# Patient Record
Sex: MALE | Race: WHITE | NOT HISPANIC OR LATINO | Employment: OTHER | URBAN - METROPOLITAN AREA
[De-identification: names, ages, dates, MRNs, and addresses within clinical notes are randomized per-mention and may not be internally consistent; named-entity substitution may affect disease eponyms.]

---

## 2018-08-21 DIAGNOSIS — M25.561 RIGHT KNEE PAIN, UNSPECIFIED CHRONICITY: Primary | ICD-10-CM

## 2019-11-22 ENCOUNTER — HOSPITAL ENCOUNTER (EMERGENCY)
Facility: HOSPITAL | Age: 64
Discharge: HOME | End: 2019-11-22
Attending: EMERGENCY MEDICINE
Payer: MEDICAID

## 2019-11-22 VITALS
RESPIRATION RATE: 18 BRPM | DIASTOLIC BLOOD PRESSURE: 94 MMHG | BODY MASS INDEX: 21.96 KG/M2 | SYSTOLIC BLOOD PRESSURE: 139 MMHG | TEMPERATURE: 98 F | HEIGHT: 77 IN | OXYGEN SATURATION: 95 % | HEART RATE: 88 BPM | WEIGHT: 186 LBS
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
## 2019-11-22 DIAGNOSIS — S50.01XA CONTUSION OF RIGHT ELBOW, INITIAL ENCOUNTER: ICD-10-CM

## 2019-11-22 DIAGNOSIS — S63.501A SPRAIN OF RIGHT WRIST, INITIAL ENCOUNTER: Primary | ICD-10-CM

## 2019-11-22 PROCEDURE — 73080 XR ELBOW COMPLETE 3 VIEW RIGHT: ICD-10-PCS | Mod: 26,RT,, | Performed by: RADIOLOGY

## 2019-11-22 PROCEDURE — 73080 X-RAY EXAM OF ELBOW: CPT | Mod: TC,FY,RT

## 2019-11-22 PROCEDURE — 73080 X-RAY EXAM OF ELBOW: CPT | Mod: 26,RT,, | Performed by: RADIOLOGY

## 2019-11-22 PROCEDURE — 73110 X-RAY EXAM OF WRIST: CPT | Mod: 26,RT,, | Performed by: RADIOLOGY

## 2019-11-22 PROCEDURE — 73110 X-RAY EXAM OF WRIST: CPT | Mod: TC,FY,RT

## 2019-11-22 PROCEDURE — 99284 EMERGENCY DEPT VISIT MOD MDM: CPT | Mod: 25

## 2019-11-22 PROCEDURE — 29125 APPL SHORT ARM SPLINT STATIC: CPT

## 2019-11-22 PROCEDURE — 25000003 PHARM REV CODE 250: Performed by: EMERGENCY MEDICINE

## 2019-11-22 PROCEDURE — 73110 XR WRIST COMPLETE 3 VIEWS RIGHT: ICD-10-PCS | Mod: 26,RT,, | Performed by: RADIOLOGY

## 2019-11-22 RX ORDER — KETOROLAC TROMETHAMINE 10 MG/1
10 TABLET, FILM COATED ORAL
Status: COMPLETED | OUTPATIENT
Start: 2019-11-22 | End: 2019-11-22

## 2019-11-22 RX ORDER — KETOROLAC TROMETHAMINE 10 MG/1
10 TABLET, FILM COATED ORAL EVERY 6 HOURS
Qty: 12 TABLET | Refills: 0 | Status: SHIPPED | OUTPATIENT
Start: 2019-11-22 | End: 2019-11-25

## 2019-11-22 RX ADMIN — KETOROLAC TROMETHAMINE 10 MG: 10 TABLET, FILM COATED ORAL at 09:11

## 2019-11-22 NOTE — ED TRIAGE NOTES
"Pt reports drinking "heavily" after fall in order to stop pain and not have to come to hospital.  "

## 2019-11-22 NOTE — ED PROVIDER NOTES
"Encounter Date: 11/22/2019       History     Chief Complaint   Patient presents with    Fall     4-5 hours pta    Wrist Injury     right wrist     63yo male with pmh anxiety/depression, COPD, HLD, HTN, and UC presents to ED for evaluation of right wrist/elbow pain after hitting his elbow on the shower earlier this morning, approximately 4-5 hours prior to arrival. Pain is constant, aching, and radiates from right wrist to right elbow. States he was showering, "has bad knees," slipped and caught himself on the handicap railing but struck his right elbow on the fiberglass shower. Denies head or neck trauma/pain, back pain, LOC. Denies any other injury.     Admits to drinking "two shots of moonshine" prior to arrival to treat his pain because he did not want to "come in." GCS 15, alert, and appropriate.          Review of patient's allergies indicates:  No Known Allergies  Past Medical History:   Diagnosis Date    Anxiety     COPD (chronic obstructive pulmonary disease)     Depression     High cholesterol     HTN (hypertension)     Ulcerative colitis      Past Surgical History:   Procedure Laterality Date    NECK SURGERY      right knee       Family History   Problem Relation Age of Onset    Diabetes Mother     Diabetes Sister      Social History     Tobacco Use    Smoking status: Current Every Day Smoker     Packs/day: 1.50     Years: 40.00     Pack years: 60.00     Types: Cigarettes    Smokeless tobacco: Never Used   Substance Use Topics    Alcohol use: Yes    Drug use: No     Review of Systems   Constitutional: Negative for appetite change, chills, diaphoresis, fatigue and fever.   HENT: Negative for congestion, ear pain, rhinorrhea, sinus pressure, sinus pain, sore throat and tinnitus.    Eyes: Negative for photophobia and visual disturbance.   Respiratory: Negative for cough, chest tightness, shortness of breath and wheezing.    Cardiovascular: Negative for chest pain, palpitations and leg " swelling.   Gastrointestinal: Negative for abdominal pain, constipation, diarrhea, nausea and vomiting.   Endocrine: Negative for cold intolerance, heat intolerance, polydipsia, polyphagia and polyuria.   Genitourinary: Negative for decreased urine volume, difficulty urinating, dysuria, flank pain, frequency, hematuria and urgency.   Musculoskeletal: Positive for arthralgias and joint swelling. Negative for back pain, gait problem, myalgias, neck pain and neck stiffness.   Skin: Negative for color change, pallor, rash and wound.   Allergic/Immunologic: Negative for immunocompromised state.   Neurological: Negative for dizziness, syncope, weakness, light-headedness, numbness and headaches.   Hematological: Negative for adenopathy. Does not bruise/bleed easily.   Psychiatric/Behavioral: Negative for decreased concentration, dysphoric mood and sleep disturbance. The patient is not nervous/anxious.    All other systems reviewed and are negative.      Physical Exam     Initial Vitals [11/22/19 0801]   BP Pulse Resp Temp SpO2   (!) 139/94 88 18 98 °F (36.7 °C) 95 %      MAP       --         Physical Exam    Nursing note and vitals reviewed.  Constitutional: He appears well-developed and well-nourished. He is not diaphoretic. No distress.   HENT:   Head: Normocephalic and atraumatic.   Right Ear: External ear normal.   Left Ear: External ear normal.   Nose: Nose normal.   Mouth/Throat: Oropharynx is clear and moist.   Eyes: Conjunctivae are normal. Pupils are equal, round, and reactive to light. No scleral icterus.   Neck: Normal range of motion and full passive range of motion without pain. Neck supple. No spinous process tenderness and no muscular tenderness present. Normal range of motion present. No JVD present.   Cardiovascular: Normal rate, regular rhythm, normal heart sounds and intact distal pulses.   Pulmonary/Chest: Breath sounds normal. No respiratory distress. He has no wheezes. He has no rhonchi. He has no  rales. He exhibits no tenderness.   Abdominal: Soft. Bowel sounds are normal. He exhibits no distension. There is no tenderness.   Musculoskeletal: He exhibits edema and tenderness.        Right elbow: He exhibits decreased range of motion and swelling. He exhibits no deformity. Tenderness found.        Right wrist: He exhibits decreased range of motion, tenderness, bony tenderness and swelling. He exhibits no deformity.   Lymphadenopathy:     He has no cervical adenopathy.   Neurological: He is alert and oriented to person, place, and time. GCS score is 15. GCS eye subscore is 4. GCS verbal subscore is 5. GCS motor subscore is 6.   Skin: Skin is warm and dry. Capillary refill takes less than 2 seconds. No rash and no abscess noted. No erythema. No pallor.   Psychiatric: He has a normal mood and affect. His behavior is normal. Judgment and thought content normal.         ED Course   Procedures  Labs Reviewed - No data to display       Imaging Results          X-Ray Elbow Complete Right (Final result)  Result time 11/22/19 09:14:08    Final result by Dmitry Finnegan MD (11/22/19 09:14:08)                 Impression:      1. No acute fracture dislocation.  2. Mild degenerative osteoarthrosis.      Electronically signed by: Dmitry Finnegan  Date:    11/22/2019  Time:    09:14             Narrative:    EXAMINATION:  XR ELBOW COMPLETE 3 VIEW RIGHT    CLINICAL HISTORY:  . Unspecified fall, initial encounter    TECHNIQUE:  AP, lateral, and oblique views of the right elbow were performed.    COMPARISON:  None    FINDINGS:  No acute fracture or dislocation.  No significant soft tissue swelling.    The joint spaces are preserved.  Mild degenerative osteoarthrosis with small osteophyte formation.  Small osteophyte formation of the medial and lateral epicondyle.    No significant joint effusion.                               X-Ray Wrist Complete Right (Final result)  Result time 11/22/19 08:38:49    Final result by Dmitry AQUINO  MD Kain (11/22/19 08:38:49)                 Impression:      1. Mild degenerative osteoarthrosis.  2. Mild bone demineralization.      Electronically signed by: Dmitry Finnegan  Date:    11/22/2019  Time:    08:38             Narrative:    EXAMINATION:  XR WRIST COMPLETE 3 VIEWS RIGHT    CLINICAL HISTORY:  Unspecified fall, initial encounter    TECHNIQUE:  PA, lateral, and oblique views of the right wrist were performed.    COMPARISON:  None.    FINDINGS:  There is no acute fracture or dislocation.  No significant soft tissue swelling.    The carpal bones are intact with mild changes of degenerative osteoarthrosis.  The radiocarpal articulation is intact with mild degenerative osteoarthrosis at the radioscaphoid joint.  The ulnar styloid is intact.    Mild bone demineralization.                                 Medical Decision Making:   Differential Diagnosis:   Sprain, contusion, fracture  ED Management:  Imaging without any acute findings. Discussed all results with the patient, who will follow up with pcp as instructed.   MS  without any prescribed controlled substances within the last year.   Splinted for comfort and provided prescription for Toradol.                                  Clinical Impression:       ICD-10-CM ICD-9-CM   1. Sprain of right wrist, initial encounter S63.501A 842.00   2. Fall W19.XXXA E888.9   3. Contusion of right elbow, initial encounter S50.01XA 923.11         Disposition:   Disposition: Discharged  Condition: Stable                     Caty Calderon MD  11/22/19 6621

## 2020-01-28 ENCOUNTER — HOSPITAL ENCOUNTER (OUTPATIENT)
Dept: RADIOLOGY | Facility: HOSPITAL | Age: 65
Discharge: HOME | End: 2020-01-28
Attending: NURSE PRACTITIONER
Payer: MEDICAID

## 2020-01-28 DIAGNOSIS — Z72.0 TOBACCO ABUSE: ICD-10-CM

## 2020-01-28 DIAGNOSIS — J44.9 CHRONIC OBSTRUCTIVE PULMONARY DISEASE, UNSPECIFIED COPD TYPE: ICD-10-CM

## 2020-01-28 DIAGNOSIS — Z12.9 SCREENING FOR CANCER: ICD-10-CM

## 2020-01-28 PROCEDURE — 71250 CT THORAX DX C-: CPT | Mod: TC

## 2020-01-28 PROCEDURE — 71250 CT THORAX DX C-: CPT | Mod: 26,,, | Performed by: RADIOLOGY

## 2020-01-28 PROCEDURE — 71250 CT CHEST WITHOUT CONTRAST: ICD-10-PCS | Mod: 26,,, | Performed by: RADIOLOGY

## 2020-01-30 ENCOUNTER — TELEPHONE (OUTPATIENT)
Dept: UROLOGY | Facility: CLINIC | Age: 65
End: 2020-01-30

## 2020-01-30 NOTE — TELEPHONE ENCOUNTER
Spoke with pt. Scheduled consult with MD in 02/2020. Verbalized an understanding.     ----- Message from Pricila Glover sent at 1/30/2020  2:38 PM CST -----  Contact: Pt  Type: Needs Medical Advice    Who Called:  Pt  Best Call Back Number: [phone]  Additional Information: Requesting a call back regarding if pt appointment will be approved by his insurance   Please Advise ---Thank you

## 2020-02-12 ENCOUNTER — HOSPITAL ENCOUNTER (EMERGENCY)
Facility: HOSPITAL | Age: 65
Discharge: HOME | End: 2020-02-12
Attending: FAMILY MEDICINE
Payer: MEDICAID

## 2020-02-12 VITALS
HEART RATE: 83 BPM | DIASTOLIC BLOOD PRESSURE: 100 MMHG | WEIGHT: 185 LBS | HEIGHT: 77 IN | RESPIRATION RATE: 16 BRPM | TEMPERATURE: 98 F | SYSTOLIC BLOOD PRESSURE: 140 MMHG | BODY MASS INDEX: 21.84 KG/M2 | OXYGEN SATURATION: 96 %

## 2020-02-12 DIAGNOSIS — R05.9 COUGH: ICD-10-CM

## 2020-02-12 DIAGNOSIS — J44.1 COPD WITH ACUTE EXACERBATION: Primary | ICD-10-CM

## 2020-02-12 DIAGNOSIS — R06.02 SOB (SHORTNESS OF BREATH): ICD-10-CM

## 2020-02-12 LAB
ALBUMIN SERPL BCP-MCNC: 4 G/DL (ref 3.5–5.2)
ALP SERPL-CCNC: 93 U/L (ref 55–135)
ALT SERPL W/O P-5'-P-CCNC: 26 U/L (ref 10–44)
ANION GAP SERPL CALC-SCNC: 10 MMOL/L (ref 8–16)
AST SERPL-CCNC: 23 U/L (ref 10–40)
BASOPHILS # BLD AUTO: 0.05 K/UL (ref 0–0.2)
BASOPHILS NFR BLD: 0.6 % (ref 0–1.9)
BILIRUB SERPL-MCNC: 0.5 MG/DL (ref 0.1–1)
BUN SERPL-MCNC: 10 MG/DL (ref 8–23)
CALCIUM SERPL-MCNC: 9.4 MG/DL (ref 8.7–10.5)
CHLORIDE SERPL-SCNC: 105 MMOL/L (ref 95–110)
CO2 SERPL-SCNC: 25 MMOL/L (ref 23–29)
CREAT SERPL-MCNC: 0.7 MG/DL (ref 0.5–1.4)
DEPRECATED S PYO AG THROAT QL EIA: NEGATIVE
DIFFERENTIAL METHOD: ABNORMAL
EOSINOPHIL # BLD AUTO: 0.2 K/UL (ref 0–0.5)
EOSINOPHIL NFR BLD: 2.5 % (ref 0–8)
ERYTHROCYTE [DISTWIDTH] IN BLOOD BY AUTOMATED COUNT: 13.2 % (ref 11.5–14.5)
EST. GFR  (AFRICAN AMERICAN): >60 ML/MIN/1.73 M^2
EST. GFR  (NON AFRICAN AMERICAN): >60 ML/MIN/1.73 M^2
GLUCOSE SERPL-MCNC: 112 MG/DL (ref 70–110)
HCT VFR BLD AUTO: 44.2 % (ref 40–54)
HGB BLD-MCNC: 14.7 G/DL (ref 14–18)
IMM GRANULOCYTES # BLD AUTO: 0.02 K/UL (ref 0–0.04)
IMM GRANULOCYTES NFR BLD AUTO: 0.3 % (ref 0–0.5)
INFLUENZA A, MOLECULAR: NEGATIVE
INFLUENZA B, MOLECULAR: NEGATIVE
LYMPHOCYTES # BLD AUTO: 1.3 K/UL (ref 1–4.8)
LYMPHOCYTES NFR BLD: 16.9 % (ref 18–48)
MCH RBC QN AUTO: 30.7 PG (ref 27–31)
MCHC RBC AUTO-ENTMCNC: 33.3 G/DL (ref 32–36)
MCV RBC AUTO: 92 FL (ref 82–98)
MONOCYTES # BLD AUTO: 0.6 K/UL (ref 0.3–1)
MONOCYTES NFR BLD: 7.3 % (ref 4–15)
NEUTROPHILS # BLD AUTO: 5.7 K/UL (ref 1.8–7.7)
NEUTROPHILS NFR BLD: 72.4 % (ref 38–73)
NRBC BLD-RTO: 0 /100 WBC
PLATELET # BLD AUTO: 278 K/UL (ref 150–350)
PMV BLD AUTO: 10.5 FL (ref 9.2–12.9)
POTASSIUM SERPL-SCNC: 4.1 MMOL/L (ref 3.5–5.1)
PROT SERPL-MCNC: 7.2 G/DL (ref 6–8.4)
RBC # BLD AUTO: 4.79 M/UL (ref 4.6–6.2)
SODIUM SERPL-SCNC: 140 MMOL/L (ref 136–145)
SPECIMEN SOURCE: NORMAL
WBC # BLD AUTO: 7.91 K/UL (ref 3.9–12.7)

## 2020-02-12 PROCEDURE — 99284 EMERGENCY DEPT VISIT MOD MDM: CPT | Mod: 25

## 2020-02-12 PROCEDURE — 96375 TX/PRO/DX INJ NEW DRUG ADDON: CPT

## 2020-02-12 PROCEDURE — 94640 AIRWAY INHALATION TREATMENT: CPT

## 2020-02-12 PROCEDURE — 94761 N-INVAS EAR/PLS OXIMETRY MLT: CPT

## 2020-02-12 PROCEDURE — 96365 THER/PROPH/DIAG IV INF INIT: CPT

## 2020-02-12 PROCEDURE — 71046 XR CHEST PA AND LATERAL: ICD-10-PCS | Mod: 26,,, | Performed by: RADIOLOGY

## 2020-02-12 PROCEDURE — 25000242 PHARM REV CODE 250 ALT 637 W/ HCPCS: Performed by: FAMILY MEDICINE

## 2020-02-12 PROCEDURE — 85025 COMPLETE CBC W/AUTO DIFF WBC: CPT

## 2020-02-12 PROCEDURE — 87081 CULTURE SCREEN ONLY: CPT

## 2020-02-12 PROCEDURE — 80053 COMPREHEN METABOLIC PANEL: CPT

## 2020-02-12 PROCEDURE — 87880 STREP A ASSAY W/OPTIC: CPT

## 2020-02-12 PROCEDURE — 87502 INFLUENZA DNA AMP PROBE: CPT

## 2020-02-12 PROCEDURE — 63600175 PHARM REV CODE 636 W HCPCS: Performed by: FAMILY MEDICINE

## 2020-02-12 PROCEDURE — 71046 X-RAY EXAM CHEST 2 VIEWS: CPT | Mod: TC,FY

## 2020-02-12 PROCEDURE — 71046 X-RAY EXAM CHEST 2 VIEWS: CPT | Mod: 26,,, | Performed by: RADIOLOGY

## 2020-02-12 RX ORDER — LEVOFLOXACIN 5 MG/ML
750 INJECTION, SOLUTION INTRAVENOUS
Status: COMPLETED | OUTPATIENT
Start: 2020-02-12 | End: 2020-02-12

## 2020-02-12 RX ORDER — IPRATROPIUM BROMIDE AND ALBUTEROL SULFATE 2.5; .5 MG/3ML; MG/3ML
6 SOLUTION RESPIRATORY (INHALATION)
Status: COMPLETED | OUTPATIENT
Start: 2020-02-12 | End: 2020-02-12

## 2020-02-12 RX ORDER — AMOXICILLIN AND CLAVULANATE POTASSIUM 875; 125 MG/1; MG/1
1 TABLET, FILM COATED ORAL 2 TIMES DAILY
Qty: 20 TABLET | Refills: 0 | Status: SHIPPED | OUTPATIENT
Start: 2020-02-12 | End: 2020-02-12 | Stop reason: SDUPTHER

## 2020-02-12 RX ORDER — ALBUTEROL SULFATE 90 UG/1
1-2 AEROSOL, METERED RESPIRATORY (INHALATION) EVERY 4 HOURS PRN
Qty: 8 G | Refills: 1 | Status: SHIPPED | OUTPATIENT
Start: 2020-02-12 | End: 2020-02-12 | Stop reason: SDUPTHER

## 2020-02-12 RX ORDER — PREDNISONE 20 MG/1
60 TABLET ORAL DAILY
Qty: 15 TABLET | Refills: 0 | Status: SHIPPED | OUTPATIENT
Start: 2020-02-12 | End: 2020-02-12 | Stop reason: SDUPTHER

## 2020-02-12 RX ORDER — PREDNISONE 20 MG/1
60 TABLET ORAL DAILY
Qty: 15 TABLET | Refills: 0 | Status: SHIPPED | OUTPATIENT
Start: 2020-02-12 | End: 2020-02-17

## 2020-02-12 RX ORDER — ALBUTEROL SULFATE 90 UG/1
1-2 AEROSOL, METERED RESPIRATORY (INHALATION) EVERY 4 HOURS PRN
Qty: 8 G | Refills: 1 | Status: SHIPPED | OUTPATIENT
Start: 2020-02-12 | End: 2020-05-29 | Stop reason: SDUPTHER

## 2020-02-12 RX ORDER — METHYLPREDNISOLONE SOD SUCC 125 MG
125 VIAL (EA) INJECTION
Status: COMPLETED | OUTPATIENT
Start: 2020-02-12 | End: 2020-02-12

## 2020-02-12 RX ORDER — AMOXICILLIN AND CLAVULANATE POTASSIUM 875; 125 MG/1; MG/1
1 TABLET, FILM COATED ORAL 2 TIMES DAILY
Qty: 20 TABLET | Refills: 0 | Status: SHIPPED | OUTPATIENT
Start: 2020-02-12 | End: 2020-03-06

## 2020-02-12 RX ADMIN — METHYLPREDNISOLONE SODIUM SUCCINATE 125 MG: 125 INJECTION, POWDER, FOR SOLUTION INTRAMUSCULAR; INTRAVENOUS at 04:02

## 2020-02-12 RX ADMIN — LEVOFLOXACIN 750 MG: 750 INJECTION, SOLUTION INTRAVENOUS at 04:02

## 2020-02-12 RX ADMIN — IPRATROPIUM BROMIDE AND ALBUTEROL SULFATE 6 ML: .5; 3 SOLUTION RESPIRATORY (INHALATION) at 04:02

## 2020-02-12 NOTE — ED PROVIDER NOTES
Encounter Date: 2/12/2020       History     Chief Complaint   Patient presents with    Cough    suspected pneumonia     Patient comes is complaining of shortness of breath that has worsened over the last 1 week.  He has a productive cough with yellow sputum production.  He has a chronic cough from smoking.  He continues to smoke, but claims to have cut down.  Patient does have a history of emphysema.  He has worsening of his wheezing.  He does not use any nebulizer or MDI therapy.  He denies any sick contacts.  He does not use home oxygen.  He states that his last COPD flare-up was about 2 years prior.        Review of patient's allergies indicates:  No Known Allergies  Past Medical History:   Diagnosis Date    Anxiety     COPD (chronic obstructive pulmonary disease)     Depression     High cholesterol     HTN (hypertension)     Ulcerative colitis      Past Surgical History:   Procedure Laterality Date    NECK SURGERY      right knee       Family History   Problem Relation Age of Onset    Diabetes Mother     Diabetes Sister      Social History     Tobacco Use    Smoking status: Current Every Day Smoker     Packs/day: 1.50     Years: 40.00     Pack years: 60.00     Types: Cigarettes    Smokeless tobacco: Never Used   Substance Use Topics    Alcohol use: Yes    Drug use: No     Review of Systems   Constitutional: Negative for chills, diaphoresis, fatigue and fever.   HENT: Negative for sinus pressure, sinus pain, sneezing and sore throat.    Eyes: Negative for visual disturbance.   Respiratory: Positive for cough, shortness of breath and wheezing. Negative for choking, chest tightness and stridor.    Cardiovascular: Negative for chest pain, palpitations and leg swelling.   Gastrointestinal: Negative for abdominal pain, constipation, diarrhea, nausea and vomiting.   Genitourinary: Negative for dysuria, flank pain and frequency.   Musculoskeletal: Negative for arthralgias and myalgias.   Skin: Negative  for color change, pallor, rash and wound.   Neurological: Negative for dizziness, weakness, light-headedness and headaches.   Hematological: Negative for adenopathy. Does not bruise/bleed easily.   Psychiatric/Behavioral: Negative for agitation, behavioral problems and confusion.       Physical Exam     Initial Vitals [02/12/20 1543]   BP Pulse Resp Temp SpO2   (!) 140/100 80 16 98 °F (36.7 °C) 96 %      MAP       --         Physical Exam    Nursing note and vitals reviewed.  Constitutional: He appears well-developed and well-nourished. He is not diaphoretic. No distress.   HENT:   Head: Normocephalic and atraumatic.   Nose: Nose normal.   Mouth/Throat: Oropharynx is clear and moist. No oropharyngeal exudate.   Eyes: Conjunctivae and EOM are normal.   Cardiovascular: Normal rate, regular rhythm, normal heart sounds and intact distal pulses.   No murmur heard.  Pulmonary/Chest: He has no wheezes. He has no rhonchi. He exhibits no tenderness.   diminished breath sounds   Abdominal: Soft. Bowel sounds are normal. He exhibits no distension. There is no tenderness. There is no rebound and no guarding.   Musculoskeletal: Normal range of motion. He exhibits no edema or tenderness.   Lymphadenopathy:     He has no cervical adenopathy.   Neurological: He is alert and oriented to person, place, and time. He has normal strength. No cranial nerve deficit or sensory deficit. GCS score is 15. GCS eye subscore is 4. GCS verbal subscore is 5. GCS motor subscore is 6.   Skin: Skin is warm and dry. Capillary refill takes less than 2 seconds. No rash and no abscess noted. No erythema. No pallor.   Psychiatric: He has a normal mood and affect. His behavior is normal. Judgment normal.         ED Course   Procedures  Labs Reviewed - No data to display       Imaging Results    None       X-Rays:   Independently Interpreted Readings:   Other Readings:  Chronic left pleural effusion unchanged from previous exams.  Lower lobe atelectasis on  left side noted. No infiltrates noted.                                    Clinical Impression:       ICD-10-CM ICD-9-CM   1. COPD with acute exacerbation J44.1 491.21   2. Cough R05 786.2   3. SOB (shortness of breath) R06.02 786.05         Disposition:   Disposition: Discharged  Condition: Stable                     Kanu Vargas MD  02/12/20 1860

## 2020-02-12 NOTE — ED TRIAGE NOTES
Pt sent from Dr. Farrell's office by NP for suspected pneumonia.  Cough congestion for 1 wk.  Hx of COPD

## 2020-02-15 LAB — BACTERIA THROAT CULT: NORMAL

## 2020-03-06 ENCOUNTER — OFFICE VISIT (OUTPATIENT)
Dept: UROLOGY | Facility: CLINIC | Age: 65
End: 2020-03-06
Payer: MEDICAID

## 2020-03-06 VITALS
HEART RATE: 64 BPM | BODY MASS INDEX: 21.73 KG/M2 | RESPIRATION RATE: 16 BRPM | SYSTOLIC BLOOD PRESSURE: 137 MMHG | TEMPERATURE: 98 F | WEIGHT: 184 LBS | HEIGHT: 77 IN | DIASTOLIC BLOOD PRESSURE: 87 MMHG

## 2020-03-06 DIAGNOSIS — R97.20 ELEVATED PSA: ICD-10-CM

## 2020-03-06 PROCEDURE — 99999 PR PBB SHADOW E&M-EST. PATIENT-LVL IV: ICD-10-PCS | Mod: PBBFAC,,, | Performed by: UROLOGY

## 2020-03-06 PROCEDURE — 99204 OFFICE O/P NEW MOD 45 MIN: CPT | Mod: S$PBB,,, | Performed by: UROLOGY

## 2020-03-06 PROCEDURE — 99214 OFFICE O/P EST MOD 30 MIN: CPT | Mod: PBBFAC | Performed by: UROLOGY

## 2020-03-06 PROCEDURE — 99999 PR PBB SHADOW E&M-EST. PATIENT-LVL IV: CPT | Mod: PBBFAC,,, | Performed by: UROLOGY

## 2020-03-06 PROCEDURE — 99204 PR OFFICE/OUTPT VISIT, NEW, LEVL IV, 45-59 MIN: ICD-10-PCS | Mod: S$PBB,,, | Performed by: UROLOGY

## 2020-03-06 NOTE — PROGRESS NOTES
Subjective:       Patient ID: Jama James is a 64 y.o. male.    Chief Complaint:   Elevated PSA.  This is a consultation with Dr. Marisel Farrell please send a copy of the report to him.     HPI   Mr. James is a 64-year-old male who in a recent routine evaluation was found to have a significant elevation of the PSA level.  On February 12, 2020 his PSA was 23.0.  The patient has been referred to our office for further evaluation and possible treatments.  The patient referred that have significant nocturia times 8-10 times.  Denies dysuria or hematuria.  The flow is a slow but feels to be adequate.  He feels that he is not able to empty the bladder satisfactory.  The patient denies any significant bone pain.  In the last few years have lost significant weight he refers that 5 years ago was 250 now is 184.    The past genitourinary history is completely negative no history of urinary tract infections or stones.  The family history is negative for prostate cancer.    The past medical and surgical history is significant this patient suffer of severe COPD and also asbestosis.  Is a tobacco abuser.    The postvoid residual urine was measured and found to be at 7 cc.      Review of Systems   Constitutional: Negative for activity change and appetite change.   HENT: Negative.    Eyes: Negative for discharge.   Respiratory: Positive for cough and shortness of breath.    Cardiovascular: Negative for chest pain and palpitations.   Gastrointestinal: Negative for abdominal distention, abdominal pain, constipation and vomiting.   Genitourinary: Negative for discharge, dysuria, flank pain, frequency, hematuria, testicular pain and urgency.   Musculoskeletal: Negative for arthralgias.   Skin: Negative for rash.   Neurological: Negative for dizziness.   Psychiatric/Behavioral: The patient is not nervous/anxious.          Objective:      Physical Exam   Constitutional: He appears well-developed and well-nourished.   HENT:   Head:  Normocephalic.   Eyes: Pupils are equal, round, and reactive to light.   Neck: Normal range of motion.   Cardiovascular: Normal rate.    Pulmonary/Chest: Effort normal.   Abdominal: Soft. He exhibits no distension and no mass. There is no tenderness.   Genitourinary: Rectum normal and penis normal. Rectal exam shows no external hemorrhoid, no mass and no tenderness. Prostate is not enlarged and not tender. Right testis shows no mass and no tenderness. Left testis shows no mass and no tenderness. No discharge found.       Musculoskeletal: Normal range of motion.   Neurological: He is alert.   Skin: Skin is warm.     Psychiatric: He has a normal mood and affect.       Assessment:       1. Elevated PSA        Plan:       Elevated PSA  -     Case Request Operating Room: BIOPSY, PROSTATE, RECTAL APPROACH, WITH US GUIDANCE  -     US Transrectal; Future; Expected date: 03/18/2020     The patient was informed that we need to proceed with a prostate biopsy in order to determine if he has are not prostatic neoplasm.  The rationale risks and complication were fully explained to him and his family member.  He agreed to proceed with it and has been is scheduled to undergo this procedure on 03/18/2020 under general mac sedation.  I thank you for letting me participate in the care of your patient will be following the patient with you.

## 2020-03-06 NOTE — LETTER
March 6, 2020      Lisa Y. Cooksey, MILAGROS  [address]  [place]  [place] [address]           [facility]  [address] [place] [address]  Phone: [phone]  Fax: [phone]          Patient: Jama James   MR Number: [id number]   YOB: 1955   Date of Visit: 3/6/2020       Dear Lisa Y. Cooksey:    Thank you for referring Jama James to me for evaluation. Attached you will find relevant portions of my assessment and plan of care.    If you have questions, please do not hesitate to call me. I look forward to following Jama James along with you.    Sincerely,    Bill Jeong MD    Enclosure  CC:  No Recipients    If you would like to receive this communication electronically, please contact [email or web address] or [phone] to request more information on [employer] Link access.    For providers and/or their staff who would like to refer a patient to [facility], please contact us through our one-stop-shop provider referral line, [facility], at [phone].    If you feel you have received this communication in error or would no longer like to receive these types of communications, please e-mail [email or web address]

## 2020-03-16 ENCOUNTER — HOSPITAL ENCOUNTER (OUTPATIENT)
Dept: PREADMISSION TESTING | Facility: HOSPITAL | Age: 65
Discharge: HOME | End: 2020-03-16
Attending: UROLOGY
Payer: MEDICAID

## 2020-03-18 ENCOUNTER — HOSPITAL ENCOUNTER (OUTPATIENT)
Dept: RADIOLOGY | Facility: HOSPITAL | Age: 65
Discharge: HOME | End: 2020-03-18
Attending: UROLOGY | Admitting: UROLOGY
Payer: MEDICAID

## 2020-03-18 ENCOUNTER — ANESTHESIA EVENT (OUTPATIENT)
Dept: SURGERY | Facility: HOSPITAL | Age: 65
End: 2020-03-18
Payer: MEDICAID

## 2020-03-18 ENCOUNTER — ANESTHESIA (OUTPATIENT)
Dept: SURGERY | Facility: HOSPITAL | Age: 65
End: 2020-03-18
Payer: MEDICAID

## 2020-03-18 ENCOUNTER — HOSPITAL ENCOUNTER (OUTPATIENT)
Facility: HOSPITAL | Age: 65
Discharge: HOME | End: 2020-03-18
Attending: UROLOGY | Admitting: UROLOGY
Payer: MEDICAID

## 2020-03-18 VITALS
RESPIRATION RATE: 16 BRPM | BODY MASS INDEX: 21.84 KG/M2 | OXYGEN SATURATION: 96 % | HEIGHT: 77 IN | DIASTOLIC BLOOD PRESSURE: 83 MMHG | SYSTOLIC BLOOD PRESSURE: 149 MMHG | TEMPERATURE: 98 F | HEART RATE: 70 BPM | WEIGHT: 185 LBS

## 2020-03-18 DIAGNOSIS — R97.20 ELEVATED PSA: ICD-10-CM

## 2020-03-18 PROCEDURE — 25000242 PHARM REV CODE 250 ALT 637 W/ HCPCS

## 2020-03-18 PROCEDURE — 88305 TISSUE EXAM BY PATHOLOGIST: CPT | Mod: 26,,, | Performed by: PATHOLOGY

## 2020-03-18 PROCEDURE — 76872 US TRANSRECTAL: CPT | Mod: 26,,, | Performed by: UROLOGY

## 2020-03-18 PROCEDURE — 00910 ANES TRANSURETHRAL PX NOS: CPT | Performed by: UROLOGY

## 2020-03-18 PROCEDURE — 37000009 HC ANESTHESIA EA ADD 15 MINS: Performed by: UROLOGY

## 2020-03-18 PROCEDURE — 63600175 PHARM REV CODE 636 W HCPCS: Performed by: UROLOGY

## 2020-03-18 PROCEDURE — D9220A PRA ANESTHESIA: ICD-10-PCS | Mod: ,,, | Performed by: ANESTHESIOLOGY

## 2020-03-18 PROCEDURE — 76998 US INTRAOPERATIVE: ICD-10-PCS | Mod: 26,,, | Performed by: RADIOLOGY

## 2020-03-18 PROCEDURE — 63600175 PHARM REV CODE 636 W HCPCS: Performed by: NURSE ANESTHETIST, CERTIFIED REGISTERED

## 2020-03-18 PROCEDURE — D9220A PRA ANESTHESIA: Mod: ,,, | Performed by: ANESTHESIOLOGY

## 2020-03-18 PROCEDURE — 25000003 PHARM REV CODE 250: Performed by: UROLOGY

## 2020-03-18 PROCEDURE — 55700 PR BIOPSY OF PROSTATE,NEEDLE/PUNCH: ICD-10-PCS | Mod: ,,, | Performed by: UROLOGY

## 2020-03-18 PROCEDURE — 25000003 PHARM REV CODE 250: Performed by: NURSE ANESTHETIST, CERTIFIED REGISTERED

## 2020-03-18 PROCEDURE — 71000015 HC POSTOP RECOV 1ST HR: Performed by: UROLOGY

## 2020-03-18 PROCEDURE — 37000008 HC ANESTHESIA 1ST 15 MINUTES: Performed by: UROLOGY

## 2020-03-18 PROCEDURE — 25000003 PHARM REV CODE 250: Performed by: ANESTHESIOLOGY

## 2020-03-18 PROCEDURE — 36000705 HC OR TIME LEV I EA ADD 15 MIN: Performed by: UROLOGY

## 2020-03-18 PROCEDURE — 76998 US GUIDE INTRAOP: CPT | Mod: TC

## 2020-03-18 PROCEDURE — 76872 PR US TRANSRECTAL: ICD-10-PCS | Mod: 26,,, | Performed by: UROLOGY

## 2020-03-18 PROCEDURE — 63600175 PHARM REV CODE 636 W HCPCS: Performed by: ANESTHESIOLOGY

## 2020-03-18 PROCEDURE — 76998 US GUIDE INTRAOP: CPT | Mod: 26,,, | Performed by: RADIOLOGY

## 2020-03-18 PROCEDURE — 88305 TISSUE EXAM BY PATHOLOGIST: ICD-10-PCS | Mod: 26,,, | Performed by: PATHOLOGY

## 2020-03-18 PROCEDURE — 55700 PR BIOPSY OF PROSTATE,NEEDLE/PUNCH: CPT | Mod: ,,, | Performed by: UROLOGY

## 2020-03-18 PROCEDURE — S0028 INJECTION, FAMOTIDINE, 20 MG: HCPCS | Performed by: ANESTHESIOLOGY

## 2020-03-18 PROCEDURE — 76942 PR U/S GUIDANCE FOR NEEDLE GUIDANCE: ICD-10-PCS | Mod: 26,59,, | Performed by: UROLOGY

## 2020-03-18 PROCEDURE — 27201423 OPTIME MED/SURG SUP & DEVICES STERILE SUPPLY: Performed by: UROLOGY

## 2020-03-18 PROCEDURE — 76942 ECHO GUIDE FOR BIOPSY: CPT | Mod: 26,59,, | Performed by: UROLOGY

## 2020-03-18 PROCEDURE — 36000704 HC OR TIME LEV I 1ST 15 MIN: Performed by: UROLOGY

## 2020-03-18 PROCEDURE — 71000033 HC RECOVERY, INTIAL HOUR: Performed by: UROLOGY

## 2020-03-18 PROCEDURE — 88305 TISSUE EXAM BY PATHOLOGIST: CPT | Mod: 59 | Performed by: PATHOLOGY

## 2020-03-18 PROCEDURE — 94640 AIRWAY INHALATION TREATMENT: CPT | Mod: 59

## 2020-03-18 PROCEDURE — 52000 CYSTOURETHROSCOPY: CPT | Mod: 59,,, | Performed by: UROLOGY

## 2020-03-18 PROCEDURE — 94761 N-INVAS EAR/PLS OXIMETRY MLT: CPT | Mod: 59

## 2020-03-18 PROCEDURE — 52000 PR CYSTOURETHROSCOPY: ICD-10-PCS | Mod: 59,,, | Performed by: UROLOGY

## 2020-03-18 PROCEDURE — 94760 N-INVAS EAR/PLS OXIMETRY 1: CPT

## 2020-03-18 RX ORDER — DIPHENHYDRAMINE HYDROCHLORIDE 50 MG/ML
12.5 INJECTION INTRAMUSCULAR; INTRAVENOUS
Status: DISCONTINUED | OUTPATIENT
Start: 2020-03-18 | End: 2020-03-18 | Stop reason: HOSPADM

## 2020-03-18 RX ORDER — ONDANSETRON 2 MG/ML
4 INJECTION INTRAMUSCULAR; INTRAVENOUS DAILY PRN
Status: DISCONTINUED | OUTPATIENT
Start: 2020-03-18 | End: 2020-03-18 | Stop reason: HOSPADM

## 2020-03-18 RX ORDER — SUCCINYLCHOLINE CHLORIDE 20 MG/ML
INJECTION INTRAMUSCULAR; INTRAVENOUS
Status: DISCONTINUED | OUTPATIENT
Start: 2020-03-18 | End: 2020-03-18

## 2020-03-18 RX ORDER — FAMOTIDINE 10 MG/ML
INJECTION INTRAVENOUS
Status: DISCONTINUED
Start: 2020-03-18 | End: 2020-03-18 | Stop reason: HOSPADM

## 2020-03-18 RX ORDER — IPRATROPIUM BROMIDE AND ALBUTEROL SULFATE 2.5; .5 MG/3ML; MG/3ML
SOLUTION RESPIRATORY (INHALATION)
Status: COMPLETED
Start: 2020-03-18 | End: 2020-03-18

## 2020-03-18 RX ORDER — MEPERIDINE HYDROCHLORIDE 50 MG/ML
INJECTION INTRAMUSCULAR; INTRAVENOUS; SUBCUTANEOUS
Status: DISCONTINUED | OUTPATIENT
Start: 2020-03-18 | End: 2020-03-18

## 2020-03-18 RX ORDER — LIDOCAINE HYDROCHLORIDE 10 MG/ML
1 INJECTION, SOLUTION EPIDURAL; INFILTRATION; INTRACAUDAL; PERINEURAL ONCE
Status: DISCONTINUED | OUTPATIENT
Start: 2020-03-18 | End: 2020-03-18 | Stop reason: HOSPADM

## 2020-03-18 RX ORDER — SODIUM CHLORIDE, SODIUM LACTATE, POTASSIUM CHLORIDE, CALCIUM CHLORIDE 600; 310; 30; 20 MG/100ML; MG/100ML; MG/100ML; MG/100ML
INJECTION, SOLUTION INTRAVENOUS CONTINUOUS
Status: DISCONTINUED | OUTPATIENT
Start: 2020-03-18 | End: 2020-03-18 | Stop reason: HOSPADM

## 2020-03-18 RX ORDER — IPRATROPIUM BROMIDE AND ALBUTEROL SULFATE 2.5; .5 MG/3ML; MG/3ML
3 SOLUTION RESPIRATORY (INHALATION) ONCE
Status: DISCONTINUED | OUTPATIENT
Start: 2020-03-18 | End: 2020-03-18 | Stop reason: HOSPADM

## 2020-03-18 RX ORDER — CEPHALEXIN 500 MG/1
500 CAPSULE ORAL EVERY 12 HOURS
Qty: 10 CAPSULE | Refills: 0 | Status: SHIPPED | OUTPATIENT
Start: 2020-03-18 | End: 2020-03-23

## 2020-03-18 RX ORDER — PROPOFOL 10 MG/ML
VIAL (ML) INTRAVENOUS
Status: DISCONTINUED | OUTPATIENT
Start: 2020-03-18 | End: 2020-03-18

## 2020-03-18 RX ORDER — CEFAZOLIN SODIUM 2 G/50ML
2 SOLUTION INTRAVENOUS
Status: COMPLETED | OUTPATIENT
Start: 2020-03-18 | End: 2020-03-18

## 2020-03-18 RX ORDER — CEFAZOLIN SODIUM 2 G/50ML
SOLUTION INTRAVENOUS
Status: COMPLETED
Start: 2020-03-18 | End: 2020-03-18

## 2020-03-18 RX ORDER — FAMOTIDINE 10 MG/ML
20 INJECTION INTRAVENOUS 2 TIMES DAILY
Status: DISCONTINUED | OUTPATIENT
Start: 2020-03-18 | End: 2020-03-18 | Stop reason: HOSPADM

## 2020-03-18 RX ORDER — LIDOCAINE HYDROCHLORIDE 20 MG/ML
JELLY TOPICAL
Status: DISCONTINUED | OUTPATIENT
Start: 2020-03-18 | End: 2020-03-18 | Stop reason: HOSPADM

## 2020-03-18 RX ORDER — GLYCOPYRROLATE 0.2 MG/ML
INJECTION INTRAMUSCULAR; INTRAVENOUS
Status: DISCONTINUED | OUTPATIENT
Start: 2020-03-18 | End: 2020-03-18

## 2020-03-18 RX ORDER — TRAMADOL HYDROCHLORIDE AND ACETAMINOPHEN 37.5; 325 MG/1; MG/1
1 TABLET, FILM COATED ORAL EVERY 6 HOURS PRN
Qty: 15 TABLET | Refills: 0 | Status: SHIPPED | OUTPATIENT
Start: 2020-03-18 | End: 2022-09-14

## 2020-03-18 RX ORDER — SODIUM CHLORIDE, SODIUM LACTATE, POTASSIUM CHLORIDE, CALCIUM CHLORIDE 600; 310; 30; 20 MG/100ML; MG/100ML; MG/100ML; MG/100ML
125 INJECTION, SOLUTION INTRAVENOUS CONTINUOUS
Status: DISCONTINUED | OUTPATIENT
Start: 2020-03-18 | End: 2020-03-18 | Stop reason: HOSPADM

## 2020-03-18 RX ADMIN — IPRATROPIUM BROMIDE AND ALBUTEROL SULFATE: 2.5; .5 SOLUTION RESPIRATORY (INHALATION) at 09:03

## 2020-03-18 RX ADMIN — MEPERIDINE HYDROCHLORIDE 25 MG: 50 INJECTION INTRAMUSCULAR; INTRAVENOUS; SUBCUTANEOUS at 10:03

## 2020-03-18 RX ADMIN — SUCCINYLCHOLINE CHLORIDE 120 MG: 20 INJECTION, SOLUTION INTRAMUSCULAR; INTRAVENOUS at 10:03

## 2020-03-18 RX ADMIN — FAMOTIDINE 20 MG: 10 INJECTION INTRAVENOUS at 09:03

## 2020-03-18 RX ADMIN — CEFAZOLIN SODIUM 2 G: 2 SOLUTION INTRAVENOUS at 10:03

## 2020-03-18 RX ADMIN — SODIUM CHLORIDE, SODIUM LACTATE, POTASSIUM CHLORIDE, AND CALCIUM CHLORIDE: .6; .31; .03; .02 INJECTION, SOLUTION INTRAVENOUS at 09:03

## 2020-03-18 RX ADMIN — PROPOFOL 200 MG: 10 INJECTION, EMULSION INTRAVENOUS at 10:03

## 2020-03-18 RX ADMIN — GLYCOPYRROLATE 0.4 MG: 0.2 INJECTION INTRAMUSCULAR; INTRAVENOUS at 11:03

## 2020-03-18 NOTE — TRANSFER OF CARE
"Anesthesia Transfer of Care Note    Patient: Jama James    Procedure(s) Performed: Procedure(s) (LRB):  BIOPSY, PROSTATE, RECTAL APPROACH, WITH US GUIDANCE (Bilateral)    Patient location: PACU    Anesthesia Type: general    Transport from OR: Transported from OR on room air with adequate spontaneous ventilation    Post pain: adequate analgesia    Post assessment: no apparent anesthetic complications and tolerated procedure well    Post vital signs: stable    Level of consciousness: awake, alert and oriented    Nausea/Vomiting: no nausea/vomiting    Complications: none    Transfer of care protocol was followed      Last vitals:   Visit Vitals  /71   Pulse 78   Temp 36.7 °C (98.1 °F) (Oral)   Resp 16   Ht 6' 5" (1.956 m)   Wt 83.9 kg (185 lb)   SpO2 98%   BMI 21.94 kg/m²     "

## 2020-03-18 NOTE — BRIEF OP NOTE
Brief Operative Note     Surgery Date: 3/18/2020    Surgeon:   Bill Jeong MD     Pre-op Diagnosis:  Elevated PSA [R97.20]  Elevated PSA [R97.20]    Post-op Diagnosis:  Same    Procedure:  Procedure(s) (LRB):  BIOPSY, PROSTATE, RECTAL APPROACH, WITH US GUIDANCE (Bilateral)  Cystoscopy  Anesthesia: General    Findings/Key Components:  46 cc prostate    Estimated Blood Loss: Minimal                                                                                                                           Discharge Summary    Admit Date: 3/18/2020     Attending Physician: Bill Jeong MD     Discharge Physician: Bill Jeong MD      Discharge Date: 3/18/2020    Treatments/Procedures: Procedure(s) (LRB):  BIOPSY, PROSTATE, RECTAL APPROACH, WITH US GUIDANCE (Bilateral) Cystoscopy  Final Diagnosis:Elevated PSA  Hospital Course: TRUS+BX+Cystoscopy done as planned.  F.U. Dr. Jeong 2 weeks    Disposition: Home or Self Care     Patient Instructions:     Current Discharge Medication List:         Jama James   Home Medication Instructions PARK:[id number]    Printed on:03/18/20 2504   Medication Information                      albuterol (PROVENTIL/VENTOLIN HFA) 90 mcg/actuation inhaler  Inhale 1-2 puffs into the lungs every 4 (four) hours as needed for Wheezing. Rescue             amlodipine-benazepril 5-20 mg (LOTREL) 5-20 mg per capsule  Take 1 capsule by mouth once daily.             cephALEXin (KEFLEX) 500 MG capsule  Take 1 capsule (500 mg total) by mouth every 12 (twelve) hours. for 10 doses             fluticasone-umeclidin-vilanter (TRELEGY ELLIPTA) 100-62.5-25 mcg DsDv  Inhale 1 puff into the lungs once daily.             ipratropium-albuterol (COMBIVENT RESPIMAT)  mcg/actuation inhaler  Inhale 1 puff into the lungs every 4 (four) hours as needed for Wheezing or Shortness of Breath.             naproxen (NAPROSYN) 500 MG tablet  Take one tablet by mouth once twice daily as needed for  pain. With food and water             omeprazole (PRILOSEC) 20 MG capsule  TAKE 1 CAPSULE EVERY MORNING (30 MINUTES BEFORE BREAKFAST) FOR STOMACH             paroxetine (PAXIL) 40 MG tablet  Take one tablet by mouth once daily             simvastatin (ZOCOR) 40 MG tablet  Take 1 tablet (40 mg total) by mouth nightly.             spironolactone (ALDACTONE) 50 MG tablet  Take 50 mg by mouth once daily.             tramadol-acetaminophen 37.5-325 mg (ULTRACET) 37.5-325 mg Tab  Take 1 tablet by mouth every 6 (six) hours as needed.                     Current Discharge Medication List      START taking these medications    Details   cephALEXin (KEFLEX) 500 MG capsule Take 1 capsule (500 mg total) by mouth every 12 (twelve) hours. for 10 doses  Qty: 10 capsule, Refills: 0      tramadol-acetaminophen 37.5-325 mg (ULTRACET) 37.5-325 mg Tab Take 1 tablet by mouth every 6 (six) hours as needed.  Qty: 15 tablet, Refills: 0         CONTINUE these medications which have NOT CHANGED    Details   albuterol (PROVENTIL/VENTOLIN HFA) 90 mcg/actuation inhaler Inhale 1-2 puffs into the lungs every 4 (four) hours as needed for Wheezing. Rescue  Qty: 8 g, Refills: 1    Associated Diagnoses: COPD with acute exacerbation      amlodipine-benazepril 5-20 mg (LOTREL) 5-20 mg per capsule Take 1 capsule by mouth once daily.  Qty: 90 capsule, Refills: 1    Comments: To replace Lotrel 5/10  Associated Diagnoses: Essential hypertension      fluticasone-umeclidin-vilanter (TRELEGY ELLIPTA) 100-62.5-25 mcg DsDv Inhale 1 puff into the lungs once daily.  Qty: 60 each, Refills: 5    Associated Diagnoses: Tobacco abuse; Chronic obstructive pulmonary disease, unspecified COPD type      ipratropium-albuterol (COMBIVENT RESPIMAT)  mcg/actuation inhaler Inhale 1 puff into the lungs every 4 (four) hours as needed for Wheezing or Shortness of Breath.  Qty: 12 g, Refills: 1    Associated Diagnoses: Chronic obstructive pulmonary disease, unspecified COPD  type      naproxen (NAPROSYN) 500 MG tablet Take one tablet by mouth once twice daily as needed for pain. With food and water  Qty: 60 tablet, Refills: 5    Associated Diagnoses: Polyarthralgia      omeprazole (PRILOSEC) 20 MG capsule TAKE 1 CAPSULE EVERY MORNING (30 MINUTES BEFORE BREAKFAST) FOR STOMACH  Qty: 90 capsule, Refills: 1    Associated Diagnoses: Gastroesophageal reflux disease, esophagitis presence not specified      paroxetine (PAXIL) 40 MG tablet Take one tablet by mouth once daily  Qty: 90 tablet, Refills: 1    Associated Diagnoses: Labile mood      simvastatin (ZOCOR) 40 MG tablet Take 1 tablet (40 mg total) by mouth nightly.  Qty: 90 tablet, Refills: 1    Associated Diagnoses: High cholesterol      spironolactone (ALDACTONE) 50 MG tablet Take 50 mg by mouth once daily.             Discharge Procedure Orders:    Discharge Procedure Orders   Diet Adult Regular     Activity as tolerated

## 2020-03-18 NOTE — PLAN OF CARE
Pt arrives to PACU via stretcher awake and able to follow simple commands, monitor connected, PIV intact and  infusing LR'S to gravity. VSS, assist with Urinal, No complaints will continue  monitor

## 2020-03-18 NOTE — OP NOTE
TRANSRECTAL PROSTATIC ULTRASOUND, TRANSRECTAL PROSTATE BIOPSY  REPORT    DATE:  3/18/2020  NAME:Jama James  : 1955  Diagnosis: Elevated PSA,PROSTATE NODULE  Current PSA: 23.    EBL: Minimal                          TRANSRECTAL ULTRASOUND  Measurements:       Volume: 46 cm3    Seminal vesicles/Ejaculatory Ducts: Normal  Outline/Symmetry of Prostate: WNL  Central Gland/Transition Zone: Well demarcated  Peripheral Zone: Hypoechoic  Bladder: Normal  MedianLobe: Normal    Documentation images were taken.  It was confirmed that the patient took the antibiotic this morning. A total of 12 biopsies were taken with ultrasound guidance, using a spring loaded needle device. The biopsies were at the base, mid-portion, apex and lateral areas of each lobe. Minimal rectal bleeding was observed. The patient tolerated the procedure well. He is to keep taking the prescribed antibiotics until finished. Post biopsy instructions were given and he is to follow up in one week to discuss the pathologist report.      CYSTOSCOPY REPORT    Surgery Date:  3/18/2020  Surgeon(s) and Role:     * Bill Jeong MD - Primary      Jama James  : 1955    Pre Procedure Diagnosis:Elevated PSA    OPERATION: CYSTOSCOPY     ANESTHESIA: 10 cc 2% lidocaine jelly applied per urethra.General      PROCEDURE:  The patient was taken to the cystoscopy suite and placed in supine position.  The genitalia was prepped and draped  in the usual sterile fashion.  Two percent lidocaine jelly was inserted in the urethra .  After sufficent time had passed to allow good local anesthesia, the cystoscope was inserted in the urethra and passed into the bladder visualizing the urethra along its entire course.  The dome, anterior, posterior and lateral walls were examined systematically.  The ureteral orifices were in their usual position and configuration. The cystoscope was then brought to the level of the bladder neck, the water was turned on and the  prostate was visualized.  The cystoscope was removed .  SPECIMEN:none    FINDINGS:  URETHRA: open with no strictures  PROSTATE: 4.5 cm with mild partial FERRIS   TRABEC: grade 2  BLADDER: no lesions or stones seen  URETERAL ORIFICES : NSSP clear efflux   OTHER FINDINGS: none    Procedure medication: Lidocaine gel in the urethra  Post Procedure Diagnosis: Elevated PSA, suspected Prostate CA.     ASSESSMENT/PLAN:  Status post  cystoscopy.  1. Push fluids for 24 hours.  2. May see blood in the urine, this should gradually improve over the next 2-3 days.  3. The patient was instructed to return to the office or go to the emergency should fever, chills, cloudy urine, or inability to urinate develop.  4.  PLAN: Await Biopsy results.  5. Follow up in 2 weeks

## 2020-03-18 NOTE — ANESTHESIA PREPROCEDURE EVALUATION
03/18/2020  Jama James is a 64 y.o., male.    Anesthesia Evaluation    I have reviewed the Patient Summary Reports.    I have reviewed the Nursing Notes.   I have reviewed the Medications.     Review of Systems  Social:  Smoker    Cardiovascular:   Hypertension SUBRAMANIAN    Pulmonary:   COPD    Neurological:  Neurology Normal    Endocrine:  Endocrine Normal    Psych:   Psychiatric History          Physical Exam  General:  Well nourished    Airway/Jaw/Neck:  Airway Findings: (Beard Positive 4mm lesion on tongue.  Hard, elevated.  Patient instructedn to see an ENT doctor) Mouth Opening: Normal  Tongue: Normal  General Airway Assessment: Adult  Mallampati: III  TM Distance: Normal, at least 6 cm       Chest/Lungs:  Chest/Lungs Findings: Clear to auscultation     Heart/Vascular:  Heart Findings: Rate: Normal  Rhythm: Regular Rhythm        Mental Status:  Mental Status Findings:  Cooperative, Alert and Oriented         Anesthesia Plan  Type of Anesthesia, risks & benefits discussed:  Anesthesia Type:  general  Patient's Preference:   Intra-op Monitoring Plan: standard ASA monitors  Intra-op Monitoring Plan Comments:   Post Op Pain Control Plan: IV/PO Opioids PRN  Post Op Pain Control Plan Comments:   Induction:   IV  Beta Blocker:  Patient is not currently on a Beta-Blocker (No further documentation required).       Informed Consent: Patient understands risks and agrees with Anesthesia plan.  Questions answered. Anesthesia consent signed with patient.  ASA Score: 3     Day of Surgery Review of History & Physical: I have interviewed and examined the patient. I have reviewed the patient's H&P dated:            Ready For Surgery From Anesthesia Perspective.

## 2020-03-18 NOTE — ANESTHESIA POSTPROCEDURE EVALUATION
Anesthesia Post Evaluation    Patient: Jama James    Procedure(s) Performed: Procedure(s) (LRB):  BIOPSY, PROSTATE, RECTAL APPROACH, WITH US GUIDANCE (Bilateral)    Final Anesthesia Type: general    Patient location during evaluation: PACU  Patient participation: Yes- Able to Participate  Level of consciousness: awake and alert  Post-procedure vital signs: reviewed and stable  Pain management: adequate  Airway patency: patent    PONV status at discharge: No PONV  Anesthetic complications: no      Cardiovascular status: blood pressure returned to baseline  Respiratory status: unassisted  Hydration status: euvolemic  Follow-up not needed.          Vitals Value Taken Time   /83 3/18/2020 12:42 PM   Temp 36.7 °C (98.1 °F) 3/18/2020  9:04 AM   Pulse 71 3/18/2020 12:42 PM   Resp 17 3/18/2020 12:35 PM   SpO2 96 % 3/18/2020 12:42 PM   Vitals shown include unvalidated device data.      No case tracking events are documented in the log.      Pain/Jhon Score: Jhon Score: 10 (3/18/2020 12:35 PM)  Modified Jhon Score: 20 (3/18/2020 12:35 PM)

## 2020-03-18 NOTE — H&P
Chief Complaint:   Elevated PSA.  This is a consultation with Dr. Marisel Farrell please send a copy of the report to him.      HPI   Mr. James is a 64-year-old male who in a recent routine evaluation was found to have a significant elevation of the PSA level.  On February 12, 2020 his PSA was 23.0.  The patient has been referred to our office for further evaluation and possible treatments.  The patient referred that have significant nocturia times 8-10 times.  Denies dysuria or hematuria.  The flow is a slow but feels to be adequate.  He feels that he is not able to empty the bladder satisfactory.  The patient denies any significant bone pain.  In the last few years have lost significant weight he refers that 5 years ago was 250 now is 184.    The past genitourinary history is completely negative no history of urinary tract infections or stones.  The family history is negative for prostate cancer.     The past medical and surgical history is significant this patient suffer of severe COPD and also asbestosis.  Is a tobacco abuser.     The postvoid residual urine was measured and found to be at 7 cc.        Review of Systems   Constitutional: Negative for activity change and appetite change.   HENT: Negative.    Eyes: Negative for discharge.   Respiratory: Positive for cough and shortness of breath.    Cardiovascular: Negative for chest pain and palpitations.   Gastrointestinal: Negative for abdominal distention, abdominal pain, constipation and vomiting.   Genitourinary: Negative for discharge, dysuria, flank pain, frequency, hematuria, testicular pain and urgency.   Musculoskeletal: Negative for arthralgias.   Skin: Negative for rash.   Neurological: Negative for dizziness.   Psychiatric/Behavioral: The patient is not nervous/anxious.           Objective:   Physical Exam   Constitutional: He appears well-developed and well-nourished.   HENT:   Head: Normocephalic.   Eyes: Pupils are equal, round, and reactive to  light.   Neck: Normal range of motion.   Cardiovascular: Normal rate.    Pulmonary/Chest: Effort normal.   Abdominal: Soft. He exhibits no distension and no mass. There is no tenderness.   Genitourinary: Rectum normal and penis normal. Rectal exam shows no external hemorrhoid, no mass and no tenderness. Prostate is not enlarged and not tender. Right testis shows no mass and no tenderness. Left testis shows no mass and no tenderness. No discharge found.       Musculoskeletal: Normal range of motion.   Neurological: He is alert.   Skin: Skin is warm.     Psychiatric: He has a normal mood and affect.       Assessment:       1. Elevated PSA        Plan:       TRUS+BX+Cystoscopy

## 2020-03-25 LAB
FINAL PATHOLOGIC DIAGNOSIS: NORMAL
GROSS: NORMAL

## 2020-03-26 ENCOUNTER — TELEPHONE (OUTPATIENT)
Dept: UROLOGY | Facility: CLINIC | Age: 65
End: 2020-03-26

## 2020-03-26 NOTE — TELEPHONE ENCOUNTER
Attempted to call pt to inform him of recent lab results and set him up for a telemedicine visit  was unable to JESSEM. Called pt contact Troy CHADWICK.

## 2020-04-23 ENCOUNTER — TELEPHONE (OUTPATIENT)
Dept: UROLOGY | Facility: CLINIC | Age: 65
End: 2020-04-23

## 2020-04-23 NOTE — TELEPHONE ENCOUNTER
Tried calling pt back, no answer and did not have voicemail. Pt needs an audio visit with anne marie

## 2020-04-23 NOTE — TELEPHONE ENCOUNTER
----- Message from Dayana Mcdonough sent at 4/23/2020  3:01 PM CDT -----  Contact: patient  Type: Needs Medical Advice  Who Called:  Patient  Best Call Back Number: [phone] (home) .  Additional Information: the patient said he got a letter stating he needs to come in to discuss the biopsy results please call him to advise

## 2020-04-24 ENCOUNTER — TELEPHONE (OUTPATIENT)
Dept: UROLOGY | Facility: CLINIC | Age: 65
End: 2020-04-24

## 2020-04-24 NOTE — TELEPHONE ENCOUNTER
Pt was set up for a future appt to discuss results per MD request. Pt confirmed susie date and time.       ----- Message from Stephanie Victor MA sent at 4/23/2020  3:09 PM CDT -----  Contact: patient  I called pt but he did not answer, I will not be here tomorrow so I am forwarding this to you.    ----- Message -----  From: Dayana Mcdonough  Sent: 4/23/2020   3:01 PM CDT  To: Jean-Paul LÓ[facility] Staff    Type: Needs Medical Advice  Who Called:  Patient  Best Call Back Number: [phone] (home) .  Additional Information: the patient said he got a letter stating he needs to come in to discuss the biopsy results please call him to advise

## 2020-05-27 ENCOUNTER — OFFICE VISIT (OUTPATIENT)
Dept: UROLOGY | Facility: CLINIC | Age: 65
End: 2020-05-27
Payer: MEDICAID

## 2020-05-27 VITALS
TEMPERATURE: 97 F | RESPIRATION RATE: 16 BRPM | BODY MASS INDEX: 21.37 KG/M2 | WEIGHT: 181 LBS | DIASTOLIC BLOOD PRESSURE: 89 MMHG | HEART RATE: 101 BPM | HEIGHT: 77 IN | SYSTOLIC BLOOD PRESSURE: 169 MMHG | OXYGEN SATURATION: 98 %

## 2020-05-27 DIAGNOSIS — D49.59 NEOPLASM OF PROSTATE: ICD-10-CM

## 2020-05-27 DIAGNOSIS — C61 PROSTATE CANCER: ICD-10-CM

## 2020-05-27 LAB
BILIRUB SERPL-MCNC: ABNORMAL MG/DL
BLOOD URINE, POC: ABNORMAL
COLOR, POC UA: YELLOW
GLUCOSE UR QL STRIP: 500
KETONES UR QL STRIP: ABNORMAL
LEUKOCYTE ESTERASE URINE, POC: ABNORMAL
NITRITE, POC UA: ABNORMAL
PH, POC UA: 5
PROTEIN, POC: 100
SPECIFIC GRAVITY, POC UA: 1.03
UROBILINOGEN, POC UA: 0.2

## 2020-05-27 PROCEDURE — 81002 URINALYSIS NONAUTO W/O SCOPE: CPT | Mod: PBBFAC | Performed by: UROLOGY

## 2020-05-27 PROCEDURE — 99214 OFFICE O/P EST MOD 30 MIN: CPT | Mod: S$PBB,,, | Performed by: UROLOGY

## 2020-05-27 PROCEDURE — 99214 OFFICE O/P EST MOD 30 MIN: CPT | Mod: PBBFAC | Performed by: UROLOGY

## 2020-05-27 PROCEDURE — 99999 PR PBB SHADOW E&M-EST. PATIENT-LVL IV: ICD-10-PCS | Mod: PBBFAC,,, | Performed by: UROLOGY

## 2020-05-27 PROCEDURE — 99999 PR PBB SHADOW E&M-EST. PATIENT-LVL IV: CPT | Mod: PBBFAC,,, | Performed by: UROLOGY

## 2020-05-27 PROCEDURE — 99214 PR OFFICE/OUTPT VISIT, EST, LEVL IV, 30-39 MIN: ICD-10-PCS | Mod: S$PBB,,, | Performed by: UROLOGY

## 2020-05-27 NOTE — PROGRESS NOTES
Mr. James is a 64-year-old male who was found to have a PSA of 23.0 on January 2020 he underwent a transrectal prostate sound on biopsies on March 2020 the biopsies have shown prostate adenocarcinoma Igo score 7 on 7 scores out of 12 performed.  Today I have a lengthy discussion with Mr. Ponce regarding his condition and the need to perform a metastatic workup consisting bone scan and CT scan of the abdomen and pelvis without contrast.  I explained to the patient the concept of grading and staging of cancer of the prostate.  All the questions were answered at the patient's satisfaction he agreed to proceed with metastatic workup and to have a final discussion on possible treatments in the next appointment.  I spent approximately 30 min with Mr. Ponce all the time was spent on counseling

## 2020-06-03 DIAGNOSIS — D49.59 NEOPLASM OF PROSTATE: ICD-10-CM

## 2020-06-04 ENCOUNTER — HOSPITAL ENCOUNTER (OUTPATIENT)
Dept: RADIOLOGY | Facility: HOSPITAL | Age: 65
Discharge: HOME | End: 2020-06-04
Attending: UROLOGY
Payer: MEDICAID

## 2020-06-04 DIAGNOSIS — D49.59 NEOPLASM OF PROSTATE: ICD-10-CM

## 2020-06-04 PROCEDURE — 25500020 PHARM REV CODE 255: Performed by: UROLOGY

## 2020-06-04 PROCEDURE — 78306 BONE IMAGING WHOLE BODY: CPT | Mod: 26,,, | Performed by: RADIOLOGY

## 2020-06-04 PROCEDURE — 74177 CT ABD & PELVIS W/CONTRAST: CPT | Mod: TC

## 2020-06-04 PROCEDURE — A9503 TC99M MEDRONATE: HCPCS

## 2020-06-04 PROCEDURE — 74177 CT ABD & PELVIS W/CONTRAST: CPT | Mod: 26,,, | Performed by: RADIOLOGY

## 2020-06-04 PROCEDURE — A9698 NON-RAD CONTRAST MATERIALNOC: HCPCS | Performed by: UROLOGY

## 2020-06-04 PROCEDURE — 78306 NM BONE SCAN WHOLE BODY: ICD-10-PCS | Mod: 26,,, | Performed by: RADIOLOGY

## 2020-06-04 PROCEDURE — 74177 CT ABDOMEN PELVIS WITH CONTRAST: ICD-10-PCS | Mod: 26,,, | Performed by: RADIOLOGY

## 2020-06-04 RX ADMIN — IOHEXOL 75 ML: 350 INJECTION, SOLUTION INTRAVENOUS at 11:06

## 2020-06-04 RX ADMIN — IOHEXOL 500 ML: 9 SOLUTION ORAL at 10:06

## 2020-06-12 ENCOUNTER — TELEPHONE (OUTPATIENT)
Dept: UROLOGY | Facility: CLINIC | Age: 65
End: 2020-06-12

## 2020-06-12 NOTE — TELEPHONE ENCOUNTER
Attempted to call pt. LVM regarding test results       ----- Message from Odessa Vazquez LPN sent at 6/11/2020  4:33 PM CDT -----  Contact: Pt      ----- Message -----  From: Marleny Zamarripa  Sent: 6/11/2020   2:32 PM CDT  To: Anne Marie LÓPEZ Staff    Type:  Test Results    Who Called:   Pt  Name of Test (Lab/Mammo/Etc):  Lab & ultrasound  Date of Test:   6/4/20  Ordering Provider:   anne marie  Where the test was performed:   [facility]  Best Call Back Number:  [phone]  Additional Information: Pt requesting a call back to get test results

## 2020-06-19 ENCOUNTER — TELEPHONE (OUTPATIENT)
Dept: UROLOGY | Facility: CLINIC | Age: 65
End: 2020-06-19

## 2020-06-19 NOTE — TELEPHONE ENCOUNTER
Spoke to pt and pt sister on the phone, was calling about CT results informed them once the MD views and a result note is made will notify the pt, pt was set up for a f/u appt regarding results per pt request.

## 2020-07-01 ENCOUNTER — OFFICE VISIT (OUTPATIENT)
Dept: UROLOGY | Facility: CLINIC | Age: 65
End: 2020-07-01
Payer: MEDICAID

## 2020-07-01 VITALS
RESPIRATION RATE: 15 BRPM | HEART RATE: 105 BPM | HEIGHT: 77 IN | DIASTOLIC BLOOD PRESSURE: 91 MMHG | SYSTOLIC BLOOD PRESSURE: 157 MMHG | TEMPERATURE: 98 F | WEIGHT: 181 LBS | OXYGEN SATURATION: 98 % | BODY MASS INDEX: 21.37 KG/M2

## 2020-07-01 DIAGNOSIS — D49.59 NEOPLASM OF PROSTATE: Primary | ICD-10-CM

## 2020-07-01 PROCEDURE — 99215 OFFICE O/P EST HI 40 MIN: CPT | Mod: PBBFAC | Performed by: UROLOGY

## 2020-07-01 PROCEDURE — 99214 PR OFFICE/OUTPT VISIT, EST, LEVL IV, 30-39 MIN: ICD-10-PCS | Mod: S$PBB,,, | Performed by: UROLOGY

## 2020-07-01 PROCEDURE — 99999 PR PBB SHADOW E&M-EST. PATIENT-LVL V: ICD-10-PCS | Mod: PBBFAC,,, | Performed by: UROLOGY

## 2020-07-01 PROCEDURE — 99999 PR PBB SHADOW E&M-EST. PATIENT-LVL V: CPT | Mod: PBBFAC,,, | Performed by: UROLOGY

## 2020-07-01 PROCEDURE — 99214 OFFICE O/P EST MOD 30 MIN: CPT | Mod: S$PBB,,, | Performed by: UROLOGY

## 2020-07-01 NOTE — LETTER
July 1, 2020      Marisel Farrell III, MD  [address]  [facility] [place] [address]           [facility]  [address] [place] [address]  Phone: [phone]  Fax: [phone]          Patient: Jama James   MR Number: [id number]   YOB: 1955   Date of Visit: 7/1/2020       Dear Dr. Marisel Farrell III:    Thank you for referring Jama James to me for evaluation. Attached you will find relevant portions of my assessment and plan of care.    If you have questions, please do not hesitate to call me. I look forward to following Jama James along with you.    Sincerely,    Bill Jeong MD    Enclosure  CC:  No Recipients    If you would like to receive this communication electronically, please contact [email or web address] or [phone] to request more information on [facility] access.    For providers and/or their staff who would like to refer a patient to [facility], please contact us through our one-stop-shop provider referral line, [facility], at [phone].    If you feel you have received this communication in error or would no longer like to receive these types of communications, please e-mail [email or web address]

## 2020-07-01 NOTE — PROGRESS NOTES
Mr. James is a 64-year-old male who was diagnosed of prostate cancer Inge score 7.  He underwent a complete metastatic workup to have failed to show evidence of any metastatic disease.  His PSA is 20.  Today we have a lengthy discussion regarding different options for treatment including surgery radiation and or hormone therapy.  The patient is not a good risk for surgery because his CV year a comorbidities including COPD.    Abby suggested that we start with and LHRH agonist injection and we arrange for external beam radiation that can be given at [facility].  All this will be arranged by our office and I will plan to see him in 6 months.    All the questions were answered at his satisfaction and he left the office in satisfactory condition.  At approximately 25 min with him explaining the details of different therapies for prostate cancer with the complications and adverse side effects.

## 2020-07-10 ENCOUNTER — INFUSION (OUTPATIENT)
Dept: INFUSION THERAPY | Facility: HOSPITAL | Age: 65
End: 2020-07-10
Attending: UROLOGY
Payer: MEDICAID

## 2020-07-10 VITALS
TEMPERATURE: 99 F | OXYGEN SATURATION: 95 % | DIASTOLIC BLOOD PRESSURE: 82 MMHG | SYSTOLIC BLOOD PRESSURE: 146 MMHG | RESPIRATION RATE: 21 BRPM | HEART RATE: 99 BPM

## 2020-07-10 DIAGNOSIS — C61 PROSTATE CANCER: Primary | ICD-10-CM

## 2020-07-10 PROCEDURE — 96372 THER/PROPH/DIAG INJ SC/IM: CPT

## 2020-07-10 PROCEDURE — 63600175 PHARM REV CODE 636 W HCPCS: Mod: JG | Performed by: UROLOGY

## 2020-07-10 RX ADMIN — TRIPTORELIN PAMOATE 22.5 MG: KIT at 01:07

## 2020-07-10 NOTE — PLAN OF CARE
Problem: Adult Inpatient Plan of Care  Goal: Plan of Care Review  Outcome: Ongoing, Progressing  Flowsheets (Taken 7/10/2020 1329)  Plan of Care Reviewed With: patient    BP (!) 146/82   Pulse 99   Temp 98.9 °F (37.2 °C)   Resp (!) 21   SpO2 95%   Pt here today for trelstar injection. VSS. Injection given right glute. Secured with bandaid. No reaction indicated. Next appt 1/6/20. Ambulates per self. AVS printed for review.

## 2020-08-17 ENCOUNTER — TELEPHONE (OUTPATIENT)
Dept: UROLOGY | Facility: CLINIC | Age: 65
End: 2020-08-17

## 2020-08-17 NOTE — TELEPHONE ENCOUNTER
Referral has been faxed to [phone] [facility] informing pt to please call me next week if an appt has not been made or he has not been contacted by [facility] urology [facility].       MD states pt will need to have procedure done at [facility], spoke to pt and informed him referral will be sent to [facility] urology and someone will give him a call to set up appt with the pt, verbalized an understanding.         Spoke to pt and his sister they informed me pt would like to have surgery instead of beam radiation at [facility], informed pt I will notify and discuss this with the MD and then give him a call back, verbalized an understanding.       ----- Message from Abdulaziz Siegel sent at 8/17/2020  3:49 PM CDT -----  Type: Needs Medical Advice  Who Called: Sister/Renu Moura    Best Call Back Number: [phone]  Additional Information: Caller states that she would like a callback regarding scheduling the patient's cancer surgery.

## 2020-08-19 ENCOUNTER — TELEPHONE (OUTPATIENT)
Dept: UROLOGY | Facility: CLINIC | Age: 65
End: 2020-08-19

## 2020-09-03 ENCOUNTER — TELEPHONE (OUTPATIENT)
Dept: UROLOGY | Facility: CLINIC | Age: 65
End: 2020-09-03

## 2020-09-03 NOTE — TELEPHONE ENCOUNTER
Referral has been faxed to [phone] [facility] oncology dept  Pt contacted and informed insurance is out of network for [facility], pt verbalized an understanding.     Spoke to Svetlana she stated pt insurance is out of network with [facility] Oncology clinic he has [place] [employer]. She also stated pt could be referred to [facility].         Attempted to call number listed it is directed to the pharmacy, spoke to someone at [phone], [facility] Oncology clinic she states she will send a message to someone in regards to the referral and have them call with clarification of the message.       ----- Message from Micaela Spencer sent at 9/3/2020 10:53 AM CDT -----  Contact: [facility]  Type: Needs Medical Advice    Best Call Back Salome [phone]  Additional Information: received referral  [facility] is not contracted with Sherin Herron requesting  to a counteracted provider

## 2020-09-04 ENCOUNTER — TELEPHONE (OUTPATIENT)
Dept: UROLOGY | Facility: CLINIC | Age: 65
End: 2020-09-04

## 2020-09-04 NOTE — TELEPHONE ENCOUNTER
Pathology report, CT, bone scan, PSA report faxed to Dr. Yates at [facility] fax number [phone]

## 2020-09-24 ENCOUNTER — TELEPHONE (OUTPATIENT)
Dept: UROLOGY | Facility: CLINIC | Age: 65
End: 2020-09-24

## 2020-09-24 NOTE — TELEPHONE ENCOUNTER
Pt states he was seen by [facility] and was told he would need to go for treatment 5 days a week for 2 months, pt is not able to drive back and forth. Pt states he would like a referral sent to [facility] Urology Dr. Macias, to have surgery radiation performed. Information faxed to [phone]      ----- Message from Anastasia Cartagena sent at 9/24/2020  2:41 PM CDT -----  Contact: patient  Type: Needs Medical Advice  Who Called:  Margaret-spouse    Best Call Back Number: [phone] (home)     Additional Information: Please contact patient regarding a referral to surgeon, Dr. Nikki Helms  [phone]

## 2020-10-22 DIAGNOSIS — R94.8 ABNORMAL RADIONUCLIDE BONE SCAN: ICD-10-CM

## 2020-10-22 DIAGNOSIS — C61 PROSTATE CANCER: Primary | ICD-10-CM

## 2020-10-23 ENCOUNTER — HOSPITAL ENCOUNTER (OUTPATIENT)
Dept: RADIOLOGY | Facility: HOSPITAL | Age: 65
Discharge: HOME | End: 2020-10-23
Attending: SPECIALIST
Payer: MEDICAID

## 2020-10-23 DIAGNOSIS — R94.8 ABNORMAL RADIONUCLIDE BONE SCAN: ICD-10-CM

## 2020-10-23 DIAGNOSIS — C61 PROSTATE CANCER: ICD-10-CM

## 2020-10-23 PROCEDURE — 73562 X-RAY EXAM OF KNEE 3: CPT | Mod: TC,FY,LT

## 2020-10-23 PROCEDURE — 73562 X-RAY EXAM OF KNEE 3: CPT | Mod: 26,LT,, | Performed by: RADIOLOGY

## 2020-10-23 PROCEDURE — 73562 XR KNEE 3 VIEW LEFT: ICD-10-PCS | Mod: 26,LT,, | Performed by: RADIOLOGY

## 2020-12-01 ENCOUNTER — TELEPHONE (OUTPATIENT)
Dept: UROLOGY | Facility: CLINIC | Age: 65
End: 2020-12-01

## 2020-12-01 NOTE — TELEPHONE ENCOUNTER
Received a request of hormone therapy given and when from [facility], information has been faxed to [phone]

## 2020-12-29 ENCOUNTER — HOSPITAL ENCOUNTER (EMERGENCY)
Facility: HOSPITAL | Age: 65
Discharge: HOME | End: 2020-12-29
Attending: EMERGENCY MEDICINE
Payer: MEDICAID

## 2020-12-29 VITALS
HEIGHT: 77 IN | RESPIRATION RATE: 17 BRPM | DIASTOLIC BLOOD PRESSURE: 85 MMHG | HEART RATE: 92 BPM | TEMPERATURE: 98 F | BODY MASS INDEX: 21.02 KG/M2 | WEIGHT: 178 LBS | SYSTOLIC BLOOD PRESSURE: 141 MMHG | OXYGEN SATURATION: 94 %

## 2020-12-29 DIAGNOSIS — Z20.822 COVID-19 VIRUS RNA NOT DETECTED: ICD-10-CM

## 2020-12-29 DIAGNOSIS — J44.1 COPD EXACERBATION: Primary | ICD-10-CM

## 2020-12-29 DIAGNOSIS — R05.9 COUGH: ICD-10-CM

## 2020-12-29 LAB — SARS-COV-2 RDRP RESP QL NAA+PROBE: NEGATIVE

## 2020-12-29 PROCEDURE — 96372 THER/PROPH/DIAG INJ SC/IM: CPT

## 2020-12-29 PROCEDURE — 99284 EMERGENCY DEPT VISIT MOD MDM: CPT | Mod: 25

## 2020-12-29 PROCEDURE — U0002 COVID-19 LAB TEST NON-CDC: HCPCS

## 2020-12-29 PROCEDURE — 25000242 PHARM REV CODE 250 ALT 637 W/ HCPCS: Performed by: NURSE PRACTITIONER

## 2020-12-29 PROCEDURE — 63600175 PHARM REV CODE 636 W HCPCS: Performed by: NURSE PRACTITIONER

## 2020-12-29 PROCEDURE — 94640 AIRWAY INHALATION TREATMENT: CPT

## 2020-12-29 PROCEDURE — 71045 X-RAY EXAM CHEST 1 VIEW: CPT | Mod: 26,,, | Performed by: RADIOLOGY

## 2020-12-29 PROCEDURE — 71045 X-RAY EXAM CHEST 1 VIEW: CPT | Mod: TC,FY

## 2020-12-29 PROCEDURE — 94761 N-INVAS EAR/PLS OXIMETRY MLT: CPT

## 2020-12-29 PROCEDURE — 71045 XR CHEST AP PORTABLE: ICD-10-PCS | Mod: 26,,, | Performed by: RADIOLOGY

## 2020-12-29 RX ORDER — AZITHROMYCIN 500 MG/1
500 TABLET, FILM COATED ORAL DAILY
Qty: 5 TABLET | Refills: 0 | Status: SHIPPED | OUTPATIENT
Start: 2020-12-29 | End: 2021-01-03

## 2020-12-29 RX ORDER — IPRATROPIUM BROMIDE AND ALBUTEROL SULFATE 2.5; .5 MG/3ML; MG/3ML
3 SOLUTION RESPIRATORY (INHALATION)
Status: COMPLETED | OUTPATIENT
Start: 2020-12-29 | End: 2020-12-29

## 2020-12-29 RX ORDER — METHYLPREDNISOLONE 4 MG/1
TABLET ORAL
Qty: 1 PACKAGE | Refills: 0 | Status: SHIPPED | OUTPATIENT
Start: 2020-12-29 | End: 2021-01-19

## 2020-12-29 RX ORDER — CEFTRIAXONE 1 G/1
1 INJECTION, POWDER, FOR SOLUTION INTRAMUSCULAR; INTRAVENOUS
Status: COMPLETED | OUTPATIENT
Start: 2020-12-29 | End: 2020-12-29

## 2020-12-29 RX ADMIN — CEFTRIAXONE SODIUM 1 G: 1 INJECTION, POWDER, FOR SOLUTION INTRAMUSCULAR; INTRAVENOUS at 06:12

## 2020-12-29 RX ADMIN — IPRATROPIUM BROMIDE AND ALBUTEROL SULFATE 3 ML: .5; 3 SOLUTION RESPIRATORY (INHALATION) at 06:12

## 2020-12-29 RX ADMIN — METHYLPREDNISOLONE SODIUM SUCCINATE 80 MG: 40 INJECTION, POWDER, FOR SOLUTION INTRAMUSCULAR; INTRAVENOUS at 05:12

## 2020-12-31 ENCOUNTER — DOCUMENTATION ONLY (OUTPATIENT)
Dept: INFUSION THERAPY | Facility: HOSPITAL | Age: 65
End: 2020-12-31

## 2020-12-31 NOTE — PROGRESS NOTES
Liliana Haas, DAHIANA LÓPEZ Staff             Good morning!     Patient is scheduled for Trelstar injection on 1/6/21. Noticed yesterday that patient's appointment with Urology was cancelled. Is patient to f/u with Dr Goodwin now? Do I need to call patient and cancel appointment? Please advise.     Thanks,   Liliana

## 2021-01-06 ENCOUNTER — TELEPHONE (OUTPATIENT)
Dept: INFUSION THERAPY | Facility: HOSPITAL | Age: 66
End: 2021-01-06

## 2021-07-15 ENCOUNTER — HOSPITAL ENCOUNTER (EMERGENCY)
Facility: HOSPITAL | Age: 66
Discharge: HOME | End: 2021-07-15
Attending: EMERGENCY MEDICINE
Payer: MEDICAID

## 2021-07-15 VITALS
OXYGEN SATURATION: 97 % | HEART RATE: 87 BPM | SYSTOLIC BLOOD PRESSURE: 129 MMHG | DIASTOLIC BLOOD PRESSURE: 78 MMHG | WEIGHT: 190 LBS | HEIGHT: 77 IN | TEMPERATURE: 99 F | RESPIRATION RATE: 18 BRPM | BODY MASS INDEX: 22.43 KG/M2

## 2021-07-15 DIAGNOSIS — R05.9 COUGH: ICD-10-CM

## 2021-07-15 DIAGNOSIS — J40 BRONCHITIS: Primary | ICD-10-CM

## 2021-07-15 DIAGNOSIS — R07.9 CHEST PAIN: ICD-10-CM

## 2021-07-15 DIAGNOSIS — J44.9 CHRONIC OBSTRUCTIVE PULMONARY DISEASE, UNSPECIFIED COPD TYPE: ICD-10-CM

## 2021-07-15 LAB
ALBUMIN SERPL BCP-MCNC: 3.6 G/DL (ref 3.5–5.2)
ALP SERPL-CCNC: 160 U/L (ref 55–135)
ALT SERPL W/O P-5'-P-CCNC: 14 U/L (ref 10–44)
ANION GAP SERPL CALC-SCNC: 14 MMOL/L (ref 8–16)
AST SERPL-CCNC: 17 U/L (ref 10–40)
BASOPHILS # BLD AUTO: 0.05 K/UL (ref 0–0.2)
BASOPHILS NFR BLD: 0.6 % (ref 0–1.9)
BILIRUB SERPL-MCNC: 0.6 MG/DL (ref 0.1–1)
BUN SERPL-MCNC: 11 MG/DL (ref 8–23)
CALCIUM SERPL-MCNC: 10.6 MG/DL (ref 8.7–10.5)
CHLORIDE SERPL-SCNC: 104 MMOL/L (ref 95–110)
CK MB SERPL-MCNC: 1.4 NG/ML (ref 0.1–6.5)
CK MB SERPL-RTO: 3.4 % (ref 0–5)
CK SERPL-CCNC: 41 U/L (ref 20–200)
CO2 SERPL-SCNC: 21 MMOL/L (ref 23–29)
CREAT SERPL-MCNC: 0.7 MG/DL (ref 0.5–1.4)
DIFFERENTIAL METHOD: NORMAL
EOSINOPHIL # BLD AUTO: 0.2 K/UL (ref 0–0.5)
EOSINOPHIL NFR BLD: 2.2 % (ref 0–8)
ERYTHROCYTE [DISTWIDTH] IN BLOOD BY AUTOMATED COUNT: 12.5 % (ref 11.5–14.5)
EST. GFR  (AFRICAN AMERICAN): >60 ML/MIN/1.73 M^2
EST. GFR  (NON AFRICAN AMERICAN): >60 ML/MIN/1.73 M^2
GLUCOSE SERPL-MCNC: 131 MG/DL (ref 70–110)
HCT VFR BLD AUTO: 44.6 % (ref 40–54)
HGB BLD-MCNC: 15.1 G/DL (ref 14–18)
IMM GRANULOCYTES # BLD AUTO: 0.04 K/UL (ref 0–0.04)
IMM GRANULOCYTES NFR BLD AUTO: 0.4 % (ref 0–0.5)
LACTATE SERPL-SCNC: 0.8 MMOL/L (ref 0.5–2.2)
LYMPHOCYTES # BLD AUTO: 1.8 K/UL (ref 1–4.8)
LYMPHOCYTES NFR BLD: 19.5 % (ref 18–48)
MAGNESIUM SERPL-MCNC: 1.8 MG/DL (ref 1.6–2.6)
MCH RBC QN AUTO: 30.8 PG (ref 27–31)
MCHC RBC AUTO-ENTMCNC: 33.9 G/DL (ref 32–36)
MCV RBC AUTO: 91 FL (ref 82–98)
MONOCYTES # BLD AUTO: 1 K/UL (ref 0.3–1)
MONOCYTES NFR BLD: 10.5 % (ref 4–15)
NEUTROPHILS # BLD AUTO: 6 K/UL (ref 1.8–7.7)
NEUTROPHILS NFR BLD: 66.8 % (ref 38–73)
NRBC BLD-RTO: 0 /100 WBC
PLATELET # BLD AUTO: 426 K/UL (ref 150–450)
PMV BLD AUTO: 9.8 FL (ref 9.2–12.9)
POTASSIUM SERPL-SCNC: 4 MMOL/L (ref 3.5–5.1)
PROT SERPL-MCNC: 8 G/DL (ref 6–8.4)
RBC # BLD AUTO: 4.91 M/UL (ref 4.6–6.2)
SARS-COV-2 RDRP RESP QL NAA+PROBE: NEGATIVE
SODIUM SERPL-SCNC: 139 MMOL/L (ref 136–145)
TROPONIN I SERPL DL<=0.01 NG/ML-MCNC: <0.006 NG/ML (ref 0–0.03)
WBC # BLD AUTO: 9.01 K/UL (ref 3.9–12.7)

## 2021-07-15 PROCEDURE — 94761 N-INVAS EAR/PLS OXIMETRY MLT: CPT

## 2021-07-15 PROCEDURE — 87040 BLOOD CULTURE FOR BACTERIA: CPT | Performed by: EMERGENCY MEDICINE

## 2021-07-15 PROCEDURE — 93005 ELECTROCARDIOGRAM TRACING: CPT

## 2021-07-15 PROCEDURE — 25000003 PHARM REV CODE 250: Performed by: EMERGENCY MEDICINE

## 2021-07-15 PROCEDURE — 25000242 PHARM REV CODE 250 ALT 637 W/ HCPCS: Performed by: EMERGENCY MEDICINE

## 2021-07-15 PROCEDURE — 71045 X-RAY EXAM CHEST 1 VIEW: CPT | Mod: TC,FY

## 2021-07-15 PROCEDURE — U0002 COVID-19 LAB TEST NON-CDC: HCPCS | Performed by: EMERGENCY MEDICINE

## 2021-07-15 PROCEDURE — 36415 COLL VENOUS BLD VENIPUNCTURE: CPT | Performed by: EMERGENCY MEDICINE

## 2021-07-15 PROCEDURE — 96360 HYDRATION IV INFUSION INIT: CPT

## 2021-07-15 PROCEDURE — 94640 AIRWAY INHALATION TREATMENT: CPT

## 2021-07-15 PROCEDURE — 83735 ASSAY OF MAGNESIUM: CPT | Performed by: EMERGENCY MEDICINE

## 2021-07-15 PROCEDURE — 83605 ASSAY OF LACTIC ACID: CPT | Performed by: EMERGENCY MEDICINE

## 2021-07-15 PROCEDURE — 85025 COMPLETE CBC W/AUTO DIFF WBC: CPT | Performed by: EMERGENCY MEDICINE

## 2021-07-15 PROCEDURE — 82550 ASSAY OF CK (CPK): CPT | Performed by: EMERGENCY MEDICINE

## 2021-07-15 PROCEDURE — 71045 XR CHEST 1 VIEW: ICD-10-PCS | Mod: 26,,, | Performed by: RADIOLOGY

## 2021-07-15 PROCEDURE — 99285 EMERGENCY DEPT VISIT HI MDM: CPT | Mod: 25

## 2021-07-15 PROCEDURE — 84484 ASSAY OF TROPONIN QUANT: CPT | Performed by: EMERGENCY MEDICINE

## 2021-07-15 PROCEDURE — 71045 X-RAY EXAM CHEST 1 VIEW: CPT | Mod: 26,,, | Performed by: RADIOLOGY

## 2021-07-15 PROCEDURE — 80053 COMPREHEN METABOLIC PANEL: CPT | Performed by: EMERGENCY MEDICINE

## 2021-07-15 RX ORDER — PREDNISONE 10 MG/1
10 TABLET ORAL DAILY
Qty: 21 TABLET | Refills: 0 | Status: SHIPPED | OUTPATIENT
Start: 2021-07-15 | End: 2021-07-25

## 2021-07-15 RX ORDER — DOXYCYCLINE 100 MG/1
100 CAPSULE ORAL 2 TIMES DAILY
Qty: 20 CAPSULE | Refills: 0 | Status: SHIPPED | OUTPATIENT
Start: 2021-07-15 | End: 2021-07-25

## 2021-07-15 RX ORDER — ALBUTEROL SULFATE 90 UG/1
1-2 AEROSOL, METERED RESPIRATORY (INHALATION) EVERY 6 HOURS PRN
Qty: 1 G | Refills: 0 | Status: SHIPPED | OUTPATIENT
Start: 2021-07-15 | End: 2022-04-12 | Stop reason: SDUPTHER

## 2021-07-15 RX ORDER — BENZONATATE 100 MG/1
100 CAPSULE ORAL 3 TIMES DAILY PRN
Qty: 20 CAPSULE | Refills: 0 | Status: SHIPPED | OUTPATIENT
Start: 2021-07-15 | End: 2021-07-25

## 2021-07-15 RX ORDER — IPRATROPIUM BROMIDE AND ALBUTEROL SULFATE 2.5; .5 MG/3ML; MG/3ML
3 SOLUTION RESPIRATORY (INHALATION) ONCE
Status: COMPLETED | OUTPATIENT
Start: 2021-07-15 | End: 2021-07-15

## 2021-07-15 RX ORDER — IPRATROPIUM BROMIDE AND ALBUTEROL SULFATE 2.5; .5 MG/3ML; MG/3ML
3 SOLUTION RESPIRATORY (INHALATION) EVERY 6 HOURS PRN
Qty: 1 BOX | Refills: 0 | Status: SHIPPED | OUTPATIENT
Start: 2021-07-15 | End: 2022-07-15

## 2021-07-15 RX ADMIN — IPRATROPIUM BROMIDE AND ALBUTEROL SULFATE 3 ML: .5; 3 SOLUTION RESPIRATORY (INHALATION) at 11:07

## 2021-07-15 RX ADMIN — SODIUM CHLORIDE 1000 ML: 0.9 INJECTION, SOLUTION INTRAVENOUS at 10:07

## 2021-07-20 LAB
BACTERIA BLD CULT: NORMAL
BACTERIA BLD CULT: NORMAL

## 2022-05-07 ENCOUNTER — HOSPITAL ENCOUNTER (EMERGENCY)
Facility: HOSPITAL | Age: 67
Discharge: HOME | End: 2022-05-07
Attending: INTERNAL MEDICINE
Payer: MEDICAID

## 2022-05-07 VITALS
BODY MASS INDEX: 22.43 KG/M2 | OXYGEN SATURATION: 94 % | HEART RATE: 96 BPM | SYSTOLIC BLOOD PRESSURE: 140 MMHG | RESPIRATION RATE: 20 BRPM | WEIGHT: 190 LBS | DIASTOLIC BLOOD PRESSURE: 74 MMHG | HEIGHT: 77 IN | TEMPERATURE: 98 F

## 2022-05-07 DIAGNOSIS — J44.1 COPD WITH ACUTE EXACERBATION: Primary | ICD-10-CM

## 2022-05-07 DIAGNOSIS — R06.02 SHORTNESS OF BREATH: ICD-10-CM

## 2022-05-07 DIAGNOSIS — Z72.0 TOBACCO ABUSE: ICD-10-CM

## 2022-05-07 DIAGNOSIS — R05.9 COUGH: ICD-10-CM

## 2022-05-07 LAB
ALBUMIN SERPL BCP-MCNC: 3.6 G/DL (ref 3.5–5.2)
ALLENS TEST: ABNORMAL
ALP SERPL-CCNC: 120 U/L (ref 55–135)
ALT SERPL W/O P-5'-P-CCNC: 13 U/L (ref 10–44)
ANION GAP SERPL CALC-SCNC: 13 MMOL/L (ref 8–16)
AST SERPL-CCNC: 12 U/L (ref 10–40)
BASOPHILS # BLD AUTO: 0.06 K/UL (ref 0–0.2)
BASOPHILS NFR BLD: 0.6 % (ref 0–1.9)
BILIRUB SERPL-MCNC: 0.4 MG/DL (ref 0.1–1)
BNP SERPL-MCNC: 34 PG/ML (ref 0–99)
BUN SERPL-MCNC: 14 MG/DL (ref 8–23)
CALCIUM SERPL-MCNC: 10.1 MG/DL (ref 8.7–10.5)
CHLORIDE SERPL-SCNC: 102 MMOL/L (ref 95–110)
CO2 SERPL-SCNC: 25 MMOL/L (ref 23–29)
CREAT SERPL-MCNC: 0.8 MG/DL (ref 0.5–1.4)
D DIMER PPP IA.FEU-MCNC: 0.37 MG/L FEU
DELSYS: ABNORMAL
DIFFERENTIAL METHOD: ABNORMAL
EOSINOPHIL # BLD AUTO: 0.1 K/UL (ref 0–0.5)
EOSINOPHIL NFR BLD: 1.4 % (ref 0–8)
ERYTHROCYTE [DISTWIDTH] IN BLOOD BY AUTOMATED COUNT: 14 % (ref 11.5–14.5)
EST. GFR  (AFRICAN AMERICAN): >60 ML/MIN/1.73 M^2
EST. GFR  (NON AFRICAN AMERICAN): >60 ML/MIN/1.73 M^2
FIO2: 21
GLUCOSE SERPL-MCNC: 130 MG/DL (ref 70–110)
HCO3 UR-SCNC: 24.4 MMOL/L (ref 24–28)
HCT VFR BLD AUTO: 41.8 % (ref 40–54)
HGB BLD-MCNC: 14 G/DL (ref 14–18)
IMM GRANULOCYTES # BLD AUTO: 0.02 K/UL (ref 0–0.04)
IMM GRANULOCYTES NFR BLD AUTO: 0.2 % (ref 0–0.5)
LYMPHOCYTES # BLD AUTO: 1.1 K/UL (ref 1–4.8)
LYMPHOCYTES NFR BLD: 11.4 % (ref 18–48)
MCH RBC QN AUTO: 30.8 PG (ref 27–31)
MCHC RBC AUTO-ENTMCNC: 33.5 G/DL (ref 32–36)
MCV RBC AUTO: 92 FL (ref 82–98)
MODE: ABNORMAL
MONOCYTES # BLD AUTO: 0.8 K/UL (ref 0.3–1)
MONOCYTES NFR BLD: 8.5 % (ref 4–15)
NEUTROPHILS # BLD AUTO: 7.7 K/UL (ref 1.8–7.7)
NEUTROPHILS NFR BLD: 77.9 % (ref 38–73)
NRBC BLD-RTO: 0 /100 WBC
PCO2 BLDA: 36.5 MMHG (ref 35–45)
PH SMN: 7.43 [PH] (ref 7.35–7.45)
PLATELET # BLD AUTO: 400 K/UL (ref 150–450)
PMV BLD AUTO: 10.2 FL (ref 9.2–12.9)
PO2 BLDA: 56 MMHG (ref 80–100)
POC BE: 0 MMOL/L
POC SATURATED O2: 90 % (ref 95–100)
POC TCO2: 25 MMOL/L (ref 23–27)
POTASSIUM SERPL-SCNC: 4.1 MMOL/L (ref 3.5–5.1)
PROT SERPL-MCNC: 7.9 G/DL (ref 6–8.4)
RBC # BLD AUTO: 4.55 M/UL (ref 4.6–6.2)
SAMPLE: ABNORMAL
SARS-COV-2 RDRP RESP QL NAA+PROBE: NEGATIVE
SITE: ABNORMAL
SODIUM SERPL-SCNC: 140 MMOL/L (ref 136–145)
SP02: 93
TROPONIN I SERPL DL<=0.01 NG/ML-MCNC: <0.006 NG/ML (ref 0–0.03)
WBC # BLD AUTO: 9.92 K/UL (ref 3.9–12.7)

## 2022-05-07 PROCEDURE — 93010 ELECTROCARDIOGRAM REPORT: CPT | Mod: ,,, | Performed by: INTERNAL MEDICINE

## 2022-05-07 PROCEDURE — 63600175 PHARM REV CODE 636 W HCPCS: Performed by: INTERNAL MEDICINE

## 2022-05-07 PROCEDURE — 71045 XR CHEST 1 VIEW: ICD-10-PCS | Mod: 26,,, | Performed by: RADIOLOGY

## 2022-05-07 PROCEDURE — 93005 ELECTROCARDIOGRAM TRACING: CPT

## 2022-05-07 PROCEDURE — 83880 ASSAY OF NATRIURETIC PEPTIDE: CPT | Performed by: INTERNAL MEDICINE

## 2022-05-07 PROCEDURE — 85379 FIBRIN DEGRADATION QUANT: CPT | Performed by: INTERNAL MEDICINE

## 2022-05-07 PROCEDURE — U0002 COVID-19 LAB TEST NON-CDC: HCPCS | Performed by: INTERNAL MEDICINE

## 2022-05-07 PROCEDURE — 36600 WITHDRAWAL OF ARTERIAL BLOOD: CPT

## 2022-05-07 PROCEDURE — 93010 EKG 12-LEAD: ICD-10-PCS | Mod: ,,, | Performed by: INTERNAL MEDICINE

## 2022-05-07 PROCEDURE — 85025 COMPLETE CBC W/AUTO DIFF WBC: CPT | Performed by: INTERNAL MEDICINE

## 2022-05-07 PROCEDURE — 71045 X-RAY EXAM CHEST 1 VIEW: CPT | Mod: 26,,, | Performed by: RADIOLOGY

## 2022-05-07 PROCEDURE — 71045 X-RAY EXAM CHEST 1 VIEW: CPT | Mod: TC,FY

## 2022-05-07 PROCEDURE — 82803 BLOOD GASES ANY COMBINATION: CPT

## 2022-05-07 PROCEDURE — 99900035 HC TECH TIME PER 15 MIN (STAT)

## 2022-05-07 PROCEDURE — 25000242 PHARM REV CODE 250 ALT 637 W/ HCPCS: Performed by: INTERNAL MEDICINE

## 2022-05-07 PROCEDURE — 84484 ASSAY OF TROPONIN QUANT: CPT | Performed by: INTERNAL MEDICINE

## 2022-05-07 PROCEDURE — 27000221 HC OXYGEN, UP TO 24 HOURS

## 2022-05-07 PROCEDURE — 80053 COMPREHEN METABOLIC PANEL: CPT | Performed by: INTERNAL MEDICINE

## 2022-05-07 PROCEDURE — 96374 THER/PROPH/DIAG INJ IV PUSH: CPT

## 2022-05-07 PROCEDURE — 99285 EMERGENCY DEPT VISIT HI MDM: CPT | Mod: 25

## 2022-05-07 PROCEDURE — 94640 AIRWAY INHALATION TREATMENT: CPT

## 2022-05-07 PROCEDURE — 87040 BLOOD CULTURE FOR BACTERIA: CPT | Performed by: INTERNAL MEDICINE

## 2022-05-07 RX ORDER — AZITHROMYCIN 250 MG/1
250 TABLET, FILM COATED ORAL DAILY
Qty: 6 TABLET | Refills: 0 | Status: SHIPPED | OUTPATIENT
Start: 2022-05-07 | End: 2022-05-07 | Stop reason: SDUPTHER

## 2022-05-07 RX ORDER — METHYLPREDNISOLONE SOD SUCC 125 MG
125 VIAL (EA) INJECTION
Status: COMPLETED | OUTPATIENT
Start: 2022-05-07 | End: 2022-05-07

## 2022-05-07 RX ORDER — AZITHROMYCIN 250 MG/1
250 TABLET, FILM COATED ORAL DAILY
Qty: 6 TABLET | Refills: 0 | Status: SHIPPED | OUTPATIENT
Start: 2022-05-07 | End: 2022-09-14

## 2022-05-07 RX ORDER — PREDNISONE 10 MG/1
10 TABLET ORAL DAILY
Qty: 21 TABLET | Refills: 0 | Status: SHIPPED | OUTPATIENT
Start: 2022-05-07 | End: 2022-09-14

## 2022-05-07 RX ORDER — IPRATROPIUM BROMIDE AND ALBUTEROL SULFATE 2.5; .5 MG/3ML; MG/3ML
3 SOLUTION RESPIRATORY (INHALATION)
Status: COMPLETED | OUTPATIENT
Start: 2022-05-07 | End: 2022-05-07

## 2022-05-07 RX ADMIN — IPRATROPIUM BROMIDE AND ALBUTEROL SULFATE 3 ML: 2.5; .5 SOLUTION RESPIRATORY (INHALATION) at 04:05

## 2022-05-07 RX ADMIN — METHYLPREDNISOLONE SODIUM SUCCINATE 125 MG: 125 INJECTION, POWDER, FOR SOLUTION INTRAMUSCULAR; INTRAVENOUS at 03:05

## 2022-05-07 NOTE — ED NOTES
Pt states that his dizziness gets worse when he gets up, but that he feels like he is always dizzy. Pt states that he has fallen several times due to this dizziness over the last three weeks.

## 2022-05-07 NOTE — PLAN OF CARE
ABG done. Results reported to Dr. Rich. Patient placed on 2 lpm. 1602, aerosol treatment given. Some shortness of breath and wheezing noted. Will continue to monitor.

## 2022-05-07 NOTE — ED NOTES
"Pt states that for the last three weeks he has been coughing up "green stuff". Pt states that he has also been having dizziness and SOB for the past three weeks pt states that his wife and sister talked him into coming to figure out what was going on. Pt states that he is having pain in his chest area from always coughing. Pt states that the pain is over his entire chest area, that is a pressure pain. Pt states that the pain gets worse when he coughs or takes a deep breath pt states that he has been having this pain for 3 weeks also.   "

## 2022-05-07 NOTE — ED PROVIDER NOTES
Encounter Date: 5/7/2022       History     Chief Complaint   Patient presents with    Cough     Cough and sob coughing up green     PATIENT COMES IN COMPLAINING OF COUGH AND COLD CONGESTION FOR LAST 2-3 DAYS.  Patient has history of COPD and emphysema and still smokes, started coughing up green type sputum but no fever chills.  States he has been off his medicines up until several days ago when he got refills.  Patient labs show a lung doctor several years ago.        Review of patient's allergies indicates:  No Known Allergies  Past Medical History:   Diagnosis Date    Anxiety     COPD (chronic obstructive pulmonary disease)     Depression     High cholesterol     HTN (hypertension)     Ulcerative colitis      Past Surgical History:   Procedure Laterality Date    NECK SURGERY      right knee      TRANSRECTAL BIOPSY OF PROSTATE WITH ULTRASOUND GUIDANCE Bilateral 3/18/2020    Procedure: BIOPSY, PROSTATE, RECTAL APPROACH, WITH US GUIDANCE;  Surgeon: Bill Jeong MD;  Location: [facility];  Service: Urology;  Laterality: Bilateral;     Family History   Problem Relation Age of Onset    Diabetes Mother     Diabetes Sister      Social History     Tobacco Use    Smoking status: Current Every Day Smoker     Packs/day: 1.50     Years: 40.00     Pack years: 60.00     Types: Cigarettes    Smokeless tobacco: Never Used   Substance Use Topics    Alcohol use: Yes     Alcohol/week: 6.0 standard drinks     Types: 6 Cans of beer per week    Drug use: No     Review of Systems   Constitutional: Negative for fever.   HENT: Negative for sore throat.    Respiratory: Negative for shortness of breath.    Cardiovascular: Negative for chest pain.   Gastrointestinal: Negative for nausea.   Genitourinary: Negative for dysuria.   Musculoskeletal: Negative for back pain.   Skin: Negative for rash.   Neurological: Negative for weakness.   Hematological: Does not bruise/bleed easily.       Physical Exam     Initial Vitals   BP  Pulse Resp Temp SpO2   05/07/22 1450 05/07/22 1437 05/07/22 1437 05/07/22 1437 05/07/22 1437   125/89 100 (!) 22 98 °F (36.7 °C) (!) 94 %      MAP       --                Physical Exam    Nursing note and vitals reviewed.  Constitutional: He appears well-developed.   HENT:   Head: Normocephalic.   Eyes: Pupils are equal, round, and reactive to light.   Neck:   Normal range of motion.  Cardiovascular: Normal rate, regular rhythm, normal heart sounds and normal pulses.   Pulmonary/Chest: Accessory muscle usage present. No respiratory distress. He has wheezes. He has rhonchi.   Abdominal: Abdomen is soft.   Musculoskeletal:         General: Normal range of motion.      Cervical back: Normal range of motion.     Neurological: He is alert. He has normal strength and normal reflexes. No cranial nerve deficit. GCS eye subscore is 4. GCS verbal subscore is 5. GCS motor subscore is 6.   Skin: Skin is warm.         ED Course   Procedures  Labs Reviewed   CBC W/ AUTO DIFFERENTIAL - Abnormal; Notable for the following components:       Result Value    RBC 4.55 (*)     Gran % 77.9 (*)     Lymph % 11.4 (*)     All other components within normal limits   COMPREHENSIVE METABOLIC PANEL - Abnormal; Notable for the following components:    Glucose 130 (*)     All other components within normal limits   ISTAT PROCEDURE - Abnormal; Notable for the following components:    POC PO2 56 (*)     POC SATURATED O2 90 (*)     All other components within normal limits   CULTURE, BLOOD   CULTURE, BLOOD   SARS-COV-2 RNA AMPLIFICATION, QUAL    Narrative:     Is the patient symptomatic?->No   TROPONIN I   B-TYPE NATRIURETIC PEPTIDE   D DIMER, QUANTITATIVE        ECG Results          EKG 12-lead (Preliminary result)  Result time 05/07/22 14:50:04    ED Interpretation by Hever Rich MD (05/07/22 14:50:04, [facility], Emergency Medicine)    Normal sinus rhythm with heart rate of 96 and no acute ischemic changes                             Imaging Results          X-Ray Chest 1 View (In process)  Result time 05/07/22 16:22:27    ED Interpretation by Hever Rich MD (05/07/22 15:30:20, [facility] Dept, Emergency Medicine)    No significant change from previous chest x-ray                               Medications   methylPREDNISolone sodium succinate injection 125 mg (125 mg Intravenous Given 5/7/22 1515)   albuterol-ipratropium 2.5 mg-0.5 mg/3 mL nebulizer solution 3 mL (3 mLs Nebulization Given 5/7/22 1602)     Medical Decision Making:   ED Management:  The patient feels better after breathing treatment.  Chest x-ray shows no new findings similar to 2021. Patient still smokes advised him he stopped smoking.  Will do course of steroids and Zithromax and referred to pulmonology.               ED Course as of 05/07/22 1622   Sat May 07, 2022   1450 EKG 12-lead [PW]   1503 ABG shows a pH of 7 point 4 3, pCO2 of 36, PO2 56, O2 sat of 90% on room air [PW]   1529 CBC Auto Differential(!) [PW]   1529 ISTAT PROCEDURE(!!) [PW]   1530 X-Ray Chest 1 View [PW]   1551 SARS-CoV-2 RNA, Amplification, Qual: Negative [PW]   1603 Brain Natriuretic Peptide [PW]   1603 Comprehensive Metabolic Panel(!) [PW]   1603 D-Dimer: 0.37 [PW]   1603 D-Dimer, Quantitative [PW]   1612 Troponin I [PW]      ED Course User Index  [PW] Hever Rich MD             Clinical Impression:   Final diagnoses:  [R06.02] Shortness of breath  [J44.1] COPD with acute exacerbation (Primary)  [R05.9] Cough  [Z72.0] Tobacco abuse          ED Disposition Condition    Discharge Stable        ED Prescriptions     Medication Sig Dispense Start Date End Date Auth. Provider    predniSONE (DELTASONE) 10 MG tablet Take 1 tablet (10 mg total) by mouth once daily. Take 4 tabs x 3 days, then  Take 2 tabs x 3 days, then   Take 1 tab x 3 days. 21 tablet 5/7/2022  Hever Rcih MD    azithromycin (Z-MURTAZA) 250 MG tablet  (Status: Discontinued) Take 1 tablet (250 mg total) by mouth once daily. Take  first 2 tablets together, then 1 every day until finished. 6 tablet 5/7/2022 5/7/2022 Hever Rich MD    azithromycin (Z-MURTAZA) 250 MG tablet Take 1 tablet (250 mg total) by mouth once daily. Take first 2 tablets together, then 1 every day until finished. 6 tablet 5/7/2022  Hever Rich MD        Follow-up Information     Follow up With Specialties Details Why Contact Info    Marisel Farrell III, MD Internal Medicine, Cardiology In 3 days  [address] [place] [address] [phone]             Hever Rich MD  05/07/22 1618       Hever Rich MD  05/07/22 1623       Hever Rich MD  05/07/22 1629

## 2022-05-10 LAB
BACTERIA BLD CULT: ABNORMAL

## 2022-05-12 LAB — BACTERIA BLD CULT: NORMAL

## 2022-07-28 ENCOUNTER — HOSPITAL ENCOUNTER (EMERGENCY)
Facility: HOSPITAL | Age: 67
Discharge: HOME | End: 2022-07-28
Attending: FAMILY MEDICINE
Payer: MEDICARE

## 2022-07-28 VITALS
HEIGHT: 77 IN | HEART RATE: 90 BPM | BODY MASS INDEX: 22.43 KG/M2 | SYSTOLIC BLOOD PRESSURE: 99 MMHG | DIASTOLIC BLOOD PRESSURE: 74 MMHG | RESPIRATION RATE: 18 BRPM | WEIGHT: 190 LBS | OXYGEN SATURATION: 97 % | TEMPERATURE: 98 F

## 2022-07-28 DIAGNOSIS — R06.02 SHORTNESS OF BREATH: ICD-10-CM

## 2022-07-28 DIAGNOSIS — J44.1 COPD WITH ACUTE EXACERBATION: ICD-10-CM

## 2022-07-28 DIAGNOSIS — U07.1 COVID-19 VIRUS DETECTED: ICD-10-CM

## 2022-07-28 DIAGNOSIS — J44.9 CHRONIC OBSTRUCTIVE PULMONARY DISEASE, UNSPECIFIED COPD TYPE: ICD-10-CM

## 2022-07-28 DIAGNOSIS — U07.1 COVID-19: Primary | ICD-10-CM

## 2022-07-28 DIAGNOSIS — Z72.0 TOBACCO ABUSE: ICD-10-CM

## 2022-07-28 LAB
ALBUMIN SERPL BCP-MCNC: 3.3 G/DL (ref 3.5–5.2)
ALP SERPL-CCNC: 104 U/L (ref 55–135)
ALT SERPL W/O P-5'-P-CCNC: 22 U/L (ref 10–44)
ANION GAP SERPL CALC-SCNC: 17 MMOL/L (ref 8–16)
AST SERPL-CCNC: 36 U/L (ref 10–40)
BASOPHILS # BLD AUTO: 0.03 K/UL (ref 0–0.2)
BASOPHILS NFR BLD: 0.5 % (ref 0–1.9)
BILIRUB SERPL-MCNC: 0.4 MG/DL (ref 0.1–1)
BUN SERPL-MCNC: 20 MG/DL (ref 8–23)
CALCIUM SERPL-MCNC: 9.6 MG/DL (ref 8.7–10.5)
CHLORIDE SERPL-SCNC: 103 MMOL/L (ref 95–110)
CO2 SERPL-SCNC: 22 MMOL/L (ref 23–29)
CREAT SERPL-MCNC: 0.9 MG/DL (ref 0.5–1.4)
DIFFERENTIAL METHOD: NORMAL
EOSINOPHIL # BLD AUTO: 0 K/UL (ref 0–0.5)
EOSINOPHIL NFR BLD: 0.2 % (ref 0–8)
ERYTHROCYTE [DISTWIDTH] IN BLOOD BY AUTOMATED COUNT: 13.7 % (ref 11.5–14.5)
EST. GFR  (AFRICAN AMERICAN): >60 ML/MIN/1.73 M^2
EST. GFR  (NON AFRICAN AMERICAN): >60 ML/MIN/1.73 M^2
GLUCOSE SERPL-MCNC: 83 MG/DL (ref 70–110)
HCT VFR BLD AUTO: 48.4 % (ref 40–54)
HGB BLD-MCNC: 16.3 G/DL (ref 14–18)
IMM GRANULOCYTES # BLD AUTO: 0.01 K/UL (ref 0–0.04)
IMM GRANULOCYTES NFR BLD AUTO: 0.2 % (ref 0–0.5)
LYMPHOCYTES # BLD AUTO: 1.2 K/UL (ref 1–4.8)
LYMPHOCYTES NFR BLD: 21.1 % (ref 18–48)
MCH RBC QN AUTO: 29.9 PG (ref 27–31)
MCHC RBC AUTO-ENTMCNC: 33.7 G/DL (ref 32–36)
MCV RBC AUTO: 89 FL (ref 82–98)
MONOCYTES # BLD AUTO: 0.5 K/UL (ref 0.3–1)
MONOCYTES NFR BLD: 9.3 % (ref 4–15)
NEUTROPHILS # BLD AUTO: 3.8 K/UL (ref 1.8–7.7)
NEUTROPHILS NFR BLD: 68.7 % (ref 38–73)
NRBC BLD-RTO: 0 /100 WBC
PLATELET # BLD AUTO: 188 K/UL (ref 150–450)
PMV BLD AUTO: 10.8 FL (ref 9.2–12.9)
POTASSIUM SERPL-SCNC: 4.1 MMOL/L (ref 3.5–5.1)
PROT SERPL-MCNC: 8.1 G/DL (ref 6–8.4)
RBC # BLD AUTO: 5.45 M/UL (ref 4.6–6.2)
SARS-COV-2 RDRP RESP QL NAA+PROBE: POSITIVE
SODIUM SERPL-SCNC: 142 MMOL/L (ref 136–145)
WBC # BLD AUTO: 5.49 K/UL (ref 3.9–12.7)

## 2022-07-28 PROCEDURE — 25000242 PHARM REV CODE 250 ALT 637 W/ HCPCS: Performed by: FAMILY MEDICINE

## 2022-07-28 PROCEDURE — 80053 COMPREHEN METABOLIC PANEL: CPT | Performed by: FAMILY MEDICINE

## 2022-07-28 PROCEDURE — 93005 ELECTROCARDIOGRAM TRACING: CPT

## 2022-07-28 PROCEDURE — 93010 EKG 12-LEAD: ICD-10-PCS | Mod: ,,, | Performed by: INTERNAL MEDICINE

## 2022-07-28 PROCEDURE — 63600175 PHARM REV CODE 636 W HCPCS: Performed by: FAMILY MEDICINE

## 2022-07-28 PROCEDURE — 99284 EMERGENCY DEPT VISIT MOD MDM: CPT | Mod: 25

## 2022-07-28 PROCEDURE — 27000221 HC OXYGEN, UP TO 24 HOURS

## 2022-07-28 PROCEDURE — 94761 N-INVAS EAR/PLS OXIMETRY MLT: CPT

## 2022-07-28 PROCEDURE — U0002 COVID-19 LAB TEST NON-CDC: HCPCS | Performed by: FAMILY MEDICINE

## 2022-07-28 PROCEDURE — 93010 ELECTROCARDIOGRAM REPORT: CPT | Mod: ,,, | Performed by: INTERNAL MEDICINE

## 2022-07-28 PROCEDURE — 94640 AIRWAY INHALATION TREATMENT: CPT

## 2022-07-28 PROCEDURE — 85025 COMPLETE CBC W/AUTO DIFF WBC: CPT | Performed by: FAMILY MEDICINE

## 2022-07-28 PROCEDURE — 96374 THER/PROPH/DIAG INJ IV PUSH: CPT

## 2022-07-28 RX ORDER — ALBUTEROL SULFATE 90 UG/1
AEROSOL, METERED RESPIRATORY (INHALATION)
Qty: 18 G | Refills: 11 | Status: SHIPPED | OUTPATIENT
Start: 2022-07-28 | End: 2022-09-14 | Stop reason: SDUPTHER

## 2022-07-28 RX ORDER — IPRATROPIUM BROMIDE AND ALBUTEROL SULFATE 2.5; .5 MG/3ML; MG/3ML
3 SOLUTION RESPIRATORY (INHALATION)
Status: COMPLETED | OUTPATIENT
Start: 2022-07-28 | End: 2022-07-28

## 2022-07-28 RX ORDER — METHYLPREDNISOLONE SOD SUCC 125 MG
125 VIAL (EA) INJECTION
Status: COMPLETED | OUTPATIENT
Start: 2022-07-28 | End: 2022-07-28

## 2022-07-28 RX ORDER — IPRATROPIUM BROMIDE AND ALBUTEROL 20; 100 UG/1; UG/1
1 SPRAY, METERED RESPIRATORY (INHALATION) EVERY 4 HOURS PRN
Qty: 12 G | Refills: 2 | Status: SHIPPED | OUTPATIENT
Start: 2022-07-28 | End: 2022-09-14 | Stop reason: SDUPTHER

## 2022-07-28 RX ADMIN — IPRATROPIUM BROMIDE AND ALBUTEROL SULFATE 3 ML: 2.5; .5 SOLUTION RESPIRATORY (INHALATION) at 06:07

## 2022-07-28 RX ADMIN — IPRATROPIUM BROMIDE AND ALBUTEROL SULFATE 3 ML: .5; 2.5 SOLUTION RESPIRATORY (INHALATION) at 04:07

## 2022-07-28 RX ADMIN — METHYLPREDNISOLONE SODIUM SUCCINATE 125 MG: 125 INJECTION, POWDER, FOR SOLUTION INTRAMUSCULAR; INTRAVENOUS at 04:07

## 2022-07-28 NOTE — ED PROVIDER NOTES
Encounter Date: 7/28/2022       History     Chief Complaint   Patient presents with    Shortness of Breath     Pt. C/o SOB and dizziness     67-year-old male presents complaining shortness of breath dizziness fatigue he has a history of COPD depression hypertension prostate cancer there are no family members with similar symptoms in the has no known exposure        Review of patient's allergies indicates:  No Known Allergies  Past Medical History:   Diagnosis Date    Anxiety     COPD (chronic obstructive pulmonary disease)     Depression     High cholesterol     HTN (hypertension)     Malignant neoplasm of prostate     Ulcerative colitis      Past Surgical History:   Procedure Laterality Date    NECK SURGERY      right knee      TRANSRECTAL BIOPSY OF PROSTATE WITH ULTRASOUND GUIDANCE Bilateral 3/18/2020    Procedure: BIOPSY, PROSTATE, RECTAL APPROACH, WITH US GUIDANCE;  Surgeon: Bill Jeong MD;  Location: [facility] OR;  Service: Urology;  Laterality: Bilateral;     Family History   Problem Relation Age of Onset    Diabetes Mother     Diabetes Sister      Social History     Tobacco Use    Smoking status: Current Every Day Smoker     Packs/day: 1.50     Years: 40.00     Pack years: 60.00     Types: Cigarettes    Smokeless tobacco: Never Used   Substance Use Topics    Alcohol use: Yes     Alcohol/week: 6.0 standard drinks     Types: 6 Cans of beer per week    Drug use: No     Review of Systems   Constitutional: Negative for fever.   HENT: Negative for sore throat.    Respiratory: Negative for shortness of breath.    Cardiovascular: Negative for chest pain.   Gastrointestinal: Negative for nausea.   Genitourinary: Negative for dysuria.   Musculoskeletal: Negative for back pain.   Skin: Negative for rash.   Neurological: Negative for dizziness and weakness.   Hematological: Does not bruise/bleed easily.       Physical Exam     Initial Vitals [07/28/22 1635]   BP Pulse Resp Temp SpO2   124/81 93 (!) 24  98 °F (36.7 °C) (!) 91 %      MAP       --         Physical Exam    Nursing note and vitals reviewed.  Constitutional: He appears well-developed and well-nourished. He is not diaphoretic. No distress.   HENT:   Head: Normocephalic and atraumatic.   Nose: Nose normal.   Mouth/Throat: Oropharynx is clear and moist. No oropharyngeal exudate.   Eyes: EOM are normal.   Neck: Neck supple. No tracheal deviation present.   Normal range of motion.  Cardiovascular: Normal rate and regular rhythm.   No murmur heard.  Pulmonary/Chest: Breath sounds normal. No stridor. No respiratory distress. He has no rales.   Abdominal: Abdomen is soft. He exhibits no distension and no mass. There is no abdominal tenderness. There is no rebound.   Musculoskeletal:         General: No edema. Normal range of motion.      Cervical back: Normal range of motion and neck supple.     Lymphadenopathy:     He has no cervical adenopathy.   Neurological: He is alert and oriented to person, place, and time. He has normal strength.   Skin: Skin is warm and dry. Capillary refill takes less than 2 seconds. No pallor.   Psychiatric: He has a normal mood and affect.         ED Course   Procedures  Labs Reviewed   SARS-COV-2 RNA AMPLIFICATION, QUAL - Abnormal; Notable for the following components:       Result Value    SARS-CoV-2 RNA, Amplification, Qual Positive (*)     All other components within normal limits    Narrative:     Is the patient symptomatic?->Yes   CBC W/ AUTO DIFFERENTIAL   COMPREHENSIVE METABOLIC PANEL     EKG Readings: (Independently Interpreted)   Initial Reading: No STEMI. Rhythm: Normal Sinus Rhythm. Heart Rate: 96. Ectopy: No Ectopy. Conduction: Normal. ST Segments: Non-Specific ST Segment Depression. T Waves: Normal.       Imaging Results    None          Medications   albuterol-ipratropium 2.5 mg-0.5 mg/3 mL nebulizer solution 3 mL (3 mLs Nebulization Given 7/28/22 0685)   methylPREDNISolone sodium succinate injection 125 mg (125 mg  Intravenous Given 7/28/22 1657)   albuterol-ipratropium 2.5 mg-0.5 mg/3 mL nebulizer solution 3 mL (3 mLs Nebulization Given 7/28/22 1809)                          Clinical Impression:   Final diagnoses:  [R06.02] Shortness of breath  [U07.1] COVID-19 (Primary)          ED Disposition Condition    Discharge Stable        ED Prescriptions     Medication Sig Dispense Start Date End Date Auth. Provider    fluticasone-umeclidin-vilanter (TRELEGY ELLIPTA) 100-62.5-25 mcg DsDv Inhale 1 puff into the lungs once daily. 60 each 7/28/2022  Grabiel Armstrong MD    albuterol (PROVENTIL/VENTOLIN HFA) 90 mcg/actuation inhaler INHALE ONE (1) OR TWO (2) SPRAYS BY MOUTH EVERY FOUR (4) HOURS AS NEEDED FOR WHEEZING 18 g 7/28/2022  Grabiel Armstrong MD    nirmatrelvir-ritonavir 300 mg (150 mg x 2)-100 mg copackaged tablets (EUA) Take 3 tablets by mouth 2 (two) times daily for 5 days. Each dose contains 2 nirmatrelvir (pink tablets) and 1 ritonavir (white tablet). Take all 3 tablets together 30 tablet 7/28/2022 8/2/2022 Grabiel Armstrong MD    ipratropium-albuteroL (COMBIVENT RESPIMAT)  mcg/actuation inhaler Inhale 1 puff into the lungs every 4 (four) hours as needed for Wheezing or Shortness of Breath. 12 g 7/28/2022  Grabiel Armstrong MD        Follow-up Information    None          Grabiel Armstrong MD  07/31/22 5955

## 2022-07-28 NOTE — PLAN OF CARE
Patient in no apparent distress. Sat's 93  % on 2 lpm. Wheezing and shortess of breath present. Duoneb 3 ml treatment given . Will continue to monitor.

## 2022-10-11 ENCOUNTER — HOSPITAL ENCOUNTER (OUTPATIENT)
Dept: RADIOLOGY | Facility: HOSPITAL | Age: 67
Discharge: HOME | End: 2022-10-11
Attending: NURSE PRACTITIONER
Payer: MEDICARE

## 2022-10-11 DIAGNOSIS — Z87.891 PERSONAL HISTORY OF NICOTINE DEPENDENCE: ICD-10-CM

## 2022-10-11 DIAGNOSIS — Z72.0 TOBACCO ABUSE: ICD-10-CM

## 2022-10-11 DIAGNOSIS — I73.9 CLAUDICATION OF BOTH LOWER EXTREMITIES: ICD-10-CM

## 2022-10-11 DIAGNOSIS — Z12.2 SCREENING FOR LUNG CANCER: ICD-10-CM

## 2022-10-11 PROCEDURE — 93922 US ARTERIAL LOWER EXTREMITY BILAT WITH ABI (XPD): ICD-10-PCS | Mod: 26,,, | Performed by: RADIOLOGY

## 2022-10-11 PROCEDURE — 71271 CT CHEST LUNG SCREENING LOW DOSE: ICD-10-PCS | Mod: 26,,, | Performed by: RADIOLOGY

## 2022-10-11 PROCEDURE — 71271 CT THORAX LUNG CANCER SCR C-: CPT | Mod: TC

## 2022-10-11 PROCEDURE — 93925 US ARTERIAL LOWER EXTREMITY BILAT WITH ABI (XPD): ICD-10-PCS | Mod: 26,,, | Performed by: RADIOLOGY

## 2022-10-11 PROCEDURE — 71271 CT THORAX LUNG CANCER SCR C-: CPT | Mod: 26,,, | Performed by: RADIOLOGY

## 2022-10-11 PROCEDURE — 93925 LOWER EXTREMITY STUDY: CPT | Mod: 26,,, | Performed by: RADIOLOGY

## 2022-10-11 PROCEDURE — 93925 LOWER EXTREMITY STUDY: CPT | Mod: TC

## 2022-10-11 PROCEDURE — 93922 UPR/L XTREMITY ART 2 LEVELS: CPT | Mod: 26,,, | Performed by: RADIOLOGY

## 2022-10-27 ENCOUNTER — HOSPITAL ENCOUNTER (OUTPATIENT)
Dept: RADIOLOGY | Facility: HOSPITAL | Age: 67
Discharge: HOME | End: 2022-10-27
Attending: NURSE PRACTITIONER
Payer: MEDICARE

## 2022-10-27 DIAGNOSIS — Z87.891 PERSONAL HISTORY OF NICOTINE DEPENDENCE: ICD-10-CM

## 2022-10-27 DIAGNOSIS — R42 DIZZINESS AND GIDDINESS: ICD-10-CM

## 2022-10-27 DIAGNOSIS — R55 SYNCOPE AND COLLAPSE: ICD-10-CM

## 2022-10-27 DIAGNOSIS — R91.8 OTHER NONSPECIFIC ABNORMAL FINDING OF LUNG FIELD: ICD-10-CM

## 2022-10-27 DIAGNOSIS — R91.8 MULTIPLE PULMONARY NODULES: ICD-10-CM

## 2022-10-27 PROCEDURE — 70551 MRI BRAIN WITHOUT CONTRAST: ICD-10-PCS | Mod: 26,,, | Performed by: RADIOLOGY

## 2022-10-27 PROCEDURE — 71271 CT THORAX LUNG CANCER SCR C-: CPT | Mod: TC

## 2022-10-27 PROCEDURE — 71271 CT THORAX LUNG CANCER SCR C-: CPT | Mod: 26,,, | Performed by: RADIOLOGY

## 2022-10-27 PROCEDURE — 70551 MRI BRAIN STEM W/O DYE: CPT | Mod: 26,,, | Performed by: RADIOLOGY

## 2022-10-27 PROCEDURE — 70551 MRI BRAIN STEM W/O DYE: CPT | Mod: TC

## 2022-10-27 PROCEDURE — 71271 CT CHEST LUNG SCREENING LOW DOSE: ICD-10-PCS | Mod: 26,,, | Performed by: RADIOLOGY

## 2022-11-08 ENCOUNTER — HOSPITAL ENCOUNTER (OUTPATIENT)
Dept: RADIOLOGY | Facility: HOSPITAL | Age: 67
Discharge: HOME | End: 2022-11-08
Attending: NURSE PRACTITIONER
Payer: MEDICARE

## 2022-11-08 DIAGNOSIS — R55 SYNCOPE AND COLLAPSE: ICD-10-CM

## 2022-11-08 DIAGNOSIS — R42 DIZZINESS: ICD-10-CM

## 2022-11-08 PROCEDURE — 93880 US CAROTID BILATERAL: ICD-10-PCS | Mod: 26,,, | Performed by: RADIOLOGY

## 2022-11-08 PROCEDURE — 93880 EXTRACRANIAL BILAT STUDY: CPT | Mod: TC

## 2022-11-08 PROCEDURE — 93880 EXTRACRANIAL BILAT STUDY: CPT | Mod: 26,,, | Performed by: RADIOLOGY

## 2022-11-17 ENCOUNTER — HOSPITAL ENCOUNTER (OUTPATIENT)
Dept: CARDIOLOGY | Facility: HOSPITAL | Age: 67
Discharge: HOME | End: 2022-11-17
Attending: NURSE PRACTITIONER
Payer: MEDICARE

## 2022-11-17 DIAGNOSIS — R42 DIZZINESS: ICD-10-CM

## 2022-11-17 DIAGNOSIS — R55 SYNCOPE AND COLLAPSE: ICD-10-CM

## 2022-11-17 PROCEDURE — 93225 XTRNL ECG REC<48 HRS REC: CPT

## 2023-01-10 LAB
OHS CV EVENT MONITOR DAY: 0
OHS CV HOLTER LENGTH DECIMAL HOURS: 47.98
OHS CV HOLTER LENGTH HOURS: 47
OHS CV HOLTER LENGTH MINUTES: 59
OHS CV HOLTER SINUS AVERAGE HR: 87
OHS CV HOLTER SINUS MAX HR: 140
OHS CV HOLTER SINUS MIN HR: 67

## 2023-02-13 DIAGNOSIS — C61 PROSTATE CANCER: Primary | ICD-10-CM

## 2023-02-16 ENCOUNTER — TELEPHONE (OUTPATIENT)
Dept: UROLOGY | Facility: CLINIC | Age: 68
End: 2023-02-16
Payer: MEDICARE

## 2023-02-16 NOTE — TELEPHONE ENCOUNTER
Contacted patient regarding referral received. Updated patient on progress of obtaining records. Will schedule [facility] visits once records are received and reviewed. Patient has received multiple opinions on treatment plan. States he is currently on Trelstar, radiation was recommended but patient could not complete regimen due to transportation. Patient verbalized understanding of process and is aware nurse navigator will contact him once we are able to schedule.

## 2023-03-10 ENCOUNTER — TELEPHONE (OUTPATIENT)
Dept: UROLOGY | Facility: CLINIC | Age: 68
End: 2023-03-10
Payer: MEDICARE

## 2023-03-10 NOTE — TELEPHONE ENCOUNTER
Oncology nurse navigator contacted patient for scheduling urology oncology surgery appointment, self referral. Date, time, and location discussed with patient. All questions/concerns addressed. Patient verbalized understanding. Contact information reviewed for further assistance.      All records uploaded into media.

## 2023-03-21 ENCOUNTER — HOSPITAL ENCOUNTER (INPATIENT)
Facility: HOSPITAL | Age: 68
LOS: 3 days | Discharge: HOME | DRG: 190 | End: 2023-03-27
Attending: EMERGENCY MEDICINE | Admitting: STUDENT IN AN ORGANIZED HEALTH CARE EDUCATION/TRAINING PROGRAM
Payer: MEDICARE

## 2023-03-21 DIAGNOSIS — J44.1 CHRONIC OBSTRUCTIVE PULMONARY DISEASE WITH ACUTE EXACERBATION: ICD-10-CM

## 2023-03-21 DIAGNOSIS — Z03.89 RULED OUT FOR MYOCARDIAL INFARCTION: ICD-10-CM

## 2023-03-21 DIAGNOSIS — J44.1 COPD WITH ACUTE EXACERBATION: Primary | ICD-10-CM

## 2023-03-21 DIAGNOSIS — R09.02 HYPOXIA: ICD-10-CM

## 2023-03-21 DIAGNOSIS — R06.02 SOB (SHORTNESS OF BREATH): ICD-10-CM

## 2023-03-21 LAB
ALBUMIN SERPL BCP-MCNC: 3.4 G/DL (ref 3.5–5.2)
ALP SERPL-CCNC: 102 U/L (ref 55–135)
ALT SERPL W/O P-5'-P-CCNC: 14 U/L (ref 10–44)
ANION GAP SERPL CALC-SCNC: 11 MMOL/L (ref 8–16)
AST SERPL-CCNC: 20 U/L (ref 10–40)
BASOPHILS # BLD AUTO: 0.05 K/UL (ref 0–0.2)
BASOPHILS NFR BLD: 0.8 % (ref 0–1.9)
BILIRUB SERPL-MCNC: 0.8 MG/DL (ref 0.1–1)
BNP SERPL-MCNC: 170 PG/ML (ref 0–99)
BUN SERPL-MCNC: 7 MG/DL (ref 8–23)
CALCIUM SERPL-MCNC: 9.3 MG/DL (ref 8.7–10.5)
CHLORIDE SERPL-SCNC: 110 MMOL/L (ref 95–110)
CO2 SERPL-SCNC: 24 MMOL/L (ref 23–29)
CREAT SERPL-MCNC: 0.7 MG/DL (ref 0.5–1.4)
DIFFERENTIAL METHOD: ABNORMAL
EOSINOPHIL # BLD AUTO: 0.2 K/UL (ref 0–0.5)
EOSINOPHIL NFR BLD: 2.4 % (ref 0–8)
ERYTHROCYTE [DISTWIDTH] IN BLOOD BY AUTOMATED COUNT: 12.8 % (ref 11.5–14.5)
EST. GFR  (NO RACE VARIABLE): >60 ML/MIN/1.73 M^2
GLUCOSE SERPL-MCNC: 101 MG/DL (ref 70–110)
HCT VFR BLD AUTO: 38.3 % (ref 40–54)
HGB BLD-MCNC: 12.7 G/DL (ref 14–18)
IMM GRANULOCYTES # BLD AUTO: 0.02 K/UL (ref 0–0.04)
IMM GRANULOCYTES NFR BLD AUTO: 0.3 % (ref 0–0.5)
INR PPP: 1 (ref 0.8–1.2)
LYMPHOCYTES # BLD AUTO: 0.8 K/UL (ref 1–4.8)
LYMPHOCYTES NFR BLD: 11.6 % (ref 18–48)
MCH RBC QN AUTO: 31.4 PG (ref 27–31)
MCHC RBC AUTO-ENTMCNC: 33.2 G/DL (ref 32–36)
MCV RBC AUTO: 95 FL (ref 82–98)
MONOCYTES # BLD AUTO: 0.5 K/UL (ref 0.3–1)
MONOCYTES NFR BLD: 7.3 % (ref 4–15)
NEUTROPHILS # BLD AUTO: 5.1 K/UL (ref 1.8–7.7)
NEUTROPHILS NFR BLD: 77.6 % (ref 38–73)
NRBC BLD-RTO: 0 /100 WBC
PLATELET # BLD AUTO: 245 K/UL (ref 150–450)
PMV BLD AUTO: 10.2 FL (ref 9.2–12.9)
POTASSIUM SERPL-SCNC: 4.2 MMOL/L (ref 3.5–5.1)
PROT SERPL-MCNC: 6.6 G/DL (ref 6–8.4)
PROTHROMBIN TIME: 10.3 SEC (ref 9–12.5)
RBC # BLD AUTO: 4.05 M/UL (ref 4.6–6.2)
SARS-COV-2 RDRP RESP QL NAA+PROBE: NEGATIVE
SODIUM SERPL-SCNC: 145 MMOL/L (ref 136–145)
TROPONIN I SERPL DL<=0.01 NG/ML-MCNC: 0.01 NG/ML (ref 0–0.03)
WBC # BLD AUTO: 6.55 K/UL (ref 3.9–12.7)

## 2023-03-21 PROCEDURE — 25000003 PHARM REV CODE 250: Performed by: STUDENT IN AN ORGANIZED HEALTH CARE EDUCATION/TRAINING PROGRAM

## 2023-03-21 PROCEDURE — 25000242 PHARM REV CODE 250 ALT 637 W/ HCPCS: Performed by: STUDENT IN AN ORGANIZED HEALTH CARE EDUCATION/TRAINING PROGRAM

## 2023-03-21 PROCEDURE — 71275 CTA CHEST NON CORONARY (PE STUDIES): ICD-10-PCS | Mod: 26,,, | Performed by: RADIOLOGY

## 2023-03-21 PROCEDURE — 93010 EKG 12-LEAD: ICD-10-PCS | Mod: ,,, | Performed by: INTERNAL MEDICINE

## 2023-03-21 PROCEDURE — 71045 X-RAY EXAM CHEST 1 VIEW: CPT | Mod: TC

## 2023-03-21 PROCEDURE — G0378 HOSPITAL OBSERVATION PER HR: HCPCS

## 2023-03-21 PROCEDURE — 85025 COMPLETE CBC W/AUTO DIFF WBC: CPT | Performed by: EMERGENCY MEDICINE

## 2023-03-21 PROCEDURE — 25500020 PHARM REV CODE 255: Performed by: EMERGENCY MEDICINE

## 2023-03-21 PROCEDURE — 25000242 PHARM REV CODE 250 ALT 637 W/ HCPCS: Performed by: PHYSICIAN ASSISTANT

## 2023-03-21 PROCEDURE — U0002 COVID-19 LAB TEST NON-CDC: HCPCS | Performed by: PHYSICIAN ASSISTANT

## 2023-03-21 PROCEDURE — 99285 EMERGENCY DEPT VISIT HI MDM: CPT | Mod: 25

## 2023-03-21 PROCEDURE — 71045 XR CHEST AP PORTABLE: ICD-10-PCS | Mod: 26,,, | Performed by: RADIOLOGY

## 2023-03-21 PROCEDURE — 94640 AIRWAY INHALATION TREATMENT: CPT

## 2023-03-21 PROCEDURE — 94640 AIRWAY INHALATION TREATMENT: CPT | Mod: XB

## 2023-03-21 PROCEDURE — 94761 N-INVAS EAR/PLS OXIMETRY MLT: CPT

## 2023-03-21 PROCEDURE — 84484 ASSAY OF TROPONIN QUANT: CPT | Performed by: EMERGENCY MEDICINE

## 2023-03-21 PROCEDURE — 85610 PROTHROMBIN TIME: CPT | Performed by: EMERGENCY MEDICINE

## 2023-03-21 PROCEDURE — 83880 ASSAY OF NATRIURETIC PEPTIDE: CPT | Performed by: EMERGENCY MEDICINE

## 2023-03-21 PROCEDURE — 63600175 PHARM REV CODE 636 W HCPCS: Performed by: STUDENT IN AN ORGANIZED HEALTH CARE EDUCATION/TRAINING PROGRAM

## 2023-03-21 PROCEDURE — 93010 ELECTROCARDIOGRAM REPORT: CPT | Mod: ,,, | Performed by: INTERNAL MEDICINE

## 2023-03-21 PROCEDURE — 99223 1ST HOSP IP/OBS HIGH 75: CPT | Mod: AI,,, | Performed by: STUDENT IN AN ORGANIZED HEALTH CARE EDUCATION/TRAINING PROGRAM

## 2023-03-21 PROCEDURE — 27000221 HC OXYGEN, UP TO 24 HOURS

## 2023-03-21 PROCEDURE — 71045 X-RAY EXAM CHEST 1 VIEW: CPT | Mod: 26,,, | Performed by: RADIOLOGY

## 2023-03-21 PROCEDURE — 93005 ELECTROCARDIOGRAM TRACING: CPT

## 2023-03-21 PROCEDURE — 71275 CT ANGIOGRAPHY CHEST: CPT | Mod: TC

## 2023-03-21 PROCEDURE — 99900031 HC PATIENT EDUCATION (STAT)

## 2023-03-21 PROCEDURE — 71275 CT ANGIOGRAPHY CHEST: CPT | Mod: 26,,, | Performed by: RADIOLOGY

## 2023-03-21 PROCEDURE — 63700000 PHARM REV CODE 250 ALT 637 W/O HCPCS: Performed by: STUDENT IN AN ORGANIZED HEALTH CARE EDUCATION/TRAINING PROGRAM

## 2023-03-21 PROCEDURE — 96375 TX/PRO/DX INJ NEW DRUG ADDON: CPT

## 2023-03-21 PROCEDURE — 99223 PR INITIAL HOSPITAL CARE,LEVL III: ICD-10-PCS | Mod: AI,,, | Performed by: STUDENT IN AN ORGANIZED HEALTH CARE EDUCATION/TRAINING PROGRAM

## 2023-03-21 PROCEDURE — 80053 COMPREHEN METABOLIC PANEL: CPT | Performed by: EMERGENCY MEDICINE

## 2023-03-21 RX ORDER — ATORVASTATIN CALCIUM 10 MG/1
10 TABLET, FILM COATED ORAL NIGHTLY
Status: DISCONTINUED | OUTPATIENT
Start: 2023-03-21 | End: 2023-03-27 | Stop reason: HOSPADM

## 2023-03-21 RX ORDER — IPRATROPIUM BROMIDE 0.5 MG/2.5ML
0.5 SOLUTION RESPIRATORY (INHALATION) EVERY 6 HOURS
Status: DISCONTINUED | OUTPATIENT
Start: 2023-03-21 | End: 2023-03-25

## 2023-03-21 RX ORDER — FLUTICASONE FUROATE AND VILANTEROL 100; 25 UG/1; UG/1
1 POWDER RESPIRATORY (INHALATION) DAILY
Status: DISCONTINUED | OUTPATIENT
Start: 2023-03-22 | End: 2023-03-27 | Stop reason: HOSPADM

## 2023-03-21 RX ORDER — IPRATROPIUM BROMIDE 0.5 MG/2.5ML
0.5 SOLUTION RESPIRATORY (INHALATION) ONCE
Status: COMPLETED | OUTPATIENT
Start: 2023-03-21 | End: 2023-03-21

## 2023-03-21 RX ORDER — ASPIRIN 81 MG/1
81 TABLET ORAL DAILY
Status: DISCONTINUED | OUTPATIENT
Start: 2023-03-22 | End: 2023-03-27 | Stop reason: HOSPADM

## 2023-03-21 RX ORDER — TRAZODONE HYDROCHLORIDE 50 MG/1
100 TABLET ORAL NIGHTLY
Status: DISCONTINUED | OUTPATIENT
Start: 2023-03-21 | End: 2023-03-27 | Stop reason: HOSPADM

## 2023-03-21 RX ORDER — ALBUTEROL SULFATE 0.83 MG/ML
SOLUTION RESPIRATORY (INHALATION)
Status: DISPENSED
Start: 2023-03-21 | End: 2023-03-22

## 2023-03-21 RX ORDER — FINASTERIDE 5 MG/1
5 TABLET, FILM COATED ORAL DAILY
Status: DISCONTINUED | OUTPATIENT
Start: 2023-03-22 | End: 2023-03-27 | Stop reason: HOSPADM

## 2023-03-21 RX ORDER — PAROXETINE 10 MG/1
40 TABLET, FILM COATED ORAL DAILY
Status: DISCONTINUED | OUTPATIENT
Start: 2023-03-22 | End: 2023-03-27 | Stop reason: HOSPADM

## 2023-03-21 RX ORDER — LOSARTAN POTASSIUM 25 MG/1
25 TABLET ORAL 2 TIMES DAILY
Status: DISCONTINUED | OUTPATIENT
Start: 2023-03-22 | End: 2023-03-24

## 2023-03-21 RX ORDER — IPRATROPIUM BROMIDE 0.5 MG/2.5ML
SOLUTION RESPIRATORY (INHALATION)
Status: DISPENSED
Start: 2023-03-21 | End: 2023-03-22

## 2023-03-21 RX ORDER — ALBUTEROL SULFATE 0.83 MG/ML
2.5 SOLUTION RESPIRATORY (INHALATION)
Status: DISPENSED | OUTPATIENT
Start: 2023-03-21 | End: 2023-03-22

## 2023-03-21 RX ORDER — ENOXAPARIN SODIUM 100 MG/ML
40 INJECTION SUBCUTANEOUS EVERY 24 HOURS
Status: DISCONTINUED | OUTPATIENT
Start: 2023-03-22 | End: 2023-03-25

## 2023-03-21 RX ORDER — ALBUTEROL SULFATE 0.83 MG/ML
2.5 SOLUTION RESPIRATORY (INHALATION) EVERY 6 HOURS
Status: DISCONTINUED | OUTPATIENT
Start: 2023-03-22 | End: 2023-03-25

## 2023-03-21 RX ORDER — SODIUM CHLORIDE 0.9 % (FLUSH) 0.9 %
10 SYRINGE (ML) INJECTION
Status: DISCONTINUED | OUTPATIENT
Start: 2023-03-21 | End: 2023-03-27 | Stop reason: HOSPADM

## 2023-03-21 RX ORDER — IPRATROPIUM BROMIDE 0.5 MG/2.5ML
0.5 SOLUTION RESPIRATORY (INHALATION) ONCE
Status: DISCONTINUED | OUTPATIENT
Start: 2023-03-21 | End: 2023-03-23

## 2023-03-21 RX ORDER — PANTOPRAZOLE SODIUM 40 MG/1
40 TABLET, DELAYED RELEASE ORAL DAILY
Status: DISCONTINUED | OUTPATIENT
Start: 2023-03-22 | End: 2023-03-27 | Stop reason: HOSPADM

## 2023-03-21 RX ORDER — IPRATROPIUM BROMIDE 0.5 MG/2.5ML
0.5 SOLUTION RESPIRATORY (INHALATION) ONCE
Status: DISCONTINUED | OUTPATIENT
Start: 2023-03-21 | End: 2023-03-21

## 2023-03-21 RX ORDER — IPRATROPIUM BROMIDE AND ALBUTEROL SULFATE 2.5; .5 MG/3ML; MG/3ML
3 SOLUTION RESPIRATORY (INHALATION)
Status: DISCONTINUED | OUTPATIENT
Start: 2023-03-21 | End: 2023-03-21

## 2023-03-21 RX ORDER — ALBUTEROL SULFATE 0.83 MG/ML
2.5 SOLUTION RESPIRATORY (INHALATION) ONCE
Status: COMPLETED | OUTPATIENT
Start: 2023-03-21 | End: 2023-03-21

## 2023-03-21 RX ORDER — TAMSULOSIN HYDROCHLORIDE 0.4 MG/1
0.4 CAPSULE ORAL DAILY
Status: DISCONTINUED | OUTPATIENT
Start: 2023-03-22 | End: 2023-03-27 | Stop reason: HOSPADM

## 2023-03-21 RX ORDER — PREDNISONE 10 MG/1
40 TABLET ORAL DAILY
Status: DISCONTINUED | OUTPATIENT
Start: 2023-03-21 | End: 2023-03-27 | Stop reason: HOSPADM

## 2023-03-21 RX ORDER — AZITHROMYCIN 250 MG/1
500 TABLET, FILM COATED ORAL DAILY
Status: DISCONTINUED | OUTPATIENT
Start: 2023-03-21 | End: 2023-03-24

## 2023-03-21 RX ORDER — FUROSEMIDE 10 MG/ML
20 INJECTION INTRAMUSCULAR; INTRAVENOUS ONCE
Status: COMPLETED | OUTPATIENT
Start: 2023-03-21 | End: 2023-03-21

## 2023-03-21 RX ORDER — ALBUTEROL SULFATE 0.83 MG/ML
2.5 SOLUTION RESPIRATORY (INHALATION) EVERY 4 HOURS PRN
Status: DISCONTINUED | OUTPATIENT
Start: 2023-03-21 | End: 2023-03-21

## 2023-03-21 RX ADMIN — FUROSEMIDE 20 MG: 10 INJECTION, SOLUTION INTRAMUSCULAR; INTRAVENOUS at 05:03

## 2023-03-21 RX ADMIN — TRAZODONE HYDROCHLORIDE 100 MG: 50 TABLET ORAL at 09:03

## 2023-03-21 RX ADMIN — IPRATROPIUM BROMIDE 0.5 MG: 0.5 SOLUTION RESPIRATORY (INHALATION) at 02:03

## 2023-03-21 RX ADMIN — ATORVASTATIN CALCIUM 10 MG: 10 TABLET, FILM COATED ORAL at 09:03

## 2023-03-21 RX ADMIN — ALBUTEROL SULFATE 2.5 MG: 2.5 SOLUTION RESPIRATORY (INHALATION) at 05:03

## 2023-03-21 RX ADMIN — IOHEXOL 75 ML: 350 INJECTION, SOLUTION INTRAVENOUS at 04:03

## 2023-03-21 RX ADMIN — PREDNISONE 40 MG: 10 TABLET ORAL at 05:03

## 2023-03-21 RX ADMIN — CEFTRIAXONE SODIUM 1 G: 1 INJECTION, POWDER, FOR SOLUTION INTRAMUSCULAR; INTRAVENOUS at 06:03

## 2023-03-21 RX ADMIN — AZITHROMYCIN MONOHYDRATE 500 MG: 250 TABLET ORAL at 05:03

## 2023-03-21 RX ADMIN — ALBUTEROL SULFATE 2.5 MG: 2.5 SOLUTION RESPIRATORY (INHALATION) at 02:03

## 2023-03-21 RX ADMIN — IPRATROPIUM BROMIDE 0.5 MG: 0.5 SOLUTION RESPIRATORY (INHALATION) at 05:03

## 2023-03-21 NOTE — HPI
67 w/ Chronic Hypoxic Respiratory Failure on 1L LFNC 2/2 COPD, Prostate cancer, HTN, HLD presenting w/ 1 week of worsening SOB. Patient endorses subjective fever and chills. No change in sputum production but has new cough. He has been having to use his home O2 more often than usual and using his home inhalers more often. Denies NVD. Also endorses chronic LE swelling that has been worse lately. He does endorse sleeping on a couch primarily sleeping straight up. He sits in a recliner all day with his legs hanging. Patient endorses inspirational chest pain. Does not radiate. Comes on at rest and with exertion. Associated with cough. Painful to palpation per patient. Denies any hx of HF. Patient actively undergoing workup for treatment of prostate cancer.

## 2023-03-21 NOTE — SUBJECTIVE & OBJECTIVE
Past Medical History:   Diagnosis Date    Anxiety     COPD (chronic obstructive pulmonary disease)     Depression     High cholesterol     HTN (hypertension)     Malignant neoplasm of prostate     Ulcerative colitis        Past Surgical History:   Procedure Laterality Date    NECK SURGERY      right knee      TRANSRECTAL BIOPSY OF PROSTATE WITH ULTRASOUND GUIDANCE Bilateral 3/18/2020    Procedure: BIOPSY, PROSTATE, RECTAL APPROACH, WITH US GUIDANCE;  Surgeon: Bill Jeong MD;  Location: [facility] OR;  Service: Urology;  Laterality: Bilateral;       Review of patient's allergies indicates:  No Known Allergies    No current facility-administered medications on file prior to encounter.     Current Outpatient Medications on File Prior to Encounter   Medication Sig    aspirin (ECOTRIN) 81 MG EC tablet Take 1 tablet (81 mg total) by mouth once daily.    albuterol (PROVENTIL/VENTOLIN HFA) 90 mcg/actuation inhaler INHALE ONE (1) OR TWO (2) SPRAYS BY MOUTH EVERY SIX (6) HOURS AS NEEDED FOR WHEEZING    atorvastatin (LIPITOR) 10 MG tablet Take 1 tablet (10 mg total) by mouth every evening.    finasteride (PROSCAR) 5 mg tablet TAKE 1 TABLET EVERY MORNING FOR PROSTATE    fluticasone-umeclidin-vilanter (TRELEGY ELLIPTA) 200-62.5-25 mcg inhaler Inhale 1 puff into the lungs once daily.    losartan (COZAAR) 25 MG tablet Take 25 mg by mouth 2 (two) times a day.    pantoprazole (PROTONIX) 40 MG tablet TAKE 1 TABLET EVERY MORNING (30 MINUTES BEFORE BREAKFAST) FOR STOMACH    paroxetine (PAXIL) 40 MG tablet TAKE ONE (1) TABLET EVERY MORNING FOR MOOD AND NERVE PAIN    tamsulosin (FLOMAX) 0.4 mg Cap Take 1 capsule (0.4 mg total) by mouth once daily.    traZODone (DESYREL) 100 MG tablet Take 1 tablet (100 mg total) by mouth every evening.     Family History       Problem Relation (Age of Onset)    Diabetes Mother, Sister          Tobacco Use    Smoking status: Every Day     Packs/day: 1.50     Years: 40.00     Pack years: 60.00     Types:  Cigarettes    Smokeless tobacco: Never   Substance and Sexual Activity    Alcohol use: Yes     Alcohol/week: 6.0 standard drinks     Types: 6 Cans of beer per week    Drug use: No    Sexual activity: Yes     Review of Systems   All other systems reviewed and are negative.  Objective:     Vital Signs (Most Recent):  Temp: 98.4 °F (36.9 °C) (03/21/23 1721)  Pulse: 82 (03/21/23 1721)  Resp: 18 (03/21/23 1721)  BP: (!) 166/91 (03/21/23 1721)  SpO2: 98 % (03/21/23 1721)   Vital Signs (24h Range):  Temp:  [98 °F (36.7 °C)-98.4 °F (36.9 °C)] 98.4 °F (36.9 °C)  Pulse:  [76-94] 82  Resp:  [18-22] 18  SpO2:  [94 %-98 %] 98 %  BP: (138-166)/(85-91) 166/91     Weight: 86.2 kg (190 lb)  Body mass index is 22.53 kg/m².    Physical Exam  Vitals reviewed.   Constitutional:       General: He is not in acute distress.     Appearance: Normal appearance. He is ill-appearing.   HENT:      Head: Normocephalic and atraumatic.   Eyes:      Extraocular Movements: Extraocular movements intact.      Pupils: Pupils are equal, round, and reactive to light.   Cardiovascular:      Rate and Rhythm: Normal rate and regular rhythm.   Pulmonary:      Effort: Pulmonary effort is normal. No respiratory distress.      Breath sounds: No wheezing or rales.   Abdominal:      Palpations: Abdomen is soft.      Tenderness: There is no abdominal tenderness.   Musculoskeletal:         General: Tenderness (tenderness to palpation left chest wall) present.      Right lower leg: Edema present.      Left lower leg: Edema present.   Skin:     General: Skin is warm and dry.      Findings: Erythema (to bilateral anterior shins w/ some stasis dermatitis) present.   Neurological:      General: No focal deficit present.      Mental Status: He is alert and oriented to person, place, and time.   Psychiatric:         Mood and Affect: Mood normal.         Behavior: Behavior normal.       CRANIAL NERVES     CN III, IV, VI   Pupils are equal, round, and reactive to light.      Significant Labs: All pertinent labs within the past 24 hours have been reviewed.    Significant Imaging: I have reviewed all pertinent imaging results/findings within the past 24 hours.

## 2023-03-21 NOTE — ASSESSMENT & PLAN NOTE
Acute on Chronic Hypoxic Respiratory failure 2/2 COPD exacerbation caused by likely acute viral illness.   CT w/ no evidence of pneumonia or PE  Did show 6mm spiculated nodule left anterior lung that needs to be investigated further in outpatient setting. Patient did have recent PET scan but I can not see report to see if this is new or old.  Will give 1 dose IV lasix for elevated BNP and mild swelling in lower extremities although this is likely venous insufficiency from sitting most of the day  TTE tomorrow  Given 1 dose Rocephin/Azithro. Will continue azithromycin for anti-inflammatory effect  Continue prednisone daily  Breathing treatments scheduled  Continuous pulse ox  Currently requiring 2L LFNC which is increase from baseline. Wean as tolerated

## 2023-03-21 NOTE — ED PROVIDER NOTES
Encounter Date: 3/21/2023       History     Chief Complaint   Patient presents with    Shortness of Breath     X 2 days. Pt has hx of copd on home o2. Pt reports being unable to walk to the restroom due to the sob which isn't his normal.      Pt presents to the ER with complaint of chest pain and shortness of breath pt stated this started yesterday and continues today. Pt stated he has a history of COPD and continues to smoke 1 pack a day. Pt stated he is not normally on oxygen though the last week he has used home oxygen. The patient stated he has no history of CHF stated his cardiologist is Dr. Earl. Pt denied any n/v/d/fever or other associated symptoms.    The history is provided by the patient.   Review of patient's allergies indicates:  No Known Allergies  Past Medical History:   Diagnosis Date    Anxiety     COPD (chronic obstructive pulmonary disease)     Depression     High cholesterol     HTN (hypertension)     Malignant neoplasm of prostate     Ulcerative colitis      Past Surgical History:   Procedure Laterality Date    NECK SURGERY      right knee      TRANSRECTAL BIOPSY OF PROSTATE WITH ULTRASOUND GUIDANCE Bilateral 3/18/2020    Procedure: BIOPSY, PROSTATE, RECTAL APPROACH, WITH US GUIDANCE;  Surgeon: Bill Jeong MD;  Location: [facility] OR;  Service: Urology;  Laterality: Bilateral;     Family History   Problem Relation Age of Onset    Diabetes Mother     Diabetes Sister      Social History     Tobacco Use    Smoking status: Every Day     Packs/day: 1.50     Years: 40.00     Pack years: 60.00     Types: Cigarettes    Smokeless tobacco: Never   Substance Use Topics    Alcohol use: Yes     Alcohol/week: 6.0 standard drinks     Types: 6 Cans of beer per week    Drug use: No     Review of Systems   Constitutional:  Negative for fever.   HENT:  Negative for congestion, rhinorrhea and sore throat.    Respiratory:  Positive for shortness of breath. Negative for cough.    Cardiovascular:   Positive for chest pain.   Gastrointestinal:  Negative for abdominal pain, constipation, diarrhea, nausea and vomiting.   Genitourinary:  Negative for difficulty urinating.   Musculoskeletal:  Negative for back pain.   Skin:  Negative for rash.   Neurological:  Negative for headaches.   All other systems reviewed and are negative.    Physical Exam     Initial Vitals [03/21/23 1342]   BP Pulse Resp Temp SpO2   138/88 94 (!) 22 98.2 °F (36.8 °C) (!) 94 %      MAP       --         Physical Exam    Nursing note and vitals reviewed.  Constitutional: He appears well-developed and well-nourished. He is not diaphoretic. No distress.   HENT:   Head: Atraumatic.   Mouth/Throat: Oropharynx is clear and moist.   Eyes: Right eye exhibits no discharge. Left eye exhibits no discharge.   Neck: Neck supple.   Normal range of motion.  Cardiovascular:  Normal rate, regular rhythm, normal heart sounds and intact distal pulses.     Exam reveals no gallop and no friction rub.       No murmur heard.  Pulmonary/Chest: No respiratory distress. He has no wheezes. He has no rhonchi. He has rales (Bilarteral bases). He exhibits no tenderness.   Abdominal: Abdomen is soft. He exhibits no distension. There is no abdominal tenderness.   Musculoskeletal:         General: Edema (1+ bilaterally) present. Normal range of motion.      Cervical back: Normal range of motion and neck supple.     Neurological: He is alert and oriented to person, place, and time. GCS score is 15. GCS eye subscore is 4. GCS verbal subscore is 5. GCS motor subscore is 6.   Skin: Skin is warm and dry. Capillary refill takes less than 2 seconds.   Psychiatric: He has a normal mood and affect. Thought content normal.       ED Course   Critical Care    Date/Time: 3/21/2023 1:42 PM  Performed by: LOPEZ Ribeiro  Authorized by: Alfredito Rogel MD   Total critical care time (exclusive of procedural time) : 35 minutes  Critical care time was exclusive of separately  billable procedures and treating other patients and teaching time.  Critical care was necessary to treat or prevent imminent or life-threatening deterioration of the following conditions: respiratory failure.  Critical care was time spent personally by me on the following activities: development of treatment plan with patient or surrogate, discussions with consultants, evaluation of patient's response to treatment, examination of patient, obtaining history from patient or surrogate, ordering and performing treatments and interventions, ordering and review of laboratory studies, ordering and review of radiographic studies, pulse oximetry, re-evaluation of patient's condition and review of old charts.      Labs Reviewed   CBC W/ AUTO DIFFERENTIAL - Abnormal; Notable for the following components:       Result Value    RBC 4.05 (*)     Hemoglobin 12.7 (*)     Hematocrit 38.3 (*)     MCH 31.4 (*)     Lymph # 0.8 (*)     Gran % 77.6 (*)     Lymph % 11.6 (*)     All other components within normal limits   COMPREHENSIVE METABOLIC PANEL - Abnormal; Notable for the following components:    BUN 7 (*)     Albumin 3.4 (*)     All other components within normal limits   B-TYPE NATRIURETIC PEPTIDE - Abnormal; Notable for the following components:     (*)     All other components within normal limits   TROPONIN I   PROTIME-INR   SARS-COV-2 RNA AMPLIFICATION, QUAL    Narrative:     Is the patient symptomatic?->Yes     EKG Readings: (Independently Interpreted)   Initial Reading: No STEMI.   Normal sinus rhythm at 94 bpm no ST elevation normal axis narrow QRS      Imaging Results               CTA Chest Non-Coronary (PE Studies) (Final result)  Result time 03/21/23 16:42:30      Final result by Dmitry Finnegan MD (03/21/23 16:42:30)                   Impression:      1. No CT evidence to suggest an acute pulmonary embolus.  2. Multiple small soft tissue pulmonary nodules with interval development of a small spiculated soft  tissue pulmonary nodule of the left upper lobe.  Follow-up per Fleischner guidelines.  For multiple solid nodules with any 6 mm or greater, Fleischner Society guidelines recommend follow up with non-contrast chest CT at 3-6 months and 18-24 months after discovery.  3. Diffuse centrilobular emphysematous change with linear discoid atelectasis at the lung bases.  4. Chronic small loculated left pleural effusion with suspected chronic round atelectasis at the left lung base.  5. Chronic bilateral adrenal adenomas.  This report was flagged in Epic as abnormal.      Electronically signed by: Dmitry Finnegan  Date:    03/21/2023  Time:    16:42               Narrative:    EXAMINATION:  CTA CHEST NON CORONARY (PE STUDIES)    CLINICAL HISTORY:  Pulmonary embolism (PE) suspected, high prob;    TECHNIQUE:  Low dose axial images, sagittal and coronal reformations were obtained from the thoracic inlet to the lung bases following the IV administration of 75 mL of Omnipaque 350.  Contrast timing was optimized to evaluate the pulmonary arteries.  MIP images were performed.    COMPARISON:  Chest x-ray same day.  CT 10/27/2022.    FINDINGS:  The pulmonary trunk, main right and left pulmonary arteries extending into the segmental and subsegmental pulmonary arteries opacify normally without CT evidence to suggest an acute pulmonary embolus.    There is a small calcified granuloma the right lower lobe.  There are small bilateral soft tissue pulmonary nodules measuring 3-5 mm in diameter.  There is been interval development of a small spiculated 6 mm soft tissue pulmonary nodule of the anterior left upper lobe.  Extensive dependent discoid atelectasis is present at the lung bases.  Chronic focus of round atelectasis is present at the left lung base.  There is diffuse centrilobular emphysematous change most predominant within the bilateral upper lobes.    There is a chronic loculated left pleural effusion measuring to a depth of 2.9 cm  with chronic peripheral calcification.  No significant right pleural effusion.  Mild pleural thickening at the right lung base.  No significant pericardial effusion.  No suspicious endobronchial lesion.  There is dependent mucous within the right mainstem bronchus.  No significant axillary or intrathoracic lymphadenopathy.    Limited evaluation of the upper abdomen demonstrates chronic prominent bilateral adrenal adenomas.                                        X-Ray Chest AP Portable (Final result)  Result time 03/21/23 14:11:08      Final result by Dmitry Finnegan MD (03/21/23 14:11:08)                   Impression:      1. Findings consistent with congestive heart failure small bilateral pleural effusions and bibasilar dependent atelectasis.  2. Retrocardiac density consistent with left lower lobe atelectasis versus infiltrate.  Correlate clinically with possible fever and/or elevated white count.  3. Calcified pleural plaque consistent with prior occupational exposure.  This report was flagged in Epic as abnormal.      Electronically signed by: Dmitry Finnegan  Date:    03/21/2023  Time:    14:11               Narrative:    EXAMINATION:  XR CHEST AP PORTABLE    CLINICAL HISTORY:  sob;    TECHNIQUE:  Portable view of the chest was performed.    COMPARISON:  05/07/2022.    FINDINGS:  Mild diffuse prominence of the pulmonary interstitium suspicious for interstitial edema.  There are small bilateral pleural effusions with bibasilar dependent atelectasis.  There is a retrocardiac density consistent with left lower lobe atelectasis versus infiltrate.  Calcified pleural plaque at the left hemidiaphragm.    Heart size is mildly enlarged.  Calcified aortic plaque.  Trachea midline.    Bony thorax intact.  Prior ACDF.                                       Medications   albuterol (PROVENTIL) 2.5 mg /3 mL (0.083 %) nebulizer solution (  Canceled Entry 3/21/23 1415)   ipratropium (ATROVENT) 0.02 % nebulizer solution (   Canceled Entry 3/21/23 1415)   albuterol nebulizer solution 2.5 mg ( Nebulization Canceled Entry 3/21/23 1700)   aspirin EC tablet 81 mg (has no administration in time range)   atorvastatin tablet 10 mg (has no administration in time range)   finasteride tablet 5 mg (has no administration in time range)   predniSONE tablet 40 mg (40 mg Oral Given 3/21/23 1755)   fluticasone furoate-vilanteroL 100-25 mcg/dose diskus inhaler 1 puff (has no administration in time range)   losartan tablet 25 mg (has no administration in time range)   pantoprazole EC tablet 40 mg (has no administration in time range)   tamsulosin 24 hr capsule 0.4 mg (has no administration in time range)   paroxetine tablet 40 mg (has no administration in time range)   trazodone split tablet 100 mg (has no administration in time range)   ipratropium 0.02 % nebulizer solution 0.5 mg (0.5 mg Nebulization Given 3/21/23 1717)   cefTRIAXone (ROCEPHIN) 1 g in dextrose 5 % in water (D5W) 5 % 50 mL IVPB (MB+) (0 g Intravenous Stopped 3/21/23 1802)   azithromycin tablet 500 mg (500 mg Oral Given 3/21/23 1754)   sodium chloride 0.9% flush 10 mL (has no administration in time range)   enoxaparin injection 40 mg (has no administration in time range)   ipratropium 0.02 % nebulizer solution 0.5 mg (has no administration in time range)   albuterol nebulizer solution 2.5 mg (has no administration in time range)   albuterol nebulizer solution 2.5 mg (2.5 mg Nebulization Given 3/21/23 1416)   ipratropium 0.02 % nebulizer solution 0.5 mg (0.5 mg Nebulization Given 3/21/23 1416)   iohexoL (OMNIPAQUE 350) injection 75 mL (75 mLs Intravenous Given 3/21/23 1618)   furosemide injection 20 mg (20 mg Intravenous Given 3/21/23 1756)     Medical Decision Making:   Differential Diagnosis:   COPD, MI, Pneumonia, PE  Clinical Tests:   Lab Tests: Ordered and Reviewed  Radiological Study: Ordered and Reviewed  Medical Tests: Ordered and Reviewed  ED Management:  Discussed with the  patient exam findings plan of care will await labs and imaging and reevaluate.    On walk test the patients oxygen saturation dropped to 84% on room air    Discussed the patient lab results imaging results discussed plan of care we will admit to hospital medicine for further evaluation and treatment of COPD exacerbation with hypoxia patient verbalizes understanding and accepts plan of care for admission.  At time of admission patient is stable on nasal cannula maintaining 98% oxygen saturation.    Discussed case with hospitalist Hernandezotal accepts patient for admission to the hospital.                          Clinical Impression:   Final diagnoses:  [R06.02] SOB (shortness of breath)  [J44.1] COPD with acute exacerbation (Primary)  [R09.02] Hypoxia        ED Disposition Condition    Observation Stable                LOPEZ Ribeiro  03/21/23 1178       LOPEZ Ribeiro  04/10/23 1526

## 2023-03-22 LAB
ALBUMIN SERPL BCP-MCNC: 2.9 G/DL (ref 3.5–5.2)
ALP SERPL-CCNC: 107 U/L (ref 55–135)
ALT SERPL W/O P-5'-P-CCNC: 12 U/L (ref 10–44)
ANION GAP SERPL CALC-SCNC: 10 MMOL/L (ref 8–16)
AORTIC ROOT ANNULUS: 2.57 CM
AORTIC VALVE CUSP SEPERATION: 1.64 CM
ASCENDING AORTA: 2.31 CM
AST SERPL-CCNC: 12 U/L (ref 10–40)
AV INDEX (PROSTH): 0.91
AV MEAN GRADIENT: 3 MMHG
AV PEAK GRADIENT: 5 MMHG
AV VALVE AREA: 2.51 CM2
AV VELOCITY RATIO: 0.91
BASOPHILS # BLD AUTO: 0.02 K/UL (ref 0–0.2)
BASOPHILS NFR BLD: 0.5 % (ref 0–1.9)
BILIRUB SERPL-MCNC: 0.6 MG/DL (ref 0.1–1)
BSA FOR ECHO PROCEDURE: 2.16 M2
BUN SERPL-MCNC: 11 MG/DL (ref 8–23)
CALCIUM SERPL-MCNC: 8.9 MG/DL (ref 8.7–10.5)
CHLORIDE SERPL-SCNC: 107 MMOL/L (ref 95–110)
CO2 SERPL-SCNC: 26 MMOL/L (ref 23–29)
CREAT SERPL-MCNC: 0.7 MG/DL (ref 0.5–1.4)
CV ECHO LV RWT: 0.46 CM
DIFFERENTIAL METHOD: ABNORMAL
DOP CALC AO PEAK VEL: 1.14 M/S
DOP CALC AO VTI: 26.6 CM
DOP CALC LVOT AREA: 2.8 CM2
DOP CALC LVOT DIAMETER: 1.88 CM
DOP CALC LVOT PEAK VEL: 1.04 M/S
DOP CALC LVOT STROKE VOLUME: 66.87 CM3
DOP CALC MV VTI: 23.3 CM
DOP CALCLVOT PEAK VEL VTI: 24.1 CM
E WAVE DECELERATION TIME: 169.94 MSEC
E/A RATIO: 1.18
E/E' RATIO: 10.6 M/S
ECHO LV POSTERIOR WALL: 1.16 CM (ref 0.6–1.1)
EJECTION FRACTION: 69 %
EOSINOPHIL # BLD AUTO: 0 K/UL (ref 0–0.5)
EOSINOPHIL NFR BLD: 0 % (ref 0–8)
ERYTHROCYTE [DISTWIDTH] IN BLOOD BY AUTOMATED COUNT: 12.8 % (ref 11.5–14.5)
EST. GFR  (NO RACE VARIABLE): >60 ML/MIN/1.73 M^2
FRACTIONAL SHORTENING: 39 % (ref 28–44)
GLUCOSE SERPL-MCNC: 252 MG/DL (ref 70–110)
HCT VFR BLD AUTO: 35 % (ref 40–54)
HGB BLD-MCNC: 11.6 G/DL (ref 14–18)
IMM GRANULOCYTES # BLD AUTO: 0.01 K/UL (ref 0–0.04)
IMM GRANULOCYTES NFR BLD AUTO: 0.2 % (ref 0–0.5)
INFLUENZA A, MOLECULAR: NEGATIVE
INFLUENZA B, MOLECULAR: NEGATIVE
INTERVENTRICULAR SEPTUM: 1.22 CM (ref 0.6–1.1)
IVC DIAMETER: 1.86 CM
IVRT: 83.73 MSEC
LA MAJOR: 3.84 CM
LA MINOR: 3.38 CM
LEFT ATRIUM SIZE: 3.87 CM
LEFT ATRIUM VOLUME INDEX MOD: 15.5 ML/M2
LEFT ATRIUM VOLUME MOD: 33.87 CM3
LEFT INTERNAL DIMENSION IN SYSTOLE: 3.06 CM (ref 2.1–4)
LEFT VENTRICLE DIASTOLIC VOLUME INDEX: 53.74 ML/M2
LEFT VENTRICLE DIASTOLIC VOLUME: 117.7 ML
LEFT VENTRICLE MASS INDEX: 105 G/M2
LEFT VENTRICLE SYSTOLIC VOLUME INDEX: 16.8 ML/M2
LEFT VENTRICLE SYSTOLIC VOLUME: 36.83 ML
LEFT VENTRICULAR INTERNAL DIMENSION IN DIASTOLE: 4.99 CM (ref 3.5–6)
LEFT VENTRICULAR MASS: 230.29 G
LV LATERAL E/E' RATIO: 8.83 M/S
LV SEPTAL E/E' RATIO: 13.25 M/S
LVOT MG: 2.25 MMHG
LVOT MV: 0.7 CM/S
LYMPHOCYTES # BLD AUTO: 0.3 K/UL (ref 1–4.8)
LYMPHOCYTES NFR BLD: 7.1 % (ref 18–48)
MAGNESIUM SERPL-MCNC: 1.9 MG/DL (ref 1.6–2.6)
MCH RBC QN AUTO: 31.7 PG (ref 27–31)
MCHC RBC AUTO-ENTMCNC: 33.1 G/DL (ref 32–36)
MCV RBC AUTO: 96 FL (ref 82–98)
MONOCYTES # BLD AUTO: 0.2 K/UL (ref 0.3–1)
MONOCYTES NFR BLD: 4.2 % (ref 4–15)
MV MEAN GRADIENT: 2 MMHG
MV PEAK A VEL: 0.9 M/S
MV PEAK E VEL: 1.06 M/S
MV PEAK GRADIENT: 4 MMHG
MV STENOSIS PRESSURE HALF TIME: 54.65 MS
MV VALVE AREA BY CONTINUITY EQUATION: 2.87 CM2
MV VALVE AREA P 1/2 METHOD: 4.03 CM2
NEUTROPHILS # BLD AUTO: 3.7 K/UL (ref 1.8–7.7)
NEUTROPHILS NFR BLD: 88 % (ref 38–73)
NRBC BLD-RTO: 0 /100 WBC
PISA MRMAX VEL: 4.37 M/S
PISA TR MAX VEL: 2 M/S
PLATELET # BLD AUTO: 233 K/UL (ref 150–450)
PMV BLD AUTO: 10.8 FL (ref 9.2–12.9)
POTASSIUM SERPL-SCNC: 3.7 MMOL/L (ref 3.5–5.1)
PROT SERPL-MCNC: 6 G/DL (ref 6–8.4)
PV MEAN GRADIENT: 2 MMHG
PV MV: 0.74 M/S
PV PEAK VELOCITY: 0.96 CM/S
RA MAJOR: 4.18 CM
RA PRESSURE: 8 MMHG
RA WIDTH: 3.19 CM
RBC # BLD AUTO: 3.66 M/UL (ref 4.6–6.2)
RIGHT VENTRICULAR END-DIASTOLIC DIMENSION: 3.21 CM
RIGHT VENTRICULAR LENGTH IN DIASTOLE (APICAL 4-CHAMBER VIEW): 62.3 CM
RV MID DIAMA: 15.8 CM
SODIUM SERPL-SCNC: 143 MMOL/L (ref 136–145)
SPECIMEN SOURCE: NORMAL
STJ: 2.5 CM
TDI LATERAL: 0.12 M/S
TDI SEPTAL: 0.08 M/S
TDI: 0.1 M/S
TR MAX PG: 16 MMHG
TRICUSPID VALVE PEAK A WAVE VELOCITY: 0.5 M/S
TV PEAK E VEL: 0.6 M/S
TV REST PULMONARY ARTERY PRESSURE: 24 MMHG
WBC # BLD AUTO: 4.25 K/UL (ref 3.9–12.7)

## 2023-03-22 PROCEDURE — 25000003 PHARM REV CODE 250: Performed by: STUDENT IN AN ORGANIZED HEALTH CARE EDUCATION/TRAINING PROGRAM

## 2023-03-22 PROCEDURE — 85025 COMPLETE CBC W/AUTO DIFF WBC: CPT | Performed by: STUDENT IN AN ORGANIZED HEALTH CARE EDUCATION/TRAINING PROGRAM

## 2023-03-22 PROCEDURE — 99233 SBSQ HOSP IP/OBS HIGH 50: CPT | Mod: ,,, | Performed by: STUDENT IN AN ORGANIZED HEALTH CARE EDUCATION/TRAINING PROGRAM

## 2023-03-22 PROCEDURE — 94761 N-INVAS EAR/PLS OXIMETRY MLT: CPT

## 2023-03-22 PROCEDURE — 96372 THER/PROPH/DIAG INJ SC/IM: CPT | Performed by: STUDENT IN AN ORGANIZED HEALTH CARE EDUCATION/TRAINING PROGRAM

## 2023-03-22 PROCEDURE — 25000242 PHARM REV CODE 250 ALT 637 W/ HCPCS: Performed by: STUDENT IN AN ORGANIZED HEALTH CARE EDUCATION/TRAINING PROGRAM

## 2023-03-22 PROCEDURE — 63700000 PHARM REV CODE 250 ALT 637 W/O HCPCS: Performed by: STUDENT IN AN ORGANIZED HEALTH CARE EDUCATION/TRAINING PROGRAM

## 2023-03-22 PROCEDURE — 94640 AIRWAY INHALATION TREATMENT: CPT | Mod: XB

## 2023-03-22 PROCEDURE — 27000221 HC OXYGEN, UP TO 24 HOURS

## 2023-03-22 PROCEDURE — 87502 INFLUENZA DNA AMP PROBE: CPT | Performed by: STUDENT IN AN ORGANIZED HEALTH CARE EDUCATION/TRAINING PROGRAM

## 2023-03-22 PROCEDURE — 99233 PR SUBSEQUENT HOSPITAL CARE,LEVL III: ICD-10-PCS | Mod: ,,, | Performed by: STUDENT IN AN ORGANIZED HEALTH CARE EDUCATION/TRAINING PROGRAM

## 2023-03-22 PROCEDURE — 96365 THER/PROPH/DIAG IV INF INIT: CPT

## 2023-03-22 PROCEDURE — 80053 COMPREHEN METABOLIC PANEL: CPT | Performed by: STUDENT IN AN ORGANIZED HEALTH CARE EDUCATION/TRAINING PROGRAM

## 2023-03-22 PROCEDURE — G0378 HOSPITAL OBSERVATION PER HR: HCPCS

## 2023-03-22 PROCEDURE — 63600175 PHARM REV CODE 636 W HCPCS: Performed by: STUDENT IN AN ORGANIZED HEALTH CARE EDUCATION/TRAINING PROGRAM

## 2023-03-22 PROCEDURE — 83735 ASSAY OF MAGNESIUM: CPT | Performed by: STUDENT IN AN ORGANIZED HEALTH CARE EDUCATION/TRAINING PROGRAM

## 2023-03-22 RX ORDER — MAGNESIUM SULFATE 1 G/100ML
1 INJECTION INTRAVENOUS ONCE
Status: COMPLETED | OUTPATIENT
Start: 2023-03-22 | End: 2023-03-22

## 2023-03-22 RX ORDER — POTASSIUM CHLORIDE 20 MEQ/1
40 TABLET, EXTENDED RELEASE ORAL ONCE
Status: COMPLETED | OUTPATIENT
Start: 2023-03-22 | End: 2023-03-22

## 2023-03-22 RX ADMIN — FLUTICASONE FUROATE AND VILANTEROL TRIFENATATE 1 PUFF: 100; 25 POWDER RESPIRATORY (INHALATION) at 08:03

## 2023-03-22 RX ADMIN — IPRATROPIUM BROMIDE 0.5 MG: 0.5 SOLUTION RESPIRATORY (INHALATION) at 01:03

## 2023-03-22 RX ADMIN — ALBUTEROL SULFATE 2.5 MG: 2.5 SOLUTION RESPIRATORY (INHALATION) at 12:03

## 2023-03-22 RX ADMIN — LOSARTAN POTASSIUM 25 MG: 25 TABLET, FILM COATED ORAL at 09:03

## 2023-03-22 RX ADMIN — FINASTERIDE 5 MG: 5 TABLET, FILM COATED ORAL at 09:03

## 2023-03-22 RX ADMIN — PANTOPRAZOLE SODIUM 40 MG: 40 TABLET, DELAYED RELEASE ORAL at 09:03

## 2023-03-22 RX ADMIN — MAGNESIUM SULFATE IN DEXTROSE 1 G: 10 INJECTION, SOLUTION INTRAVENOUS at 09:03

## 2023-03-22 RX ADMIN — PAROXETINE HYDROCHLORIDE 40 MG: 10 TABLET, FILM COATED ORAL at 09:03

## 2023-03-22 RX ADMIN — POTASSIUM CHLORIDE 40 MEQ: 1500 TABLET, EXTENDED RELEASE ORAL at 09:03

## 2023-03-22 RX ADMIN — IPRATROPIUM BROMIDE 0.5 MG: 0.5 SOLUTION RESPIRATORY (INHALATION) at 06:03

## 2023-03-22 RX ADMIN — ALBUTEROL SULFATE 2.5 MG: 2.5 SOLUTION RESPIRATORY (INHALATION) at 07:03

## 2023-03-22 RX ADMIN — ALBUTEROL SULFATE 2.5 MG: 2.5 SOLUTION RESPIRATORY (INHALATION) at 06:03

## 2023-03-22 RX ADMIN — TAMSULOSIN HYDROCHLORIDE 0.4 MG: 0.4 CAPSULE ORAL at 09:03

## 2023-03-22 RX ADMIN — TRAZODONE HYDROCHLORIDE 100 MG: 50 TABLET ORAL at 09:03

## 2023-03-22 RX ADMIN — ATORVASTATIN CALCIUM 10 MG: 10 TABLET, FILM COATED ORAL at 09:03

## 2023-03-22 RX ADMIN — ALBUTEROL SULFATE 2.5 MG: 2.5 SOLUTION RESPIRATORY (INHALATION) at 01:03

## 2023-03-22 RX ADMIN — AZITHROMYCIN MONOHYDRATE 500 MG: 250 TABLET ORAL at 09:03

## 2023-03-22 RX ADMIN — PREDNISONE 40 MG: 10 TABLET ORAL at 09:03

## 2023-03-22 RX ADMIN — ENOXAPARIN SODIUM 40 MG: 40 INJECTION SUBCUTANEOUS at 04:03

## 2023-03-22 RX ADMIN — ASPIRIN 81 MG: 81 TABLET, COATED ORAL at 09:03

## 2023-03-22 RX ADMIN — IPRATROPIUM BROMIDE 0.5 MG: 0.5 SOLUTION RESPIRATORY (INHALATION) at 08:03

## 2023-03-22 NOTE — ASSESSMENT & PLAN NOTE
Acute on Chronic Hypoxic Respiratory failure 2/2 COPD exacerbation caused by likely acute viral illness.   CT w/ no evidence of pneumonia or PE  Did show 6mm spiculated nodule left anterior lung that needs to be investigated further in outpatient setting. Patient did have recent PET scan but I can not see report to see if this is new or old.  TTE pending  Given 1 dose Rocephin/Azithro. Will continue azithromycin for anti-inflammatory effect  Continue prednisone daily  Breathing treatments scheduled  Continuous pulse ox  Currently requiring 3 L LFNC which is increase from baseline. Wean as tolerated

## 2023-03-22 NOTE — PLAN OF CARE
03/22/23 1027   Discharge Assessment   Assessment Type Discharge Planning Assessment   Confirmed/corrected address, phone number and insurance Yes   Confirmed Demographics Correct on Facesheet   Source of Information patient   When was your last doctors appointment?   (about 2 months ago)   Does patient/caregiver understand observation status Yes   Communicated CLEMENTINA with patient/caregiver Date not available/Unable to determine   Reason For Admission Shortness of breath   People in Home spouse  (Margaret James [phone])   Do you expect to return to your current living situation? Yes   Do you have help at home or someone to help you manage your care at home? Yes   Who are your caregiver(s) and their phone number(s)? sisterBrennon [phone]   Prior to hospitilization cognitive status: Unable to Assess   Current cognitive status: Alert/Oriented   Walking or Climbing Stairs ambulation difficulty, requires equipment   Mobility Management rollator and striaght cane   Dressing/Bathing bathing difficulty, requires equipment   Dressing/Bathing Management shower chair   Home Accessibility wheelchair accessible   Home Layout Able to live on 1st floor   Equipment Currently Used at Home oxygen;cane, straight;rollator;shower chair;nebulizer  (nebulizer machine is broken)   Readmission within 30 days? No   Patient currently being followed by outpatient case management? No   Do you currently have service(s) that help you manage your care at home? No   Do you take prescription medications? Yes   Do you have prescription coverage? Yes   Coverage [employer]   Do you have any problems affording any of your prescribed medications? No   Is the patient taking medications as prescribed? yes   Who is going to help you get home at discharge? brother in law/sister   How do you get to doctors appointments? family or friend will provide;car, drives self   Are you on dialysis? No   Do you take coumadin? No   Discharge Plan A Home with  family   Discharge Plan B Home Health   DME Needed Upon Discharge  none   Discharge Plan discussed with: Patient   Discharge Barriers Identified None   Physical Activity   On average, how many days per week do you engage in moderate to strenuous exercise (like a brisk walk)? 1 day   On average, how many minutes do you engage in exercise at this level? 30 min   Financial Resource Strain   How hard is it for you to pay for the very basics like food, housing, medical care, and heating? Not very   Housing Stability   In the last 12 months, was there a time when you were not able to pay the mortgage or rent on time? N   In the last 12 months, was there a time when you did not have a steady place to sleep or slept in a shelter (including now)? N   Transportation Needs   In the past 12 months, has lack of transportation kept you from medical appointments or from getting medications? no   In the past 12 months, has lack of transportation kept you from meetings, work, or from getting things needed for daily living? No   Food Insecurity   Within the past 12 months, you worried that your food would run out before you got the money to buy more. Never true   Within the past 12 months, the food you bought just didn't last and you didn't have money to get more. Never true   Stress   Do you feel stress - tense, restless, nervous, or anxious, or unable to sleep at night because your mind is troubled all the time - these days? Rather much   Social Connections   In a typical week, how many times do you talk on the phone with family, friends, or neighbors? More than 3   How often do you get together with friends or relatives? Once   How often do you attend Restoration or Yazidi services? Never   Do you belong to any clubs or organizations such as Restoration groups, unions, fraternal or athletic groups, or school groups? Yes   How often do you attend meetings of the clubs or organizations you belong to? Never   Are you , ,  , , never , or living with a partner?    Alcohol Use   Q1: How often do you have a drink containing alcohol? 4 or more ti   Q2: How many drinks containing alcohol do you have on a typical day when you are drinking? 5 or 6   Q3: How often do you have six or more drinks on one occasion? Daily   OTHER   Name(s) of People in Home Spouse, Margaret James [phone]      completed assessment with patient who was alert and oriented. He lives at home with his spouse, Margaret James [phone]. Patient's PCP is Marisel calabrese MD. He has [employer] Managed Medicare and [place] medicaid insurance and his pharmacy of choice is [facility]. Patient is interested in having home health upon discharge. He uses a rollator, straight cane, shower chair and oxygen at home. He stated that he has a nebulizer at home however it is broken. He has family support, his sister, brother in law and aunts are available to assist when needed. Patient denies any additional needs or barriers at this time. Case Management will continue to follow.

## 2023-03-22 NOTE — PLAN OF CARE
03/22/23 1132   Discharge Reassessment   Assessment Type Discharge Planning Reassessment   Did the patient's condition or plan change since previous assessment? No   Discharge Plan discussed with: Patient   Communicated CLEMENTINA with patient/caregiver Yes   Discharge Plan A Home with family   Discharge Plan B Home Health   DME Needed Upon Discharge  nebulizer   Discharge Barriers Identified None   Why the patient remains in the hospital Requires continued medical care   Post-Acute Status   Hospital Resources/Appts/Education Provided Appointments scheduled and added to AVS   Discharge Delays None known at this time     In to discuss discharge plans with patient. States his wife has home health & wanted to use them. Spoke to all the home health co & none have his wife as a patient. Called his wife who states she has [employer] NP that sees her yearly but doesn't have home health. Patient notified. States he's not sure if he needs or wants home health. He can decide once he's ready for discharge. His nebulizer is broken. Will see if he can get another one. Plan for the day is to try to wean his oxygen down to his normal 1-2l at home; replace eletrolytes & have echo done. Possibly discharge tomorrow. No other needs at this time. Will continue to follow.

## 2023-03-22 NOTE — PROGRESS NOTES
[facility] Medicine  Progress Note    Patient Name: Jama James  MRN: [id number]  Patient Class: OP- Observation   Admission Date: 3/21/2023  Length of Stay: 0 days  Attending Physician: Edu Meier MD  Primary Care Provider: Marisel Farrell III, MD        Subjective:     Principal Problem:COPD exacerbation        HPI:  67 w/ Chronic Hypoxic Respiratory Failure on 1L LFNC 2/2 COPD, Prostate cancer, HTN, HLD presenting w/ 1 week of worsening SOB. Patient endorses subjective fever and chills. No change in sputum production but has new cough. He has been having to use his home O2 more often than usual and using his home inhalers more often. Denies NVD. Also endorses chronic LE swelling that has been worse lately. He does endorse sleeping on a couch primarily sleeping straight up. He sits in a recliner all day with his legs hanging. Patient endorses inspirational chest pain. Does not radiate. Comes on at rest and with exertion. Associated with cough. Painful to palpation per patient. Denies any hx of HF. Patient actively undergoing workup for treatment of prostate cancer.      Overview/Hospital Course:  No notes on file    Interval History: Patient continues to have significant shortness of breath with ambulation with associated wheezing. Had good UOP with IV lasix but did not improve patient's breathing. On 3L LFNC this morning. TTE pending. Continuing current level of care for today.    Review of Systems   All other systems reviewed and are negative.  Objective:     Vital Signs (Most Recent):  Temp: 97 °F (36.1 °C) (03/22/23 0725)  Pulse: 73 (03/22/23 0833)  Resp: 16 (03/22/23 0833)  BP: (!) 165/72 (03/22/23 0725)  SpO2: 98 % (03/22/23 0833)   Vital Signs (24h Range):  Temp:  [96.9 °F (36.1 °C)-98.4 °F (36.9 °C)] 97 °F (36.1 °C)  Pulse:  [] 73  Resp:  [14-22] 16  SpO2:  [94 %-99 %] 98 %  BP: (136-169)/(72-91) 165/72     Weight: 86.2 kg (190 lb)  Body mass index is 22.53  kg/m².    Intake/Output Summary (Last 24 hours) at 3/22/2023 1029  Last data filed at 3/22/2023 0808  Gross per 24 hour   Intake 1050 ml   Output 750 ml   Net 300 ml      Physical Exam  Vitals reviewed  General: NAD, Well developed, Well Nourished  Head: NC/AT  Eyes: EOMI, MELISSA  Cardiovascular: Pulses intact distally, Regular Rate and rhythm  Pulmonary: Normal Respiratory Rate, No respiratory distress, no audible wheezing  Gi: Soft, Non-tender  Extremities: Warm, No edema present, chronic skin changes bilateral lower extremity. Edema resolved  Skin: Warm, dry  Neuro: Alert, Oriented x3, No focal Deficit  Psych: Appropriate mood and affect      Significant Labs: All pertinent labs within the past 24 hours have been reviewed.    Significant Imaging: I have reviewed all pertinent imaging results/findings within the past 24 hours.      Assessment/Plan:      * COPD exacerbation  Acute on Chronic Hypoxic Respiratory failure 2/2 COPD exacerbation caused by likely acute viral illness.   CT w/ no evidence of pneumonia or PE  Did show 6mm spiculated nodule left anterior lung that needs to be investigated further in outpatient setting. Patient did have recent PET scan but I can not see report to see if this is new or old.  TTE pending  Given 1 dose Rocephin/Azithro. Will continue azithromycin for anti-inflammatory effect  Continue prednisone daily  Breathing treatments scheduled  Continuous pulse ox  Currently requiring 3 L LFNC which is increase from baseline. Wean as tolerated      Prostate cancer  Needs f/u w/ heme onc at discharge  Continue flomax and finasteride      Tobacco abuse  Counseled on cessation  Nicotine patch if needed    Depression  Continue home paroxetine        High cholesterol  Continue ASA/Statin      HTN (hypertension)  Continue home medications. Adjust and titrate as needed        VTE Risk Mitigation (From admission, onward)         Ordered     enoxaparin injection 40 mg  Daily         03/21/23 9053      IP VTE HIGH RISK PATIENT  Once         03/21/23 1701     Place sequential compression device  Until discontinued         03/21/23 1701                Discharge Planning   CLEMENTINA:      Code Status: Full Code   Is the patient medically ready for discharge?:     Reason for patient still in hospital (select all that apply): Treatment                     Edu Meier MD  Department of [facility] Medicine   [facility] Intensive [facility]

## 2023-03-22 NOTE — SUBJECTIVE & OBJECTIVE
Interval History: Patient continues to have significant shortness of breath with ambulation with associated wheezing. Had good UOP with IV lasix but did not improve patient's breathing. On 3L LFNC this morning. TTE pending. Continuing current level of care for today.    Review of Systems   All other systems reviewed and are negative.  Objective:     Vital Signs (Most Recent):  Temp: 97 °F (36.1 °C) (03/22/23 0725)  Pulse: 73 (03/22/23 0833)  Resp: 16 (03/22/23 0833)  BP: (!) 165/72 (03/22/23 0725)  SpO2: 98 % (03/22/23 0833)   Vital Signs (24h Range):  Temp:  [96.9 °F (36.1 °C)-98.4 °F (36.9 °C)] 97 °F (36.1 °C)  Pulse:  [] 73  Resp:  [14-22] 16  SpO2:  [94 %-99 %] 98 %  BP: (136-169)/(72-91) 165/72     Weight: 86.2 kg (190 lb)  Body mass index is 22.53 kg/m².    Intake/Output Summary (Last 24 hours) at 3/22/2023 1029  Last data filed at 3/22/2023 0808  Gross per 24 hour   Intake 1050 ml   Output 750 ml   Net 300 ml      Physical Exam  Vitals reviewed  General: NAD, Well developed, Well Nourished  Head: NC/AT  Eyes: EOMI, MELISSA  Cardiovascular: Pulses intact distally, Regular Rate and rhythm  Pulmonary: Normal Respiratory Rate, No respiratory distress, no audible wheezing  Gi: Soft, Non-tender  Extremities: Warm, No edema present, chronic skin changes bilateral lower extremity. Edema resolved  Skin: Warm, dry  Neuro: Alert, Oriented x3, No focal Deficit  Psych: Appropriate mood and affect      Significant Labs: All pertinent labs within the past 24 hours have been reviewed.    Significant Imaging: I have reviewed all pertinent imaging results/findings within the past 24 hours.

## 2023-03-22 NOTE — PLAN OF CARE
03/22/23 1039   GAMBLE Message   Medicare Outpatient and Observation Notification regarding financial responsibility Given to patient/caregiver;Explained to patient/caregiver;Signed/date by patient/caregiver   Date GAMBLE was signed 03/22/23   Time GAMBLE was signed 103        Dupixent Amount: 300 mg

## 2023-03-22 NOTE — H&P
[facility] Medicine  History & Physical    Patient Name: Jama James  MRN: [id number]  Patient Class: OP- Observation  Admission Date: 3/21/2023  Attending Physician: Edu Meier MD   Primary Care Provider: Marisel Farrell III, MD         Patient information was obtained from patient, past medical records and ER records.     Subjective:     Principal Problem:COPD exacerbation    Chief Complaint:   Chief Complaint   Patient presents with    Shortness of Breath     X 2 days. Pt has hx of copd on home o2. Pt reports being unable to walk to the restroom due to the sob which isn't his normal.         HPI: 67 w/ Chronic Hypoxic Respiratory Failure on 1L LFNC 2/2 COPD, Prostate cancer, HTN, HLD presenting w/ 1 week of worsening SOB. Patient endorses subjective fever and chills. No change in sputum production but has new cough. He has been having to use his home O2 more often than usual and using his home inhalers more often. Denies NVD. Also endorses chronic LE swelling that has been worse lately. He does endorse sleeping on a couch primarily sleeping straight up. He sits in a recliner all day with his legs hanging. Patient endorses inspirational chest pain. Does not radiate. Comes on at rest and with exertion. Associated with cough. Painful to palpation per patient. Denies any hx of HF. Patient actively undergoing workup for treatment of prostate cancer.      Past Medical History:   Diagnosis Date    Anxiety     COPD (chronic obstructive pulmonary disease)     Depression     High cholesterol     HTN (hypertension)     Malignant neoplasm of prostate     Ulcerative colitis        Past Surgical History:   Procedure Laterality Date    NECK SURGERY      right knee      TRANSRECTAL BIOPSY OF PROSTATE WITH ULTRASOUND GUIDANCE Bilateral 3/18/2020    Procedure: BIOPSY, PROSTATE, RECTAL APPROACH, WITH US GUIDANCE;  Surgeon: Bill Jeong MD;  Location: [facility] OR;  Service: Urology;   Laterality: Bilateral;       Review of patient's allergies indicates:  No Known Allergies    No current facility-administered medications on file prior to encounter.     Current Outpatient Medications on File Prior to Encounter   Medication Sig    aspirin (ECOTRIN) 81 MG EC tablet Take 1 tablet (81 mg total) by mouth once daily.    albuterol (PROVENTIL/VENTOLIN HFA) 90 mcg/actuation inhaler INHALE ONE (1) OR TWO (2) SPRAYS BY MOUTH EVERY SIX (6) HOURS AS NEEDED FOR WHEEZING    atorvastatin (LIPITOR) 10 MG tablet Take 1 tablet (10 mg total) by mouth every evening.    finasteride (PROSCAR) 5 mg tablet TAKE 1 TABLET EVERY MORNING FOR PROSTATE    fluticasone-umeclidin-vilanter (TRELEGY ELLIPTA) 200-62.5-25 mcg inhaler Inhale 1 puff into the lungs once daily.    losartan (COZAAR) 25 MG tablet Take 25 mg by mouth 2 (two) times a day.    pantoprazole (PROTONIX) 40 MG tablet TAKE 1 TABLET EVERY MORNING (30 MINUTES BEFORE BREAKFAST) FOR STOMACH    paroxetine (PAXIL) 40 MG tablet TAKE ONE (1) TABLET EVERY MORNING FOR MOOD AND NERVE PAIN    tamsulosin (FLOMAX) 0.4 mg Cap Take 1 capsule (0.4 mg total) by mouth once daily.    traZODone (DESYREL) 100 MG tablet Take 1 tablet (100 mg total) by mouth every evening.     Family History       Problem Relation (Age of Onset)    Diabetes Mother, Sister          Tobacco Use    Smoking status: Every Day     Packs/day: 1.50     Years: 40.00     Pack years: 60.00     Types: Cigarettes    Smokeless tobacco: Never   Substance and Sexual Activity    Alcohol use: Yes     Alcohol/week: 6.0 standard drinks     Types: 6 Cans of beer per week    Drug use: No    Sexual activity: Yes     Review of Systems   All other systems reviewed and are negative.  Objective:     Vital Signs (Most Recent):  Temp: 98.4 °F (36.9 °C) (03/21/23 1721)  Pulse: 82 (03/21/23 1721)  Resp: 18 (03/21/23 1721)  BP: (!) 166/91 (03/21/23 1721)  SpO2: 98 % (03/21/23 1721)   Vital Signs (24h Range):  Temp:  [98 °F  (36.7 °C)-98.4 °F (36.9 °C)] 98.4 °F (36.9 °C)  Pulse:  [76-94] 82  Resp:  [18-22] 18  SpO2:  [94 %-98 %] 98 %  BP: (138-166)/(85-91) 166/91     Weight: 86.2 kg (190 lb)  Body mass index is 22.53 kg/m².    Physical Exam  Vitals reviewed.   Constitutional:       General: He is not in acute distress.     Appearance: Normal appearance. He is ill-appearing.   HENT:      Head: Normocephalic and atraumatic.   Eyes:      Extraocular Movements: Extraocular movements intact.      Pupils: Pupils are equal, round, and reactive to light.   Cardiovascular:      Rate and Rhythm: Normal rate and regular rhythm.   Pulmonary:      Effort: Pulmonary effort is normal. No respiratory distress.      Breath sounds: No wheezing or rales.   Abdominal:      Palpations: Abdomen is soft.      Tenderness: There is no abdominal tenderness.   Musculoskeletal:         General: Tenderness (tenderness to palpation left chest wall) present.      Right lower leg: Edema present.      Left lower leg: Edema present.   Skin:     General: Skin is warm and dry.      Findings: Erythema (to bilateral anterior shins w/ some stasis dermatitis) present.   Neurological:      General: No focal deficit present.      Mental Status: He is alert and oriented to person, place, and time.   Psychiatric:         Mood and Affect: Mood normal.         Behavior: Behavior normal.       CRANIAL NERVES     CN III, IV, VI   Pupils are equal, round, and reactive to light.     Significant Labs: All pertinent labs within the past 24 hours have been reviewed.    Significant Imaging: I have reviewed all pertinent imaging results/findings within the past 24 hours.    Assessment/Plan:     * COPD exacerbation  Acute on Chronic Hypoxic Respiratory failure 2/2 COPD exacerbation caused by likely acute viral illness.   CT w/ no evidence of pneumonia or PE  Did show 6mm spiculated nodule left anterior lung that needs to be investigated further in outpatient setting. Patient did have recent  PET scan but I can not see report to see if this is new or old.  Will give 1 dose IV lasix for elevated BNP and mild swelling in lower extremities although this is likely venous insufficiency from sitting most of the day  TTE tomorrow  Given 1 dose Rocephin/Azithro. Will continue azithromycin for anti-inflammatory effect  Continue prednisone daily  Breathing treatments scheduled  Continuous pulse ox  Currently requiring 2L LFNC which is increase from baseline. Wean as tolerated      Prostate cancer  Needs f/u w/ heme onc at discharge  Continue flomax and finasteride      Tobacco abuse  Counseled on cessation  Nicotine patch if needed    Depression  Continue home paroxetine        High cholesterol  Continue ASA/Statin      HTN (hypertension)  Continue home medications. Adjust and titrate as needed        VTE Risk Mitigation (From admission, onward)         Ordered     enoxaparin injection 40 mg  Daily         03/21/23 1701     IP VTE HIGH RISK PATIENT  Once         03/21/23 1701     Place sequential compression device  Until discontinued         03/21/23 1701                   On 03/21/2023, patient should be placed in hospital observation services under my care.        Edu Meier MD  Department of Hospital Medicine  [place] - Emergency Dept

## 2023-03-22 NOTE — PLAN OF CARE
Free from falls, skin breakdown, or injury. NADN. Respirations even and unlabored on 3l N/C. A&O x 4. Tolerating diet well as ordered. Denies pain. Bed in lowest, locked position, side rails elevated x2.  Call bell in reach. Purposeful rounding done every hour.

## 2023-03-23 LAB
ALBUMIN SERPL BCP-MCNC: 3 G/DL (ref 3.5–5.2)
ALP SERPL-CCNC: 101 U/L (ref 55–135)
ALT SERPL W/O P-5'-P-CCNC: 12 U/L (ref 10–44)
ANION GAP SERPL CALC-SCNC: 8 MMOL/L (ref 8–16)
AST SERPL-CCNC: 16 U/L (ref 10–40)
BASOPHILS # BLD AUTO: 0.04 K/UL (ref 0–0.2)
BASOPHILS NFR BLD: 0.6 % (ref 0–1.9)
BILIRUB SERPL-MCNC: 0.2 MG/DL (ref 0.1–1)
BUN SERPL-MCNC: 9 MG/DL (ref 8–23)
CALCIUM SERPL-MCNC: 9.6 MG/DL (ref 8.7–10.5)
CHLORIDE SERPL-SCNC: 108 MMOL/L (ref 95–110)
CO2 SERPL-SCNC: 26 MMOL/L (ref 23–29)
CREAT SERPL-MCNC: 0.7 MG/DL (ref 0.5–1.4)
DIFFERENTIAL METHOD: ABNORMAL
EOSINOPHIL # BLD AUTO: 0 K/UL (ref 0–0.5)
EOSINOPHIL NFR BLD: 0.6 % (ref 0–8)
ERYTHROCYTE [DISTWIDTH] IN BLOOD BY AUTOMATED COUNT: 12.7 % (ref 11.5–14.5)
EST. GFR  (NO RACE VARIABLE): >60 ML/MIN/1.73 M^2
GLUCOSE SERPL-MCNC: 122 MG/DL (ref 70–110)
HCT VFR BLD AUTO: 36.7 % (ref 40–54)
HGB BLD-MCNC: 11.7 G/DL (ref 14–18)
IMM GRANULOCYTES # BLD AUTO: 0.03 K/UL (ref 0–0.04)
IMM GRANULOCYTES NFR BLD AUTO: 0.4 % (ref 0–0.5)
LYMPHOCYTES # BLD AUTO: 1.1 K/UL (ref 1–4.8)
LYMPHOCYTES NFR BLD: 16.5 % (ref 18–48)
MCH RBC QN AUTO: 30.7 PG (ref 27–31)
MCHC RBC AUTO-ENTMCNC: 31.9 G/DL (ref 32–36)
MCV RBC AUTO: 96 FL (ref 82–98)
MONOCYTES # BLD AUTO: 0.6 K/UL (ref 0.3–1)
MONOCYTES NFR BLD: 9.3 % (ref 4–15)
NEUTROPHILS # BLD AUTO: 4.9 K/UL (ref 1.8–7.7)
NEUTROPHILS NFR BLD: 72.6 % (ref 38–73)
NRBC BLD-RTO: 0 /100 WBC
PLATELET # BLD AUTO: 234 K/UL (ref 150–450)
PMV BLD AUTO: 11.5 FL (ref 9.2–12.9)
POTASSIUM SERPL-SCNC: 4 MMOL/L (ref 3.5–5.1)
PROT SERPL-MCNC: 6 G/DL (ref 6–8.4)
RBC # BLD AUTO: 3.81 M/UL (ref 4.6–6.2)
SODIUM SERPL-SCNC: 142 MMOL/L (ref 136–145)
WBC # BLD AUTO: 6.8 K/UL (ref 3.9–12.7)

## 2023-03-23 PROCEDURE — G0378 HOSPITAL OBSERVATION PER HR: HCPCS

## 2023-03-23 PROCEDURE — 94761 N-INVAS EAR/PLS OXIMETRY MLT: CPT

## 2023-03-23 PROCEDURE — 25000003 PHARM REV CODE 250: Performed by: STUDENT IN AN ORGANIZED HEALTH CARE EDUCATION/TRAINING PROGRAM

## 2023-03-23 PROCEDURE — 27000221 HC OXYGEN, UP TO 24 HOURS

## 2023-03-23 PROCEDURE — 99233 PR SUBSEQUENT HOSPITAL CARE,LEVL III: ICD-10-PCS | Mod: ,,, | Performed by: STUDENT IN AN ORGANIZED HEALTH CARE EDUCATION/TRAINING PROGRAM

## 2023-03-23 PROCEDURE — 94640 AIRWAY INHALATION TREATMENT: CPT | Mod: XB

## 2023-03-23 PROCEDURE — 63700000 PHARM REV CODE 250 ALT 637 W/O HCPCS: Performed by: STUDENT IN AN ORGANIZED HEALTH CARE EDUCATION/TRAINING PROGRAM

## 2023-03-23 PROCEDURE — 99233 SBSQ HOSP IP/OBS HIGH 50: CPT | Mod: ,,, | Performed by: STUDENT IN AN ORGANIZED HEALTH CARE EDUCATION/TRAINING PROGRAM

## 2023-03-23 PROCEDURE — 25000242 PHARM REV CODE 250 ALT 637 W/ HCPCS: Performed by: STUDENT IN AN ORGANIZED HEALTH CARE EDUCATION/TRAINING PROGRAM

## 2023-03-23 PROCEDURE — 96372 THER/PROPH/DIAG INJ SC/IM: CPT | Performed by: STUDENT IN AN ORGANIZED HEALTH CARE EDUCATION/TRAINING PROGRAM

## 2023-03-23 PROCEDURE — 85025 COMPLETE CBC W/AUTO DIFF WBC: CPT | Performed by: STUDENT IN AN ORGANIZED HEALTH CARE EDUCATION/TRAINING PROGRAM

## 2023-03-23 PROCEDURE — 80053 COMPREHEN METABOLIC PANEL: CPT | Performed by: STUDENT IN AN ORGANIZED HEALTH CARE EDUCATION/TRAINING PROGRAM

## 2023-03-23 PROCEDURE — 63600175 PHARM REV CODE 636 W HCPCS: Performed by: STUDENT IN AN ORGANIZED HEALTH CARE EDUCATION/TRAINING PROGRAM

## 2023-03-23 RX ADMIN — ALBUTEROL SULFATE 2.5 MG: 2.5 SOLUTION RESPIRATORY (INHALATION) at 12:03

## 2023-03-23 RX ADMIN — IPRATROPIUM BROMIDE 0.5 MG: 0.5 SOLUTION RESPIRATORY (INHALATION) at 07:03

## 2023-03-23 RX ADMIN — ALBUTEROL SULFATE 2.5 MG: 2.5 SOLUTION RESPIRATORY (INHALATION) at 07:03

## 2023-03-23 RX ADMIN — ENOXAPARIN SODIUM 40 MG: 40 INJECTION SUBCUTANEOUS at 05:03

## 2023-03-23 RX ADMIN — AZITHROMYCIN MONOHYDRATE 500 MG: 250 TABLET ORAL at 09:03

## 2023-03-23 RX ADMIN — LOSARTAN POTASSIUM 25 MG: 25 TABLET, FILM COATED ORAL at 09:03

## 2023-03-23 RX ADMIN — ATORVASTATIN CALCIUM 10 MG: 10 TABLET, FILM COATED ORAL at 09:03

## 2023-03-23 RX ADMIN — PREDNISONE 40 MG: 10 TABLET ORAL at 09:03

## 2023-03-23 RX ADMIN — IPRATROPIUM BROMIDE 0.5 MG: 0.5 SOLUTION RESPIRATORY (INHALATION) at 06:03

## 2023-03-23 RX ADMIN — FLUTICASONE FUROATE AND VILANTEROL TRIFENATATE 1 PUFF: 100; 25 POWDER RESPIRATORY (INHALATION) at 07:03

## 2023-03-23 RX ADMIN — PANTOPRAZOLE SODIUM 40 MG: 40 TABLET, DELAYED RELEASE ORAL at 09:03

## 2023-03-23 RX ADMIN — IPRATROPIUM BROMIDE 0.5 MG: 0.5 SOLUTION RESPIRATORY (INHALATION) at 12:03

## 2023-03-23 RX ADMIN — FINASTERIDE 5 MG: 5 TABLET, FILM COATED ORAL at 09:03

## 2023-03-23 RX ADMIN — TAMSULOSIN HYDROCHLORIDE 0.4 MG: 0.4 CAPSULE ORAL at 09:03

## 2023-03-23 RX ADMIN — ALBUTEROL SULFATE 2.5 MG: 2.5 SOLUTION RESPIRATORY (INHALATION) at 06:03

## 2023-03-23 RX ADMIN — TRAZODONE HYDROCHLORIDE 100 MG: 50 TABLET ORAL at 09:03

## 2023-03-23 RX ADMIN — PAROXETINE HYDROCHLORIDE 40 MG: 10 TABLET, FILM COATED ORAL at 09:03

## 2023-03-23 RX ADMIN — ASPIRIN 81 MG: 81 TABLET, COATED ORAL at 09:03

## 2023-03-23 NOTE — PLAN OF CARE
Patient recently back in bed after going to bathroom & still remains short of breath with little exertion. Plan for today will to continue twith breathing treatments, steroids & oxygen.

## 2023-03-23 NOTE — PLAN OF CARE
Problem: Gas Exchange Impaired  Goal: Optimal Gas Exchange  Outcome: Ongoing, Progressing     Problem: COPD (Chronic Obstructive Pulmonary Disease) Comorbidity  Goal: Maintenance of COPD Symptom Control  Outcome: Ongoing, Progressing     Problem: Hypertension Comorbidity  Goal: Blood Pressure in Desired Range  Outcome: Ongoing, Progressing     Problem: Activity Intolerance  Goal: Enhanced Capacity and Energy  Outcome: Ongoing, Progressing

## 2023-03-23 NOTE — ASSESSMENT & PLAN NOTE
Acute on Chronic Hypoxic Respiratory failure 2/2 COPD exacerbation caused by likely acute viral illness.   CT w/ no evidence of pneumonia or PE  Did show 6mm spiculated nodule left anterior lung that needs to be investigated further in outpatient setting. Patient did have recent PET scan but I can not see report to see if this is new or old.  TTE completed  Given 1 dose Rocephin/Azithro. Will continue azithromycin for anti-inflammatory effect  Continue prednisone daily  Breathing treatments scheduled  Continuous pulse ox  Currently requiring 3 L LFNC which is increase from baseline. Wean as tolerated

## 2023-03-23 NOTE — PROGRESS NOTES
[facility] Medicine  Progress Note    Patient Name: Jama James  MRN: [id number]  Patient Class: OP- Observation   Admission Date: 3/21/2023  Length of Stay: 0 days  Attending Physician: Edu Meier MD  Primary Care Provider: Marisel Farrell III, MD        Subjective:     Principal Problem:COPD exacerbation        HPI:  67 w/ Chronic Hypoxic Respiratory Failure on 1L LFNC 2/2 COPD, Prostate cancer, HTN, HLD presenting w/ 1 week of worsening SOB. Patient endorses subjective fever and chills. No change in sputum production but has new cough. He has been having to use his home O2 more often than usual and using his home inhalers more often. Denies NVD. Also endorses chronic LE swelling that has been worse lately. He does endorse sleeping on a couch primarily sleeping straight up. He sits in a recliner all day with his legs hanging. Patient endorses inspirational chest pain. Does not radiate. Comes on at rest and with exertion. Associated with cough. Painful to palpation per patient. Denies any hx of HF. Patient actively undergoing workup for treatment of prostate cancer.      Overview/Hospital Course:  No notes on file    Interval History: Patient continues to require O2 and is very symptomatically short of breath with ambulation. He is not improved from yesterday. Continuing current level of care for today.    Review of Systems   All other systems reviewed and are negative.  Objective:     Vital Signs (Most Recent):  Temp: 96.5 °F (35.8 °C) (03/23/23 0800)  Pulse: 70 (03/23/23 0800)  Resp: 20 (03/23/23 0800)  BP: (!) 180/83 (03/23/23 0800)  SpO2: 96 % (03/23/23 0800)   Vital Signs (24h Range):  Temp:  [96.5 °F (35.8 °C)-98 °F (36.7 °C)] 96.5 °F (35.8 °C)  Pulse:  [66-89] 70  Resp:  [14-22] 20  SpO2:  [96 %-99 %] 96 %  BP: (148-180)/(68-94) 180/83     Weight: 86.2 kg (190 lb)  Body mass index is 22.53 kg/m².    Intake/Output Summary (Last 24 hours) at 3/23/2023 1113  Last data filed at  3/23/2023 0745  Gross per 24 hour   Intake --   Output 1280 ml   Net -1280 ml      Physical Exam  Vitals reviewed  General: Mild respiratory distress, Well developed, Well Nourished  Head: NC/AT  Eyes: EOMI, MELISSA  Cardiovascular: Pulses intact distally, Regular Rate and rhythm  Pulmonary: Increased RR, Audibly Wheezing on exam  Gi: Soft, Non-tender  Extremities: Warm, No edema present  Skin: Warm, dry  Neuro: Alert, Oriented x3, No focal Deficit  Psych: Appropriate mood and affect      Significant Labs: All pertinent labs within the past 24 hours have been reviewed.    Significant Imaging: I have reviewed all pertinent imaging results/findings within the past 24 hours.      Assessment/Plan:      * COPD exacerbation  Acute on Chronic Hypoxic Respiratory failure 2/2 COPD exacerbation caused by likely acute viral illness.   CT w/ no evidence of pneumonia or PE  Did show 6mm spiculated nodule left anterior lung that needs to be investigated further in outpatient setting. Patient did have recent PET scan but I can not see report to see if this is new or old.  TTE completed  Given 1 dose Rocephin/Azithro. Will continue azithromycin for anti-inflammatory effect  Continue prednisone daily  Breathing treatments scheduled  Continuous pulse ox  Currently requiring 3 L LFNC which is increase from baseline. Wean as tolerated      Prostate cancer  Needs f/u w/ heme onc at discharge  Continue flomax and finasteride      Tobacco abuse  Counseled on cessation  Nicotine patch if needed    Depression  Continue home paroxetine        High cholesterol  Continue ASA/Statin      HTN (hypertension)  Continue home medications. Adjust and titrate as needed      VTE Risk Mitigation (From admission, onward)         Ordered     enoxaparin injection 40 mg  Daily         03/21/23 1701     IP VTE HIGH RISK PATIENT  Once         03/21/23 1701     Place sequential compression device  Until discontinued         03/21/23 1701                Discharge  Planning   CLEMENTINA:      Code Status: Full Code   Is the patient medically ready for discharge?:     Reason for patient still in hospital (select all that apply): Treatment  Discharge Plan A: Home with family   Discharge Delays: None known at this time              Edu Meier MD  Department of [facility] Medicine   [facility] Intensive [facility]

## 2023-03-23 NOTE — SUBJECTIVE & OBJECTIVE
Interval History: Patient continues to require O2 and is very symptomatically short of breath with ambulation. He is not improved from yesterday. Continuing current level of care for today.    Review of Systems   All other systems reviewed and are negative.  Objective:     Vital Signs (Most Recent):  Temp: 96.5 °F (35.8 °C) (03/23/23 0800)  Pulse: 70 (03/23/23 0800)  Resp: 20 (03/23/23 0800)  BP: (!) 180/83 (03/23/23 0800)  SpO2: 96 % (03/23/23 0800)   Vital Signs (24h Range):  Temp:  [96.5 °F (35.8 °C)-98 °F (36.7 °C)] 96.5 °F (35.8 °C)  Pulse:  [66-89] 70  Resp:  [14-22] 20  SpO2:  [96 %-99 %] 96 %  BP: (148-180)/(68-94) 180/83     Weight: 86.2 kg (190 lb)  Body mass index is 22.53 kg/m².    Intake/Output Summary (Last 24 hours) at 3/23/2023 1113  Last data filed at 3/23/2023 0745  Gross per 24 hour   Intake --   Output 1280 ml   Net -1280 ml      Physical Exam  Vitals reviewed  General: Mild respiratory distress, Well developed, Well Nourished  Head: NC/AT  Eyes: EOMI, MELISSA  Cardiovascular: Pulses intact distally, Regular Rate and rhythm  Pulmonary: Increased RR, Audibly Wheezing on exam  Gi: Soft, Non-tender  Extremities: Warm, No edema present  Skin: Warm, dry  Neuro: Alert, Oriented x3, No focal Deficit  Psych: Appropriate mood and affect      Significant Labs: All pertinent labs within the past 24 hours have been reviewed.    Significant Imaging: I have reviewed all pertinent imaging results/findings within the past 24 hours.

## 2023-03-24 LAB
ALBUMIN SERPL BCP-MCNC: 2.8 G/DL (ref 3.5–5.2)
ALP SERPL-CCNC: 98 U/L (ref 55–135)
ALT SERPL W/O P-5'-P-CCNC: 14 U/L (ref 10–44)
ANION GAP SERPL CALC-SCNC: 10 MMOL/L (ref 8–16)
AST SERPL-CCNC: 12 U/L (ref 10–40)
BASOPHILS # BLD AUTO: 0.04 K/UL (ref 0–0.2)
BASOPHILS NFR BLD: 0.6 % (ref 0–1.9)
BILIRUB SERPL-MCNC: 0.2 MG/DL (ref 0.1–1)
BUN SERPL-MCNC: 14 MG/DL (ref 8–23)
CALCIUM SERPL-MCNC: 8.8 MG/DL (ref 8.7–10.5)
CHLORIDE SERPL-SCNC: 108 MMOL/L (ref 95–110)
CO2 SERPL-SCNC: 26 MMOL/L (ref 23–29)
CREAT SERPL-MCNC: 0.8 MG/DL (ref 0.5–1.4)
DIFFERENTIAL METHOD: ABNORMAL
EOSINOPHIL # BLD AUTO: 0 K/UL (ref 0–0.5)
EOSINOPHIL NFR BLD: 0.6 % (ref 0–8)
ERYTHROCYTE [DISTWIDTH] IN BLOOD BY AUTOMATED COUNT: 12.6 % (ref 11.5–14.5)
EST. GFR  (NO RACE VARIABLE): >60 ML/MIN/1.73 M^2
GLUCOSE SERPL-MCNC: 142 MG/DL (ref 70–110)
HCT VFR BLD AUTO: 35.1 % (ref 40–54)
HGB BLD-MCNC: 11.4 G/DL (ref 14–18)
IMM GRANULOCYTES # BLD AUTO: 0.03 K/UL (ref 0–0.04)
IMM GRANULOCYTES NFR BLD AUTO: 0.4 % (ref 0–0.5)
LYMPHOCYTES # BLD AUTO: 1.2 K/UL (ref 1–4.8)
LYMPHOCYTES NFR BLD: 17 % (ref 18–48)
MCH RBC QN AUTO: 31.2 PG (ref 27–31)
MCHC RBC AUTO-ENTMCNC: 32.5 G/DL (ref 32–36)
MCV RBC AUTO: 96 FL (ref 82–98)
MONOCYTES # BLD AUTO: 0.6 K/UL (ref 0.3–1)
MONOCYTES NFR BLD: 8.9 % (ref 4–15)
NEUTROPHILS # BLD AUTO: 5.1 K/UL (ref 1.8–7.7)
NEUTROPHILS NFR BLD: 72.5 % (ref 38–73)
NRBC BLD-RTO: 0 /100 WBC
PLATELET # BLD AUTO: 259 K/UL (ref 150–450)
PMV BLD AUTO: 10.7 FL (ref 9.2–12.9)
POTASSIUM SERPL-SCNC: 3.7 MMOL/L (ref 3.5–5.1)
PROT SERPL-MCNC: 5.8 G/DL (ref 6–8.4)
RBC # BLD AUTO: 3.65 M/UL (ref 4.6–6.2)
SODIUM SERPL-SCNC: 144 MMOL/L (ref 136–145)
WBC # BLD AUTO: 7 K/UL (ref 3.9–12.7)

## 2023-03-24 PROCEDURE — 94640 AIRWAY INHALATION TREATMENT: CPT | Mod: XB

## 2023-03-24 PROCEDURE — 99233 PR SUBSEQUENT HOSPITAL CARE,LEVL III: ICD-10-PCS | Mod: ,,, | Performed by: STUDENT IN AN ORGANIZED HEALTH CARE EDUCATION/TRAINING PROGRAM

## 2023-03-24 PROCEDURE — 27000221 HC OXYGEN, UP TO 24 HOURS

## 2023-03-24 PROCEDURE — 85025 COMPLETE CBC W/AUTO DIFF WBC: CPT | Performed by: STUDENT IN AN ORGANIZED HEALTH CARE EDUCATION/TRAINING PROGRAM

## 2023-03-24 PROCEDURE — 99233 SBSQ HOSP IP/OBS HIGH 50: CPT | Mod: ,,, | Performed by: STUDENT IN AN ORGANIZED HEALTH CARE EDUCATION/TRAINING PROGRAM

## 2023-03-24 PROCEDURE — 63700000 PHARM REV CODE 250 ALT 637 W/O HCPCS: Performed by: STUDENT IN AN ORGANIZED HEALTH CARE EDUCATION/TRAINING PROGRAM

## 2023-03-24 PROCEDURE — 94640 AIRWAY INHALATION TREATMENT: CPT

## 2023-03-24 PROCEDURE — 94761 N-INVAS EAR/PLS OXIMETRY MLT: CPT

## 2023-03-24 PROCEDURE — 25000003 PHARM REV CODE 250: Performed by: STUDENT IN AN ORGANIZED HEALTH CARE EDUCATION/TRAINING PROGRAM

## 2023-03-24 PROCEDURE — 80053 COMPREHEN METABOLIC PANEL: CPT | Performed by: STUDENT IN AN ORGANIZED HEALTH CARE EDUCATION/TRAINING PROGRAM

## 2023-03-24 PROCEDURE — 97530 THERAPEUTIC ACTIVITIES: CPT

## 2023-03-24 PROCEDURE — 63600175 PHARM REV CODE 636 W HCPCS: Performed by: STUDENT IN AN ORGANIZED HEALTH CARE EDUCATION/TRAINING PROGRAM

## 2023-03-24 PROCEDURE — 97166 OT EVAL MOD COMPLEX 45 MIN: CPT

## 2023-03-24 PROCEDURE — 21400001 HC TELEMETRY ROOM

## 2023-03-24 PROCEDURE — 97116 GAIT TRAINING THERAPY: CPT

## 2023-03-24 PROCEDURE — 25000242 PHARM REV CODE 250 ALT 637 W/ HCPCS: Performed by: STUDENT IN AN ORGANIZED HEALTH CARE EDUCATION/TRAINING PROGRAM

## 2023-03-24 PROCEDURE — 97162 PT EVAL MOD COMPLEX 30 MIN: CPT

## 2023-03-24 RX ORDER — MUPIROCIN 20 MG/G
OINTMENT TOPICAL 2 TIMES DAILY
Status: DISCONTINUED | OUTPATIENT
Start: 2023-03-24 | End: 2023-03-27 | Stop reason: HOSPADM

## 2023-03-24 RX ORDER — LOSARTAN POTASSIUM 25 MG/1
50 TABLET ORAL 2 TIMES DAILY
Status: DISCONTINUED | OUTPATIENT
Start: 2023-03-24 | End: 2023-03-27 | Stop reason: HOSPADM

## 2023-03-24 RX ORDER — IPRATROPIUM BROMIDE 0.5 MG/2.5ML
500 SOLUTION RESPIRATORY (INHALATION) EVERY 6 HOURS PRN
Qty: 75 ML | Refills: 0 | Status: SHIPPED | OUTPATIENT
Start: 2023-03-24 | End: 2023-10-18

## 2023-03-24 RX ORDER — ALBUTEROL SULFATE 0.83 MG/ML
2.5 SOLUTION RESPIRATORY (INHALATION) EVERY 6 HOURS PRN
Qty: 180 ML | Refills: 2 | Status: SHIPPED | OUTPATIENT
Start: 2023-03-24 | End: 2023-04-23

## 2023-03-24 RX ORDER — LOSARTAN POTASSIUM 25 MG/1
25 TABLET ORAL ONCE
Status: COMPLETED | OUTPATIENT
Start: 2023-03-24 | End: 2023-03-24

## 2023-03-24 RX ADMIN — PANTOPRAZOLE SODIUM 40 MG: 40 TABLET, DELAYED RELEASE ORAL at 08:03

## 2023-03-24 RX ADMIN — LOSARTAN POTASSIUM 25 MG: 25 TABLET, FILM COATED ORAL at 11:03

## 2023-03-24 RX ADMIN — AZITHROMYCIN MONOHYDRATE 500 MG: 250 TABLET ORAL at 08:03

## 2023-03-24 RX ADMIN — IPRATROPIUM BROMIDE 0.5 MG: 0.5 SOLUTION RESPIRATORY (INHALATION) at 07:03

## 2023-03-24 RX ADMIN — PAROXETINE HYDROCHLORIDE 40 MG: 10 TABLET, FILM COATED ORAL at 08:03

## 2023-03-24 RX ADMIN — FLUTICASONE FUROATE AND VILANTEROL TRIFENATATE 1 PUFF: 100; 25 POWDER RESPIRATORY (INHALATION) at 07:03

## 2023-03-24 RX ADMIN — ALBUTEROL SULFATE 2.5 MG: 2.5 SOLUTION RESPIRATORY (INHALATION) at 07:03

## 2023-03-24 RX ADMIN — LOSARTAN POTASSIUM 50 MG: 25 TABLET, FILM COATED ORAL at 08:03

## 2023-03-24 RX ADMIN — LOSARTAN POTASSIUM 25 MG: 25 TABLET, FILM COATED ORAL at 08:03

## 2023-03-24 RX ADMIN — ENOXAPARIN SODIUM 40 MG: 40 INJECTION SUBCUTANEOUS at 04:03

## 2023-03-24 RX ADMIN — ALBUTEROL SULFATE 2.5 MG: 2.5 SOLUTION RESPIRATORY (INHALATION) at 06:03

## 2023-03-24 RX ADMIN — ASPIRIN 81 MG: 81 TABLET, COATED ORAL at 08:03

## 2023-03-24 RX ADMIN — IPRATROPIUM BROMIDE 0.5 MG: 0.5 SOLUTION RESPIRATORY (INHALATION) at 06:03

## 2023-03-24 RX ADMIN — ALBUTEROL SULFATE 2.5 MG: 2.5 SOLUTION RESPIRATORY (INHALATION) at 12:03

## 2023-03-24 RX ADMIN — ATORVASTATIN CALCIUM 10 MG: 10 TABLET, FILM COATED ORAL at 08:03

## 2023-03-24 RX ADMIN — TAMSULOSIN HYDROCHLORIDE 0.4 MG: 0.4 CAPSULE ORAL at 08:03

## 2023-03-24 RX ADMIN — FINASTERIDE 5 MG: 5 TABLET, FILM COATED ORAL at 08:03

## 2023-03-24 RX ADMIN — PREDNISONE 40 MG: 10 TABLET ORAL at 08:03

## 2023-03-24 RX ADMIN — IPRATROPIUM BROMIDE 0.5 MG: 0.5 SOLUTION RESPIRATORY (INHALATION) at 12:03

## 2023-03-24 RX ADMIN — TRAZODONE HYDROCHLORIDE 100 MG: 50 TABLET ORAL at 10:03

## 2023-03-24 RX ADMIN — MUPIROCIN: 20 OINTMENT TOPICAL at 11:03

## 2023-03-24 NOTE — PLAN OF CARE
Patient in no apparent distress. Patient received on 2 lpm, decreased to 1 lpm. Patient wears 1 lpm at home at night and PRN Day. Sat's  96 % on 1 lpm. Aerosol treatments given Q 6. Breo daily . Will continue to monitor.

## 2023-03-24 NOTE — PT/OT/SLP EVAL
Occupational Therapy   Evaluation    Name: Jama James  MRN: [id number]  Admitting Diagnosis: COPD exacerbation  Recent Surgery: * No surgery found *      Recommendations:     Discharge Recommendations:  Home with home health  Discharge Equipment Recommendations:   Noine  Barriers to discharge:   None    Assessment:   67 w/ Chronic Hypoxic Respiratory Failure on 1L LFNC 2/2 COPD, Prostate cancer, HTN, HLD presenting w/ 1 week of worsening SOB. Patient endorses subjective fever and chills. No change in sputum production but has new cough. He has been having to use his home O2 more often than usual and using his home inhalers more often. Denies NVD. Also endorses chronic LE swelling that has been worse lately. He does endorse sleeping on a couch primarily sleeping straight up. He sits in a recliner all day with his legs hanging. Patient endorses inspirational chest pain. Does not radiate. Comes on at rest and with exertion. Associated with cough. Painful to palpation per patient. Denies any hx of HF. Patient actively undergoing workup for treatment of prostate cancer.       Rehab Prognosis: Good    Plan:     Patient to be seen   to address the above listed problems via    Plan of Care Expires: When patient is discharged.  Patient will be treated 3-5x per week.  Plan of Care Reviewed with:  Patient     Subjective       Occupational Profile:  Living Environment: Patient lives in an apartment on first floor.  Previous level of function: Indepedent  Equipment Used at Home:  None  Assistance upon Discharge: wife    Pain/Comfort:   No pain        Objective:     Communicated with: OT spoke with patient's nurse prior to entering room.    General Precautions: Standard,    Orthopedic Precautions:    Braces:    Respiratory Status: 2L O2    Occupational Performance:    Bed Mobility:    Patient requires CGA with bed mobility.    Functional Mobility/Transfers:  Patient requires Gareth with functional transfers.    Activities of Daily  Living:  Patient requires Gareth with ADLs.    Cognitive/Visual Perceptual:  Patient is alert and oriented x4.    Physical Exam:  No deficits with BUE.    Treatment & Education:  Patient tolerated OT evaluation well. Patient requires CGA with bed mobility.  Patient requires Gareth with functional transfers and ADLs.      GOALS:   Patient will require SBA with UE dressing.  Patient will require CGA with LE dressing.  Patient will be independent with grooming.  History:     Past Medical History:   Diagnosis Date    Anxiety     COPD (chronic obstructive pulmonary disease)     Depression     High cholesterol     HTN (hypertension)     Malignant neoplasm of prostate     Ulcerative colitis          Past Surgical History:   Procedure Laterality Date    NECK SURGERY      right knee      TRANSRECTAL BIOPSY OF PROSTATE WITH ULTRASOUND GUIDANCE Bilateral 3/18/2020    Procedure: BIOPSY, PROSTATE, RECTAL APPROACH, WITH US GUIDANCE;  Surgeon: Bill Jeong MD;  Location: [facility];  Service: Urology;  Laterality: Bilateral;       Time Tracking:     OT Date of Treatment:  3/24/2023  OT Start Time:  9:40  OT Stop Time: 9:55  OT Total Time (min):  15 minutes    Santino Jane OTR/L  3/24/2023

## 2023-03-24 NOTE — PLAN OF CARE
Problem: Adult Inpatient Plan of Care  Goal: Plan of Care Review  Outcome: Ongoing, Progressing  Goal: Patient-Specific Goal (Individualized)  Outcome: Ongoing, Progressing  Goal: Absence of Hospital-Acquired Illness or Injury  Outcome: Ongoing, Progressing  Goal: Optimal Comfort and Wellbeing  Outcome: Ongoing, Progressing  Goal: Readiness for Transition of Care  Outcome: Ongoing, Progressing     Problem: Skin Injury Risk Increased  Goal: Skin Health and Integrity  Outcome: Ongoing, Progressing     Problem: Fall Injury Risk  Goal: Absence of Fall and Fall-Related Injury  Outcome: Ongoing, Progressing     Problem: Gas Exchange Impaired  Goal: Optimal Gas Exchange  Outcome: Ongoing, Progressing     Problem: Asthma Comorbidity  Goal: Maintenance of Asthma Control  Outcome: Ongoing, Progressing     Problem: COPD (Chronic Obstructive Pulmonary Disease) Comorbidity  Goal: Maintenance of COPD Symptom Control  Outcome: Ongoing, Progressing     Problem: Hypertension Comorbidity  Goal: Blood Pressure in Desired Range  Outcome: Ongoing, Progressing     Problem: Activity Intolerance  Goal: Enhanced Capacity and Energy  Outcome: Ongoing, Progressing

## 2023-03-24 NOTE — PROGRESS NOTES
[facility] Medicine  Progress Note    Patient Name: Jama James  MRN: [id number]  Patient Class: IP- Inpatient   Admission Date: 3/21/2023  Length of Stay: 0 days  Attending Physician: Edu Meier MD  Primary Care Provider: Marisel Farrell III, MD        Subjective:     Principal Problem:COPD exacerbation        HPI:  67 w/ Chronic Hypoxic Respiratory Failure on 1L LFNC 2/2 COPD, Prostate cancer, HTN, HLD presenting w/ 1 week of worsening SOB. Patient endorses subjective fever and chills. No change in sputum production but has new cough. He has been having to use his home O2 more often than usual and using his home inhalers more often. Denies NVD. Also endorses chronic LE swelling that has been worse lately. He does endorse sleeping on a couch primarily sleeping straight up. He sits in a recliner all day with his legs hanging. Patient endorses inspirational chest pain. Does not radiate. Comes on at rest and with exertion. Associated with cough. Painful to palpation per patient. Denies any hx of HF. Patient actively undergoing workup for treatment of prostate cancer.      Overview/Hospital Course:  Patient admitted for acute on chronic hypoxic respiratory failure 2/2 COPD exacerbation from likely viral infection. CT PE obtained at admit given patients cancer history but this was negative. Patient improving mildly with steroids and breathing treatments. Patient continues to require more o2 than at home. Working w/ PT/OT today. 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 Improving daily. Should be appropriate for discharge 3/25 if he continues to improve.      Interval History: Improved today from yesterday but continues to require more oxygen than at home. Continues to require 2L LFNC. AT home patient only uses oxygen at sleep. Continuing steroids and scheduled treatments today. Working w/ PT/OT today.     Review of Systems   All other systems reviewed and are  negative.  Objective:     Vital Signs (Most Recent):  Temp: 97.8 °F (36.6 °C) (03/24/23 0820)  Pulse: 81 (03/24/23 0821)  Resp: (!) 22 (03/24/23 0820)  BP: (!) 175/84 (03/24/23 0821)  SpO2: 95 % (03/24/23 0820)   Vital Signs (24h Range):  Temp:  [96.9 °F (36.1 °C)-98.1 °F (36.7 °C)] 97.8 °F (36.6 °C)  Pulse:  [68-85] 81  Resp:  [16-22] 22  SpO2:  [94 %-98 %] 95 %  BP: (157-183)/(76-86) 175/84     Weight: 86.7 kg (191 lb 2.2 oz)  Body mass index is 22.67 kg/m².    Intake/Output Summary (Last 24 hours) at 3/24/2023 1032  Last data filed at 3/24/2023 0353  Gross per 24 hour   Intake --   Output 1675 ml   Net -1675 ml      Physical Exam  Vitals reviewed  General: NAD, Well developed, Well Nourished  Head: NC/AT  Eyes: EOMI, MELISSA  Cardiovascular: Pulses intact distally, Regular Rate and rhythm  Pulmonary: Increased RR, No respiratory distress  Gi: Soft, Non-tender  Extremities: Warm, No edema present  Skin: Warm, dry  Neuro: Alert, Oriented x3, No focal Deficit  Psych: Appropriate mood and affect      Significant Labs: All pertinent labs within the past 24 hours have been reviewed.    Significant Imaging: I have reviewed all pertinent imaging results/findings within the past 24 hours.      Assessment/Plan:      * COPD exacerbation  Acute on Chronic Hypoxic Respiratory failure 2/2 COPD exacerbation caused by likely acute viral illness.   CT w/ no evidence of pneumonia or PE  Did show 6mm spiculated nodule left anterior lung that needs to be investigated further in outpatient setting. Patient did have recent PET scan but I can not see report to see if this is new or old.  TTE completed  Given 1 dose Rocephin/Azithro. Stopping azithro today  Continue prednisone daily  Breathing treatments scheduled  Continuous pulse ox  Currently requiring 2 L LFNC which is increase from baseline. Wean as tolerated      Prostate cancer  Needs f/u w/ heme onc at discharge  Continue flomax and finasteride      Tobacco abuse  Counseled on  cessation  Nicotine patch if needed    Depression  Continue home paroxetine        High cholesterol  Continue ASA/Statin      HTN (hypertension)  Continue home medications. Adjust and titrate as needed  Increased home losartan 3/24.      VTE Risk Mitigation (From admission, onward)         Ordered     enoxaparin injection 40 mg  Daily         03/21/23 1701     IP VTE HIGH RISK PATIENT  Once         03/21/23 1701     Place sequential compression device  Until discontinued         03/21/23 1701                Discharge Planning   CLEMENTINA:      Code Status: Full Code   Is the patient medically ready for discharge?:     Reason for patient still in hospital (select all that apply): Treatment  Discharge Plan A: Home with family   Discharge Delays: None known at this time              Edu Meier MD  Department of Hospital Medicine   [facility]

## 2023-03-24 NOTE — ASSESSMENT & PLAN NOTE
Acute on Chronic Hypoxic Respiratory failure 2/2 COPD exacerbation caused by likely acute viral illness.   CT w/ no evidence of pneumonia or PE  Did show 6mm spiculated nodule left anterior lung that needs to be investigated further in outpatient setting. Patient did have recent PET scan but I can not see report to see if this is new or old.  TTE completed  Given 1 dose Rocephin/Azithro. Stopping azithro today  Continue prednisone daily  Breathing treatments scheduled  Continuous pulse ox  Currently requiring 2 L LFNC which is increase from baseline. Wean as tolerated

## 2023-03-24 NOTE — PLAN OF CARE
03/24/23 1552   Discharge Reassessment   Assessment Type Discharge Planning Reassessment   Did the patient's condition or plan change since previous assessment? No   Discharge Plan discussed with: Patient   Communicated CLEMENTINA with patient/caregiver No   Discharge Plan A Home Health   DME Needed Upon Discharge  nebulizer   Discharge Barriers Identified None   Why the patient remains in the hospital Requires continued medical care   Post-Acute Status   Post-Acute Authorization Home Health;HME   HME Status Set-up Complete/Auth obtained   Home Health Status Referrals Sent   Discharge Delays None known at this time     Verbal & written follow up appointments with Tiffany Smith NP for primary care & urology follow up appointment provided to patient. Demonstrated understanding by verbal feedback. Preference form for home health with [facility] signed. Referral sent to them. Will need appointment with his pulmonologist Dr Mitchell Pagan. Provided him with nebulizer from [employer]. No other needs at this time.

## 2023-03-24 NOTE — PLAN OF CARE
03/24/23 1318   Medicare Message   Important Message from Medicare regarding Discharge Appeal Rights Given to patient/caregiver;Explained to patient/caregiver;Signed/date by patient/caregiver   Date IMM was signed 03/24/23   Time IMM was signed [id number]

## 2023-03-24 NOTE — PLAN OF CARE
Problem: Gas Exchange Impaired  Goal: Optimal Gas Exchange  Outcome: Ongoing, Progressing  Intervention: Optimize Oxygenation and Ventilation  Flowsheets (Taken 3/24/2023 1618)  Airway/Ventilation Management: airway patency maintained  Head of Bed (HOB) Positioning: HOB elevated     Problem: Asthma Comorbidity  Goal: Maintenance of Asthma Control  Outcome: Ongoing, Progressing  Intervention: Maintain Asthma Symptom Control  Flowsheets (Taken 3/24/2023 1618)  Medication Review/Management: medications reviewed     Problem: COPD (Chronic Obstructive Pulmonary Disease) Comorbidity  Goal: Maintenance of COPD Symptom Control  Outcome: Ongoing, Progressing  Intervention: Maintain COPD-Symptom Control  Flowsheets (Taken 3/24/2023 1618)  Supportive Measures:   goal-setting facilitated   self-care encouraged  Medication Review/Management: medications reviewed     Problem: Activity Intolerance  Goal: Enhanced Capacity and Energy  Outcome: Ongoing, Progressing  Intervention: Optimize Activity Tolerance  Flowsheets (Taken 3/24/2023 1618)  Self-Care Promotion: independence encouraged  Activity Management: Ambulated in sánchez -      Plan of care reviewed with pt. AAOX4 throughout shift. Cardiac diet. Continue to Monitor Labs. VSS.  Decreased O2 to 0.5L nasal cannula with humidification. Patient ambulated in the hallway with PT today and has been ambulating to the restroom without assistance.  Safety maintained. No complaints at this time. Patient anticipating discharge tomorrow.

## 2023-03-24 NOTE — HOSPITAL COURSE
Patient admitted for acute on chronic hypoxic respiratory failure 2/2 COPD exacerbation from likely viral infection. CT PE obtained at admit given patients cancer history but this was negative. Patient improved with steroids and breathing treatments. Patient continues to require more o2 than at home previously. At home patient is primary care giver for wife. Patient continued to improve throughout hospital stay. Patient went into new onset afib during this admission but converted back to sinus with amio. Cardiology was consulted. Patient started on diltiazem and amiodarone. Initially on lovenox weight based but transitioned to eliquis at discharge. Patient was counseled on tobacco cessation. He was prescribed nicotine patches. Given prescription for nebulizer and breathing treatments. He will be discharged with home health services. Patient was provided discharge instructions and return precautions.

## 2023-03-24 NOTE — PT/OT/SLP EVAL
Physical Therapy Evaluation    Patient Name:  Jama James   MRN:  [id number]    Recommendations:     Discharge Recommendations: home with home health   Discharge Equipment Recommendations: walker, rolling   Barriers to discharge: None    Assessment:   HPI:  67 w/ Chronic Hypoxic Respiratory Failure on 1L LFNC 2/2 COPD, Prostate cancer, HTN, HLD presenting w/ 1 week of worsening SOB. Patient endorses subjective fever and chills. No change in sputum production but has new cough. He has been having to use his home O2 more often than usual and using his home inhalers more often. Denies NVD. Also endorses chronic LE swelling that has been worse lately. He does endorse sleeping on a couch primarily sleeping straight up. He sits in a recliner all day with his legs hanging. Patient endorses inspirational chest pain. Does not radiate. Comes on at rest and with exertion. Associated with cough. Painful to palpation per patient. Denies any hx of HF. Patient actively undergoing workup for treatment of prostate cancer.    Jama James is a 67 y.o. male admitted with a medical diagnosis of COPD exacerbation.  He presents with the following impairments/functional limitations: weakness, impaired endurance, impaired functional mobility, impaired balance, impaired cardiopulmonary response to activity.    Rehab Prognosis: Good; patient would benefit from acute skilled PT services to address these deficits and reach maximum level of function.    Recent Surgery: * No surgery found *      Plan:     During this hospitalization, patient to be seen  (3-5 x/wk) to address the identified rehab impairments via gait training, therapeutic activities, therapeutic exercises and progress toward the following goals:    Plan of Care Expires:   (upon discharge)    Subjective     Chief Complaint: COPD exacerbation  Patient/Family Comments/goals: discharge home with spouse  Pain/Comfort:  Pain Rating 1: 0/10    Patients cultural, spiritual, Spiritism  conflicts given the current situation: no    Living Environment:  Patient lives with his wife in a SS home with no steps to enter.  Prior to admission, patients level of function was mod I to I with ADL's, ambulatory limited distances using AD (rollator or SPC).  Equipment used at home: nebulizer, cane, straight, oxygen, shower chair, rollator.  DME owned (not currently used): none.  Upon discharge, patient will have assistance from family.    Objective:     Communicated with RN and MD prior to session.  Patient found HOB elevated with oxygen, peripheral IV, pulse ox (continuous), telemetry  upon PT entry to room.    General Precautions: Standard,    Orthopedic Precautions:N/A   Braces: N/A  Respiratory Status: Nasal cannula, flow 2 L/min    Exams:  Cognitive Exam:  Patient is oriented to Person, Place, Time, and Situation  RLE ROM: WFL  RLE Strength: 3+/5 to 4-/5  LLE ROM: WFL  LLE Strength: 3+/5 to 4-/5    Functional Mobility:  Bed Mobility:  Rolling Left:  modified independence  Rolling Right: modified independence  Scooting: independence  Bridging: independence  Supine to Sit: modified independence  Sit to Supine: modified independence  Transfers:  Sit to Stand:  stand by assistance with rolling walker  Gait: patient ambulated 150' with SBA using RW and requiring several standing rest breaks secondary to SOB      AM-PAC 6 CLICK MOBILITY  Total Score:        Treatment & Education:  Patient performs bed mobility with mod I to I, transfers with SBA using RW. Patient ambulated 150' with SBA using RW and requiring several standing rest breaks secondary to SOB. Patient also with noted LE weakness requiring use of AD for safety and decrease fall risk. Patient educated in pursed lip breathing techniques, call light use, importance of OOB activity and functional mobility to negate the negative effects of prolonged bed rest during this hospitalization, safe transfers/ambulation and discharge planning  recommendations/options.     Patient left HOB elevated with all lines intact, call button in reach, and RN notified.    GOALS:   Independent with all aspects of bed mobility  Independent transfers with RW using safe/correct technique  3.   Ambulate 200' with supervision using RW and requiring decreased standing rest breaks    History:     Past Medical History:   Diagnosis Date    Anxiety     COPD (chronic obstructive pulmonary disease)     Depression     High cholesterol     HTN (hypertension)     Malignant neoplasm of prostate     Ulcerative colitis        Past Surgical History:   Procedure Laterality Date    NECK SURGERY      right knee      TRANSRECTAL BIOPSY OF PROSTATE WITH ULTRASOUND GUIDANCE Bilateral 3/18/2020    Procedure: BIOPSY, PROSTATE, RECTAL APPROACH, WITH US GUIDANCE;  Surgeon: Bill Jeong MD;  Location: [facility] OR;  Service: Urology;  Laterality: Bilateral;       Time Tracking:     PT Received On: 03/24/23  PT Start Time: 0915     PT Stop Time: 0939  PT Total Time (min): 24 min     Billable Minutes: Evaluation 8 min and Gait Training 16 min      03/24/2023

## 2023-03-24 NOTE — SUBJECTIVE & OBJECTIVE
Interval History: Improved today from yesterday but continues to require more oxygen than at home. Continues to require 2L LFNC. AT home patient only uses oxygen at sleep. Continuing steroids and scheduled treatments today. Working w/ PT/OT today.     Review of Systems   All other systems reviewed and are negative.  Objective:     Vital Signs (Most Recent):  Temp: 97.8 °F (36.6 °C) (03/24/23 0820)  Pulse: 81 (03/24/23 0821)  Resp: (!) 22 (03/24/23 0820)  BP: (!) 175/84 (03/24/23 0821)  SpO2: 95 % (03/24/23 0820)   Vital Signs (24h Range):  Temp:  [96.9 °F (36.1 °C)-98.1 °F (36.7 °C)] 97.8 °F (36.6 °C)  Pulse:  [68-85] 81  Resp:  [16-22] 22  SpO2:  [94 %-98 %] 95 %  BP: (157-183)/(76-86) 175/84     Weight: 86.7 kg (191 lb 2.2 oz)  Body mass index is 22.67 kg/m².    Intake/Output Summary (Last 24 hours) at 3/24/2023 1032  Last data filed at 3/24/2023 0353  Gross per 24 hour   Intake --   Output 1675 ml   Net -1675 ml      Physical Exam  Vitals reviewed  General: NAD, Well developed, Well Nourished  Head: NC/AT  Eyes: EOMI, MELISSA  Cardiovascular: Pulses intact distally, Regular Rate and rhythm  Pulmonary: Increased RR, No respiratory distress  Gi: Soft, Non-tender  Extremities: Warm, No edema present  Skin: Warm, dry  Neuro: Alert, Oriented x3, No focal Deficit  Psych: Appropriate mood and affect      Significant Labs: All pertinent labs within the past 24 hours have been reviewed.    Significant Imaging: I have reviewed all pertinent imaging results/findings within the past 24 hours.

## 2023-03-25 LAB
ALBUMIN SERPL BCP-MCNC: 3.2 G/DL (ref 3.5–5.2)
ALP SERPL-CCNC: 98 U/L (ref 55–135)
ALT SERPL W/O P-5'-P-CCNC: 17 U/L (ref 10–44)
ANION GAP SERPL CALC-SCNC: 10 MMOL/L (ref 8–16)
AST SERPL-CCNC: 17 U/L (ref 10–40)
BASOPHILS # BLD AUTO: 0.04 K/UL (ref 0–0.2)
BASOPHILS NFR BLD: 0.5 % (ref 0–1.9)
BILIRUB SERPL-MCNC: 0.3 MG/DL (ref 0.1–1)
BUN SERPL-MCNC: 13 MG/DL (ref 8–23)
CALCIUM SERPL-MCNC: 9.4 MG/DL (ref 8.7–10.5)
CHLORIDE SERPL-SCNC: 104 MMOL/L (ref 95–110)
CK MB SERPL-MCNC: 1.6 NG/ML (ref 0.1–6.5)
CK MB SERPL-RTO: 8.4 % (ref 0–5)
CK SERPL-CCNC: 19 U/L (ref 20–200)
CO2 SERPL-SCNC: 27 MMOL/L (ref 23–29)
CREAT SERPL-MCNC: 0.7 MG/DL (ref 0.5–1.4)
DIFFERENTIAL METHOD: ABNORMAL
EOSINOPHIL # BLD AUTO: 0.1 K/UL (ref 0–0.5)
EOSINOPHIL NFR BLD: 1.5 % (ref 0–8)
ERYTHROCYTE [DISTWIDTH] IN BLOOD BY AUTOMATED COUNT: 12.7 % (ref 11.5–14.5)
EST. GFR  (NO RACE VARIABLE): >60 ML/MIN/1.73 M^2
GLUCOSE SERPL-MCNC: 114 MG/DL (ref 70–110)
HCT VFR BLD AUTO: 39.5 % (ref 40–54)
HGB BLD-MCNC: 13.1 G/DL (ref 14–18)
IMM GRANULOCYTES # BLD AUTO: 0.02 K/UL (ref 0–0.04)
IMM GRANULOCYTES NFR BLD AUTO: 0.2 % (ref 0–0.5)
LYMPHOCYTES # BLD AUTO: 1.1 K/UL (ref 1–4.8)
LYMPHOCYTES NFR BLD: 12 % (ref 18–48)
MCH RBC QN AUTO: 31.6 PG (ref 27–31)
MCHC RBC AUTO-ENTMCNC: 33.2 G/DL (ref 32–36)
MCV RBC AUTO: 95 FL (ref 82–98)
MONOCYTES # BLD AUTO: 0.7 K/UL (ref 0.3–1)
MONOCYTES NFR BLD: 8.3 % (ref 4–15)
NEUTROPHILS # BLD AUTO: 6.8 K/UL (ref 1.8–7.7)
NEUTROPHILS NFR BLD: 77.5 % (ref 38–73)
NRBC BLD-RTO: 0 /100 WBC
PLATELET # BLD AUTO: 289 K/UL (ref 150–450)
PMV BLD AUTO: 9.9 FL (ref 9.2–12.9)
POTASSIUM SERPL-SCNC: 3.7 MMOL/L (ref 3.5–5.1)
PROT SERPL-MCNC: 6.4 G/DL (ref 6–8.4)
RBC # BLD AUTO: 4.15 M/UL (ref 4.6–6.2)
SODIUM SERPL-SCNC: 141 MMOL/L (ref 136–145)
TROPONIN I SERPL DL<=0.01 NG/ML-MCNC: 0.01 NG/ML (ref 0–0.03)
TSH SERPL DL<=0.005 MIU/L-ACNC: 1.76 UIU/ML (ref 0.4–4)
WBC # BLD AUTO: 8.72 K/UL (ref 3.9–12.7)

## 2023-03-25 PROCEDURE — 99900035 HC TECH TIME PER 15 MIN (STAT)

## 2023-03-25 PROCEDURE — 84484 ASSAY OF TROPONIN QUANT: CPT | Performed by: INTERNAL MEDICINE

## 2023-03-25 PROCEDURE — 21400001 HC TELEMETRY ROOM

## 2023-03-25 PROCEDURE — 93010 EKG 12-LEAD: ICD-10-PCS | Mod: ,,, | Performed by: INTERNAL MEDICINE

## 2023-03-25 PROCEDURE — 82553 CREATINE MB FRACTION: CPT | Performed by: INTERNAL MEDICINE

## 2023-03-25 PROCEDURE — 25000003 PHARM REV CODE 250: Performed by: STUDENT IN AN ORGANIZED HEALTH CARE EDUCATION/TRAINING PROGRAM

## 2023-03-25 PROCEDURE — 85025 COMPLETE CBC W/AUTO DIFF WBC: CPT | Performed by: INTERNAL MEDICINE

## 2023-03-25 PROCEDURE — 84443 ASSAY THYROID STIM HORMONE: CPT | Performed by: INTERNAL MEDICINE

## 2023-03-25 PROCEDURE — 80053 COMPREHEN METABOLIC PANEL: CPT | Performed by: INTERNAL MEDICINE

## 2023-03-25 PROCEDURE — 93010 ELECTROCARDIOGRAM REPORT: CPT | Mod: ,,, | Performed by: INTERNAL MEDICINE

## 2023-03-25 PROCEDURE — 25000242 PHARM REV CODE 250 ALT 637 W/ HCPCS: Performed by: STUDENT IN AN ORGANIZED HEALTH CARE EDUCATION/TRAINING PROGRAM

## 2023-03-25 PROCEDURE — 99233 PR SUBSEQUENT HOSPITAL CARE,LEVL III: ICD-10-PCS | Mod: 25,,, | Performed by: INTERNAL MEDICINE

## 2023-03-25 PROCEDURE — 63600175 PHARM REV CODE 636 W HCPCS: Performed by: STUDENT IN AN ORGANIZED HEALTH CARE EDUCATION/TRAINING PROGRAM

## 2023-03-25 PROCEDURE — 99233 SBSQ HOSP IP/OBS HIGH 50: CPT | Mod: 25,,, | Performed by: INTERNAL MEDICINE

## 2023-03-25 PROCEDURE — 63600175 PHARM REV CODE 636 W HCPCS: Performed by: INTERNAL MEDICINE

## 2023-03-25 PROCEDURE — 36415 COLL VENOUS BLD VENIPUNCTURE: CPT | Performed by: INTERNAL MEDICINE

## 2023-03-25 PROCEDURE — 94640 AIRWAY INHALATION TREATMENT: CPT

## 2023-03-25 PROCEDURE — 99407 PR TOBACCO USE CESSATION INTENSIVE >10 MINUTES: ICD-10-PCS | Mod: ,,, | Performed by: INTERNAL MEDICINE

## 2023-03-25 PROCEDURE — 99407 BEHAV CHNG SMOKING > 10 MIN: CPT | Mod: ,,, | Performed by: INTERNAL MEDICINE

## 2023-03-25 PROCEDURE — 94761 N-INVAS EAR/PLS OXIMETRY MLT: CPT

## 2023-03-25 PROCEDURE — 25000003 PHARM REV CODE 250: Performed by: INTERNAL MEDICINE

## 2023-03-25 PROCEDURE — S4991 NICOTINE PATCH NONLEGEND: HCPCS | Performed by: INTERNAL MEDICINE

## 2023-03-25 PROCEDURE — 27000221 HC OXYGEN, UP TO 24 HOURS

## 2023-03-25 PROCEDURE — 93005 ELECTROCARDIOGRAM TRACING: CPT

## 2023-03-25 RX ORDER — ENOXAPARIN SODIUM 100 MG/ML
1 INJECTION SUBCUTANEOUS
Status: DISCONTINUED | OUTPATIENT
Start: 2023-03-25 | End: 2023-03-27

## 2023-03-25 RX ORDER — ACETAMINOPHEN 325 MG/1
650 TABLET ORAL EVERY 6 HOURS PRN
Status: DISCONTINUED | OUTPATIENT
Start: 2023-03-25 | End: 2023-03-27 | Stop reason: HOSPADM

## 2023-03-25 RX ORDER — IPRATROPIUM BROMIDE 0.5 MG/2.5ML
0.5 SOLUTION RESPIRATORY (INHALATION) EVERY 6 HOURS PRN
Status: DISCONTINUED | OUTPATIENT
Start: 2023-03-25 | End: 2023-03-27 | Stop reason: HOSPADM

## 2023-03-25 RX ORDER — DILTIAZEM HYDROCHLORIDE 30 MG/1
30 TABLET, FILM COATED ORAL EVERY 8 HOURS
Status: DISCONTINUED | OUTPATIENT
Start: 2023-03-25 | End: 2023-03-27

## 2023-03-25 RX ORDER — LEVALBUTEROL 1.25 MG/.5ML
0.63 SOLUTION, CONCENTRATE RESPIRATORY (INHALATION) EVERY 6 HOURS PRN
Status: DISCONTINUED | OUTPATIENT
Start: 2023-03-25 | End: 2023-03-25

## 2023-03-25 RX ORDER — DILTIAZEM HYDROCHLORIDE 5 MG/ML
10 INJECTION INTRAVENOUS ONCE
Status: COMPLETED | OUTPATIENT
Start: 2023-03-25 | End: 2023-03-25

## 2023-03-25 RX ORDER — LEVALBUTEROL 1.25 MG/.5ML
1.25 SOLUTION, CONCENTRATE RESPIRATORY (INHALATION) EVERY 6 HOURS PRN
Status: DISCONTINUED | OUTPATIENT
Start: 2023-03-25 | End: 2023-03-27 | Stop reason: HOSPADM

## 2023-03-25 RX ORDER — IBUPROFEN 200 MG
1 TABLET ORAL DAILY
Status: DISCONTINUED | OUTPATIENT
Start: 2023-03-25 | End: 2023-03-27 | Stop reason: HOSPADM

## 2023-03-25 RX ADMIN — ASPIRIN 81 MG: 81 TABLET, COATED ORAL at 08:03

## 2023-03-25 RX ADMIN — ATORVASTATIN CALCIUM 10 MG: 10 TABLET, FILM COATED ORAL at 09:03

## 2023-03-25 RX ADMIN — LOSARTAN POTASSIUM 50 MG: 25 TABLET, FILM COATED ORAL at 08:03

## 2023-03-25 RX ADMIN — FINASTERIDE 5 MG: 5 TABLET, FILM COATED ORAL at 08:03

## 2023-03-25 RX ADMIN — PAROXETINE HYDROCHLORIDE 40 MG: 10 TABLET, FILM COATED ORAL at 08:03

## 2023-03-25 RX ADMIN — ALBUTEROL SULFATE 2.5 MG: 2.5 SOLUTION RESPIRATORY (INHALATION) at 01:03

## 2023-03-25 RX ADMIN — IPRATROPIUM BROMIDE 0.5 MG: 0.5 SOLUTION RESPIRATORY (INHALATION) at 01:03

## 2023-03-25 RX ADMIN — DILTIAZEM HYDROCHLORIDE 10 MG: 5 INJECTION, SOLUTION INTRAVENOUS at 12:03

## 2023-03-25 RX ADMIN — NICOTINE 1 PATCH: 14 PATCH, EXTENDED RELEASE TRANSDERMAL at 10:03

## 2023-03-25 RX ADMIN — DILTIAZEM HYDROCHLORIDE 30 MG: 30 TABLET, FILM COATED ORAL at 10:03

## 2023-03-25 RX ADMIN — PANTOPRAZOLE SODIUM 40 MG: 40 TABLET, DELAYED RELEASE ORAL at 08:03

## 2023-03-25 RX ADMIN — LOSARTAN POTASSIUM 50 MG: 25 TABLET, FILM COATED ORAL at 09:03

## 2023-03-25 RX ADMIN — MUPIROCIN: 20 OINTMENT TOPICAL at 08:03

## 2023-03-25 RX ADMIN — ENOXAPARIN SODIUM 90 MG: 100 INJECTION SUBCUTANEOUS at 10:03

## 2023-03-25 RX ADMIN — ALBUTEROL SULFATE 2.5 MG: 2.5 SOLUTION RESPIRATORY (INHALATION) at 06:03

## 2023-03-25 RX ADMIN — DILTIAZEM HYDROCHLORIDE 30 MG: 30 TABLET, FILM COATED ORAL at 09:03

## 2023-03-25 RX ADMIN — IPRATROPIUM BROMIDE 0.5 MG: 0.5 SOLUTION RESPIRATORY (INHALATION) at 06:03

## 2023-03-25 RX ADMIN — TAMSULOSIN HYDROCHLORIDE 0.4 MG: 0.4 CAPSULE ORAL at 08:03

## 2023-03-25 RX ADMIN — TRAZODONE HYDROCHLORIDE 100 MG: 50 TABLET ORAL at 09:03

## 2023-03-25 RX ADMIN — PREDNISONE 40 MG: 10 TABLET ORAL at 08:03

## 2023-03-25 RX ADMIN — FLUTICASONE FUROATE AND VILANTEROL TRIFENATATE 1 PUFF: 100; 25 POWDER RESPIRATORY (INHALATION) at 07:03

## 2023-03-25 NOTE — PLAN OF CARE
Problem: Adult Inpatient Plan of Care  Goal: Plan of Care Review  Outcome: Ongoing, Progressing     Problem: Adult Inpatient Plan of Care  Goal: Patient-Specific Goal (Individualized)  Outcome: Ongoing, Progressing     Problem: Skin Injury Risk Increased  Goal: Skin Health and Integrity  Outcome: Ongoing, Progressing     Problem: Fall Injury Risk  Goal: Absence of Fall and Fall-Related Injury  Outcome: Ongoing, Progressing     Problem: Gas Exchange Impaired  Goal: Optimal Gas Exchange  Outcome: Ongoing, Progressing     Problem: Asthma Comorbidity  Goal: Maintenance of Asthma Control  Outcome: Ongoing, Progressing     Problem: COPD (Chronic Obstructive Pulmonary Disease) Comorbidity  Goal: Maintenance of COPD Symptom Control  Outcome: Ongoing, Progressing     Problem: Hypertension Comorbidity  Goal: Blood Pressure in Desired Range  Outcome: Ongoing, Progressing     Problem: Activity Intolerance  Goal: Enhanced Capacity and Energy  Outcome: Ongoing, Progressing

## 2023-03-25 NOTE — PROGRESS NOTES
[facility]  Progress Note    Patient Name: Jama James  MRN: [id number]  Patient Class: IP- Inpatient   Admission Date: 3/21/2023  Length of Stay: 1 days  Attending Physician: Edu Meier MD  Primary Care Provider: Marisel Farrell III, MD        Subjective:     Principal Problem:COPD exacerbation        HPI:  67 w/ Chronic Hypoxic Respiratory Failure on 1L LFNC 2/2 COPD, Prostate cancer, HTN, HLD presenting w/ 1 week of worsening SOB. Patient endorses subjective fever and chills. No change in sputum production but has new cough. He has been having to use his home O2 more often than usual and using his home inhalers more often. Denies NVD. Also endorses chronic LE swelling that has been worse lately. He does endorse sleeping on a couch primarily sleeping straight up. He sits in a recliner all day with his legs hanging. Patient endorses inspirational chest pain. Does not radiate. Comes on at rest and with exertion. Associated with cough. Painful to palpation per patient. Denies any hx of HF. Patient actively undergoing workup for treatment of prostate cancer.      Overview/Hospital Course:  Patient admitted for acute on chronic hypoxic respiratory failure 2/2 COPD exacerbation from likely viral infection. CT PE obtained at admit given patients cancer history but this was negative. Patient improving mildly with steroids and breathing treatments. Patient continues to require more o2 than at home. Working w/ PT/OT today. 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 Improving daily. Should be appropriate for discharge 3/25 if he continues to improve.      Interval History:  Patient continues to be short of breath and noted to be having significant sinus tachycardia upon ambulation.  Patient admits significant use of alcohol consumption and nicotine.  Currently on 2 liter/minute supplemental oxygen via nasal cannula.    Review of Systems   All other systems reviewed and are  negative.  Objective:     Vital Signs (Most Recent):  Temp: 96.5 °F (35.8 °C) (03/25/23 0742)  Pulse: 92 (03/25/23 0742)  Resp: 20 (03/25/23 0742)  BP: (!) 158/68 (03/25/23 0742)  SpO2: 96 % (03/25/23 0742)   Vital Signs (24h Range):  Temp:  [96.5 °F (35.8 °C)-98.4 °F (36.9 °C)] 96.5 °F (35.8 °C)  Pulse:  [66-92] 92  Resp:  [12-22] 20  SpO2:  [91 %-97 %] 96 %  BP: (151-182)/(68-90) 158/68     Weight: 86.7 kg (191 lb 2.2 oz)  Body mass index is 22.67 kg/m².    Intake/Output Summary (Last 24 hours) at 3/25/2023 0755  Last data filed at 3/25/2023 0048  Gross per 24 hour   Intake 698.86 ml   Output 2850 ml   Net -2151.14 ml        Physical Exam  Vitals reviewed  General: NAD, Well developed, Well Nourished  Head: NC/AT  Eyes: EOMI, MELISSA  Cardiovascular: Pulses intact distally, Regular Rate and rhythm  Pulmonary: Increased RR, No respiratory distress  Gi: Soft, Non-tender  Extremities: Warm, No edema present  Skin: Warm, dry  Neuro: Alert, Oriented x3, No focal Deficit  Psych: Appropriate mood and affect      Significant Labs:   Lab Results   Component Value Date    WBC 7.00 03/24/2023    HGB 11.4 (L) 03/24/2023    HCT 35.1 (L) 03/24/2023    MCV 96 03/24/2023     03/24/2023       CMP  Sodium   Date Value Ref Range Status   03/24/2023 144 136 - 145 mmol/L Final     Potassium   Date Value Ref Range Status   03/24/2023 3.7 3.5 - 5.1 mmol/L Final     Chloride   Date Value Ref Range Status   03/24/2023 108 95 - 110 mmol/L Final     CO2   Date Value Ref Range Status   03/24/2023 26 23 - 29 mmol/L Final     Glucose   Date Value Ref Range Status   03/24/2023 142 (H) 70 - 110 mg/dL Final     BUN   Date Value Ref Range Status   03/24/2023 14 8 - 23 mg/dL Final     Creatinine   Date Value Ref Range Status   03/24/2023 0.8 0.5 - 1.4 mg/dL Final     Calcium   Date Value Ref Range Status   03/24/2023 8.8 8.7 - 10.5 mg/dL Final     Total Protein   Date Value Ref Range Status   03/24/2023 5.8 (L) 6.0 - 8.4 g/dL Final     Albumin    Date Value Ref Range Status   03/24/2023 2.8 (L) 3.5 - 5.2 g/dL Final     Total Bilirubin   Date Value Ref Range Status   03/24/2023 0.2 0.1 - 1.0 mg/dL Final     Comment:     For infants and newborns, interpretation of results should be based  on gestational age, weight and in agreement with clinical  observations.    Premature Infant recommended reference ranges:  Up to 24 hours.............<8.0 mg/dL  Up to 48 hours............<12.0 mg/dL  3-5 days..................<15.0 mg/dL  6-29 days.................<15.0 mg/dL       Alkaline Phosphatase   Date Value Ref Range Status   03/24/2023 98 55 - 135 U/L Final     AST   Date Value Ref Range Status   03/24/2023 12 10 - 40 U/L Final     ALT   Date Value Ref Range Status   03/24/2023 14 10 - 44 U/L Final     Anion Gap   Date Value Ref Range Status   03/24/2023 10 8 - 16 mmol/L Final     eGFR   Date Value Ref Range Status   03/24/2023 >60.0 >60 mL/min/1.73 m^2 Final   01/12/2023 97 > OR = 60 mL/min/1.73m2 Final     Comment:     The eGFR is based on the CKD-EPI 2021 equation. To calculate   the new eGFR from a previous Creatinine or Cystatin C  result, go to https://www.kidney.org/professionals/  kdoqi/gfr%5Fcalculator       Microbiology Results (last 7 days)       Procedure Component Value Units Date/Time    Influenza A & B by Molecular [[id number]] Collected: 03/22/23 0810    Order Status: Completed Specimen: Nasopharyngeal Swab Updated: 03/22/23 0912     Influenza A, Molecular Negative     Influenza B, Molecular Negative     Flu A & B Source Nasal Swab          Significant Imaging:   ECHO:  The left ventricle is normal in size with concentric remodeling and normal systolic function.  The estimated ejection fraction is 69%.  Normal left ventricular diastolic function.  Normal right ventricular size with normal right ventricular systolic function.  Mild right atrial enlargement.  Intermediate central venous pressure (8 mmHg).  The estimated PA systolic pressure is 24  mmHg.  The left ventricular global longitudinal strain is -12%. Reduced    CXR:  1. Findings consistent with congestive heart failure small bilateral pleural effusions and bibasilar dependent atelectasis.  2. Retrocardiac density consistent with left lower lobe atelectasis versus infiltrate.  Correlate clinically with possible fever and/or elevated white count.  3. Calcified pleural plaque consistent with prior occupational exposure.    CTA Chest:  1. No CT evidence to suggest an acute pulmonary embolus.  2. Multiple small soft tissue pulmonary nodules with interval development of a small spiculated soft tissue pulmonary nodule of the left upper lobe.  Follow-up per Fleischner guidelines.  For multiple solid nodules with any 6 mm or greater, Fleischner Society guidelines recommend follow up with non-contrast chest CT at 3-6 months and 18-24 months after discovery.  3. Diffuse centrilobular emphysematous change with linear discoid atelectasis at the lung bases.  4. Chronic small loculated left pleural effusion with suspected chronic round atelectasis at the left lung base.  5. Chronic bilateral adrenal adenomas.      Assessment/Plan:      * COPD exacerbation  Acute on Chronic Hypoxic Respiratory failure 2/2 COPD exacerbation caused by likely acute viral illness.   CT w/ no evidence of pneumonia or PE  Did show 6mm spiculated nodule left anterior lung that needs to be investigated further in outpatient setting. Patient did have recent PET scan but I can not see report to see if this is new or old.  TTE completed  Completed antibiotic therapy.  Continue prednisone daily  Breathing treatments scheduled  Continuous pulse ox  Currently requiring 2 L LFNC which is increase from baseline. Wean as tolerated      Prostate cancer  Needs f/u w/ heme onc at discharge  Continue flomax and finasteride      Tobacco abuse  Smoking cessation counseling performed. Dangers of cigarette smoking were reviewed with patient in detail and  patient was encouraged to quit. Nicotine replacement options were discussed for > 10 minutes.  Nicoderm ordered.      Depression  Continue home paroxetine        High cholesterol  Continue ASA/Statin      HTN (hypertension)  Continue home medications. Adjust and titrate as needed  Increased home losartan 3/24.    Encouraged patient to quit tobacco cigarette smoking and abstain from drinking alcohol.  Encourage patient to follow-up with pulmonologist for evaluation of pulmonary nodule and there surveillance.  VTE Risk Mitigation (From admission, onward)           Ordered     enoxaparin injection 40 mg  Daily         03/21/23 1701     IP VTE HIGH RISK PATIENT  Once         03/21/23 1701     Place sequential compression device  Until discontinued         03/21/23 1701                    Discharge Planning   CLE[place]:  3/26/23    Code Status: Full Code   Is the patient medically ready for discharge?:     Reason for patient still in hospital (select all that apply): Patient trending condition  Discharge Plan A: Home Health   Discharge Delays: None known at this time              Mayra Ritter MD  Department of Hospital Medicine   [facility] - Int[facility]    Addendum:  Patient has developed new onset AFib with RVR.  EKG reviewed.  Patient follows with Dr. Annabella sanderson from Cardiology.  2D echocardiogram reviewed.  Will increase Lovenox 1 milligram/kilogram subQ Q 12 hours.  Start patient on Cardizem 30 mg t.i.d. obtain TSH and serial cardiac enzymes.

## 2023-03-25 NOTE — NURSING
Patient rhythm on telemetry monitor became irregular.  EKG performed and confirmed a-fib with RVR. Provider made aware.  Dr. Combs consulted and will be in to see patient today. Dr. Combs agrees to plan of Diltiazem 30mg three times daily and Lovenox 90mg twice daily. Stated he will be in to see the patient today.

## 2023-03-25 NOTE — PLAN OF CARE
Problem: Gas Exchange Impaired  Goal: Optimal Gas Exchange  Outcome: Ongoing, Progressing     Problem: Asthma Comorbidity  Goal: Maintenance of Asthma Control  Outcome: Ongoing, Progressing     Problem: COPD (Chronic Obstructive Pulmonary Disease) Comorbidity  Goal: Maintenance of COPD Symptom Control  Outcome: Ongoing, Progressing   Patient received on nasal cannula at 1 lpm. Aerosol treatments given Q 6. Breo daily. Home O2 walk done to determine if continuous O2 needed as patient only wears at night and prn day. Patient desaturated during walk to 87%. Placed on 2 lpm. At 5 min into walk HR increased to 153. HR did decreased to 120's post walk but later rhythm noted to be irregular, EKG done. Per Dr. Ritter, medications changed to Xopenex PRN and Ipatropium PRN. Will monitor and given as needed.

## 2023-03-25 NOTE — PLAN OF CARE
Problem: Gas Exchange Impaired  Goal: Optimal Gas Exchange  Outcome: Ongoing, Progressing  Intervention: Optimize Oxygenation and Ventilation  Flowsheets (Taken 3/25/2023 1712)  Airway/Ventilation Management: airway patency maintained  Head of Bed (HOB) Positioning: HOB elevated     Problem: Dysrhythmia  Goal: Normalized Cardiac Rhythm  Outcome: Ongoing, Progressing  Intervention: Monitor and Manage Cardiac Rhythm Effect  Flowsheets (Taken 3/25/2023 1712)  Dysrhythmia Management: (Medication given) forceful cough encouraged  VTE Prevention/Management:   ambulation promoted   bleeding risk assessed   dorsiflexion/plantar flexion performed   fluids promoted     Plan of care reviewed with pt. AAOX4 throughout shift. Cardiac diet. Patient experienced new onset Afib with RVR this morning. Dr. Combs called for consult and will see patient tomorrow morning. Medication given and patient converted to NSR around 3pm.  VSS.  Safety maintained. Will continue to monitor. No complaints at this time.

## 2023-03-25 NOTE — SUBJECTIVE & OBJECTIVE
Interval History:  Patient continues to be short of breath and noted to be having significant sinus tachycardia upon ambulation.  Patient admits significant use of alcohol consumption and nicotine.  Currently on 2 liter/minute supplemental oxygen via nasal cannula.    Review of Systems   All other systems reviewed and are negative.  Objective:     Vital Signs (Most Recent):  Temp: 96.5 °F (35.8 °C) (03/25/23 0742)  Pulse: 92 (03/25/23 0742)  Resp: 20 (03/25/23 0742)  BP: (!) 158/68 (03/25/23 0742)  SpO2: 96 % (03/25/23 0742)   Vital Signs (24h Range):  Temp:  [96.5 °F (35.8 °C)-98.4 °F (36.9 °C)] 96.5 °F (35.8 °C)  Pulse:  [66-92] 92  Resp:  [12-22] 20  SpO2:  [91 %-97 %] 96 %  BP: (151-182)/(68-90) 158/68     Weight: 86.7 kg (191 lb 2.2 oz)  Body mass index is 22.67 kg/m².    Intake/Output Summary (Last 24 hours) at 3/25/2023 0755  Last data filed at 3/25/2023 0048  Gross per 24 hour   Intake 698.86 ml   Output 2850 ml   Net -2151.14 ml        Physical Exam  Vitals reviewed  General: NAD, Well developed, Well Nourished  Head: NC/AT  Eyes: EOMI, MELISSA  Cardiovascular: Pulses intact distally, Regular Rate and rhythm  Pulmonary: Increased RR, No respiratory distress  Gi: Soft, Non-tender  Extremities: Warm, No edema present  Skin: Warm, dry  Neuro: Alert, Oriented x3, No focal Deficit  Psych: Appropriate mood and affect      Significant Labs:   Lab Results   Component Value Date    WBC 7.00 03/24/2023    HGB 11.4 (L) 03/24/2023    HCT 35.1 (L) 03/24/2023    MCV 96 03/24/2023     03/24/2023       CMP  Sodium   Date Value Ref Range Status   03/24/2023 144 136 - 145 mmol/L Final     Potassium   Date Value Ref Range Status   03/24/2023 3.7 3.5 - 5.1 mmol/L Final     Chloride   Date Value Ref Range Status   03/24/2023 108 95 - 110 mmol/L Final     CO2   Date Value Ref Range Status   03/24/2023 26 23 - 29 mmol/L Final     Glucose   Date Value Ref Range Status   03/24/2023 142 (H) 70 - 110 mg/dL Final     BUN   Date  Value Ref Range Status   03/24/2023 14 8 - 23 mg/dL Final     Creatinine   Date Value Ref Range Status   03/24/2023 0.8 0.5 - 1.4 mg/dL Final     Calcium   Date Value Ref Range Status   03/24/2023 8.8 8.7 - 10.5 mg/dL Final     Total Protein   Date Value Ref Range Status   03/24/2023 5.8 (L) 6.0 - 8.4 g/dL Final     Albumin   Date Value Ref Range Status   03/24/2023 2.8 (L) 3.5 - 5.2 g/dL Final     Total Bilirubin   Date Value Ref Range Status   03/24/2023 0.2 0.1 - 1.0 mg/dL Final     Comment:     For infants and newborns, interpretation of results should be based  on gestational age, weight and in agreement with clinical  observations.    Premature Infant recommended reference ranges:  Up to 24 hours.............<8.0 mg/dL  Up to 48 hours............<12.0 mg/dL  3-5 days..................<15.0 mg/dL  6-29 days.................<15.0 mg/dL       Alkaline Phosphatase   Date Value Ref Range Status   03/24/2023 98 55 - 135 U/L Final     AST   Date Value Ref Range Status   03/24/2023 12 10 - 40 U/L Final     ALT   Date Value Ref Range Status   03/24/2023 14 10 - 44 U/L Final     Anion Gap   Date Value Ref Range Status   03/24/2023 10 8 - 16 mmol/L Final     eGFR   Date Value Ref Range Status   03/24/2023 >60.0 >60 mL/min/1.73 m^2 Final   01/12/2023 97 > OR = 60 mL/min/1.73m2 Final     Comment:     The eGFR is based on the CKD-EPI 2021 equation. To calculate   the new eGFR from a previous Creatinine or Cystatin C  result, go to https://www.kidney.org/professionals/  kdoqi/gfr%5Fcalculator       Microbiology Results (last 7 days)       Procedure Component Value Units Date/Time    Influenza A & B by Molecular [[id number]] Collected: 03/22/23 0810    Order Status: Completed Specimen: Nasopharyngeal Swab Updated: 03/22/23 0912     Influenza A, Molecular Negative     Influenza B, Molecular Negative     Flu A & B Source Nasal Swab          Significant Imaging:   ECHO:  The left ventricle is normal in size with concentric  remodeling and normal systolic function.  The estimated ejection fraction is 69%.  Normal left ventricular diastolic function.  Normal right ventricular size with normal right ventricular systolic function.  Mild right atrial enlargement.  Intermediate central venous pressure (8 mmHg).  The estimated PA systolic pressure is 24 mmHg.  The left ventricular global longitudinal strain is -12%. Reduced    CXR:  1. Findings consistent with congestive heart failure small bilateral pleural effusions and bibasilar dependent atelectasis.  2. Retrocardiac density consistent with left lower lobe atelectasis versus infiltrate.  Correlate clinically with possible fever and/or elevated white count.  3. Calcified pleural plaque consistent with prior occupational exposure.    CTA Chest:  1. No CT evidence to suggest an acute pulmonary embolus.  2. Multiple small soft tissue pulmonary nodules with interval development of a small spiculated soft tissue pulmonary nodule of the left upper lobe.  Follow-up per Fleischner guidelines.  For multiple solid nodules with any 6 mm or greater, Fleischner Society guidelines recommend follow up with non-contrast chest CT at 3-6 months and 18-24 months after discovery.  3. Diffuse centrilobular emphysematous change with linear discoid atelectasis at the lung bases.  4. Chronic small loculated left pleural effusion with suspected chronic round atelectasis at the left lung base.  5. Chronic bilateral adrenal adenomas.

## 2023-03-25 NOTE — ASSESSMENT & PLAN NOTE
Acute on Chronic Hypoxic Respiratory failure 2/2 COPD exacerbation caused by likely acute viral illness.   CT w/ no evidence of pneumonia or PE  Did show 6mm spiculated nodule left anterior lung that needs to be investigated further in outpatient setting. Patient did have recent PET scan but I can not see report to see if this is new or old.  TTE completed  Completed antibiotic therapy.  Continue prednisone daily  Breathing treatments scheduled  Continuous pulse ox  Currently requiring 2 L LFNC which is increase from baseline. Wean as tolerated

## 2023-03-25 NOTE — CARE UPDATE
03/25/23 0832   PRE-TX-O2   Device (Oxygen Therapy) room air   SpO2 (!) 87 %  (during 6 min walk)   Home Oxygen Qualification   $ Home O2 Qualification Tech time 15 minutes   Room Air SpO2 At Rest 93 %   Room Air SpO2 During Ambulation (!) 87 %   SpO2 During Ambulation on O2 93 %   Heart Rate on O2 153 bpm   Ambulation O2 LPM 2 LPM   SpO2 Post Ambulation 96 %   Post Ambulation Heart Rate 128 bpm   Post Ambulation O2 LPM 2 LPM   Home O2 Eval Comments   (Walked with walker, complaionts of knee pain. Shortness of breath with walking.)     Hr increased at 5 min ariana of walk to 153. Dr. Ritter notified. HR back down to 120's after walk.

## 2023-03-25 NOTE — ASSESSMENT & PLAN NOTE
Smoking cessation counseling performed. Dangers of cigarette smoking were reviewed with patient in detail and patient was encouraged to quit. Nicotine replacement options were discussed for > 10 minutes.  Nicoderm ordered.

## 2023-03-26 PROBLEM — I48.0 PAROXYSMAL ATRIAL FIBRILLATION WITH RVR: Status: ACTIVE | Noted: 2023-03-26

## 2023-03-26 LAB
ALBUMIN SERPL BCP-MCNC: 3 G/DL (ref 3.5–5.2)
ALP SERPL-CCNC: 89 U/L (ref 55–135)
ALT SERPL W/O P-5'-P-CCNC: 22 U/L (ref 10–44)
ANION GAP SERPL CALC-SCNC: 7 MMOL/L (ref 8–16)
AST SERPL-CCNC: 19 U/L (ref 10–40)
BASOPHILS # BLD AUTO: 0.03 K/UL (ref 0–0.2)
BASOPHILS NFR BLD: 0.4 % (ref 0–1.9)
BILIRUB SERPL-MCNC: 0.3 MG/DL (ref 0.1–1)
BUN SERPL-MCNC: 12 MG/DL (ref 8–23)
CALCIUM SERPL-MCNC: 9.1 MG/DL (ref 8.7–10.5)
CHLORIDE SERPL-SCNC: 107 MMOL/L (ref 95–110)
CO2 SERPL-SCNC: 28 MMOL/L (ref 23–29)
CREAT SERPL-MCNC: 0.6 MG/DL (ref 0.5–1.4)
DIFFERENTIAL METHOD: ABNORMAL
EOSINOPHIL # BLD AUTO: 0.1 K/UL (ref 0–0.5)
EOSINOPHIL NFR BLD: 1.3 % (ref 0–8)
ERYTHROCYTE [DISTWIDTH] IN BLOOD BY AUTOMATED COUNT: 12.4 % (ref 11.5–14.5)
EST. GFR  (NO RACE VARIABLE): >60 ML/MIN/1.73 M^2
GLUCOSE SERPL-MCNC: 105 MG/DL (ref 70–110)
HCT VFR BLD AUTO: 38.9 % (ref 40–54)
HGB BLD-MCNC: 12.8 G/DL (ref 14–18)
IMM GRANULOCYTES # BLD AUTO: 0.02 K/UL (ref 0–0.04)
IMM GRANULOCYTES NFR BLD AUTO: 0.3 % (ref 0–0.5)
LYMPHOCYTES # BLD AUTO: 1.4 K/UL (ref 1–4.8)
LYMPHOCYTES NFR BLD: 20.3 % (ref 18–48)
MAGNESIUM SERPL-MCNC: 1.9 MG/DL (ref 1.6–2.6)
MCH RBC QN AUTO: 31.4 PG (ref 27–31)
MCHC RBC AUTO-ENTMCNC: 32.9 G/DL (ref 32–36)
MCV RBC AUTO: 95 FL (ref 82–98)
MONOCYTES # BLD AUTO: 0.7 K/UL (ref 0.3–1)
MONOCYTES NFR BLD: 9.9 % (ref 4–15)
NEUTROPHILS # BLD AUTO: 4.6 K/UL (ref 1.8–7.7)
NEUTROPHILS NFR BLD: 67.8 % (ref 38–73)
NRBC BLD-RTO: 0 /100 WBC
PLATELET # BLD AUTO: 294 K/UL (ref 150–450)
PMV BLD AUTO: 10.2 FL (ref 9.2–12.9)
POTASSIUM SERPL-SCNC: 3.5 MMOL/L (ref 3.5–5.1)
PROT SERPL-MCNC: 6 G/DL (ref 6–8.4)
RBC # BLD AUTO: 4.08 M/UL (ref 4.6–6.2)
SODIUM SERPL-SCNC: 142 MMOL/L (ref 136–145)
WBC # BLD AUTO: 6.76 K/UL (ref 3.9–12.7)

## 2023-03-26 PROCEDURE — 27000221 HC OXYGEN, UP TO 24 HOURS

## 2023-03-26 PROCEDURE — 83735 ASSAY OF MAGNESIUM: CPT | Performed by: INTERNAL MEDICINE

## 2023-03-26 PROCEDURE — 25000003 PHARM REV CODE 250: Performed by: STUDENT IN AN ORGANIZED HEALTH CARE EDUCATION/TRAINING PROGRAM

## 2023-03-26 PROCEDURE — 21400001 HC TELEMETRY ROOM

## 2023-03-26 PROCEDURE — 80053 COMPREHEN METABOLIC PANEL: CPT | Performed by: INTERNAL MEDICINE

## 2023-03-26 PROCEDURE — 63600175 PHARM REV CODE 636 W HCPCS: Performed by: STUDENT IN AN ORGANIZED HEALTH CARE EDUCATION/TRAINING PROGRAM

## 2023-03-26 PROCEDURE — 85025 COMPLETE CBC W/AUTO DIFF WBC: CPT | Performed by: INTERNAL MEDICINE

## 2023-03-26 PROCEDURE — 99900035 HC TECH TIME PER 15 MIN (STAT)

## 2023-03-26 PROCEDURE — S4991 NICOTINE PATCH NONLEGEND: HCPCS | Performed by: INTERNAL MEDICINE

## 2023-03-26 PROCEDURE — 25000003 PHARM REV CODE 250: Performed by: INTERNAL MEDICINE

## 2023-03-26 PROCEDURE — 99233 PR SUBSEQUENT HOSPITAL CARE,LEVL III: ICD-10-PCS | Mod: ,,, | Performed by: INTERNAL MEDICINE

## 2023-03-26 PROCEDURE — 63600175 PHARM REV CODE 636 W HCPCS: Performed by: INTERNAL MEDICINE

## 2023-03-26 PROCEDURE — 99233 SBSQ HOSP IP/OBS HIGH 50: CPT | Mod: ,,, | Performed by: INTERNAL MEDICINE

## 2023-03-26 PROCEDURE — 94640 AIRWAY INHALATION TREATMENT: CPT

## 2023-03-26 RX ORDER — POTASSIUM CHLORIDE 750 MG/1
10 TABLET, EXTENDED RELEASE ORAL ONCE
Status: COMPLETED | OUTPATIENT
Start: 2023-03-26 | End: 2023-03-26

## 2023-03-26 RX ORDER — FUROSEMIDE 20 MG/1
20 TABLET ORAL ONCE
Status: COMPLETED | OUTPATIENT
Start: 2023-03-26 | End: 2023-03-26

## 2023-03-26 RX ADMIN — ENOXAPARIN SODIUM 90 MG: 100 INJECTION SUBCUTANEOUS at 11:03

## 2023-03-26 RX ADMIN — PAROXETINE HYDROCHLORIDE 40 MG: 10 TABLET, FILM COATED ORAL at 09:03

## 2023-03-26 RX ADMIN — POTASSIUM CHLORIDE 10 MEQ: 750 TABLET, FILM COATED, EXTENDED RELEASE ORAL at 01:03

## 2023-03-26 RX ADMIN — FLUTICASONE FUROATE AND VILANTEROL TRIFENATATE 1 PUFF: 100; 25 POWDER RESPIRATORY (INHALATION) at 07:03

## 2023-03-26 RX ADMIN — DILTIAZEM HYDROCHLORIDE 30 MG: 30 TABLET, FILM COATED ORAL at 05:03

## 2023-03-26 RX ADMIN — FINASTERIDE 5 MG: 5 TABLET, FILM COATED ORAL at 09:03

## 2023-03-26 RX ADMIN — ENOXAPARIN SODIUM 90 MG: 100 INJECTION SUBCUTANEOUS at 12:03

## 2023-03-26 RX ADMIN — MUPIROCIN: 20 OINTMENT TOPICAL at 08:03

## 2023-03-26 RX ADMIN — LOSARTAN POTASSIUM 50 MG: 25 TABLET, FILM COATED ORAL at 08:03

## 2023-03-26 RX ADMIN — NICOTINE 1 PATCH: 14 PATCH, EXTENDED RELEASE TRANSDERMAL at 09:03

## 2023-03-26 RX ADMIN — ASPIRIN 81 MG: 81 TABLET, COATED ORAL at 09:03

## 2023-03-26 RX ADMIN — DILTIAZEM HYDROCHLORIDE 30 MG: 30 TABLET, FILM COATED ORAL at 10:03

## 2023-03-26 RX ADMIN — DILTIAZEM HYDROCHLORIDE 30 MG: 30 TABLET, FILM COATED ORAL at 01:03

## 2023-03-26 RX ADMIN — MUPIROCIN: 20 OINTMENT TOPICAL at 09:03

## 2023-03-26 RX ADMIN — TRAZODONE HYDROCHLORIDE 100 MG: 50 TABLET ORAL at 08:03

## 2023-03-26 RX ADMIN — FUROSEMIDE 20 MG: 20 TABLET ORAL at 01:03

## 2023-03-26 RX ADMIN — PANTOPRAZOLE SODIUM 40 MG: 40 TABLET, DELAYED RELEASE ORAL at 09:03

## 2023-03-26 RX ADMIN — TAMSULOSIN HYDROCHLORIDE 0.4 MG: 0.4 CAPSULE ORAL at 08:03

## 2023-03-26 RX ADMIN — ATORVASTATIN CALCIUM 10 MG: 10 TABLET, FILM COATED ORAL at 08:03

## 2023-03-26 RX ADMIN — PREDNISONE 40 MG: 10 TABLET ORAL at 08:03

## 2023-03-26 NOTE — CONSULTS
CARDIOLOGY CONSULTATION    Patient Name:  Jama James    :  1955    Medical Record:  [id number]    DATE OF SERVICE: 3/26/2023    ATTENDING PHYSICIAN: Edu Meier MD    REASON FOR CONSULT:   Atrial fibrillation  HISTORY OF PRESENT ILLNESS  The patient is a 67 y.o. y/o male who is hospitalized for COPD exacerbation.  Increasing shortness of breath COPD exacerbation CT no pulmonary embolism but multiple small nodules seen.  Patient had developed atrial fibrillation rapid ventricular response possibly 10:30 a.m. on 2023 patient describes his palpitations given amiodarone 150 mg bolus and converted to sinus rhythm 3:30 p.m..  Patient had telemetry monitor and the  no atrial fibrillation seen at that time.  Echo EF 60%  Lexiscan mild inferior fixed defect .  Troponin negative x2.  Cardiology consult for atrial fibrillation.      PAST MEDICAL HISTORY  Past Medical History:   Diagnosis Date    Anxiety     COPD (chronic obstructive pulmonary disease)     Depression     High cholesterol     HTN (hypertension)     Malignant neoplasm of prostate     Ulcerative colitis        PAST SURGICAL HISTORY  Past Surgical History:   Procedure Laterality Date    NECK SURGERY      right knee      TRANSRECTAL BIOPSY OF PROSTATE WITH ULTRASOUND GUIDANCE Bilateral 3/18/2020    Procedure: BIOPSY, PROSTATE, RECTAL APPROACH, WITH US GUIDANCE;  Surgeon: Bill Jeong MD;  Location: [facility] OR;  Service: Urology;  Laterality: Bilateral;       SOCIAL HISTORY   reports that he has been smoking cigarettes. He has a 60.00 pack-year smoking history. He has never used smokeless tobacco. He reports current alcohol use of about 6.0 standard drinks per week. He reports that he does not use drugs.      FAMILY HISTORY  Family History   Problem Relation Age of Onset    Diabetes Mother     Diabetes Sister          ALLERGIES   Review of patient's allergies indicates:  No Known Allergies    HOME  MEDICATIONS  Prior to Admission  medications    Medication Sig Start Date End Date Taking? Authorizing Provider   aspirin (ECOTRIN) 81 MG EC tablet Take 1 tablet (81 mg total) by mouth once daily. 11/14/22 11/14/23 Yes Tiffany Smith NP   albuterol (PROVENTIL) 2.5 mg /3 mL (0.083 %) nebulizer solution Take 3 mLs (2.5 mg total) by nebulization every 6 (six) hours as needed for Wheezing or Shortness of Breath. Rescue 3/24/23 4/23/23  Edu Meier MD   albuterol (PROVENTIL/VENTOLIN HFA) 90 mcg/actuation inhaler INHALE ONE (1) OR TWO (2) SPRAYS BY MOUTH EVERY SIX (6) HOURS AS NEEDED FOR WHEEZING 1/12/23   Tiffany Smith NP   atorvastatin (LIPITOR) 10 MG tablet Take 1 tablet (10 mg total) by mouth every evening. 10/13/22 10/13/23  Tiffany Smith NP   finasteride (PROSCAR) 5 mg tablet TAKE 1 TABLET EVERY MORNING FOR PROSTATE 3/15/23   Tiffany Smith NP   fluticasone-umeclidin-vilanter (TRELEGY ELLIPTA) 200-62.5-25 mcg inhaler Inhale 1 puff into the lungs once daily. 1/12/23   Tiffany Smith NP   ipratropium (ATROVENT) 0.02 % nebulizer solution Take 2.5 mLs (500 mcg total) by nebulization every 6 (six) hours as needed for Wheezing. Rescue 3/24/23 4/23/23  Edu Meier MD   losartan (COZAAR) 25 MG tablet Take 25 mg by mouth 2 (two) times a day.    Historical Provider   pantoprazole (PROTONIX) 40 MG tablet TAKE 1 TABLET EVERY MORNING (30 MINUTES BEFORE BREAKFAST) FOR STOMACH 11/28/22   Tiffany Smith NP   paroxetine (PAXIL) 40 MG tablet TAKE ONE (1) TABLET EVERY MORNING FOR MOOD AND NERVE PAIN 11/28/22   Tiffany Smith NP   tamsulosin (FLOMAX) 0.4 mg Cap Take 1 capsule (0.4 mg total) by mouth once daily. 12/6/22   Tiffany Smith NP   traZODone (DESYREL) 100 MG tablet Take 1 tablet (100 mg total) by mouth every evening. 1/26/23 1/26/24  Tiffany Smith NP     CURRENT MEDICATIONS   aspirin  81 mg Oral Daily    atorvastatin  10 mg Oral QHS    diltiaZEM  30 mg Oral Q8H    enoxaparin  1 mg/kg Subcutaneous Q12H    finasteride  5 mg Oral Daily    fluticasone  furoate-vilanteroL  1 puff Inhalation Daily    losartan  50 mg Oral BID    mupirocin   Nasal BID    nicotine  1 patch Transdermal Daily    pantoprazole  40 mg Oral Daily    paroxetine  40 mg Oral Daily    predniSONE  40 mg Oral Daily    tamsulosin  0.4 mg Oral Daily    traZODone  100 mg Oral QHS     acetaminophen, ipratropium, levalbuterol, sodium chloride 0.9%      REVIEW OF SYSTEMS  General: no weight loss, no fever, no chills, no anorexia  Skin: no rash  Eyes: no blurry vision  Ears/Nose/Throat: no nasal congestion, no sore throat  Respiratory: +cough, +dyspnea, no wheezing  Cardiovascular: no chest pain, no ankle swelling  Gastrointestinal: no nausea, no vomiting, no diarrhea, no constipation, no abdominal pain. No melena, no bright red blood per rectum  Genitourinary: no dysuria, no hematuria  Musculoskeletal: no joint pain, no myalgia, no muscle weakness  Neurologic: no seizures, no tremors, no fainting  Hematologic/Lymphatic: no abnormal bleeding, no abnormal bruising  Endocrine: no heat or cold intolerance, no polydipsia, no polyuria  Psychiatric: no anxiety, no depression  Allergy/Immunology: no seasonal allergies, no joint stiffness in morning      PHYSICAL EXAM  Vital Signs:  Temp:  [97.2 °F (36.2 °C)-98.6 °F (37 °C)] 97.2 °F (36.2 °C)  Pulse:  [] 79  Resp:  [16-24] 16  SpO2:  [92 %-98 %] 96 %  BP: (137-172)/(65-91) 140/65  Constitutional:  Healthy, no distress  HENT:  Normocephalic, Atraumatic, Bilateral external ears normal, Oropharynx moist, No oral exudates, No tenderness, neck supple.  Eyes:  PERRL, EOMI, Conjunctiva normal, No discharge.   Lymphatic:  No cervical or supraclavicular lymphadenopathy noted.   Cardiovascular:  Normal heart rate, Normal rhythm, 1 to 2/6 systolic ejection murmur left sternal border, No rubs, No gallops.   Respiratory:  Normal breath sounds, No respiratory distress, No wheezing, No rhonchi, No rales, No chest tenderness.   GI:  Bowel sounds normal, Soft, No  tenderness, No rebound or guarding, No masses.   Integument:  Warm, Dry, No erythema, No rash.   Musculoskeletal/Extremities:  Intact distal pulses, No edema, No tenderness, No cyanosis, No clubbing. Good range of motion in all major joints.   Neurologic:  Alert & oriented x 3, cranial nerves grossly intact, No focal deficits.    LABS    CBC  Recent Labs   Lab 03/24/23  0331 03/25/23  1109 03/26/23  0427   WBC 7.00 8.72 6.76   RBC 3.65* 4.15* 4.08*   HGB 11.4* 13.1* 12.8*   HCT 35.1* 39.5* 38.9*   MCV 96 95 95   MCH 31.2* 31.6* 31.4*   MCHC 32.5 33.2 32.9   RDW 12.6 12.7 12.4   MPV 10.7 9.9 10.2    289 294       Chemistry  Recent Labs   Lab 03/24/23  0331 03/25/23  1109 03/26/23  0427    141 142   K 3.7 3.7 3.5    104 107   CO2 26 27 28   BUN 14 13 12   CREATININE 0.8 0.7 0.6   * 114* 105   PROT 5.8* 6.4 6.0   CALCIUM 8.8 9.4 9.1   BILITOT 0.2 0.3 0.3   AST 12 17 19   ALT 14 17 22       ABG  No results for input(s): PHART, EMJ4MVW, PO2ART, EAD6ORA, G7AQQUQI, BEART, D6UGUFUW in the last 168 hours.    IMAGING STUDIES & OTHER STUDIES  Reviewed          ASSESSMENT  Active Hospital Problems    Diagnosis  POA    *COPD exacerbation [J44.1]  Yes    Paroxysmal atrial fibrillation with RVR [I48.0]  No    Prostate cancer [C61]  Yes    Tobacco abuse [Z72.0]  Yes    HTN (hypertension) [I10]  Yes    Depression [F32.A]  Yes    High cholesterol [E78.00]  Yes      Resolved Hospital Problems   No resolved problems to display.       PLAN    Persistent atrial fibrillation  Atrial fibrillation rate 130  Convert with IV amiodarone 150 x 1  Cardizem 30 mg t.i.d.  Lexiscan mild inferior fixed defect 2/23  Echo EF 60% 2023  Enzyme negative  Lovenox 1 milligram/kilogram subQ b.i.d.    Acute HFpEF   3/23    Hypokalemia  K 3.5    Hypertension  140/65 today  On losartan 50 mg b.i.d.  Monitor blood pressure    Lung nodule  CT chest 6 mm left upper lobe nodule    COPD exacerbation  Treated medicine    History of  prostate cancer    Plan  Decrease Cardizem 30 mg b.i.d. recent syncope  KCL 40 mg x 1  Additional 20 mEq at 2:00 p.m. today  Lasix 20 mg p.o. x1 today  CBC BMP Mag in a.m.  Amiodarone 200 mg daily  Discussed with Dr. Ritter    The plan was discussed with the patient and/or family.    Thank you for the Consult.    Electronically signed by: Vince Combs, 3/26/2023 9:15 AM     Time spent on counseling/coordination of care:   Total time spent with patient:

## 2023-03-26 NOTE — PLAN OF CARE
Problem: Adult Inpatient Plan of Care  Goal: Plan of Care Review  Outcome: Ongoing, Not Progressing  Goal: Patient-Specific Goal (Individualized)  Outcome: Ongoing, Not Progressing  Goal: Absence of Hospital-Acquired Illness or Injury  Outcome: Ongoing, Not Progressing  Goal: Optimal Comfort and Wellbeing  Outcome: Ongoing, Not Progressing  Goal: Readiness for Transition of Care  Outcome: Ongoing, Not Progressing     Problem: Skin Injury Risk Increased  Goal: Skin Health and Integrity  Outcome: Ongoing, Not Progressing     Problem: Fall Injury Risk  Goal: Absence of Fall and Fall-Related Injury  Outcome: Ongoing, Not Progressing     Problem: Gas Exchange Impaired  Goal: Optimal Gas Exchange  Outcome: Ongoing, Not Progressing     Problem: Asthma Comorbidity  Goal: Maintenance of Asthma Control  Outcome: Ongoing, Not Progressing     Problem: COPD (Chronic Obstructive Pulmonary Disease) Comorbidity  Goal: Maintenance of COPD Symptom Control  Outcome: Ongoing, Not Progressing     Problem: Hypertension Comorbidity  Goal: Blood Pressure in Desired Range  Outcome: Ongoing, Not Progressing     Problem: Activity Intolerance  Goal: Enhanced Capacity and Energy  Outcome: Ongoing, Not Progressing     Problem: Dysrhythmia  Goal: Normalized Cardiac Rhythm  Outcome: Ongoing, Not Progressing

## 2023-03-26 NOTE — ASSESSMENT & PLAN NOTE
Patient with Paroxysmal (<7 days) atrial fibrillation which is controlled currently with Calcium Channel Blocker. Patient is currently in sinus rhythm.[id number] Score: 1. . Anticoagulation with Lovenox 1 mg/kg sq q day. Longterm anticoagulation decision deferred to cardiologist recommendations.  Discussed with Dr. Combs who is reducing Cardizem 30 mg twice daily and adding amiodarone 200 mg daily.  Patient is well known to him.

## 2023-03-26 NOTE — PLAN OF CARE
Problem: Adult Inpatient Plan of Care  Goal: Plan of Care Review  Outcome: Ongoing, Progressing     Problem: Adult Inpatient Plan of Care  Goal: Patient-Specific Goal (Individualized)  Outcome: Ongoing, Progressing     Problem: Adult Inpatient Plan of Care  Goal: Absence of Hospital-Acquired Illness or Injury  Outcome: Ongoing, Progressing     Problem: Adult Inpatient Plan of Care  Goal: Optimal Comfort and Wellbeing  Outcome: Ongoing, Progressing     Problem: Adult Inpatient Plan of Care  Goal: Readiness for Transition of Care  Outcome: Ongoing, Progressing     Problem: Skin Injury Risk Increased  Goal: Skin Health and Integrity  Outcome: Ongoing, Progressing     Problem: Fall Injury Risk  Goal: Absence of Fall and Fall-Related Injury  Outcome: Ongoing, Progressing

## 2023-03-26 NOTE — PLAN OF CARE
Patient in no apparent distress. Sat's 92  % on 2 lpm. PRN treatments not needed. Breo daily. Shortness of breath present with exertion . Will continue to monitor.

## 2023-03-26 NOTE — PROGRESS NOTES
[facility]  Progress Note    Patient Name: Jama James  MRN: [id number]  Patient Class: IP- Inpatient   Admission Date: 3/21/2023  Length of Stay: 2 days  Attending Physician: Edu Meier MD  Primary Care Provider: Marisel Farrell III, MD        Subjective:     Principal Problem:COPD exacerbation        HPI:  67 w/ Chronic Hypoxic Respiratory Failure on 1L LFNC 2/2 COPD, Prostate cancer, HTN, HLD presenting w/ 1 week of worsening SOB. Patient endorses subjective fever and chills. No change in sputum production but has new cough. He has been having to use his home O2 more often than usual and using his home inhalers more often. Denies NVD. Also endorses chronic LE swelling that has been worse lately. He does endorse sleeping on a couch primarily sleeping straight up. He sits in a recliner all day with his legs hanging. Patient endorses inspirational chest pain. Does not radiate. Comes on at rest and with exertion. Associated with cough. Painful to palpation per patient. Denies any hx of HF. Patient actively undergoing workup for treatment of prostate cancer.      Overview/Hospital Course:  Patient admitted for acute on chronic hypoxic respiratory failure 2/2 COPD exacerbation from likely viral infection. CT PE obtained at admit given patients cancer history but this was negative. Patient improving mildly with steroids and breathing treatments. Patient continues to require more o2 than at home. Working w/ PT/OT today. 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 Improving daily. Should be appropriate for discharge 3/25 if he continues to improve.      Interval History:  Overnight patient atrial fibrillation converted spontaneously to normal sinus rhythm, now rate controlled.  Still with significant shortness of breath especially on exertion.  Patient admits significant use of alcohol consumption and nicotine.  Currently on 2 liter/minute supplemental oxygen via nasal  cannula.    Review of Systems   All other systems reviewed and are negative.  Objective:     Vital Signs (Most Recent):  Temp: 97.7 °F (36.5 °C) (03/26/23 0045)  Pulse: 69 (03/26/23 0045)  Resp: 18 (03/26/23 0045)  BP: (!) 152/70 (03/26/23 0430)  SpO2: 97 % (03/26/23 0045)   Vital Signs (24h Range):  Temp:  [96.5 °F (35.8 °C)-98.6 °F (37 °C)] 97.7 °F (36.5 °C)  Pulse:  [] 69  Resp:  [18-25] 18  SpO2:  [87 %-98 %] 97 %  BP: (137-172)/(67-91) 152/70     Weight: 86.7 kg (191 lb 2.2 oz)  Body mass index is 22.67 kg/m².    Intake/Output Summary (Last 24 hours) at 3/26/2023 0740  Last data filed at 3/26/2023 0020  Gross per 24 hour   Intake 1660 ml   Output --   Net 1660 ml        Physical Exam  Vitals reviewed  General: NAD, Well developed, Well Nourished  Head: NC/AT  Eyes: EOMI, MELISSA  Cardiovascular: Pulses intact distally, Regular Rate and rhythm  Pulmonary: Increased RR, No respiratory distress  Gi: Soft, Non-tender  Extremities: Warm, No edema present  Skin: Warm, dry  Neuro: Alert, Oriented x3, No focal Deficit  Psych: Appropriate mood and affect      Significant Labs:   Lab Results   Component Value Date    WBC 6.76 03/26/2023    HGB 12.8 (L) 03/26/2023    HCT 38.9 (L) 03/26/2023    MCV 95 03/26/2023     03/26/2023       CMP  Sodium   Date Value Ref Range Status   03/26/2023 142 136 - 145 mmol/L Final     Potassium   Date Value Ref Range Status   03/26/2023 3.5 3.5 - 5.1 mmol/L Final     Chloride   Date Value Ref Range Status   03/26/2023 107 95 - 110 mmol/L Final     CO2   Date Value Ref Range Status   03/26/2023 28 23 - 29 mmol/L Final     Glucose   Date Value Ref Range Status   03/26/2023 105 70 - 110 mg/dL Final     BUN   Date Value Ref Range Status   03/26/2023 12 8 - 23 mg/dL Final     Creatinine   Date Value Ref Range Status   03/26/2023 0.6 0.5 - 1.4 mg/dL Final     Calcium   Date Value Ref Range Status   03/26/2023 9.1 8.7 - 10.5 mg/dL Final     Total Protein   Date Value Ref Range Status    03/26/2023 6.0 6.0 - 8.4 g/dL Final     Albumin   Date Value Ref Range Status   03/26/2023 3.0 (L) 3.5 - 5.2 g/dL Final     Total Bilirubin   Date Value Ref Range Status   03/26/2023 0.3 0.1 - 1.0 mg/dL Final     Comment:     For infants and newborns, interpretation of results should be based  on gestational age, weight and in agreement with clinical  observations.    Premature Infant recommended reference ranges:  Up to 24 hours.............<8.0 mg/dL  Up to 48 hours............<12.0 mg/dL  3-5 days..................<15.0 mg/dL  6-29 days.................<15.0 mg/dL       Alkaline Phosphatase   Date Value Ref Range Status   03/26/2023 89 55 - 135 U/L Final     AST   Date Value Ref Range Status   03/26/2023 19 10 - 40 U/L Final     ALT   Date Value Ref Range Status   03/26/2023 22 10 - 44 U/L Final     Anion Gap   Date Value Ref Range Status   03/26/2023 7 (L) 8 - 16 mmol/L Final     eGFR   Date Value Ref Range Status   03/26/2023 >60.0 >60 mL/min/1.73 m^2 Final   01/12/2023 97 > OR = 60 mL/min/1.73m2 Final     Comment:     The eGFR is based on the CKD-EPI 2021 equation. To calculate   the new eGFR from a previous Creatinine or Cystatin C  result, go to https://www.kidney.org/professionals/  kdoqi/gfr%5Fcalculator       Microbiology Results (last 7 days)       Procedure Component Value Units Date/Time    Influenza A & B by Molecular [[id number]] Collected: 03/22/23 0810    Order Status: Completed Specimen: Nasopharyngeal Swab Updated: 03/22/23 0912     Influenza A, Molecular Negative     Influenza B, Molecular Negative     Flu A & B Source Nasal Swab          Significant Imaging:   ECHO:   The left ventricle is normal in size with concentric remodeling and normal systolic function.   The estimated ejection fraction is 69%.   Normal left ventricular diastolic function.   Normal right ventricular size with normal right ventricular systolic function.   Mild right atrial enlargement.   Intermediate central venous  pressure (8 mmHg).   The estimated PA systolic pressure is 24 mmHg.   The left ventricular global longitudinal strain is -12%. Reduced    CXR:  1. Findings consistent with congestive heart failure small bilateral pleural effusions and bibasilar dependent atelectasis.  2. Retrocardiac density consistent with left lower lobe atelectasis versus infiltrate.  Correlate clinically with possible fever and/or elevated white count.  3. Calcified pleural plaque consistent with prior occupational exposure.    CTA Chest:  1. No CT evidence to suggest an acute pulmonary embolus.  2. Multiple small soft tissue pulmonary nodules with interval development of a small spiculated soft tissue pulmonary nodule of the left upper lobe.  Follow-up per Fleischner guidelines.  For multiple solid nodules with any 6 mm or greater, Fleischner Society guidelines recommend follow up with non-contrast chest CT at 3-6 months and 18-24 months after discovery.  3. Diffuse centrilobular emphysematous change with linear discoid atelectasis at the lung bases.  4. Chronic small loculated left pleural effusion with suspected chronic round atelectasis at the left lung base.  5. Chronic bilateral adrenal adenomas.      Assessment/Plan:      * COPD exacerbation  Acute on Chronic Hypoxic Respiratory failure 2/2 COPD exacerbation caused by likely acute viral illness.   CT w/ no evidence of pneumonia or PE  Did show 6mm spiculated nodule left anterior lung that needs to be investigated further in outpatient setting. Patient did have recent PET scan but I can not see report to see if this is new or old.  TTE completed  Completed antibiotic therapy.  Continue prednisone daily  Breathing treatments scheduled  Continuous pulse ox  Currently requiring 2 L LFNC which is increase from baseline. Wean as tolerated      Paroxysmal atrial fibrillation with RVR  Patient with Paroxysmal (<7 days) atrial fibrillation which is controlled currently with Calcium Channel  Blocker. Patient is currently in sinus rhythm.[id number] Score: 1. . Anticoagulation with Lovenox 1 mg/kg sq q day. Longterm anticoagulation decision deferred to cardiologist recommendations.  Discussed with Dr. Combs who is reducing Cardizem 30 mg twice daily and adding amiodarone 200 mg daily.  Patient is well known to him.        Prostate cancer  Needs f/u w/ heme onc at discharge  Continue flomax and finasteride      Tobacco abuse  Smoking cessation counseling performed. Dangers of cigarette smoking were reviewed with patient in detail and patient was encouraged to quit. Nicotine replacement options were discussed for > 10 minutes.  Nicoderm ordered.      Depression  Continue home paroxetine        High cholesterol  Continue ASA/Statin      HTN (hypertension)  Continue home medications. Adjust and titrate as needed  Increased home losartan 3/24.      VTE Risk Mitigation (From admission, onward)         Ordered     enoxaparin injection 90 mg  Every 12 hours (non-standard times)         03/25/23 1036     IP VTE HIGH RISK PATIENT  Once         03/21/23 1701     Place sequential compression device  Until discontinued         03/21/23 1701                Discharge Planning   CLEMENTINA: 3/27/23     Code Status: Full Code   Is the patient medically ready for discharge?:     Reason for patient still in hospital (select all that apply): Patient trending condition and Consult recommendations  Discharge Plan A: Home Health   Discharge Delays: None known at this time              Mayra Ritter MD  Department of Hospital Medicine   [facility]

## 2023-03-27 VITALS
TEMPERATURE: 98 F | RESPIRATION RATE: 17 BRPM | BODY MASS INDEX: 22.57 KG/M2 | OXYGEN SATURATION: 98 % | DIASTOLIC BLOOD PRESSURE: 77 MMHG | HEART RATE: 68 BPM | WEIGHT: 191.13 LBS | SYSTOLIC BLOOD PRESSURE: 164 MMHG | HEIGHT: 77 IN

## 2023-03-27 LAB
ANION GAP SERPL CALC-SCNC: 6 MMOL/L (ref 8–16)
BASOPHILS # BLD AUTO: 0.04 K/UL (ref 0–0.2)
BASOPHILS NFR BLD: 0.6 % (ref 0–1.9)
BNP SERPL-MCNC: 62 PG/ML (ref 0–99)
BUN SERPL-MCNC: 14 MG/DL (ref 8–23)
CALCIUM SERPL-MCNC: 9.2 MG/DL (ref 8.7–10.5)
CHLORIDE SERPL-SCNC: 107 MMOL/L (ref 95–110)
CO2 SERPL-SCNC: 29 MMOL/L (ref 23–29)
CREAT SERPL-MCNC: 0.6 MG/DL (ref 0.5–1.4)
DIFFERENTIAL METHOD: ABNORMAL
EOSINOPHIL # BLD AUTO: 0.1 K/UL (ref 0–0.5)
EOSINOPHIL NFR BLD: 1.9 % (ref 0–8)
ERYTHROCYTE [DISTWIDTH] IN BLOOD BY AUTOMATED COUNT: 12.3 % (ref 11.5–14.5)
EST. GFR  (NO RACE VARIABLE): >60 ML/MIN/1.73 M^2
GLUCOSE SERPL-MCNC: 102 MG/DL (ref 70–110)
HCT VFR BLD AUTO: 37.1 % (ref 40–54)
HGB BLD-MCNC: 12.2 G/DL (ref 14–18)
IMM GRANULOCYTES # BLD AUTO: 0.02 K/UL (ref 0–0.04)
IMM GRANULOCYTES NFR BLD AUTO: 0.3 % (ref 0–0.5)
LYMPHOCYTES # BLD AUTO: 1.5 K/UL (ref 1–4.8)
LYMPHOCYTES NFR BLD: 22.1 % (ref 18–48)
MAGNESIUM SERPL-MCNC: 2 MG/DL (ref 1.6–2.6)
MCH RBC QN AUTO: 31.2 PG (ref 27–31)
MCHC RBC AUTO-ENTMCNC: 32.9 G/DL (ref 32–36)
MCV RBC AUTO: 95 FL (ref 82–98)
MONOCYTES # BLD AUTO: 0.6 K/UL (ref 0.3–1)
MONOCYTES NFR BLD: 9.6 % (ref 4–15)
NEUTROPHILS # BLD AUTO: 4.4 K/UL (ref 1.8–7.7)
NEUTROPHILS NFR BLD: 65.5 % (ref 38–73)
NRBC BLD-RTO: 0 /100 WBC
PLATELET # BLD AUTO: 294 K/UL (ref 150–450)
PMV BLD AUTO: 10 FL (ref 9.2–12.9)
POTASSIUM SERPL-SCNC: 3.4 MMOL/L (ref 3.5–5.1)
RBC # BLD AUTO: 3.91 M/UL (ref 4.6–6.2)
SODIUM SERPL-SCNC: 142 MMOL/L (ref 136–145)
WBC # BLD AUTO: 6.7 K/UL (ref 3.9–12.7)

## 2023-03-27 PROCEDURE — 83880 ASSAY OF NATRIURETIC PEPTIDE: CPT | Performed by: INTERNAL MEDICINE

## 2023-03-27 PROCEDURE — S4991 NICOTINE PATCH NONLEGEND: HCPCS | Performed by: INTERNAL MEDICINE

## 2023-03-27 PROCEDURE — 94761 N-INVAS EAR/PLS OXIMETRY MLT: CPT

## 2023-03-27 PROCEDURE — 25000003 PHARM REV CODE 250: Performed by: INTERNAL MEDICINE

## 2023-03-27 PROCEDURE — 85025 COMPLETE CBC W/AUTO DIFF WBC: CPT | Performed by: INTERNAL MEDICINE

## 2023-03-27 PROCEDURE — 83735 ASSAY OF MAGNESIUM: CPT | Performed by: INTERNAL MEDICINE

## 2023-03-27 PROCEDURE — 63600175 PHARM REV CODE 636 W HCPCS: Performed by: STUDENT IN AN ORGANIZED HEALTH CARE EDUCATION/TRAINING PROGRAM

## 2023-03-27 PROCEDURE — 80048 BASIC METABOLIC PNL TOTAL CA: CPT | Performed by: INTERNAL MEDICINE

## 2023-03-27 PROCEDURE — 99239 PR HOSPITAL DISCHARGE DAY,>30 MIN: ICD-10-PCS | Mod: ,,, | Performed by: STUDENT IN AN ORGANIZED HEALTH CARE EDUCATION/TRAINING PROGRAM

## 2023-03-27 PROCEDURE — 25000003 PHARM REV CODE 250: Performed by: STUDENT IN AN ORGANIZED HEALTH CARE EDUCATION/TRAINING PROGRAM

## 2023-03-27 PROCEDURE — 27000221 HC OXYGEN, UP TO 24 HOURS

## 2023-03-27 PROCEDURE — 94640 AIRWAY INHALATION TREATMENT: CPT

## 2023-03-27 PROCEDURE — 99239 HOSP IP/OBS DSCHRG MGMT >30: CPT | Mod: ,,, | Performed by: STUDENT IN AN ORGANIZED HEALTH CARE EDUCATION/TRAINING PROGRAM

## 2023-03-27 RX ORDER — AMIODARONE HYDROCHLORIDE 200 MG/1
200 TABLET ORAL DAILY
Qty: 30 TABLET | Refills: 3 | Status: SHIPPED | OUTPATIENT
Start: 2023-03-27 | End: 2023-07-14

## 2023-03-27 RX ORDER — DILTIAZEM HYDROCHLORIDE 30 MG/1
30 TABLET, FILM COATED ORAL EVERY 12 HOURS
Qty: 60 TABLET | Refills: 11 | Status: SHIPPED | OUTPATIENT
Start: 2023-03-27 | End: 2024-03-26

## 2023-03-27 RX ORDER — POTASSIUM CHLORIDE 20 MEQ/1
40 TABLET, EXTENDED RELEASE ORAL EVERY 4 HOURS
Status: COMPLETED | OUTPATIENT
Start: 2023-03-27 | End: 2023-03-27

## 2023-03-27 RX ORDER — IBUPROFEN 200 MG
1 TABLET ORAL DAILY
Qty: 30 PATCH | Refills: 1 | Status: SHIPPED | OUTPATIENT
Start: 2023-03-27 | End: 2023-04-26

## 2023-03-27 RX ORDER — DILTIAZEM HYDROCHLORIDE 30 MG/1
30 TABLET, FILM COATED ORAL EVERY 12 HOURS
Status: DISCONTINUED | OUTPATIENT
Start: 2023-03-27 | End: 2023-03-27 | Stop reason: HOSPADM

## 2023-03-27 RX ORDER — LOSARTAN POTASSIUM 50 MG/1
50 TABLET ORAL 2 TIMES DAILY
Qty: 180 TABLET | Refills: 3 | Status: ON HOLD | OUTPATIENT
Start: 2023-03-27 | End: 2023-11-30 | Stop reason: HOSPADM

## 2023-03-27 RX ORDER — AMIODARONE HYDROCHLORIDE 100 MG/1
200 TABLET ORAL DAILY
Status: DISCONTINUED | OUTPATIENT
Start: 2023-03-27 | End: 2023-03-27 | Stop reason: HOSPADM

## 2023-03-27 RX ADMIN — LOSARTAN POTASSIUM 50 MG: 25 TABLET, FILM COATED ORAL at 08:03

## 2023-03-27 RX ADMIN — PAROXETINE HYDROCHLORIDE 40 MG: 10 TABLET, FILM COATED ORAL at 08:03

## 2023-03-27 RX ADMIN — POTASSIUM CHLORIDE 40 MEQ: 1500 TABLET, EXTENDED RELEASE ORAL at 08:03

## 2023-03-27 RX ADMIN — APIXABAN 5 MG: 2.5 TABLET, FILM COATED ORAL at 11:03

## 2023-03-27 RX ADMIN — FINASTERIDE 5 MG: 5 TABLET, FILM COATED ORAL at 08:03

## 2023-03-27 RX ADMIN — TAMSULOSIN HYDROCHLORIDE 0.4 MG: 0.4 CAPSULE ORAL at 08:03

## 2023-03-27 RX ADMIN — FLUTICASONE FUROATE AND VILANTEROL TRIFENATATE 1 PUFF: 100; 25 POWDER RESPIRATORY (INHALATION) at 09:03

## 2023-03-27 RX ADMIN — ASPIRIN 81 MG: 81 TABLET, COATED ORAL at 08:03

## 2023-03-27 RX ADMIN — NICOTINE 1 PATCH: 14 PATCH, EXTENDED RELEASE TRANSDERMAL at 08:03

## 2023-03-27 RX ADMIN — POTASSIUM CHLORIDE 40 MEQ: 1500 TABLET, EXTENDED RELEASE ORAL at 11:03

## 2023-03-27 RX ADMIN — PANTOPRAZOLE SODIUM 40 MG: 40 TABLET, DELAYED RELEASE ORAL at 08:03

## 2023-03-27 RX ADMIN — AMIODARONE HYDROCHLORIDE 200 MG: 100 TABLET ORAL at 11:03

## 2023-03-27 RX ADMIN — PREDNISONE 40 MG: 10 TABLET ORAL at 08:03

## 2023-03-27 RX ADMIN — DILTIAZEM HYDROCHLORIDE 30 MG: 30 TABLET, FILM COATED ORAL at 05:03

## 2023-03-27 NOTE — PROGRESS NOTES
"CARDIOLOGY PROGRESS NOTE    Patient Name:  Jama James    :  1955    Medical Record:  [id number]    DATE OF SERVICE  3/27/2023        SUBJECTIVE  The patient is being seen for follow-up recent COPD exacerbation or shortness of breath chest pains atrial fibrillation rapid conversion to sinus rhythm hypokalemia K 3.4 today      REVIEW OF SYSTEMS  General: No chills, No fever, No fatigue  Eyes: No vision changes, No drainage from eyes  ENT: No nasal congestion, no sore throat  Respiratory: No shortness of breath, No cough  Cardiac: No chest pain, palpitations, dyspnea, dizziness, claudication, or syncopal episodes  GI: No abdominal pain, no nausea, no vomiting  : No dysuria  Musculoskeletal: No joint pain  Neuro: No weakness, dizziness, vision changes       OBJECTIVE          PHYSICAL EXAM  Vital Signs:  BP (!) 164/77   Pulse 68   Temp 98.2 °F (36.8 °C)   Resp 17   Ht 6' 5" (1.956 m)   Wt 86.7 kg (191 lb 2.2 oz)   SpO2 98%   BMI 22.67 kg/m²   Temp:  [98.2 °F (36.8 °C)-98.6 °F (37 °C)] 98.2 °F (36.8 °C)  Pulse:  [66-80] 68  Resp:  [16-17] 17  SpO2:  [95 %-98 %] 98 %  BP: (134-164)/(71-77) 164/77      General:   Eyes: Anicteric sclera. Moist conjunctiva. Vision grossly intact.  HENMT: Normocephalic.  Moist mucous membranes. No obvious ulcerations.   Neck: Trachea midline.   Cardiovascular: Heart regular rate and rhythm. S1, S2, 1 to 2/6 systolic ejection murmur left sternal border Peripheral pulses palpable. No peripheral edema.   Respiratory: Lungs clear to auscultation bilaterally. No increased work of breathing.  Gastrointestinal: Bowel sounds active in all 4 quadrants. Soft, nontender, nondistended.   Genitourinary: Urine clear yellow  Musculoskeletal: Moves all extremities with equal strength.   Skin: Color normal for ethnicity. Skin warm and dry with good turgor. Capillary refill < 3 seconds.   Neurologic: Oriented x 3.  Speech fluent, follows commands.  Psychiatric:  Awake and alert, normal affect " mood good.      LABS    CBC  Recent Labs   Lab 03/25/23  1109 03/26/23  0427 03/27/23  0500   WBC 8.72 6.76 6.70   RBC 4.15* 4.08* 3.91*   HGB 13.1* 12.8* 12.2*   HCT 39.5* 38.9* 37.1*   MCV 95 95 95   MCH 31.6* 31.4* 31.2*   MCHC 33.2 32.9 32.9   RDW 12.7 12.4 12.3   MPV 9.9 10.2 10.0    294 294       Chemistry  Recent Labs   Lab 03/24/23  0331 03/25/23  1109 03/26/23  0427 03/27/23  0500    141 142 142   K 3.7 3.7 3.5 3.4*    104 107 107   CO2 26 27 28 29   BUN 14 13 12 14   CREATININE 0.8 0.7 0.6 0.6   * 114* 105 102   PROT 5.8* 6.4 6.0  --    CALCIUM 8.8 9.4 9.1 9.2   BILITOT 0.2 0.3 0.3  --    AST 12 17 19  --    ALT 14 17 22  --        ABG  No results for input(s): PHART, VVL8UUC, PO2ART, CVB8TBP, C1JTIAPE, BEART, U7TCQVVH in the last 168 hours.      MICROBIOLOGY  Reviewed    IMAGING & OTHER STUDIES  Reviewed      Intake/Output last 3 shifts:  I/O last 3 completed shifts:  In: 1502 [P.O.:1502]  Out: -     Scheduled meds:   amiodarone  200 mg Oral Daily    apixaban  5 mg Oral BID    aspirin  81 mg Oral Daily    atorvastatin  10 mg Oral QHS    diltiaZEM  30 mg Oral Q12H    finasteride  5 mg Oral Daily    fluticasone furoate-vilanteroL  1 puff Inhalation Daily    losartan  50 mg Oral BID    mupirocin   Nasal BID    nicotine  1 patch Transdermal Daily    pantoprazole  40 mg Oral Daily    paroxetine  40 mg Oral Daily    predniSONE  40 mg Oral Daily    tamsulosin  0.4 mg Oral Daily    traZODone  100 mg Oral QHS       PRN Meds:  acetaminophen, ipratropium, levalbuterol, sodium chloride 0.9%    Continuous Infusions:      Dietary Orders:  [unfilled]     Admit weight: Weight: 86.2 kg (190 lb)   Today weight: Weight: 86.7 kg (191 lb 2.2 oz)      Length of stay in days: 3      ASSESSMENT    Active Hospital Problems    Diagnosis  POA    *COPD exacerbation [J44.1]  Yes    Paroxysmal atrial fibrillation with RVR [I48.0]  No    Prostate cancer [C61]  Yes    Tobacco abuse [Z72.0]  Yes    HTN  (hypertension) [I10]  Yes    Depression [F32.A]  Yes    High cholesterol [E78.00]  Yes      Resolved Hospital Problems   No resolved problems to display.          PLAN    Persistent atrial fibrillation  Atrial fibrillation rate 130  Convert with IV amiodarone 150 x 1  Cardizem 30 mg t.i.d.  Lexiscan mild inferior fixed defect 2/23  Echo EF 60% 2023  Enzyme negative  Lovenox 1 milligram/kilogram subQ b.i.d.     Acute HFpEF   3/23     Hypokalemia  K 3.4 3/27     Hypertension  140/65 today  On losartan 50 mg b.i.d.  Monitor blood pressure     Lung nodule  CT chest 6 mm left upper lobe nodule     COPD exacerbation  Treated medicine     History of prostate cancer     Plan    Discussed with Dr. Renea MARES to home today  Replete potassium  Follow-up 1-2 weeks          Electronically signed by: Vince Combs, 3/27/2023 1:20 PM

## 2023-03-27 NOTE — PLAN OF CARE
03/27/23 1634   Final Note   Assessment Type Final Discharge Note   Anticipated Discharge Disposition Home-Health   What phone number can be called within the next 1-3 days to see how you are doing after discharge? [phone]   Hospital Resources/Appts/Education Provided Appointments scheduled and added to AVS   Post-Acute Status   Post-Acute Authorization Home Health   Home Health Status Referrals Sent   Discharge Delays None known at this time     Follow up appointment were given to patient via written and verbal form. Home health referrals and orders were sent to [facility]. Patient demonstrated understanding by verbal feedback. Patient was also given a discount card for Eliquis as well as a nebulizer from [facility]. There are no additional needs at this time.

## 2023-03-27 NOTE — PLAN OF CARE
03/27/23 1042   Medicare Message   Important Message from Medicare regarding Discharge Appeal Rights Given to patient/caregiver;Explained to patient/caregiver;Signed/date by patient/caregiver   Date IMM was signed 03/27/23   Time IMM was signed 1042

## 2023-03-27 NOTE — DISCHARGE SUMMARY
[facility]  Discharge Summary      Patient Name: Jama James  MRN: [id number]  PARK: [id number]  Patient Class: IP- Inpatient  Admission Date: 3/21/2023  Hospital Length of Stay: 3 days  Discharge Date and Time:  03/27/2023 1:38 PM  Attending Physician: Edu Meier MD   Discharging Provider: Edu Meier MD  Primary Care Provider: Marisel Farrell III, MD    Primary Care Team: Networked reference to record PCT     HPI:   67 w/ Chronic Hypoxic Respiratory Failure on 1L LFNC 2/2 COPD, Prostate cancer, HTN, HLD presenting w/ 1 week of worsening SOB. Patient endorses subjective fever and chills. No change in sputum production but has new cough. He has been having to use his home O2 more often than usual and using his home inhalers more often. Denies NVD. Also endorses chronic LE swelling that has been worse lately. He does endorse sleeping on a couch primarily sleeping straight up. He sits in a recliner all day with his legs hanging. Patient endorses inspirational chest pain. Does not radiate. Comes on at rest and with exertion. Associated with cough. Painful to palpation per patient. Denies any hx of HF. Patient actively undergoing workup for treatment of prostate cancer.      * No surgery found *      Hospital Course:   Patient admitted for acute on chronic hypoxic respiratory failure 2/2 COPD exacerbation from likely viral infection. CT PE obtained at admit given patients cancer history but this was negative. Patient improved with steroids and breathing treatments. Patient continues to require more o2 than at home previously. At home patient is primary care giver for wife. Patient continued to improve throughout hospital stay. Patient went into new onset afib during this admission but converted back to sinus with amio. Cardiology was consulted. Patient started on diltiazem and amiodarone. Initially on lovenox weight based but transitioned to eliquis at discharge. Patient was  counseled on tobacco cessation. He was prescribed nicotine patches. Given prescription for nebulizer and breathing treatments. He will be discharged with home health services. Patient was provided discharge instructions and return precautions.       Goals of Care Treatment Preferences:  Code Status: Full Code    Living Will: Yes              Consults:   Consults (From admission, onward)        Status Ordering Provider     Inpatient consult to Cardiology  Once        Provider:  Vince Combs MD    Completed , JS     IP consult to case management  Once        Provider:  (Not yet assigned)    Acknowledged TAMEKA SALTER     Inpatient consult to Respiratory Care  Once        Provider:  (Not yet assigned)    Acknowledged TAMEKA SALTER          No new Assessment & Plan notes have been filed under this hospital service since the last note was generated.  Service: Hospital Medicine    Final Active Diagnoses:    Diagnosis Date Noted POA    PRINCIPAL PROBLEM:  COPD exacerbation [J44.1] 03/21/2023 Yes    Paroxysmal atrial fibrillation with RVR [I48.0] 03/26/2023 No    Prostate cancer [C61] 05/27/2020 Yes    Tobacco abuse [Z72.0] 06/11/2014 Yes    HTN (hypertension) [I10]  Yes    Depression [F32.A]  Yes    High cholesterol [E78.00]  Yes      Problems Resolved During this Admission:       Discharged Condition: good    Disposition: Home or Self Care    Follow Up:   Follow-up Information     Antonio Boykin MD. Go on 4/4/2023.    Specialty: Urology  Why: appointment Tuesday April 4th at 1:45pm for urology follow up regarding prostate cancer options  Contact information:  [address]  [address] [facility] [address]  [phone]             Tiffany Luis, NP. Go on 3/30/2023.    Specialty: Family Medicine  Why: appointment Thursday March 30th at 11:00am for hospital follow up  Contact information:  [address] [place] [address]  [phone]             Mitchell BOLDEN  "MD Latasha Follow up on 4/3/2023.    Specialty: Pulmonary Disease  Why: Appointment for Monday, 4/3 @ 9:30am--address is [id number]  Contact information:  [address]  MO[address]  [address]  [place] [address]  [phone]             Vince Combs MD Follow up in 2 week(s).    Specialties: Cardiology, Cardiovascular Disease  Why: Office will call patient with foll-up appointment  Contact information:  [address] [facility]  [facility] [place] [address]  [phone]                       Patient Instructions:      NEBULIZER FOR HOME USE     Order Specific Question Answer Comments   Height: 6' 5" (1.956 m)    Weight: 86.7 kg (191 lb 2.2 oz)    Does patient have medical equipment at home? nebulizer    Does patient have medical equipment at home? cane, straight    Does patient have medical equipment at home? oxygen    Does patient have medical equipment at home? shower chair    Does patient have medical equipment at home? rollator    Length of need (1-99 months): 99      Ambulatory referral/consult to Smoking Cessation Program   Standing Status: Future   Referral Priority: Routine Referral Type: Consultation   Referral Reason: Specialty Services Required   Requested Specialty: CTTS   Number of Visits Requested: 1     Ambulatory referral/consult to Home Health   Standing Status: Future   Referral Priority: Routine Referral Type: Home Health   Referral Reason: Specialty Services Required   Requested Specialty: Home Health Services   Number of Visits Requested: 1     Ambulatory referral/consult to ENT   Standing Status: Future   Referral Priority: Routine Referral Type: Consultation   Referral Reason: Specialty Services Required   Requested Specialty: Otolaryngology   Number of Visits Requested: 1     Diet Cardiac     Activity as tolerated       Significant Diagnostic Studies:   Recent Results (from the past 168 hour(s))   CBC auto differential    Collection Time: 03/21/23  2:07 PM   Result Value Ref Range    " WBC 6.55 3.90 - 12.70 K/uL    RBC 4.05 (L) 4.60 - 6.20 M/uL    Hemoglobin 12.7 (L) 14.0 - 18.0 g/dL    Hematocrit 38.3 (L) 40.0 - 54.0 %    MCV 95 82 - 98 fL    MCH 31.4 (H) 27.0 - 31.0 pg    MCHC 33.2 32.0 - 36.0 g/dL    RDW 12.8 11.5 - 14.5 %    Platelets 245 150 - 450 K/uL    MPV 10.2 9.2 - 12.9 fL    Immature Granulocytes 0.3 0.0 - 0.5 %    Gran # (ANC) 5.1 1.8 - 7.7 K/uL    Immature Grans (Abs) 0.02 0.00 - 0.04 K/uL    Lymph # 0.8 (L) 1.0 - 4.8 K/uL    Mono # 0.5 0.3 - 1.0 K/uL    Eos # 0.2 0.0 - 0.5 K/uL    Baso # 0.05 0.00 - 0.20 K/uL    nRBC 0 0 /100 WBC    Gran % 77.6 (H) 38.0 - 73.0 %    Lymph % 11.6 (L) 18.0 - 48.0 %    Mono % 7.3 4.0 - 15.0 %    Eosinophil % 2.4 0.0 - 8.0 %    Basophil % 0.8 0.0 - 1.9 %    Differential Method Automated    Comprehensive metabolic panel    Collection Time: 03/21/23  2:07 PM   Result Value Ref Range    Sodium 145 136 - 145 mmol/L    Potassium 4.2 3.5 - 5.1 mmol/L    Chloride 110 95 - 110 mmol/L    CO2 24 23 - 29 mmol/L    Glucose 101 70 - 110 mg/dL    BUN 7 (L) 8 - 23 mg/dL    Creatinine 0.7 0.5 - 1.4 mg/dL    Calcium 9.3 8.7 - 10.5 mg/dL    Total Protein 6.6 6.0 - 8.4 g/dL    Albumin 3.4 (L) 3.5 - 5.2 g/dL    Total Bilirubin 0.8 0.1 - 1.0 mg/dL    Alkaline Phosphatase 102 55 - 135 U/L    AST 20 10 - 40 U/L    ALT 14 10 - 44 U/L    Anion Gap 11 8 - 16 mmol/L    eGFR >60.0 >60 mL/min/1.73 m^2   Troponin I    Collection Time: 03/21/23  2:07 PM   Result Value Ref Range    Troponin I 0.010 0.000 - 0.026 ng/mL   Brain natriuretic peptide    Collection Time: 03/21/23  2:07 PM   Result Value Ref Range     (H) 0 - 99 pg/mL   Protime-INR    Collection Time: 03/21/23  2:07 PM   Result Value Ref Range    Prothrombin Time 10.3 9.0 - 12.5 sec    INR 1.0 0.8 - 1.2   COVID-19 Rapid Screening    Collection Time: 03/21/23  2:44 PM   Result Value Ref Range    SARS-CoV-2 RNA, Amplification, Qual Negative Negative   CBC Auto Differential    Collection Time: 03/22/23  3:55 AM   Result  Value Ref Range    WBC 4.25 3.90 - 12.70 K/uL    RBC 3.66 (L) 4.60 - 6.20 M/uL    Hemoglobin 11.6 (L) 14.0 - 18.0 g/dL    Hematocrit 35.0 (L) 40.0 - 54.0 %    MCV 96 82 - 98 fL    MCH 31.7 (H) 27.0 - 31.0 pg    MCHC 33.1 32.0 - 36.0 g/dL    RDW 12.8 11.5 - 14.5 %    Platelets 233 150 - 450 K/uL    MPV 10.8 9.2 - 12.9 fL    Immature Granulocytes 0.2 0.0 - 0.5 %    Gran # (ANC) 3.7 1.8 - 7.7 K/uL    Immature Grans (Abs) 0.01 0.00 - 0.04 K/uL    Lymph # 0.3 (L) 1.0 - 4.8 K/uL    Mono # 0.2 (L) 0.3 - 1.0 K/uL    Eos # 0.0 0.0 - 0.5 K/uL    Baso # 0.02 0.00 - 0.20 K/uL    nRBC 0 0 /100 WBC    Gran % 88.0 (H) 38.0 - 73.0 %    Lymph % 7.1 (L) 18.0 - 48.0 %    Mono % 4.2 4.0 - 15.0 %    Eosinophil % 0.0 0.0 - 8.0 %    Basophil % 0.5 0.0 - 1.9 %    Differential Method Automated    Comprehensive Metabolic Panel    Collection Time: 03/22/23  3:55 AM   Result Value Ref Range    Sodium 143 136 - 145 mmol/L    Potassium 3.7 3.5 - 5.1 mmol/L    Chloride 107 95 - 110 mmol/L    CO2 26 23 - 29 mmol/L    Glucose 252 (H) 70 - 110 mg/dL    BUN 11 8 - 23 mg/dL    Creatinine 0.7 0.5 - 1.4 mg/dL    Calcium 8.9 8.7 - 10.5 mg/dL    Total Protein 6.0 6.0 - 8.4 g/dL    Albumin 2.9 (L) 3.5 - 5.2 g/dL    Total Bilirubin 0.6 0.1 - 1.0 mg/dL    Alkaline Phosphatase 107 55 - 135 U/L    AST 12 10 - 40 U/L    ALT 12 10 - 44 U/L    Anion Gap 10 8 - 16 mmol/L    eGFR >60.0 >60 mL/min/1.73 m^2   Magnesium    Collection Time: 03/22/23  3:55 AM   Result Value Ref Range    Magnesium 1.9 1.6 - 2.6 mg/dL   Influenza A & B by Molecular    Collection Time: 03/22/23  8:10 AM    Specimen: Nasopharyngeal Swab   Result Value Ref Range    Influenza A, Molecular Negative Negative    Influenza B, Molecular Negative Negative    Flu A & B Source Nasal Swab    Echo    Collection Time: 03/22/23 12:22 PM   Result Value Ref Range    BSA 2.16 m2    TDI SEPTAL 0.08 m/s    LV LATERAL E/E' RATIO 8.83 m/s    LV SEPTAL E/E' RATIO 13.25 m/s    Right Atrial Pressure (from IVC) 8  mmHg    IVC diameter 1.86 cm    RV mid diameter 15.80 cm    EF 69 %    Left Ventricular Outflow Tract Mean Velocity 0.70 cm/s    Left Ventricular Outflow Tract Mean Gradient 2.25 mmHg    Pulmonary Valve Mean Velocity 0.74 m/s    AORTIC VALVE CUSP SEPERATION 1.64 cm    TDI LATERAL 0.12 m/s    PV PEAK VELOCITY 0.96 cm/s    LVIDd 4.99 3.5 - 6.0 cm    IVS 1.22 (A) 0.6 - 1.1 cm    Posterior Wall 1.16 (A) 0.6 - 1.1 cm    Ao root annulus 2.57 cm    LVIDs 3.06 2.1 - 4.0 cm    FS 39 28 - 44 %    STJ 2.50 cm    Ascending aorta 2.31 cm    LV mass 230.29 g    LA size 3.87 cm    RVDD 3.21 cm    Right ventricular length in diastole (apical 4-chamber view) 62.3 cm    Tricuspid valve peak A wave velocity [id number] m/s    TV peak E emmett 0.6 m/s    Left Ventricle Relative Wall Thickness 0.46 cm    AV mean gradient 3 mmHg    AV valve area 2.51 cm2    AV Velocity Ratio 0.91     AV index (prosthetic) 0.91     MV mean gradient 2 mmHg    MV valve area p 1/2 method 4.03 cm2    MV valve area by continuity eq 2.87 cm2    E/A ratio 1.18     Mean e' 0.10 m/s    E wave deceleration time 169.94 msec    IVRT 83.73 msec    LVOT diameter 1.88 cm    LVOT area 2.8 cm2    LVOT peak emmett 1.04 m/s    LVOT peak VTI 24.10 cm    Ao peak emmett 1.14 m/s    Ao VTI 26.6 cm    Mr max emmett 4.37 m/s    LVOT stroke volume 66.87 cm3    AV peak gradient 5 mmHg    MV peak gradient 4 mmHg    TV rest pulmonary artery pressure 24 mmHg    PV mean gradient 2.00 mmHg    E/E' ratio 10.60 m/s    MV Peak E Emmett 1.06 m/s    TR Max Emmett 2.00 m/s    MV VTI 23.3 cm    MV stenosis pressure 1/2 time 54.65 ms    MV Peak A Emmett 0.90 m/s    LV Systolic Volume 36.83 mL    LV Systolic Volume Index 16.8 mL/m2    LV Diastolic Volume 117.70 mL    LV Diastolic Volume Index 53.74 mL/m2    LV Mass Index 105 g/m2    RA Major Axis 4.18 cm    Left Atrium Minor Axis 3.38 cm    Left Atrium Major Axis 3.84 cm    Triscuspid Valve Regurgitation Peak Gradient 16 mmHg    RA Width 3.19 cm    LA Volume  Index (Mod) 15.5 mL/m2    LA volume (mod) 33.87 cm3   CBC Auto Differential    Collection Time: 03/23/23  3:51 AM   Result Value Ref Range    WBC 6.80 3.90 - 12.70 K/uL    RBC 3.81 (L) 4.60 - 6.20 M/uL    Hemoglobin 11.7 (L) 14.0 - 18.0 g/dL    Hematocrit 36.7 (L) 40.0 - 54.0 %    MCV 96 82 - 98 fL    MCH 30.7 27.0 - 31.0 pg    MCHC 31.9 (L) 32.0 - 36.0 g/dL    RDW 12.7 11.5 - 14.5 %    Platelets 234 150 - 450 K/uL    MPV 11.5 9.2 - 12.9 fL    Immature Granulocytes 0.4 0.0 - 0.5 %    Gran # (ANC) 4.9 1.8 - 7.7 K/uL    Immature Grans (Abs) 0.03 0.00 - 0.04 K/uL    Lymph # 1.1 1.0 - 4.8 K/uL    Mono # 0.6 0.3 - 1.0 K/uL    Eos # 0.0 0.0 - 0.5 K/uL    Baso # 0.04 0.00 - 0.20 K/uL    nRBC 0 0 /100 WBC    Gran % 72.6 38.0 - 73.0 %    Lymph % 16.5 (L) 18.0 - 48.0 %    Mono % 9.3 4.0 - 15.0 %    Eosinophil % 0.6 0.0 - 8.0 %    Basophil % 0.6 0.0 - 1.9 %    Differential Method Automated    Comprehensive Metabolic Panel    Collection Time: 03/23/23  3:51 AM   Result Value Ref Range    Sodium 142 136 - 145 mmol/L    Potassium 4.0 3.5 - 5.1 mmol/L    Chloride 108 95 - 110 mmol/L    CO2 26 23 - 29 mmol/L    Glucose 122 (H) 70 - 110 mg/dL    BUN 9 8 - 23 mg/dL    Creatinine 0.7 0.5 - 1.4 mg/dL    Calcium 9.6 8.7 - 10.5 mg/dL    Total Protein 6.0 6.0 - 8.4 g/dL    Albumin 3.0 (L) 3.5 - 5.2 g/dL    Total Bilirubin 0.2 0.1 - 1.0 mg/dL    Alkaline Phosphatase 101 55 - 135 U/L    AST 16 10 - 40 U/L    ALT 12 10 - 44 U/L    Anion Gap 8 8 - 16 mmol/L    eGFR >60.0 >60 mL/min/1.73 m^2   CBC Auto Differential    Collection Time: 03/24/23  3:31 AM   Result Value Ref Range    WBC 7.00 3.90 - 12.70 K/uL    RBC 3.65 (L) 4.60 - 6.20 M/uL    Hemoglobin 11.4 (L) 14.0 - 18.0 g/dL    Hematocrit 35.1 (L) 40.0 - 54.0 %    MCV 96 82 - 98 fL    MCH 31.2 (H) 27.0 - 31.0 pg    MCHC 32.5 32.0 - 36.0 g/dL    RDW 12.6 11.5 - 14.5 %    Platelets 259 150 - 450 K/uL    MPV 10.7 9.2 - 12.9 fL    Immature Granulocytes 0.4 0.0 - 0.5 %    Gran # (ANC) 5.1 1.8 -  7.7 K/uL    Immature Grans (Abs) 0.03 0.00 - 0.04 K/uL    Lymph # 1.2 1.0 - 4.8 K/uL    Mono # 0.6 0.3 - 1.0 K/uL    Eos # 0.0 0.0 - 0.5 K/uL    Baso # 0.04 0.00 - 0.20 K/uL    nRBC 0 0 /100 WBC    Gran % 72.5 38.0 - 73.0 %    Lymph % 17.0 (L) 18.0 - 48.0 %    Mono % 8.9 4.0 - 15.0 %    Eosinophil % 0.6 0.0 - 8.0 %    Basophil % 0.6 0.0 - 1.9 %    Differential Method Automated    Comprehensive Metabolic Panel    Collection Time: 03/24/23  3:31 AM   Result Value Ref Range    Sodium 144 136 - 145 mmol/L    Potassium 3.7 3.5 - 5.1 mmol/L    Chloride 108 95 - 110 mmol/L    CO2 26 23 - 29 mmol/L    Glucose 142 (H) 70 - 110 mg/dL    BUN 14 8 - 23 mg/dL    Creatinine 0.8 0.5 - 1.4 mg/dL    Calcium 8.8 8.7 - 10.5 mg/dL    Total Protein 5.8 (L) 6.0 - 8.4 g/dL    Albumin 2.8 (L) 3.5 - 5.2 g/dL    Total Bilirubin 0.2 0.1 - 1.0 mg/dL    Alkaline Phosphatase 98 55 - 135 U/L    AST 12 10 - 40 U/L    ALT 14 10 - 44 U/L    Anion Gap 10 8 - 16 mmol/L    eGFR >60.0 >60 mL/min/1.73 m^2   CBC auto differential    Collection Time: 03/25/23 11:09 AM   Result Value Ref Range    WBC 8.72 3.90 - 12.70 K/uL    RBC 4.15 (L) 4.60 - 6.20 M/uL    Hemoglobin 13.1 (L) 14.0 - 18.0 g/dL    Hematocrit 39.5 (L) 40.0 - 54.0 %    MCV 95 82 - 98 fL    MCH 31.6 (H) 27.0 - 31.0 pg    MCHC 33.2 32.0 - 36.0 g/dL    RDW 12.7 11.5 - 14.5 %    Platelets 289 150 - 450 K/uL    MPV 9.9 9.2 - 12.9 fL    Immature Granulocytes 0.2 0.0 - 0.5 %    Gran # (ANC) 6.8 1.8 - 7.7 K/uL    Immature Grans (Abs) 0.02 0.00 - 0.04 K/uL    Lymph # 1.1 1.0 - 4.8 K/uL    Mono # 0.7 0.3 - 1.0 K/uL    Eos # 0.1 0.0 - 0.5 K/uL    Baso # 0.04 0.00 - 0.20 K/uL    nRBC 0 0 /100 WBC    Gran % 77.5 (H) 38.0 - 73.0 %    Lymph % 12.0 (L) 18.0 - 48.0 %    Mono % 8.3 4.0 - 15.0 %    Eosinophil % 1.5 0.0 - 8.0 %    Basophil % 0.5 0.0 - 1.9 %    Differential Method Automated    Comprehensive metabolic panel    Collection Time: 03/25/23 11:09 AM   Result Value Ref Range    Sodium 141 136 - 145  mmol/L    Potassium 3.7 3.5 - 5.1 mmol/L    Chloride 104 95 - 110 mmol/L    CO2 27 23 - 29 mmol/L    Glucose 114 (H) 70 - 110 mg/dL    BUN 13 8 - 23 mg/dL    Creatinine 0.7 0.5 - 1.4 mg/dL    Calcium 9.4 8.7 - 10.5 mg/dL    Total Protein 6.4 6.0 - 8.4 g/dL    Albumin 3.2 (L) 3.5 - 5.2 g/dL    Total Bilirubin 0.3 0.1 - 1.0 mg/dL    Alkaline Phosphatase 98 55 - 135 U/L    AST 17 10 - 40 U/L    ALT 17 10 - 44 U/L    Anion Gap 10 8 - 16 mmol/L    eGFR >60.0 >60 mL/min/1.73 m^2   CK-MB    Collection Time: 03/25/23 11:09 AM   Result Value Ref Range    CPK 19 (L) 20 - 200 U/L    CPK MB 1.6 0.1 - 6.5 ng/mL    MB % 8.4 (H) 0.0 - 5.0 %   Troponin I    Collection Time: 03/25/23 11:09 AM   Result Value Ref Range    Troponin I 0.006 0.000 - 0.026 ng/mL   TSH    Collection Time: 03/25/23 11:15 AM   Result Value Ref Range    TSH 1.756 0.400 - 4.000 uIU/mL   CBC Auto Differential    Collection Time: 03/26/23  4:27 AM   Result Value Ref Range    WBC 6.76 3.90 - 12.70 K/uL    RBC 4.08 (L) 4.60 - 6.20 M/uL    Hemoglobin 12.8 (L) 14.0 - 18.0 g/dL    Hematocrit 38.9 (L) 40.0 - 54.0 %    MCV 95 82 - 98 fL    MCH 31.4 (H) 27.0 - 31.0 pg    MCHC 32.9 32.0 - 36.0 g/dL    RDW 12.4 11.5 - 14.5 %    Platelets 294 150 - 450 K/uL    MPV 10.2 9.2 - 12.9 fL    Immature Granulocytes 0.3 0.0 - 0.5 %    Gran # (ANC) 4.6 1.8 - 7.7 K/uL    Immature Grans (Abs) 0.02 0.00 - 0.04 K/uL    Lymph # 1.4 1.0 - 4.8 K/uL    Mono # 0.7 0.3 - 1.0 K/uL    Eos # 0.1 0.0 - 0.5 K/uL    Baso # 0.03 0.00 - 0.20 K/uL    nRBC 0 0 /100 WBC    Gran % 67.8 38.0 - 73.0 %    Lymph % 20.3 18.0 - 48.0 %    Mono % 9.9 4.0 - 15.0 %    Eosinophil % 1.3 0.0 - 8.0 %    Basophil % 0.4 0.0 - 1.9 %    Differential Method Automated    Comprehensive Metabolic Panel    Collection Time: 03/26/23  4:27 AM   Result Value Ref Range    Sodium 142 136 - 145 mmol/L    Potassium 3.5 3.5 - 5.1 mmol/L    Chloride 107 95 - 110 mmol/L    CO2 28 23 - 29 mmol/L    Glucose 105 70 - 110 mg/dL    BUN 12 8  - 23 mg/dL    Creatinine 0.6 0.5 - 1.4 mg/dL    Calcium 9.1 8.7 - 10.5 mg/dL    Total Protein 6.0 6.0 - 8.4 g/dL    Albumin 3.0 (L) 3.5 - 5.2 g/dL    Total Bilirubin 0.3 0.1 - 1.0 mg/dL    Alkaline Phosphatase 89 55 - 135 U/L    AST 19 10 - 40 U/L    ALT 22 10 - 44 U/L    Anion Gap 7 (L) 8 - 16 mmol/L    eGFR >60.0 >60 mL/min/1.73 m^2   Magnesium    Collection Time: 03/26/23  4:27 AM   Result Value Ref Range    Magnesium 1.9 1.6 - 2.6 mg/dL   CBC Auto Differential    Collection Time: 03/27/23  5:00 AM   Result Value Ref Range    WBC 6.70 3.90 - 12.70 K/uL    RBC 3.91 (L) 4.60 - 6.20 M/uL    Hemoglobin 12.2 (L) 14.0 - 18.0 g/dL    Hematocrit 37.1 (L) 40.0 - 54.0 %    MCV 95 82 - 98 fL    MCH 31.2 (H) 27.0 - 31.0 pg    MCHC 32.9 32.0 - 36.0 g/dL    RDW 12.3 11.5 - 14.5 %    Platelets 294 150 - 450 K/uL    MPV 10.0 9.2 - 12.9 fL    Immature Granulocytes 0.3 0.0 - 0.5 %    Gran # (ANC) 4.4 1.8 - 7.7 K/uL    Immature Grans (Abs) 0.02 0.00 - 0.04 K/uL    Lymph # 1.5 1.0 - 4.8 K/uL    Mono # 0.6 0.3 - 1.0 K/uL    Eos # 0.1 0.0 - 0.5 K/uL    Baso # 0.04 0.00 - 0.20 K/uL    nRBC 0 0 /100 WBC    Gran % 65.5 38.0 - 73.0 %    Lymph % 22.1 18.0 - 48.0 %    Mono % 9.6 4.0 - 15.0 %    Eosinophil % 1.9 0.0 - 8.0 %    Basophil % 0.6 0.0 - 1.9 %    Differential Method Automated    Basic Metabolic Panel    Collection Time: 03/27/23  5:00 AM   Result Value Ref Range    Sodium 142 136 - 145 mmol/L    Potassium 3.4 (L) 3.5 - 5.1 mmol/L    Chloride 107 95 - 110 mmol/L    CO2 29 23 - 29 mmol/L    Glucose 102 70 - 110 mg/dL    BUN 14 8 - 23 mg/dL    Creatinine 0.6 0.5 - 1.4 mg/dL    Calcium 9.2 8.7 - 10.5 mg/dL    Anion Gap 6 (L) 8 - 16 mmol/L    eGFR >60.0 >60 mL/min/1.73 m^2   BNP    Collection Time: 03/27/23  5:00 AM   Result Value Ref Range    BNP 62 0 - 99 pg/mL   Magnesium    Collection Time: 03/27/23  5:00 AM   Result Value Ref Range    Magnesium 2.0 1.6 - 2.6 mg/dL         Pending Diagnostic Studies:     None          Medications:  Reconciled Home Medications:      Medication List      START taking these medications    amiodarone 200 MG Tab  Commonly known as: PACERONE  Take 1 tablet (200 mg total) by mouth once daily.     apixaban 5 mg Tab  Commonly known as: ELIQUIS  Take 1 tablet (5 mg total) by mouth 2 (two) times daily.     diltiaZEM 30 MG tablet  Commonly known as: CARDIZEM  Take 1 tablet (30 mg total) by mouth every 12 (twelve) hours.     ipratropium 0.02 % nebulizer solution  Commonly known as: ATROVENT  Take 2.5 mLs (500 mcg total) by nebulization every 6 (six) hours as needed for Wheezing. Rescue     nicotine 21 mg/24 hr  Commonly known as: NICODERM CQ  Place 1 patch onto the skin once daily.        CHANGE how you take these medications    * albuterol 90 mcg/actuation inhaler  Commonly known as: PROVENTIL/VENTOLIN HFA  INHALE ONE (1) OR TWO (2) SPRAYS BY MOUTH EVERY SIX (6) HOURS AS NEEDED FOR WHEEZING  What changed: Another medication with the same name was added. Make sure you understand how and when to take each.     * albuterol 2.5 mg /3 mL (0.083 %) nebulizer solution  Commonly known as: PROVENTIL  Take 3 mLs (2.5 mg total) by nebulization every 6 (six) hours as needed for Wheezing or Shortness of Breath. Rescue  What changed: You were already taking a medication with the same name, and this prescription was added. Make sure you understand how and when to take each.     * losartan 50 MG tablet  Commonly known as: COZAAR  Take 1 tablet (50 mg total) by mouth 2 (two) times a day.  What changed: You were already taking a medication with the same name, and this prescription was added. Make sure you understand how and when to take each.     * losartan 25 MG tablet  Commonly known as: COZAAR  Take 25 mg by mouth 2 (two) times a day.  What changed: Another medication with the same name was added. Make sure you understand how and when to take each.         * This list has 4 medication(s) that are the same as other  medications prescribed for you. Read the directions carefully, and ask your doctor or other care provider to review them with you.            CONTINUE taking these medications    aspirin 81 MG EC tablet  Commonly known as: ECOTRIN  Take 1 tablet (81 mg total) by mouth once daily.     atorvastatin 10 MG tablet  Commonly known as: LIPITOR  Take 1 tablet (10 mg total) by mouth every evening.     finasteride 5 mg tablet  Commonly known as: PROSCAR  TAKE 1 TABLET EVERY MORNING FOR PROSTATE     fluticasone-umeclidin-vilanter 200-62.5-25 mcg inhaler  Commonly known as: TRELEGY ELLIPTA  Inhale 1 puff into the lungs once daily.     pantoprazole 40 MG tablet  Commonly known as: PROTONIX  TAKE 1 TABLET EVERY MORNING (30 MINUTES BEFORE BREAKFAST) FOR STOMACH     paroxetine 40 MG tablet  Commonly known as: PAXIL  TAKE ONE (1) TABLET EVERY MORNING FOR MOOD AND NERVE PAIN     tamsulosin 0.4 mg Cap  Commonly known as: FLOMAX  Take 1 capsule (0.4 mg total) by mouth once daily.     traZODone 100 MG tablet  Commonly known as: DESYREL  Take 1 tablet (100 mg total) by mouth every evening.            Indwelling Lines/Drains at time of discharge:   Lines/Drains/Airways     None                 Time spent on the discharge of patient: 40 minutes         Edu Meier MD  Department of Hospital Medicine  [facility]

## 2023-03-27 NOTE — NURSING
D/C instructions provided and explained to pt, printed prescriptions provided, understanding of D/C instructions verbalized. IV removed, catheter intact. Tolerated well. Telemetry monitor removed and returned to monitor room. VSS. Pt denies complaints. Transported off unit via wheelchair with all belongings in hand.

## 2023-03-30 PROBLEM — R91.1 NODULE OF UPPER LOBE OF LEFT LUNG: Status: ACTIVE | Noted: 2023-03-30

## 2023-04-20 ENCOUNTER — TELEPHONE (OUTPATIENT)
Dept: SMOKING CESSATION | Facility: CLINIC | Age: 68
End: 2023-04-20
Payer: MEDICARE

## 2023-04-20 NOTE — TELEPHONE ENCOUNTER
Called patient with offer to reschedule his no show intake appointment with smoking cessation. I was able to leave a detailed message with my contact information. I requested a return call.

## 2023-05-16 RX ORDER — IPRATROPIUM BROMIDE AND ALBUTEROL SULFATE 2.5; .5 MG/3ML; MG/3ML
SOLUTION RESPIRATORY (INHALATION)
Qty: 180 ML | Refills: 2 | OUTPATIENT
Start: 2023-05-16

## 2023-05-17 PROBLEM — J44.9 COPD (CHRONIC OBSTRUCTIVE PULMONARY DISEASE): Status: ACTIVE | Noted: 2023-03-21

## 2023-05-22 ENCOUNTER — LAB VISIT (OUTPATIENT)
Dept: LAB | Facility: HOSPITAL | Age: 68
End: 2023-05-22
Attending: UROLOGY
Payer: MEDICARE

## 2023-05-22 ENCOUNTER — OFFICE VISIT (OUTPATIENT)
Dept: UROLOGY | Facility: CLINIC | Age: 68
End: 2023-05-22
Payer: MEDICARE

## 2023-05-22 VITALS — WEIGHT: 203 LBS | HEIGHT: 77 IN | BODY MASS INDEX: 23.97 KG/M2

## 2023-05-22 DIAGNOSIS — C61 PROSTATE CANCER: Primary | ICD-10-CM

## 2023-05-22 DIAGNOSIS — C61 PROSTATE CANCER: ICD-10-CM

## 2023-05-22 LAB — COMPLEXED PSA SERPL-MCNC: 43.8 NG/ML (ref 0–4)

## 2023-05-22 PROCEDURE — 1101F PR PT FALLS ASSESS DOC 0-1 FALLS W/OUT INJ PAST YR: ICD-10-PCS | Mod: CPTII,S$GLB,, | Performed by: UROLOGY

## 2023-05-22 PROCEDURE — 36415 COLL VENOUS BLD VENIPUNCTURE: CPT | Performed by: UROLOGY

## 2023-05-22 PROCEDURE — 3008F PR BODY MASS INDEX (BMI) DOCUMENTED: ICD-10-PCS | Mod: CPTII,S$GLB,, | Performed by: UROLOGY

## 2023-05-22 PROCEDURE — 3044F PR MOST RECENT HEMOGLOBIN A1C LEVEL <7.0%: ICD-10-PCS | Mod: CPTII,S$GLB,, | Performed by: UROLOGY

## 2023-05-22 PROCEDURE — 99215 PR OFFICE/OUTPT VISIT, EST, LEVL V, 40-54 MIN: ICD-10-PCS | Mod: S$GLB,,, | Performed by: UROLOGY

## 2023-05-22 PROCEDURE — 1157F ADVNC CARE PLAN IN RCRD: CPT | Mod: CPTII,S$GLB,, | Performed by: UROLOGY

## 2023-05-22 PROCEDURE — 99999 PR PBB SHADOW E&M-EST. PATIENT-LVL IV: ICD-10-PCS | Mod: PBBFAC,,, | Performed by: UROLOGY

## 2023-05-22 PROCEDURE — 1159F MED LIST DOCD IN RCRD: CPT | Mod: CPTII,S$GLB,, | Performed by: UROLOGY

## 2023-05-22 PROCEDURE — 3288F PR FALLS RISK ASSESSMENT DOCUMENTED: ICD-10-PCS | Mod: CPTII,S$GLB,, | Performed by: UROLOGY

## 2023-05-22 PROCEDURE — 1157F PR ADVANCE CARE PLAN OR EQUIV PRESENT IN MEDICAL RECORD: ICD-10-PCS | Mod: CPTII,S$GLB,, | Performed by: UROLOGY

## 2023-05-22 PROCEDURE — 4010F PR ACE/ARB THEARPY RXD/TAKEN: ICD-10-PCS | Mod: CPTII,S$GLB,, | Performed by: UROLOGY

## 2023-05-22 PROCEDURE — 99999 PR PBB SHADOW E&M-EST. PATIENT-LVL IV: CPT | Mod: PBBFAC,,, | Performed by: UROLOGY

## 2023-05-22 PROCEDURE — 3008F BODY MASS INDEX DOCD: CPT | Mod: CPTII,S$GLB,, | Performed by: UROLOGY

## 2023-05-22 PROCEDURE — 1159F PR MEDICATION LIST DOCUMENTED IN MEDICAL RECORD: ICD-10-PCS | Mod: CPTII,S$GLB,, | Performed by: UROLOGY

## 2023-05-22 PROCEDURE — 3044F HG A1C LEVEL LT 7.0%: CPT | Mod: CPTII,S$GLB,, | Performed by: UROLOGY

## 2023-05-22 PROCEDURE — 1101F PT FALLS ASSESS-DOCD LE1/YR: CPT | Mod: CPTII,S$GLB,, | Performed by: UROLOGY

## 2023-05-22 PROCEDURE — 1126F AMNT PAIN NOTED NONE PRSNT: CPT | Mod: CPTII,S$GLB,, | Performed by: UROLOGY

## 2023-05-22 PROCEDURE — 4010F ACE/ARB THERAPY RXD/TAKEN: CPT | Mod: CPTII,S$GLB,, | Performed by: UROLOGY

## 2023-05-22 PROCEDURE — 99215 OFFICE O/P EST HI 40 MIN: CPT | Mod: S$GLB,,, | Performed by: UROLOGY

## 2023-05-22 PROCEDURE — 99214 OFFICE O/P EST MOD 30 MIN: CPT | Performed by: UROLOGY

## 2023-05-22 PROCEDURE — 3288F FALL RISK ASSESSMENT DOCD: CPT | Mod: CPTII,S$GLB,, | Performed by: UROLOGY

## 2023-05-22 PROCEDURE — 1126F PR PAIN SEVERITY QUANTIFIED, NO PAIN PRESENT: ICD-10-PCS | Mod: CPTII,S$GLB,, | Performed by: UROLOGY

## 2023-05-22 PROCEDURE — 84153 ASSAY OF PSA TOTAL: CPT | Performed by: UROLOGY

## 2023-05-22 RX ORDER — AMLODIPINE AND BENAZEPRIL HYDROCHLORIDE 5; 20 MG/1; MG/1
CAPSULE ORAL
COMMUNITY
Start: 2023-05-15 | End: 2023-05-24

## 2023-05-22 RX ORDER — IPRATROPIUM BROMIDE AND ALBUTEROL SULFATE 2.5; .5 MG/3ML; MG/3ML
SOLUTION RESPIRATORY (INHALATION)
COMMUNITY
Start: 2023-05-15 | End: 2023-06-13

## 2023-05-22 NOTE — PROGRESS NOTES
[facility] New Patient/H&P:    Jama James is a 67 y.o. male who presents for high-risk prostate cancer.    Patient with a history of severe COPD, ulcerative colitis and HTN who was referred for a 4th opinion on management of his prostate cancer.     On review of records, he underwent prostate biopsy on 3/18/20 with Dr. Jeong. Pathology with [place] 4+3=7 and 3+4=7 prostate cancer. He was referred to radiation oncology in 9/2020 in [place] as he was not a good surgical candidate. He followed up and the plan was for ADT with XRT, but patient states he had no transportation.     Here today stating that he now has someone who can bring him for radiation therapy and he also has Medicare coverage. Wishes to proceed with XRT.     He has last been managed by Dr. Church at [facility]. On review of sparse records, he underwent PET CT in 1/2023 with no evidence of any metastasis present. CTA chest on 3/21/23 with multiple small soft tissue pulmonary nodules with interval development of a soft tissue pulmonary nodule. Diffuse emphysematous change and chronic bilateral adrenal adenomas.     His sister is present and states he did receive a dose of Lupron in the past. Unclear when he last received a dose.     He is on Flomax and Finasteride for lower urinary tract symptoms. No erectile function. Of note, he reports undergoing negative hematuria work up recently for gross hematuria.     Denies any fever, chills, gross hematuria, flank pain, bone pain, unintentional weight loss,or  trauma.         PSA  4.9  9/14/22  23  1/20/20      Past Medical History:   Diagnosis Date    Anxiety     COPD (chronic obstructive pulmonary disease)     Depression     High cholesterol     HTN (hypertension)     Malignant neoplasm of prostate     Ulcerative colitis        Past Surgical History:   Procedure Laterality Date    NECK SURGERY      right knee      TRANSRECTAL BIOPSY OF PROSTATE WITH ULTRASOUND GUIDANCE  "Bilateral 3/18/2020    Procedure: BIOPSY, PROSTATE, RECTAL APPROACH, WITH US GUIDANCE;  Surgeon: Bill Jeong MD;  Location: [facility] OR;  Service: Urology;  Laterality: Bilateral;       Family History   Problem Relation Age of Onset    Diabetes Mother     Diabetes Sister        Review of patient's allergies indicates:  No Known Allergies    Medications Reviewed: see MAR      FOCUSED PHYSICAL EXAM:    There were no vitals filed for this visit.  Body mass index is 24.07 kg/m². Weight: 92.1 kg (203 lb) Height: 6' 5" (195.6 cm)       General: Alert, cooperative, no distress, appears stated age  Abdomen: Soft, non-tender, no CVA tenderness, non-distended      LABS:    No results found for this or any previous visit (from the past 336 hour(s)).      Assessment/Diagnosis:    1. Prostate cancer  MRI Prostate W W/O Contrast    Creatinine, Serum    Prostate Specific Antigen, Diagnostic          Plans:    - I spent 40 minutes of the day of this encounter preparing for, treating and managing this patient. Today's visit was spent almost entirely on counseling. We reviewed his diagnosis, stage, grade, risk group, and prognosis. We discussed the concept of low risk, moderate risk, and high risk disease. We discussed the different treatment options including active surveillance (as well as the surveillance protocol), prostate brachytherapy, IMRT, IGRT, cryotherapy, and both open and robotic prostatectomy.We also discussed the advantages, disadvantages, risks and benefits of the different options. Regarding radiation therapy we discussed treatment planning, the different techniques, short and long term complications. These included radiation cystitis, radiation proctitis, and impotence. We discussed success, failure, and salvage therapeutic options. We discussed surgical therapy in depth including preoperative preparation, surgical technique(including bladder neck and nerve-sparing techniques), postoperative recuperation and " recovery, and short and long term complications including UTI, bleeding, blood clots,catheter dislodgement, etc. We discussed the risks of reoperation, incontinence, impotence, and recurrence. We discussed preop and postop Kegels, post op penile rehab, and treatment options for incontinence and impotence. We discussed rates of cancer free survival and recurrence, as well as salvage therapeutic options. We discussed the possible  indications for adjuvant radiation therapy. I answered questions and addressed concerns.   - PSA today.   - MRI prostate.   - Referral to radiation oncology in [place] to discuss XRT.

## 2023-05-24 PROBLEM — Z99.81 ON HOME OXYGEN THERAPY: Status: ACTIVE | Noted: 2023-05-24

## 2023-05-26 PROBLEM — R91.8 MULTIPLE LUNG NODULES: Status: ACTIVE | Noted: 2023-03-30

## 2023-05-31 ENCOUNTER — HOSPITAL ENCOUNTER (OUTPATIENT)
Dept: RADIOLOGY | Facility: HOSPITAL | Age: 68
Discharge: HOME | End: 2023-05-31
Attending: UROLOGY
Payer: MEDICARE

## 2023-05-31 DIAGNOSIS — C61 PROSTATE CANCER: ICD-10-CM

## 2023-05-31 PROCEDURE — 72197 MRI PELVIS W/O & W/DYE: CPT | Mod: 26,,, | Performed by: RADIOLOGY

## 2023-05-31 PROCEDURE — A9585 GADOBUTROL INJECTION: HCPCS | Performed by: UROLOGY

## 2023-05-31 PROCEDURE — 72197 MRI PROSTATE W W/O CONTRAST: ICD-10-PCS | Mod: 26,,, | Performed by: RADIOLOGY

## 2023-05-31 PROCEDURE — 25500020 PHARM REV CODE 255: Performed by: UROLOGY

## 2023-05-31 PROCEDURE — 72197 MRI PELVIS W/O & W/DYE: CPT | Mod: TC

## 2023-05-31 RX ORDER — GADOBUTROL 604.72 MG/ML
9 INJECTION INTRAVENOUS
Status: COMPLETED | OUTPATIENT
Start: 2023-05-31 | End: 2023-05-31

## 2023-05-31 RX ADMIN — GADOBUTROL 9 ML: 604.72 INJECTION INTRAVENOUS at 04:05

## 2023-06-01 ENCOUNTER — TELEPHONE (OUTPATIENT)
Dept: UROLOGY | Facility: CLINIC | Age: 68
End: 2023-06-01
Payer: MEDICARE

## 2023-06-01 DIAGNOSIS — C61 PROSTATE CANCER: Primary | ICD-10-CM

## 2023-06-01 NOTE — TELEPHONE ENCOUNTER
----- Message from Magdi Ramirez sent at 6/1/2023  3:08 PM CDT -----  Contact: self  Type:  Patient Returning Call    Who Called:  Renu (sister)  Who Left Message for Patient:  n/a  Does the patient know what this is regarding?:  n/a  Best Call Back Number:  [phone]  Additional Information:  thank you

## 2023-06-08 ENCOUNTER — TELEPHONE (OUTPATIENT)
Dept: HEMATOLOGY/ONCOLOGY | Facility: CLINIC | Age: 68
End: 2023-06-08

## 2023-06-08 NOTE — PROGRESS NOTES
Patient's sister, Beti, called regarding transportation assistance.  She was given the number to [facility] [phone]; she was instructed that she must call three business days prior to appointment.

## 2023-06-09 ENCOUNTER — HOSPITAL ENCOUNTER (OUTPATIENT)
Dept: RADIOLOGY | Facility: HOSPITAL | Age: 68
Discharge: HOME | End: 2023-06-09
Attending: UROLOGY
Payer: MEDICARE

## 2023-06-09 DIAGNOSIS — C61 PROSTATE CANCER: ICD-10-CM

## 2023-06-09 PROCEDURE — 78815 NM PET CT F 18 PYL PSMA, MIDTHIGH TO VERTEX: ICD-10-PCS | Mod: 26,PI,, | Performed by: RADIOLOGY

## 2023-06-09 PROCEDURE — 78815 PET IMAGE W/CT SKULL-THIGH: CPT | Mod: TC,PN

## 2023-06-09 PROCEDURE — 78815 PET IMAGE W/CT SKULL-THIGH: CPT | Mod: 26,PI,, | Performed by: RADIOLOGY

## 2023-06-09 NOTE — PROGRESS NOTES
PET Imaging Questionnaire    Are you a Diabetic? Recent Blood Sugar level? No    Are you anemic? Bone Marrow Stimulation Meds? No    Have you had a CT Scan, if so when & where was your last one? Yes -     Have you had a PET Scan, if so when & where was your last one? Yes -     Chemotherapy or currently on Chemotherapy? Yes    Radiation therapy? No    Surgical History:   Past Surgical History:   Procedure Laterality Date    NECK SURGERY      right knee      TRANSRECTAL BIOPSY OF PROSTATE WITH ULTRASOUND GUIDANCE Bilateral 3/18/2020    Procedure: BIOPSY, PROSTATE, RECTAL APPROACH, WITH US GUIDANCE;  Surgeon: Bill Jeong MD;  Location: [facility];  Service: Urology;  Laterality: Bilateral;        Have you been fasting for at least 6 hours? Yes    Is there any chance you may be pregnant or breastfeeding? No    Assay: 10.93 MCi@:11:46   Injection Site:RT AC    Residual: 1.241 mCi@: 11:48   Technologist: Jagdish English Injected:9.69 mCi

## 2023-06-14 RX ORDER — AMIODARONE HYDROCHLORIDE 200 MG/1
TABLET ORAL
Qty: 30 TABLET | Refills: 3 | OUTPATIENT
Start: 2023-06-14

## 2023-06-15 NOTE — PROGRESS NOTES
Jama James  [id number]  1955  6/15/2023  Ann Goodwin Jr., Md  [address] [place] [address] Dr  [address]  [place] [address]    Dx: Metastatic castrate-sensitive prostate adenoCA    HISTORY OF PRESENT ILLNESS:   Mr. James is a 67M who was dx'd with high risk PCa in 2020 w/ Grp3 on bx and PSA: 23, at that time.  Reportedly, he was also deemed medically inoperable and thus sent for definitive RT, however he and his sister state that transportation and insurance issues preventing execution of such.    He does report getting several Lupron injection circa 3251-9816, but is unaware when that last was.  States that is was at least over 1 year ago.  PSA in 2022 was down 4.9, but now is up to 43.8.    The patient was seen by Dr. Goodwin last month and sent for PSMA, as well as to [facility] here for eval.    Unfortunately, his PSMA on 6/9/23 showed PA/mediastinal/SCV LAD as well as numerous bony DM's.    The patient presents today in stable condition and denies any bony pain, however does report severe LUTS w/o noted retention, abd bloating, or hematuria, but with burning and frequent, intermittent weak stream.  He also reports chronic moderate left testicle soreness w/o swelling/erythema/TTP.    IPSS:  34/35   Severe dysuria  QoL:   6/6 Terrible  Sage:  1/25 Severe ED      PSMA PET/CT (6/9/23):  - Prostate carcinoma with metastatic disease to cervical, thoracic,   abdominal and pelvic lymph nodes as well as metastatic disease to   bone.  Interval progression compared to previous PET-CT.        REVIEW OF SYSTEMS:  A complete ROS was performed and the patient denies any acute changes/concerns other than per HPI.    Past Medical History:   Diagnosis Date    Anxiety     Asbestosis 08/04/2015    Atrial fibrillation     Bilateral adrenal adenomas     COPD (chronic obstructive pulmonary disease)     COVID-19 virus infection 07/28/2022    Depression     High cholesterol     HTN (hypertension)     Malignant neoplasm of prostate      Multiple lung nodules     Nodule of upper lobe of left lung 03/30/2023    6 mm spiculated on cta chest 3/21/23    On home oxygen therapy 05/24/2023    Pneumonia     Prostate cancer 5/27/2020    Ulcerative colitis      Past Surgical History:   Procedure Laterality Date    NECK SURGERY      right knee      TRANSRECTAL BIOPSY OF PROSTATE WITH ULTRASOUND GUIDANCE Bilateral 3/18/2020    Procedure: BIOPSY, PROSTATE, RECTAL APPROACH, WITH US GUIDANCE;  Surgeon: Bill Jeong MD;  Location: [facility];  Service: Urology;  Laterality: Bilateral;     Social History     Socioeconomic History    Marital status:    Tobacco Use    Smoking status: Every Day     Packs/day: 0.50     Years: 50.00     Pack years: 25.00     Types: Cigarettes     Passive exposure: Past    Smokeless tobacco: Never   Substance and Sexual Activity    Alcohol use: Yes     Comment: 8 16oz beers per day    Drug use: No     Social Determinants of Health     Financial Resource Strain: Low Risk     Difficulty of Paying Living Expenses: Not very hard   Food Insecurity: No Food Insecurity    Worried About Running Out of Food in the Last Year: Never true    Ran Out of Food in the Last Year: Never true   Transportation Needs: No Transportation Needs    Lack of Transportation (Medical): No    Lack of Transportation (Non-Medical): No   Physical Activity: Insufficiently Active    Days of Exercise per Week: 1 day    Minutes of Exercise per Session: 30 min   Stress: Stress Concern Present    Feeling of Stress : Rather much   Social Connections: Moderately Integrated    Frequency of Communication with Friends and Family: More than three times a week    Frequency of Social Gatherings with Friends and Family: Once a week    Attends Baptism Services: Never    Active Member of Clubs or Organizations: Yes    Attends Club or Organization Meetings: Never    Marital Status:    Housing Stability: Unknown    Unable to Pay for Housing in the Last Year: No     Unstable Housing in the Last Year: No     Family History   Problem Relation Age of Onset    Cancer Mother         Uterine and Ovarian cancer    Diabetes Mother     Diabetes Sister        PRIOR HISTORY OF CHEMOTHERAPY OR RADIOTHERAPY: Please see HPI for patients prior oncologic history.    Medication List with Changes/Refills   Current Medications    ALBUTEROL (PROVENTIL/VENTOLIN HFA) 90 MCG/ACTUATION INHALER    INHALE ONE (1) OR TWO (2) SPRAYS BY MOUTH EVERY SIX (6) HOURS AS NEEDED FOR WHEEZING    ALBUTEROL-IPRATROPIUM (DUO-NEB) 2.5 MG-0.5 MG/3 ML NEBULIZER SOLUTION    PLACE ONE (1) VIAL IN NEBULIZER AND INHALE EVERY 6 HOURS AS DIRECTED AS NEEDED FOR WHEEZING    AMIODARONE (PACERONE) 200 MG TAB    Take 1 tablet (200 mg total) by mouth once daily.    ASPIRIN (ECOTRIN) 81 MG EC TABLET    Take 1 tablet (81 mg total) by mouth once daily.    ATORVASTATIN (LIPITOR) 10 MG TABLET    TAKE 1 TABLET AT BEDTIME FOR CHOLESTEROL (TAKE WITH CO-ENZYME Q-10)    DILTIAZEM (CARDIZEM) 30 MG TABLET    Take 1 tablet (30 mg total) by mouth every 12 (twelve) hours.    FERROUS SULFATE (FEOSOL) 325 MG (65 MG IRON) TAB TABLET    Take 1 tablet (325 mg total) by mouth every Mon, Wed, Fri.    FINASTERIDE (PROSCAR) 5 MG TABLET    TAKE 1 TABLET EVERY MORNING FOR PROSTATE    FLUTICASONE-UMECLIDIN-VILANTER (TRELEGY ELLIPTA) 200-62.5-25 MCG INHALER    Inhale 1 puff into the lungs once daily.    IPRATROPIUM (ATROVENT) 0.02 % NEBULIZER SOLUTION    Take 2.5 mLs (500 mcg total) by nebulization every 6 (six) hours as needed for Wheezing. Rescue    LOSARTAN (COZAAR) 25 MG TABLET    Take 25 mg by mouth 2 (two) times a day.    LOSARTAN (COZAAR) 50 MG TABLET    Take 1 tablet (50 mg total) by mouth 2 (two) times a day.    OXYGEN-AIR DELIVERY SYSTEMS MISC    by Misc.(Non-Drug; Combo Route) route.    PANTOPRAZOLE (PROTONIX) 40 MG TABLET    TAKE 1 TABLET EVERY MORNING (30 MINUTES BEFORE BREAKFAST) FOR STOMACH    PAROXETINE (PAXIL) 40 MG TABLET    TAKE ONE (1)  TABLET EVERY MORNING FOR MOOD AND NERVE PAIN    TAMSULOSIN (FLOMAX) 0.4 MG CAP    TAKE 1 CAPSULE AT BEDTIME FOR PROSTATE    TRAZODONE (DESYREL) 100 MG TABLET    Take 1 tablet (100 mg total) by mouth every evening.    TRIAMCINOLONE ACETONIDE 0.1% (KENALOG) 0.1 % CREAM    Apply topically 2 (two) times daily.     Review of patient's allergies indicates:  No Known Allergies    QUALITY OF LIFE: 80%- Normal Activity with Effort: Some Symptoms of Disease    There were no vitals filed for this visit.  There is no height or weight on file to calculate BMI.    PHYSICAL EXAM:  GENERAL: alert; in no apparent distress.   HEAD: normocephalic, atraumatic.  EYES: pupils are equal, round, reactive to light and accommodation. Sclera anicteric. Conjunctiva not injected.   NOSE/THROAT: no nasal erythema or rhinorrhea. Oropharynx pink, without erythema, ulcerations or thrush.   NECK: no cervical motion rigidity; supple with no masses.  CHEST: clear to auscultation bilaterally; no wheezes, crackles or rubs. Patient is speaking comfortably on room air with normal work of breathing without using accessory muscles of respiration.  CARDIOVASCULAR: regular rate and rhythm; no murmurs, rubs or gallops.  ABDOMEN: soft, nontender, nondistended. Bowel sounds present.   MUSCULOSKELETAL: no tenderness to palpation along the spine or scapulae. Normal range of motion.  NEUROLOGIC: cranial nerves II-XII intact bilaterally. Strength 5/5 in bilateral upper and lower extremities. No sensory deficits appreciated. Reflexes globally intact. No cerebellar signs. Normal gait.  LYMPHATIC: no cervical, supraclavicular or axillary adenopathy appreciated bilaterally.   EXTREMITIES: no clubbing, cyanosis, edema.  SKIN: no erythema, rashes, ulcerations noted.     REVIEW OF IMAGING/PATHOLOGY/LABS: Please see HPI. All images reviewed personally by dictating physician.       ASSESSMENT: Jama James is a 67 y.o. male with metastatic castrate-sensitive prostate  adenoCA    PLAN:  After complete review of the patient's chart/hx and eval/discussion w/ the patient today, I explained that his metastatic disease burden is to great to benefit from STAMPEDE prostate gland RT or directed bony met SBRT, and I would be concerned radiating his prostate with such severe LUTS.    I did, however, recommend that he restart ADT, probably best via 2wks casodex followed by Lupron due to known new bony lesions.  And I will also refer to [facility] for eval regarding addition of 2nd gen ADT agent(s) and/or docetaxel.    I described the potential future indications for palliative RT that he may face, and he verbalized understanding of this and of all above.    Contact info was exchanged, and the patient was encouraged to contact our clinic with any questions, needs, or concerns.    DISPOSITION: RTC PRN    I have personally seen and evaluated this patient. Greater than 50% of this time was spent discussing coordination of care and/or counseling.    PHYSICIAN: Myles Hutchison III, MD    Thank you for the opportunity to meet and consult with Jama James.   Please feel free to contact me to discuss the above recommendation further.

## 2023-06-16 ENCOUNTER — OFFICE VISIT (OUTPATIENT)
Dept: RADIATION ONCOLOGY | Facility: CLINIC | Age: 68
End: 2023-06-16
Payer: MEDICARE

## 2023-06-16 ENCOUNTER — DOCUMENTATION ONLY (OUTPATIENT)
Dept: HEMATOLOGY/ONCOLOGY | Facility: CLINIC | Age: 68
End: 2023-06-16

## 2023-06-16 VITALS
TEMPERATURE: 98 F | RESPIRATION RATE: 18 BRPM | DIASTOLIC BLOOD PRESSURE: 73 MMHG | OXYGEN SATURATION: 93 % | SYSTOLIC BLOOD PRESSURE: 162 MMHG | BODY MASS INDEX: 22.79 KG/M2 | HEIGHT: 77 IN | HEART RATE: 78 BPM | WEIGHT: 193 LBS

## 2023-06-16 DIAGNOSIS — C61 PROSTATE CANCER: ICD-10-CM

## 2023-06-16 DIAGNOSIS — C61 PROSTATE CANCER: Primary | ICD-10-CM

## 2023-06-16 PROCEDURE — 3077F SYST BP >= 140 MM HG: CPT | Mod: CPTII,S$GLB,, | Performed by: RADIOLOGY

## 2023-06-16 PROCEDURE — 3008F BODY MASS INDEX DOCD: CPT | Mod: CPTII,S$GLB,, | Performed by: RADIOLOGY

## 2023-06-16 PROCEDURE — 3044F PR MOST RECENT HEMOGLOBIN A1C LEVEL <7.0%: ICD-10-PCS | Mod: CPTII,S$GLB,, | Performed by: RADIOLOGY

## 2023-06-16 PROCEDURE — 99205 OFFICE O/P NEW HI 60 MIN: CPT | Mod: S$GLB,,, | Performed by: RADIOLOGY

## 2023-06-16 PROCEDURE — 4010F ACE/ARB THERAPY RXD/TAKEN: CPT | Mod: CPTII,S$GLB,, | Performed by: RADIOLOGY

## 2023-06-16 PROCEDURE — 1159F MED LIST DOCD IN RCRD: CPT | Mod: CPTII,S$GLB,, | Performed by: RADIOLOGY

## 2023-06-16 PROCEDURE — 4010F PR ACE/ARB THEARPY RXD/TAKEN: ICD-10-PCS | Mod: CPTII,S$GLB,, | Performed by: RADIOLOGY

## 2023-06-16 PROCEDURE — 1101F PR PT FALLS ASSESS DOC 0-1 FALLS W/OUT INJ PAST YR: ICD-10-PCS | Mod: CPTII,S$GLB,, | Performed by: RADIOLOGY

## 2023-06-16 PROCEDURE — 1125F PR PAIN SEVERITY QUANTIFIED, PAIN PRESENT: ICD-10-PCS | Mod: CPTII,S$GLB,, | Performed by: RADIOLOGY

## 2023-06-16 PROCEDURE — 1157F PR ADVANCE CARE PLAN OR EQUIV PRESENT IN MEDICAL RECORD: ICD-10-PCS | Mod: CPTII,S$GLB,, | Performed by: RADIOLOGY

## 2023-06-16 PROCEDURE — 3078F PR MOST RECENT DIASTOLIC BLOOD PRESSURE < 80 MM HG: ICD-10-PCS | Mod: CPTII,S$GLB,, | Performed by: RADIOLOGY

## 2023-06-16 PROCEDURE — 1157F ADVNC CARE PLAN IN RCRD: CPT | Mod: CPTII,S$GLB,, | Performed by: RADIOLOGY

## 2023-06-16 PROCEDURE — 3288F FALL RISK ASSESSMENT DOCD: CPT | Mod: CPTII,S$GLB,, | Performed by: RADIOLOGY

## 2023-06-16 PROCEDURE — 3008F PR BODY MASS INDEX (BMI) DOCUMENTED: ICD-10-PCS | Mod: CPTII,S$GLB,, | Performed by: RADIOLOGY

## 2023-06-16 PROCEDURE — 3078F DIAST BP <80 MM HG: CPT | Mod: CPTII,S$GLB,, | Performed by: RADIOLOGY

## 2023-06-16 PROCEDURE — 1125F AMNT PAIN NOTED PAIN PRSNT: CPT | Mod: CPTII,S$GLB,, | Performed by: RADIOLOGY

## 2023-06-16 PROCEDURE — 3044F HG A1C LEVEL LT 7.0%: CPT | Mod: CPTII,S$GLB,, | Performed by: RADIOLOGY

## 2023-06-16 PROCEDURE — 1101F PT FALLS ASSESS-DOCD LE1/YR: CPT | Mod: CPTII,S$GLB,, | Performed by: RADIOLOGY

## 2023-06-16 PROCEDURE — 1159F PR MEDICATION LIST DOCUMENTED IN MEDICAL RECORD: ICD-10-PCS | Mod: CPTII,S$GLB,, | Performed by: RADIOLOGY

## 2023-06-16 PROCEDURE — 3288F PR FALLS RISK ASSESSMENT DOCUMENTED: ICD-10-PCS | Mod: CPTII,S$GLB,, | Performed by: RADIOLOGY

## 2023-06-16 PROCEDURE — 99205 PR OFFICE/OUTPT VISIT, NEW, LEVL V, 60-74 MIN: ICD-10-PCS | Mod: S$GLB,,, | Performed by: RADIOLOGY

## 2023-06-16 PROCEDURE — 3077F PR MOST RECENT SYSTOLIC BLOOD PRESSURE >= 140 MM HG: ICD-10-PCS | Mod: CPTII,S$GLB,, | Performed by: RADIOLOGY

## 2023-06-19 DIAGNOSIS — C61 PROSTATE CANCER: Primary | ICD-10-CM

## 2023-06-19 RX ORDER — BICALUTAMIDE 50 MG/1
50 TABLET, FILM COATED ORAL DAILY
Qty: 14 TABLET | Refills: 0 | Status: SHIPPED | OUTPATIENT
Start: 2023-06-19 | End: 2023-10-18

## 2023-06-20 ENCOUNTER — TELEPHONE (OUTPATIENT)
Dept: UROLOGY | Facility: CLINIC | Age: 68
End: 2023-06-20
Payer: MEDICARE

## 2023-06-20 DIAGNOSIS — C61 PROSTATE CANCER: Primary | ICD-10-CM

## 2023-06-20 NOTE — TELEPHONE ENCOUNTER
----- Message from Dillon Willis MA sent at 6/19/2023  8:18 AM CDT -----    ----- Message -----  From: Ann Goodwin Jr., MD  Sent: 6/19/2023   7:04 AM CDT  To: Buddy FAUSTIN Staff    Please inform patient that Casodex was sent to his pharmacy to start ASAP. He will need an appointment in the next 2 weeks for Lupron injection in [facility]. Thanks.   ----- Message -----  From: Myles Hutchison III, MD  Sent: 6/16/2023  12:25 PM CDT  To: Maurilio Lawson MD, Marisel Farrell III, MD, #

## 2023-06-20 NOTE — TELEPHONE ENCOUNTER
Called and spoke to patient sister. Informed that patient will need to start the Casodex and come in for a Lupron. Appt scheduled 6/30/2023 at 10:15am. Appt confirmed. Auth ordered and linked to appt.

## 2023-06-22 ENCOUNTER — LAB VISIT (OUTPATIENT)
Dept: LAB | Facility: HOSPITAL | Age: 68
End: 2023-06-22
Attending: UROLOGY
Payer: MEDICARE

## 2023-06-22 ENCOUNTER — TELEPHONE (OUTPATIENT)
Dept: UROLOGY | Facility: CLINIC | Age: 68
End: 2023-06-22
Payer: MEDICARE

## 2023-06-22 DIAGNOSIS — R82.998 CELLS AND CASTS IN URINE: ICD-10-CM

## 2023-06-22 DIAGNOSIS — R82.998 CELLS AND CASTS IN URINE: Primary | ICD-10-CM

## 2023-06-22 PROCEDURE — 87086 URINE CULTURE/COLONY COUNT: CPT | Performed by: UROLOGY

## 2023-06-22 NOTE — TELEPHONE ENCOUNTER
----- Message from Carole Hamm sent at 6/22/2023  1:59 PM CDT -----  Contact: Sister/Beti  Type: Needs Medical Advice  Who Called:  Sister/Beti  Symptoms (please be specific):  testicular pain  Pharmacy name and phone #:    [facility] [place] [facility] [address]  [address]  [place] [address]  Phone: [phone] Fax: [phone]    Best Call Back Number: [phone]    Additional Information: Would like something called in for symptom before 6/30 appt

## 2023-06-22 NOTE — TELEPHONE ENCOUNTER
Called and spoke to patient sister who states that patient is having burning when urinating. States that patient describes it as tobacco coming out of penis. Informed that he would need a urine culture done. Orders placed. Informed after leaving urine specimen can take AZO over the counter until results are received. Patient sister voiced okay.

## 2023-06-24 LAB — BACTERIA UR CULT: NO GROWTH

## 2023-06-30 ENCOUNTER — OFFICE VISIT (OUTPATIENT)
Dept: UROLOGY | Facility: CLINIC | Age: 68
End: 2023-06-30
Payer: MEDICARE

## 2023-06-30 VITALS — HEIGHT: 77 IN | BODY MASS INDEX: 23.97 KG/M2 | WEIGHT: 203 LBS

## 2023-06-30 DIAGNOSIS — C61 PROSTATE CANCER: ICD-10-CM

## 2023-06-30 DIAGNOSIS — C61 PROSTATE CANCER: Primary | ICD-10-CM

## 2023-06-30 DIAGNOSIS — R39.9 LOWER URINARY TRACT SYMPTOMS (LUTS): ICD-10-CM

## 2023-06-30 DIAGNOSIS — C61 PROSTATE CANCER METASTATIC TO BONE: Primary | ICD-10-CM

## 2023-06-30 DIAGNOSIS — C79.51 PROSTATE CANCER METASTATIC TO BONE: Primary | ICD-10-CM

## 2023-06-30 PROCEDURE — 1126F PR PAIN SEVERITY QUANTIFIED, NO PAIN PRESENT: ICD-10-PCS | Mod: CPTII,S$GLB,, | Performed by: UROLOGY

## 2023-06-30 PROCEDURE — 99999 PR PBB SHADOW E&M-EST. PATIENT-LVL III: CPT | Mod: PBBFAC,,, | Performed by: UROLOGY

## 2023-06-30 PROCEDURE — 1159F PR MEDICATION LIST DOCUMENTED IN MEDICAL RECORD: ICD-10-PCS | Mod: CPTII,S$GLB,, | Performed by: UROLOGY

## 2023-06-30 PROCEDURE — 99999 PR PBB SHADOW E&M-EST. PATIENT-LVL III: ICD-10-PCS | Mod: PBBFAC,,, | Performed by: UROLOGY

## 2023-06-30 PROCEDURE — 1126F AMNT PAIN NOTED NONE PRSNT: CPT | Mod: CPTII,S$GLB,, | Performed by: UROLOGY

## 2023-06-30 PROCEDURE — 99214 OFFICE O/P EST MOD 30 MIN: CPT | Mod: 25,S$GLB,, | Performed by: UROLOGY

## 2023-06-30 PROCEDURE — 96402 CHEMO HORMON ANTINEOPL SQ/IM: CPT | Mod: S$GLB,,, | Performed by: UROLOGY

## 2023-06-30 PROCEDURE — 3288F PR FALLS RISK ASSESSMENT DOCUMENTED: ICD-10-PCS | Mod: CPTII,S$GLB,, | Performed by: UROLOGY

## 2023-06-30 PROCEDURE — 1157F PR ADVANCE CARE PLAN OR EQUIV PRESENT IN MEDICAL RECORD: ICD-10-PCS | Mod: CPTII,S$GLB,, | Performed by: UROLOGY

## 2023-06-30 PROCEDURE — 1100F PTFALLS ASSESS-DOCD GE2>/YR: CPT | Mod: CPTII,S$GLB,, | Performed by: UROLOGY

## 2023-06-30 PROCEDURE — 3288F FALL RISK ASSESSMENT DOCD: CPT | Mod: CPTII,S$GLB,, | Performed by: UROLOGY

## 2023-06-30 PROCEDURE — 96402 PR CHEMOTHER HORMON ANTINEOPL SUB-Q/IM: ICD-10-PCS | Mod: S$GLB,,, | Performed by: UROLOGY

## 2023-06-30 PROCEDURE — 1157F ADVNC CARE PLAN IN RCRD: CPT | Mod: CPTII,S$GLB,, | Performed by: UROLOGY

## 2023-06-30 PROCEDURE — 3008F PR BODY MASS INDEX (BMI) DOCUMENTED: ICD-10-PCS | Mod: CPTII,S$GLB,, | Performed by: UROLOGY

## 2023-06-30 PROCEDURE — 99214 PR OFFICE/OUTPT VISIT, EST, LEVL IV, 30-39 MIN: ICD-10-PCS | Mod: 25,S$GLB,, | Performed by: UROLOGY

## 2023-06-30 PROCEDURE — 1160F PR REVIEW ALL MEDS BY PRESCRIBER/CLIN PHARMACIST DOCUMENTED: ICD-10-PCS | Mod: CPTII,S$GLB,, | Performed by: UROLOGY

## 2023-06-30 PROCEDURE — 3044F PR MOST RECENT HEMOGLOBIN A1C LEVEL <7.0%: ICD-10-PCS | Mod: CPTII,S$GLB,, | Performed by: UROLOGY

## 2023-06-30 PROCEDURE — 4010F ACE/ARB THERAPY RXD/TAKEN: CPT | Mod: CPTII,S$GLB,, | Performed by: UROLOGY

## 2023-06-30 PROCEDURE — 3008F BODY MASS INDEX DOCD: CPT | Mod: CPTII,S$GLB,, | Performed by: UROLOGY

## 2023-06-30 PROCEDURE — 4010F PR ACE/ARB THEARPY RXD/TAKEN: ICD-10-PCS | Mod: CPTII,S$GLB,, | Performed by: UROLOGY

## 2023-06-30 PROCEDURE — 3044F HG A1C LEVEL LT 7.0%: CPT | Mod: CPTII,S$GLB,, | Performed by: UROLOGY

## 2023-06-30 PROCEDURE — 1159F MED LIST DOCD IN RCRD: CPT | Mod: CPTII,S$GLB,, | Performed by: UROLOGY

## 2023-06-30 PROCEDURE — 1160F RVW MEDS BY RX/DR IN RCRD: CPT | Mod: CPTII,S$GLB,, | Performed by: UROLOGY

## 2023-06-30 PROCEDURE — 1100F PR PT FALLS ASSESS DOC 2+ FALLS/FALL W/INJURY/YR: ICD-10-PCS | Mod: CPTII,S$GLB,, | Performed by: UROLOGY

## 2023-06-30 RX ORDER — AMLODIPINE AND BENAZEPRIL HYDROCHLORIDE 5; 20 MG/1; MG/1
CAPSULE ORAL
Status: ON HOLD | COMMUNITY
Start: 2023-05-31 | End: 2023-11-30 | Stop reason: HOSPADM

## 2023-06-30 NOTE — PROGRESS NOTES
[facility] Established Patient/H&P:    Jama James is a 67 y.o. male who presents for metastatic castrate sensitive prostate cancer.     Patient with a history of severe COPD, ulcerative colitis and HTN who was referred for a 4th opinion on management of his prostate cancer.      On review of records, he underwent prostate biopsy on 3/18/20 with Dr. Jeong. Pathology with [place] 4+3=7 and 3+4=7 prostate cancer. He was referred to radiation oncology in 9/2020 in [place] as he was not a good surgical candidate. He followed up and the plan was for ADT with XRT, but patient states he had no transportation.      Here today stating that he now has someone who can bring him for radiation therapy and he also has Medicare coverage. Wishes to proceed with XRT.      He has last been managed by Dr. Church at [facility]. On review of sparse records, he underwent PET CT in 1/2023 with no evidence of any metastasis present. CTA chest on 3/21/23 with multiple small soft tissue pulmonary nodules with interval development of a soft tissue pulmonary nodule. Diffuse emphysematous change and chronic bilateral adrenal adenomas.      His sister is present and states he did receive a dose of Lupron in the past. Unclear when he last received a dose.      He is on Flomax and Finasteride for lower urinary tract symptoms. No erectile function. Of note, he reports undergoing negative hematuria work up recently for gross hematuria.       Interval History    6/30/23: Repeat PSA 43.8 on 5/22/23. MRI prostate on 5/31/23 with a PIRADs 5 lesion with broad capsular abutment and suspicious femoral chain lymph nodes. PET CT with metastatic disease to cervical, thoracic, abdominal and pelvic lymph nodes as well as bone on 6/9/23. He is not a candidate for XRT given his metastatic disease. Scheduled to establish care with heme onc. Here for Lupron and to discuss further management. No complaints today.      Denies any  "fever, chills, gross hematuria, flank pain, bone pain, unintentional weight loss,or  trauma.         PSA  43.8  5/22/23  4.9                   9/14/22  23                    1/20/20    Urine culture  No growth 6/22/23    Past Medical History:   Diagnosis Date    Anxiety     Asbestosis 08/04/2015    Atrial fibrillation     Bilateral adrenal adenomas     COPD (chronic obstructive pulmonary disease)     COVID-19 virus infection 07/28/2022    Depression     High cholesterol     HTN (hypertension)     Malignant neoplasm of prostate     Multiple lung nodules     Nodule of upper lobe of left lung 03/30/2023    6 mm spiculated on cta chest 3/21/23    On home oxygen therapy 05/24/2023    Pneumonia     Prostate cancer 5/27/2020    Ulcerative colitis        Past Surgical History:   Procedure Laterality Date    NECK SURGERY      right knee      TRANSRECTAL BIOPSY OF PROSTATE WITH ULTRASOUND GUIDANCE Bilateral 3/18/2020    Procedure: BIOPSY, PROSTATE, RECTAL APPROACH, WITH US GUIDANCE;  Surgeon: Bill Jeong MD;  Location: [facility];  Service: Urology;  Laterality: Bilateral;       Review of patient's allergies indicates:  No Known Allergies    Medications Reviewed: see MAR    FOCUSED PHYSICAL EXAM:    There were no vitals filed for this visit.  Body mass index is 24.07 kg/m². Weight: 92.1 kg (203 lb) Height: 6' 5" (195.6 cm)       General: Alert, cooperative, no distress, appears stated age  Abdomen: Soft, non-tender, no CVA tenderness, non-distended        LABS:    Recent Results (from the past 336 hour(s))   Urine culture    Collection Time: 06/22/23  3:44 PM    Specimen: Urine, Clean Catch   Result Value Ref Range    Urine Culture, Routine No growth            Assessment/Diagnosis:    1. Prostate cancer metastatic to bone        2. Prostate cancer        3. Lower urinary tract symptoms (LUTS)            Plans:    - I spent 30 minutes of the day of this encounter preparing for, treating and managing this patient. " Today's visit was spent almost entirely on counseling. We reviewed his diagnosis, stage, grade, risk group, and prognosis. We discussed the concept of low risk, moderate risk, and high risk disease. We discussed that he has metastatic castrate sensitive prostate cancer.   - Lupron 45 mg administered today.   - Establish care with Dr. Renny OBANDO for abiraterone or enzalutamide.   - Continue Flomax and Finasteride.   - RTC in 6 months with PSA and for Lupron.

## 2023-07-06 PROBLEM — R93.89 ABNORMAL CT SCAN: Status: ACTIVE | Noted: 2023-07-06

## 2023-07-06 PROBLEM — C61 PROSTATE CANCER METASTATIC TO LUNG: Status: ACTIVE | Noted: 2023-07-06

## 2023-07-06 PROBLEM — R94.8 ABNORMAL POSITRON EMISSION TOMOGRAPHY (PET) SCAN: Status: ACTIVE | Noted: 2023-07-06

## 2023-07-06 PROBLEM — C79.51 PROSTATE CANCER METASTATIC TO BONE: Status: ACTIVE | Noted: 2023-07-06

## 2023-07-06 PROBLEM — C61 PROSTATE CANCER METASTATIC TO BONE: Status: ACTIVE | Noted: 2023-07-06

## 2023-07-06 PROBLEM — R97.20 ELEVATED PSA: Status: ACTIVE | Noted: 2023-07-06

## 2023-07-06 PROBLEM — C78.00 PROSTATE CANCER METASTATIC TO LUNG: Status: ACTIVE | Noted: 2023-07-06

## 2023-07-06 NOTE — PROGRESS NOTES
[facility] Hematolgy/Oncology  History & Physical    Subjective:      Patient ID:   NAME: Jama James : 1955     67 y.o. male    Referring Doc: Myles Hutchison III, *  Other Physicians: Ann Goodwin; Tiffany Smith NP(Bud); Vince Combs (Card)        Chief Complaint: prostate cancer      HPI:  67 y.o. male with diagnosis of prostate cancer who has been referred by Myles Hutchison III, * for evaluation by medical hematology/oncology. Patient had a diagnosis of high risk prostate cancer back in  with Group 3 on pathology and a PSA of 23. He was previously followed by Dr Jeong with  and had undergone a TRUBP on 3/18/2020.  At that time, he was deemed to be inoperable and was referred for definitive XRT, but appears to have been noncompliant and was lost to follow-up. He reports that he was seeing Dr Wood with urology in [facility] due to insurance issues and started ADT with Lupron in  or ut had not been on the ADT for over a year.    He is here with his sister. He lives at home with his wife.     His PSA had previously dropped to 4.9 in , but has since risen to 43.8. He had a  PEt PSMA on 2023 which is unfortunately showing wide-spread disease including bony mets.     He was seen by Dr Ann Goodwin with  and Dr JOSEFINA Hutchison with rad/onc. He was restarted on ADT with 2 weeks of casodex up front.    He has been referred to med/onc for evaluation for any second generation ADT therapy  +/- chemotherapy.     Breathing ok currently but is on O2 at home an inhalers. No CP, Ha's or N/V    He is retired ; he is smoker and drinks beer regularly    Discussed covid and he has not been vaccinated but he had covid last year              ROS:   GEN: normal without any fever, night sweats or weight loss  HEENT: normal with no HA's, sore throat, stiff neck, changes in vision  CV: normal with no CP, SOB, PND, SUBRAMANIAN or orthopnea  PULM: normal with no SOB, cough, hemoptysis, sputum or pleuritic  pain  GI: normal with no abdominal pain, nausea, vomiting, constipation, diarrhea, melanotic stools, BRBPR, or hematemesis  : LUTS sx's; adequate flow currently  BREAST: normal with no mass, discharge, pain  SKIN: normal with no rash, erythema, bruising, or swelling       Past Medical/Surgical History:  Past Medical History:   Diagnosis Date    Abnormal CT scan 7/6/2023    Abnormal positron emission tomography (PET) scan 7/6/2023    Anxiety     Asbestosis 08/04/2015    Atrial fibrillation     Bilateral adrenal adenomas     COPD (chronic obstructive pulmonary disease)     COVID-19 virus infection 07/28/2022    Depression     Elevated PSA 7/6/2023    High cholesterol     HTN (hypertension)     Malignant neoplasm of prostate     Multiple lung nodules     Nodule of upper lobe of left lung 03/30/2023    6 mm spiculated on cta chest 3/21/23    On home oxygen therapy 05/24/2023    Pneumonia     Prostate cancer 5/27/2020    Prostate cancer metastatic to bone 7/6/2023    Prostate cancer metastatic to lung 7/6/2023    Ulcerative colitis      Past Surgical History:   Procedure Laterality Date    NECK SURGERY      right knee      TRANSRECTAL BIOPSY OF PROSTATE WITH ULTRASOUND GUIDANCE Bilateral 3/18/2020    Procedure: BIOPSY, PROSTATE, RECTAL APPROACH, WITH US GUIDANCE;  Surgeon: Bill Jeong MD;  Location: [facility] OR;  Service: Urology;  Laterality: Bilateral;         Allergies:  Review of patient's allergies indicates:  No Known Allergies    Social/Family History:  Social History     Socioeconomic History    Marital status:    Tobacco Use    Smoking status: Every Day     Packs/day: 0.50     Years: 50.00     Pack years: 25.00     Types: Cigarettes     Passive exposure: Past    Smokeless tobacco: Never   Substance and Sexual Activity    Alcohol use: Yes     Comment: 8 16oz beers per day    Drug use: No     Social Determinants of Health     Financial Resource Strain: Low Risk     Difficulty of Paying Living Expenses:  Not very hard   Food Insecurity: No Food Insecurity    Worried About Running Out of Food in the Last Year: Never true    Ran Out of Food in the Last Year: Never true   Transportation Needs: No Transportation Needs    Lack of Transportation (Medical): No    Lack of Transportation (Non-Medical): No   Physical Activity: Insufficiently Active    Days of Exercise per Week: 1 day    Minutes of Exercise per Session: 30 min   Stress: Stress Concern Present    Feeling of Stress : Rather much   Social Connections: Moderately Integrated    Frequency of Communication with Friends and Family: More than three times a week    Frequency of Social Gatherings with Friends and Family: Once a week    Attends Denominational Services: Never    Active Member of Clubs or Organizations: Yes    Attends Club or Organization Meetings: Never    Marital Status:    Housing Stability: Unknown    Unable to Pay for Housing in the Last Year: No    Unstable Housing in the Last Year: No     Family History   Problem Relation Age of Onset    Cancer Mother         Uterine and Ovarian cancer    Diabetes Mother     Diabetes Sister          Medications:    Current Outpatient Medications:     albuterol (PROVENTIL/VENTOLIN HFA) 90 mcg/actuation inhaler, INHALE ONE (1) OR TWO (2) SPRAYS BY MOUTH EVERY SIX (6) HOURS AS NEEDED FOR WHEEZING, Disp: 18 g, Rfl: 5    albuterol-ipratropium (DUO-NEB) 2.5 mg-0.5 mg/3 mL nebulizer solution, PLACE ONE (1) VIAL IN NEBULIZER AND INHALE EVERY 6 HOURS AS DIRECTED AS NEEDED FOR WHEEZING, Disp: 180 mL, Rfl: 2    amiodarone (PACERONE) 200 MG Tab, Take 1 tablet (200 mg total) by mouth once daily., Disp: 30 tablet, Rfl: 3    amlodipine-benazepril 5-20 mg (LOTREL) 5-20 mg per capsule,  90 cap, 0 Refill(s), Disp: , Rfl:     apixaban (ELIQUIS) 2.5 mg Tab, Take by mouth 2 (two) times daily., Disp: , Rfl:     apixaban (ELIQUIS) 5 mg Tab,  60 EA, TAKE 1 TABLET BY MOUTH TWICE DAILY, 0 Refill(s), Soft Stop, Disp: , Rfl:     aspirin  (ECOTRIN) 81 MG EC tablet, Take 1 tablet (81 mg total) by mouth once daily., Disp: 90 tablet, Rfl: 3    atorvastatin (LIPITOR) 10 MG tablet, TAKE 1 TABLET AT BEDTIME FOR CHOLESTEROL (TAKE WITH CO-ENZYME Q-10), Disp: 90 tablet, Rfl: 3    diltiaZEM (CARDIZEM) 30 MG tablet, Take 1 tablet (30 mg total) by mouth every 12 (twelve) hours., Disp: 60 tablet, Rfl: 11    ferrous sulfate (FEOSOL) 325 mg (65 mg iron) Tab tablet, Take 1 tablet (325 mg total) by mouth every Mon, Wed, Fri., Disp: 30 tablet, Rfl: 1    finasteride (PROSCAR) 5 mg tablet, TAKE 1 TABLET EVERY MORNING FOR PROSTATE, Disp: 90 tablet, Rfl: 1    fluticasone-umeclidin-vilanter (TRELEGY ELLIPTA) 200-62.5-25 mcg inhaler, Inhale 1 puff into the lungs once daily., Disp: 60 each, Rfl: 11    losartan (COZAAR) 25 MG tablet, Take 25 mg by mouth 2 (two) times a day., Disp: , Rfl:     losartan (COZAAR) 50 MG tablet, Take 1 tablet (50 mg total) by mouth 2 (two) times a day., Disp: 180 tablet, Rfl: 3    OXYGEN-AIR DELIVERY SYSTEMS MISC, by Misc.(Non-Drug; Combo Route) route., Disp: , Rfl:     pantoprazole (PROTONIX) 40 MG tablet, TAKE 1 TABLET EVERY MORNING (30 MINUTES BEFORE BREAKFAST) FOR STOMACH, Disp: 90 tablet, Rfl: 1    paroxetine (PAXIL) 40 MG tablet, TAKE ONE (1) TABLET EVERY MORNING FOR MOOD AND NERVE PAIN, Disp: 90 tablet, Rfl: 1    tamsulosin (FLOMAX) 0.4 mg Cap, TAKE 1 CAPSULE AT BEDTIME FOR PROSTATE, Disp: 30 capsule, Rfl: 3    traZODone (DESYREL) 100 MG tablet, Take 1 tablet (100 mg total) by mouth every evening., Disp: 90 tablet, Rfl: 1    triamcinolone acetonide 0.1% (KENALOG) 0.1 % cream, Apply topically 2 (two) times daily., Disp: 453.6 g, Rfl: 1    bicalutamide (CASODEX) 50 MG Tab, Take 1 tablet (50 mg total) by mouth once daily., Disp: 14 tablet, Rfl: 0    ipratropium (ATROVENT) 0.02 % nebulizer solution, Take 2.5 mLs (500 mcg total) by nebulization every 6 (six) hours as needed for Wheezing. Rescue, Disp: 75 mL, Rfl: 0      Pathology:   Cancer  "Staging   No matching staging information was found for the patient.      Prostate Biopsy 3/18/2020:    Final Pathologic Diagnosis 1.  PROSTATE, RIGHT BASE, BIOPSY:   Benign prostatic tissue.   Negative for malignancy.   2.  PROSTATE, RIGHT MIDDLE, BIOPSY:   Prostatic adenocarcinoma, [place] grade 4 + 3 = 7 (80% pattern 4), involving   70% (8 mm) of 1 core.   3.  PROSTATE, RIGHT APEX, BIOPSY:   Prostatic adenocarcinoma, [place] grade 4 + 3 = 7 (70% pattern 4), involving   95% (15 mm) of 1 core.   4.  PROSTATE, RIGHT LATERAL BASE, BIOPSY:   Prostatic adenocarcinoma, [place] grade 4 + 3 = 7 (60% pattern 4), involving   80% (9 mm) of 1 core.   5. PROSTATE, RIGHT LATERAL MIDDLE, BIOPSY:   Prostatic adenocarcinoma, [place] grade 4 + 3 = 7 (90% pattern 4), involving   90% (11 mm) of 1 core.   6.  PROSTATE, RIGHT LATERAL APEX, BIOPSY:   Prostatic adenocarcinoma, [place] grade 3 + 4 = 7 (30% pattern 4), involving   70% (4 mm) of 1 core.   7.  PROSTATE, LEFT BASE, BIOPSY:   Benign prostatic tissue.   Negative for malignancy.   8.  PROSTATE, LEFT MIDDLE, BIOPSY:   Benign prostatic tissue.   Negative for malignancy.   9.  PROSTATE, LEFT APEX, BIOPSY:   Prostatic adenocarcinoma, [place] grade 4 + 3 = 7 (60% pattern 4), involving   15% (2 mm) of 1 core.   10.  PROSTATE, LEFT LATERAL BASE, BIOPSY:   Benign prostatic tissue.   Negative for malignancy.   11.  PROSTATE, LEFT LATERAL MIDDLE, BIOPSY:   Benign prostatic tissue with atrophy.   Negative for malignancy.   12.  PROSTATE, LEFT LATERAL APEX, BIOPSY:   Prostatic adenocarcinoma, [place] grade 3 + 4 = 7 (10% pattern 4) involving a   40-50% (3-5 mm) of 2/2 cores.   Perineural invasion is identified.         Objective:   Vitals:  Blood pressure 133/69, pulse 91, temperature 98.2 °F (36.8 °C), resp. rate 18, height 6' 5" (1.956 m), weight 86.8 kg (191 lb 4.8 oz), SpO2 (!) 94 %.    Physical Examination:   GEN: no apparent distress, comfortable; AAOx3; looks older than chronological " age  HEAD: atraumatic and normocephalic  EYES: no pallor, no icterus, PERRLA; shiner on left orbit  ENT: OMM, no pharyngeal erythema, external ears WNL; no nasal discharge; no thrush  NECK: no masses, thyroid normal, trachea midline, no LAD/LN's, supple  CV: RRR with no murmur; normal pulse; normal S1 and S2; no pedal edema  CHEST: Normal respiratory effort; CTAB; normal breath sounds; no wheeze or crackles  ABDOM: nontender and nondistended; soft; normal bowel sounds; no rebound/guarding  MUSC/Skeletal: ROM normal; no crepitus; joints normal; no deformities ; some arthropathy  EXTREM: no clubbing, cyanosis, inflammation or swelling  SKIN: no rashes, lesions, ulcers, petechiae or subcutaneous nodules; dry skin on bilateral lower extremities Rght >Left; chronic age related skin changes; tattoos  : no tracy  NEURO: grossly intact; motor/sensory WNL; AAOx3; no tremors  PSYCH: normal mood, affect and behavior  LYMPH: normal cervical, supraclavicular, axillary and groin LN's      Labs:   Lab Results   Component Value Date    WBC 7.0 06/30/2023    HGB 12.5 (L) 06/30/2023    HCT 39.5 06/30/2023    MCV 97.3 06/30/2023     06/30/2023    CMP  Sodium   Date Value Ref Range Status   05/22/2023 141 135 - 146 mmol/L Final     Potassium   Date Value Ref Range Status   05/22/2023 4.3 3.5 - 5.3 mmol/L Final     Chloride   Date Value Ref Range Status   05/22/2023 104 98 - 110 mmol/L Final     CO2   Date Value Ref Range Status   05/22/2023 28 20 - 32 mmol/L Final     Glucose   Date Value Ref Range Status   05/22/2023 103 65 - 139 mg/dL Final     Comment:               Non-fasting reference interval          BUN   Date Value Ref Range Status   05/22/2023 24 7 - 25 mg/dL Final     Creatinine   Date Value Ref Range Status   05/31/2023 0.8 0.5 - 1.4 mg/dL Final     Calcium   Date Value Ref Range Status   05/22/2023 9.9 8.6 - 10.3 mg/dL Final     Total Protein   Date Value Ref Range Status   05/22/2023 6.5 6.1 - 8.1 g/dL Final      Albumin   Date Value Ref Range Status   05/22/2023 4.2 3.6 - 5.1 g/dL Final     Total Bilirubin   Date Value Ref Range Status   05/22/2023 0.4 0.2 - 1.2 mg/dL Final     Alkaline Phosphatase   Date Value Ref Range Status   03/26/2023 89 55 - 135 U/L Final     AST   Date Value Ref Range Status   05/22/2023 16 10 - 35 U/L Final     ALT   Date Value Ref Range Status   05/22/2023 11 9 - 46 U/L Final     Anion Gap   Date Value Ref Range Status   03/27/2023 6 (L) 8 - 16 mmol/L Final     eGFR if    Date Value Ref Range Status   07/28/2022 >60.0 >60 mL/min/1.73 m^2 Final     eGFR if non    Date Value Ref Range Status   07/28/2022 >60.0 >60 mL/min/1.73 m^2 Final     Comment:     Calculation used to obtain the estimated glomerular filtration  rate (eGFR) is the CKD-EPI equation.            Radiology/Diagnostic Studies:      PET PSMA 6/9/2023:    Impression:     Prostate carcinoma with metastatic disease to cervical, thoracic, abdominal and pelvic lymph nodes as well as metastatic disease to bone.  Interval progression compared to previous PET-CT.      MRI prostate 5/31/2023:    Impression:     Mild enlarged prostate gland demonstrating a PI-RADS 5 lesion for which clinically significant prostate cancer is highly likely to be present.  Broad capsular abutment and above with the anterior wall the rectum is present without visible invasion.  Mildly suspicious bilateral common femoral chain lymph nodes.     Overall Assessment:     PIRADS 5 (clinically significant cancer is highly likely to be present)        CTA chest 3/21/2023:    Impression:     1. No CT evidence to suggest an acute pulmonary embolus.  2. Multiple small soft tissue pulmonary nodules with interval development of a small spiculated soft tissue pulmonary nodule of the left upper lobe.  Follow-up per Fleischner guidelines.  For multiple solid nodules with any 6 mm or greater, Fleischner Society guidelines recommend follow up with  non-contrast chest CT at 3-6 months and 18-24 months after discovery.  3. Diffuse centrilobular emphysematous change with linear discoid atelectasis at the lung bases.  4. Chronic small loculated left pleural effusion with suspected chronic round atelectasis at the left lung base.  5. Chronic bilateral adrenal adenomas.  This report was flagged in Epic as abnormal.       CT Chest 10/27/2022:    FINDINGS:  There is no mediastinal nor axillary adenopathy.  The aorta is normal in caliber with extensive calcific plaque.  There are coronary artery calcifications.  Mild gynecomastia is present.     There is a chronic small loculated left pleural effusion with associated calcification of the pleura.  There is a trace right pleural effusion versus posterior pleural thickening, unchanged.  There is unchanged rounded atelectasis in the left lower lobe adjacent to the chronic loculated pleural effusion.     Emphysema is present.  There are calcified granulomata.     Noncalcified lung nodules are unchanged, as follows on series 3:     Right upper lobe 3 mm image 81, unchanged.     Right upper lobe 2 mm image 121 unchanged.     Right upper lobe 4 mm image 189, not significantly changed.     Right middle lobe 6 mm image 227, unchanged.     Intra fissural nodule on the right, scarring in the lateral right middle lobe are unchanged.  Pleural thickening lateral right lower lobe dependent atelectasis are similar to the previous study     2 mm nodule left lower lobe series 3, image 188 unchanged.     3 mm nodule anterior left upper lobe image 102 is unchanged.     There are no new or enlarging nodules compared to the recent prior study.     There is hardware in the cervical spine.  No acute bony abnormality.     Bilateral adrenal adenomas are again noted.         All lab results and imaging results have been reviewed and discussed with the patient    Assessment:   (1) 67 y.o. male diagnosis of prostate cancer who has been referred by  Myles Hutchison III, * for evaluation by medical hematology/oncology. Patient had a diagnosis of high risk prostate cancer back in 2020 with Group 3 on pathology and a PSA of 23. He was previously followed by Dr Jeong with  and had undergone a TRUBP on 3/18/2020.  At that time, he was deemed to be inoperable and was referred for definitive XRT, but appears to have been noncompliant and was lost to follow-up.     He reports that he started ADT with Lupron in 2020 or 2021 but had not continued for over a year and could not recall when he last had an injection. His PSA had previously dropped to 4.9 in 2022, but has since risen to 43.8. He had a  PEt PSMA on 6/9/2023 which is unfortunately showing wide-spread disease including bony mets.     He was seen by Dr Ann Goodwin with  and Dr JOSEFINA Hutchison with rad/onc. He was restarted on ADT with 2 weeks of casodex up front.    He has been referred to med/onc for evaluation for any second generation ADT therapy  +/- chemotherapy.     7/7/2023:  - discussed and reviewed the latest NCCN guidelines (Vers 1.2023)  - would benefit from addition of abiraterone and prednisone to the ADT regimen  - set up chemotx/antiandrogen training with Josephine; provid litertaure on the drugs, go over side-effect profile and obtain consents      (2) HTN and hypercholesterolemia    (3) Atrial fibrillation    (4) COPD, chronic hypoxemia, O2 dependence, multiple pulmonary nodules    (5) Asbestosis and chronic active tobacco use    (6) Ulcerative colitis    (7) Anxiety/Depression    (8) Bilateral adrenal adenomas        VISIT DIAGNOSES:     Prostate cancer  -     Ambulatory referral/consult to Hematology / Oncology    Tobacco abuse    Multiple lung nodules    Prostate cancer metastatic to bone    Prostate cancer metastatic to lung    Elevated PSA    Abnormal positron emission tomography (PET) scan    Abnormal CT scan          Plan:     PLAN:  Set up chemotx/antiandrogen therapy school/education  with Josephine for the abiraterone; provide literature, discuss side-effect profile and obtain consents  Proceed with ADT therapy  F/u with PCP, , Card and rad/onc    RTC in  4 weeks   Fax note to Myles Hutchison III, *, Tiffany Smith (PCP), Garret Isabel      COVID-19 Discussion:    I had long discussion with patient and any applicable family about the COVID-19 coronavirus epidemic and the recommended precautions with regard to cancer and/or hematology patients. I have re-iterated the CDC recommendations for adequate hand washing, use of hand -like products, and coughing into elbow, etc. In addition, especially for our patients who are on chemotherapy and/or our otherwise immunocompromised patients, I have recommended avoidance of crowds, including movie theaters, restaurants, churches, etc. I have recommended avoidance of any sick or symptomatic family members and/or friends. I have also recommended avoidance of any raw and unwashed food products, and general avoidance of food items that have not been prepared by themselves. The patient has been asked to call us immediately with any symptom developments, issues, questions or other general concerns.       Pathology Discussion:    I reviewed and discussed the pathology report(s) and radiograph reports (if available) in as simple to understand and/or laymen's terms to the best of my ability. I had an indepth conversation with the patient and went over the patient's individual diagnosis based on the information that was currently available. I discussed the TNM staging process with regard to the patient's particular cancer type, and the calculated stage based on the currently available TNM data and literature. I discussed the available prognostic data with regard to the current staging information and how it relates to the prognosis of their particular neoplastic process.        NCCN Guidelines:    I discussed the available treatment option(s) in  "accordance with the latest literature from the NCCN Clinical Practice Guidelines for the patient's particular type of cancer disorder. The NCCN Guidelines provide a "document evidence-based (and) consensus-driven management" of the care of oncology patients. The treatment recommendations were made not only in accordance to the NCCN guidelines, but also factored in to account the patient's overall age, condition, performance status and their medical co-morbidities. I went over the risks and benefits of the the treatment options (if any could be made) with regard to their particular cancer type, their cancer stage, their age, and their co-morbidities.     Anti-Androgen Hormone Therapy Discussion:    I discussed the advantages of antihormone therapy with the patient with regard to their particular neoplastic or carcinoma in situ condition.  I discussed the risks for thromboembolic events such as DVT's, pulmonary emboli, CVA's, retinal vascular clots, phlebitis, and TIA's. I discussed the potential risks for development of ocular disturbances, retinopathy, cataracts, corneal changes, flushing, hot flashes, erectile dysfunction, altered breasts (incl. Gynecomastia), fluid retention, weight changes, elevations in LFT's, liver damage, and mood disturbances. I discussed the potential risk of arthropathy and joint pains/aches which could be chronic and debilitating. I discussed potential adverse effects on bone mineralization and the risk of osteopenia and/or osteoporosis which could led to increase risk of fractures.   A consent form was obtained and a copy was provided to the patient.   I have explained and the patient understands all of  the current recommendation(s). I have answered all of their questions to the best of my ability and to their complete satisfaction.           Thank you for allowing me to participate in this patient's care. Please call with any questions or concerns.    Electronically signed Maurilio MCMANUS " MD Renny  Answers submitted by the patient for this visit:  Review of Systems Questionnaire (Submitted on 7/6/2023)  appetite change : No  unexpected weight change: Yes  mouth sores: No  visual disturbance: Yes  cough: Yes  shortness of breath: Yes  chest pain: Yes  abdominal pain: No  diarrhea: Yes  frequency: Yes  back pain: No  rash: No  headaches: Yes  adenopathy: No  nervous/ anxious: No

## 2023-07-07 ENCOUNTER — OFFICE VISIT (OUTPATIENT)
Dept: HEMATOLOGY/ONCOLOGY | Facility: CLINIC | Age: 68
End: 2023-07-07
Payer: MEDICARE

## 2023-07-07 VITALS
HEART RATE: 91 BPM | SYSTOLIC BLOOD PRESSURE: 133 MMHG | OXYGEN SATURATION: 94 % | HEIGHT: 77 IN | BODY MASS INDEX: 22.59 KG/M2 | DIASTOLIC BLOOD PRESSURE: 69 MMHG | RESPIRATION RATE: 18 BRPM | TEMPERATURE: 98 F | WEIGHT: 191.31 LBS

## 2023-07-07 DIAGNOSIS — R91.8 MULTIPLE LUNG NODULES: ICD-10-CM

## 2023-07-07 DIAGNOSIS — R97.20 ELEVATED PSA: ICD-10-CM

## 2023-07-07 DIAGNOSIS — C61 PROSTATE CANCER METASTATIC TO BONE: ICD-10-CM

## 2023-07-07 DIAGNOSIS — C61 PROSTATE CANCER: Primary | ICD-10-CM

## 2023-07-07 DIAGNOSIS — C61 PROSTATE CANCER METASTATIC TO LUNG: ICD-10-CM

## 2023-07-07 DIAGNOSIS — R94.8 ABNORMAL POSITRON EMISSION TOMOGRAPHY (PET) SCAN: ICD-10-CM

## 2023-07-07 DIAGNOSIS — C78.00 PROSTATE CANCER METASTATIC TO LUNG: ICD-10-CM

## 2023-07-07 DIAGNOSIS — C79.51 PROSTATE CANCER METASTATIC TO BONE: ICD-10-CM

## 2023-07-07 DIAGNOSIS — Z72.0 TOBACCO ABUSE: ICD-10-CM

## 2023-07-07 DIAGNOSIS — R93.89 ABNORMAL CT SCAN: ICD-10-CM

## 2023-07-07 PROCEDURE — 3008F PR BODY MASS INDEX (BMI) DOCUMENTED: ICD-10-PCS | Mod: CPTII,S$GLB,, | Performed by: INTERNAL MEDICINE

## 2023-07-07 PROCEDURE — 3008F BODY MASS INDEX DOCD: CPT | Mod: CPTII,S$GLB,, | Performed by: INTERNAL MEDICINE

## 2023-07-07 PROCEDURE — 1100F PTFALLS ASSESS-DOCD GE2>/YR: CPT | Mod: CPTII,S$GLB,, | Performed by: INTERNAL MEDICINE

## 2023-07-07 PROCEDURE — 1125F AMNT PAIN NOTED PAIN PRSNT: CPT | Mod: CPTII,S$GLB,, | Performed by: INTERNAL MEDICINE

## 2023-07-07 PROCEDURE — 1159F MED LIST DOCD IN RCRD: CPT | Mod: CPTII,S$GLB,, | Performed by: INTERNAL MEDICINE

## 2023-07-07 PROCEDURE — 1157F ADVNC CARE PLAN IN RCRD: CPT | Mod: CPTII,S$GLB,, | Performed by: INTERNAL MEDICINE

## 2023-07-07 PROCEDURE — 3288F FALL RISK ASSESSMENT DOCD: CPT | Mod: CPTII,S$GLB,, | Performed by: INTERNAL MEDICINE

## 2023-07-07 PROCEDURE — 1100F PR PT FALLS ASSESS DOC 2+ FALLS/FALL W/INJURY/YR: ICD-10-PCS | Mod: CPTII,S$GLB,, | Performed by: INTERNAL MEDICINE

## 2023-07-07 PROCEDURE — 3044F HG A1C LEVEL LT 7.0%: CPT | Mod: CPTII,S$GLB,, | Performed by: INTERNAL MEDICINE

## 2023-07-07 PROCEDURE — 3075F PR MOST RECENT SYSTOLIC BLOOD PRESS GE 130-139MM HG: ICD-10-PCS | Mod: CPTII,S$GLB,, | Performed by: INTERNAL MEDICINE

## 2023-07-07 PROCEDURE — 99204 OFFICE O/P NEW MOD 45 MIN: CPT | Mod: S$GLB,,, | Performed by: INTERNAL MEDICINE

## 2023-07-07 PROCEDURE — 3078F PR MOST RECENT DIASTOLIC BLOOD PRESSURE < 80 MM HG: ICD-10-PCS | Mod: CPTII,S$GLB,, | Performed by: INTERNAL MEDICINE

## 2023-07-07 PROCEDURE — 4010F PR ACE/ARB THEARPY RXD/TAKEN: ICD-10-PCS | Mod: CPTII,S$GLB,, | Performed by: INTERNAL MEDICINE

## 2023-07-07 PROCEDURE — 1157F PR ADVANCE CARE PLAN OR EQUIV PRESENT IN MEDICAL RECORD: ICD-10-PCS | Mod: CPTII,S$GLB,, | Performed by: INTERNAL MEDICINE

## 2023-07-07 PROCEDURE — 3078F DIAST BP <80 MM HG: CPT | Mod: CPTII,S$GLB,, | Performed by: INTERNAL MEDICINE

## 2023-07-07 PROCEDURE — 3288F PR FALLS RISK ASSESSMENT DOCUMENTED: ICD-10-PCS | Mod: CPTII,S$GLB,, | Performed by: INTERNAL MEDICINE

## 2023-07-07 PROCEDURE — 1125F PR PAIN SEVERITY QUANTIFIED, PAIN PRESENT: ICD-10-PCS | Mod: CPTII,S$GLB,, | Performed by: INTERNAL MEDICINE

## 2023-07-07 PROCEDURE — 99204 PR OFFICE/OUTPT VISIT, NEW, LEVL IV, 45-59 MIN: ICD-10-PCS | Mod: S$GLB,,, | Performed by: INTERNAL MEDICINE

## 2023-07-07 PROCEDURE — 1160F RVW MEDS BY RX/DR IN RCRD: CPT | Mod: CPTII,S$GLB,, | Performed by: INTERNAL MEDICINE

## 2023-07-07 PROCEDURE — 1159F PR MEDICATION LIST DOCUMENTED IN MEDICAL RECORD: ICD-10-PCS | Mod: CPTII,S$GLB,, | Performed by: INTERNAL MEDICINE

## 2023-07-07 PROCEDURE — 3044F PR MOST RECENT HEMOGLOBIN A1C LEVEL <7.0%: ICD-10-PCS | Mod: CPTII,S$GLB,, | Performed by: INTERNAL MEDICINE

## 2023-07-07 PROCEDURE — 4010F ACE/ARB THERAPY RXD/TAKEN: CPT | Mod: CPTII,S$GLB,, | Performed by: INTERNAL MEDICINE

## 2023-07-07 PROCEDURE — 3075F SYST BP GE 130 - 139MM HG: CPT | Mod: CPTII,S$GLB,, | Performed by: INTERNAL MEDICINE

## 2023-07-07 PROCEDURE — 1160F PR REVIEW ALL MEDS BY PRESCRIBER/CLIN PHARMACIST DOCUMENTED: ICD-10-PCS | Mod: CPTII,S$GLB,, | Performed by: INTERNAL MEDICINE

## 2023-07-14 ENCOUNTER — TELEPHONE (OUTPATIENT)
Dept: HEMATOLOGY/ONCOLOGY | Facility: CLINIC | Age: 68
End: 2023-07-14

## 2023-07-14 ENCOUNTER — OFFICE VISIT (OUTPATIENT)
Dept: HEMATOLOGY/ONCOLOGY | Facility: CLINIC | Age: 68
End: 2023-07-14
Payer: MEDICARE

## 2023-07-14 VITALS
SYSTOLIC BLOOD PRESSURE: 148 MMHG | HEART RATE: 77 BPM | HEIGHT: 77 IN | DIASTOLIC BLOOD PRESSURE: 70 MMHG | WEIGHT: 194.63 LBS | TEMPERATURE: 98 F | OXYGEN SATURATION: 95 % | RESPIRATION RATE: 18 BRPM | BODY MASS INDEX: 22.98 KG/M2

## 2023-07-14 DIAGNOSIS — C61 PROSTATE CANCER METASTATIC TO BONE: ICD-10-CM

## 2023-07-14 DIAGNOSIS — C79.51 PROSTATE CANCER METASTATIC TO BONE: ICD-10-CM

## 2023-07-14 DIAGNOSIS — C61 PROSTATE CANCER METASTATIC TO LUNG: ICD-10-CM

## 2023-07-14 DIAGNOSIS — I87.2 STASIS DERMATITIS OF BOTH LEGS: ICD-10-CM

## 2023-07-14 DIAGNOSIS — C78.00 PROSTATE CANCER METASTATIC TO LUNG: ICD-10-CM

## 2023-07-14 PROCEDURE — 1125F AMNT PAIN NOTED PAIN PRSNT: CPT | Mod: CPTII,S$GLB,, | Performed by: NURSE PRACTITIONER

## 2023-07-14 PROCEDURE — 3078F PR MOST RECENT DIASTOLIC BLOOD PRESSURE < 80 MM HG: ICD-10-PCS | Mod: CPTII,S$GLB,, | Performed by: NURSE PRACTITIONER

## 2023-07-14 PROCEDURE — 1159F MED LIST DOCD IN RCRD: CPT | Mod: CPTII,S$GLB,, | Performed by: NURSE PRACTITIONER

## 2023-07-14 PROCEDURE — 3008F BODY MASS INDEX DOCD: CPT | Mod: CPTII,S$GLB,, | Performed by: NURSE PRACTITIONER

## 2023-07-14 PROCEDURE — 3288F PR FALLS RISK ASSESSMENT DOCUMENTED: ICD-10-PCS | Mod: CPTII,S$GLB,, | Performed by: NURSE PRACTITIONER

## 2023-07-14 PROCEDURE — 99215 PR OFFICE/OUTPT VISIT, EST, LEVL V, 40-54 MIN: ICD-10-PCS | Mod: S$GLB,,, | Performed by: NURSE PRACTITIONER

## 2023-07-14 PROCEDURE — 3044F PR MOST RECENT HEMOGLOBIN A1C LEVEL <7.0%: ICD-10-PCS | Mod: CPTII,S$GLB,, | Performed by: NURSE PRACTITIONER

## 2023-07-14 PROCEDURE — 1157F PR ADVANCE CARE PLAN OR EQUIV PRESENT IN MEDICAL RECORD: ICD-10-PCS | Mod: CPTII,S$GLB,, | Performed by: NURSE PRACTITIONER

## 2023-07-14 PROCEDURE — 3288F FALL RISK ASSESSMENT DOCD: CPT | Mod: CPTII,S$GLB,, | Performed by: NURSE PRACTITIONER

## 2023-07-14 PROCEDURE — 1159F PR MEDICATION LIST DOCUMENTED IN MEDICAL RECORD: ICD-10-PCS | Mod: CPTII,S$GLB,, | Performed by: NURSE PRACTITIONER

## 2023-07-14 PROCEDURE — 1100F PR PT FALLS ASSESS DOC 2+ FALLS/FALL W/INJURY/YR: ICD-10-PCS | Mod: CPTII,S$GLB,, | Performed by: NURSE PRACTITIONER

## 2023-07-14 PROCEDURE — 3077F PR MOST RECENT SYSTOLIC BLOOD PRESSURE >= 140 MM HG: ICD-10-PCS | Mod: CPTII,S$GLB,, | Performed by: NURSE PRACTITIONER

## 2023-07-14 PROCEDURE — 3077F SYST BP >= 140 MM HG: CPT | Mod: CPTII,S$GLB,, | Performed by: NURSE PRACTITIONER

## 2023-07-14 PROCEDURE — 1157F ADVNC CARE PLAN IN RCRD: CPT | Mod: CPTII,S$GLB,, | Performed by: NURSE PRACTITIONER

## 2023-07-14 PROCEDURE — 3078F DIAST BP <80 MM HG: CPT | Mod: CPTII,S$GLB,, | Performed by: NURSE PRACTITIONER

## 2023-07-14 PROCEDURE — 1125F PR PAIN SEVERITY QUANTIFIED, PAIN PRESENT: ICD-10-PCS | Mod: CPTII,S$GLB,, | Performed by: NURSE PRACTITIONER

## 2023-07-14 PROCEDURE — 99215 OFFICE O/P EST HI 40 MIN: CPT | Mod: S$GLB,,, | Performed by: NURSE PRACTITIONER

## 2023-07-14 PROCEDURE — 3008F PR BODY MASS INDEX (BMI) DOCUMENTED: ICD-10-PCS | Mod: CPTII,S$GLB,, | Performed by: NURSE PRACTITIONER

## 2023-07-14 PROCEDURE — 1100F PTFALLS ASSESS-DOCD GE2>/YR: CPT | Mod: CPTII,S$GLB,, | Performed by: NURSE PRACTITIONER

## 2023-07-14 PROCEDURE — 4010F ACE/ARB THERAPY RXD/TAKEN: CPT | Mod: CPTII,S$GLB,, | Performed by: NURSE PRACTITIONER

## 2023-07-14 PROCEDURE — 4010F PR ACE/ARB THEARPY RXD/TAKEN: ICD-10-PCS | Mod: CPTII,S$GLB,, | Performed by: NURSE PRACTITIONER

## 2023-07-14 PROCEDURE — 3044F HG A1C LEVEL LT 7.0%: CPT | Mod: CPTII,S$GLB,, | Performed by: NURSE PRACTITIONER

## 2023-07-14 RX ORDER — ABIRATERONE 500 MG/1
1000 TABLET ORAL DAILY
Qty: 120 TABLET | Refills: 11 | Status: SHIPPED | OUTPATIENT
Start: 2023-07-14 | End: 2023-07-17

## 2023-07-14 RX ORDER — TRAMADOL HYDROCHLORIDE 50 MG/1
TABLET ORAL
Qty: 90 EACH | Refills: 0 | Status: SHIPPED | OUTPATIENT
Start: 2023-07-14 | End: 2023-08-16 | Stop reason: SDUPTHER

## 2023-07-14 RX ORDER — ABIRATERONE 500 MG/1
1000 TABLET ORAL DAILY
Qty: 60 TABLET | Refills: 11 | Status: SHIPPED | OUTPATIENT
Start: 2023-07-14 | End: 2023-07-14 | Stop reason: SDUPTHER

## 2023-07-14 RX ORDER — PREDNISONE 5 MG/1
5 TABLET ORAL DAILY
Qty: 90 TABLET | Refills: 3 | Status: ON HOLD | OUTPATIENT
Start: 2023-07-14 | End: 2023-11-30 | Stop reason: HOSPADM

## 2023-07-14 RX ORDER — TRIAMCINOLONE ACETONIDE 1 MG/G
CREAM TOPICAL 2 TIMES DAILY
Qty: 453.6 G | Refills: 1 | Status: ON HOLD | OUTPATIENT
Start: 2023-07-14 | End: 2023-11-30 | Stop reason: HOSPADM

## 2023-07-14 NOTE — TELEPHONE ENCOUNTER
----- Message from Shantal Tian sent at 7/14/2023  2:17 PM CDT -----  Dian with [employer] is calling b/c pts ins will only cover Zytiga 250 mg tablet. They are needing a new rx for 250 mg for 1000 mg day     [phone] ext [phone]

## 2023-07-14 NOTE — PROGRESS NOTES
[facility] HEMATOLGY ONCOLOGY     Subjective:       Patient ID: Jama James is a 67 y.o. male presents today for chemotherapy education.      Chief Complaint: Prostate cancer      HPI  He is receiving the ADT from Dr. Goodwin every 6 months.   He is due to start on Zytiga and prednisone.   He is here with a family friend.     Oncology History   Prostate cancer metastatic to bone   7/6/2023 Initial Diagnosis    Prostate cancer metastatic to bone     7/14/2023 -  Chemotherapy    Treatment Summary   Plan Name: OP ABIRATERONE DAILY PREDNISONE BID  Treatment Goal: Control  Status: Active  Start Date: 7/14/2023 (Planned)  End Date: 7/14/2023 (Planned)  Provider: Maurilio Lawson MD  Chemotherapy: abiraterone (ZYTIGA) 500 mg Tab, 1,000 mg, Oral, Daily, 0 of 1 cycle, Start date: --, End date: --     Prostate cancer metastatic to lung   7/6/2023 Initial Diagnosis    Prostate cancer metastatic to lung     7/14/2023 -  Chemotherapy    Treatment Summary   Plan Name: OP ABIRATERONE DAILY PREDNISONE BID  Treatment Goal: Control  Status: Active  Start Date: 7/14/2023 (Planned)  End Date: 7/14/2023 (Planned)  Provider: Maurilio Lawson MD  Chemotherapy: abiraterone (ZYTIGA) 500 mg Tab, 1,000 mg, Oral, Daily, 0 of 1 cycle, Start date: --, End date: --         Past Medical History:   Diagnosis Date    Abnormal CT scan 7/6/2023    Abnormal positron emission tomography (PET) scan 7/6/2023    Anxiety     Asbestosis 08/04/2015    Atrial fibrillation     Bilateral adrenal adenomas     COPD (chronic obstructive pulmonary disease)     COVID-19 virus infection 07/28/2022    Depression     Elevated PSA 7/6/2023    High cholesterol     HTN (hypertension)     Malignant neoplasm of prostate     Multiple lung nodules     Nodule of upper lobe of left lung 03/30/2023    6 mm spiculated on cta chest 3/21/23    On home oxygen therapy 05/24/2023    Pneumonia     Prostate cancer 5/27/2020    Prostate cancer metastatic to bone 7/6/2023    Prostate  cancer metastatic to lung 7/6/2023    Ulcerative colitis        Past Surgical History:   Procedure Laterality Date    NECK SURGERY      right knee      TRANSRECTAL BIOPSY OF PROSTATE WITH ULTRASOUND GUIDANCE Bilateral 3/18/2020    Procedure: BIOPSY, PROSTATE, RECTAL APPROACH, WITH US GUIDANCE;  Surgeon: Bill Jeong MD;  Location: [facility];  Service: Urology;  Laterality: Bilateral;       Social History     Socioeconomic History    Marital status:    Tobacco Use    Smoking status: Every Day     Packs/day: 0.50     Years: 50.00     Pack years: 25.00     Types: Cigarettes     Passive exposure: Past    Smokeless tobacco: Never   Substance and Sexual Activity    Alcohol use: Yes     Comment: 8 16oz beers per day    Drug use: No     Social Determinants of Health     Financial Resource Strain: Low Risk     Difficulty of Paying Living Expenses: Not very hard   Food Insecurity: No Food Insecurity    Worried About Running Out of Food in the Last Year: Never true    Ran Out of Food in the Last Year: Never true   Transportation Needs: No Transportation Needs    Lack of Transportation (Medical): No    Lack of Transportation (Non-Medical): No   Physical Activity: Insufficiently Active    Days of Exercise per Week: 1 day    Minutes of Exercise per Session: 30 min   Stress: Stress Concern Present    Feeling of Stress : Rather much   Social Connections: Moderately Integrated    Frequency of Communication with Friends and Family: More than three times a week    Frequency of Social Gatherings with Friends and Family: Once a week    Attends Taoist Services: Never    Active Member of Clubs or Organizations: Yes    Attends Club or Organization Meetings: Never    Marital Status:    Housing Stability: Unknown    Unable to Pay for Housing in the Last Year: No    Unstable Housing in the Last Year: No       Family History   Problem Relation Age of Onset    Cancer Mother         Uterine and Ovarian cancer    Diabetes  Mother     Diabetes Sister        Review of patient's allergies indicates:  No Known Allergies      Current Outpatient Medications:     albuterol (PROVENTIL/VENTOLIN HFA) 90 mcg/actuation inhaler, INHALE ONE (1) OR TWO (2) SPRAYS BY MOUTH EVERY SIX (6) HOURS AS NEEDED FOR WHEEZING, Disp: 18 g, Rfl: 5    albuterol-ipratropium (DUO-NEB) 2.5 mg-0.5 mg/3 mL nebulizer solution, PLACE ONE (1) VIAL IN NEBULIZER AND INHALE EVERY 6 HOURS AS DIRECTED AS NEEDED FOR WHEEZING, Disp: 180 mL, Rfl: 2    amiodarone (PACERONE) 200 MG Tab, TAKE ONE (1) TABLET ONCE DAILY FOR HEART RYHTHM, Disp: 30 tablet, Rfl: 3    amlodipine-benazepril 5-20 mg (LOTREL) 5-20 mg per capsule,  90 cap, 0 Refill(s), Disp: , Rfl:     apixaban (ELIQUIS) 5 mg Tab,  60 EA, TAKE 1 TABLET BY MOUTH TWICE DAILY, 0 Refill(s), Soft Stop, Disp: , Rfl:     aspirin (ECOTRIN) 81 MG EC tablet, Take 1 tablet (81 mg total) by mouth once daily., Disp: 90 tablet, Rfl: 3    atorvastatin (LIPITOR) 10 MG tablet, TAKE 1 TABLET AT BEDTIME FOR CHOLESTEROL (TAKE WITH CO-ENZYME Q-10), Disp: 90 tablet, Rfl: 3    diltiaZEM (CARDIZEM) 30 MG tablet, Take 1 tablet (30 mg total) by mouth every 12 (twelve) hours., Disp: 60 tablet, Rfl: 11    ferrous sulfate (FEOSOL) 325 mg (65 mg iron) Tab tablet, Take 1 tablet (325 mg total) by mouth every Mon, Wed, Fri., Disp: 30 tablet, Rfl: 1    finasteride (PROSCAR) 5 mg tablet, TAKE ONE (1) TABLET EVERY MORNING FOR PROSTATE, Disp: 90 tablet, Rfl: 1    fluticasone-umeclidin-vilanter (TRELEGY ELLIPTA) 200-62.5-25 mcg inhaler, Inhale 1 puff into the lungs once daily., Disp: 60 each, Rfl: 11    losartan (COZAAR) 50 MG tablet, Take 1 tablet (50 mg total) by mouth 2 (two) times a day. (Patient taking differently: Take 50 mg by mouth once daily.), Disp: 180 tablet, Rfl: 3    OXYGEN-AIR DELIVERY SYSTEMS MISC, by Misc.(Non-Drug; Combo Route) route., Disp: , Rfl:     pantoprazole (PROTONIX) 40 MG tablet, TAKE 1 TABLET EVERY MORNING (30 MINUTES BEFORE  BREAKFAST) FOR STOMACH, Disp: 90 tablet, Rfl: 1    paroxetine (PAXIL) 40 MG tablet, TAKE ONE (1) TABLET EVERY MORNING FOR MOOD AND NERVE PAIN, Disp: 90 tablet, Rfl: 1    tamsulosin (FLOMAX) 0.4 mg Cap, TAKE 1 CAPSULE AT BEDTIME FOR PROSTATE, Disp: 30 capsule, Rfl: 3    traZODone (DESYREL) 100 MG tablet, TAKE 1 TABLET AT BEDTIME FOR SLEEP AND NERVE PAIN, Disp: 90 tablet, Rfl: 1    triamcinolone acetonide 0.1% (KENALOG) 0.1 % cream, Apply topically 2 (two) times daily., Disp: 453.6 g, Rfl: 1    abiraterone (ZYTIGA) 500 mg Tab, Take 1,000 mg by mouth Daily., Disp: 60 tablet, Rfl: 11    bicalutamide (CASODEX) 50 MG Tab, Take 1 tablet (50 mg total) by mouth once daily., Disp: 14 tablet, Rfl: 0    ipratropium (ATROVENT) 0.02 % nebulizer solution, Take 2.5 mLs (500 mcg total) by nebulization every 6 (six) hours as needed for Wheezing. Rescue, Disp: 75 mL, Rfl: 0    predniSONE (DELTASONE) 5 MG tablet, Take 1 tablet (5 mg total) by mouth once daily., Disp: 90 tablet, Rfl: 3    traMADoL (ULTRAM) 50 mg tablet, Take 1-2 tablets every 8 hours as needed for pain, Disp: 90 each, Rfl: 0    All medications and past history have been reviewed.    Review of Systems   Constitutional:  Positive for unexpected weight change. Negative for appetite change.   HENT:  Negative for mouth sores.    Eyes:  Positive for visual disturbance.   Respiratory:  Positive for shortness of breath. Negative for cough.    Cardiovascular:  Negative for chest pain.   Gastrointestinal:  Positive for diarrhea. Negative for abdominal pain.   Genitourinary:  Positive for frequency.   Musculoskeletal:  Negative for back pain.   Skin:  Negative for rash.   Neurological:  Positive for headaches.   Hematological:  Negative for adenopathy.   Psychiatric/Behavioral:  The patient is not nervous/anxious.      Objective:        Physcial Examination  VITAL SIGNS:    Body surface area is 2.19 meters squared.   Pain Assessment  Vitals:    07/14/23 1312   BP: (!) 148/70  "  Pulse: 77   Resp: 18   Temp: 98.2 °F (36.8 °C)   SpO2: 95%   Weight: 88.3 kg (194 lb 9.6 oz)   Height: 6' 5" (1.956 m)   PainSc:   5   PainLoc: Groin     Quality:throbbing  Onset: gradual  Duration: 3 months  What relieves the pain? pain medication  What cause or increases the pain? Standing and Laying  Plan: Pain medications prescribed.     Wt Readings from Last 5 Encounters:   07/14/23 88.3 kg (194 lb 9.6 oz)   07/13/23 87.5 kg (193 lb)   07/07/23 86.8 kg (191 lb 4.8 oz)   06/30/23 92.1 kg (203 lb)   06/16/23 87.5 kg (193 lb)       Physical Exam  Constitutional:       Appearance: Normal appearance. He is ill-appearing.   HENT:      Head: Normocephalic and atraumatic.      Mouth/Throat:      Mouth: Mucous membranes are moist.      Comments: Missing teeth    Cardiovascular:      Rate and Rhythm: Normal rate and regular rhythm.      Pulses: Normal pulses.      Heart sounds: Normal heart sounds.   Pulmonary:      Effort: Pulmonary effort is normal. No respiratory distress.      Breath sounds: Normal breath sounds. No wheezing.   Abdominal:      General: There is no distension.      Palpations: Abdomen is soft. There is no mass.      Tenderness: There is no abdominal tenderness.   Musculoskeletal:         General: No swelling. Normal range of motion.      Right lower leg: No edema.      Left lower leg: No edema.   Skin:     General: Skin is warm and dry.      Capillary Refill: Capillary refill takes 2 to 3 seconds.      Findings: Rash present. No bruising.   Neurological:      Mental Status: He is alert and oriented to person, place, and time. Mental status is at baseline.      Motor: No weakness.   Psychiatric:         Mood and Affect: Mood normal.         Behavior: Behavior normal.            Laboratory and Radiology   Lab Results   Component Value Date    WBC 7.0 06/30/2023    RBC 4.06 (L) 06/30/2023    HGB 12.5 (L) 06/30/2023    HCT 39.5 06/30/2023    MCV 97.3 06/30/2023    MCH 30.8 06/30/2023    MCHC 31.6 (L) " 06/30/2023    RDW 13.1 06/30/2023     06/30/2023    MPV 10.1 06/30/2023    GRAN 4.4 03/27/2023    GRAN 65.5 03/27/2023    LYMPH 812 (L) 06/30/2023    LYMPH 11.6 06/30/2023    MONO 560 06/30/2023    MONO 8.0 06/30/2023     06/30/2023    BASO 63 06/30/2023    EOSINOPHIL 2.0 06/30/2023    BASOPHIL 0.9 06/30/2023     BMP  Lab Results   Component Value Date     05/22/2023    K 4.3 05/22/2023     05/22/2023    CO2 28 05/22/2023    BUN 24 05/22/2023    CREATININE 0.8 05/31/2023    CALCIUM 9.9 05/22/2023    ANIONGAP 6 (L) 03/27/2023    ESTGFRAFRICA >60.0 07/28/2022    EGFRNONAA >60.0 07/28/2022     Lab Results   Component Value Date    ALT 11 05/22/2023    AST 16 05/22/2023    ALKPHOS 89 03/26/2023    BILITOT 0.4 05/22/2023     No results found for this or any previous visit (from the past 2160 hour(s)).  No results found for this or any previous visit (from the past 2160 hour(s)).  Results for orders placed or performed during the hospital encounter of 05/31/23 (from the past 2160 hour(s))   MRI Prostate W W/O Contrast    Impression    Mild enlarged prostate gland demonstrating a PI-RADS 5 lesion for which clinically significant prostate cancer is highly likely to be present.  Broad capsular abutment and above with the anterior wall the rectum is present without visible invasion.  Mildly suspicious bilateral common femoral chain lymph nodes.    Overall Assessment:    PIRADS 5 (clinically significant cancer is highly likely to be present)    Number of targets created for potential MR/US fusion biopsy    1      Electronically signed by: Dmitry Reno MD  Date:    05/31/2023  Time:    17:09       Pathology  Pathology Results  (Last 10 years)                 03/18/20 1127  Specimen to Pathology, Surgery Urology Final result    Narrative:  Pre-op Diagnosis: Elevated PSA [R97.20]   Procedure(s):   BIOPSY, PROSTATE, RECTAL APPROACH, WITH US GUIDANCE   Number of specimens: 12   Name of specimens: 1.  "Right base 2. Right middle 3. Right   apex 4. Right lateral base 5. Right lateral middle 6. Right   lateral apex 7. Left base 8. Left middle 9. Left apex 10.   Left lateral base 11. Left lateral middle 12. Left lateral   apex   Specimen total (fresh, frozen, permanent):->12             Zytiga®     Generic name: Abiraterone acetate   Zytiga® is the trade name for the generic drug abiraterone acetate. In some cases, health care professionals may use the generic name abiraterone acetate when referring to the trade name Zytiga®.   Drug type:   Zytiga® is a type of hormone therapy. This medication is classified as an adrenal inhibitor". (For more detail, see How Zytiga® Works below)   What Zytiga Is Used For:  Zytiga® is indicated for use in combination with prednisone for the treatment of men with metastatic castration-resistant prostate cancer who have already received prior chemotherapy containing docetaxel.   Note: If a drug has been approved for one use, physicians may elect to use this same drug for other problems if they believe it may be helpful.   How Zytiga Is Given:  Zytiga® is a pill, taken by mouth. It is taken once a day.   It must be taken on an empty stomach (take the pill at least 2 hours after a meal, and do not eat for at least 1 hour after taking the pill)   It should be taken at approximately the same time each day.   Prednisone [10mg daily] should be taken along with Zytiga®.   The amount of Zytiga® you receive depends on many factors. Your doctor will determine your dose and schedule   Side Effects:  Important things to remember about the side effects of Zytiga®:   Most people will not experience all of the Zytiga® side effects listed.   Zytiga® side effects are often predictable in terms of their onset, duration, and severity.   Zytiga® side effects are almost always reversible and will go away after therapy is complete.   Zytiga® side effects may be quite manageable. There are many options to " minimize or prevent the side effects of Zytiga®.   The following side effects are common (occurring in greater than 30%) for patients taking Zytiga®:   Fluid Retention   Increased triglycerides   Increased liver enzymes (AST)   These side effects are less common (occurring in 10-29%) side effects for patients receiving Zytiga®:   Joint swelling / discomfort   Decreased levels of potassium in your blood (hypokalemia)   Peripheral Edema (swelling of the hands/feet)   Muscle aches / discomfort   Decreased levels of phosphorus in your blood (hypophosphatemia)   Hot flashes   Diarrhea   Urinary tract infections   Cough   Increased ALT (liver enzyme)   Zytiga® works by inhibiting specific enzymes in your adrenal glands that are responsible for making androgens. For this reason, there is an increased risk of over production of mineral corticosteroids in your body while on Zytiga®. As a result, Zytiga® may cause hypertension [high blood pressure], hypokalemia [low levels of potassium in the blood], and fluid retention. Because of this risk, Zytiga® should be used cautiously in patients with a strong cardiovascular history, and patients should be monitored closely for these symptoms. Dosing along with prednisone will decrease the risk of mineral corticosteroids excess.   Not all side effects are listed above. Side effects that are very rare -- occurring in less than about 10 percent of patients -- are not listed here. But you should always inform your health care provider if you experience any unusual symptoms.   When to contact your doctor or health care provider:  Contact your health care provider immediately, day or night, if you should experience any of the following symptoms:   Fever of 100.4° F (38° C or higher, chills)   Chest Pain or Irregular heart beats   Difficulty breathing   Inability to urinate for 8 or more hours   Always inform your health care provider if you experience any unusual symptoms.   The  following symptoms require medical attention, but are not an emergency. Contact your health care provider within 24 hours of noticing any of the following:   Dizziness / Light headedness   Significant headache   Diarrhea (4-6 episodes in a 24-hour period)   Unusual bleeding or bruising   Black or tarry stools, or blood in your stools   Extreme fatigue (unable to carry on self-care activities)   Yellow coloring of your skin or eyes   Unusual elevation in blood pressure   Always inform your health care provider if you experience any unusual symptoms.   Precautions:  Before starting Zytiga® treatment, make sure you tell your doctor about any other medications you are taking (including prescription, over-the-counter, vitamins, herbal remedies, etc.). Do not take aspirin, products containing aspirin unless your doctor specifically permits this.   Zytiga® is currently not indicated for use in women. However, if Zytiga® is given to a woman, getting pregnant should be avoided and the woman should not breast feed.   Zytiga® is currently not indicated for use in women. Zytiga® should not be given to women who are pregnant or intend to become pregnant. Pregnancy category X (Zytiga® may cause fetal harm when given to a pregnant woman. If a woman becomes pregnant while taking Zytiga®, the medication must be stopped immediately and the woman given appropriate counseling).   It is not known if Zytiga® is excreted in semen, therefore, men should use a condom and another method of birth control during treatment and for 1 week following the last dose of Zytiga®.   Self-Care Tips:  Zytiga® and prednisone are to be used together and neither medication should be interrupted or stopped unless your healthcare provider tells you to. Take the medication exactly as directed.   Take at the same time each day   Take on an empty stomach   You will need to continue receiving your LHRH/GnRH agonists injections (ie Zoladex®, Lupron®, Trelstar®,  etc) throughout treatment with Zytiga®.   If you are experiencing hot flashes, wear light clothing, stay in a cool environment to try and reduce symptoms. Consult your healthcare provider if these worsen or become intolerable.   Get plenty of rest.   Maintain good nutrition.   If you experience symptoms or side effects, be sure to discuss them with your health care team. They can prescribe medications and/or offer other suggestions that are effective in managing such problems.   Monitoring and Testing:  You will be checked regularly by your doctor while you are taking Zytiga®, to monitor side effects and check your response to therapy. Periodic blood work will be obtained to monitor your complete blood count (CBC) as well as the function of other organs (such as your kidneys and liver), as deemed necessary by your doctor.   How Zytiga Works:  Hormones are chemical substances that are produced by glands in the body, which enter the bloodstream and cause effects in other tissues. The use of hormone therapy to treat cancer is based on the observation that receptors for specific hormones that are needed for cell growth are on the surface of some tumor cells. Hormone therapy can work by stopping the production of a certain hormone, blocking hormone receptors or substituting chemically similar agents for the active hormone, which cannot be used by the tumor cell. Different types of hormone therapies are categorized by their function and/or the type of hormone that is affected.   The growth of prostate cancer is stimulated by male hormones (androgens / testosterone). Decreasing the production of these hormones is critical in helping men fight prostate cancer. Androgens are primarily made by the testicles and adrenal glands. However, in men with advanced prostate cancer, the metastatic tumors themselves have the capability to produce testosterone. Generally, prostate cancer responds to treatment that decreases androgen  levels. Many androgen deprivation therapies, decrease androgen production by the testicles but do not affect androgen production elsewhere in the body, such as the adrenal glands or in the tumor.   Zytiga® works in a different manner than other androgen deprivation therapies. Zytiga® interferes with an enzyme that is expressed in testicular, adrenal, and prostatic tumor tissues and is required as part of the bodys androgen producing process. Because of this interference the amount of androgens produced are decreased. Zytiga®, blocks androgen production at three sources; the testes, the adrenal glands, as well as from the tumor itself.   Note: We strongly encourage you to talk with your health care professional about your specific medical condition and treatments. The information contained in this website is meant to be helpful and educational, but is not a substitute for medical advice.   All lab results and imaging results have been reviewed and discussed with the patient.      PROGRESS NOTES: I met with the patient Mr James  today for chemotherapy education. he will be starting treatment with zytiga . We discussed the mechanism of action, potential side effects of this treatment as well as ways he can manage them at home. Some of these side effects include but or not limited to fever, nausea, vomiting, decreased appetite, fatigue, weakness, cytopenias, myalgia/arthralgia, constipation, diarrhea, bleeding, headache, shortness of breath, nail changes, taste change, hair thinning/loss, mood disturbances, or edema. We also discussed dietary modifications he should make although this will be discussed in more detail with the dietician. he was provided with anti-emetic medication, a copy of all of the information we discussed today as well as our contact information. he will be provided a schedule on his first day of treatment. We will obtain labs on a weekly basis and the patient will follow-up with the physician for  toxicity monitoring throughout treatment. All questions were answered and an informed consent was obtained. he was reminded to certainly contact us sooner if needed.  Attached to the patients folder and discussed with the patient the 24 hour/ 7 days a week after hours telephone number for the physician.  Patient notified to call anytime 24/7 because their is a physician on call for any problems that may arise.  Patient also notified to report to [facility] if they can not get in touch with a physician after hours.  Discussed the five wishes booklet with the patient and their family.           Assessment/Plan:       1. Prostate cancer metastatic to bone    2. Prostate cancer metastatic to lung    3. Stasis dermatitis of both legs      Prostate cancer metastatic to bone  -     Ambulatory referral/consult to [facility]  -     predniSONE (DELTASONE) 5 MG tablet; Take 1 tablet (5 mg total) by mouth once daily.  Dispense: 90 tablet; Refill: 3  -     abiraterone (ZYTIGA) 500 mg Tab; Take 1,000 mg by mouth Daily.  Dispense: 60 tablet; Refill: 11  -     CBC Auto Differential; Standing  -     CMP; Standing  -     Prostate Specific Antigen, Diagnostic; Standing  -     traMADoL (ULTRAM) 50 mg tablet; Take 1-2 tablets every 8 hours as needed for pain  Dispense: 90 each; Refill: 0    Prostate cancer metastatic to lung  -     Ambulatory referral/consult to [facility]    Stasis dermatitis of both legs  -     triamcinolone acetonide 0.1% (KENALOG) 0.1 % cream; Apply topically 2 (two) times daily.  Dispense: 453.6 g; Refill: 1          I have explained and the patient understands all of  the current recommendation(s). I have answered all of their questions to the best of my ability and to their complete satisfaction.   The patient is to continue with the current management plan.             Standing Labs Ordered:  Cbc cmp monthly   PSA every 3 months      Total Face to Face Time: 60 minutes face to face with the patient and  their family discussing the chemotherapy side-effects and when to call our office.   Electronically signed by: Electronically signed by: Josephine Peraza, MSN, APRN, AGNP-C

## 2023-07-17 DIAGNOSIS — C79.51 PROSTATE CANCER METASTATIC TO BONE: ICD-10-CM

## 2023-07-17 DIAGNOSIS — C61 PROSTATE CANCER METASTATIC TO BONE: ICD-10-CM

## 2023-07-17 RX ORDER — ABIRATERONE ACETATE 250 MG/1
1000 TABLET ORAL DAILY
Qty: 120 TABLET | Refills: 11 | Status: SHIPPED | OUTPATIENT
Start: 2023-07-17

## 2023-07-18 ENCOUNTER — TELEPHONE (OUTPATIENT)
Dept: HEMATOLOGY/ONCOLOGY | Facility: CLINIC | Age: 68
End: 2023-07-18

## 2023-07-18 NOTE — TELEPHONE ENCOUNTER
----- Message from Micaela Soares sent at 7/18/2023  8:38 AM CDT -----  Priscila from [employer] called and said they need progress notes faxed to the for the prior auth for his Abiraterone. Fax# [phone]

## 2023-07-24 ENCOUNTER — TELEPHONE (OUTPATIENT)
Dept: HEMATOLOGY/ONCOLOGY | Facility: CLINIC | Age: 68
End: 2023-07-24

## 2023-07-24 NOTE — TELEPHONE ENCOUNTER
I spoke with pt sister and reminded her that pt needs labs done prior to appt here on 8/1/23 she verbalized understanding .

## 2023-07-25 ENCOUNTER — LAB VISIT (OUTPATIENT)
Dept: LAB | Facility: HOSPITAL | Age: 68
End: 2023-07-25
Attending: NURSE PRACTITIONER
Payer: MEDICARE

## 2023-07-25 DIAGNOSIS — C79.51 PROSTATE CANCER METASTATIC TO BONE: ICD-10-CM

## 2023-07-25 DIAGNOSIS — C61 PROSTATE CANCER METASTATIC TO BONE: ICD-10-CM

## 2023-07-25 LAB
ALBUMIN SERPL BCP-MCNC: 3.9 G/DL (ref 3.5–5.2)
ALP SERPL-CCNC: 175 U/L (ref 55–135)
ALT SERPL W/O P-5'-P-CCNC: 17 U/L (ref 10–44)
ANION GAP SERPL CALC-SCNC: 13 MMOL/L (ref 8–16)
AST SERPL-CCNC: 21 U/L (ref 10–40)
BASOPHILS # BLD AUTO: 0.06 K/UL (ref 0–0.2)
BASOPHILS NFR BLD: 0.7 % (ref 0–1.9)
BILIRUB SERPL-MCNC: 0.4 MG/DL (ref 0.1–1)
BUN SERPL-MCNC: 18 MG/DL (ref 8–23)
CALCIUM SERPL-MCNC: 9.8 MG/DL (ref 8.7–10.5)
CHLORIDE SERPL-SCNC: 104 MMOL/L (ref 95–110)
CO2 SERPL-SCNC: 24 MMOL/L (ref 23–29)
COMPLEXED PSA SERPL-MCNC: 65.3 NG/ML (ref 0–4)
CREAT SERPL-MCNC: 0.9 MG/DL (ref 0.5–1.4)
DIFFERENTIAL METHOD: ABNORMAL
EOSINOPHIL # BLD AUTO: 0.2 K/UL (ref 0–0.5)
EOSINOPHIL NFR BLD: 2.6 % (ref 0–8)
ERYTHROCYTE [DISTWIDTH] IN BLOOD BY AUTOMATED COUNT: 14.1 % (ref 11.5–14.5)
EST. GFR  (NO RACE VARIABLE): >60 ML/MIN/1.73 M^2
GLUCOSE SERPL-MCNC: 106 MG/DL (ref 70–110)
HCT VFR BLD AUTO: 43.5 % (ref 40–54)
HGB BLD-MCNC: 14.3 G/DL (ref 14–18)
IMM GRANULOCYTES # BLD AUTO: 0.03 K/UL (ref 0–0.04)
IMM GRANULOCYTES NFR BLD AUTO: 0.4 % (ref 0–0.5)
LYMPHOCYTES # BLD AUTO: 1.7 K/UL (ref 1–4.8)
LYMPHOCYTES NFR BLD: 19.6 % (ref 18–48)
MCH RBC QN AUTO: 31.1 PG (ref 27–31)
MCHC RBC AUTO-ENTMCNC: 32.9 G/DL (ref 32–36)
MCV RBC AUTO: 95 FL (ref 82–98)
MONOCYTES # BLD AUTO: 0.6 K/UL (ref 0.3–1)
MONOCYTES NFR BLD: 7.5 % (ref 4–15)
NEUTROPHILS # BLD AUTO: 5.8 K/UL (ref 1.8–7.7)
NEUTROPHILS NFR BLD: 69.2 % (ref 38–73)
NRBC BLD-RTO: 0 /100 WBC
PLATELET # BLD AUTO: 326 K/UL (ref 150–450)
PMV BLD AUTO: 9.4 FL (ref 9.2–12.9)
POTASSIUM SERPL-SCNC: 4.7 MMOL/L (ref 3.5–5.1)
PROT SERPL-MCNC: 7.7 G/DL (ref 6–8.4)
RBC # BLD AUTO: 4.6 M/UL (ref 4.6–6.2)
SODIUM SERPL-SCNC: 141 MMOL/L (ref 136–145)
WBC # BLD AUTO: 8.4 K/UL (ref 3.9–12.7)

## 2023-07-25 PROCEDURE — 85025 COMPLETE CBC W/AUTO DIFF WBC: CPT | Performed by: NURSE PRACTITIONER

## 2023-07-25 PROCEDURE — 36415 COLL VENOUS BLD VENIPUNCTURE: CPT | Performed by: NURSE PRACTITIONER

## 2023-07-25 PROCEDURE — 84153 ASSAY OF PSA TOTAL: CPT | Performed by: NURSE PRACTITIONER

## 2023-07-25 PROCEDURE — 80053 COMPREHEN METABOLIC PANEL: CPT | Performed by: NURSE PRACTITIONER

## 2023-07-31 NOTE — PROGRESS NOTES
[facility] Hematology/Oncology  PROGRESS NOTE - 2nd Follow-up Visit      Subjective:       Patient ID:   NAME: Jama James : 1955     68 y.o. male    Referring Doc: Myles Hutchison III, *  Other Physicians: Ann Goodwin; Tiffany Smith NP(Bud); Vince Combs (Card)        Chief Complaint: prostate cancer f/u      History of Present Illness:     Patient returns today for a 2nd regularly scheduled follow-up visit.  The patient is here today to go over the results of the recently ordered labs, tests and studies. He is here with his sister.    Breathing ok currently but is on O2 at home an inhalers. No CP, Ha's or N/V    He saw Josephine Hernandez NP; he started abiraterone last week and seems to be tolerating it well so far           ROS:   GEN: normal without any fever, night sweats or weight loss  HEENT: normal with no HA's, sore throat, stiff neck, changes in vision  CV: normal with no CP, SOB, PND, SUBRAMANIAN or orthopnea  PULM: normal with no SOB, cough, hemoptysis, sputum or pleuritic pain  GI: normal with no abdominal pain, nausea, vomiting, constipation, diarrhea, melanotic stools, BRBPR, or hematemesis  : LUTS sx's; adequate flow currently  BREAST: normal with no mass, discharge, pain  SKIN: normal with no rash, erythema, bruising, or swelling    Pain Scale:  0    Allergies:  Review of patient's allergies indicates:  No Known Allergies    Medications:    Current Outpatient Medications:     abiraterone (ZYTIGA) 250 mg Tab, Take 4 tablets (1,000 mg total) by mouth once daily., Disp: 120 tablet, Rfl: 11    albuterol (PROVENTIL/VENTOLIN HFA) 90 mcg/actuation inhaler, INHALE ONE (1) OR TWO (2) SPRAYS BY MOUTH EVERY SIX (6) HOURS AS NEEDED FOR WHEEZING, Disp: 18 g, Rfl: 5    albuterol-ipratropium (DUO-NEB) 2.5 mg-0.5 mg/3 mL nebulizer solution, PLACE ONE (1) VIAL IN NEBULIZER AND INHALE EVERY 6 HOURS AS DIRECTED AS NEEDED FOR WHEEZING, Disp: 180 mL, Rfl: 2    amiodarone (PACERONE) 200 MG Tab, TAKE ONE (1) TABLET ONCE DAILY  FOR HEART RYHTHM, Disp: 30 tablet, Rfl: 3    amlodipine-benazepril 5-20 mg (LOTREL) 5-20 mg per capsule,  90 cap, 0 Refill(s), Disp: , Rfl:     apixaban (ELIQUIS) 5 mg Tab,  60 EA, TAKE 1 TABLET BY MOUTH TWICE DAILY, 0 Refill(s), Soft Stop, Disp: , Rfl:     aspirin (ECOTRIN) 81 MG EC tablet, Take 1 tablet (81 mg total) by mouth once daily., Disp: 90 tablet, Rfl: 3    atorvastatin (LIPITOR) 10 MG tablet, TAKE 1 TABLET AT BEDTIME FOR CHOLESTEROL (TAKE WITH CO-ENZYME Q-10), Disp: 90 tablet, Rfl: 3    diltiaZEM (CARDIZEM) 30 MG tablet, Take 1 tablet (30 mg total) by mouth every 12 (twelve) hours., Disp: 60 tablet, Rfl: 11    ferrous sulfate (FEOSOL) 325 mg (65 mg iron) Tab tablet, Take 1 tablet (325 mg total) by mouth every Mon, Wed, Fri., Disp: 30 tablet, Rfl: 1    finasteride (PROSCAR) 5 mg tablet, TAKE ONE (1) TABLET EVERY MORNING FOR PROSTATE, Disp: 90 tablet, Rfl: 1    fluticasone-umeclidin-vilanter (TRELEGY ELLIPTA) 200-62.5-25 mcg inhaler, Inhale 1 puff into the lungs once daily., Disp: 60 each, Rfl: 11    losartan (COZAAR) 50 MG tablet, Take 1 tablet (50 mg total) by mouth 2 (two) times a day. (Patient taking differently: Take 50 mg by mouth once daily.), Disp: 180 tablet, Rfl: 3    OXYGEN-AIR DELIVERY SYSTEMS MISC, by Misc.(Non-Drug; Combo Route) route., Disp: , Rfl:     pantoprazole (PROTONIX) 40 MG tablet, TAKE 1 TABLET EVERY MORNING (30 MINUTES BEFORE BREAKFAST) FOR STOMACH, Disp: 90 tablet, Rfl: 1    paroxetine (PAXIL) 40 MG tablet, TAKE ONE (1) TABLET EVERY MORNING FOR MOOD AND NERVE PAIN, Disp: 90 tablet, Rfl: 1    predniSONE (DELTASONE) 5 MG tablet, Take 1 tablet (5 mg total) by mouth once daily., Disp: 90 tablet, Rfl: 3    tamsulosin (FLOMAX) 0.4 mg Cap, TAKE 1 CAPSULE AT BEDTIME FOR PROSTATE, Disp: 30 capsule, Rfl: 3    traMADoL (ULTRAM) 50 mg tablet, Take 1-2 tablets every 8 hours as needed for pain, Disp: 90 each, Rfl: 0    traZODone (DESYREL) 100 MG tablet, TAKE 1 TABLET AT BEDTIME FOR SLEEP AND  NERVE PAIN, Disp: 90 tablet, Rfl: 1    triamcinolone acetonide 0.1% (KENALOG) 0.1 % cream, Apply topically 2 (two) times daily., Disp: 453.6 g, Rfl: 1    bicalutamide (CASODEX) 50 MG Tab, Take 1 tablet (50 mg total) by mouth once daily., Disp: 14 tablet, Rfl: 0    ipratropium (ATROVENT) 0.02 % nebulizer solution, Take 2.5 mLs (500 mcg total) by nebulization every 6 (six) hours as needed for Wheezing. Rescue, Disp: 75 mL, Rfl: 0    PMHx/PSHx Updates:  See patient's last visit with me on 7/7/2023.  See H&P on 7/7/2023        Pathology:   Cancer Staging   No matching staging information was found for the patient.    Prostate Biopsy 3/18/2020:     Final Pathologic Diagnosis 1.  PROSTATE, RIGHT BASE, BIOPSY:   Benign prostatic tissue.   Negative for malignancy.   2.  PROSTATE, RIGHT MIDDLE, BIOPSY:   Prostatic adenocarcinoma, [place] grade 4 + 3 = 7 (80% pattern 4), involving   70% (8 mm) of 1 core.   3.  PROSTATE, RIGHT APEX, BIOPSY:   Prostatic adenocarcinoma, [place] grade 4 + 3 = 7 (70% pattern 4), involving   95% (15 mm) of 1 core.   4.  PROSTATE, RIGHT LATERAL BASE, BIOPSY:   Prostatic adenocarcinoma, [place] grade 4 + 3 = 7 (60% pattern 4), involving   80% (9 mm) of 1 core.   5. PROSTATE, RIGHT LATERAL MIDDLE, BIOPSY:   Prostatic adenocarcinoma, [place] grade 4 + 3 = 7 (90% pattern 4), involving   90% (11 mm) of 1 core.   6.  PROSTATE, RIGHT LATERAL APEX, BIOPSY:   Prostatic adenocarcinoma, [place] grade 3 + 4 = 7 (30% pattern 4), involving   70% (4 mm) of 1 core.   7.  PROSTATE, LEFT BASE, BIOPSY:   Benign prostatic tissue.   Negative for malignancy.   8.  PROSTATE, LEFT MIDDLE, BIOPSY:   Benign prostatic tissue.   Negative for malignancy.   9.  PROSTATE, LEFT APEX, BIOPSY:   Prostatic adenocarcinoma, [place] grade 4 + 3 = 7 (60% pattern 4), involving   15% (2 mm) of 1 core.   10.  PROSTATE, LEFT LATERAL BASE, BIOPSY:   Benign prostatic tissue.   Negative for malignancy.   11.  PROSTATE, LEFT LATERAL MIDDLE,  "BIOPSY:   Benign prostatic tissue with atrophy.   Negative for malignancy.   12.  PROSTATE, LEFT LATERAL APEX, BIOPSY:   Prostatic adenocarcinoma, [place] grade 3 + 4 = 7 (10% pattern 4) involving a   40-50% (3-5 mm) of 2/2 cores.   Perineural invasion is identified.               Objective:     Vitals:  Blood pressure (!) 160/86, pulse 86, temperature 97.4 °F (36.3 °C), resp. rate 20, height 6' 5" (1.956 m), weight 87.3 kg (192 lb 6.4 oz).    Physical Examination:   GEN: no apparent distress, comfortable; AAOx3; looks older than chronological age  HEAD: atraumatic and normocephalic  EYES: no pallor, no icterus, PERRLA; shiner on left orbit  ENT: OMM, no pharyngeal erythema, external ears WNL; no nasal discharge; no thrush  NECK: no masses, thyroid normal, trachea midline, no LAD/LN's, supple  CV: RRR with no murmur; normal pulse; normal S1 and S2; no pedal edema  CHEST: Normal respiratory effort; CTAB; normal breath sounds; no wheeze or crackles  ABDOM: nontender and nondistended; soft; normal bowel sounds; no rebound/guarding  MUSC/Skeletal: ROM normal; no crepitus; joints normal; no deformities ; some arthropathy  EXTREM: no clubbing, cyanosis, inflammation or swelling  SKIN: no rashes, lesions, ulcers, petechiae or subcutaneous nodules; dry skin on bilateral lower extremities Rght >Left; chronic age related skin changes; tattoos  : no tracy  NEURO: grossly intact; motor/sensory WNL; AAOx3; no tremors  PSYCH: normal mood, affect and behavior  LYMPH: normal cervical, supraclavicular, axillary and groin LN's             Labs:     Lab Results   Component Value Date    WBC 8.40 07/25/2023    HGB 14.3 07/25/2023    HCT 43.5 07/25/2023    MCV 95 07/25/2023     07/25/2023       CMP  Sodium   Date Value Ref Range Status   07/25/2023 141 136 - 145 mmol/L Final     Potassium   Date Value Ref Range Status   07/25/2023 4.7 3.5 - 5.1 mmol/L Final     Chloride   Date Value Ref Range Status   07/25/2023 104 95 - 110 " mmol/L Final     CO2   Date Value Ref Range Status   07/25/2023 24 23 - 29 mmol/L Final     Glucose   Date Value Ref Range Status   07/25/2023 106 70 - 110 mg/dL Final     BUN   Date Value Ref Range Status   07/25/2023 18 8 - 23 mg/dL Final     Creatinine   Date Value Ref Range Status   07/25/2023 0.9 0.5 - 1.4 mg/dL Final     Calcium   Date Value Ref Range Status   07/25/2023 9.8 8.7 - 10.5 mg/dL Final     Total Protein   Date Value Ref Range Status   07/25/2023 7.7 6.0 - 8.4 g/dL Final     Albumin   Date Value Ref Range Status   07/25/2023 3.9 3.5 - 5.2 g/dL Final     Total Bilirubin   Date Value Ref Range Status   07/25/2023 0.4 0.1 - 1.0 mg/dL Final     Comment:     For infants and newborns, interpretation of results should be based  on gestational age, weight and in agreement with clinical  observations.    Premature Infant recommended reference ranges:  Up to 24 hours.............<8.0 mg/dL  Up to 48 hours............<12.0 mg/dL  3-5 days..................<15.0 mg/dL  6-29 days.................<15.0 mg/dL       Alkaline Phosphatase   Date Value Ref Range Status   07/25/2023 175 (H) 55 - 135 U/L Final     AST   Date Value Ref Range Status   07/25/2023 21 10 - 40 U/L Final     ALT   Date Value Ref Range Status   07/25/2023 17 10 - 44 U/L Final     Anion Gap   Date Value Ref Range Status   07/25/2023 13 8 - 16 mmol/L Final     eGFR   Date Value Ref Range Status   07/25/2023 >60.0 >60 mL/min/1.73 m^2 Final   05/22/2023 95 > OR = 60 mL/min/1.73m2 Final     Comment:     The eGFR is based on the CKD-EPI 2021 equation. To calculate   the new eGFR from a previous Creatinine or Cystatin C  result, go to https://www.kidney.org/professionals/  kdoqi/gfr%5Fcalculator       Total PSA (ng/mL)   Date Value   09/14/2022 4.9 (H)           Radiology/Diagnostic Studies:    PET PSMA 6/9/2023:     Impression:     Prostate carcinoma with metastatic disease to cervical, thoracic, abdominal and pelvic lymph nodes as well as metastatic  disease to bone.  Interval progression compared to previous PET-CT.        MRI prostate 5/31/2023:     Impression:     Mild enlarged prostate gland demonstrating a PI-RADS 5 lesion for which clinically significant prostate cancer is highly likely to be present.  Broad capsular abutment and above with the anterior wall the rectum is present without visible invasion.  Mildly suspicious bilateral common femoral chain lymph nodes.     Overall Assessment:     PIRADS 5 (clinically significant cancer is highly likely to be present)           CTA chest 3/21/2023:     Impression:     1. No CT evidence to suggest an acute pulmonary embolus.  2. Multiple small soft tissue pulmonary nodules with interval development of a small spiculated soft tissue pulmonary nodule of the left upper lobe.  Follow-up per Fleischner guidelines.  For multiple solid nodules with any 6 mm or greater, Fleischner Society guidelines recommend follow up with non-contrast chest CT at 3-6 months and 18-24 months after discovery.  3. Diffuse centrilobular emphysematous change with linear discoid atelectasis at the lung bases.  4. Chronic small loculated left pleural effusion with suspected chronic round atelectasis at the left lung base.  5. Chronic bilateral adrenal adenomas.  This report was flagged in Epic as abnormal.        CT Chest 10/27/2022:     FINDINGS:  There is no mediastinal nor axillary adenopathy.  The aorta is normal in caliber with extensive calcific plaque.  There are coronary artery calcifications.  Mild gynecomastia is present.     There is a chronic small loculated left pleural effusion with associated calcification of the pleura.  There is a trace right pleural effusion versus posterior pleural thickening, unchanged.  There is unchanged rounded atelectasis in the left lower lobe adjacent to the chronic loculated pleural effusion.     Emphysema is present.  There are calcified granulomata.     Noncalcified lung nodules are unchanged,  as follows on series 3:     Right upper lobe 3 mm image 81, unchanged.     Right upper lobe 2 mm image 121 unchanged.     Right upper lobe 4 mm image 189, not significantly changed.     Right middle lobe 6 mm image 227, unchanged.     Intra fissural nodule on the right, scarring in the lateral right middle lobe are unchanged.  Pleural thickening lateral right lower lobe dependent atelectasis are similar to the previous study     2 mm nodule left lower lobe series 3, image 188 unchanged.     3 mm nodule anterior left upper lobe image 102 is unchanged.     There are no new or enlarging nodules compared to the recent prior study.     There is hardware in the cervical spine.  No acute bony abnormality.     Bilateral adrenal adenomas are again noted.          I have reviewed all available lab results and radiology reports.    Assessment/Plan:   (1) 68 y.o. male with diagnosis of prostate cancer who has been referred by Myles Hutchison III, * for evaluation by medical hematology/oncology. Patient had a diagnosis of high risk prostate cancer back in 2020 with Group 3 on pathology and a PSA of 23. He was previously followed by Dr Jeong with  and had undergone a TRUBP on 3/18/2020.  At that time, he was deemed to be inoperable and was referred for definitive XRT, but appears to have been noncompliant and was lost to follow-up.      He reports that he started ADT with Lupron in 2020 or 2021 but had not continued for over a year and could not recall when he last had an injection. His PSA had previously dropped to 4.9 in 2022, but has since risen to 43.8. He had a  PEt PSMA on 6/9/2023 which is unfortunately showing wide-spread disease including bony mets.      He was seen by Dr Ann Goodwin with  and Dr JOSEFINA Hutchison with rad/onc. He was restarted on ADT with 2 weeks of casodex up front.     He has been referred to med/onc for evaluation for any second generation ADT therapy  +/- chemotherapy.      7/7/2023:  - discussed  and reviewed the latest NCCN guidelines (Vers 1.2023)  - would benefit from addition of abiraterone and prednisone to the ADT regimen  - set up chemotx/antiandrogen training with Josephine; provid litertaure on the drugs, go over side-effect profile and obtain consents    8/1/2023:  - s/p chemotx/antiandrogen therapy school with Kay  - started on abiraterone last week and seems to be tolerating it ok so far        (2) HTN and hypercholesterolemia     (3) Atrial fibrillation     (4) COPD, chronic hypoxemia, O2 dependence, multiple pulmonary nodules     (5) Asbestosis and chronic active tobacco use     (6) Ulcerative colitis     (7) Anxiety/Depression     (8) Bilateral adrenal adenomas            VISIT DIAGNOSES:      Prostate cancer metastatic to lung    Elevated PSA    Multiple lung nodules    Prostate cancer metastatic to bone    Prostate cancer    Abnormal CT scan    Abnormal positron emission tomography (PET) scan    Tobacco abuse          PLAN:  Proceeded with abiraterone last week; s/pchemotx/antiandrogen therapy school/education with Josephine for the abiraterone; provide literature, discuss side-effect profile and obtain consents  Proceed with ADT therapy  F/u with PCP, , Card and rad/onc     RTC in  6 weeks with Kay and 12 weeks with myself  Fax note to Myles Hutchison III, *, Tiffany Smith (PCP), Garret Isabel       Discussion:     COVID-19 Discussion:    I had long discussion with patient and any applicable family about the COVID-19 coronavirus epidemic and the recommended precautions with regard to cancer and/or hematology patients. I have re-iterated the CDC recommendations for adequate hand washing, use of hand -like products, and coughing into elbow, etc. In addition, especially for our patients who are on chemotherapy and/or our otherwise immunocompromised patients, I have recommended avoidance of crowds, including movie theaters, restaurants, churches, etc. I have recommended avoidance  "of any sick or symptomatic family members and/or friends. I have also recommended avoidance of any raw and unwashed food products, and general avoidance of food items that have not been prepared by themselves. The patient has been asked to call us immediately with any symptom developments, issues, questions or other general concerns.     Pathology Discussion:    I reviewed and discussed the pathology report(s) and radiograph reports (if available) in as simple to understand and/or laymen's terms to the best of my ability. I had an indepth conversation with the patient and went over the patient's individual diagnosis based on the information that was currently available. I discussed the TNM staging process with regard to the patient's particular cancer type, and the calculated stage based on the currently available TNM data and literature. I discussed the available prognostic data with regard to the current staging information and how it relates to the prognosis of their particular neoplastic process.        NCCN Guidelines:    I discussed the available treatment option(s) in accordance with the latest literature from the NCCN Clinical Practice Guidelines for the patient's particular type of cancer disorder. The NCCN Guidelines provide a "document evidence-based (and) consensus-driven management" of the care of oncology patients. The treatment recommendations were made not only in accordance to the NCCN guidelines, but also factored in to account the patient's overall age, condition, performance status and their medical co-morbidities. I went over the risks and benefits of the the treatment options (if any could be made) with regard to their particular cancer type, their cancer stage, their age, and their co-morbidities.       Anti-Androgen Hormone Therapy Discussion:    I discussed the advantages of anti-androgen hormone therapy with the patient with regard to their particular neoplastic or carcinoma in situ " condition.  I discussed the risks for thromboembolic events such as DVT's, pulmonary emboli, CVA's, retinal vascular clots, phlebitis, and TIA's. I discussed the potential risks for development of ocular disturbances, retinopathy, cataracts, corneal changes, flushing, hot flashes, erectile dysfunction, altered breasts (incl. Gynecomastia), fluid retention, weight changes, elevations in LFT's, liver damage, and mood disturbances. I discussed the potential risk of arthropathy and joint pains/aches which could be chronic and debilitating. I discussed potential adverse effects on bone mineralization and the risk of osteopenia and/or osteoporosis which could led to increase risk of fractures.   A consent form was obtained and a copy was provided to the patient.      I spent over 25 mins of time with the patient. Reviewed results of the recently ordered labs, tests and studies; made directives with regards to the results. Over half of this time was spent couseling and coordinating care.    I have explained all of the above in detail and the patient understands all of the current recommendation(s). I have answered all of their questions to the best of my ability and to their complete satisfaction.   The patient is to continue with the current management plan.            Electronically signed by Maurilio Lawson MD          Answers submitted by the patient for this visit:  Review of Systems Questionnaire (Submitted on 7/29/2023)  appetite change : No  unexpected weight change: Yes  mouth sores: No  visual disturbance: No  cough: No  shortness of breath: No  chest pain: No  diarrhea: No  frequency: No  back pain: No  rash: No  headaches: No  adenopathy: No  nervous/ anxious: No

## 2023-08-01 ENCOUNTER — OFFICE VISIT (OUTPATIENT)
Dept: HEMATOLOGY/ONCOLOGY | Facility: CLINIC | Age: 68
End: 2023-08-01
Payer: MEDICARE

## 2023-08-01 VITALS
RESPIRATION RATE: 20 BRPM | TEMPERATURE: 97 F | HEIGHT: 77 IN | DIASTOLIC BLOOD PRESSURE: 86 MMHG | HEART RATE: 86 BPM | SYSTOLIC BLOOD PRESSURE: 160 MMHG | BODY MASS INDEX: 22.71 KG/M2 | WEIGHT: 192.38 LBS

## 2023-08-01 DIAGNOSIS — C61 PROSTATE CANCER METASTATIC TO BONE: ICD-10-CM

## 2023-08-01 DIAGNOSIS — Z72.0 TOBACCO ABUSE: ICD-10-CM

## 2023-08-01 DIAGNOSIS — R93.89 ABNORMAL CT SCAN: ICD-10-CM

## 2023-08-01 DIAGNOSIS — R94.8 ABNORMAL POSITRON EMISSION TOMOGRAPHY (PET) SCAN: ICD-10-CM

## 2023-08-01 DIAGNOSIS — C78.00 PROSTATE CANCER METASTATIC TO LUNG: Primary | ICD-10-CM

## 2023-08-01 DIAGNOSIS — C61 PROSTATE CANCER METASTATIC TO LUNG: Primary | ICD-10-CM

## 2023-08-01 DIAGNOSIS — C61 PROSTATE CANCER: ICD-10-CM

## 2023-08-01 DIAGNOSIS — R97.20 ELEVATED PSA: ICD-10-CM

## 2023-08-01 DIAGNOSIS — R91.8 MULTIPLE LUNG NODULES: ICD-10-CM

## 2023-08-01 DIAGNOSIS — C79.51 PROSTATE CANCER METASTATIC TO BONE: ICD-10-CM

## 2023-08-01 PROCEDURE — 3044F HG A1C LEVEL LT 7.0%: CPT | Mod: CPTII,S$GLB,, | Performed by: INTERNAL MEDICINE

## 2023-08-01 PROCEDURE — 4010F PR ACE/ARB THEARPY RXD/TAKEN: ICD-10-PCS | Mod: CPTII,S$GLB,, | Performed by: INTERNAL MEDICINE

## 2023-08-01 PROCEDURE — 3288F PR FALLS RISK ASSESSMENT DOCUMENTED: ICD-10-PCS | Mod: CPTII,S$GLB,, | Performed by: INTERNAL MEDICINE

## 2023-08-01 PROCEDURE — 99214 PR OFFICE/OUTPT VISIT, EST, LEVL IV, 30-39 MIN: ICD-10-PCS | Mod: S$GLB,,, | Performed by: INTERNAL MEDICINE

## 2023-08-01 PROCEDURE — 1157F ADVNC CARE PLAN IN RCRD: CPT | Mod: CPTII,S$GLB,, | Performed by: INTERNAL MEDICINE

## 2023-08-01 PROCEDURE — 1100F PTFALLS ASSESS-DOCD GE2>/YR: CPT | Mod: CPTII,S$GLB,, | Performed by: INTERNAL MEDICINE

## 2023-08-01 PROCEDURE — 3079F PR MOST RECENT DIASTOLIC BLOOD PRESSURE 80-89 MM HG: ICD-10-PCS | Mod: CPTII,S$GLB,, | Performed by: INTERNAL MEDICINE

## 2023-08-01 PROCEDURE — 3077F SYST BP >= 140 MM HG: CPT | Mod: CPTII,S$GLB,, | Performed by: INTERNAL MEDICINE

## 2023-08-01 PROCEDURE — 3008F BODY MASS INDEX DOCD: CPT | Mod: CPTII,S$GLB,, | Performed by: INTERNAL MEDICINE

## 2023-08-01 PROCEDURE — 1100F PR PT FALLS ASSESS DOC 2+ FALLS/FALL W/INJURY/YR: ICD-10-PCS | Mod: CPTII,S$GLB,, | Performed by: INTERNAL MEDICINE

## 2023-08-01 PROCEDURE — 3044F PR MOST RECENT HEMOGLOBIN A1C LEVEL <7.0%: ICD-10-PCS | Mod: CPTII,S$GLB,, | Performed by: INTERNAL MEDICINE

## 2023-08-01 PROCEDURE — 1126F AMNT PAIN NOTED NONE PRSNT: CPT | Mod: CPTII,S$GLB,, | Performed by: INTERNAL MEDICINE

## 2023-08-01 PROCEDURE — 1159F PR MEDICATION LIST DOCUMENTED IN MEDICAL RECORD: ICD-10-PCS | Mod: CPTII,S$GLB,, | Performed by: INTERNAL MEDICINE

## 2023-08-01 PROCEDURE — 3288F FALL RISK ASSESSMENT DOCD: CPT | Mod: CPTII,S$GLB,, | Performed by: INTERNAL MEDICINE

## 2023-08-01 PROCEDURE — 1160F RVW MEDS BY RX/DR IN RCRD: CPT | Mod: CPTII,S$GLB,, | Performed by: INTERNAL MEDICINE

## 2023-08-01 PROCEDURE — 1126F PR PAIN SEVERITY QUANTIFIED, NO PAIN PRESENT: ICD-10-PCS | Mod: CPTII,S$GLB,, | Performed by: INTERNAL MEDICINE

## 2023-08-01 PROCEDURE — 3079F DIAST BP 80-89 MM HG: CPT | Mod: CPTII,S$GLB,, | Performed by: INTERNAL MEDICINE

## 2023-08-01 PROCEDURE — 1160F PR REVIEW ALL MEDS BY PRESCRIBER/CLIN PHARMACIST DOCUMENTED: ICD-10-PCS | Mod: CPTII,S$GLB,, | Performed by: INTERNAL MEDICINE

## 2023-08-01 PROCEDURE — 3077F PR MOST RECENT SYSTOLIC BLOOD PRESSURE >= 140 MM HG: ICD-10-PCS | Mod: CPTII,S$GLB,, | Performed by: INTERNAL MEDICINE

## 2023-08-01 PROCEDURE — 99214 OFFICE O/P EST MOD 30 MIN: CPT | Mod: S$GLB,,, | Performed by: INTERNAL MEDICINE

## 2023-08-01 PROCEDURE — 3008F PR BODY MASS INDEX (BMI) DOCUMENTED: ICD-10-PCS | Mod: CPTII,S$GLB,, | Performed by: INTERNAL MEDICINE

## 2023-08-01 PROCEDURE — 1159F MED LIST DOCD IN RCRD: CPT | Mod: CPTII,S$GLB,, | Performed by: INTERNAL MEDICINE

## 2023-08-01 PROCEDURE — 1157F PR ADVANCE CARE PLAN OR EQUIV PRESENT IN MEDICAL RECORD: ICD-10-PCS | Mod: CPTII,S$GLB,, | Performed by: INTERNAL MEDICINE

## 2023-08-01 PROCEDURE — 4010F ACE/ARB THERAPY RXD/TAKEN: CPT | Mod: CPTII,S$GLB,, | Performed by: INTERNAL MEDICINE

## 2023-08-03 ENCOUNTER — HOSPITAL ENCOUNTER (EMERGENCY)
Facility: HOSPITAL | Age: 68
Discharge: HOME | End: 2023-08-03
Attending: EMERGENCY MEDICINE
Payer: MEDICARE

## 2023-08-03 VITALS
TEMPERATURE: 99 F | SYSTOLIC BLOOD PRESSURE: 154 MMHG | BODY MASS INDEX: 23.62 KG/M2 | RESPIRATION RATE: 16 BRPM | DIASTOLIC BLOOD PRESSURE: 92 MMHG | HEART RATE: 60 BPM | OXYGEN SATURATION: 100 % | WEIGHT: 200 LBS | HEIGHT: 77 IN

## 2023-08-03 DIAGNOSIS — R33.9 URINARY RETENTION: Primary | ICD-10-CM

## 2023-08-03 LAB
ANION GAP SERPL CALC-SCNC: 11 MMOL/L (ref 8–16)
BASOPHILS # BLD AUTO: 0.06 K/UL (ref 0–0.2)
BASOPHILS NFR BLD: 0.8 % (ref 0–1.9)
BILIRUB UR QL STRIP: NEGATIVE
BUN SERPL-MCNC: 12 MG/DL (ref 8–23)
CALCIUM SERPL-MCNC: 9.5 MG/DL (ref 8.7–10.5)
CHLORIDE SERPL-SCNC: 106 MMOL/L (ref 95–110)
CLARITY UR: CLEAR
CO2 SERPL-SCNC: 22 MMOL/L (ref 23–29)
COLOR UR: YELLOW
CREAT SERPL-MCNC: 0.8 MG/DL (ref 0.5–1.4)
DIFFERENTIAL METHOD: ABNORMAL
EOSINOPHIL # BLD AUTO: 0.1 K/UL (ref 0–0.5)
EOSINOPHIL NFR BLD: 0.9 % (ref 0–8)
ERYTHROCYTE [DISTWIDTH] IN BLOOD BY AUTOMATED COUNT: 13.7 % (ref 11.5–14.5)
EST. GFR  (NO RACE VARIABLE): >60 ML/MIN/1.73 M^2
GLUCOSE SERPL-MCNC: 97 MG/DL (ref 70–110)
GLUCOSE UR QL STRIP: NEGATIVE
HCT VFR BLD AUTO: 39.7 % (ref 40–54)
HGB BLD-MCNC: 13.3 G/DL (ref 14–18)
HGB UR QL STRIP: ABNORMAL
IMM GRANULOCYTES # BLD AUTO: 0.04 K/UL (ref 0–0.04)
IMM GRANULOCYTES NFR BLD AUTO: 0.5 % (ref 0–0.5)
KETONES UR QL STRIP: NEGATIVE
LEUKOCYTE ESTERASE UR QL STRIP: NEGATIVE
LYMPHOCYTES # BLD AUTO: 1.1 K/UL (ref 1–4.8)
LYMPHOCYTES NFR BLD: 15.3 % (ref 18–48)
MCH RBC QN AUTO: 31.7 PG (ref 27–31)
MCHC RBC AUTO-ENTMCNC: 33.5 G/DL (ref 32–36)
MCV RBC AUTO: 95 FL (ref 82–98)
MONOCYTES # BLD AUTO: 0.6 K/UL (ref 0.3–1)
MONOCYTES NFR BLD: 8.4 % (ref 4–15)
NEUTROPHILS # BLD AUTO: 5.5 K/UL (ref 1.8–7.7)
NEUTROPHILS NFR BLD: 74.1 % (ref 38–73)
NITRITE UR QL STRIP: NEGATIVE
NRBC BLD-RTO: 0 /100 WBC
PH UR STRIP: 6 [PH] (ref 5–8)
PLATELET # BLD AUTO: 263 K/UL (ref 150–450)
PMV BLD AUTO: 9.6 FL (ref 9.2–12.9)
POTASSIUM SERPL-SCNC: 4 MMOL/L (ref 3.5–5.1)
PROT UR QL STRIP: NEGATIVE
RBC # BLD AUTO: 4.2 M/UL (ref 4.6–6.2)
SODIUM SERPL-SCNC: 139 MMOL/L (ref 136–145)
SP GR UR STRIP: <=1.005 (ref 1–1.03)
URN SPEC COLLECT METH UR: ABNORMAL
UROBILINOGEN UR STRIP-ACNC: NEGATIVE EU/DL
WBC # BLD AUTO: 7.37 K/UL (ref 3.9–12.7)

## 2023-08-03 PROCEDURE — 81003 URINALYSIS AUTO W/O SCOPE: CPT | Performed by: EMERGENCY MEDICINE

## 2023-08-03 PROCEDURE — 80048 BASIC METABOLIC PNL TOTAL CA: CPT | Performed by: EMERGENCY MEDICINE

## 2023-08-03 PROCEDURE — 99283 EMERGENCY DEPT VISIT LOW MDM: CPT

## 2023-08-03 PROCEDURE — 85025 COMPLETE CBC W/AUTO DIFF WBC: CPT | Performed by: EMERGENCY MEDICINE

## 2023-08-04 NOTE — ED PROVIDER NOTES
Encounter Date: 8/3/2023       History     Chief Complaint   Patient presents with    Urinary Retention     Stage 4 prostate cancer. Has had rentention x 3 days. Pharmacy has not been able to fill Flomax. Wears 3L O2 at home     68-year-old male here stating that he can not urinate and requesting a Roblero catheter.  Patient has stage IV prostate cancer.    The history is provided by the patient. No  was used.     Review of patient's allergies indicates:  No Known Allergies  Past Medical History:   Diagnosis Date    Abnormal CT scan 7/6/2023    Abnormal positron emission tomography (PET) scan 7/6/2023    Anxiety     Asbestosis 08/04/2015    Atrial fibrillation     Bilateral adrenal adenomas     COPD (chronic obstructive pulmonary disease)     COVID-19 virus infection 07/28/2022    Depression     Elevated PSA 7/6/2023    High cholesterol     HTN (hypertension)     Malignant neoplasm of prostate     Multiple lung nodules     Nodule of upper lobe of left lung 03/30/2023    6 mm spiculated on cta chest 3/21/23    On home oxygen therapy 05/24/2023    Pneumonia     Prostate cancer 5/27/2020    Prostate cancer metastatic to bone 7/6/2023    Prostate cancer metastatic to lung 7/6/2023    Ulcerative colitis      Past Surgical History:   Procedure Laterality Date    NECK SURGERY      right knee      TRANSRECTAL BIOPSY OF PROSTATE WITH ULTRASOUND GUIDANCE Bilateral 3/18/2020    Procedure: BIOPSY, PROSTATE, RECTAL APPROACH, WITH US GUIDANCE;  Surgeon: Bill Jeong MD;  Location: [facility];  Service: Urology;  Laterality: Bilateral;     Family History   Problem Relation Age of Onset    Cancer Mother         Uterine and Ovarian cancer    Diabetes Mother     Diabetes Sister      Social History     Tobacco Use    Smoking status: Every Day     Current packs/day: 0.50     Average packs/day: 0.5 packs/day for 50.0 years (25.0 ttl pk-yrs)     Types: Cigarettes     Passive exposure: Past    Smokeless tobacco: Never    Substance Use Topics    Alcohol use: Yes     Comment: 8 16oz beers per day    Drug use: No     Review of Systems   Constitutional:  Negative for fever.   HENT:  Negative for sore throat.    Respiratory:  Negative for shortness of breath.    Cardiovascular:  Negative for chest pain.   Gastrointestinal:  Positive for abdominal pain. Negative for nausea.   Genitourinary:  Positive for decreased urine volume and urgency. Negative for dysuria.   Musculoskeletal:  Negative for back pain.   Skin:  Negative for rash.   Neurological:  Negative for weakness.   Hematological:  Does not bruise/bleed easily.   All other systems reviewed and are negative.      Physical Exam     Initial Vitals [08/03/23 2005]   BP Pulse Resp Temp SpO2   (!) 163/104 63 20 98.9 °F (37.2 °C) (!) 91 %      MAP       --         Physical Exam    Nursing note and vitals reviewed.  Constitutional: He appears well-developed and well-nourished.   HENT:   Head: Normocephalic and atraumatic.   Eyes: EOM are normal. Pupils are equal, round, and reactive to light.   Neck:   Normal range of motion.  Cardiovascular:  Normal rate and regular rhythm.           Pulmonary/Chest: Breath sounds normal.   Abdominal: Abdomen is soft.   Suprapubic fullness   Musculoskeletal:      Cervical back: Normal range of motion.     Neurological: He is alert and oriented to person, place, and time. He has normal strength.   Skin: Skin is warm and dry. Capillary refill takes less than 2 seconds.   Psychiatric: He has a normal mood and affect. His behavior is normal. Thought content normal.         ED Course   Procedures  Labs Reviewed   BASIC METABOLIC PANEL - Abnormal; Notable for the following components:       Result Value    CO2 22 (*)     All other components within normal limits   CBC W/ AUTO DIFFERENTIAL - Abnormal; Notable for the following components:    RBC 4.20 (*)     Hemoglobin 13.3 (*)     Hematocrit 39.7 (*)     MCH 31.7 (*)     Gran % 74.1 (*)     Lymph % 15.3 (*)      All other components within normal limits   URINALYSIS, REFLEX TO URINE CULTURE - Abnormal; Notable for the following components:    Occult Blood UA Trace (*)     All other components within normal limits    Narrative:     Preferred Collection Type->Urine, Clean Catch  Specimen Source->Urine          Imaging Results    None          Medications - No data to display  Medical Decision Making:   Differential Diagnosis:   Urinary retention, urinary tract infection, hematuria  ED Management:  Initially 700 cc out of urine, total of 2 L out over his time in the emergency department.  Labs show no renal injury secondary to urinary retention.  Patient feels much better and happy and was able to walk out and will follow up with his urologist                          Clinical Impression:   Final diagnoses:  [R33.9] Urinary retention (Primary)        ED Disposition Condition    Discharge Stable          ED Prescriptions    None       Follow-up Information       Follow up With Specialties Details Why Contact Info    Marisel Farrell III, MD Internal Medicine, Cardiology Schedule an appointment as soon as possible for a visit  Please call your urologist as soon as possible and schedule a follow up appointment as soon as possible.  Return to the emergency department with any new or worsening symptoms.  You will need the catheter to remain in place until your seen by the urologist. [address]  [place] [facility] [place] [id number] [phone]               Ingrid Nino MD  08/04/23 0059

## 2023-08-16 DIAGNOSIS — C61 PROSTATE CANCER METASTATIC TO BONE: ICD-10-CM

## 2023-08-16 DIAGNOSIS — C79.51 PROSTATE CANCER METASTATIC TO BONE: ICD-10-CM

## 2023-08-17 RX ORDER — TRAMADOL HYDROCHLORIDE 50 MG/1
TABLET ORAL
Qty: 90 EACH | Refills: 0 | Status: SHIPPED | OUTPATIENT
Start: 2023-08-17 | End: 2023-10-24 | Stop reason: SDUPTHER

## 2023-08-26 ENCOUNTER — HOSPITAL ENCOUNTER (EMERGENCY)
Facility: HOSPITAL | Age: 68
Discharge: HOME | End: 2023-08-26
Attending: EMERGENCY MEDICINE
Payer: MEDICARE

## 2023-08-26 VITALS
BODY MASS INDEX: 23.62 KG/M2 | HEART RATE: 80 BPM | DIASTOLIC BLOOD PRESSURE: 79 MMHG | OXYGEN SATURATION: 97 % | WEIGHT: 200 LBS | SYSTOLIC BLOOD PRESSURE: 139 MMHG | HEIGHT: 77 IN | TEMPERATURE: 99 F | RESPIRATION RATE: 20 BRPM

## 2023-08-26 DIAGNOSIS — R06.02 SOB (SHORTNESS OF BREATH): ICD-10-CM

## 2023-08-26 DIAGNOSIS — J44.1 COPD WITH ACUTE EXACERBATION: ICD-10-CM

## 2023-08-26 DIAGNOSIS — R60.0 BILATERAL LEG EDEMA: Primary | ICD-10-CM

## 2023-08-26 LAB
ALBUMIN SERPL BCP-MCNC: 3.1 G/DL (ref 3.5–5.2)
ALP SERPL-CCNC: 135 U/L (ref 55–135)
ALT SERPL W/O P-5'-P-CCNC: 15 U/L (ref 10–44)
ANION GAP SERPL CALC-SCNC: 13 MMOL/L (ref 8–16)
AST SERPL-CCNC: 16 U/L (ref 10–40)
BASOPHILS # BLD AUTO: 0.05 K/UL (ref 0–0.2)
BASOPHILS NFR BLD: 0.6 % (ref 0–1.9)
BILIRUB SERPL-MCNC: 0.3 MG/DL (ref 0.1–1)
BNP SERPL-MCNC: 34 PG/ML (ref 0–99)
BUN SERPL-MCNC: 9 MG/DL (ref 8–23)
CALCIUM SERPL-MCNC: 9.9 MG/DL (ref 8.7–10.5)
CHLORIDE SERPL-SCNC: 101 MMOL/L (ref 95–110)
CO2 SERPL-SCNC: 30 MMOL/L (ref 23–29)
CREAT SERPL-MCNC: 0.7 MG/DL (ref 0.5–1.4)
D DIMER PPP IA.FEU-MCNC: 0.5 MG/L FEU
DIFFERENTIAL METHOD: ABNORMAL
EOSINOPHIL # BLD AUTO: 0.3 K/UL (ref 0–0.5)
EOSINOPHIL NFR BLD: 3.4 % (ref 0–8)
ERYTHROCYTE [DISTWIDTH] IN BLOOD BY AUTOMATED COUNT: 12.8 % (ref 11.5–14.5)
EST. GFR  (NO RACE VARIABLE): >60 ML/MIN/1.73 M^2
GLUCOSE SERPL-MCNC: 119 MG/DL (ref 70–110)
HCT VFR BLD AUTO: 39.1 % (ref 40–54)
HGB BLD-MCNC: 12.7 G/DL (ref 14–18)
IMM GRANULOCYTES # BLD AUTO: 0.02 K/UL (ref 0–0.04)
IMM GRANULOCYTES NFR BLD AUTO: 0.2 % (ref 0–0.5)
LYMPHOCYTES # BLD AUTO: 1 K/UL (ref 1–4.8)
LYMPHOCYTES NFR BLD: 11.8 % (ref 18–48)
MAGNESIUM SERPL-MCNC: 1.9 MG/DL (ref 1.6–2.6)
MCH RBC QN AUTO: 30.8 PG (ref 27–31)
MCHC RBC AUTO-ENTMCNC: 32.5 G/DL (ref 32–36)
MCV RBC AUTO: 95 FL (ref 82–98)
MONOCYTES # BLD AUTO: 0.6 K/UL (ref 0.3–1)
MONOCYTES NFR BLD: 7.5 % (ref 4–15)
NEUTROPHILS # BLD AUTO: 6.2 K/UL (ref 1.8–7.7)
NEUTROPHILS NFR BLD: 76.5 % (ref 38–73)
NRBC BLD-RTO: 0 /100 WBC
PLATELET # BLD AUTO: 322 K/UL (ref 150–450)
PMV BLD AUTO: 9.6 FL (ref 9.2–12.9)
POTASSIUM SERPL-SCNC: 3.4 MMOL/L (ref 3.5–5.1)
PROT SERPL-MCNC: 6.9 G/DL (ref 6–8.4)
RBC # BLD AUTO: 4.12 M/UL (ref 4.6–6.2)
SODIUM SERPL-SCNC: 144 MMOL/L (ref 136–145)
WBC # BLD AUTO: 8.12 K/UL (ref 3.9–12.7)

## 2023-08-26 PROCEDURE — 93010 EKG 12-LEAD: ICD-10-PCS | Mod: ,,, | Performed by: INTERNAL MEDICINE

## 2023-08-26 PROCEDURE — 85379 FIBRIN DEGRADATION QUANT: CPT | Performed by: EMERGENCY MEDICINE

## 2023-08-26 PROCEDURE — 80053 COMPREHEN METABOLIC PANEL: CPT | Performed by: EMERGENCY MEDICINE

## 2023-08-26 PROCEDURE — 25000242 PHARM REV CODE 250 ALT 637 W/ HCPCS: Performed by: EMERGENCY MEDICINE

## 2023-08-26 PROCEDURE — 83880 ASSAY OF NATRIURETIC PEPTIDE: CPT | Performed by: EMERGENCY MEDICINE

## 2023-08-26 PROCEDURE — 71045 X-RAY EXAM CHEST 1 VIEW: CPT | Mod: TC

## 2023-08-26 PROCEDURE — 85025 COMPLETE CBC W/AUTO DIFF WBC: CPT | Performed by: EMERGENCY MEDICINE

## 2023-08-26 PROCEDURE — 96372 THER/PROPH/DIAG INJ SC/IM: CPT | Performed by: EMERGENCY MEDICINE

## 2023-08-26 PROCEDURE — 93005 ELECTROCARDIOGRAM TRACING: CPT

## 2023-08-26 PROCEDURE — 71045 X-RAY EXAM CHEST 1 VIEW: CPT | Mod: 26,,, | Performed by: RADIOLOGY

## 2023-08-26 PROCEDURE — 27000221 HC OXYGEN, UP TO 24 HOURS

## 2023-08-26 PROCEDURE — 99285 EMERGENCY DEPT VISIT HI MDM: CPT | Mod: 25

## 2023-08-26 PROCEDURE — 83735 ASSAY OF MAGNESIUM: CPT | Performed by: EMERGENCY MEDICINE

## 2023-08-26 PROCEDURE — 96374 THER/PROPH/DIAG INJ IV PUSH: CPT

## 2023-08-26 PROCEDURE — 25000003 PHARM REV CODE 250: Performed by: EMERGENCY MEDICINE

## 2023-08-26 PROCEDURE — 71045 XR CHEST AP PORTABLE: ICD-10-PCS | Mod: 26,,, | Performed by: RADIOLOGY

## 2023-08-26 PROCEDURE — 63600175 PHARM REV CODE 636 W HCPCS: Mod: UD | Performed by: EMERGENCY MEDICINE

## 2023-08-26 PROCEDURE — 94640 AIRWAY INHALATION TREATMENT: CPT

## 2023-08-26 PROCEDURE — 93010 ELECTROCARDIOGRAM REPORT: CPT | Mod: ,,, | Performed by: INTERNAL MEDICINE

## 2023-08-26 RX ORDER — DEXAMETHASONE SODIUM PHOSPHATE 10 MG/ML
8 INJECTION INTRAMUSCULAR; INTRAVENOUS
Status: COMPLETED | OUTPATIENT
Start: 2023-08-26 | End: 2023-08-26

## 2023-08-26 RX ORDER — PREDNISONE 20 MG/1
20 TABLET ORAL DAILY
Qty: 5 TABLET | Refills: 0 | Status: SHIPPED | OUTPATIENT
Start: 2023-08-26 | End: 2023-08-31

## 2023-08-26 RX ORDER — IPRATROPIUM BROMIDE AND ALBUTEROL SULFATE 2.5; .5 MG/3ML; MG/3ML
3 SOLUTION RESPIRATORY (INHALATION)
Status: COMPLETED | OUTPATIENT
Start: 2023-08-26 | End: 2023-08-26

## 2023-08-26 RX ORDER — FUROSEMIDE 10 MG/ML
20 INJECTION INTRAMUSCULAR; INTRAVENOUS
Status: COMPLETED | OUTPATIENT
Start: 2023-08-26 | End: 2023-08-26

## 2023-08-26 RX ORDER — FUROSEMIDE 20 MG/1
20 TABLET ORAL DAILY
Qty: 14 TABLET | Refills: 0 | Status: SHIPPED | OUTPATIENT
Start: 2023-08-26 | End: 2023-10-18

## 2023-08-26 RX ORDER — POTASSIUM CHLORIDE 20 MEQ/1
40 TABLET, EXTENDED RELEASE ORAL
Status: COMPLETED | OUTPATIENT
Start: 2023-08-26 | End: 2023-08-26

## 2023-08-26 RX ADMIN — POTASSIUM CHLORIDE 40 MEQ: 1500 TABLET, EXTENDED RELEASE ORAL at 04:08

## 2023-08-26 RX ADMIN — DEXAMETHASONE SODIUM PHOSPHATE 8 MG: 10 INJECTION INTRAMUSCULAR; INTRAVENOUS at 04:08

## 2023-08-26 RX ADMIN — IPRATROPIUM BROMIDE AND ALBUTEROL SULFATE 3 ML: 2.5; .5 SOLUTION RESPIRATORY (INHALATION) at 03:08

## 2023-08-26 RX ADMIN — FUROSEMIDE 20 MG: 10 INJECTION, SOLUTION INTRAVENOUS at 04:08

## 2023-08-26 NOTE — ED PROVIDER NOTES
Encounter Date: 8/26/2023       History     Chief Complaint   Patient presents with    Leg Swelling     Bilateral x 5 days. Pt also states that he has exertional dyspnea.      Patient is a 68-year-old male with multiple medical problems including COPD requiring home O2 and metastatic prostate cancer (patient currently maintained on oral chemotherapeutic agent) here with complaints of increased bilateral leg edema, cough and shortness of breath for about a week.  No fever or chills.  No chest pain.  Symptoms better with rest, worse with movement.    The history is provided by the patient and medical records.     Review of patient's allergies indicates:  No Known Allergies  Past Medical History:   Diagnosis Date    Abnormal CT scan 7/6/2023    Abnormal positron emission tomography (PET) scan 7/6/2023    Anxiety     Asbestosis 08/04/2015    Atrial fibrillation     Bilateral adrenal adenomas     COPD (chronic obstructive pulmonary disease)     COVID-19 virus infection 07/28/2022    Depression     Elevated PSA 7/6/2023    High cholesterol     HTN (hypertension)     Malignant neoplasm of prostate     Multiple lung nodules     Nodule of upper lobe of left lung 03/30/2023    6 mm spiculated on cta chest 3/21/23    On home oxygen therapy 05/24/2023    Pneumonia     Prostate cancer 5/27/2020    Prostate cancer metastatic to bone 7/6/2023    Prostate cancer metastatic to lung 7/6/2023    Ulcerative colitis      Past Surgical History:   Procedure Laterality Date    NECK SURGERY      right knee      TRANSRECTAL BIOPSY OF PROSTATE WITH ULTRASOUND GUIDANCE Bilateral 3/18/2020    Procedure: BIOPSY, PROSTATE, RECTAL APPROACH, WITH US GUIDANCE;  Surgeon: Bill Jeong MD;  Location: [facility];  Service: Urology;  Laterality: Bilateral;     Family History   Problem Relation Age of Onset    Cancer Mother         Uterine and Ovarian cancer    Diabetes Mother     Diabetes Sister      Social History     Tobacco Use    Smoking status:  Every Day     Current packs/day: 0.50     Average packs/day: 0.5 packs/day for 50.0 years (25.0 ttl pk-yrs)     Types: Cigarettes     Passive exposure: Past    Smokeless tobacco: Never   Substance Use Topics    Alcohol use: Yes     Comment: 8 16oz beers per day    Drug use: No     Review of Systems   Constitutional:  Negative for fever.   HENT:  Negative for sore throat.    Respiratory:  Positive for cough, shortness of breath and wheezing.    Cardiovascular:  Negative for chest pain.   Gastrointestinal:  Negative for nausea.   Genitourinary:  Negative for dysuria.   Musculoskeletal:  Negative for back pain.        Bilateral leg swelling   Skin:  Negative for rash.   Neurological:  Negative for weakness.   Hematological:  Does not bruise/bleed easily.   All other systems reviewed and are negative.      Physical Exam     Initial Vitals [08/26/23 1524]   BP Pulse Resp Temp SpO2   137/73 80 16 98.8 °F (37.1 °C) 96 %      MAP       --         Physical Exam    Nursing note and vitals reviewed.  Constitutional: He appears well-developed.   Thin, chronically ill-appearing elderly male in no obvious distress.   HENT:   Head: Atraumatic.   Eyes: EOM are normal.   Neck: Neck supple.   Cardiovascular:  Normal rate.           Pulmonary/Chest: No respiratory distress. He has wheezes.   Abdominal: Abdomen is soft.   Musculoskeletal:         General: Edema present.      Cervical back: Neck supple.      Comments: 2+ bilateral leg edema     Neurological: He is oriented to person, place, and time.   Skin: Skin is warm and dry.   Psychiatric: He has a normal mood and affect.         ED Course   Procedures  Labs Reviewed   CBC W/ AUTO DIFFERENTIAL - Abnormal; Notable for the following components:       Result Value    RBC 4.12 (*)     Hemoglobin 12.7 (*)     Hematocrit 39.1 (*)     Gran % 76.5 (*)     Lymph % 11.8 (*)     All other components within normal limits   COMPREHENSIVE METABOLIC PANEL - Abnormal; Notable for the following  components:    Potassium 3.4 (*)     CO2 30 (*)     Glucose 119 (*)     Albumin 3.1 (*)     All other components within normal limits   D DIMER, QUANTITATIVE - Abnormal; Notable for the following components:    D-Dimer 0.50 (*)     All other components within normal limits   MAGNESIUM   B-TYPE NATRIURETIC PEPTIDE   URINALYSIS, REFLEX TO URINE CULTURE     EKG Readings: (Independently Interpreted)   15:35 EKG #1 sinus rhythm 81 beats per minute, normal AR, long QT, +arrhythmia, no lexi ST elevation       Imaging Results              X-Ray Chest AP Portable (Final result)  Result time 08/26/23 15:56:23      Final result by Lisa Ortega MD (08/26/23 15:56:23)                   Impression:      There is bibasilar atelectasis.      Electronically signed by: Lisa Ortega MD  Date:    08/26/2023  Time:    15:56               Narrative:    EXAMINATION:  XR CHEST AP PORTABLE    CLINICAL HISTORY:  sob;    TECHNIQUE:  Single frontal view of the chest was performed.    COMPARISON:  03/21/2023    FINDINGS:  There are bibasilar regions of atelectasis appearing similar to prior exam.  There is no pneumothorax or pleural effusion.  There is atherosclerotic disease of the aorta.                                       Medications   albuterol-ipratropium 2.5 mg-0.5 mg/3 mL nebulizer solution 3 mL (3 mLs Nebulization Given 8/26/23 4629)   dexAMETHasone sodium phos (PF) injection 8 mg (8 mg Intramuscular Given 8/26/23 1657)   furosemide injection 20 mg (20 mg Intravenous Given 8/26/23 1657)   potassium chloride SA CR tablet 40 mEq (40 mEq Oral Given 8/26/23 1657)     Medical Decision Making  Differential diagnosis:  Congestive heart failure, DVT, COPD exacerbation, sequelae of prostate cancer.  Patient is a 68-year-old male with shortness of breath and leg edema.  Clinically patient is in congestive heart failure.  We will send labs, image and expand workup as needed.    Amount and/or Complexity of Data Reviewed  Labs:  ordered.  Radiology: ordered.    Risk  Prescription drug management.               ED Course as of 08/26/23 1717   Sat Aug 26, 2023   1713 We will start patient on Lasix, short course of steroids for COPD exacerbation and discharge home. [RJ]      ED Course User Index  [RJ] Dmitry Gracia MD                    Clinical Impression:   Final diagnoses:  [R06.02] SOB (shortness of breath)  [R60.0] Bilateral leg edema (Primary)  [J44.1] COPD with acute exacerbation        ED Disposition Condition    Discharge Stable          ED Prescriptions       Medication Sig Dispense Start Date End Date Auth. Provider    predniSONE (DELTASONE) 20 MG tablet Take 1 tablet (20 mg total) by mouth once daily. for 5 days 5 tablet 8/26/2023 8/31/2023 Dmitry Gracia MD    furosemide (LASIX) 20 MG tablet Take 1 tablet (20 mg total) by mouth once daily. for 14 days 14 tablet 8/26/2023 9/9/2023 Dmitry Gracia MD          Follow-up Information       Follow up With Specialties Details Why Contact Info    [facility] Emergency Dept Emergency Medicine  As needed [address] [facility] [address] [phone]    Marisel Farrell III, MD Internal Medicine, Cardiology Schedule an appointment as soon as possible for a visit   [address]  [facility] [address] [phone]               Dmitry Gracia MD  08/26/23 1717

## 2023-08-26 NOTE — ED NOTES
Pt arrives via POV on home O2 that he wears all the time. He states that for the last 5 days, he has had significant increase in the bilateral distal leg swelling. He states that he also has exertional dyspnea. Pt denies any pain or discomfort. He reports some dizziness/lightheadedness on exertion as well. He states that he has been on a new chemo medication and his physician stated that these would be some of the side effects, but that he just wanted to be sure nothing else was going on.

## 2023-08-26 NOTE — CARE UPDATE
08/26/23 1559   Patient Assessment/Suction   Level of Consciousness (AVPU) alert   Respiratory Effort Short of breath;Unlabored   Expansion/Accessory Muscles/Retractions expansion symmetric;no retractions;no use of accessory muscles   All Lung Fields Breath Sounds wheezes, expiratory;diminished   Rhythm/Pattern, Respiratory shortness of breath;depth regular;pattern regular;unlabored   PRE-TX-O2   Device (Oxygen Therapy) nasal cannula   $ Is the patient on Low Flow Oxygen? Yes   Flow (L/min) 3  (wears 3 lpm at home)   Oxygen Concentration (%) 32   SpO2 96 %   Pulse 71   Resp 20   Aerosol Therapy   $ Aerosol Therapy Charges Aerosol Treatment  (3 ml Duoneb given)   Respiratory Treatment Status (SVN) given   Treatment Route (SVN) mouthpiece;oxygen   Patient Position (SVN) HOB elevated   Post Treatment Assessment (SVN) breath sounds improved;increased aeration   Signs of Intolerance (SVN) none   Breath Sounds Post-Respiratory Treatment   Throughout All Fields Post-Treatment All Fields   Throughout All Fields Post-Treatment aeration increased   Post-treatment Heart Rate (beats/min) 75   Post-treatment Resp Rate (breaths/min) 20   Ready to Wean/Extubation Screen   FIO2<=50 (chart decimal) 0.32

## 2023-08-26 NOTE — ED NOTES
Pt's family member, Mariza, is stepping out for something to eat. She states to call if anything changes. [phone]

## 2023-08-29 ENCOUNTER — LAB VISIT (OUTPATIENT)
Dept: LAB | Facility: HOSPITAL | Age: 68
End: 2023-08-29
Attending: NURSE PRACTITIONER
Payer: MEDICARE

## 2023-08-29 DIAGNOSIS — C79.51 PROSTATE CANCER METASTATIC TO BONE: ICD-10-CM

## 2023-08-29 DIAGNOSIS — C61 PROSTATE CANCER METASTATIC TO BONE: ICD-10-CM

## 2023-08-29 LAB
ALBUMIN SERPL BCP-MCNC: 3 G/DL (ref 3.5–5.2)
ALP SERPL-CCNC: 132 U/L (ref 55–135)
ALT SERPL W/O P-5'-P-CCNC: 14 U/L (ref 10–44)
ANION GAP SERPL CALC-SCNC: 11 MMOL/L (ref 8–16)
AST SERPL-CCNC: 11 U/L (ref 10–40)
BASOPHILS # BLD AUTO: 0.03 K/UL (ref 0–0.2)
BASOPHILS NFR BLD: 0.3 % (ref 0–1.9)
BILIRUB SERPL-MCNC: 0.4 MG/DL (ref 0.1–1)
BUN SERPL-MCNC: 9 MG/DL (ref 8–23)
CALCIUM SERPL-MCNC: 9.7 MG/DL (ref 8.7–10.5)
CHLORIDE SERPL-SCNC: 99 MMOL/L (ref 95–110)
CO2 SERPL-SCNC: 33 MMOL/L (ref 23–29)
COMPLEXED PSA SERPL-MCNC: 53 NG/ML (ref 0–4)
CREAT SERPL-MCNC: 0.8 MG/DL (ref 0.5–1.4)
DIFFERENTIAL METHOD: ABNORMAL
EOSINOPHIL # BLD AUTO: 0.2 K/UL (ref 0–0.5)
EOSINOPHIL NFR BLD: 2.4 % (ref 0–8)
ERYTHROCYTE [DISTWIDTH] IN BLOOD BY AUTOMATED COUNT: 12.7 % (ref 11.5–14.5)
EST. GFR  (NO RACE VARIABLE): >60 ML/MIN/1.73 M^2
GLUCOSE SERPL-MCNC: 143 MG/DL (ref 70–110)
HCT VFR BLD AUTO: 39 % (ref 40–54)
HGB BLD-MCNC: 12.8 G/DL (ref 14–18)
IMM GRANULOCYTES # BLD AUTO: 0.02 K/UL (ref 0–0.04)
IMM GRANULOCYTES NFR BLD AUTO: 0.2 % (ref 0–0.5)
LYMPHOCYTES # BLD AUTO: 0.7 K/UL (ref 1–4.8)
LYMPHOCYTES NFR BLD: 7.6 % (ref 18–48)
MCH RBC QN AUTO: 30.6 PG (ref 27–31)
MCHC RBC AUTO-ENTMCNC: 32.8 G/DL (ref 32–36)
MCV RBC AUTO: 93 FL (ref 82–98)
MONOCYTES # BLD AUTO: 0.7 K/UL (ref 0.3–1)
MONOCYTES NFR BLD: 7.2 % (ref 4–15)
NEUTROPHILS # BLD AUTO: 7.5 K/UL (ref 1.8–7.7)
NEUTROPHILS NFR BLD: 82.3 % (ref 38–73)
NRBC BLD-RTO: 0 /100 WBC
PLATELET # BLD AUTO: 347 K/UL (ref 150–450)
PMV BLD AUTO: 9.4 FL (ref 9.2–12.9)
POTASSIUM SERPL-SCNC: 3.4 MMOL/L (ref 3.5–5.1)
PROT SERPL-MCNC: 6.9 G/DL (ref 6–8.4)
RBC # BLD AUTO: 4.18 M/UL (ref 4.6–6.2)
SODIUM SERPL-SCNC: 143 MMOL/L (ref 136–145)
WBC # BLD AUTO: 9.12 K/UL (ref 3.9–12.7)

## 2023-08-29 PROCEDURE — 36415 COLL VENOUS BLD VENIPUNCTURE: CPT | Performed by: NURSE PRACTITIONER

## 2023-08-29 PROCEDURE — 80053 COMPREHEN METABOLIC PANEL: CPT | Performed by: NURSE PRACTITIONER

## 2023-08-29 PROCEDURE — 84153 ASSAY OF PSA TOTAL: CPT | Performed by: NURSE PRACTITIONER

## 2023-08-29 PROCEDURE — 85025 COMPLETE CBC W/AUTO DIFF WBC: CPT | Performed by: NURSE PRACTITIONER

## 2023-09-01 ENCOUNTER — HOSPITAL ENCOUNTER (OUTPATIENT)
Dept: RADIOLOGY | Facility: HOSPITAL | Age: 68
Discharge: HOME | End: 2023-09-01
Attending: NURSE PRACTITIONER
Payer: MEDICARE

## 2023-09-01 ENCOUNTER — OFFICE VISIT (OUTPATIENT)
Dept: HEMATOLOGY/ONCOLOGY | Facility: CLINIC | Age: 68
End: 2023-09-01
Payer: MEDICARE

## 2023-09-01 VITALS
DIASTOLIC BLOOD PRESSURE: 71 MMHG | RESPIRATION RATE: 22 BRPM | BODY MASS INDEX: 22.18 KG/M2 | OXYGEN SATURATION: 92 % | HEART RATE: 84 BPM | TEMPERATURE: 97 F | SYSTOLIC BLOOD PRESSURE: 123 MMHG | HEIGHT: 77 IN | WEIGHT: 187.88 LBS

## 2023-09-01 DIAGNOSIS — C79.51 PROSTATE CANCER METASTATIC TO BONE: Primary | ICD-10-CM

## 2023-09-01 DIAGNOSIS — R05.9 COUGH, UNSPECIFIED TYPE: ICD-10-CM

## 2023-09-01 DIAGNOSIS — J44.9 CHRONIC OBSTRUCTIVE PULMONARY DISEASE, UNSPECIFIED COPD TYPE: ICD-10-CM

## 2023-09-01 DIAGNOSIS — C61 PROSTATE CANCER METASTATIC TO BONE: Primary | ICD-10-CM

## 2023-09-01 DIAGNOSIS — C78.00 PROSTATE CANCER METASTATIC TO LUNG: ICD-10-CM

## 2023-09-01 DIAGNOSIS — C61 PROSTATE CANCER METASTATIC TO LUNG: ICD-10-CM

## 2023-09-01 PROCEDURE — 3008F BODY MASS INDEX DOCD: CPT | Mod: CPTII,S$GLB,, | Performed by: NURSE PRACTITIONER

## 2023-09-01 PROCEDURE — 3074F SYST BP LT 130 MM HG: CPT | Mod: CPTII,S$GLB,, | Performed by: NURSE PRACTITIONER

## 2023-09-01 PROCEDURE — 3288F PR FALLS RISK ASSESSMENT DOCUMENTED: ICD-10-PCS | Mod: CPTII,S$GLB,, | Performed by: NURSE PRACTITIONER

## 2023-09-01 PROCEDURE — 3288F FALL RISK ASSESSMENT DOCD: CPT | Mod: CPTII,S$GLB,, | Performed by: NURSE PRACTITIONER

## 2023-09-01 PROCEDURE — 1100F PTFALLS ASSESS-DOCD GE2>/YR: CPT | Mod: CPTII,S$GLB,, | Performed by: NURSE PRACTITIONER

## 2023-09-01 PROCEDURE — 99214 OFFICE O/P EST MOD 30 MIN: CPT | Mod: S$GLB,,, | Performed by: NURSE PRACTITIONER

## 2023-09-01 PROCEDURE — 3074F PR MOST RECENT SYSTOLIC BLOOD PRESSURE < 130 MM HG: ICD-10-PCS | Mod: CPTII,S$GLB,, | Performed by: NURSE PRACTITIONER

## 2023-09-01 PROCEDURE — 71046 X-RAY EXAM CHEST 2 VIEWS: CPT | Mod: TC,PO

## 2023-09-01 PROCEDURE — 1125F AMNT PAIN NOTED PAIN PRSNT: CPT | Mod: CPTII,S$GLB,, | Performed by: NURSE PRACTITIONER

## 2023-09-01 PROCEDURE — 3078F DIAST BP <80 MM HG: CPT | Mod: CPTII,S$GLB,, | Performed by: NURSE PRACTITIONER

## 2023-09-01 PROCEDURE — 3044F PR MOST RECENT HEMOGLOBIN A1C LEVEL <7.0%: ICD-10-PCS | Mod: CPTII,S$GLB,, | Performed by: NURSE PRACTITIONER

## 2023-09-01 PROCEDURE — 3008F PR BODY MASS INDEX (BMI) DOCUMENTED: ICD-10-PCS | Mod: CPTII,S$GLB,, | Performed by: NURSE PRACTITIONER

## 2023-09-01 PROCEDURE — 4010F PR ACE/ARB THEARPY RXD/TAKEN: ICD-10-PCS | Mod: CPTII,S$GLB,, | Performed by: NURSE PRACTITIONER

## 2023-09-01 PROCEDURE — 1157F PR ADVANCE CARE PLAN OR EQUIV PRESENT IN MEDICAL RECORD: ICD-10-PCS | Mod: CPTII,S$GLB,, | Performed by: NURSE PRACTITIONER

## 2023-09-01 PROCEDURE — 1159F PR MEDICATION LIST DOCUMENTED IN MEDICAL RECORD: ICD-10-PCS | Mod: CPTII,S$GLB,, | Performed by: NURSE PRACTITIONER

## 2023-09-01 PROCEDURE — 1125F PR PAIN SEVERITY QUANTIFIED, PAIN PRESENT: ICD-10-PCS | Mod: CPTII,S$GLB,, | Performed by: NURSE PRACTITIONER

## 2023-09-01 PROCEDURE — 1157F ADVNC CARE PLAN IN RCRD: CPT | Mod: CPTII,S$GLB,, | Performed by: NURSE PRACTITIONER

## 2023-09-01 PROCEDURE — 4010F ACE/ARB THERAPY RXD/TAKEN: CPT | Mod: CPTII,S$GLB,, | Performed by: NURSE PRACTITIONER

## 2023-09-01 PROCEDURE — 99214 PR OFFICE/OUTPT VISIT, EST, LEVL IV, 30-39 MIN: ICD-10-PCS | Mod: S$GLB,,, | Performed by: NURSE PRACTITIONER

## 2023-09-01 PROCEDURE — 3078F PR MOST RECENT DIASTOLIC BLOOD PRESSURE < 80 MM HG: ICD-10-PCS | Mod: CPTII,S$GLB,, | Performed by: NURSE PRACTITIONER

## 2023-09-01 PROCEDURE — 1159F MED LIST DOCD IN RCRD: CPT | Mod: CPTII,S$GLB,, | Performed by: NURSE PRACTITIONER

## 2023-09-01 PROCEDURE — 3044F HG A1C LEVEL LT 7.0%: CPT | Mod: CPTII,S$GLB,, | Performed by: NURSE PRACTITIONER

## 2023-09-01 PROCEDURE — 1100F PR PT FALLS ASSESS DOC 2+ FALLS/FALL W/INJURY/YR: ICD-10-PCS | Mod: CPTII,S$GLB,, | Performed by: NURSE PRACTITIONER

## 2023-09-01 RX ORDER — LEVOFLOXACIN 750 MG/1
750 TABLET ORAL DAILY
Qty: 7 TABLET | Refills: 0 | Status: ON HOLD | OUTPATIENT
Start: 2023-09-01 | End: 2023-11-30 | Stop reason: HOSPADM

## 2023-09-01 NOTE — PROGRESS NOTES
[facility] Hematology/Oncology  PROGRESS NOTE - 2nd Follow-up Visit      Subjective:       Patient ID:   NAME: Jama James : 1955     68 y.o. male    Referring Doc: Myles Hutchison III, *  Other Physicians: Ann Goodwin; Tiffany Smith NP(Bud); Vince Combs (Card)        Chief Complaint: prostate cancer f/u      History of Present Illness:     Patient returns today for a regularly scheduled follow-up visit.  The patient is here today to go over the results of the recently ordered labs, tests and studies. He is here with his sister.    Breathing ok currently but is on O2 at home an inhalers. No CP, Ha's or N/V    He was started on zytiga and prednisone for prostate cancer.     He was admitted to the hospital twice over the past month due to urinary retention bilateral lower extremity edema.     He rain out of his flomax at home and then he was having the issues due to this reason.     He is now having increased cough and is not on any cough medications.       ROS:   GEN: normal without any fever, night sweats or weight loss  HEENT: normal with no HA's, sore throat, stiff neck, changes in vision  CV: normal with no CP, SOB, PND, SUBRAMANIAN or orthopnea  PULM: normal with no SOB, cough, hemoptysis, sputum or pleuritic pain  GI: normal with no abdominal pain, nausea, vomiting, constipation, diarrhea, melanotic stools, BRBPR, or hematemesis  : LUTS sx's; adequate flow currently  BREAST: normal with no mass, discharge, pain  SKIN: normal with no rash, erythema, bruising, or swelling    Pain Scale:  0    Allergies:  Review of patient's allergies indicates:  No Known Allergies    Medications:    Current Outpatient Medications:     abiraterone (ZYTIGA) 250 mg Tab, Take 4 tablets (1,000 mg total) by mouth once daily., Disp: 120 tablet, Rfl: 11    albuterol (PROVENTIL/VENTOLIN HFA) 90 mcg/actuation inhaler, INHALE ONE (1) OR TWO (2) SPRAYS BY MOUTH EVERY SIX (6) HOURS AS NEEDED FOR WHEEZING, Disp: 18 g, Rfl: 5     albuterol-ipratropium (DUO-NEB) 2.5 mg-0.5 mg/3 mL nebulizer solution, Take 3 mLs by nebulization every 6 (six) hours as needed for Wheezing. Rescue, Disp: 180 mL, Rfl: 2    amiodarone (PACERONE) 200 MG Tab, TAKE ONE (1) TABLET ONCE DAILY FOR HEART RYHTHM, Disp: 30 tablet, Rfl: 3    amlodipine-benazepril 5-20 mg (LOTREL) 5-20 mg per capsule,  90 cap, 0 Refill(s), Disp: , Rfl:     apixaban (ELIQUIS) 5 mg Tab,  60 EA, TAKE 1 TABLET BY MOUTH TWICE DAILY, 0 Refill(s), Soft Stop, Disp: , Rfl:     aspirin (ECOTRIN) 81 MG EC tablet, Take 1 tablet (81 mg total) by mouth once daily., Disp: 90 tablet, Rfl: 3    atorvastatin (LIPITOR) 10 MG tablet, TAKE 1 TABLET AT BEDTIME FOR CHOLESTEROL (TAKE WITH CO-ENZYME Q-10), Disp: 90 tablet, Rfl: 3    diltiaZEM (CARDIZEM) 30 MG tablet, Take 1 tablet (30 mg total) by mouth every 12 (twelve) hours., Disp: 60 tablet, Rfl: 11    ferrous sulfate (FEOSOL) 325 mg (65 mg iron) Tab tablet, Take 1 tablet (325 mg total) by mouth every Mon, Wed, Fri., Disp: 30 tablet, Rfl: 1    finasteride (PROSCAR) 5 mg tablet, TAKE ONE (1) TABLET EVERY MORNING FOR PROSTATE, Disp: 90 tablet, Rfl: 1    fluticasone-umeclidin-vilanter (TRELEGY ELLIPTA) 200-62.5-25 mcg inhaler, Inhale 1 puff into the lungs once daily., Disp: 60 each, Rfl: 11    furosemide (LASIX) 20 MG tablet, Take 1 tablet (20 mg total) by mouth once daily. for 14 days, Disp: 14 tablet, Rfl: 0    losartan (COZAAR) 50 MG tablet, Take 1 tablet (50 mg total) by mouth 2 (two) times a day. (Patient taking differently: Take 50 mg by mouth once daily.), Disp: 180 tablet, Rfl: 3    OXYGEN-AIR DELIVERY SYSTEMS MISC, by Misc.(Non-Drug; Combo Route) route., Disp: , Rfl:     pantoprazole (PROTONIX) 40 MG tablet, TAKE 1 TABLET EVERY MORNING (30 MINUTES BEFORE BREAKFAST) FOR STOMACH, Disp: 90 tablet, Rfl: 1    paroxetine (PAXIL) 40 MG tablet, TAKE ONE (1) TABLET EVERY MORNING FOR MOOD AND NERVE PAIN, Disp: 90 tablet, Rfl: 1    predniSONE (DELTASONE) 5 MG tablet,  Take 1 tablet (5 mg total) by mouth once daily., Disp: 90 tablet, Rfl: 3    tamsulosin (FLOMAX) 0.4 mg Cap, Take 1 capsule (0.4 mg total) by mouth once daily., Disp: 90 capsule, Rfl: 3    traMADoL (ULTRAM) 50 mg tablet, Take 1-2 tablets every 8 hours as needed for pain, Disp: 90 each, Rfl: 0    traZODone (DESYREL) 100 MG tablet, TAKE 1 TABLET AT BEDTIME FOR SLEEP AND NERVE PAIN, Disp: 90 tablet, Rfl: 1    triamcinolone acetonide 0.1% (KENALOG) 0.1 % cream, Apply topically 2 (two) times daily., Disp: 453.6 g, Rfl: 1    bicalutamide (CASODEX) 50 MG Tab, Take 1 tablet (50 mg total) by mouth once daily., Disp: 14 tablet, Rfl: 0    ipratropium (ATROVENT) 0.02 % nebulizer solution, Take 2.5 mLs (500 mcg total) by nebulization every 6 (six) hours as needed for Wheezing. Rescue, Disp: 75 mL, Rfl: 0    levoFLOXacin (LEVAQUIN) 750 MG tablet, Take 1 tablet (750 mg total) by mouth once daily., Disp: 7 tablet, Rfl: 0    PMHx/PSHx Updates:  See patient's last visit with Dr. Lawson 8/1/2023  See H&P on 7/7/2023        Pathology:   Cancer Staging   No matching staging information was found for the patient.    Prostate Biopsy 3/18/2020:     Final Pathologic Diagnosis 1.  PROSTATE, RIGHT BASE, BIOPSY:   Benign prostatic tissue.   Negative for malignancy.   2.  PROSTATE, RIGHT MIDDLE, BIOPSY:   Prostatic adenocarcinoma, Inge grade 4 + 3 = 7 (80% pattern 4), involving   70% (8 mm) of 1 core.   3.  PROSTATE, RIGHT APEX, BIOPSY:   Prostatic adenocarcinoma, Inge grade 4 + 3 = 7 (70% pattern 4), involving   95% (15 mm) of 1 core.   4.  PROSTATE, RIGHT LATERAL BASE, BIOPSY:   Prostatic adenocarcinoma, Inge grade 4 + 3 = 7 (60% pattern 4), involving   80% (9 mm) of 1 core.   5. PROSTATE, RIGHT LATERAL MIDDLE, BIOPSY:   Prostatic adenocarcinoma, Beaumont grade 4 + 3 = 7 (90% pattern 4), involving   90% (11 mm) of 1 core.   6.  PROSTATE, RIGHT LATERAL APEX, BIOPSY:   Prostatic adenocarcinoma, Beaumont grade 3 + 4 = 7 (30% pattern 4),  "involving   70% (4 mm) of 1 core.   7.  PROSTATE, LEFT BASE, BIOPSY:   Benign prostatic tissue.   Negative for malignancy.   8.  PROSTATE, LEFT MIDDLE, BIOPSY:   Benign prostatic tissue.   Negative for malignancy.   9.  PROSTATE, LEFT APEX, BIOPSY:   Prostatic adenocarcinoma, Bath grade 4 + 3 = 7 (60% pattern 4), involving   15% (2 mm) of 1 core.   10.  PROSTATE, LEFT LATERAL BASE, BIOPSY:   Benign prostatic tissue.   Negative for malignancy.   11.  PROSTATE, LEFT LATERAL MIDDLE, BIOPSY:   Benign prostatic tissue with atrophy.   Negative for malignancy.   12.  PROSTATE, LEFT LATERAL APEX, BIOPSY:   Prostatic adenocarcinoma, Bath grade 3 + 4 = 7 (10% pattern 4) involving a   40-50% (3-5 mm) of 2/2 cores.   Perineural invasion is identified.               Objective:     Vitals:  Blood pressure 123/71, pulse 84, temperature 97 °F (36.1 °C), resp. rate (!) 22, height 6' 5" (1.956 m), weight 85.2 kg (187 lb 14.4 oz), SpO2 (!) 82 %.    Physical Examination:   GEN: no apparent distress, comfortable; AAOx3; looks older than chronological age  HEAD: atraumatic and normocephalic  EYES: no pallor, no icterus, PERRLA;  ENT: OMM, no pharyngeal erythema, external ears WNL; no nasal discharge; no thrush  NECK: no masses, thyroid normal, trachea midline, no LAD/LN's, supple  CV: RRR with no murmur; normal pulse; normal S1 and S2; no pedal edema  CHEST: + cough, +wheezing   ABDOM: nontender and nondistended; soft; normal bowel sounds; no rebound/guarding  MUSC/Skeletal: ROM normal; no crepitus; joints normal; no deformities ; some arthropathy  EXTREM: no clubbing, cyanosis, inflammation or swelling  SKIN: no rashes, lesions, ulcers, petechiae or subcutaneous nodules; dry skin on bilateral lower extremities Rght >Left; chronic age related skin changes; tattoos  : no tracy  NEURO: grossly intact; motor/sensory WNL; AAOx3; no tremors  PSYCH: normal mood, affect and behavior  LYMPH: normal cervical, supraclavicular, axillary and " edwige CHEW's             Labs:     Lab Results   Component Value Date    WBC 9.12 08/29/2023    HGB 12.8 (L) 08/29/2023    HCT 39.0 (L) 08/29/2023    MCV 93 08/29/2023     08/29/2023       CMP  Sodium   Date Value Ref Range Status   08/29/2023 143 136 - 145 mmol/L Final     Potassium   Date Value Ref Range Status   08/29/2023 3.4 (L) 3.5 - 5.1 mmol/L Final     Chloride   Date Value Ref Range Status   08/29/2023 99 95 - 110 mmol/L Final     CO2   Date Value Ref Range Status   08/29/2023 33 (H) 23 - 29 mmol/L Final     Glucose   Date Value Ref Range Status   08/29/2023 143 (H) 70 - 110 mg/dL Final     BUN   Date Value Ref Range Status   08/29/2023 9 8 - 23 mg/dL Final     Creatinine   Date Value Ref Range Status   08/29/2023 0.8 0.5 - 1.4 mg/dL Final     Calcium   Date Value Ref Range Status   08/29/2023 9.7 8.7 - 10.5 mg/dL Final     Total Protein   Date Value Ref Range Status   08/29/2023 6.9 6.0 - 8.4 g/dL Final     Albumin   Date Value Ref Range Status   08/29/2023 3.0 (L) 3.5 - 5.2 g/dL Final     Total Bilirubin   Date Value Ref Range Status   08/29/2023 0.4 0.1 - 1.0 mg/dL Final     Comment:     For infants and newborns, interpretation of results should be based  on gestational age, weight and in agreement with clinical  observations.    Premature Infant recommended reference ranges:  Up to 24 hours.............<8.0 mg/dL  Up to 48 hours............<12.0 mg/dL  3-5 days..................<15.0 mg/dL  6-29 days.................<15.0 mg/dL       Alkaline Phosphatase   Date Value Ref Range Status   08/29/2023 132 55 - 135 U/L Final     AST   Date Value Ref Range Status   08/29/2023 11 10 - 40 U/L Final     ALT   Date Value Ref Range Status   08/29/2023 14 10 - 44 U/L Final     Anion Gap   Date Value Ref Range Status   08/29/2023 11 8 - 16 mmol/L Final     eGFR   Date Value Ref Range Status   08/29/2023 >60.0 >60 mL/min/1.73 m^2 Final   05/22/2023 95 > OR = 60 mL/min/1.73m2 Final     Comment:     The eGFR is  based on the CKD-EPI 2021 equation. To calculate   the new eGFR from a previous Creatinine or Cystatin C  result, go to https://www.kidney.org/professionals/  kdoqi/gfr%5Fcalculator       Total PSA (ng/mL)   Date Value   09/14/2022 4.9 (H)           Radiology/Diagnostic Studies:    PET PSMA 6/9/2023:     Impression:     Prostate carcinoma with metastatic disease to cervical, thoracic, abdominal and pelvic lymph nodes as well as metastatic disease to bone.  Interval progression compared to previous PET-CT.        MRI prostate 5/31/2023:     Impression:     Mild enlarged prostate gland demonstrating a PI-RADS 5 lesion for which clinically significant prostate cancer is highly likely to be present.  Broad capsular abutment and above with the anterior wall the rectum is present without visible invasion.  Mildly suspicious bilateral common femoral chain lymph nodes.     Overall Assessment:     PIRADS 5 (clinically significant cancer is highly likely to be present)           CTA chest 3/21/2023:     Impression:     1. No CT evidence to suggest an acute pulmonary embolus.  2. Multiple small soft tissue pulmonary nodules with interval development of a small spiculated soft tissue pulmonary nodule of the left upper lobe.  Follow-up per Fleischner guidelines.  For multiple solid nodules with any 6 mm or greater, Fleischner Society guidelines recommend follow up with non-contrast chest CT at 3-6 months and 18-24 months after discovery.  3. Diffuse centrilobular emphysematous change with linear discoid atelectasis at the lung bases.  4. Chronic small loculated left pleural effusion with suspected chronic round atelectasis at the left lung base.  5. Chronic bilateral adrenal adenomas.  This report was flagged in Epic as abnormal.        CT Chest 10/27/2022:     FINDINGS:  There is no mediastinal nor axillary adenopathy.  The aorta is normal in caliber with extensive calcific plaque.  There are coronary artery calcifications.   Mild gynecomastia is present.     There is a chronic small loculated left pleural effusion with associated calcification of the pleura.  There is a trace right pleural effusion versus posterior pleural thickening, unchanged.  There is unchanged rounded atelectasis in the left lower lobe adjacent to the chronic loculated pleural effusion.     Emphysema is present.  There are calcified granulomata.     Noncalcified lung nodules are unchanged, as follows on series 3:     Right upper lobe 3 mm image 81, unchanged.     Right upper lobe 2 mm image 121 unchanged.     Right upper lobe 4 mm image 189, not significantly changed.     Right middle lobe 6 mm image 227, unchanged.     Intra fissural nodule on the right, scarring in the lateral right middle lobe are unchanged.  Pleural thickening lateral right lower lobe dependent atelectasis are similar to the previous study     2 mm nodule left lower lobe series 3, image 188 unchanged.     3 mm nodule anterior left upper lobe image 102 is unchanged.     There are no new or enlarging nodules compared to the recent prior study.     There is hardware in the cervical spine.  No acute bony abnormality.     Bilateral adrenal adenomas are again noted.          I have reviewed all available lab results and radiology reports.    Assessment/Plan:   (1) 68 y.o. male with diagnosis of prostate cancer who has been referred by Myles Hutchison III, * for evaluation by medical hematology/oncology. Patient had a diagnosis of high risk prostate cancer back in 2020 with Group 3 on pathology and a PSA of 23. He was previously followed by Dr Jeong with  and had undergone a TRUBP on 3/18/2020.  At that time, he was deemed to be inoperable and was referred for definitive XRT, but appears to have been noncompliant and was lost to follow-up.      He reports that he started ADT with Lupron in 2020 or 2021 but had not continued for over a year and could not recall when he last had an injection. His  PSA had previously dropped to 4.9 in 2022, but has since risen to 43.8. He had a  PEt PSMA on 6/9/2023 which is unfortunately showing wide-spread disease including bony mets.      He was seen by Dr Ann Goodwin with  and Dr JOSEFINA Hutchison with rad/onc. He was restarted on ADT with 2 weeks of casodex up front.     He has been referred to med/onc for evaluation for any second generation ADT therapy  +/- chemotherapy.      7/7/2023:  - discussed and reviewed the latest NCCN guidelines (Vers 1.2023)  - would benefit from addition of abiraterone and prednisone to the ADT regimen  - set up chemotx/antiandrogen training with Josephine; provid litertaure on the drugs, go over side-effect profile and obtain consents    8/1/2023:  - s/p chemotx/antiandrogen therapy school with Kay  - started on abiraterone last week and seems to be tolerating it ok so far        (2) HTN and hypercholesterolemia     (3) Atrial fibrillation     (4) COPD, chronic hypoxemia, O2 dependence, multiple pulmonary nodules     (5) Asbestosis and chronic active tobacco use     (6) Ulcerative colitis     (7) Anxiety/Depression     (8) Bilateral adrenal adenomas            VISIT DIAGNOSES:      Prostate cancer metastatic to bone    Prostate cancer metastatic to lung    Cough, unspecified type  -     X-Ray Chest PA And Lateral; Future; Expected date: 09/01/2023  -     levoFLOXacin (LEVAQUIN) 750 MG tablet; Take 1 tablet (750 mg total) by mouth once daily.  Dispense: 7 tablet; Refill: 0    Chronic obstructive pulmonary disease, unspecified COPD type  -     Ambulatory referral/consult to Pulmonology; Future; Expected date: 09/02/2023            PLAN:  Proceeded with abiraterone last week; s/pchemotx/antiandrogen therapy school/education with me for the abiraterone; provide literature, discuss side-effect profile and obtain consents  Proceed with ADT therapy  F/u with PCP, , Card and rad/onc  CXR today   Levaquin sent to [facility] for increasing cough    Referral to pulm to establish care here in [facility] in  6 weeks with Dr. Lawson and labs before   Fax note to Myles Hutchison III, *, Tiffany Smith (PCP), Garret Isabel       Discussion:     COVID-19 Discussion:    I had long discussion with patient and any applicable family about the COVID-19 coronavirus epidemic and the recommended precautions with regard to cancer and/or hematology patients. I have re-iterated the CDC recommendations for adequate hand washing, use of hand -like products, and coughing into elbow, etc. In addition, especially for our patients who are on chemotherapy and/or our otherwise immunocompromised patients, I have recommended avoidance of crowds, including movie theaters, restaurants, churches, etc. I have recommended avoidance of any sick or symptomatic family members and/or friends. I have also recommended avoidance of any raw and unwashed food products, and general avoidance of food items that have not been prepared by themselves. The patient has been asked to call us immediately with any symptom developments, issues, questions or other general concerns.     Pathology Discussion:    I reviewed and discussed the pathology report(s) and radiograph reports (if available) in as simple to understand and/or laymen's terms to the best of my ability. I had an indepth conversation with the patient and went over the patient's individual diagnosis based on the information that was currently available. I discussed the TNM staging process with regard to the patient's particular cancer type, and the calculated stage based on the currently available TNM data and literature. I discussed the available prognostic data with regard to the current staging information and how it relates to the prognosis of their particular neoplastic process.        NCCN Guidelines:    I discussed the available treatment option(s) in accordance with the latest literature from the NCCN Clinical  "Practice Guidelines for the patient's particular type of cancer disorder. The NCCN Guidelines provide a "document evidence-based (and) consensus-driven management" of the care of oncology patients. The treatment recommendations were made not only in accordance to the NCCN guidelines, but also factored in to account the patient's overall age, condition, performance status and their medical co-morbidities. I went over the risks and benefits of the the treatment options (if any could be made) with regard to their particular cancer type, their cancer stage, their age, and their co-morbidities.       Anti-Androgen Hormone Therapy Discussion:    I discussed the advantages of anti-androgen hormone therapy with the patient with regard to their particular neoplastic or carcinoma in situ condition.  I discussed the risks for thromboembolic events such as DVT's, pulmonary emboli, CVA's, retinal vascular clots, phlebitis, and TIA's. I discussed the potential risks for development of ocular disturbances, retinopathy, cataracts, corneal changes, flushing, hot flashes, erectile dysfunction, altered breasts (incl. Gynecomastia), fluid retention, weight changes, elevations in LFT's, liver damage, and mood disturbances. I discussed the potential risk of arthropathy and joint pains/aches which could be chronic and debilitating. I discussed potential adverse effects on bone mineralization and the risk of osteopenia and/or osteoporosis which could led to increase risk of fractures.   A consent form was obtained and a copy was provided to the patient.      I spent over 25 mins of time with the patient. Reviewed results of the recently ordered labs, tests and studies; made directives with regards to the results. Over half of this time was spent couseling and coordinating care.    I have explained all of the above in detail and the patient understands all of the current recommendation(s). I have answered all of their questions to the best " of my ability and to their complete satisfaction.   The patient is to continue with the current management plan.            Electronically signed by Josephine Peraza, MSN,APRN,AGNP-C            Answers submitted by the patient for this visit:  Review of Systems Questionnaire (Submitted on 7/29/2023)  appetite change : No  unexpected weight change: Yes  mouth sores: No  visual disturbance: No  cough: No  shortness of breath: No  chest pain: No  diarrhea: No  frequency: No  back pain: No  rash: No  headaches: No  adenopathy: No  nervous/ anxious: No

## 2023-09-05 ENCOUNTER — TELEPHONE (OUTPATIENT)
Dept: PULMONOLOGY | Facility: CLINIC | Age: 68
End: 2023-09-05

## 2023-09-07 ENCOUNTER — OFFICE VISIT (OUTPATIENT)
Dept: PULMONOLOGY | Facility: CLINIC | Age: 68
End: 2023-09-07
Payer: MEDICARE

## 2023-09-07 VITALS
WEIGHT: 194 LBS | OXYGEN SATURATION: 94 % | HEIGHT: 77 IN | HEART RATE: 87 BPM | DIASTOLIC BLOOD PRESSURE: 70 MMHG | SYSTOLIC BLOOD PRESSURE: 130 MMHG | BODY MASS INDEX: 22.91 KG/M2

## 2023-09-07 DIAGNOSIS — J44.9 CHRONIC OBSTRUCTIVE PULMONARY DISEASE, UNSPECIFIED COPD TYPE: Primary | ICD-10-CM

## 2023-09-07 DIAGNOSIS — C61 PROSTATE CANCER METASTATIC TO MULTIPLE SITES: ICD-10-CM

## 2023-09-07 DIAGNOSIS — J96.11 CHRONIC HYPOXEMIC RESPIRATORY FAILURE: ICD-10-CM

## 2023-09-07 DIAGNOSIS — Z72.0 TOBACCO ABUSE: ICD-10-CM

## 2023-09-07 PROCEDURE — 3008F PR BODY MASS INDEX (BMI) DOCUMENTED: ICD-10-PCS | Mod: CPTII,S$GLB,, | Performed by: NURSE PRACTITIONER

## 2023-09-07 PROCEDURE — 1159F PR MEDICATION LIST DOCUMENTED IN MEDICAL RECORD: ICD-10-PCS | Mod: CPTII,S$GLB,, | Performed by: NURSE PRACTITIONER

## 2023-09-07 PROCEDURE — 3044F HG A1C LEVEL LT 7.0%: CPT | Mod: CPTII,S$GLB,, | Performed by: NURSE PRACTITIONER

## 2023-09-07 PROCEDURE — 99204 OFFICE O/P NEW MOD 45 MIN: CPT | Mod: S$GLB,,, | Performed by: NURSE PRACTITIONER

## 2023-09-07 PROCEDURE — 3078F DIAST BP <80 MM HG: CPT | Mod: CPTII,S$GLB,, | Performed by: NURSE PRACTITIONER

## 2023-09-07 PROCEDURE — 1157F PR ADVANCE CARE PLAN OR EQUIV PRESENT IN MEDICAL RECORD: ICD-10-PCS | Mod: CPTII,S$GLB,, | Performed by: NURSE PRACTITIONER

## 2023-09-07 PROCEDURE — 4010F ACE/ARB THERAPY RXD/TAKEN: CPT | Mod: CPTII,S$GLB,, | Performed by: NURSE PRACTITIONER

## 2023-09-07 PROCEDURE — 3075F PR MOST RECENT SYSTOLIC BLOOD PRESS GE 130-139MM HG: ICD-10-PCS | Mod: CPTII,S$GLB,, | Performed by: NURSE PRACTITIONER

## 2023-09-07 PROCEDURE — 1125F AMNT PAIN NOTED PAIN PRSNT: CPT | Mod: CPTII,S$GLB,, | Performed by: NURSE PRACTITIONER

## 2023-09-07 PROCEDURE — 3078F PR MOST RECENT DIASTOLIC BLOOD PRESSURE < 80 MM HG: ICD-10-PCS | Mod: CPTII,S$GLB,, | Performed by: NURSE PRACTITIONER

## 2023-09-07 PROCEDURE — 99204 PR OFFICE/OUTPT VISIT, NEW, LEVL IV, 45-59 MIN: ICD-10-PCS | Mod: S$GLB,,, | Performed by: NURSE PRACTITIONER

## 2023-09-07 PROCEDURE — 3008F BODY MASS INDEX DOCD: CPT | Mod: CPTII,S$GLB,, | Performed by: NURSE PRACTITIONER

## 2023-09-07 PROCEDURE — 3075F SYST BP GE 130 - 139MM HG: CPT | Mod: CPTII,S$GLB,, | Performed by: NURSE PRACTITIONER

## 2023-09-07 PROCEDURE — 1159F MED LIST DOCD IN RCRD: CPT | Mod: CPTII,S$GLB,, | Performed by: NURSE PRACTITIONER

## 2023-09-07 PROCEDURE — 1157F ADVNC CARE PLAN IN RCRD: CPT | Mod: CPTII,S$GLB,, | Performed by: NURSE PRACTITIONER

## 2023-09-07 PROCEDURE — 3044F PR MOST RECENT HEMOGLOBIN A1C LEVEL <7.0%: ICD-10-PCS | Mod: CPTII,S$GLB,, | Performed by: NURSE PRACTITIONER

## 2023-09-07 PROCEDURE — 4010F PR ACE/ARB THEARPY RXD/TAKEN: ICD-10-PCS | Mod: CPTII,S$GLB,, | Performed by: NURSE PRACTITIONER

## 2023-09-07 PROCEDURE — 1125F PR PAIN SEVERITY QUANTIFIED, PAIN PRESENT: ICD-10-PCS | Mod: CPTII,S$GLB,, | Performed by: NURSE PRACTITIONER

## 2023-09-07 RX ORDER — IPRATROPIUM BROMIDE AND ALBUTEROL 20; 100 UG/1; UG/1
SPRAY, METERED RESPIRATORY (INHALATION)
COMMUNITY
Start: 2023-08-18 | End: 2023-09-07

## 2023-09-07 RX ORDER — BUDESONIDE, GLYCOPYRROLATE, AND FORMOTEROL FUMARATE 160; 9; 4.8 UG/1; UG/1; UG/1
2 AEROSOL, METERED RESPIRATORY (INHALATION) 2 TIMES DAILY
Qty: 10.7 G | Refills: 6 | Status: ON HOLD | OUTPATIENT
Start: 2023-09-07 | End: 2023-12-22 | Stop reason: HOSPADM

## 2023-09-07 NOTE — PATIENT INSTRUCTIONS
PFT  6 minute walk   Stop the Trelegy  Start the Breztri 2 puffs twice a day   Mucinex 1200mg twice a day  YOU NEED TO QUIT SMOKING!    Follow up in about 6 weeks (around 10/19/2023).

## 2023-09-07 NOTE — PROGRESS NOTES
SUBJECTIVE:    Patient ID: Jama James is a 68 y.o. male.    Chief Complaint: Establish Care and COPD    HPI    Patient referred here from oncology for management of COPD. He was seeing a MD in [place] but wanted to have someone closer to [place].  The patient has metastatic prostrate cancer.  He is still actively smoking, states has cut back a lot. He is oxygen dependent, swears he does not smoke around oxygen.  He is using Trelegy daily, and his rescue medicine via nebulizer or rescue inhaler at least 4 times a day.  He was treated with Levaquin for a week, finished today for a URI. His legs are swollen, states he did not take lasix this am because he had appointment.   Past Medical History:   Diagnosis Date    Abnormal CT scan 7/6/2023    Abnormal positron emission tomography (PET) scan 7/6/2023    Anxiety     Asbestosis 08/04/2015    Atrial fibrillation     Bilateral adrenal adenomas     COPD (chronic obstructive pulmonary disease)     COVID-19 virus infection 07/28/2022    Depression     Elevated PSA 7/6/2023    High cholesterol     HTN (hypertension)     Malignant neoplasm of prostate     Multiple lung nodules     Nodule of upper lobe of left lung 03/30/2023    6 mm spiculated on cta chest 3/21/23    On home oxygen therapy 05/24/2023    Pneumonia     Prostate cancer 5/27/2020    Prostate cancer metastatic to bone 7/6/2023    Prostate cancer metastatic to lung 7/6/2023    Ulcerative colitis      Past Surgical History:   Procedure Laterality Date    NECK SURGERY      right knee      TRANSRECTAL BIOPSY OF PROSTATE WITH ULTRASOUND GUIDANCE Bilateral 3/18/2020    Procedure: BIOPSY, PROSTATE, RECTAL APPROACH, WITH US GUIDANCE;  Surgeon: Bill Jeong MD;  Location: [facility];  Service: Urology;  Laterality: Bilateral;     Family History   Problem Relation Age of Onset    Cancer Mother         Uterine and Ovarian cancer    Diabetes Mother     Diabetes Sister         Social History:   Marital Status:    Occupation: Data Unavailable  Alcohol History:  reports current alcohol use.  Tobacco History:  reports that he has been smoking cigarettes. He started smoking about 50 years ago. He has a 25.0 pack-year smoking history. He has been exposed to tobacco smoke. He has never used smokeless tobacco.  Drug History:  reports no history of drug use.    Review of patient's allergies indicates:  No Known Allergies    Current Outpatient Medications   Medication Sig Dispense Refill    abiraterone (ZYTIGA) 250 mg Tab Take 4 tablets (1,000 mg total) by mouth once daily. 120 tablet 11    albuterol (PROVENTIL/VENTOLIN HFA) 90 mcg/actuation inhaler INHALE ONE (1) OR TWO (2) SPRAYS BY MOUTH EVERY SIX (6) HOURS AS NEEDED FOR WHEEZING 18 g 5    albuterol-ipratropium (DUO-NEB) 2.5 mg-0.5 mg/3 mL nebulizer solution Take 3 mLs by nebulization every 6 (six) hours as needed for Wheezing. Rescue 180 mL 2    amiodarone (PACERONE) 200 MG Tab TAKE ONE (1) TABLET ONCE DAILY FOR HEART RYHTHM 30 tablet 3    amlodipine-benazepril 5-20 mg (LOTREL) 5-20 mg per capsule   90 cap, 0 Refill(s)      apixaban (ELIQUIS) 5 mg Tab   60 EA, TAKE 1 TABLET BY MOUTH TWICE DAILY, 0 Refill(s), Soft Stop      aspirin (ECOTRIN) 81 MG EC tablet Take 1 tablet (81 mg total) by mouth once daily. 90 tablet 3    atorvastatin (LIPITOR) 10 MG tablet TAKE 1 TABLET AT BEDTIME FOR CHOLESTEROL (TAKE WITH CO-ENZYME Q-10) 90 tablet 3    diltiaZEM (CARDIZEM) 30 MG tablet Take 1 tablet (30 mg total) by mouth every 12 (twelve) hours. 60 tablet 11    ferrous sulfate (FEOSOL) 325 mg (65 mg iron) Tab tablet Take 1 tablet (325 mg total) by mouth every Mon, Wed, Fri. 30 tablet 1    finasteride (PROSCAR) 5 mg tablet TAKE ONE (1) TABLET EVERY MORNING FOR PROSTATE 90 tablet 1    furosemide (LASIX) 20 MG tablet Take 1 tablet (20 mg total) by mouth once daily. for 14 days 14 tablet 0    levoFLOXacin (LEVAQUIN) 750 MG tablet Take 1 tablet (750 mg total) by mouth once daily. 7 tablet 0     losartan (COZAAR) 50 MG tablet Take 1 tablet (50 mg total) by mouth 2 (two) times a day. (Patient taking differently: Take 50 mg by mouth once daily.) 180 tablet 3    OXYGEN-AIR DELIVERY SYSTEMS M[place] [facility] [place]Non-Drug; Combo Route) route.      pantoprazole (PROTONIX) 40 MG tablet TAKE 1 TABLET EVERY MORNING (30 MINUTES BEFORE BREAKFAST) FOR STOMACH 90 tablet 1    paroxetine (PAXIL) 40 MG tablet TAKE ONE (1) TABLET EVERY MORNING FOR MOOD AND NERVE PAIN 90 tablet 1    predniSONE (DELTASONE) 5 MG tablet Take 1 tablet (5 mg total) by mouth once daily. 90 tablet 3    tamsulosin (FLOMAX) 0.4 mg Cap Take 1 capsule (0.4 mg total) by mouth once daily. 90 capsule 3    traMADoL (ULTRAM) 50 mg tablet Take 1-2 tablets every 8 hours as needed for pain 90 each 0    traZODone (DESYREL) 100 MG tablet TAKE 1 TABLET AT BEDTIME FOR SLEEP AND NERVE PAIN 90 tablet 1    triamcinolone acetonide 0.1% (KENALOG) 0.1 % cream Apply topically 2 (two) times daily. 453.6 g 1    bicalutamide (CASODEX) 50 MG Tab Take 1 tablet (50 mg total) by mouth once daily. 14 tablet 0    budesonide-glycopyr-formoterol (BREZTRI AEROSPHERE) 160-9-4.8 mcg/actuation HFAA Inhale 2 puffs into the lungs 2 (two) times a day. 10.7 g 6    ipratropium (ATROVENT) 0.02 % nebulizer solution Take 2.5 mLs (500 mcg total) by nebulization every 6 (six) hours as needed for Wheezing. Rescue 75 mL 0     No current facility-administered medications for this visit.         Last PET 06/23 Impression:     Prostate carcinoma with metastatic disease to cervical, thoracic, abdominal and pelvic lymph nodes as well as metastatic disease to bone.  Interval progression compared to previous PET-/09/2023    Last CTA:03/2023    Impression:     1. No CT evidence to suggest an acute pulmonary embolus.  2. Multiple small soft tissue pulmonary nodules with interval development of a small spiculated soft tissue pulmonary nodule of the left upper lobe.  Follow-up per Fleischner guidelines.  For  "multiple solid nodules with any 6 mm or greater, Fleischner Society guidelines recommend follow up with non-contrast chest CT at 3-6 months and 18-24 months after discovery.  3. Diffuse centrilobular emphysematous change with linear discoid atelectasis at the lung bases.  4. Chronic small loculated left pleural effusion with suspected chronic round atelectasis at the left lung base.  5. Chronic bilateral adrenal adenomas.      Review of Systems  General: Feeling Well.  Eyes: Vision is good.  ENT:  No sinusitis or pharyngitis.   Heart:: No chest pain or palpitations.  Lungs: dyspnea with exertion, chronic mucous production   GI: No Nausea, vomiting, constipation, diarrhea, or reflux.  : No dysuria, hesitancy, or nocturia.  Musculoskeletal: No joint pain or myalgias.  Skin: No lesions or rashes.  Neuro: No headaches or neuropathy.  Lymph: legs are swollen   Psych: No anxiety or depression.  Endo: No weight change.    OBJECTIVE:      /70 (BP Location: Left arm, Patient Position: Sitting, BP Method: Medium (Manual))   Pulse 87   Ht 6' 5" (1.956 m)   Wt 88 kg (194 lb)   SpO2 (!) 94% Comment: 3LPM  BMI 23.01 kg/m²     Physical Exam  GENERAL: Older patient in no distress. Smells of cigarette smoke   HEENT: Pupils equal and reactive. Extraocular movements intact. Nose intact.      Pharynx moist.  NECK: Supple.   HEART: Regular rate and rhythm. No murmur or gallop auscultated.  LUNGS: dense crackles to right base posteriorly, laterally  ABDOMEN: Bowel sounds present. Non-tender, no masses palpated.  EXTREMITIES: Normal muscle tone and joint movement, clubbed nails, smoke stained, onychomycosis  LYMPHATICS: No adenopathy palpated, 2+ pitting to legs  SKIN: erythematous lower extremities  NEURO: Cranial nerves II-XII intact. Motor strength 5/5 bilaterally, upper and lower extremities.  PSYCH: Appropriate affect.    Assessment:       1. Chronic obstructive pulmonary disease, unspecified COPD type    2. Prostate " cancer metastatic to multiple sites    3. Tobacco abuse    4. Chronic hypoxemic respiratory failure          Plan:       Chronic obstructive pulmonary disease, unspecified COPD type  -     Complete PFT with bronchodilator; Future    Prostate cancer metastatic to multiple sites    Tobacco abuse    Chronic hypoxemic respiratory failure  -     Stress test, pulmonary; Future; Expected date: 09/07/2023    Other orders  -     budesonide-glycopyr-formoterol (BREZTRI AEROSPHERE) 160-9-4.8 mcg/actuation HFAA; Inhale 2 puffs into the lungs 2 (two) times a day.  Dispense: 10.7 g; Refill: 6       PFT  6 minute walk   Stop the Trelegy  Start the Breztri 2 puffs twice a day   Mucinex 1200mg twice a day  YOU NEED TO QUIT SMOKING!    Follow up in about 6 weeks (around 10/19/2023).

## 2023-10-03 ENCOUNTER — TELEPHONE (OUTPATIENT)
Dept: PULMONOLOGY | Facility: CLINIC | Age: 68
End: 2023-10-03

## 2023-10-03 NOTE — TELEPHONE ENCOUNTER
----- Message from Alfredo Mchugh sent at 10/3/2023  9:50 AM CDT -----  Regarding: Pt Sister Roger Called  Pt sister Renu called and would like to get an order. She would for Gali to sent it to [facility] for pt to have a portal able oxygen tank.  She can be reached at [phone]

## 2023-10-06 ENCOUNTER — HOSPITAL ENCOUNTER (OUTPATIENT)
Dept: PULMONOLOGY | Facility: HOSPITAL | Age: 68
Discharge: HOME | End: 2023-10-06
Attending: NURSE PRACTITIONER
Payer: MEDICARE

## 2023-10-06 VITALS — HEIGHT: 77 IN | BODY MASS INDEX: 22.91 KG/M2 | WEIGHT: 194 LBS

## 2023-10-06 DIAGNOSIS — J44.9 CHRONIC OBSTRUCTIVE PULMONARY DISEASE, UNSPECIFIED COPD TYPE: ICD-10-CM

## 2023-10-06 DIAGNOSIS — J96.11 CHRONIC HYPOXEMIC RESPIRATORY FAILURE: ICD-10-CM

## 2023-10-06 PROCEDURE — 94727 GAS DIL/WSHOT DETER LNG VOL: CPT

## 2023-10-06 PROCEDURE — 94618 PULMONARY STRESS TESTING: CPT

## 2023-10-06 PROCEDURE — 94729 DIFFUSING CAPACITY: CPT

## 2023-10-06 PROCEDURE — 94060 EVALUATION OF WHEEZING: CPT

## 2023-10-06 PROCEDURE — 94010 BREATHING CAPACITY TEST: CPT

## 2023-10-06 NOTE — CARE UPDATE
"   10/06/23 1126   6MW Test   Ordering Provider Gali Camacho FNP   Diagnosis Shortness of Breath;Qualify for Oxygen;COPD   Height 6' 5" (1.956 m)   Weight 88 kg (194 lb)   BMI (Calculated) 23   Predicted Distance Meters (Calculated) 685.5 meters   Patient Race    6MWT Status completed with stops   Patient Reported Dyspnea;Dizziness;Other (Comment)  (weakness, pt very unsteady while walking .)   Was O2 used? Yes   Delivery Method Cannula;Pull Tank;Continuous Flow   6MW Distance walked (feet) 500 feet   Distance walked (meters) 152.4 meters   Did patient stop? No   Type of assistive device(s) used? a wheelchair   Is extra documentation required for this patient? Yes   Pre-Exercise   Oxygen Saturation 85 %   Supplemental Oxygen Room Air   Heart Rate 98 bpm   Ravi Dyspnea Rating  very, very heavy   Exercise 1 Minute   Oxygen Saturation 86 %   Supplemental Oxygen 2 L/M   Heart Rate 98 bpm   Exercise 2 Minutes   Oxygen Saturation 89 %   Supplemental Oxygen 3 L/M   Heart Rate 98 bpm   Exercise 3 Minutes   Oxygen Saturation 94 %   Supplemental Oxygen 3 L/M   Heart Rate 96 bpm   Exercise 4 Minutes   Oxygen Saturation 95 %   Supplemental Oxygen 3 L/M   Heart Rate 96 bpm   Exercise 5 Minutes   Oxygen Saturation 94 %   Supplemental Oxygen 3 L/M   Heart Rate 96 bpm   Post Exercise   Oxygen Saturation 95 %   Supplemental Oxygen 3 L/M   Heart Rate 96 bpm   Ravi Dyspnea Rating  very,very heavy   Recovery   Oxygen Saturation 96 %   Supplemental Oxygen 3 L/M   Heart Rate 89 bpm   Ravi Dyspnea Rating  heavy   Interpretation   Is procedure ready for interpretation? Yes   Did the patient stop or pause? No   Total Laps Walked 3   Final Partial Lap Distance (feet) 0 feet   Total Distance Feet (Calculated) 600 feet   Total Distance Meters (Calculated) 182.88 meters   Percentage of Predicted (Calculated) 26.68   Peak VO2 (Calculated) 9.47   Mets 2.71   Comments This is a hypoxemic 6 min. walk.   Patient requires __3___ " liters of oxygen to adequately oxygenate with ambulation.   Oxygen Qualification   Oxygen Qualification? Yes

## 2023-10-08 ENCOUNTER — HOSPITAL ENCOUNTER (EMERGENCY)
Facility: HOSPITAL | Age: 68
Discharge: HOME | End: 2023-10-08
Attending: STUDENT IN AN ORGANIZED HEALTH CARE EDUCATION/TRAINING PROGRAM
Payer: MEDICARE

## 2023-10-08 VITALS
SYSTOLIC BLOOD PRESSURE: 164 MMHG | OXYGEN SATURATION: 99 % | HEART RATE: 82 BPM | TEMPERATURE: 98 F | BODY MASS INDEX: 22.43 KG/M2 | HEIGHT: 77 IN | RESPIRATION RATE: 19 BRPM | DIASTOLIC BLOOD PRESSURE: 87 MMHG | WEIGHT: 190 LBS

## 2023-10-08 DIAGNOSIS — R33.9 URINARY RETENTION: Primary | ICD-10-CM

## 2023-10-08 LAB
ALBUMIN SERPL BCP-MCNC: 3.9 G/DL (ref 3.5–5.2)
ALP SERPL-CCNC: 176 U/L (ref 55–135)
ALT SERPL W/O P-5'-P-CCNC: 14 U/L (ref 10–44)
ANION GAP SERPL CALC-SCNC: 15 MMOL/L (ref 8–16)
AST SERPL-CCNC: 14 U/L (ref 10–40)
BASOPHILS # BLD AUTO: 0.08 K/UL (ref 0–0.2)
BASOPHILS NFR BLD: 1 % (ref 0–1.9)
BILIRUB SERPL-MCNC: 0.7 MG/DL (ref 0.1–1)
BILIRUB UR QL STRIP: NEGATIVE
BUN SERPL-MCNC: 10 MG/DL (ref 8–23)
CALCIUM SERPL-MCNC: 10.1 MG/DL (ref 8.7–10.5)
CHLORIDE SERPL-SCNC: 102 MMOL/L (ref 95–110)
CLARITY UR: CLEAR
CO2 SERPL-SCNC: 30 MMOL/L (ref 23–29)
COLOR UR: YELLOW
CREAT SERPL-MCNC: 1 MG/DL (ref 0.5–1.4)
DIFFERENTIAL METHOD: ABNORMAL
EOSINOPHIL # BLD AUTO: 0.2 K/UL (ref 0–0.5)
EOSINOPHIL NFR BLD: 2.7 % (ref 0–8)
ERYTHROCYTE [DISTWIDTH] IN BLOOD BY AUTOMATED COUNT: 14 % (ref 11.5–14.5)
EST. GFR  (NO RACE VARIABLE): >60 ML/MIN/1.73 M^2
GLUCOSE SERPL-MCNC: 126 MG/DL (ref 70–110)
GLUCOSE UR QL STRIP: NEGATIVE
HCT VFR BLD AUTO: 40.6 % (ref 40–54)
HGB BLD-MCNC: 12.7 G/DL (ref 14–18)
HGB UR QL STRIP: ABNORMAL
IMM GRANULOCYTES # BLD AUTO: 0.02 K/UL (ref 0–0.04)
IMM GRANULOCYTES NFR BLD AUTO: 0.3 % (ref 0–0.5)
KETONES UR QL STRIP: NEGATIVE
LEUKOCYTE ESTERASE UR QL STRIP: NEGATIVE
LYMPHOCYTES # BLD AUTO: 1.2 K/UL (ref 1–4.8)
LYMPHOCYTES NFR BLD: 16.1 % (ref 18–48)
MCH RBC QN AUTO: 29.6 PG (ref 27–31)
MCHC RBC AUTO-ENTMCNC: 31.3 G/DL (ref 32–36)
MCV RBC AUTO: 95 FL (ref 82–98)
MONOCYTES # BLD AUTO: 0.5 K/UL (ref 0.3–1)
MONOCYTES NFR BLD: 6.3 % (ref 4–15)
NEUTROPHILS # BLD AUTO: 5.6 K/UL (ref 1.8–7.7)
NEUTROPHILS NFR BLD: 73.6 % (ref 38–73)
NITRITE UR QL STRIP: NEGATIVE
NRBC BLD-RTO: 0 /100 WBC
PH UR STRIP: 6 [PH] (ref 5–8)
PLATELET # BLD AUTO: 379 K/UL (ref 150–450)
PMV BLD AUTO: 9.6 FL (ref 9.2–12.9)
POTASSIUM SERPL-SCNC: 4.1 MMOL/L (ref 3.5–5.1)
PROT SERPL-MCNC: 7.7 G/DL (ref 6–8.4)
PROT UR QL STRIP: NEGATIVE
RBC # BLD AUTO: 4.29 M/UL (ref 4.6–6.2)
SODIUM SERPL-SCNC: 147 MMOL/L (ref 136–145)
SP GR UR STRIP: 1.01 (ref 1–1.03)
URN SPEC COLLECT METH UR: ABNORMAL
UROBILINOGEN UR STRIP-ACNC: NEGATIVE EU/DL
WBC # BLD AUTO: 7.65 K/UL (ref 3.9–12.7)

## 2023-10-08 PROCEDURE — 96360 HYDRATION IV INFUSION INIT: CPT | Mod: 59

## 2023-10-08 PROCEDURE — 51702 INSERT TEMP BLADDER CATH: CPT

## 2023-10-08 PROCEDURE — 80053 COMPREHEN METABOLIC PANEL: CPT | Performed by: STUDENT IN AN ORGANIZED HEALTH CARE EDUCATION/TRAINING PROGRAM

## 2023-10-08 PROCEDURE — 81003 URINALYSIS AUTO W/O SCOPE: CPT | Performed by: STUDENT IN AN ORGANIZED HEALTH CARE EDUCATION/TRAINING PROGRAM

## 2023-10-08 PROCEDURE — 99284 EMERGENCY DEPT VISIT MOD MDM: CPT | Mod: 25

## 2023-10-08 PROCEDURE — 85025 COMPLETE CBC W/AUTO DIFF WBC: CPT | Performed by: STUDENT IN AN ORGANIZED HEALTH CARE EDUCATION/TRAINING PROGRAM

## 2023-10-08 PROCEDURE — 25000003 PHARM REV CODE 250: Performed by: STUDENT IN AN ORGANIZED HEALTH CARE EDUCATION/TRAINING PROGRAM

## 2023-10-08 RX ORDER — TAMSULOSIN HYDROCHLORIDE 0.4 MG/1
0.4 CAPSULE ORAL
Status: COMPLETED | OUTPATIENT
Start: 2023-10-08 | End: 2023-10-08

## 2023-10-08 RX ORDER — TAMSULOSIN HYDROCHLORIDE 0.4 MG/1
0.4 CAPSULE ORAL DAILY
Qty: 60 CAPSULE | Refills: 0 | Status: SHIPPED | OUTPATIENT
Start: 2023-10-08 | End: 2023-10-18

## 2023-10-08 RX ADMIN — SODIUM CHLORIDE 1000 ML: 9 INJECTION, SOLUTION INTRAVENOUS at 07:10

## 2023-10-08 RX ADMIN — TAMSULOSIN HYDROCHLORIDE 0.4 MG: 0.4 CAPSULE ORAL at 07:10

## 2023-10-08 NOTE — ED PROVIDER NOTES
"Encounter Date: 10/8/2023       History     Chief Complaint   Patient presents with    Urinary Retention     Patient states he is unable to urinate. Last urination time was "some time last night". Patient states hes been out of his flomax for 4 days now     68-year-old male with multiple medical problems including metastatic prostate cancer, patient currently maintained on oral chemotherapeutic agent, frequent urinary retention.  Presents to ED with complaints of urinary retention, last micturition was 9:00 p.m. last night. He reports that he ran out of his Flomax 4 days ago.  He reports associated pelvic pressure.  He denies fever, chills, nausea, vomiting or any other systemic symptoms.    The history is provided by the patient. No  was used.     Review of patient's allergies indicates:  No Known Allergies  Past Medical History:   Diagnosis Date    Abnormal CT scan 7/6/2023    Abnormal positron emission tomography (PET) scan 7/6/2023    Anxiety     Asbestosis 08/04/2015    Atrial fibrillation     Bilateral adrenal adenomas     COPD (chronic obstructive pulmonary disease)     COVID-19 virus infection 07/28/2022    Depression     Elevated PSA 7/6/2023    High cholesterol     HTN (hypertension)     Malignant neoplasm of prostate     Multiple lung nodules     Nodule of upper lobe of left lung 03/30/2023    6 mm spiculated on cta chest 3/21/23    On home oxygen therapy 05/24/2023    Pneumonia     Prostate cancer 5/27/2020    Prostate cancer metastatic to bone 7/6/2023    Prostate cancer metastatic to lung 7/6/2023    Ulcerative colitis      Past Surgical History:   Procedure Laterality Date    NECK SURGERY      right knee      TRANSRECTAL BIOPSY OF PROSTATE WITH ULTRASOUND GUIDANCE Bilateral 3/18/2020    Procedure: BIOPSY, PROSTATE, RECTAL APPROACH, WITH US GUIDANCE;  Surgeon: Bill Jeong MD;  Location: [facility];  Service: Urology;  Laterality: Bilateral;     Family History   Problem Relation " Age of Onset    Cancer Mother         Uterine and Ovarian cancer    Diabetes Mother     Diabetes Sister      Social History     Tobacco Use    Smoking status: Every Day     Current packs/day: 0.50     Average packs/day: 0.5 packs/day for 50.1 years (25.0 ttl pk-yrs)     Types: Cigarettes     Start date: 9/7/1973     Passive exposure: Past    Smokeless tobacco: Never   Substance Use Topics    Alcohol use: Yes     Comment: 8 16oz beers per day    Drug use: No     Review of Systems   Constitutional: Negative.    HENT: Negative.     Eyes: Negative.    Respiratory: Negative.     Cardiovascular: Negative.    Gastrointestinal: Negative.    Endocrine: Negative.    Genitourinary:         Urinary retention   Musculoskeletal: Negative.    Skin: Negative.    Allergic/Immunologic: Negative.    Neurological: Negative.    Hematological: Negative.    Psychiatric/Behavioral: Negative.     All other systems reviewed and are negative.      Physical Exam     Initial Vitals [10/08/23 0722]   BP Pulse Resp Temp SpO2   (!) 207/107 82 19 97.7 °F (36.5 °C) 96 %      MAP       --         Physical Exam    Nursing note and vitals reviewed.  Constitutional: He appears well-developed.   HENT:   Head: Normocephalic.   Eyes: Pupils are equal, round, and reactive to light.   Neck:   Normal range of motion.  Cardiovascular:  Normal rate.           Pulmonary/Chest: Breath sounds normal. No respiratory distress.   Abdominal: Abdomen is soft. Bowel sounds are normal.   Musculoskeletal:         General: Normal range of motion.      Cervical back: Normal range of motion.     Neurological: He is alert and oriented to person, place, and time. GCS score is 15. GCS eye subscore is 4. GCS verbal subscore is 5. GCS motor subscore is 6.   Skin: Skin is warm. Capillary refill takes less than 2 seconds.   Psychiatric: He has a normal mood and affect.         ED Course   Procedures  Labs Reviewed   CBC W/ AUTO DIFFERENTIAL - Abnormal; Notable for the following  components:       Result Value    RBC 4.29 (*)     Hemoglobin 12.7 (*)     MCHC 31.3 (*)     Gran % 73.6 (*)     Lymph % 16.1 (*)     All other components within normal limits   COMPREHENSIVE METABOLIC PANEL - Abnormal; Notable for the following components:    Sodium 147 (*)     CO2 30 (*)     Glucose 126 (*)     Alkaline Phosphatase 176 (*)     All other components within normal limits   URINALYSIS, REFLEX TO URINE CULTURE - Abnormal; Notable for the following components:    Occult Blood UA Trace (*)     All other components within normal limits    Narrative:     Preferred Collection Type->Urine, Clean Catch  Specimen Source->Urine          Imaging Results    None          Medications   sodium chloride 0.9% bolus 1,000 mL 1,000 mL (1,000 mLs Intravenous New Bag 10/8/23 9005)   tamsulosin 24 hr capsule 0.4 mg (0.4 mg Oral Given 10/8/23 1794)     Medical Decision Making  68-year-old male with multiple medical problems including metastatic prostate cancer, patient currently maintained on oral chemotherapeutic agent, frequent urinary retention.  ----------------------------------------------------------  Differentials includes urinary retention, UTI, among others.  CBC, CMP showed no significant gross abnormalities.  UA showed evidence of infection.  Roblero catheter was inserted  and yielded 1.5L of fluid.  Rx Flomax. We will leave Roblero catheter inserted, follow up with urologist  Was given strict return precautions to the ED. The patient voiced understanding and agreed with the plan      Amount and/or Complexity of Data Reviewed  External Data Reviewed: labs.  Labs: ordered.    Risk  Prescription drug management.                               Clinical Impression:   Final diagnoses:  [R33.9] Urinary retention (Primary)        ED Disposition Condition    Discharge Stable          ED Prescriptions       Medication Sig Dispense Start Date End Date Auth. Provider    tamsulosin (FLOMAX) 0.4 mg Cap Take 1 capsule (0.4 mg  total) by mouth once daily. 60 capsule 10/8/2023 12/7/2023 Vin Ferris MD          Follow-up Information       Follow up With Specialties Details Why Contact Info    Marisel Farrell III, MD Internal Medicine, Cardiology   [address] [place] [address] [place] [id number]  [phone]               Vin Ferris MD  10/08/23 0892

## 2023-10-08 NOTE — ED NOTES
Patient discharged. AVS reviewed. Patient discharged with tracy to gravity per MD. Catheter intact, catheter education reviewed. No questions or concerns at this time. Patient assisted to vehicle via WC.

## 2023-10-09 ENCOUNTER — TELEPHONE (OUTPATIENT)
Dept: PULMONOLOGY | Facility: CLINIC | Age: 68
End: 2023-10-09

## 2023-10-09 DIAGNOSIS — J96.11 CHRONIC HYPOXEMIC RESPIRATORY FAILURE: Primary | ICD-10-CM

## 2023-10-19 ENCOUNTER — OFFICE VISIT (OUTPATIENT)
Dept: UROLOGY | Facility: CLINIC | Age: 68
End: 2023-10-19
Payer: MEDICARE

## 2023-10-19 ENCOUNTER — OFFICE VISIT (OUTPATIENT)
Dept: PULMONOLOGY | Facility: CLINIC | Age: 68
End: 2023-10-19
Payer: MEDICARE

## 2023-10-19 VITALS — WEIGHT: 195 LBS | HEIGHT: 77 IN | BODY MASS INDEX: 23.02 KG/M2

## 2023-10-19 VITALS
HEART RATE: 99 BPM | SYSTOLIC BLOOD PRESSURE: 150 MMHG | HEIGHT: 77 IN | WEIGHT: 195 LBS | BODY MASS INDEX: 23.02 KG/M2 | DIASTOLIC BLOOD PRESSURE: 80 MMHG | OXYGEN SATURATION: 92 %

## 2023-10-19 DIAGNOSIS — J44.9 CHRONIC OBSTRUCTIVE PULMONARY DISEASE, UNSPECIFIED COPD TYPE: Primary | ICD-10-CM

## 2023-10-19 DIAGNOSIS — R39.9 LOWER URINARY TRACT SYMPTOMS (LUTS): ICD-10-CM

## 2023-10-19 DIAGNOSIS — C61 PROSTATE CANCER METASTATIC TO MULTIPLE SITES: ICD-10-CM

## 2023-10-19 DIAGNOSIS — R33.9 URINARY RETENTION: ICD-10-CM

## 2023-10-19 DIAGNOSIS — C79.51 PROSTATE CANCER METASTATIC TO BONE: Primary | ICD-10-CM

## 2023-10-19 DIAGNOSIS — C61 PROSTATE CANCER: ICD-10-CM

## 2023-10-19 DIAGNOSIS — J96.11 CHRONIC HYPOXEMIC RESPIRATORY FAILURE: ICD-10-CM

## 2023-10-19 DIAGNOSIS — C61 PROSTATE CANCER METASTATIC TO BONE: Primary | ICD-10-CM

## 2023-10-19 PROCEDURE — 3008F BODY MASS INDEX DOCD: CPT | Mod: CPTII,S$GLB,, | Performed by: NURSE PRACTITIONER

## 2023-10-19 PROCEDURE — 3044F PR MOST RECENT HEMOGLOBIN A1C LEVEL <7.0%: ICD-10-PCS | Mod: CPTII,S$GLB,, | Performed by: UROLOGY

## 2023-10-19 PROCEDURE — 1157F ADVNC CARE PLAN IN RCRD: CPT | Mod: CPTII,S$GLB,, | Performed by: NURSE PRACTITIONER

## 2023-10-19 PROCEDURE — 4010F ACE/ARB THERAPY RXD/TAKEN: CPT | Mod: CPTII,S$GLB,, | Performed by: UROLOGY

## 2023-10-19 PROCEDURE — 1101F PR PT FALLS ASSESS DOC 0-1 FALLS W/OUT INJ PAST YR: ICD-10-PCS | Mod: CPTII,S$GLB,, | Performed by: UROLOGY

## 2023-10-19 PROCEDURE — 3008F BODY MASS INDEX DOCD: CPT | Mod: CPTII,S$GLB,, | Performed by: UROLOGY

## 2023-10-19 PROCEDURE — 99999 PR PBB SHADOW E&M-EST. PATIENT-LVL IV: CPT | Mod: PBBFAC,,, | Performed by: UROLOGY

## 2023-10-19 PROCEDURE — 4010F PR ACE/ARB THEARPY RXD/TAKEN: ICD-10-PCS | Mod: CPTII,S$GLB,, | Performed by: UROLOGY

## 2023-10-19 PROCEDURE — 1126F PR PAIN SEVERITY QUANTIFIED, NO PAIN PRESENT: ICD-10-PCS | Mod: CPTII,S$GLB,, | Performed by: UROLOGY

## 2023-10-19 PROCEDURE — 4010F ACE/ARB THERAPY RXD/TAKEN: CPT | Mod: CPTII,S$GLB,, | Performed by: NURSE PRACTITIONER

## 2023-10-19 PROCEDURE — 1160F RVW MEDS BY RX/DR IN RCRD: CPT | Mod: CPTII,S$GLB,, | Performed by: UROLOGY

## 2023-10-19 PROCEDURE — 3077F SYST BP >= 140 MM HG: CPT | Mod: CPTII,S$GLB,, | Performed by: NURSE PRACTITIONER

## 2023-10-19 PROCEDURE — 1157F PR ADVANCE CARE PLAN OR EQUIV PRESENT IN MEDICAL RECORD: ICD-10-PCS | Mod: CPTII,S$GLB,, | Performed by: NURSE PRACTITIONER

## 2023-10-19 PROCEDURE — 3288F FALL RISK ASSESSMENT DOCD: CPT | Mod: CPTII,S$GLB,, | Performed by: UROLOGY

## 2023-10-19 PROCEDURE — 3008F PR BODY MASS INDEX (BMI) DOCUMENTED: ICD-10-PCS | Mod: CPTII,S$GLB,, | Performed by: NURSE PRACTITIONER

## 2023-10-19 PROCEDURE — 99999 PR PBB SHADOW E&M-EST. PATIENT-LVL IV: ICD-10-PCS | Mod: PBBFAC,,, | Performed by: UROLOGY

## 2023-10-19 PROCEDURE — 1125F PR PAIN SEVERITY QUANTIFIED, PAIN PRESENT: ICD-10-PCS | Mod: CPTII,S$GLB,, | Performed by: NURSE PRACTITIONER

## 2023-10-19 PROCEDURE — 99214 OFFICE O/P EST MOD 30 MIN: CPT | Mod: S$GLB,,, | Performed by: UROLOGY

## 2023-10-19 PROCEDURE — 1101F PT FALLS ASSESS-DOCD LE1/YR: CPT | Mod: CPTII,S$GLB,, | Performed by: UROLOGY

## 2023-10-19 PROCEDURE — 3077F PR MOST RECENT SYSTOLIC BLOOD PRESSURE >= 140 MM HG: ICD-10-PCS | Mod: CPTII,S$GLB,, | Performed by: NURSE PRACTITIONER

## 2023-10-19 PROCEDURE — 3044F PR MOST RECENT HEMOGLOBIN A1C LEVEL <7.0%: ICD-10-PCS | Mod: CPTII,S$GLB,, | Performed by: NURSE PRACTITIONER

## 2023-10-19 PROCEDURE — 3044F HG A1C LEVEL LT 7.0%: CPT | Mod: CPTII,S$GLB,, | Performed by: UROLOGY

## 2023-10-19 PROCEDURE — 1126F AMNT PAIN NOTED NONE PRSNT: CPT | Mod: CPTII,S$GLB,, | Performed by: UROLOGY

## 2023-10-19 PROCEDURE — 1159F MED LIST DOCD IN RCRD: CPT | Mod: CPTII,S$GLB,, | Performed by: UROLOGY

## 2023-10-19 PROCEDURE — 1160F PR REVIEW ALL MEDS BY PRESCRIBER/CLIN PHARMACIST DOCUMENTED: ICD-10-PCS | Mod: CPTII,S$GLB,, | Performed by: UROLOGY

## 2023-10-19 PROCEDURE — 3044F HG A1C LEVEL LT 7.0%: CPT | Mod: CPTII,S$GLB,, | Performed by: NURSE PRACTITIONER

## 2023-10-19 PROCEDURE — 1157F PR ADVANCE CARE PLAN OR EQUIV PRESENT IN MEDICAL RECORD: ICD-10-PCS | Mod: CPTII,S$GLB,, | Performed by: UROLOGY

## 2023-10-19 PROCEDURE — 4010F PR ACE/ARB THEARPY RXD/TAKEN: ICD-10-PCS | Mod: CPTII,S$GLB,, | Performed by: NURSE PRACTITIONER

## 2023-10-19 PROCEDURE — 1125F AMNT PAIN NOTED PAIN PRSNT: CPT | Mod: CPTII,S$GLB,, | Performed by: NURSE PRACTITIONER

## 2023-10-19 PROCEDURE — 99214 PR OFFICE/OUTPT VISIT, EST, LEVL IV, 30-39 MIN: ICD-10-PCS | Mod: S$GLB,,, | Performed by: UROLOGY

## 2023-10-19 PROCEDURE — 99213 PR OFFICE/OUTPT VISIT, EST, LEVL III, 20-29 MIN: ICD-10-PCS | Mod: S$GLB,,, | Performed by: NURSE PRACTITIONER

## 2023-10-19 PROCEDURE — 99213 OFFICE O/P EST LOW 20 MIN: CPT | Mod: S$GLB,,, | Performed by: NURSE PRACTITIONER

## 2023-10-19 PROCEDURE — 3079F PR MOST RECENT DIASTOLIC BLOOD PRESSURE 80-89 MM HG: ICD-10-PCS | Mod: CPTII,S$GLB,, | Performed by: NURSE PRACTITIONER

## 2023-10-19 PROCEDURE — 3008F PR BODY MASS INDEX (BMI) DOCUMENTED: ICD-10-PCS | Mod: CPTII,S$GLB,, | Performed by: UROLOGY

## 2023-10-19 PROCEDURE — 1157F ADVNC CARE PLAN IN RCRD: CPT | Mod: CPTII,S$GLB,, | Performed by: UROLOGY

## 2023-10-19 PROCEDURE — 3288F PR FALLS RISK ASSESSMENT DOCUMENTED: ICD-10-PCS | Mod: CPTII,S$GLB,, | Performed by: UROLOGY

## 2023-10-19 PROCEDURE — 1159F PR MEDICATION LIST DOCUMENTED IN MEDICAL RECORD: ICD-10-PCS | Mod: CPTII,S$GLB,, | Performed by: UROLOGY

## 2023-10-19 PROCEDURE — 3079F DIAST BP 80-89 MM HG: CPT | Mod: CPTII,S$GLB,, | Performed by: NURSE PRACTITIONER

## 2023-10-19 RX ORDER — ALBUTEROL SULFATE 90 UG/1
AEROSOL, METERED RESPIRATORY (INHALATION)
Qty: 18 G | Refills: 5 | Status: ON HOLD | OUTPATIENT
Start: 2023-10-19 | End: 2023-12-22 | Stop reason: HOSPADM

## 2023-10-19 NOTE — PROGRESS NOTES
[facility] Established Patient/H&P:    Jama James is a 68 y.o. male who presents for follow up for metastatic castrate sensitive prostate cancer.      Patient with a history of severe COPD, ulcerative colitis and HTN who was referred for a 4th opinion on management of his prostate cancer.      On review of records, he underwent prostate biopsy on 3/18/20 with Dr. Jeong. Pathology with [place]ora 4+3=7 and 3+4=7 prostate cancer. He was referred to radiation oncology in 9/2020 in [place] as he was not a good surgical candidate. He followed up and the plan was for ADT with XRT, but patient states he had no transportation.      Here today stating that he now has someone who can bring him for radiation therapy and he also has Medicare coverage. Wishes to proceed with XRT.      He has last been managed by Dr. Church at [facility]. On review of sparse records, he underwent PET CT in 1/2023 with no evidence of any metastasis present. CTA chest on 3/21/23 with multiple small soft tissue pulmonary nodules with interval development of a soft tissue pulmonary nodule. Diffuse emphysematous change and chronic bilateral adrenal adenomas.      His sister is present and states he did receive a dose of Lupron in the past. Unclear when he last received a dose.      He is on Flomax and Finasteride for lower urinary tract symptoms. No erectile function. Of note, he reports undergoing negative hematuria work up recently for gross hematuria.         Interval History     6/30/23: Repeat PSA 43.8 on 5/22/23. MRI prostate on 5/31/23 with a PIRADs 5 lesion with broad capsular abutment and suspicious femoral chain lymph nodes. PET CT with metastatic disease to cervical, thoracic, abdominal and pelvic lymph nodes as well as bone on 6/9/23. He is not a candidate for XRT given his metastatic disease. Scheduled to establish care with heme onc. Here for Lupron and to discuss further management. No complaints today.      10/19/23: Here for follow up. He has followed up with heme-onc and is now on Abiraterone with prednisone. He presented to the [place] emergency department on 10/8/23 with urinary retention stating he had been off of his Flomax for 4 days. Roblero placed which he removed at home a few days later with the balloon inflated. Developed gross hematuria for a few days after which has resolved.  mL today.      Denies any fever, chills, gross hematuria, flank pain, bone pain, unintentional weight loss,or  trauma.         PSA  53  8/29/23  65.3  7/25/23  43.8                 5/22/23  4.9                   9/14/22  23                    1/20/20     Urine culture  No growth        6/22/23    ADT History  Lupron 45 mg 6/30/23    PVR  112 mL  10/19/23      Past Medical History:   Diagnosis Date    Abnormal CT scan 7/6/2023    Abnormal positron emission tomography (PET) scan 7/6/2023    Anxiety     Asbestosis 08/04/2015    Atrial fibrillation     Bilateral adrenal adenomas     COPD (chronic obstructive pulmonary disease)     COVID-19 virus infection 07/28/2022    Depression     Elevated PSA 7/6/2023    High cholesterol     HTN (hypertension)     Malignant neoplasm of prostate     Multiple lung nodules     Nodule of upper lobe of left lung 03/30/2023    6 mm spiculated on cta chest 3/21/23    On home oxygen therapy 05/24/2023    Pneumonia     Prostate cancer 5/27/2020    Prostate cancer metastatic to bone 7/6/2023    Prostate cancer metastatic to lung 7/6/2023    Ulcerative colitis        Past Surgical History:   Procedure Laterality Date    NECK SURGERY      right knee      TRANSRECTAL BIOPSY OF PROSTATE WITH ULTRASOUND GUIDANCE Bilateral 3/18/2020    Procedure: BIOPSY, PROSTATE, RECTAL APPROACH, WITH US GUIDANCE;  Surgeon: Bill Jeong MD;  Location: [facility] OR;  Service: Urology;  Laterality: Bilateral;       Review of patient's allergies indicates:  No Known Allergies    Medications Reviewed: see MAR    FOCUSED  "PHYSICAL EXAM:    There were no vitals filed for this visit.  Body mass index is 23.12 kg/m². Weight: 88.5 kg (195 lb) Height: 6' 5" (195.6 cm)       General: Alert, cooperative, no distress, appears stated age  Abdomen: Soft, non-tender, no CVA tenderness, non-distended      LABS:    Recent Results (from the past 336 hour(s))   Urinalysis, Reflex to Urine Culture Urine, Clean Catch    Collection Time: 10/08/23  7:51 AM    Specimen: Urine, Catheterized   Result Value Ref Range    Specimen UA Urine, Unspecified     Color, UA Yellow Yellow, Straw, Briana    Appearance, UA Clear Clear    pH, UA 6.0 5.0 - 8.0    Specific Gravity, UA 1.015 1.005 - 1.030    Protein, UA Negative Negative    Glucose, UA Negative Negative    Ketones, UA Negative Negative    Bilirubin (UA) Negative Negative    Occult Blood UA Trace (A) Negative    Nitrite, UA Negative Negative    Urobilinogen, UA Negative Negative EU/dL    Leukocytes, UA Negative Negative   CBC W/ AUTO DIFFERENTIAL    Collection Time: 10/08/23  7:53 AM   Result Value Ref Range    WBC 7.65 3.90 - 12.70 K/uL    RBC 4.29 (L) 4.60 - 6.20 M/uL    Hemoglobin 12.7 (L) 14.0 - 18.0 g/dL    Hematocrit 40.6 40.0 - 54.0 %    MCV 95 82 - 98 fL    MCH 29.6 27.0 - 31.0 pg    MCHC 31.3 (L) 32.0 - 36.0 g/dL    RDW 14.0 11.5 - 14.5 %    Platelets 379 150 - 450 K/uL    MPV 9.6 9.2 - 12.9 fL    Immature Granulocytes 0.3 0.0 - 0.5 %    Gran # (ANC) 5.6 1.8 - 7.7 K/uL    Immature Grans (Abs) 0.02 0.00 - 0.04 K/uL    Lymph # 1.2 1.0 - 4.8 K/uL    Mono # 0.5 0.3 - 1.0 K/uL    Eos # 0.2 0.0 - 0.5 K/uL    Baso # 0.08 0.00 - 0.20 K/uL    nRBC 0 0 /100 WBC    Gran % 73.6 (H) 38.0 - 73.0 %    Lymph % 16.1 (L) 18.0 - 48.0 %    Mono % 6.3 4.0 - 15.0 %    Eosinophil % 2.7 0.0 - 8.0 %    Basophil % 1.0 0.0 - 1.9 %    Differential Method Automated    Comp. Metabolic Panel    Collection Time: 10/08/23  7:53 AM   Result Value Ref Range    Sodium 147 (H) 136 - 145 mmol/L    Potassium 4.1 3.5 - 5.1 mmol/L    " Chloride 102 95 - 110 mmol/L    CO2 30 (H) 23 - 29 mmol/L    Glucose 126 (H) 70 - 110 mg/dL    BUN 10 8 - 23 mg/dL    Creatinine 1.0 0.5 - 1.4 mg/dL    Calcium 10.1 8.7 - 10.5 mg/dL    Total Protein 7.7 6.0 - 8.4 g/dL    Albumin 3.9 3.5 - 5.2 g/dL    Total Bilirubin 0.7 0.1 - 1.0 mg/dL    Alkaline Phosphatase 176 (H) 55 - 135 U/L    AST 14 10 - 40 U/L    ALT 14 10 - 44 U/L    eGFR >60.0 >60 mL/min/1.73 m^2    Anion Gap 15 8 - 16 mmol/L   CBC Auto Differential    Collection Time: 10/11/23  1:44 PM   Result Value Ref Range    WBC 8.2 3.8 - 10.8 Thousand/uL    RBC 3.99 (L) 4.20 - 5.80 Million/uL    Hemoglobin 11.8 (L) 13.2 - 17.1 g/dL    Hematocrit 36.2 (L) 38.5 - 50.0 %    MCV 90.7 80.0 - 100.0 fL    MCH 29.6 27.0 - 33.0 pg    MCHC 32.6 32.0 - 36.0 g/dL    RDW 13.2 11.0 - 15.0 %    Platelets 348 140 - 400 Thousand/uL    MPV 10.3 7.5 - 12.5 fL    Neutrophils, Abs 6,371 1,500 - 7,800 cells/uL    Lymph # 943 850 - 3,900 cells/uL    Mono # 533 200 - 950 cells/uL    Eos # 279 15 - 500 cells/uL    Baso # 74 0 - 200 cells/uL    Neutrophils Relative 77.7 %    Lymph % 11.5 %    Mono % 6.5 %    Eosinophil % 3.4 %    Basophil % 0.9 %   Comprehensive Metabolic Panel    Collection Time: 10/11/23  1:44 PM   Result Value Ref Range    Glucose 121 65 - 139 mg/dL    BUN 14 7 - 25 mg/dL    Creatinine 0.58 (L) 0.70 - 1.35 mg/dL    eGFR 108 > OR = 60 mL/min/1.73m2    BUN/Creatinine Ratio 24 (H) 6 - 22 (calc)    Sodium 147 (H) 135 - 146 mmol/L    Potassium 3.6 3.5 - 5.3 mmol/L    Chloride 106 98 - 110 mmol/L    CO2 32 20 - 32 mmol/L    Calcium 9.5 8.6 - 10.3 mg/dL    Total Protein 6.7 6.1 - 8.1 g/dL    Albumin 3.9 3.6 - 5.1 g/dL    Globulin, Total 2.8 1.9 - 3.7 g/dL (calc)    Albumin/Globulin Ratio 1.4 1.0 - 2.5 (calc)    Total Bilirubin 0.6 0.2 - 1.2 mg/dL    Alkaline Phosphatase 137 35 - 144 U/L    AST 12 10 - 35 U/L    ALT 7 (L) 9 - 46 U/L   Hgb A1c w/ eAG Estimation    Collection Time: 10/11/23  1:44 PM   Result Value Ref Range     Hemoglobin A1C 4.8 <5.7 % of total Hgb    EAG (MG/DL) 91 mg/dL    EAG (MMOL/L) 5.0 mmol/L   Lipid Panel    Collection Time: 10/11/23  1:44 PM   Result Value Ref Range    Cholesterol 198 <200 mg/dL    HDL 87 > OR = 40 mg/dL    Triglycerides 91 <150 mg/dL    LDL Cholesterol 92 mg/dL (calc)    HDL/Cholesterol Ratio 2.3 <5.0 (calc)    Non HDL Chol. (LDL+VLDL) 111 <130 mg/dL (calc)   Ferritin    Collection Time: 10/11/23  1:44 PM   Result Value Ref Range    Ferritin 71 24 - 380 ng/mL   Folate    Collection Time: 10/11/23  1:44 PM   Result Value Ref Range    Folate 7.2 ng/mL   Iron and TIBC    Collection Time: 10/11/23  1:44 PM   Result Value Ref Range    Iron 39 (L) 50 - 180 mcg/dL    TIBC 320 250 - 425 mcg/dL (calc)    Iron Saturation 12 (L) 20 - 48 % (calc)   TSH    Collection Time: 10/11/23  1:44 PM   Result Value Ref Range    TSH 1.28 0.40 - 4.50 mIU/L   Magnesium    Collection Time: 10/11/23  1:44 PM   Result Value Ref Range    Magnesium 2.0 1.5 - 2.5 mg/dL   Vitamin B12    Collection Time: 10/11/23  1:44 PM   Result Value Ref Range    Vitamin B-12 299 200 - 1,100 pg/mL           Assessment/Diagnosis:    1. Prostate cancer metastatic to bone        2. Prostate cancer        3. Lower urinary tract symptoms (LUTS)        4. Urinary retention            Plans:       - I spent 30 minutes of the day of this encounter preparing for, treating and managing this patient. Today's visit was spent almost entirely on counseling. We reviewed his diagnosis, stage, grade, risk group, and prognosis. We discussed the concept of low risk, moderate risk, and high risk disease. We discussed that he has metastatic castrate sensitive prostate cancer. Continue abiraterone and prednisone per heme onc.   - Follow up in 1/2024 for Lupron injection.   - Continue Flomax 0.4 mg and Finasteride 5 mg PO daily.   - Defer cystoscopy as he developed hematuria only after removing his tracy at home without deflating the balloon.

## 2023-10-19 NOTE — PROGRESS NOTES
SUBJECTIVE:    Patient ID: Jama James is a 68 y.o. male.    Chief Complaint: COPD    HPI    PFT shows severe obstruction with good response to bronchodilator, severe restriction, very severe diffusion defect. DLCO is 15. His 6 minute walk is hypoxemic needs to be on 3 liters of oxygen. He is using Breztri twice a day.  He has been off cigarettes for 4 days.  He would like a portable concentrator.      Past Medical History:   Diagnosis Date    Abnormal CT scan 7/6/2023    Abnormal positron emission tomography (PET) scan 7/6/2023    Anxiety     Asbestosis 08/04/2015    Atrial fibrillation     Bilateral adrenal adenomas     COPD (chronic obstructive pulmonary disease)     COVID-19 virus infection 07/28/2022    Depression     Elevated PSA 7/6/2023    High cholesterol     HTN (hypertension)     Malignant neoplasm of prostate     Multiple lung nodules     Nodule of upper lobe of left lung 03/30/2023    6 mm spiculated on cta chest 3/21/23    On home oxygen therapy 05/24/2023    Pneumonia     Prostate cancer 5/27/2020    Prostate cancer metastatic to bone 7/6/2023    Prostate cancer metastatic to lung 7/6/2023    Ulcerative colitis      Past Surgical History:   Procedure Laterality Date    NECK SURGERY      right knee      TRANSRECTAL BIOPSY OF PROSTATE WITH ULTRASOUND GUIDANCE Bilateral 3/18/2020    Procedure: BIOPSY, PROSTATE, RECTAL APPROACH, WITH US GUIDANCE;  Surgeon: Bill Jeong MD;  Location: [facility] OR;  Service: Urology;  Laterality: Bilateral;     Family History   Problem Relation Age of Onset    Cancer Mother         Uterine and Ovarian cancer    Diabetes Mother     Diabetes Sister         Social History:   Marital Status:   Occupation: Data Unavailable  Alcohol History:  reports current alcohol use.  Tobacco History:  reports that he has been smoking cigarettes. He started smoking about 50 years ago. He has a 25.1 pack-year smoking history. He has been exposed to tobacco smoke. He has never used  smokeless tobacco.  Drug History:  reports no history of drug use.    Review of patient's allergies indicates:  No Known Allergies    Current Outpatient Medications   Medication Sig Dispense Refill    abiraterone (ZYTIGA) 250 mg Tab Take 4 tablets (1,000 mg total) by mouth once daily. 120 tablet 11    albuterol (PROVENTIL/VENTOLIN HFA) 90 mcg/actuation inhaler Rescue 18 g 5    albuterol-ipratropium (DUO-NEB) 2.5 mg-0.5 mg/3 mL nebulizer solution Take 3 mLs by nebulization every 6 (six) hours as needed for Wheezing. Rescue 180 mL 2    amiodarone (PACERONE) 200 MG Tab TAKE ONE (1) TABLET ONCE DAILY FOR HEART RYHTHM 90 tablet 1    amlodipine-benazepril 5-20 mg (LOTREL) 5-20 mg per capsule   90 cap, 0 Refill(s)      apixaban (ELIQUIS) 5 mg Tab Take 1 tablet (5 mg total) by mouth 2 (two) times daily. 180 tablet 1    aspirin (ECOTRIN) 81 MG EC tablet Take 1 tablet (81 mg total) by mouth once daily. 90 tablet 3    atorvastatin (LIPITOR) 10 MG tablet TAKE 1 TABLET AT BEDTIME FOR CHOLESTEROL (TAKE WITH CO-ENZYME Q-10) 90 tablet 3    budesonide-glycopyr-formoterol (BREZTRI AEROSPHERE) 160-9-4.8 mcg/actuation HFAA Inhale 2 puffs into the lungs 2 (two) times a day. 10.7 g 6    diltiaZEM (CARDIZEM) 30 MG tablet Take 1 tablet (30 mg total) by mouth every 12 (twelve) hours. 60 tablet 11    ferrous sulfate (FEOSOL) 325 mg (65 mg iron) Tab tablet Take 1 tablet (325 mg total) by mouth every Mon, Wed, Fri. 30 tablet 1    finasteride (PROSCAR) 5 mg tablet TAKE ONE (1) TABLET EVERY MORNING FOR PROSTATE 90 tablet 1    losartan (COZAAR) 50 MG tablet Take 1 tablet (50 mg total) by mouth 2 (two) times a day. (Patient taking differently: Take 50 mg by mouth once daily.) 180 tablet 3    OXYGEN-AIR DELIVERY SYSTEMS MISC by [facility].(Non-Drug; Combo Route) route.      pantoprazole (PROTONIX) 40 MG tablet TAKE 1 TABLET EVERY MORNING (30 MINUTES BEFORE BREAKFAST) FOR STOMACH 90 tablet 1    paroxetine (PAXIL) 40 MG tablet TAKE ONE (1) TABLET EVERY  MORNING FOR MOOD AND NERVE PAIN 90 tablet 1    predniSONE (DELTASONE) 5 MG tablet Take 1 tablet (5 mg total) by mouth once daily. 90 tablet 3    tamsulosin (FLOMAX) 0.4 mg Cap Take 1 capsule (0.4 mg total) by mouth once daily. 90 capsule 3    traMADoL (ULTRAM) 50 mg tablet Take 1-2 tablets every 8 hours as needed for pain 90 each 0    traZODone (DESYREL) 100 MG tablet TAKE ONE (1) TABLET AT BEDTIME FOR SLEEP AND NERVE PAIN 90 tablet 1    triamcinolone acetonide 0.1% (KENALOG) 0.1 % cream Apply topically 2 (two) times daily. 453.6 g 1    bicalutamide (CASODEX) 50 MG Tab Take 1 tablet (50 mg total) by mouth once daily. 14 tablet 0    levoFLOXacin (LEVAQUIN) 750 MG tablet Take 1 tablet (750 mg total) by mouth once daily. (Patient not taking: Reported on 10/19/2023) 7 tablet 0     No current facility-administered medications for this visit.         Last PET 06/23 Impression:     Prostate carcinoma with metastatic disease to cervical, thoracic, abdominal and pelvic lymph nodes as well as metastatic disease to bone.  Interval progression compared to previous PET-/09/2023    Last CTA:03/2023    Impression:     1. No CT evidence to suggest an acute pulmonary embolus.  2. Multiple small soft tissue pulmonary nodules with interval development of a small spiculated soft tissue pulmonary nodule of the left upper lobe.  Follow-up per Fleischner guidelines.  For multiple solid nodules with any 6 mm or greater, Fleischner Society guidelines recommend follow up with non-contrast chest CT at 3-6 months and 18-24 months after discovery.  3. Diffuse centrilobular emphysematous change with linear discoid atelectasis at the lung bases.  4. Chronic small loculated left pleural effusion with suspected chronic round atelectasis at the left lung base.  5. Chronic bilateral adrenal adenomas.      Review of Systems  General: Feeling Well.  Eyes: Vision is good.  ENT:  No sinusitis or pharyngitis.   Heart:: No chest pain or  "palpitations.  Lungs: dyspnea with exertion, chronic mucous production   GI: No Nausea, vomiting, constipation, diarrhea, or reflux.  : No dysuria, hesitancy, or nocturia.  Musculoskeletal: No joint pain or myalgias.  Skin: No lesions or rashes.  Neuro: No headaches or neuropathy.  Lymph: legs are swollen   Psych: No anxiety or depression.  Endo: No weight change.    OBJECTIVE:      BP (!) 150/80 (BP Location: Left arm, Patient Position: Sitting, BP Method: Medium (Manual))   Pulse 99   Ht 6' 5" (1.956 m)   Wt 88.5 kg (195 lb)   SpO2 (!) 92% Comment: 3LPM  BMI 23.12 kg/m²     Physical Exam  GENERAL: Older patient in no distress. Smells of cigarette smoke   HEENT: Pupils equal and reactive. Extraocular movements intact. Nose intact.      Pharynx moist.  NECK: Supple.   HEART: Regular rate and rhythm. No murmur or gallop auscultated.  LUNGS: rhonchi cleared with cough, decreased throughout  ABDOMEN: Bowel sounds present. Non-tender, no masses palpated.  EXTREMITIES: Normal muscle tone and joint movement, clubbed nails, smoke stained, onychomycosis  LYMPHATICS: No adenopathy palpated, +1 edema to legs   SKIN: erythematous lower extremities  NEURO: Cranial nerves II-XII intact. Motor strength 5/5 bilaterally, upper and lower extremities.  PSYCH: Appropriate affect.    Assessment:       1. Chronic obstructive pulmonary disease, unspecified COPD type    2. Chronic hypoxemic respiratory failure    3. Prostate cancer metastatic to multiple sites            Plan:       Chronic obstructive pulmonary disease, unspecified COPD type  -     albuterol (PROVENTIL/VENTOLIN HFA) 90 mcg/actuation inhaler; Rescue  Dispense: 18 g; Refill: 5    Chronic hypoxemic respiratory failure    Prostate cancer metastatic to multiple sites        Continue the Breztri 2 puffs twice a day   Mucinex 1200mg twice a day  Stay off the cigarettes     Follow up in about 6 months (around 4/19/2024).          "

## 2023-10-23 NOTE — PROGRESS NOTES
[facility] Hematology/Oncology  PROGRESS NOTE -  Follow-up Visit      Subjective:       Patient ID:   NAME: Jama James : 1955     68 y.o. male    Referring Doc: Myles Hutchison III, *  Other Physicians: Ann Goodwin; Tiffany Smith NP(Bud); Vince Combs (Card)        Chief Complaint: prostate cancer f/u      History of Present Illness:     Patient returns today for a regularly scheduled follow-up visit.  The patient is here today to go over the results of the recently ordered labs, tests and studies. He is here with his sister.    Breathing ok currently but is on O2 at home and is on new inhalers. No CP, HA's or N/V    He saw Dr Goodwin on 10/19;2023 and is due for next ADT in 2023; he accidentally pulled out his catheter and had some blood loss which has since resolved    He saw Janice with pulmonary on 10/19/2023    He has been on abiraterone and seems to be tolerating it well so far           ROS:   GEN: normal without any fever, night sweats or weight loss  HEENT: normal with no HA's, sore throat, stiff neck, changes in vision  CV: normal with no CP, SOB, PND, SUBRAMANIAN or orthopnea  PULM: normal with no SOB, cough, hemoptysis, sputum or pleuritic pain  GI: normal with no abdominal pain, nausea, vomiting, constipation, diarrhea, melanotic stools, BRBPR, or hematemesis  : LUTS sx's; adequate flow currently  BREAST: normal with no mass, discharge, pain  SKIN: normal with no rash, erythema, bruising, or swelling    Pain Scale:  0    Allergies:  Review of patient's allergies indicates:  No Known Allergies    Medications:    Current Outpatient Medications:     abiraterone (ZYTIGA) 250 mg Tab, Take 4 tablets (1,000 mg total) by mouth once daily., Disp: 120 tablet, Rfl: 11    albuterol (PROVENTIL/VENTOLIN HFA) 90 mcg/actuation inhaler, Rescue, Disp: 18 g, Rfl: 5    albuterol-ipratropium (DUO-NEB) 2.5 mg-0.5 mg/3 mL nebulizer solution, Take 3 mLs by nebulization every 6 (six) hours as needed for Wheezing. Rescue,  Disp: 180 mL, Rfl: 2    amiodarone (PACERONE) 200 MG Tab, TAKE ONE (1) TABLET ONCE DAILY FOR HEART RYHTHM, Disp: 90 tablet, Rfl: 1    amlodipine-benazepril 5-20 mg (LOTREL) 5-20 mg per capsule,  90 cap, 0 Refill(s), Disp: , Rfl:     apixaban (ELIQUIS) 5 mg Tab, Take 1 tablet (5 mg total) by mouth 2 (two) times daily., Disp: 180 tablet, Rfl: 1    aspirin (ECOTRIN) 81 MG EC tablet, Take 1 tablet (81 mg total) by mouth once daily., Disp: 90 tablet, Rfl: 3    atorvastatin (LIPITOR) 10 MG tablet, TAKE 1 TABLET AT BEDTIME FOR CHOLESTEROL (TAKE WITH CO-ENZYME Q-10), Disp: 90 tablet, Rfl: 3    bicalutamide (CASODEX) 50 MG Tab, Take 1 tablet (50 mg total) by mouth once daily., Disp: 14 tablet, Rfl: 0    budesonide-glycopyr-formoterol (BREZTRI AEROSPHERE) 160-9-4.8 mcg/actuation HFAA, Inhale 2 puffs into the lungs 2 (two) times a day., Disp: 10.7 g, Rfl: 6    diltiaZEM (CARDIZEM) 30 MG tablet, Take 1 tablet (30 mg total) by mouth every 12 (twelve) hours., Disp: 60 tablet, Rfl: 11    ferrous sulfate (FEOSOL) 325 mg (65 mg iron) Tab tablet, Take 1 tablet (325 mg total) by mouth every Mon, Wed, Fri., Disp: 30 tablet, Rfl: 1    finasteride (PROSCAR) 5 mg tablet, TAKE ONE (1) TABLET EVERY MORNING FOR PROSTATE, Disp: 90 tablet, Rfl: 1    levoFLOXacin (LEVAQUIN) 750 MG tablet, Take 1 tablet (750 mg total) by mouth once daily., Disp: 7 tablet, Rfl: 0    losartan (COZAAR) 50 MG tablet, Take 1 tablet (50 mg total) by mouth 2 (two) times a day. (Patient taking differently: Take 50 mg by mouth once daily.), Disp: 180 tablet, Rfl: 3    OXYGEN-AIR DELIVERY SYSTEMS MISC, by Misc.(Non-Drug; Combo Route) route., Disp: , Rfl:     pantoprazole (PROTONIX) 40 MG tablet, TAKE 1 TABLET EVERY MORNING (30 MINUTES BEFORE BREAKFAST) FOR STOMACH, Disp: 90 tablet, Rfl: 1    paroxetine (PAXIL) 40 MG tablet, TAKE ONE (1) TABLET EVERY MORNING FOR MOOD AND NERVE PAIN, Disp: 90 tablet, Rfl: 1    predniSONE (DELTASONE) 5 MG tablet, Take 1 tablet (5 mg total)  by mouth once daily., Disp: 90 tablet, Rfl: 3    tamsulosin (FLOMAX) 0.4 mg Cap, Take 1 capsule (0.4 mg total) by mouth once daily., Disp: 90 capsule, Rfl: 3    traMADoL (ULTRAM) 50 mg tablet, Take 1-2 tablets every 8 hours as needed for pain, Disp: 90 each, Rfl: 0    traZODone (DESYREL) 100 MG tablet, TAKE ONE (1) TABLET AT BEDTIME FOR SLEEP AND NERVE PAIN, Disp: 90 tablet, Rfl: 1    triamcinolone acetonide 0.1% (KENALOG) 0.1 % cream, Apply topically 2 (two) times daily., Disp: 453.6 g, Rfl: 1    PMHx/PSHx Updates:  See patient's last visit with me on 8/1/2023.  See H&P on 7/7/2023        Pathology:   Cancer Staging   No matching staging information was found for the patient.    Prostate Biopsy 3/18/2020:     Final Pathologic Diagnosis 1.  PROSTATE, RIGHT BASE, BIOPSY:   Benign prostatic tissue.   Negative for malignancy.   2.  PROSTATE, RIGHT MIDDLE, BIOPSY:   Prostatic adenocarcinoma, Inge grade 4 + 3 = 7 (80% pattern 4), involving   70% (8 mm) of 1 core.   3.  PROSTATE, RIGHT APEX, BIOPSY:   Prostatic adenocarcinoma, Broadford grade 4 + 3 = 7 (70% pattern 4), involving   95% (15 mm) of 1 core.   4.  PROSTATE, RIGHT LATERAL BASE, BIOPSY:   Prostatic adenocarcinoma, Broadford grade 4 + 3 = 7 (60% pattern 4), involving   80% (9 mm) of 1 core.   5. PROSTATE, RIGHT LATERAL MIDDLE, BIOPSY:   Prostatic adenocarcinoma, Broadford grade 4 + 3 = 7 (90% pattern 4), involving   90% (11 mm) of 1 core.   6.  PROSTATE, RIGHT LATERAL APEX, BIOPSY:   Prostatic adenocarcinoma, Broadford grade 3 + 4 = 7 (30% pattern 4), involving   70% (4 mm) of 1 core.   7.  PROSTATE, LEFT BASE, BIOPSY:   Benign prostatic tissue.   Negative for malignancy.   8.  PROSTATE, LEFT MIDDLE, BIOPSY:   Benign prostatic tissue.   Negative for malignancy.   9.  PROSTATE, LEFT APEX, BIOPSY:   Prostatic adenocarcinoma, Broadford grade 4 + 3 = 7 (60% pattern 4), involving   15% (2 mm) of 1 core.   10.  PROSTATE, LEFT LATERAL BASE, BIOPSY:   Benign prostatic tissue.    Negative for malignancy.   11.  PROSTATE, LEFT LATERAL MIDDLE, BIOPSY:   Benign prostatic tissue with atrophy.   Negative for malignancy.   12.  PROSTATE, LEFT LATERAL APEX, BIOPSY:   Prostatic adenocarcinoma, Inge grade 3 + 4 = 7 (10% pattern 4) involving a   40-50% (3-5 mm) of 2/2 cores.   Perineural invasion is identified.               Objective:     Vitals:  Blood pressure 120/61, pulse 78, temperature 97.6 °F (36.4 °C).    Physical Examination:   GEN: no apparent distress, comfortable; AAOx3; looks older than chronological age  HEAD: atraumatic and normocephalic  EYES: no pallor, no icterus, PERRLA; shiner on left orbit  ENT: OMM, no pharyngeal erythema, external ears WNL; no nasal discharge; no thrush  NECK: no masses, thyroid normal, trachea midline, no LAD/LN's, supple  CV: RRR with no murmur; normal pulse; normal S1 and S2; no pedal edema  CHEST: Normal respiratory effort; CTAB; normal breath sounds; no wheeze or crackles  ABDOM: nontender and nondistended; soft; normal bowel sounds; no rebound/guarding  MUSC/Skeletal: ROM normal; no crepitus; joints normal; no deformities ; some arthropathy  EXTREM: no clubbing, cyanosis, inflammation or swelling  SKIN: no rashes, lesions, ulcers, petechiae or subcutaneous nodules; dry skin on bilateral lower extremities Rght >Left; chronic age related skin changes; tattoos  : no tracy  NEURO: grossly intact; motor/sensory WNL; AAOx3; no tremors  PSYCH: normal mood, affect and behavior  LYMPH: normal cervical, supraclavicular, axillary and groin LN's             Labs:     Lab Results   Component Value Date    WBC 8.2 10/11/2023    HGB 11.8 (L) 10/11/2023    HCT 36.2 (L) 10/11/2023    MCV 90.7 10/11/2023     10/11/2023       CMP  Sodium   Date Value Ref Range Status   10/11/2023 147 (H) 135 - 146 mmol/L Final     Potassium   Date Value Ref Range Status   10/11/2023 3.6 3.5 - 5.3 mmol/L Final     Chloride   Date Value Ref Range Status   10/11/2023 106 98 - 110  mmol/L Final     CO2   Date Value Ref Range Status   10/11/2023 32 20 - 32 mmol/L Final     Glucose   Date Value Ref Range Status   10/11/2023 121 65 - 139 mg/dL Final     Comment:               Non-fasting reference interval          BUN   Date Value Ref Range Status   10/11/2023 14 7 - 25 mg/dL Final     Creatinine   Date Value Ref Range Status   10/11/2023 0.58 (L) 0.70 - 1.35 mg/dL Final     Calcium   Date Value Ref Range Status   10/11/2023 9.5 8.6 - 10.3 mg/dL Final     Total Protein   Date Value Ref Range Status   10/11/2023 6.7 6.1 - 8.1 g/dL Final     Albumin   Date Value Ref Range Status   10/11/2023 3.9 3.6 - 5.1 g/dL Final     Total Bilirubin   Date Value Ref Range Status   10/11/2023 0.6 0.2 - 1.2 mg/dL Final     Alkaline Phosphatase   Date Value Ref Range Status   10/08/2023 176 (H) 55 - 135 U/L Final     AST   Date Value Ref Range Status   10/11/2023 12 10 - 35 U/L Final     ALT   Date Value Ref Range Status   10/11/2023 7 (L) 9 - 46 U/L Final     Anion Gap   Date Value Ref Range Status   10/08/2023 15 8 - 16 mmol/L Final     eGFR   Date Value Ref Range Status   10/11/2023 108 > OR = 60 mL/min/1.73m2 Final      8/29/2023  PSA Diagnostic 0.00 - 4.00 ng/mL 53.0 High        Radiology/Diagnostic Studies:    PET PSMA 6/9/2023:     Impression:     Prostate carcinoma with metastatic disease to cervical, thoracic, abdominal and pelvic lymph nodes as well as metastatic disease to bone.  Interval progression compared to previous PET-CT.        MRI prostate 5/31/2023:     Impression:     Mild enlarged prostate gland demonstrating a PI-RADS 5 lesion for which clinically significant prostate cancer is highly likely to be present.  Broad capsular abutment and above with the anterior wall the rectum is present without visible invasion.  Mildly suspicious bilateral common femoral chain lymph nodes.     Overall Assessment:     PIRADS 5 (clinically significant cancer is highly likely to be present)           CTA chest  3/21/2023:     Impression:     1. No CT evidence to suggest an acute pulmonary embolus.  2. Multiple small soft tissue pulmonary nodules with interval development of a small spiculated soft tissue pulmonary nodule of the left upper lobe.  Follow-up per Fleischner guidelines.  For multiple solid nodules with any 6 mm or greater, Fleischner Society guidelines recommend follow up with non-contrast chest CT at 3-6 months and 18-24 months after discovery.  3. Diffuse centrilobular emphysematous change with linear discoid atelectasis at the lung bases.  4. Chronic small loculated left pleural effusion with suspected chronic round atelectasis at the left lung base.  5. Chronic bilateral adrenal adenomas.  This report was flagged in Epic as abnormal.        CT Chest 10/27/2022:     FINDINGS:  There is no mediastinal nor axillary adenopathy.  The aorta is normal in caliber with extensive calcific plaque.  There are coronary artery calcifications.  Mild gynecomastia is present.     There is a chronic small loculated left pleural effusion with associated calcification of the pleura.  There is a trace right pleural effusion versus posterior pleural thickening, unchanged.  There is unchanged rounded atelectasis in the left lower lobe adjacent to the chronic loculated pleural effusion.     Emphysema is present.  There are calcified granulomata.     Noncalcified lung nodules are unchanged, as follows on series 3:     Right upper lobe 3 mm image 81, unchanged.     Right upper lobe 2 mm image 121 unchanged.     Right upper lobe 4 mm image 189, not significantly changed.     Right middle lobe 6 mm image 227, unchanged.     Intra fissural nodule on the right, scarring in the lateral right middle lobe are unchanged.  Pleural thickening lateral right lower lobe dependent atelectasis are similar to the previous study     2 mm nodule left lower lobe series 3, image 188 unchanged.     3 mm nodule anterior left upper lobe image 102 is  unchanged.     There are no new or enlarging nodules compared to the recent prior study.     There is hardware in the cervical spine.  No acute bony abnormality.     Bilateral adrenal adenomas are again noted.          I have reviewed all available lab results and radiology reports.    Assessment/Plan:   (1) 68 y.o. male with diagnosis of prostate cancer who has been referred by Myles Hutchison III, * for evaluation by medical hematology/oncology. Patient had a diagnosis of high risk prostate cancer back in 2020 with Group 3 on pathology and a PSA of 23. He was previously followed by Dr Jeong with  and had undergone a TRUBP on 3/18/2020.  At that time, he was deemed to be inoperable and was referred for definitive XRT, but appears to have been noncompliant and was lost to follow-up.      He reports that he started ADT with Lupron in 2020 or 2021 but had not continued for over a year and could not recall when he last had an injection. His PSA had previously dropped to 4.9 in 2022, but has since risen to 43.8. He had a  PEt PSMA on 6/9/2023 which is unfortunately showing wide-spread disease including bony mets.      He was seen by Dr Ann Goodwin with  and Dr JOSEFINA Hutchison with rad/onc. He was restarted on ADT with 2 weeks of casodex up front.     He has been referred to med/onc for evaluation for any second generation ADT therapy  +/- chemotherapy.      7/7/2023:  - discussed and reviewed the latest NCCN guidelines (Vers 1.2023)  - would benefit from addition of abiraterone and prednisone to the ADT regimen  - set up chemotx/antiandrogen training with Josephine; provid litertaure on the drugs, go over side-effect profile and obtain consents    8/1/2023:  - s/p chemotx/antiandrogen therapy school with Kay  - started on abiraterone last week and seems to be tolerating it ok so far       10/24/2023:  - he recently accidentally removed his catheter and had some blood loss - he saw Dr Goodwin on 10/19  - he is continued  on abiraterone  - his next ADT in in Jan 2023  - latest PSA from Aug 2023 was 53     (2) HTN and hypercholesterolemia     (3) Atrial fibrillation     (4) COPD, chronic hypoxemia, O2 dependence, multiple pulmonary nodules     (5) Asbestosis and chronic active tobacco use     (6) Ulcerative colitis     (7) Anxiety/Depression     (8) Bilateral adrenal adenomas            VISIT DIAGNOSES:      Prostate cancer metastatic to lung    Prostate cancer metastatic to bone    Elevated PSA    Prostate cancer    Abnormal CT scan    Abnormal positron emission tomography (PET) scan    Tobacco abuse          PLAN:  Continued on abiraterone and ADT (next ADT due in Jan 2024); s/p chemotx/antiandrogen therapy school/education with Josephine for the abiraterone; provide literature, discuss side-effect profile and obtain consents  Continue with ADT therapy (due Jan 2024)  Check PSA monthly for now and Cbc/CMP as well  F/u with PCP, , Card and rad/onc     RTC in  6 weeks   myself  Fax note to Myles Hutchison III, *, Tiffany Smith (PCP), Garret Isabel       Discussion:     COVID-19 Discussion:    I had long discussion with patient and any applicable family about the COVID-19 coronavirus epidemic and the recommended precautions with regard to cancer and/or hematology patients. I have re-iterated the CDC recommendations for adequate hand washing, use of hand -like products, and coughing into elbow, etc. In addition, especially for our patients who are on chemotherapy and/or our otherwise immunocompromised patients, I have recommended avoidance of crowds, including movie theaters, restaurants, churches, etc. I have recommended avoidance of any sick or symptomatic family members and/or friends. I have also recommended avoidance of any raw and unwashed food products, and general avoidance of food items that have not been prepared by themselves. The patient has been asked to call us immediately with any symptom developments,  "issues, questions or other general concerns.     Pathology Discussion:    I reviewed and discussed the pathology report(s) and radiograph reports (if available) in as simple to understand and/or laymen's terms to the best of my ability. I had an indepth conversation with the patient and went over the patient's individual diagnosis based on the information that was currently available. I discussed the TNM staging process with regard to the patient's particular cancer type, and the calculated stage based on the currently available TNM data and literature. I discussed the available prognostic data with regard to the current staging information and how it relates to the prognosis of their particular neoplastic process.        NCCN Guidelines:    I discussed the available treatment option(s) in accordance with the latest literature from the NCCN Clinical Practice Guidelines for the patient's particular type of cancer disorder. The NCCN Guidelines provide a "document evidence-based (and) consensus-driven management" of the care of oncology patients. The treatment recommendations were made not only in accordance to the NCCN guidelines, but also factored in to account the patient's overall age, condition, performance status and their medical co-morbidities. I went over the risks and benefits of the the treatment options (if any could be made) with regard to their particular cancer type, their cancer stage, their age, and their co-morbidities.       Anti-Androgen Hormone Therapy Discussion:    I discussed the advantages of anti-androgen hormone therapy with the patient with regard to their particular neoplastic or carcinoma in situ condition.  I discussed the risks for thromboembolic events such as DVT's, pulmonary emboli, CVA's, retinal vascular clots, phlebitis, and TIA's. I discussed the potential risks for development of ocular disturbances, retinopathy, cataracts, corneal changes, flushing, hot flashes, erectile " dysfunction, altered breasts (incl. Gynecomastia), fluid retention, weight changes, elevations in LFT's, liver damage, and mood disturbances. I discussed the potential risk of arthropathy and joint pains/aches which could be chronic and debilitating. I discussed potential adverse effects on bone mineralization and the risk of osteopenia and/or osteoporosis which could led to increase risk of fractures.   A consent form was obtained and a copy was provided to the patient.      I spent over 25 mins of time with the patient. Reviewed results of the recently ordered labs, tests and studies; made directives with regards to the results. Over half of this time was spent couseling and coordinating care.    I have explained all of the above in detail and the patient understands all of the current recommendation(s). I have answered all of their questions to the best of my ability and to their complete satisfaction.   The patient is to continue with the current management plan.            Electronically signed by Maurilio Lawson MD

## 2023-10-24 ENCOUNTER — OFFICE VISIT (OUTPATIENT)
Dept: HEMATOLOGY/ONCOLOGY | Facility: CLINIC | Age: 68
End: 2023-10-24
Payer: MEDICARE

## 2023-10-24 VITALS — HEART RATE: 78 BPM | SYSTOLIC BLOOD PRESSURE: 120 MMHG | TEMPERATURE: 98 F | DIASTOLIC BLOOD PRESSURE: 61 MMHG

## 2023-10-24 DIAGNOSIS — R94.8 ABNORMAL POSITRON EMISSION TOMOGRAPHY (PET) SCAN: ICD-10-CM

## 2023-10-24 DIAGNOSIS — C61 PROSTATE CANCER METASTATIC TO BONE: ICD-10-CM

## 2023-10-24 DIAGNOSIS — C78.00 PROSTATE CANCER METASTATIC TO LUNG: Primary | ICD-10-CM

## 2023-10-24 DIAGNOSIS — C61 PROSTATE CANCER METASTATIC TO LUNG: Primary | ICD-10-CM

## 2023-10-24 DIAGNOSIS — C79.51 PROSTATE CANCER METASTATIC TO BONE: ICD-10-CM

## 2023-10-24 DIAGNOSIS — Z72.0 TOBACCO ABUSE: ICD-10-CM

## 2023-10-24 DIAGNOSIS — R97.20 ELEVATED PSA: ICD-10-CM

## 2023-10-24 DIAGNOSIS — C61 PROSTATE CANCER: ICD-10-CM

## 2023-10-24 DIAGNOSIS — R93.89 ABNORMAL CT SCAN: ICD-10-CM

## 2023-10-24 PROCEDURE — 1125F PR PAIN SEVERITY QUANTIFIED, PAIN PRESENT: ICD-10-PCS | Mod: CPTII,S$GLB,, | Performed by: INTERNAL MEDICINE

## 2023-10-24 PROCEDURE — 3044F HG A1C LEVEL LT 7.0%: CPT | Mod: CPTII,S$GLB,, | Performed by: INTERNAL MEDICINE

## 2023-10-24 PROCEDURE — 1160F RVW MEDS BY RX/DR IN RCRD: CPT | Mod: CPTII,S$GLB,, | Performed by: INTERNAL MEDICINE

## 2023-10-24 PROCEDURE — 3078F PR MOST RECENT DIASTOLIC BLOOD PRESSURE < 80 MM HG: ICD-10-PCS | Mod: CPTII,S$GLB,, | Performed by: INTERNAL MEDICINE

## 2023-10-24 PROCEDURE — 1157F ADVNC CARE PLAN IN RCRD: CPT | Mod: CPTII,S$GLB,, | Performed by: INTERNAL MEDICINE

## 2023-10-24 PROCEDURE — 3044F PR MOST RECENT HEMOGLOBIN A1C LEVEL <7.0%: ICD-10-PCS | Mod: CPTII,S$GLB,, | Performed by: INTERNAL MEDICINE

## 2023-10-24 PROCEDURE — 4010F ACE/ARB THERAPY RXD/TAKEN: CPT | Mod: CPTII,S$GLB,, | Performed by: INTERNAL MEDICINE

## 2023-10-24 PROCEDURE — 1100F PR PT FALLS ASSESS DOC 2+ FALLS/FALL W/INJURY/YR: ICD-10-PCS | Mod: CPTII,S$GLB,, | Performed by: INTERNAL MEDICINE

## 2023-10-24 PROCEDURE — 3288F PR FALLS RISK ASSESSMENT DOCUMENTED: ICD-10-PCS | Mod: CPTII,S$GLB,, | Performed by: INTERNAL MEDICINE

## 2023-10-24 PROCEDURE — 99214 PR OFFICE/OUTPT VISIT, EST, LEVL IV, 30-39 MIN: ICD-10-PCS | Mod: S$GLB,,, | Performed by: INTERNAL MEDICINE

## 2023-10-24 PROCEDURE — 1160F PR REVIEW ALL MEDS BY PRESCRIBER/CLIN PHARMACIST DOCUMENTED: ICD-10-PCS | Mod: CPTII,S$GLB,, | Performed by: INTERNAL MEDICINE

## 2023-10-24 PROCEDURE — 3078F DIAST BP <80 MM HG: CPT | Mod: CPTII,S$GLB,, | Performed by: INTERNAL MEDICINE

## 2023-10-24 PROCEDURE — 99214 OFFICE O/P EST MOD 30 MIN: CPT | Mod: S$GLB,,, | Performed by: INTERNAL MEDICINE

## 2023-10-24 PROCEDURE — 1125F AMNT PAIN NOTED PAIN PRSNT: CPT | Mod: CPTII,S$GLB,, | Performed by: INTERNAL MEDICINE

## 2023-10-24 PROCEDURE — 3288F FALL RISK ASSESSMENT DOCD: CPT | Mod: CPTII,S$GLB,, | Performed by: INTERNAL MEDICINE

## 2023-10-24 PROCEDURE — 1159F PR MEDICATION LIST DOCUMENTED IN MEDICAL RECORD: ICD-10-PCS | Mod: CPTII,S$GLB,, | Performed by: INTERNAL MEDICINE

## 2023-10-24 PROCEDURE — 3074F SYST BP LT 130 MM HG: CPT | Mod: CPTII,S$GLB,, | Performed by: INTERNAL MEDICINE

## 2023-10-24 PROCEDURE — 3074F PR MOST RECENT SYSTOLIC BLOOD PRESSURE < 130 MM HG: ICD-10-PCS | Mod: CPTII,S$GLB,, | Performed by: INTERNAL MEDICINE

## 2023-10-24 PROCEDURE — 1159F MED LIST DOCD IN RCRD: CPT | Mod: CPTII,S$GLB,, | Performed by: INTERNAL MEDICINE

## 2023-10-24 PROCEDURE — 4010F PR ACE/ARB THEARPY RXD/TAKEN: ICD-10-PCS | Mod: CPTII,S$GLB,, | Performed by: INTERNAL MEDICINE

## 2023-10-24 PROCEDURE — 1100F PTFALLS ASSESS-DOCD GE2>/YR: CPT | Mod: CPTII,S$GLB,, | Performed by: INTERNAL MEDICINE

## 2023-10-24 PROCEDURE — 1157F PR ADVANCE CARE PLAN OR EQUIV PRESENT IN MEDICAL RECORD: ICD-10-PCS | Mod: CPTII,S$GLB,, | Performed by: INTERNAL MEDICINE

## 2023-10-24 RX ORDER — TRAMADOL HYDROCHLORIDE 50 MG/1
TABLET ORAL
Qty: 90 EACH | Refills: 0 | Status: ON HOLD | OUTPATIENT
Start: 2023-10-24 | End: 2023-11-30 | Stop reason: HOSPADM

## 2023-11-11 NOTE — TELEPHONE ENCOUNTER
PFT shows severe obstruction with good response to bronchodilator, severe restriction, very severe diffusion defect. DLCO is 15    6 minute walk is hypoxemic needs to be on 3 liters of oxygen.     Patients sister is aware. Portable concentrator odered   No

## 2023-11-15 ENCOUNTER — HOSPITAL ENCOUNTER (EMERGENCY)
Facility: HOSPITAL | Age: 68
Discharge: HOME | End: 2023-11-15
Payer: MEDICARE

## 2023-11-15 VITALS
OXYGEN SATURATION: 95 % | BODY MASS INDEX: 22.43 KG/M2 | RESPIRATION RATE: 20 BRPM | HEART RATE: 78 BPM | TEMPERATURE: 98 F | WEIGHT: 190 LBS | SYSTOLIC BLOOD PRESSURE: 154 MMHG | DIASTOLIC BLOOD PRESSURE: 72 MMHG | HEIGHT: 77 IN

## 2023-11-15 DIAGNOSIS — M25.461 EFFUSION OF BURSA OF RIGHT KNEE: Primary | ICD-10-CM

## 2023-11-15 LAB
BODY FLD TYPE: ABNORMAL
CRYSTALS FLD MICRO: POSITIVE
GRAM NEGATIVE ORGANISMS: NORMAL
GRAM POSITIVE ORGANISMS: NORMAL
GRAM STAIN - WHITE CELLS: NORMAL
GRAM STN SPEC: NORMAL
GRAM STN SPEC: NORMAL
SPECIMEN SOURCE: NORMAL
YEAST ORGANISMS: NORMAL

## 2023-11-15 PROCEDURE — 96372 THER/PROPH/DIAG INJ SC/IM: CPT | Mod: 59 | Performed by: NURSE PRACTITIONER

## 2023-11-15 PROCEDURE — 87205 SMEAR GRAM STAIN: CPT | Performed by: NURSE PRACTITIONER

## 2023-11-15 PROCEDURE — 25000003 PHARM REV CODE 250: Performed by: NURSE PRACTITIONER

## 2023-11-15 PROCEDURE — 87077 CULTURE AEROBIC IDENTIFY: CPT | Performed by: EMERGENCY MEDICINE

## 2023-11-15 PROCEDURE — 20610 DRAIN/INJ JOINT/BURSA W/O US: CPT | Mod: RT

## 2023-11-15 PROCEDURE — 89060 EXAM SYNOVIAL FLUID CRYSTALS: CPT | Performed by: NURSE PRACTITIONER

## 2023-11-15 PROCEDURE — 87070 CULTURE OTHR SPECIMN AEROBIC: CPT | Performed by: EMERGENCY MEDICINE

## 2023-11-15 PROCEDURE — 87075 CULTR BACTERIA EXCEPT BLOOD: CPT | Performed by: EMERGENCY MEDICINE

## 2023-11-15 PROCEDURE — 87186 SC STD MICRODIL/AGAR DIL: CPT | Performed by: EMERGENCY MEDICINE

## 2023-11-15 PROCEDURE — 99284 EMERGENCY DEPT VISIT MOD MDM: CPT

## 2023-11-15 PROCEDURE — 63600175 PHARM REV CODE 636 W HCPCS: Mod: UD | Performed by: NURSE PRACTITIONER

## 2023-11-15 RX ORDER — KETOROLAC TROMETHAMINE 10 MG/1
10 TABLET, FILM COATED ORAL EVERY 6 HOURS
Qty: 16 TABLET | Refills: 0 | Status: SHIPPED | OUTPATIENT
Start: 2023-11-15 | End: 2023-11-15 | Stop reason: CLARIF

## 2023-11-15 RX ORDER — FAMOTIDINE 40 MG/1
40 TABLET, FILM COATED ORAL DAILY
Qty: 30 TABLET | Refills: 0 | Status: SHIPPED | OUTPATIENT
Start: 2023-11-15 | End: 2023-11-15 | Stop reason: CLARIF

## 2023-11-15 RX ORDER — METHOCARBAMOL 750 MG/1
750 TABLET, FILM COATED ORAL 2 TIMES DAILY PRN
Qty: 30 TABLET | Refills: 0 | Status: ON HOLD | OUTPATIENT
Start: 2023-11-15 | End: 2023-11-30 | Stop reason: HOSPADM

## 2023-11-15 RX ORDER — HYDROCODONE BITARTRATE AND ACETAMINOPHEN 10; 325 MG/1; MG/1
1 TABLET ORAL
Status: COMPLETED | OUTPATIENT
Start: 2023-11-15 | End: 2023-11-15

## 2023-11-15 RX ORDER — KETOROLAC TROMETHAMINE 30 MG/ML
30 INJECTION, SOLUTION INTRAMUSCULAR; INTRAVENOUS
Status: COMPLETED | OUTPATIENT
Start: 2023-11-15 | End: 2023-11-15

## 2023-11-15 RX ORDER — DEXAMETHASONE SODIUM PHOSPHATE 10 MG/ML
10 INJECTION INTRAMUSCULAR; INTRAVENOUS
Status: COMPLETED | OUTPATIENT
Start: 2023-11-15 | End: 2023-11-15

## 2023-11-15 RX ADMIN — KETOROLAC TROMETHAMINE 30 MG: 30 INJECTION, SOLUTION INTRAMUSCULAR; INTRAVENOUS at 02:11

## 2023-11-15 RX ADMIN — HYDROCODONE BITARTRATE AND ACETAMINOPHEN 1 TABLET: 10; 325 TABLET ORAL at 01:11

## 2023-11-15 RX ADMIN — DEXAMETHASONE SODIUM PHOSPHATE 10 MG: 10 INJECTION INTRAMUSCULAR; INTRAVENOUS at 02:11

## 2023-11-15 NOTE — ED PROVIDER NOTES
Encounter Date: 11/15/2023       History     Chief Complaint   Patient presents with    Knee Pain     Patient states right knee pain that has been getting worse over the last 3 days.       68-year-old male with complaints of severe right knee pain and swelling for the last 3 days.  He denies any trauma or injury.        Review of patient's allergies indicates:  No Known Allergies  Past Medical History:   Diagnosis Date    Abnormal CT scan 7/6/2023    Abnormal positron emission tomography (PET) scan 7/6/2023    Anxiety     Asbestosis 08/04/2015    Atrial fibrillation     Bilateral adrenal adenomas     COPD (chronic obstructive pulmonary disease)     COVID-19 virus infection 07/28/2022    Depression     Elevated PSA 7/6/2023    High cholesterol     HTN (hypertension)     Malignant neoplasm of prostate     Multiple lung nodules     Nodule of upper lobe of left lung 03/30/2023    6 mm spiculated on cta chest 3/21/23    On home oxygen therapy 05/24/2023    Pneumonia     Prostate cancer 5/27/2020    Prostate cancer metastatic to bone 7/6/2023    Prostate cancer metastatic to lung 7/6/2023    Ulcerative colitis      Past Surgical History:   Procedure Laterality Date    NECK SURGERY      right knee      TRANSRECTAL BIOPSY OF PROSTATE WITH ULTRASOUND GUIDANCE Bilateral 3/18/2020    Procedure: BIOPSY, PROSTATE, RECTAL APPROACH, WITH US GUIDANCE;  Surgeon: Bill Jeong MD;  Location: [facility] OR;  Service: Urology;  Laterality: Bilateral;     Family History   Problem Relation Age of Onset    Cancer Mother         Uterine and Ovarian cancer    Diabetes Mother     Diabetes Sister      Social History     Tobacco Use    Smoking status: Every Day     Current packs/day: 0.50     Average packs/day: 0.5 packs/day for 50.2 years (25.1 ttl pk-yrs)     Types: Cigarettes     Start date: 9/7/1973     Passive exposure: Past    Smokeless tobacco: Never   Substance Use Topics    Alcohol use: Yes     Comment: 8 16oz beers per day    Drug  use: No     Review of Systems   Constitutional:  Negative for fever.   HENT:  Negative for sore throat.    Respiratory:  Negative for shortness of breath.    Cardiovascular:  Negative for chest pain.   Gastrointestinal:  Negative for nausea.   Genitourinary:  Negative for dysuria.   Musculoskeletal:  Negative for back pain.        Right knee pain   Skin:  Negative for rash.   Neurological:  Negative for weakness.   Hematological:  Does not bruise/bleed easily.       Physical Exam     Initial Vitals [11/15/23 1152]   BP Pulse Resp Temp SpO2   (!) 162/79 82 20 99 °F (37.2 °C) 96 %      MAP       --         Physical Exam    Nursing note and vitals reviewed.  Constitutional: He appears well-developed and well-nourished.   HENT:   Head: Normocephalic and atraumatic.   Eyes: Conjunctivae and EOM are normal.   Neck: Neck supple.   Normal range of motion.  Cardiovascular:  Normal rate and regular rhythm.           Pulmonary/Chest: Breath sounds normal. He has no wheezes.   Musculoskeletal:         General: Tenderness and edema present.      Cervical back: Normal range of motion and neck supple.      Comments: Right knee is very swollen with large effusion, reduced range of motion, neurovascularly intact     Neurological: He is alert and oriented to person, place, and time.   Skin: Skin is warm and dry.   Psychiatric: He has a normal mood and affect.         ED Course   Arthrocentesis    Date/Time: 11/15/2023 9:25 PM  Location procedure was performed: [facility] ACCESS    Performed by: Alfredito Rogel MD  Authorized by: Alfredito Rogel MD  Assisting provider: Alfredito Rogel MD  Pre-op diagnosis: right knee effusion  Post-op diagnosis: right knee effusion  Indications: joint swelling   Body area: knee  Joint: right knee  Local anesthesia used: yes    Anesthesia:  Local anesthesia used: yes  Local Anesthetic: bupivacaine 0.25% with epinephrine  Anesthetic total: 2 mL    Patient sedated: no  Description  of findings: normal appearing joint fluid with negative string sign   Needle size: 18 G  Approach: anterior  Aspirate amount: 75 mL  Aspirate: serous  Technical procedures used: sterile  Significant surgical tasks conducted by the assistant(s): none  Complications: No  Estimated blood loss (mL): 0  Specimens: Yes  Implants: No  Comments: Done by Dr. Rogel        Labs Reviewed   GRAM STAIN   CULTURE, BODY FLUID - BACTEC   BODY FLUID CRYSTAL          Imaging Results    None          Medications - No data to display  Medical Decision Making  68-year-old male with complaints of severe right knee pain and swelling for the last 3 days.  He denies any trauma or injury.    Dr. Rogel aspirated as proximally 80 mL from his right knee.  Ace wrap applied. Fluid sent for culture, crystals and Gram stain.  Prescription for Robaxin.  Referral made to ortho    Patient has walker at home. Also given Toradol and Decadron IM here.  Patient is on tramadol 3 a day and can not get an opioid prescription.  Patient is on Eliquis and can not take NSAIDs    Risk  Prescription drug management.      Additional MDM:   Differential Diagnosis:   Effusion, contusion, fracture, sprain, strain                             Clinical Impression:   Final diagnoses:  [M25.461] Effusion of bursa of right knee (Primary)        ED Disposition Condition    Discharge Stable          ED Prescriptions       Medication Sig Dispense Start Date End Date Auth. Provider    ketorolac (TORADOL) 10 mg tablet Take 1 tablet (10 mg total) by mouth every 6 (six) hours. for 4 days 16 tablet 11/15/2023 11/19/2023 Iman Goodson, MILAGROS    methocarbamoL (ROBAXIN) 750 MG Tab Take 1 tablet (750 mg total) by mouth 2 (two) times daily as needed (muscle pain). 30 tablet 11/15/2023 11/30/2023 Iman Goodson NP    famotidine (PEPCID) 40 MG tablet Take 1 tablet (40 mg total) by mouth once daily. 30 tablet 11/15/2023 12/15/2023 Iman Goodson NP          Follow-up Information     None          Iman Goodson, MILAGROS  11/15/23 1247       Iman Goodson, MILAGROS  11/15/23 1413       Iman Goodson, MILAGROS  11/15/23 1422       Iman Goodson, MILAGROS  11/15/23 1425       Iman Goodson, MILAGROS  11/15/23 7263

## 2023-11-16 LAB — PATH INTERP FLD-IMP: NORMAL

## 2023-11-19 ENCOUNTER — TELEPHONE (OUTPATIENT)
Dept: EMERGENCY MEDICINE | Facility: HOSPITAL | Age: 68
End: 2023-11-19

## 2023-11-19 LAB — BACTERIA FLD AEROBE CULT: ABNORMAL

## 2023-11-20 ENCOUNTER — HOSPITAL ENCOUNTER (OUTPATIENT)
Facility: HOSPITAL | Age: 68
Discharge: ANOTHER HOSPITAL | End: 2023-11-21
Attending: EMERGENCY MEDICINE | Admitting: STUDENT IN AN ORGANIZED HEALTH CARE EDUCATION/TRAINING PROGRAM
Payer: MEDICARE

## 2023-11-20 DIAGNOSIS — M00.861 ARTHRITIS OF RIGHT KNEE DUE TO OTHER BACTERIA: ICD-10-CM

## 2023-11-20 DIAGNOSIS — I48.91 A-FIB: ICD-10-CM

## 2023-11-20 DIAGNOSIS — M00.861 ARTHRITIS OF RIGHT KNEE DUE TO OTHER BACTERIA: Primary | ICD-10-CM

## 2023-11-20 DIAGNOSIS — R78.81 BACTEREMIA: ICD-10-CM

## 2023-11-20 PROBLEM — N40.1 BENIGN LOCALIZED PROSTATIC HYPERPLASIA WITH LOWER URINARY TRACT SYMPTOMS (LUTS): Status: ACTIVE | Noted: 2023-11-20

## 2023-11-20 PROBLEM — M00.9 ARTHRITIS, SEPTIC: Status: ACTIVE | Noted: 2023-11-20

## 2023-11-20 LAB
ALBUMIN SERPL BCP-MCNC: 2.6 G/DL (ref 3.5–5.2)
ALP SERPL-CCNC: 129 U/L (ref 55–135)
ALT SERPL W/O P-5'-P-CCNC: 10 U/L (ref 10–44)
ANION GAP SERPL CALC-SCNC: 12 MMOL/L (ref 8–16)
APTT PPP: 36.1 SEC (ref 21–32)
AST SERPL-CCNC: 11 U/L (ref 10–40)
BACTERIA SPEC ANAEROBE CULT: NORMAL
BASOPHILS # BLD AUTO: 0.02 K/UL (ref 0–0.2)
BASOPHILS NFR BLD: 0.3 % (ref 0–1.9)
BILIRUB SERPL-MCNC: 0.4 MG/DL (ref 0.1–1)
BUN SERPL-MCNC: 7 MG/DL (ref 8–23)
CALCIUM SERPL-MCNC: 9.7 MG/DL (ref 8.7–10.5)
CHLORIDE SERPL-SCNC: 97 MMOL/L (ref 95–110)
CO2 SERPL-SCNC: 33 MMOL/L (ref 23–29)
CREAT SERPL-MCNC: 0.6 MG/DL (ref 0.5–1.4)
CRP SERPL-MCNC: 259.4 MG/L (ref 0–8.2)
DIFFERENTIAL METHOD: ABNORMAL
EOSINOPHIL # BLD AUTO: 0.1 K/UL (ref 0–0.5)
EOSINOPHIL NFR BLD: 1.8 % (ref 0–8)
ERYTHROCYTE [DISTWIDTH] IN BLOOD BY AUTOMATED COUNT: 14 % (ref 11.5–14.5)
EST. GFR  (NO RACE VARIABLE): >60 ML/MIN/1.73 M^2
GLUCOSE SERPL-MCNC: 137 MG/DL (ref 70–110)
HCT VFR BLD AUTO: 32.3 % (ref 40–54)
HGB BLD-MCNC: 9.8 G/DL (ref 14–18)
IMM GRANULOCYTES # BLD AUTO: 0.03 K/UL (ref 0–0.04)
IMM GRANULOCYTES NFR BLD AUTO: 0.4 % (ref 0–0.5)
INR PPP: 1.1 (ref 0.8–1.2)
LACTATE SERPL-SCNC: 0.8 MMOL/L (ref 0.5–2.2)
LYMPHOCYTES # BLD AUTO: 0.7 K/UL (ref 1–4.8)
LYMPHOCYTES NFR BLD: 10.9 % (ref 18–48)
MCH RBC QN AUTO: 27.5 PG (ref 27–31)
MCHC RBC AUTO-ENTMCNC: 30.3 G/DL (ref 32–36)
MCV RBC AUTO: 91 FL (ref 82–98)
MONOCYTES # BLD AUTO: 0.9 K/UL (ref 0.3–1)
MONOCYTES NFR BLD: 13 % (ref 4–15)
NEUTROPHILS # BLD AUTO: 5 K/UL (ref 1.8–7.7)
NEUTROPHILS NFR BLD: 73.6 % (ref 38–73)
NRBC BLD-RTO: 0 /100 WBC
PLATELET # BLD AUTO: 339 K/UL (ref 150–450)
PMV BLD AUTO: 9.6 FL (ref 9.2–12.9)
POTASSIUM SERPL-SCNC: 3.6 MMOL/L (ref 3.5–5.1)
PROT SERPL-MCNC: 6.9 G/DL (ref 6–8.4)
PROTHROMBIN TIME: 11.4 SEC (ref 9–12.5)
RBC # BLD AUTO: 3.56 M/UL (ref 4.6–6.2)
SODIUM SERPL-SCNC: 142 MMOL/L (ref 136–145)
WBC # BLD AUTO: 6.78 K/UL (ref 3.9–12.7)

## 2023-11-20 PROCEDURE — 80053 COMPREHEN METABOLIC PANEL: CPT | Performed by: ORTHOPAEDIC SURGERY

## 2023-11-20 PROCEDURE — 99284 EMERGENCY DEPT VISIT MOD MDM: CPT | Mod: 57,,, | Performed by: ORTHOPAEDIC SURGERY

## 2023-11-20 PROCEDURE — 87389 HIV-1 AG W/HIV-1&-2 AB AG IA: CPT | Performed by: EMERGENCY MEDICINE

## 2023-11-20 PROCEDURE — 99223 PR INITIAL HOSPITAL CARE,LEVL III: ICD-10-PCS | Mod: AI,,, | Performed by: STUDENT IN AN ORGANIZED HEALTH CARE EDUCATION/TRAINING PROGRAM

## 2023-11-20 PROCEDURE — 25000003 PHARM REV CODE 250: Performed by: STUDENT IN AN ORGANIZED HEALTH CARE EDUCATION/TRAINING PROGRAM

## 2023-11-20 PROCEDURE — 87077 CULTURE AEROBIC IDENTIFY: CPT | Performed by: EMERGENCY MEDICINE

## 2023-11-20 PROCEDURE — 73562 XR KNEE 3 VIEW RIGHT: ICD-10-PCS | Mod: 26,RT,, | Performed by: RADIOLOGY

## 2023-11-20 PROCEDURE — 86140 C-REACTIVE PROTEIN: CPT | Performed by: ORTHOPAEDIC SURGERY

## 2023-11-20 PROCEDURE — 87186 SC STD MICRODIL/AGAR DIL: CPT | Performed by: EMERGENCY MEDICINE

## 2023-11-20 PROCEDURE — G0378 HOSPITAL OBSERVATION PER HR: HCPCS

## 2023-11-20 PROCEDURE — 85025 COMPLETE CBC W/AUTO DIFF WBC: CPT | Performed by: ORTHOPAEDIC SURGERY

## 2023-11-20 PROCEDURE — 85610 PROTHROMBIN TIME: CPT | Performed by: ORTHOPAEDIC SURGERY

## 2023-11-20 PROCEDURE — 63600175 PHARM REV CODE 636 W HCPCS: Mod: UD | Performed by: STUDENT IN AN ORGANIZED HEALTH CARE EDUCATION/TRAINING PROGRAM

## 2023-11-20 PROCEDURE — 87040 BLOOD CULTURE FOR BACTERIA: CPT | Mod: 59 | Performed by: EMERGENCY MEDICINE

## 2023-11-20 PROCEDURE — 73562 X-RAY EXAM OF KNEE 3: CPT | Mod: TC,RT

## 2023-11-20 PROCEDURE — 25000242 PHARM REV CODE 250 ALT 637 W/ HCPCS: Performed by: STUDENT IN AN ORGANIZED HEALTH CARE EDUCATION/TRAINING PROGRAM

## 2023-11-20 PROCEDURE — 73562 X-RAY EXAM OF KNEE 3: CPT | Mod: 26,RT,, | Performed by: RADIOLOGY

## 2023-11-20 PROCEDURE — 99285 EMERGENCY DEPT VISIT HI MDM: CPT | Mod: 25

## 2023-11-20 PROCEDURE — 96366 THER/PROPH/DIAG IV INF ADDON: CPT

## 2023-11-20 PROCEDURE — 99284 PR EMERGENCY DEPT VISIT,LEVEL IV: ICD-10-PCS | Mod: 57,,, | Performed by: ORTHOPAEDIC SURGERY

## 2023-11-20 PROCEDURE — 85730 THROMBOPLASTIN TIME PARTIAL: CPT | Performed by: ORTHOPAEDIC SURGERY

## 2023-11-20 PROCEDURE — 83605 ASSAY OF LACTIC ACID: CPT | Performed by: EMERGENCY MEDICINE

## 2023-11-20 PROCEDURE — 87502 INFLUENZA DNA AMP PROBE: CPT

## 2023-11-20 PROCEDURE — 87154 CUL TYP ID BLD PTHGN 6+ TRGT: CPT | Mod: 59 | Performed by: EMERGENCY MEDICINE

## 2023-11-20 PROCEDURE — 86803 HEPATITIS C AB TEST: CPT | Performed by: EMERGENCY MEDICINE

## 2023-11-20 PROCEDURE — 96372 THER/PROPH/DIAG INJ SC/IM: CPT | Mod: 59 | Performed by: STUDENT IN AN ORGANIZED HEALTH CARE EDUCATION/TRAINING PROGRAM

## 2023-11-20 PROCEDURE — 99223 1ST HOSP IP/OBS HIGH 75: CPT | Mod: AI,,, | Performed by: STUDENT IN AN ORGANIZED HEALTH CARE EDUCATION/TRAINING PROGRAM

## 2023-11-20 PROCEDURE — 96365 THER/PROPH/DIAG IV INF INIT: CPT

## 2023-11-20 PROCEDURE — 94640 AIRWAY INHALATION TREATMENT: CPT

## 2023-11-20 RX ORDER — PAROXETINE 10 MG/1
40 TABLET, FILM COATED ORAL DAILY
Status: DISCONTINUED | OUTPATIENT
Start: 2023-11-21 | End: 2023-11-21 | Stop reason: HOSPADM

## 2023-11-20 RX ORDER — FINASTERIDE 5 MG/1
5 TABLET, FILM COATED ORAL DAILY
Status: DISCONTINUED | OUTPATIENT
Start: 2023-11-21 | End: 2023-11-21 | Stop reason: HOSPADM

## 2023-11-20 RX ORDER — AMPICILLIN 1 G/1
INJECTION, POWDER, FOR SOLUTION INTRAMUSCULAR; INTRAVENOUS
Status: DISPENSED
Start: 2023-11-20 | End: 2023-11-21

## 2023-11-20 RX ORDER — IBUPROFEN 200 MG
24 TABLET ORAL
Status: DISCONTINUED | OUTPATIENT
Start: 2023-11-20 | End: 2023-11-21 | Stop reason: HOSPADM

## 2023-11-20 RX ORDER — AMIODARONE HYDROCHLORIDE 100 MG/1
100 TABLET ORAL DAILY
Status: DISCONTINUED | OUTPATIENT
Start: 2023-11-21 | End: 2023-11-21 | Stop reason: HOSPADM

## 2023-11-20 RX ORDER — MORPHINE SULFATE 2 MG/ML
2 INJECTION, SOLUTION INTRAMUSCULAR; INTRAVENOUS EVERY 4 HOURS PRN
Status: DISCONTINUED | OUTPATIENT
Start: 2023-11-20 | End: 2023-11-21 | Stop reason: HOSPADM

## 2023-11-20 RX ORDER — IBUPROFEN 200 MG
16 TABLET ORAL
Status: DISCONTINUED | OUTPATIENT
Start: 2023-11-20 | End: 2023-11-21 | Stop reason: HOSPADM

## 2023-11-20 RX ORDER — FLUTICASONE FUROATE AND VILANTEROL 100; 25 UG/1; UG/1
1 POWDER RESPIRATORY (INHALATION) DAILY
Status: DISCONTINUED | OUTPATIENT
Start: 2023-11-21 | End: 2023-11-21 | Stop reason: HOSPADM

## 2023-11-20 RX ORDER — FAMOTIDINE 40 MG/1
TABLET, FILM COATED ORAL
Status: ON HOLD | COMMUNITY
Start: 2023-11-15 | End: 2023-12-22 | Stop reason: HOSPADM

## 2023-11-20 RX ORDER — PROCHLORPERAZINE EDISYLATE 5 MG/ML
5 INJECTION INTRAMUSCULAR; INTRAVENOUS EVERY 6 HOURS PRN
Status: DISCONTINUED | OUTPATIENT
Start: 2023-11-20 | End: 2023-11-21 | Stop reason: HOSPADM

## 2023-11-20 RX ORDER — GLUCAGON 1 MG
1 KIT INJECTION
Status: DISCONTINUED | OUTPATIENT
Start: 2023-11-20 | End: 2023-11-21 | Stop reason: HOSPADM

## 2023-11-20 RX ORDER — ABIRATERONE ACETATE 250 MG/1
1000 TABLET ORAL DAILY
Status: DISCONTINUED | OUTPATIENT
Start: 2023-11-21 | End: 2023-11-21 | Stop reason: HOSPADM

## 2023-11-20 RX ORDER — FAMOTIDINE 20 MG/1
40 TABLET, FILM COATED ORAL DAILY
Status: DISCONTINUED | OUTPATIENT
Start: 2023-11-21 | End: 2023-11-21 | Stop reason: HOSPADM

## 2023-11-20 RX ORDER — LOSARTAN POTASSIUM 25 MG/1
25 TABLET ORAL 2 TIMES DAILY
Status: DISCONTINUED | OUTPATIENT
Start: 2023-11-20 | End: 2023-11-21 | Stop reason: HOSPADM

## 2023-11-20 RX ORDER — IPRATROPIUM BROMIDE AND ALBUTEROL SULFATE 2.5; .5 MG/3ML; MG/3ML
3 SOLUTION RESPIRATORY (INHALATION) EVERY 6 HOURS PRN
Status: DISCONTINUED | OUTPATIENT
Start: 2023-11-20 | End: 2023-11-21 | Stop reason: HOSPADM

## 2023-11-20 RX ORDER — SODIUM CHLORIDE 0.9 % (FLUSH) 0.9 %
10 SYRINGE (ML) INJECTION EVERY 12 HOURS PRN
Status: DISCONTINUED | OUTPATIENT
Start: 2023-11-20 | End: 2023-11-21 | Stop reason: HOSPADM

## 2023-11-20 RX ORDER — OXYCODONE HYDROCHLORIDE 5 MG/1
5 TABLET ORAL EVERY 6 HOURS PRN
Status: DISCONTINUED | OUTPATIENT
Start: 2023-11-20 | End: 2023-11-21 | Stop reason: HOSPADM

## 2023-11-20 RX ORDER — NALOXONE HCL 0.4 MG/ML
0.02 VIAL (ML) INJECTION
Status: DISCONTINUED | OUTPATIENT
Start: 2023-11-20 | End: 2023-11-21 | Stop reason: HOSPADM

## 2023-11-20 RX ORDER — DILTIAZEM HYDROCHLORIDE 30 MG/1
30 TABLET, FILM COATED ORAL EVERY 12 HOURS
Status: DISCONTINUED | OUTPATIENT
Start: 2023-11-20 | End: 2023-11-21 | Stop reason: HOSPADM

## 2023-11-20 RX ORDER — KETOROLAC TROMETHAMINE 10 MG/1
TABLET, FILM COATED ORAL
Status: ON HOLD | COMMUNITY
Start: 2023-11-15 | End: 2023-11-30 | Stop reason: HOSPADM

## 2023-11-20 RX ORDER — POLYETHYLENE GLYCOL 3350 17 G/17G
17 POWDER, FOR SOLUTION ORAL 2 TIMES DAILY PRN
Status: DISCONTINUED | OUTPATIENT
Start: 2023-11-20 | End: 2023-11-21 | Stop reason: HOSPADM

## 2023-11-20 RX ORDER — ATORVASTATIN CALCIUM 10 MG/1
10 TABLET, FILM COATED ORAL DAILY
Status: DISCONTINUED | OUTPATIENT
Start: 2023-11-21 | End: 2023-11-21 | Stop reason: HOSPADM

## 2023-11-20 RX ORDER — ENOXAPARIN SODIUM 100 MG/ML
1 INJECTION SUBCUTANEOUS EVERY 12 HOURS
Status: DISCONTINUED | OUTPATIENT
Start: 2023-11-20 | End: 2023-11-21 | Stop reason: HOSPADM

## 2023-11-20 RX ORDER — TAMSULOSIN HYDROCHLORIDE 0.4 MG/1
0.4 CAPSULE ORAL DAILY
Status: DISCONTINUED | OUTPATIENT
Start: 2023-11-21 | End: 2023-11-21 | Stop reason: HOSPADM

## 2023-11-20 RX ORDER — ONDANSETRON 4 MG/1
8 TABLET, ORALLY DISINTEGRATING ORAL EVERY 8 HOURS PRN
Status: DISCONTINUED | OUTPATIENT
Start: 2023-11-20 | End: 2023-11-21 | Stop reason: HOSPADM

## 2023-11-20 RX ADMIN — LOSARTAN POTASSIUM 25 MG: 25 TABLET, FILM COATED ORAL at 09:11

## 2023-11-20 RX ADMIN — AMPICILLIN 2 G: 2 INJECTION, POWDER, FOR SOLUTION INTRAMUSCULAR; INTRAVENOUS at 07:11

## 2023-11-20 RX ADMIN — ENOXAPARIN SODIUM 90 MG: 100 INJECTION SUBCUTANEOUS at 09:11

## 2023-11-20 RX ADMIN — IPRATROPIUM BROMIDE AND ALBUTEROL SULFATE 3 ML: .5; 3 SOLUTION RESPIRATORY (INHALATION) at 09:11

## 2023-11-20 RX ADMIN — DILTIAZEM HYDROCHLORIDE 30 MG: 30 TABLET, FILM COATED ORAL at 09:11

## 2023-11-20 RX ADMIN — TRAZODONE HYDROCHLORIDE 100 MG: 50 TABLET ORAL at 09:11

## 2023-11-20 RX ADMIN — AMPICILLIN 2 G: 2 INJECTION, POWDER, FOR SOLUTION INTRAMUSCULAR; INTRAVENOUS at 11:11

## 2023-11-20 NOTE — ED PROVIDER NOTES
Encounter Date: 11/20/2023       History     Chief Complaint   Patient presents with    Knee Pain    Joint Swelling     Pt reports notification from [facility] and to come to ED r/t needing IV antiobiotics r/t bacteria that was removed on the 15th. Pt  reports right knee swelling and pain that started on the 12th.      68-year-old male, here after cultures from joint aspirate from night knee grew Enterococcus faecalis.  Dr. Lynn, orthopedic surgeon on-call, was telephoned about this patient and stated that he needed to come back to the emergency department and undergo likely surgical washout.  Patient states that his knee is actually feeling somewhat better, he denies any fevers or chills.  Does not feel ill.  No sweats etc..  Patient has multiple comorbidities including asbestosis, AFib, COPD, CHF, hypertension, history of prostate cancer, and oxygen dependence.  Dr. Lynn  met the patient in the emergency department, and calculus of labs that he would like to be completed before considering surgery here.       Review of patient's allergies indicates:  No Known Allergies  Past Medical History:   Diagnosis Date    Abnormal CT scan 7/6/2023    Abnormal positron emission tomography (PET) scan 7/6/2023    Anxiety     Asbestosis 08/04/2015    Atrial fibrillation     Bilateral adrenal adenomas     COPD (chronic obstructive pulmonary disease)     COVID-19 virus infection 07/28/2022    Depression     Elevated PSA 7/6/2023    High cholesterol     HTN (hypertension)     Malignant neoplasm of prostate     Multiple lung nodules     Nodule of upper lobe of left lung 03/30/2023    6 mm spiculated on cta chest 3/21/23    On home oxygen therapy 05/24/2023    Pneumonia     Prostate cancer 5/27/2020    Prostate cancer metastatic to bone 7/6/2023    Prostate cancer metastatic to lung 7/6/2023    Ulcerative colitis      Past Surgical History:   Procedure Laterality Date    NECK SURGERY      right knee      TRANSRECTAL BIOPSY OF  PROSTATE WITH ULTRASOUND GUIDANCE Bilateral 3/18/2020    Procedure: BIOPSY, PROSTATE, RECTAL APPROACH, WITH US GUIDANCE;  Surgeon: Bill Jeong MD;  Location: [facility] OR;  Service: Urology;  Laterality: Bilateral;     Family History   Problem Relation Age of Onset    Cancer Mother         Uterine and Ovarian cancer    Diabetes Mother     Diabetes Sister      Social History     Tobacco Use    Smoking status: Every Day     Current packs/day: 0.50     Average packs/day: 0.5 packs/day for 50.2 years (25.1 ttl pk-yrs)     Types: Cigarettes     Start date: 9/7/1973     Passive exposure: Past    Smokeless tobacco: Never   Substance Use Topics    Alcohol use: Yes     Comment: 8 16oz beers per day    Drug use: No     Review of Systems   Constitutional: Negative.    HENT: Negative.     Eyes: Negative.    Respiratory: Negative.     Cardiovascular: Negative.    Gastrointestinal: Negative.    Endocrine: Negative.    Musculoskeletal:  Positive for arthralgias (Mild right knee pain and swelling).   Skin: Negative.    Neurological: Negative.    Psychiatric/Behavioral: Negative.         Physical Exam     Initial Vitals   BP Pulse Resp Temp SpO2   11/20/23 1437 11/20/23 1447 11/20/23 1447 11/20/23 1521 11/20/23 1437   (!) 170/84 97 18 98.4 °F (36.9 °C) (!) 86 %      MAP       --                Physical Exam    Nursing note and vitals reviewed.  Constitutional: He appears well-developed and well-nourished. He is not diaphoretic. No distress.   HENT:   Head: Normocephalic and atraumatic.   Nose: Nose normal.   Mouth/Throat: Oropharynx is clear and moist. No oropharyngeal exudate.   Eyes: Conjunctivae and EOM are normal. Pupils are equal, round, and reactive to light. No scleral icterus.   Neck: Neck supple. No JVD present.   Normal range of motion.  Cardiovascular:  Normal rate, regular rhythm, normal heart sounds and intact distal pulses.           No murmur heard.  Pulmonary/Chest: No stridor. No respiratory distress. He has  wheezes. He has rhonchi.   Bilateral wheezes and rhonchi, slight.   Abdominal: Abdomen is soft. Bowel sounds are normal. He exhibits no distension. There is no abdominal tenderness.   Musculoskeletal:         General: Tenderness and edema present. Normal range of motion.      Cervical back: Normal range of motion and neck supple.      Comments: Right knee is mildly edematous in general.  No obvious erythema, no excess warmth.  Pain increased with range of motion.     Neurological: He is alert and oriented to person, place, and time. He has normal strength. No cranial nerve deficit or sensory deficit. GCS score is 15. GCS eye subscore is 4. GCS verbal subscore is 5. GCS motor subscore is 6.   Skin: Skin is warm and dry. Capillary refill takes less than 2 seconds. No rash noted. No erythema.   Psychiatric: He has a normal mood and affect. His behavior is normal.         ED Course   Procedures  Labs Reviewed   COMPREHENSIVE METABOLIC PANEL - Abnormal; Notable for the following components:       Result Value    CO2 33 (*)     Glucose 137 (*)     BUN 7 (*)     Albumin 2.6 (*)     All other components within normal limits   C-REACTIVE PROTEIN - Abnormal; Notable for the following components:    .4 (*)     All other components within normal limits   CBC W/ AUTO DIFFERENTIAL - Abnormal; Notable for the following components:    RBC 3.56 (*)     Hemoglobin 9.8 (*)     Hematocrit 32.3 (*)     MCHC 30.3 (*)     Lymph # 0.7 (*)     Gran % 73.6 (*)     Lymph % 10.9 (*)     All other components within normal limits   APTT - Abnormal; Notable for the following components:    aPTT 36.1 (*)     All other components within normal limits   CBC W/ AUTO DIFFERENTIAL - Abnormal; Notable for the following components:    RBC 3.43 (*)     Hemoglobin 9.5 (*)     Hematocrit 31.5 (*)     MCHC 30.2 (*)     MPV 8.9 (*)     Lymph # 0.5 (*)     Lymph % 9.1 (*)     Mono % 15.1 (*)     All other components within normal limits   COMPREHENSIVE  METABOLIC PANEL - Abnormal; Notable for the following components:    CO2 34 (*)     Glucose 114 (*)     BUN 6 (*)     Albumin 2.5 (*)     All other components within normal limits   CULTURE, BLOOD   CULTURE, BLOOD   CULTURE, FLUID  (AEROBIC) WITH GRAM STAIN   CULTURE, ANAEROBIC   PROTIME-INR   LACTIC ACID, PLASMA   HIV 1 / 2 ANTIBODY    Narrative:     Release to patient->Immediate   HEPATITIS C ANTIBODY    Narrative:     Release to patient->Immediate   PROTIME-INR   MAGNESIUM   PHOSPHORUS   WBC & DIFF, BODY FLUID   BODY FLUID CRYSTAL          Imaging Results              X-Ray Knee 3 View Right (Final result)  Result time 11/20/23 17:57:38      Final result by Lisa Ortega MD (11/20/23 17:57:38)                   Impression:      There are tricompartmental osteoarthritic changes of the right knee with a small joint effusion.      Electronically signed by: Lisa Ortgea MD  Date:    11/20/2023  Time:    17:57               Narrative:    EXAMINATION:  XR KNEE 3 VIEW RIGHT    CLINICAL HISTORY:  knee pain and swelling;    TECHNIQUE:  AP, lateral, and Merchant views of the right knee were performed.    COMPARISON:  None    FINDINGS:  There are tricompartmental osteoarthritic changes of the right knee without evidence fracture or dislocation.  There is atrophy of the musculature and a small joint effusion.                                       Medications   ampicillin (OMNIPEN) 2 g in sodium chloride 0.9 % 100 mL IVPB (MB+) (2 g Intravenous New Bag 11/21/23 0304)   albuterol-ipratropium 2.5 mg-0.5 mg/3 mL nebulizer solution 3 mL (3 mLs Nebulization Given 11/20/23 2109)   amiodarone tablet 100 mg (has no administration in time range)   enoxaparin injection 90 mg (90 mg Subcutaneous Given 11/20/23 2108)   diltiaZEM tablet 30 mg (30 mg Oral Given 11/20/23 2108)   famotidine tablet 40 mg (has no administration in time range)   finasteride tablet 5 mg (has no administration in time range)   fluticasone  furoate-vilanteroL 100-25 mcg/dose diskus inhaler 1 puff (has no administration in time range)   trazodone split tablet 100 mg (100 mg Oral Given 11/20/23 2108)   tamsulosin 24 hr capsule 0.4 mg (has no administration in time range)   paroxetine tablet 40 mg (has no administration in time range)   losartan tablet 25 mg (25 mg Oral Given 11/20/23 2108)   atorvastatin tablet 10 mg (has no administration in time range)   abiraterone Tab 1,000 mg (has no administration in time range)   sodium chloride 0.9% flush 10 mL (has no administration in time range)   naloxone 0.4 mg/mL injection 0.02 mg (has no administration in time range)   glucose chewable tablet 16 g (has no administration in time range)   glucose chewable tablet 24 g (has no administration in time range)   glucagon (human recombinant) injection 1 mg (has no administration in time range)   oxyCODONE immediate release tablet 5 mg (has no administration in time range)   morphine injection 2 mg (has no administration in time range)   polyethylene glycol packet 17 g (has no administration in time range)   ondansetron disintegrating tablet 8 mg (has no administration in time range)   prochlorperazine injection Soln 5 mg (has no administration in time range)   dextrose 10% bolus 125 mL 125 mL (has no administration in time range)   dextrose 10% bolus 250 mL 250 mL (has no administration in time range)   ampicillin (OMNIPEN) 1 gram injection (has no administration in time range)   ampicillin (OMNIPEN) 1 gram injection (has no administration in time range)     Medical Decision Making  Differential includes septic arthritis, contaminated culture, gout,  etc.  Patient came to the emergency department as requested, and Dr. Lynn was notified of his presence.  He came to the emergency department and saw the patient and told him that he needed surgical washout.  Anesthesiology was consulted by Dr. Lynn.  Anesthesia noted that the patient had numerous comorbidities,  including significant lung disease requiring 4 L of nasal cannula O2.  It was decided that this patient is high risk, and would likely have a prolonged recovery and there is a high possibility that he would not be able to come off the ventilator for a prolonged period.  If this were the case, he would likely need pulmonology care, which is unavailable here.  For this reason, ortho and anesthesiology decided that this patient will be better served if transferred to a larger facility such as [facility].  Patient's pulmonologist practices there as well.  Request for transfer was initiated by me.  I spoke to Dr. Kline, ortho on-call, who refused the transfer, stating that other than pulmonology, his hospital had nothing to offer the patient that this facility did not have and stated that should the patient run into issues after his surgery, he can be transferred at that time.  He also stated that he believed based on limited labs done on the patient's joint aspirate, but this likely did not represent a true septic arthritis.  He recommended hospitalization here for IV antibiotics, then reassessment after a possible 2nd joint aspiration, this time with cell counts, etc..  I spoke to Dr. Meier, hospitalist about this, and he agreed to admit the patient here.  He plans to get a 2nd joint aspiration in the morning possibly under fluoroscopy, to ensure a good, clean sample.  Patient agrees with this plan of care.    Amount and/or Complexity of Data Reviewed  Labs: ordered.                                   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

## 2023-11-20 NOTE — Clinical Note
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

## 2023-11-20 NOTE — Clinical Note
Right: Knee.   Hair: N/A.  Skin prep dry before draping.  Prepped by: Dmitry Finnegan MD 11/21/2023 12:35 PM.

## 2023-11-20 NOTE — CONSULTS
Subjective:      Patient ID: Jama James is a 68 y.o. male.    Chief Complaint: Knee Pain and Joint Swelling (Pt reports notification from [facility] and to come to ED r/t needing IV antiobiotics r/t bacteria that was removed on the 15th. Pt  reports right knee swelling and pain that started on the 12th. )    Referring Provider: Self, Aaareferral  No address on file    HPI:  Mr. James is a 68-year-old gentleman who presented to the emergency room today with a 10 year history of right knee pain which had an acute exacerbation on 11/15/2023.  He had an aspiration of his knee of which recent cultures taken on 11/15/2023 grew Enterococcus faecalis.  He stated that he still has significant pain in his knee which is inhibiting his ability to bear weight.  Flexing and bearing weight on his leg increases his symptoms while nothing improves them.  He stated he gets intermittent fever and chills.  He has swelling giving way and locking.  Currently he can only ambulate 10-15 feet and can not climb stairs.  He has to use a cane or a walker to ambulate.    Past Medical History:   Diagnosis Date    Abnormal CT scan 7/6/2023    Abnormal positron emission tomography (PET) scan 7/6/2023    Anxiety     Asbestosis 08/04/2015    Atrial fibrillation     Bilateral adrenal adenomas     COPD (chronic obstructive pulmonary disease)     COVID-19 virus infection 07/28/2022    Depression     Elevated PSA 7/6/2023    High cholesterol     HTN (hypertension)     Malignant neoplasm of prostate     Multiple lung nodules     Nodule of upper lobe of left lung 03/30/2023    6 mm spiculated on cta chest 3/21/23    On home oxygen therapy 05/24/2023    Pneumonia     Prostate cancer 5/27/2020    Prostate cancer metastatic to bone 7/6/2023    Prostate cancer metastatic to lung 7/6/2023     *  *  *  * Ulcerative colitis  Emphysema   GERD  Headaches  Chronic anticoagulation      Past Surgical History:   Procedure Laterality Date    C-SPINE FUSION      ARTHROTOMY  RIGHT KNEE      TRANSRECTAL BIOPSY OF PROSTATE WITH ULTRASOUND GUIDANCE Bilateral 3/18/2020     *    * EGD   COLONOSCOPY       Review of patient's allergies indicates:  No Known Allergies    Social History     Occupational History    Retired //   Tobacco Use    Smoking status: Every Day     Current packs/day: 0.50     Average packs/day: 0.5 packs/day for 50.2 years (25.1 ttl pk-yrs)     Types: Cigarettes     Start date: 9/7/1973     Passive exposure: Past    Smokeless tobacco: Never   Substance and Sexual Activity    Alcohol use: Yes     Comment: 8 16oz beers per day for 50 years    Drug use: No    Sexual activity: Not Currently      Family History   Problem Relation Age of Onset    Cancer Mother         Uterine and Ovarian cancer    Diabetes Mother     Diabetes Sister        Previous Hospitalizations:  COPD, C-spine fusion    ROS:   Review of Systems   Constitutional: Negative for chills and fever.   HENT:  Negative for congestion.    Eyes:  Negative for blurred vision and double vision.   Cardiovascular:  Positive for dyspnea on exertion and irregular heartbeat.   Respiratory:  Positive for cough and shortness of breath.    Endocrine: Negative for polydipsia.   Hematologic/Lymphatic: Bruises/bleeds easily.   Skin:  Negative for flushing, itching and skin cancer.   Musculoskeletal:  Positive for joint pain, joint swelling and neck pain. Negative for gout.   Gastrointestinal:  Positive for heartburn. Negative for constipation and diarrhea.   Genitourinary:  Negative for nocturia.   Neurological:  Positive for headaches. Negative for seizures.   Psychiatric/Behavioral:  Positive for depression. The patient is nervous/anxious. The patient does not have insomnia.    Allergic/Immunologic: Negative for environmental allergies and HIV exposure.       Objective:      Physical Exam:   General: AAOx3.  No acute distress.  Unkempt  HEENT: Normocephalic, PEARLA EOMI, poor dentition.  Nasal  cannula in place  Neck: Supple, No JVD  Chest: Symetric, equal excursion on inspiration.  Cough with deep inspiration.  Retractions  Abdomen: Soft NTND  Vascular:  Pulses intact and equal bilaterally.  Capillary refill less than 3 seconds and equal bilaterally  Neurologic:  Pinprick and soft touch intact and equal bilaterally  Integment:  No ecchymosis, no errythema  Extremity:  Knee: Extension/flexion right knee 5/80 degrees, left knee 0/118°.  Effusion right knee.  Positive drop home right knee.  Positive patellar ballottement right knee.  Positive patellar load/compression right knee.  Negative patella apprehension/relocation bilaterally.  Varus/valgus stressing equal bilaterally with endpoint.  Lachman's/drawer equal bilaterally with endpoint.  Positive joint line tenderness right knee.  Tommy negative both knees.  Kevil positive right knee.  Nontender at the anserine insertion bilaterally.  No swelling at the anserine insertion bilaterally.  Radiography:  No new x-rays done today.  Laboratory:  WBC 6.78; hemoglobin 9.8; hematocrit 32.3; platelets 339; PTT 36.1 normal 21 0.0-32.0; PT 11.4 normal 9.0-12.5; INR 1.1.  Microbiology:  Aerobic culture completed on 11/15/2023 grew Enterococcus faecalis sensitive to ampicillin and vancomycin; anaerobic culture no growth.      Assessment:       Impression:    Septic arthritis, right knee.      Plan:       1.  Discussed physical examination, microbiology:  and laboratory findings with the patient. Jama understands that he appears to have a septic arthritis of his right knee.  Treatment alternatives and outcomes were discussed with the patient he understands he could be treated conservatively with observation, activity modification, bracing, antibiotics, physical therapy, aspiration, or surgically with irrigation and debridement.  Discussed in detail with the patient that since he does have a swollen knee which is extremely painful and his aerobic culture grew  Enterococcus faecalis he should proceed with a irrigation and debridement of his knee he understands this could be done arthroscopically.  Unfortunately he has multiple comorbidities such as oxygen-dependent advanced COPD, AFib requiring anticoagulation, metastatic prostate cancer, he needs to proceed to a facility which has pulmonology Cardiology and oncology to monitor him.  He also understands that currently there were no ICU beds at this facility and postoperatively he will need to be admitted to an ICU for monitoring he needs to be transferred to a facility which has an ICU which can monitor him postoperatively.  Recommend he be transferred for surgical care and perioperative care which can be provided through a team approach.  This decision was made in consultation with the hospitalist and the anesthesiologist at this facility who also feel that this patient exceeds the capabilities of this facility for perioperative care and since no ICU bed is available he exceeds the capabilities of this facility also.  2. Pain control per ER physician.    3. All antibiotics per ER physician.  4. Transfer to facility that can manage his comorbidities and also perform his surgical care.

## 2023-11-20 NOTE — ED NOTES
"Call to Margaret (Mr James's sister) to update on culture results and advise pt to return to the ED "immediately" so that Dr Lynn can take the patient to the OR (as advised by Dr Lynn just prior to speaking with Margaret.   "

## 2023-11-20 NOTE — ANESTHESIA PAT ROS NOTE
67 y/o male presents to ED with infected right knee requiring washout.  He has significant PMH that includes COPD with chronic bronchitis, chronic hypoxic respiratory failure requiring O2 @ 4lpm currently, PAF on  eliquis, prostate cancer with mets to bone and lung, smoker, and bilateral adrenal adenomas.  Given the severity of his comorbidities and the possibility of post op intubation as well as potential need for further surgery, I feel it is in his best interest to have this procedure done at [facility] where his oncologist and pulmonologist are located.  I spoke with Dr.'s Lynn and Renea as well as the ED doctor and all agree with transporting this patient to [facility].  Also discussed with patient and family in the room and they understand and agree.

## 2023-11-21 ENCOUNTER — HOSPITAL ENCOUNTER (INPATIENT)
Facility: HOSPITAL | Age: 68
LOS: 8 days | Discharge: NURSING, REHAB OR LONG-TERM CARE | DRG: 853 | End: 2023-11-30
Attending: HOSPITALIST | Admitting: INTERNAL MEDICINE
Payer: MEDICARE

## 2023-11-21 VITALS
DIASTOLIC BLOOD PRESSURE: 98 MMHG | RESPIRATION RATE: 18 BRPM | WEIGHT: 190 LBS | OXYGEN SATURATION: 98 % | HEIGHT: 77 IN | HEART RATE: 94 BPM | BODY MASS INDEX: 22.43 KG/M2 | SYSTOLIC BLOOD PRESSURE: 171 MMHG | TEMPERATURE: 98 F

## 2023-11-21 DIAGNOSIS — J44.1 COPD EXACERBATION: ICD-10-CM

## 2023-11-21 DIAGNOSIS — R78.81 BACTEREMIA: ICD-10-CM

## 2023-11-21 DIAGNOSIS — M00.861 ARTHRITIS OF RIGHT KNEE DUE TO OTHER BACTERIA: ICD-10-CM

## 2023-11-21 DIAGNOSIS — M00.9 SEPTIC ARTHRITIS OF KNEE, RIGHT: Primary | ICD-10-CM

## 2023-11-21 DIAGNOSIS — M00.9: ICD-10-CM

## 2023-11-21 LAB
ACINETOBACTER CALCOACETICUS/BAUMANNII COMPLEX: NOT DETECTED
ALBUMIN SERPL BCP-MCNC: 2.5 G/DL (ref 3.5–5.2)
ALP SERPL-CCNC: 129 U/L (ref 55–135)
ALT SERPL W/O P-5'-P-CCNC: 11 U/L (ref 10–44)
ANION GAP SERPL CALC-SCNC: 12 MMOL/L (ref 8–16)
AORTIC ROOT ANNULUS: 3.17 CM
AORTIC VALVE CUSP SEPERATION: 1.91 CM
ASCENDING AORTA: 2.75 CM
AST SERPL-CCNC: 11 U/L (ref 10–40)
AV INDEX (PROSTH): 0.72
AV MEAN GRADIENT: 5 MMHG
AV PEAK GRADIENT: 9 MMHG
AV VALVE AREA BY VELOCITY RATIO: 2.6 CM²
AV VALVE AREA: 2.44 CM²
AV VELOCITY RATIO: 0.77
BACTEROIDES FRAGILIS: NOT DETECTED
BASOPHILS # BLD AUTO: 0.02 K/UL (ref 0–0.2)
BASOPHILS NFR BLD: 0.4 % (ref 0–1.9)
BILIRUB SERPL-MCNC: 0.3 MG/DL (ref 0.1–1)
BSA FOR ECHO PROCEDURE: 2.16 M2
BUN SERPL-MCNC: 6 MG/DL (ref 8–23)
CALCIUM SERPL-MCNC: 9.8 MG/DL (ref 8.7–10.5)
CANDIDA ALBICANS: NOT DETECTED
CANDIDA AURIS: NOT DETECTED
CANDIDA GLABRATA: NOT DETECTED
CANDIDA KRUSEI: NOT DETECTED
CANDIDA PARAPSILOSIS: NOT DETECTED
CANDIDA TROPICALIS: NOT DETECTED
CHLORIDE SERPL-SCNC: 98 MMOL/L (ref 95–110)
CO2 SERPL-SCNC: 34 MMOL/L (ref 23–29)
CREAT SERPL-MCNC: 0.6 MG/DL (ref 0.5–1.4)
CRYPTOCOCCUS NEOFORMANS/GATTII: NOT DETECTED
CTX-M GENE: ABNORMAL
CV ECHO LV RWT: 0.49 CM
DIFFERENTIAL METHOD: ABNORMAL
DOP CALC AO PEAK VEL: 1.49 M/S
DOP CALC AO VTI: 28.3 CM
DOP CALC LVOT AREA: 3.4 CM2
DOP CALC LVOT DIAMETER: 2.07 CM
DOP CALC LVOT PEAK VEL: 1.15 M/S
DOP CALC LVOT STROKE VOLUME: 68.95 CM3
DOP CALC MV VTI: 37.5 CM
DOP CALCLVOT PEAK VEL VTI: 20.5 CM
E WAVE DECELERATION TIME: 190.83 MSEC
E/A RATIO: 0.92
E/E' RATIO: 8.52 M/S
ECHO LV POSTERIOR WALL: 1.2 CM (ref 0.6–1.1)
EJECTION FRACTION: 70 %
ENTEROBACTER CLOACAE COMPLEX: NOT DETECTED
ENTEROBACTERALES: NOT DETECTED
ENTEROCOCCUS FAECALIS: DETECTED
ENTEROCOCCUS FAECIUM: NOT DETECTED
EOSINOPHIL # BLD AUTO: 0.2 K/UL (ref 0–0.5)
EOSINOPHIL NFR BLD: 2.9 % (ref 0–8)
ERYTHROCYTE [DISTWIDTH] IN BLOOD BY AUTOMATED COUNT: 14.1 % (ref 11.5–14.5)
ESCHERICHIA COLI: NOT DETECTED
EST. GFR  (NO RACE VARIABLE): >60 ML/MIN/1.73 M^2
FRACTIONAL SHORTENING: 39 % (ref 28–44)
GLOBAL LONGITUIDAL STRAIN: -16 %
GLUCOSE SERPL-MCNC: 114 MG/DL (ref 70–110)
HAEMOPHILUS INFLUENZAE: NOT DETECTED
HCT VFR BLD AUTO: 31.5 % (ref 40–54)
HCV AB SERPL QL IA: NORMAL
HGB BLD-MCNC: 9.5 G/DL (ref 14–18)
HIV 1+2 AB+HIV1 P24 AG SERPL QL IA: NORMAL
IMM GRANULOCYTES # BLD AUTO: 0.01 K/UL (ref 0–0.04)
IMM GRANULOCYTES NFR BLD AUTO: 0.2 % (ref 0–0.5)
IMP GENE: ABNORMAL
INFLUENZA A, MOLECULAR: NEGATIVE
INFLUENZA B, MOLECULAR: NEGATIVE
INTERVENTRICULAR SEPTUM: 1.2 CM (ref 0.6–1.1)
IVC DIAMETER: 1.87 CM
IVRT: 57.09 MSEC
KLEBSIELLA AEROGENES: NOT DETECTED
KLEBSIELLA OXYTOCA: NOT DETECTED
KLEBSIELLA PNEUMONIAE GROUP: NOT DETECTED
KPC: ABNORMAL
LA MAJOR: 4.12 CM
LA MINOR: 2.26 CM
LEFT ATRIUM SIZE: 4.53 CM
LEFT ATRIUM VOLUME INDEX MOD: 8.7 ML/M2
LEFT ATRIUM VOLUME MOD: 18.95 CM3
LEFT INTERNAL DIMENSION IN SYSTOLE: 3 CM (ref 2.1–4)
LEFT VENTRICLE DIASTOLIC VOLUME INDEX: 52.61 ML/M2
LEFT VENTRICLE DIASTOLIC VOLUME: 115.22 ML
LEFT VENTRICLE MASS INDEX: 105 G/M2
LEFT VENTRICLE SYSTOLIC VOLUME INDEX: 15.9 ML/M2
LEFT VENTRICLE SYSTOLIC VOLUME: 34.92 ML
LEFT VENTRICULAR INTERNAL DIMENSION IN DIASTOLE: 4.94 CM (ref 3.5–6)
LEFT VENTRICULAR MASS: 229.31 G
LISTERIA MONOCYTOGENES: NOT DETECTED
LV LATERAL E/E' RATIO: 7.54 M/S
LV SEPTAL E/E' RATIO: 9.8 M/S
LVOT MG: 2.67 MMHG
LVOT MV: 0.76 CM/S
LYMPHOCYTES # BLD AUTO: 0.5 K/UL (ref 1–4.8)
LYMPHOCYTES NFR BLD: 9.1 % (ref 18–48)
MAGNESIUM SERPL-MCNC: 2.1 MG/DL (ref 1.6–2.6)
MCH RBC QN AUTO: 27.7 PG (ref 27–31)
MCHC RBC AUTO-ENTMCNC: 30.2 G/DL (ref 32–36)
MCR-1: ABNORMAL
MCV RBC AUTO: 92 FL (ref 82–98)
MEC A/C AND MREJ (MRSA): ABNORMAL
MEC A/C: ABNORMAL
MONOCYTES # BLD AUTO: 0.8 K/UL (ref 0.3–1)
MONOCYTES NFR BLD: 15.1 % (ref 4–15)
MV MEAN GRADIENT: 4 MMHG
MV PEAK A VEL: 1.07 M/S
MV PEAK E VEL: 0.98 M/S
MV PEAK GRADIENT: 7 MMHG
MV STENOSIS PRESSURE HALF TIME: 55.34 MS
MV VALVE AREA BY CONTINUITY EQUATION: 1.84 CM2
MV VALVE AREA P 1/2 METHOD: 3.98 CM2
NDM GENE: ABNORMAL
NEISSERIA MENINGITIDIS: NOT DETECTED
NEUTROPHILS # BLD AUTO: 4 K/UL (ref 1.8–7.7)
NEUTROPHILS NFR BLD: 72.3 % (ref 38–73)
NRBC BLD-RTO: 0 /100 WBC
OXA-48-LIKE: ABNORMAL
PHOSPHATE SERPL-MCNC: 3.2 MG/DL (ref 2.7–4.5)
PISA MRMAX VEL: 2.21 M/S
PISA TR MAX VEL: 1.62 M/S
PLATELET # BLD AUTO: 355 K/UL (ref 150–450)
PMV BLD AUTO: 8.9 FL (ref 9.2–12.9)
POTASSIUM SERPL-SCNC: 3.7 MMOL/L (ref 3.5–5.1)
PROT SERPL-MCNC: 7.1 G/DL (ref 6–8.4)
PROTEUS SPECIES: NOT DETECTED
PSEUDOMONAS AERUGINOSA: NOT DETECTED
PV MV: 0.8 M/S
PV PEAK GRADIENT: 5 MMHG
PV PEAK VELOCITY: 1.13 M/S
RA MAJOR: 4.08 CM
RA PRESSURE ESTIMATED: 3 MMHG
RA WIDTH: 3.13 CM
RBC # BLD AUTO: 3.43 M/UL (ref 4.6–6.2)
RIGHT VENTRICULAR END-DIASTOLIC DIMENSION: 3.31 CM
RIGHT VENTRICULAR LENGTH IN DIASTOLE (APICAL 4-CHAMBER VIEW): 6.68 CM
RV MID DIAMA: 3 CM
RV TB RVSP: 5 MMHG
SALMONELLA SP: NOT DETECTED
SARS-COV-2 RDRP RESP QL NAA+PROBE: NEGATIVE
SERRATIA MARCESCENS: NOT DETECTED
SODIUM SERPL-SCNC: 144 MMOL/L (ref 136–145)
SPECIMEN SOURCE: NORMAL
STAPHYLOCOCCUS AUREUS: NOT DETECTED
STAPHYLOCOCCUS EPIDERMIDIS: NOT DETECTED
STAPHYLOCOCCUS LUGDUNESIS: NOT DETECTED
STAPHYLOCOCCUS SPECIES: NOT DETECTED
STENOTROPHOMONAS MALTOPHILIA: NOT DETECTED
STJ: 2.63 CM
STREPTOCOCCUS AGALACTIAE: NOT DETECTED
STREPTOCOCCUS PNEUMONIAE: NOT DETECTED
STREPTOCOCCUS PYOGENES: NOT DETECTED
STREPTOCOCCUS SPECIES: NOT DETECTED
TDI LATERAL: 0.13 M/S
TDI SEPTAL: 0.1 M/S
TDI: 0.12 M/S
TR MAX PG: 10 MMHG
TRICUSPID ANNULAR PLANE SYSTOLIC EXCURSION: 2.5 CM
TRICUSPID VALVE PEAK A WAVE VELOCITY: 0.62 M/S
TV PEAK E VEL: 0.5 M/S
TV REST PULMONARY ARTERY PRESSURE: 13 MMHG
VAN A/B: NOT DETECTED
VIM GENE: ABNORMAL
WBC # BLD AUTO: 5.5 K/UL (ref 3.9–12.7)
Z-SCORE OF LEFT VENTRICULAR DIMENSION IN END DIASTOLE: -4.04
Z-SCORE OF LEFT VENTRICULAR DIMENSION IN END SYSTOLE: -3.2

## 2023-11-21 PROCEDURE — 94640 AIRWAY INHALATION TREATMENT: CPT | Mod: XB

## 2023-11-21 PROCEDURE — U0002 COVID-19 LAB TEST NON-CDC: HCPCS | Performed by: STUDENT IN AN ORGANIZED HEALTH CARE EDUCATION/TRAINING PROGRAM

## 2023-11-21 PROCEDURE — 27000221 HC OXYGEN, UP TO 24 HOURS

## 2023-11-21 PROCEDURE — 99233 PR SUBSEQUENT HOSPITAL CARE,LEVL III: ICD-10-PCS | Mod: ,,, | Performed by: STUDENT IN AN ORGANIZED HEALTH CARE EDUCATION/TRAINING PROGRAM

## 2023-11-21 PROCEDURE — 27000646 HC AEROBIKA DEVICE

## 2023-11-21 PROCEDURE — 87077 CULTURE AEROBIC IDENTIFY: CPT | Performed by: STUDENT IN AN ORGANIZED HEALTH CARE EDUCATION/TRAINING PROGRAM

## 2023-11-21 PROCEDURE — 87186 SC STD MICRODIL/AGAR DIL: CPT | Performed by: STUDENT IN AN ORGANIZED HEALTH CARE EDUCATION/TRAINING PROGRAM

## 2023-11-21 PROCEDURE — 87070 CULTURE OTHR SPECIMN AEROBIC: CPT | Mod: 59 | Performed by: STUDENT IN AN ORGANIZED HEALTH CARE EDUCATION/TRAINING PROGRAM

## 2023-11-21 PROCEDURE — 84100 ASSAY OF PHOSPHORUS: CPT | Performed by: STUDENT IN AN ORGANIZED HEALTH CARE EDUCATION/TRAINING PROGRAM

## 2023-11-21 PROCEDURE — G0378 HOSPITAL OBSERVATION PER HR: HCPCS

## 2023-11-21 PROCEDURE — 63600175 PHARM REV CODE 636 W HCPCS: Mod: UD | Performed by: STUDENT IN AN ORGANIZED HEALTH CARE EDUCATION/TRAINING PROGRAM

## 2023-11-21 PROCEDURE — 85025 COMPLETE CBC W/AUTO DIFF WBC: CPT | Performed by: STUDENT IN AN ORGANIZED HEALTH CARE EDUCATION/TRAINING PROGRAM

## 2023-11-21 PROCEDURE — 99900035 HC TECH TIME PER 15 MIN (STAT)

## 2023-11-21 PROCEDURE — 87075 CULTR BACTERIA EXCEPT BLOOD: CPT | Mod: 59 | Performed by: STUDENT IN AN ORGANIZED HEALTH CARE EDUCATION/TRAINING PROGRAM

## 2023-11-21 PROCEDURE — 25000242 PHARM REV CODE 250 ALT 637 W/ HCPCS: Performed by: STUDENT IN AN ORGANIZED HEALTH CARE EDUCATION/TRAINING PROGRAM

## 2023-11-21 PROCEDURE — 89051 BODY FLUID CELL COUNT: CPT | Performed by: STUDENT IN AN ORGANIZED HEALTH CARE EDUCATION/TRAINING PROGRAM

## 2023-11-21 PROCEDURE — G0379 DIRECT REFER HOSPITAL OBSERV: HCPCS

## 2023-11-21 PROCEDURE — 25000003 PHARM REV CODE 250: Performed by: STUDENT IN AN ORGANIZED HEALTH CARE EDUCATION/TRAINING PROGRAM

## 2023-11-21 PROCEDURE — 96372 THER/PROPH/DIAG INJ SC/IM: CPT | Mod: 59 | Performed by: STUDENT IN AN ORGANIZED HEALTH CARE EDUCATION/TRAINING PROGRAM

## 2023-11-21 PROCEDURE — 87040 BLOOD CULTURE FOR BACTERIA: CPT | Performed by: STUDENT IN AN ORGANIZED HEALTH CARE EDUCATION/TRAINING PROGRAM

## 2023-11-21 PROCEDURE — 96366 THER/PROPH/DIAG IV INF ADDON: CPT

## 2023-11-21 PROCEDURE — 80053 COMPREHEN METABOLIC PANEL: CPT | Performed by: STUDENT IN AN ORGANIZED HEALTH CARE EDUCATION/TRAINING PROGRAM

## 2023-11-21 PROCEDURE — 99233 SBSQ HOSP IP/OBS HIGH 50: CPT | Mod: ,,, | Performed by: STUDENT IN AN ORGANIZED HEALTH CARE EDUCATION/TRAINING PROGRAM

## 2023-11-21 PROCEDURE — 87205 SMEAR GRAM STAIN: CPT | Performed by: STUDENT IN AN ORGANIZED HEALTH CARE EDUCATION/TRAINING PROGRAM

## 2023-11-21 PROCEDURE — 94761 N-INVAS EAR/PLS OXIMETRY MLT: CPT

## 2023-11-21 PROCEDURE — 94799 UNLISTED PULMONARY SVC/PX: CPT | Mod: XB

## 2023-11-21 PROCEDURE — 89060 EXAM SYNOVIAL FLUID CRYSTALS: CPT | Performed by: STUDENT IN AN ORGANIZED HEALTH CARE EDUCATION/TRAINING PROGRAM

## 2023-11-21 PROCEDURE — 83735 ASSAY OF MAGNESIUM: CPT | Performed by: STUDENT IN AN ORGANIZED HEALTH CARE EDUCATION/TRAINING PROGRAM

## 2023-11-21 PROCEDURE — 94664 DEMO&/EVAL PT USE INHALER: CPT | Mod: XB

## 2023-11-21 PROCEDURE — 87502 INFLUENZA DNA AMP PROBE: CPT | Performed by: STUDENT IN AN ORGANIZED HEALTH CARE EDUCATION/TRAINING PROGRAM

## 2023-11-21 RX ORDER — IPRATROPIUM BROMIDE AND ALBUTEROL SULFATE 2.5; .5 MG/3ML; MG/3ML
3 SOLUTION RESPIRATORY (INHALATION)
Status: DISCONTINUED | OUTPATIENT
Start: 2023-11-21 | End: 2023-11-21 | Stop reason: HOSPADM

## 2023-11-21 RX ORDER — SODIUM,POTASSIUM PHOSPHATES 280-250MG
2 POWDER IN PACKET (EA) ORAL
Status: DISCONTINUED | OUTPATIENT
Start: 2023-11-22 | End: 2023-11-30 | Stop reason: HOSPADM

## 2023-11-21 RX ORDER — FAMOTIDINE 20 MG/1
20 TABLET, FILM COATED ORAL 2 TIMES DAILY
Status: DISCONTINUED | OUTPATIENT
Start: 2023-11-22 | End: 2023-11-30 | Stop reason: HOSPADM

## 2023-11-21 RX ORDER — IPRATROPIUM BROMIDE AND ALBUTEROL SULFATE 2.5; .5 MG/3ML; MG/3ML
3 SOLUTION RESPIRATORY (INHALATION) EVERY 4 HOURS PRN
Status: DISCONTINUED | OUTPATIENT
Start: 2023-11-22 | End: 2023-11-23

## 2023-11-21 RX ORDER — LORAZEPAM 2 MG/ML
1 INJECTION INTRAMUSCULAR EVERY 4 HOURS PRN
Status: DISCONTINUED | OUTPATIENT
Start: 2023-11-21 | End: 2023-11-21 | Stop reason: HOSPADM

## 2023-11-21 RX ORDER — LANOLIN ALCOHOL/MO/W.PET/CERES
800 CREAM (GRAM) TOPICAL
Status: DISCONTINUED | OUTPATIENT
Start: 2023-11-22 | End: 2023-11-30 | Stop reason: HOSPADM

## 2023-11-21 RX ORDER — AMIODARONE HYDROCHLORIDE 200 MG/1
200 TABLET ORAL DAILY
Status: DISCONTINUED | OUTPATIENT
Start: 2023-11-22 | End: 2023-11-30 | Stop reason: HOSPADM

## 2023-11-21 RX ORDER — PREDNISONE 10 MG/1
10 TABLET ORAL DAILY
Status: DISCONTINUED | OUTPATIENT
Start: 2023-11-21 | End: 2023-11-21 | Stop reason: HOSPADM

## 2023-11-21 RX ORDER — ACETAMINOPHEN 325 MG/1
650 TABLET ORAL EVERY 4 HOURS PRN
Status: DISCONTINUED | OUTPATIENT
Start: 2023-11-22 | End: 2023-11-29

## 2023-11-21 RX ORDER — DILTIAZEM HYDROCHLORIDE 30 MG/1
30 TABLET, FILM COATED ORAL EVERY 12 HOURS
Status: DISCONTINUED | OUTPATIENT
Start: 2023-11-22 | End: 2023-11-30 | Stop reason: HOSPADM

## 2023-11-21 RX ORDER — SODIUM CHLORIDE 9 MG/ML
INJECTION, SOLUTION INTRAVENOUS CONTINUOUS
Status: DISCONTINUED | OUTPATIENT
Start: 2023-11-22 | End: 2023-11-23

## 2023-11-21 RX ORDER — TAMSULOSIN HYDROCHLORIDE 0.4 MG/1
0.4 CAPSULE ORAL DAILY
Status: DISCONTINUED | OUTPATIENT
Start: 2023-11-22 | End: 2023-11-30 | Stop reason: HOSPADM

## 2023-11-21 RX ORDER — DIAZEPAM 5 MG/1
5 TABLET ORAL EVERY 8 HOURS
Status: DISCONTINUED | OUTPATIENT
Start: 2023-11-21 | End: 2023-11-21 | Stop reason: HOSPADM

## 2023-11-21 RX ADMIN — IPRATROPIUM BROMIDE AND ALBUTEROL SULFATE 3 ML: .5; 3 SOLUTION RESPIRATORY (INHALATION) at 07:11

## 2023-11-21 RX ADMIN — AMPICILLIN 2 G: 2 INJECTION, POWDER, FOR SOLUTION INTRAMUSCULAR; INTRAVENOUS at 03:11

## 2023-11-21 RX ADMIN — AMPICILLIN 2 G: 2 INJECTION, POWDER, FOR SOLUTION INTRAMUSCULAR; INTRAVENOUS at 06:11

## 2023-11-21 RX ADMIN — TAMSULOSIN HYDROCHLORIDE 0.4 MG: 0.4 CAPSULE ORAL at 08:11

## 2023-11-21 RX ADMIN — AMIODARONE HYDROCHLORIDE 100 MG: 100 TABLET ORAL at 08:11

## 2023-11-21 RX ADMIN — FINASTERIDE 5 MG: 5 TABLET, FILM COATED ORAL at 08:11

## 2023-11-21 RX ADMIN — PAROXETINE HYDROCHLORIDE 40 MG: 10 TABLET, FILM COATED ORAL at 08:11

## 2023-11-21 RX ADMIN — IPRATROPIUM BROMIDE AND ALBUTEROL SULFATE 3 ML: .5; 3 SOLUTION RESPIRATORY (INHALATION) at 11:11

## 2023-11-21 RX ADMIN — FLUTICASONE FUROATE AND VILANTEROL TRIFENATATE 1 PUFF: 100; 25 POWDER RESPIRATORY (INHALATION) at 07:11

## 2023-11-21 RX ADMIN — ATORVASTATIN CALCIUM 10 MG: 10 TABLET, FILM COATED ORAL at 08:11

## 2023-11-21 RX ADMIN — PREDNISONE 10 MG: 10 TABLET ORAL at 08:11

## 2023-11-21 RX ADMIN — FAMOTIDINE 40 MG: 20 TABLET, FILM COATED ORAL at 08:11

## 2023-11-21 RX ADMIN — DILTIAZEM HYDROCHLORIDE 30 MG: 30 TABLET, FILM COATED ORAL at 08:11

## 2023-11-21 RX ADMIN — AMPICILLIN 2 G: 2 INJECTION, POWDER, FOR SOLUTION INTRAMUSCULAR; INTRAVENOUS at 10:11

## 2023-11-21 RX ADMIN — DIAZEPAM 5 MG: 5 TABLET ORAL at 02:11

## 2023-11-21 RX ADMIN — ENOXAPARIN SODIUM 90 MG: 100 INJECTION SUBCUTANEOUS at 08:11

## 2023-11-21 RX ADMIN — LOSARTAN POTASSIUM 25 MG: 25 TABLET, FILM COATED ORAL at 08:11

## 2023-11-21 RX ADMIN — IPRATROPIUM BROMIDE AND ALBUTEROL SULFATE 3 ML: .5; 3 SOLUTION RESPIRATORY (INHALATION) at 03:11

## 2023-11-21 NOTE — PLAN OF CARE
Problem: Adult Inpatient Plan of Care  Goal: Plan of Care Review  Outcome: Ongoing, Not Progressing     Problem: Adult Inpatient Plan of Care  Goal: Patient-Specific Goal (Individualized)  Outcome: Ongoing, Not Progressing     Problem: Adult Inpatient Plan of Care  Goal: Absence of Hospital-Acquired Illness or Injury  Outcome: Ongoing, Not Progressing     Problem: Adult Inpatient Plan of Care  Goal: Optimal Comfort and Wellbeing  Outcome: Ongoing, Not Progressing     Problem: Adult Inpatient Plan of Care  Goal: Readiness for Transition of Care  Outcome: Ongoing, Not Progressing     Problem: Pain Acute  Goal: Acceptable Pain Control and Functional Ability  Outcome: Ongoing, Not Progressing     Problem: Infection  Goal: Absence of Infection Signs and Symptoms  Outcome: Ongoing, Not Progressing      UTR/LVM for patient with call back instructions

## 2023-11-21 NOTE — PLAN OF CARE
[facility] [place] Emergency Dept  Initial Discharge Assessment       Primary Care Provider: Marisel Farrell III, MD    Admission Diagnosis: Arthritis of right knee due to other bacteria [M00.861]    Admission Date: 11/20/2023  Expected Discharge Date:     Transition of Care Barriers: None    Payor: HU[employer] MANAGED MEDICARE / Plan: HU[employer]NA SNP HMO PPO SPECIAL NEEDS / Product Type: Medicare Advantage /     Extended Emergency Contact Information  Primary Emergency Contact: Beti Moura  Address: [address] [place] [address] [place]  Mobile Phone: [phone]  Relation: Sister  Preferred language: English   needed? No  Secondary Emergency Contact: EMEKA ARMANDO  Address: [address]           [place] [address] [place]  Mobile Phone: [phone]  Relation: Spouse  Preferred language: English   needed? No    Discharge Plan A: Home Health  Discharge Plan B: Home with family    DC assessment completed with patient at bedside. Verified information on on facesheet as correct. Denies POA. Lives at listed address with his spouse and a  friend. Verified PCP as Dr. Farrell; reports last apt was a few weeks ago. Pharmacy for DC is [facility]. Denies hd/blood thinners/outpt services. Reports having HH for nurse only but does not remember which company. DME- o2 (3L, portable & concentrator, [employer]), cane, rollator, nebulizer, glucometer, bp monitor, and sc. Modified independent at baseline. Uses walker for assistance. Does not drive. Reprots that his sister provides transportation to apts and will likely be the one to provide transportation home upon DC. Reports taking home medications as prescribed and can afford them. Denies recent inpt stay in last 30 days. Verified insurance on file. Dc plan is home is home with continued HH.     Initial Assessment (most recent)       Adult Discharge Assessment - 11/20/23 1630          Discharge Assessment     Assessment Type Discharge Planning Assessment     Confirmed/corrected address, phone number and insurance Yes     Confirmed Demographics Correct on Facesheet     Source of Information patient     Does patient/caregiver understand observation status Yes     Communicated CLEMENTINA with patient/caregiver Yes     Reason For Admission septic arthritis     People in Home spouse;friend(s)     Facility Arrived From: ed     Do you expect to return to your current living situation? Yes     Do you have help at home or someone to help you manage your care at home? Yes     Prior to hospitilization cognitive status: Alert/Oriented     Current cognitive status: Alert/Oriented     Walking or Climbing Stairs ambulation difficulty, requires equipment     Dressing/Bathing bathing difficulty, requires equipment     Equipment Currently Used at Home nebulizer;oxygen;cane, straight;glucometer;shower chair;rollator;blood pressure machine     Readmission within 30 days? No     Patient currently being followed by outpatient case management? No     Do you currently have service(s) that help you manage your care at home? Yes     Is the pt/caregiver preference to resume services with current agency Yes     Do you take prescription medications? Yes     Do you have prescription coverage? Yes     Do you have any problems affording any of your prescribed medications? No     Is the patient taking medications as prescribed? yes     Who is going to help you get home at discharge? sister     How do you get to doctors appointments? family or friend will provide     Are you on dialysis? No     Do you take coumadin? No     DME Needed Upon Discharge  none     Discharge Plan discussed with: Patient     Transition of Care Barriers None     Discharge Plan A Home Health     Discharge Plan B Home with family

## 2023-11-21 NOTE — SUBJECTIVE & OBJECTIVE
Interval History: BC 2/2 positive for enterococcus on rapid ID. Continuing IV ampicillin. Repeat cultures pending. TTE ordered. Patient will need transfer to facility with ID, Ortho, Pulmonlogy. Joint aspiration completed today with radiology. Studies pending.    Worsening O2 this am. On 5L LFNC. COVID and flu negative. Scheduled breathing treatments with some improvement. Continued patients home chronic steroids    Patient admitted today that he drinks 6 tall boy beers per day during the week. And possibly 1-2 cases beer on the weekend. Having mild tremor. Started on PO valium taper with PRN ativan. CIWA's ordered. Patient denies any hx of withdrawal.    Review of Systems  Objective:     Vital Signs (Most Recent):  Temp: 97.7 °F (36.5 °C) (11/21/23 0730)  Pulse: 91 (11/21/23 1500)  Resp: 20 (11/21/23 1500)  BP: (!) 161/77 (11/21/23 0730)  SpO2: 95 % (11/21/23 1500) Vital Signs (24h Range):  Temp:  [97.7 °F (36.5 °C)-97.9 °F (36.6 °C)] 97.7 °F (36.5 °C)  Pulse:  [84-99] 91  Resp:  [20-26] 20  SpO2:  [89 %-96 %] 95 %  BP: (159-168)/(77-87) 161/77     Weight: 86.2 kg (190 lb)  Body mass index is 22.53 kg/m².    Intake/Output Summary (Last 24 hours) at 11/21/2023 1607  Last data filed at 11/21/2023 1047  Gross per 24 hour   Intake 240 ml   Output 350 ml   Net -110 ml         Physical Exam       Vitals reviewed  General: NAD, Well developed, Well Nourished  Head: NC/AT  Eyes: EOMI, MELISSA  Cardiovascular: Pulses intact distally, Regular Rate  Pulmonary: Normal Respiratory Rate, No respiratory distress  Gi: Soft, Non-tender  Extremities: Warm, Swelling in right knee. No redness present. Not warm to touch. Pain with flexion.  Skin: Warm, dry  Neuro: Alert, Oriented x3, No focal Deficit  Psych: Appropriate mood and affect       Significant Labs: All pertinent labs within the past 24 hours have been reviewed.    Significant Imaging: I have reviewed all pertinent imaging results/findings within the past 24 hours.

## 2023-11-21 NOTE — PROVIDER TRANSFER
Outside Transfer Acceptance Note / Regional Referral Center    Referring facility: [facility]   Referring provider: SARMAD AGUILA TIMOTHY K.  Accepting facility: [facility]  Accepting provider: MELINDA ETIENNE  Admitting provider: NARCISO HOLLOWAY provider  Reason for transfer: Higher Level of Care   Transfer diagnosis: R Knee Septic Arthritis  Transfer specialty requested: Orthopedic Surgery  Infectious Diseases  Transfer specialty notified: Yes  Transfer level: NUMBER 1-5: 2  Bed type requested: med/tele  Isolation status: No active isolations   Admission class or status: IP- Inpatient  OP- Observation      Narrative       Mr. Jama James is a 68 year old gentleman with PMH of Chronic Hypoxic Respiratory Failure on 3-4 L NC 2/2 COPD, Metastatic Prostate cancer, HTN, HLD, Afib on eliquis who presented to [place] ED on 11/20/23 with chief complaint of right knee pain. He initially presented to the ED on 11/15/23 with this symptom. Patient stated that his chronic knee pain has worsened in the past week and he noticed swelling as well. He denies fever, chills, NVD. Has remote hx of surgery to the knee but has no hardware. Endorsed mild redness to knee prior to coming to ED on 11/15/23. Of note, he is on 3-4L LFNC at home. He typically ambulates about 15ft each way in his home to get around in his home. He ambulates with a walker. He only leaves his house when his sister comes to get him to take him to doctors appointments. Patient told me he has widely metastatic prostate cancer.      During ED visit on 11/15/23 for his right knee pain. Patient's knee was drained and cultures were sent. Patient stated that knee pain improved a bit after the knee aspiration but he still has difficulty bearing weight on that knee. The knee joint fluid was Calcium pyrophosphate crystals  positive. Culture grew rare E. Faecalis sensitive to ampicillin. Patient  "was called to come back in for evaluation and possible washout with orthopedic surgery. He was seen in the ED and admitted for right knee septic arthritis.     On the floor, vital signs showed:   Temp: 97.7 °F (36.5 °C) (11/21/23 0730)  Pulse: 91 (11/21/23 1500)  Resp: 20 (11/21/23 1500)  BP: (!) 161/77 (11/21/23 0730)  SpO2: 95 % (11/21/23 1500) on 3 L NC    Labwork showed:    Recent Labs   Lab 11/21/23 0527   WBC 5.50   RBC 3.43*   HGB 9.5*   HCT 31.5*      MCV 92   MCH 27.7   MCHC 30.2*       Recent Labs   Lab 11/21/23 0527   CALCIUM 9.8   ALBUMIN 2.5*   PROT 7.1      K 3.7   CO2 34*   CL 98   BUN 6*   CREATININE 0.6   ALKPHOS 129   ALT 11   AST 11   BILITOT 0.3     Flu and COVID negative. Respiratory infection panel also negative. HCV Ab negative. Patient admitted to drinking 6 tall boy beers per day during the week and possibly 1-2 cases beer on the weekend. Patient currently showing mild tremor. Started on PO valium taper with PRN ativan. CIWA's ordered. Patient denies any history of withdrawal.     Orthopedic surgery evaluated the patient and determined he needed to go to OR for washout. Anesthesia evaluated patient and determined he would be high risk and might stay intubated after procedure. Decision was made by ortho and anesthesia that if patient was to go to OR he needed to be transferred to facility with pulmonology and cardiology services. The recommendation by the on-call orthopedic surgeon for the [facility] was that a washout was not urgent and septic knee could be treated with outpatient po antibiotics but patient did not feel that he would be able to ambulate effective at home due to the right knee pain.     On 11/20/23, blood culture grew GPC in chains resembling Strep and rapid organism ID by PCR was positive for Enterococcus faecalis. Repeat blood culture ordered. TTE showed "EF 55-70%, normal wall motion. Normal diastolic function, LA moderately dilated. Trivial effusion with no " "cardiac tamponade. No vegetation noted." He was continued on ampicillin based off culture sensitivities from initial joint aspiration. Joint fluid studies pending. [facility] discussed the case with Dr. Julien ([facility] Orthopedic surgery) and he agreed to consult on the patient with admission to Hospital Medicine. Patient will also require Infectious Disease consultation for bacteremia 2/2 septic arthritis. Patient will require med/tele bed.      Objective     Vitals:  see above  Recent Labs: All pertinent labs within the past 24 hours have been reviewed.  Recent imaging: see above   Airway:   3 L NC  Vent settings: Oxygen Concentration (%):  [36-40] 36       IV access:        Peripheral IV - Single Lumen 11/20/23 1455 18 G Posterior;Right Forearm (Active)   Site Assessment Clean;Dry;Intact 11/21/23 0730   Extremity Assessment Distal to IV No abnormal discoloration 11/21/23 0730   Line Status Infusing 11/21/23 0730   Dressing Status Clean;Dry;Intact 11/21/23 0730   Dressing Intervention Integrity maintained 11/21/23 0730   Dressing Change Due 11/24/23 11/21/23 0730   Site Change Due 11/24/23 11/21/23 0730   Reason Not Rotated Not due 11/20/23 2218            Peripheral IV - Single Lumen 11/20/23 1458 18 G Left;Posterior Forearm (Active)   Site Assessment Clean;Dry;Intact 11/21/23 0730   Extremity Assessment Distal to IV No abnormal discoloration 11/21/23 0730   Line Status Saline locked 11/21/23 0730   Dressing Status Clean;Dry;Intact 11/21/23 0730   Dressing Intervention Integrity maintained 11/21/23 0730   Dressing Change Due 11/24/23 11/21/23 0730   Site Change Due 11/24/23 11/21/23 0730   Reason Not Rotated Not due 11/20/23 2218     Infusions: None  Allergies: Review of patient's allergies indicates:  No Known Allergies   NPO: No    Anticoagulation:   Anticoagulants       None             Instructions      [facility]  Admit to Hospital Medicine  Upon patient arrival to floor, please contact Hospital Medicine on call. "

## 2023-11-21 NOTE — HPI
68 w/ Chronic Hypoxic Respiratory Failure on 3-4 LFNC 2/2 COPD, Metastatic Prostate cancer, HTN, HLD, Afib on eliquis presenting with right knee pain. Patient states he has chronic pain in this knee but for past 7-10 days he has had swelling and significant pain in the knee. No fever, chills, NVD. Has remote hx of surgery to the knee but has no hardware. Endorsed mild redness to knee prior to coming to ED on 11/15. During this visit ED staff drained knee and sent for cultures. Patient states he improved a good bit after this procedure but he is still having trouble bearing weight on the knee. This fluid was Calcium pyrophosphate crystals  positive. Culture grew rare E. Faecalis sensitive to ampicillin. Patient was called to come back in for evaluation and possible washout with ortho.     Ortho at this facility evaluated patient and determined he needed to go to OR for washout. Anesthesia evaluated patient and determined he would be high risk and might stay intubated after procedure. Decision was made by ortho and anesthesia that if patient was to go to OR he needed to be transferred to facility with pulmonology and cardiology services. I agreed with this recommendation. ED staff reached out to ortho through transfer line. After reviewing the chart they felt that it did not look like patient needed emergent washout and could likely be treated with outpatient PO abx with close ortho followup.    Patient not feeling like he will be able to ambulate effectively at home. I had prolonged conversation with patient about his functional status. He is on 3-4L LFNC at home. He typically ambulates about 15ft each way in his home to get around in his home. He ambulates with a walker. He only leaves his house when his sister comes to get him to take him to doctors appointments. Patient told me he has widely metastatic prostate cancer. Review of most recent Heme/onc notes confirms this.

## 2023-11-21 NOTE — PLAN OF CARE
11/20/23 1630   GAMBLE Message   Medicare Outpatient and Observation Notification regarding financial responsibility Given to patient/caregiver;Explained to patient/caregiver;Signed/date by patient/caregiver   Date GAMBLE was signed 11/20/23   Time GAMBLE was signed 1630     GAMBLE explained to pt. Pt verbalized understanding and signed form.

## 2023-11-21 NOTE — H&P
[facility] Medicine  History & Physical    Patient Name: Jama James  MRN: [id number]  Patient Class: OP- Observation  Admission Date: 11/20/2023  Attending Physician: Edu Meier MD   Primary Care Provider: Marisel Farrell III, MD         Patient information was obtained from patient, past medical records, and ER records.     Subjective:     Principal Problem:Arthritis, septic    Chief Complaint:   Chief Complaint   Patient presents with    Knee Pain    Joint Swelling     Pt reports notification from [facility] and to come to ED r/t needing IV antiobiotics r/t bacteria that was removed on the 15th. Pt  reports right knee swelling and pain that started on the 12th.         HPI: 68 w/ Chronic Hypoxic Respiratory Failure on 3-4 LFNC 2/2 COPD, Metastatic Prostate cancer, HTN, HLD, Afib on eliquis presenting with right knee pain. Patient states he has chronic pain in this knee but for past 7-10 days he has had swelling and significant pain in the knee. No fever, chills, NVD. Has remote hx of surgery to the knee but has no hardware. Endorsed mild redness to knee prior to coming to ED on 11/15. During this visit ED staff drained knee and sent for cultures. Patient states he improved a good bit after this procedure but he is still having trouble bearing weight on the knee. This fluid was Calcium pyrophosphate crystals  positive. Culture grew rare E. Faecalis sensitive to ampicillin. Patient was called to come back in for evaluation and possible washout with ortho.     Ortho at this facility evaluated patient and determined he needed to go to OR for washout. Anesthesia evaluated patient and determined he would be high risk and might stay intubated after procedure. Decision was made by ortho and anesthesia that if patient was to go to OR he needed to be transferred to facility with pulmonology and cardiology services. I agreed with this recommendation. ED staff reached out to ortho through transfer  line. After reviewing the chart they felt that it did not look like patient needed emergent washout and could likely be treated with outpatient PO abx with close ortho followup.    Patient not feeling like he will be able to ambulate effectively at home. I had prolonged conversation with patient about his functional status. He is on 3-4L LFNC at home. He typically ambulates about 15ft each way in his home to get around in his home. He ambulates with a walker. He only leaves his house when his sister comes to get him to take him to doctors appointments. Patient told me he has widely metastatic prostate cancer. Review of most recent Heme/onc notes confirms this.    Past Medical History:   Diagnosis Date    Abnormal CT scan 7/6/2023    Abnormal positron emission tomography (PET) scan 7/6/2023    Anxiety     Asbestosis 08/04/2015    Atrial fibrillation     Bilateral adrenal adenomas     COPD (chronic obstructive pulmonary disease)     COVID-19 virus infection 07/28/2022    Depression     Elevated PSA 7/6/2023    High cholesterol     HTN (hypertension)     Malignant neoplasm of prostate     Multiple lung nodules     Nodule of upper lobe of left lung 03/30/2023    6 mm spiculated on cta chest 3/21/23    On home oxygen therapy 05/24/2023    Pneumonia     Prostate cancer 5/27/2020    Prostate cancer metastatic to bone 7/6/2023    Prostate cancer metastatic to lung 7/6/2023    Ulcerative colitis        Past Surgical History:   Procedure Laterality Date    NECK SURGERY      right knee      TRANSRECTAL BIOPSY OF PROSTATE WITH ULTRASOUND GUIDANCE Bilateral 3/18/2020    Procedure: BIOPSY, PROSTATE, RECTAL APPROACH, WITH US GUIDANCE;  Surgeon: Bill Jeong MD;  Location: [facility] OR;  Service: Urology;  Laterality: Bilateral;       Review of patient's allergies indicates:  No Known Allergies    No current facility-administered medications on file prior to encounter.     Current Outpatient Medications on File Prior to Encounter    Medication Sig    famotidine (PEPCID) 40 MG tablet     ketorolac (TORADOL) 10 mg tablet     abiraterone (ZYTIGA) 250 mg Tab Take 4 tablets (1,000 mg total) by mouth once daily.    albuterol (PROVENTIL/VENTOLIN HFA) 90 mcg/actuation inhaler Rescue    albuterol-ipratropium (DUO-NEB) 2.5 mg-0.5 mg/3 mL nebulizer solution PLACE ONE (1) VIAL IN NEBULIZER AND INHALE EVERY 6 HOURS AS DIRECTED AS NEEDED FOR WHEEZING    amiodarone (PACERONE) 200 MG Tab TAKE ONE (1) TABLET ONCE DAILY FOR HEART RYHTHM    amlodipine-benazepril 5-20 mg (LOTREL) 5-20 mg per capsule   90 cap, 0 Refill(s)    apixaban (ELIQUIS) 5 mg Tab Take 1 tablet (5 mg total) by mouth 2 (two) times daily.    aspirin (ECOTRIN) 81 MG EC tablet Take 1 tablet (81 mg total) by mouth once daily.    atorvastatin (LIPITOR) 10 MG tablet TAKE 1 TABLET AT BEDTIME FOR CHOLESTEROL (TAKE WITH CO-ENZYME Q-10)    bicalutamide (CASODEX) 50 MG Tab Take 1 tablet (50 mg total) by mouth once daily.    budesonide-glycopyr-formoterol (BREZTRI AEROSPHERE) 160-9-4.8 mcg/actuation HFAA Inhale 2 puffs into the lungs 2 (two) times a day.    diltiaZEM (CARDIZEM) 30 MG tablet Take 1 tablet (30 mg total) by mouth every 12 (twelve) hours.    ferrous sulfate (FEOSOL) 325 mg (65 mg iron) Tab tablet Take 1 tablet (325 mg total) by mouth every Mon, Wed, Fri.    finasteride (PROSCAR) 5 mg tablet TAKE ONE (1) TABLET EVERY MORNING FOR PROSTATE    levoFLOXacin (LEVAQUIN) 750 MG tablet Take 1 tablet (750 mg total) by mouth once daily.    losartan (COZAAR) 50 MG tablet Take 1 tablet (50 mg total) by mouth 2 (two) times a day. (Patient taking differently: Take 50 mg by mouth once daily.)    methocarbamoL (ROBAXIN) 750 MG Tab Take 1 tablet (750 mg total) by mouth 2 (two) times daily as needed (muscle pain).    OXYGEN-AIR DELIVERY SYSTEMS MISC by [facility]Non-Drug; Combo Route) route.    pantoprazole (PROTONIX) 40 MG tablet TAKE ONE (1) TABLET EVERY MORNING (30 MINUTES BEFORE BREAKFAST) FOR STOMACH     paroxetine (PAXIL) 40 MG tablet TAKE ONE (1) TABLET EVERY MORNING FOR MOOD AND NERVE PAIN    predniSONE (DELTASONE) 5 MG tablet Take 1 tablet (5 mg total) by mouth once daily.    tamsulosin (FLOMAX) 0.4 mg Cap Take 1 capsule (0.4 mg total) by mouth once daily.    traMADoL (ULTRAM) 50 mg tablet Take 1-2 tablets every 8 hours as needed for pain    traZODone (DESYREL) 100 MG tablet TAKE ONE (1) TABLET AT BEDTIME FOR SLEEP AND NERVE PAIN    triamcinolone acetonide 0.1% (KENALOG) 0.1 % cream Apply topically 2 (two) times daily.     Family History       Problem Relation (Age of Onset)    Cancer Mother    Diabetes Mother, Sister          Tobacco Use    Smoking status: Every Day     Current packs/day: 0.50     Average packs/day: 0.5 packs/day for 50.2 years (25.1 ttl pk-yrs)     Types: Cigarettes     Start date: 9/7/1973     Passive exposure: Past    Smokeless tobacco: Never   Substance and Sexual Activity    Alcohol use: Yes     Comment: 8 16oz beers per day    Drug use: No    Sexual activity: Not Currently     Review of Systems   All other systems reviewed and are negative.    Objective:     Vital Signs (Most Recent):  Temp: 98.4 °F (36.9 °C) (11/20/23 1521)  Pulse: 97 (11/20/23 1447)  Resp: 20 (11/20/23 1833)  BP: (!) 168/87 (11/20/23 1833)  SpO2: (!) 92 % (11/20/23 1833) Vital Signs (24h Range):  Temp:  [98.4 °F (36.9 °C)] 98.4 °F (36.9 °C)  Pulse:  [97] 97  Resp:  [18-20] 20  SpO2:  [86 %-96 %] 92 %  BP: (140-170)/(76-87) 168/87     Weight: 86.2 kg (190 lb)  Body mass index is 22.53 kg/m².     Physical Exam   Vitals reviewed  General: NAD, Well developed, Well Nourished  Head: NC/AT  Eyes: EOMI, MELISSA  Cardiovascular: Pulses intact distally, Regular Rate  Pulmonary: Normal Respiratory Rate, No respiratory distress  Gi: Soft, Non-tender  Extremities: Warm, Swelling in right knee. No redness present. Not warm to touch. Pain with flexion.  Skin: Warm, dry  Neuro: Alert, Oriented x3, No focal Deficit  Psych: Appropriate  mood and affect          Significant Labs: All pertinent labs within the past 24 hours have been reviewed.    Significant Imaging: I have reviewed all pertinent imaging results/findings within the past 24 hours.  Assessment/Plan:     * Arthritis, septic  Patient is very clinically stable currently. Per ortho at outside facility where patient would be transferred to they would likely not do a joint washout currently. Per ED staff they did agree that if he needs surgery that if patient needs surgery it is not a bad idea to move him to a facility with pulmonology given hx of severe COPD.    RCRI- 3.9% risk of death,MI, Cardiac arrest in next 30 days  IZAGUIRRE- 1/2 % risk of MI or Cardiac arrest intra-operatively or up to 30 days post-op    Patient appears very high risk for remaining intubated post-procedure. He is on 4-5L LFNC saturating 88-90% on pulse ox. He states that he is at his baseline from a respiratory standpoint. His baseline functional status is poor.    E faecalis is a very uncommon cause of septic arthritis. Patient has no hardware in the knee. He is not clinically septic.   F/U blood cultures  Plan will be to start ampicillin based off culture sensitivities from initial joint aspiration  NPO MN for joint aspiration in am  Cell count, cultures, and evaluation for crystals  Will ask for ortho to help evaluate fluid studies  PT/OT tomorrow      Benign localized prostatic hyperplasia with lower urinary tract symptoms (LUTS)  Continue flomax and finasteride      Prostate cancer metastatic to bone  Continue home abiraterone and prednisone  F/U with oncology outpatient    On home oxygen therapy  Currently maintaining O2 saturations>88% on home O2 regimen  Continuous pulse ox for tonight      PAF (paroxysmal atrial fibrillation)  Continue home amiodarone and diltiazem   Switch to lovenox since patient will possibly be having procedures in next few days  Mary-operative ECG ordered    COPD (chronic obstructive  pulmonary disease)  Patient states he is at baseline respiratory status  Continue home inhalers  PRN nebulizers    Hyperlipidemia  Continue statin      Tobacco abuse  Counseled on cessation  Nicotine patch prn      Anxiety  Continue home medications      HTN (hypertension)  Continue home medications      VTE Risk Mitigation (From admission, onward)           Ordered     enoxaparin injection 90 mg  Every 12 hours         11/20/23 1744     IP VTE HIGH RISK PATIENT  Once         11/20/23 1744     Place sequential compression device  Until discontinued         11/20/23 1744     Reason for No Pharmacological VTE Prophylaxis  Once        Question:  Reasons:  Answer:  Already adequately anticoagulated on oral Anticoagulants    11/20/23 1744                       On 11/20/2023, patient should be placed in hospital observation services under my care.             Edu Meier MD  Department of Hospital Medicine  [place] - Emergency Dept

## 2023-11-21 NOTE — ASSESSMENT & PLAN NOTE
Medicare Screening Checklist:    Screenings  Labs look good from Dr. Gregg      Mammogram no need to continue  Colonoscopy no need to continue   Bone Density  bone density next year  Prolia September 2023    Advanced Directives  Healthcare Power of   --daughter    Vaccinations  Get a flu shot annually  Pneumococcus (pneumonia): up to date   Shingles: up to date   CoVID booster, would recommend another booster   Can get at your pharmacy     See me next year        Patient is very clinically stable currently. Per ortho at outside facility where patient would be transferred to they would likely not do a joint washout currently. Per ED staff they did agree that if he needs surgery that if patient needs surgery it is not a bad idea to move him to a facility with pulmonology given hx of severe COPD.    RCRI- 3.9% risk of death,MI, Cardiac arrest in next 30 days  IZAGUIRRE- 1/2 % risk of MI or Cardiac arrest intra-operatively or up to 30 days post-op    Patient appears very high risk for remaining intubated post-procedure. He is on 4-5L LFNC saturating 88-90% on pulse ox. Today patient states his breathing is not at baseline this am. Improved after scheduling breathing treatments. States he takes 4 per day at home. His baseline functional status is poor.      BC 2/2 with Enterococcus- Repeat pending  TTE ordered  Continue ampicillin based off culture sensitivities from initial joint aspiration  Joint aspiration completed  F/U Cell count, cultures, and evaluation for crystals  Will need to be transferred for ID and ortho

## 2023-11-21 NOTE — PLAN OF CARE
Problem: Gas Exchange Impaired  Goal: Optimal Gas Exchange  Outcome: Ongoing, Progressing  Intervention: Optimize Oxygenation and Ventilation  Flowsheets (Taken 11/21/2023 3186)  Airway/Ventilation Management: airway patency maintained  Head of Bed (HOB) Positioning: HOB elevated   Patient in no apparent distress. Patient on nasal cannula 4-5 lpm. He wears 4 lpm at home. Aerosol treatments changed to Q 4 wa. IS and aerobika started. Breo daily. Will continue to monitor.

## 2023-11-21 NOTE — PROGRESS NOTES
[facility] Medicine  Progress Note    Patient Name: Jama James  MRN: [id number]  Patient Class: OP- Observation   Admission Date: 11/20/2023  Length of Stay: 0 days  Attending Physician: Edu Meier MD  Primary Care Provider: Marisel Farrell III, MD        Subjective:     Principal Problem:Arthritis, septic        HPI:  68 w/ Chronic Hypoxic Respiratory Failure on 3-4 LFNC 2/2 COPD, Metastatic Prostate cancer, HTN, HLD, Afib on eliquis presenting with right knee pain. Patient states he has chronic pain in this knee but for past 7-10 days he has had swelling and significant pain in the knee. No fever, chills, NVD. Has remote hx of surgery to the knee but has no hardware. Endorsed mild redness to knee prior to coming to ED on 11/15. During this visit ED staff drained knee and sent for cultures. Patient states he improved a good bit after this procedure but he is still having trouble bearing weight on the knee. This fluid was Calcium pyrophosphate crystals  positive. Culture grew rare E. Faecalis sensitive to ampicillin. Patient was called to come back in for evaluation and possible washout with ortho.     Ortho at this facility evaluated patient and determined he needed to go to OR for washout. Anesthesia evaluated patient and determined he would be high risk and might stay intubated after procedure. Decision was made by ortho and anesthesia that if patient was to go to OR he needed to be transferred to facility with pulmonology and cardiology services. I agreed with this recommendation. ED staff reached out to ortho through transfer line. After reviewing the chart they felt that it did not look like patient needed emergent washout and could likely be treated with outpatient PO abx with close ortho followup.    Patient not feeling like he will be able to ambulate effectively at home. I had prolonged conversation with patient about his functional status. He is on 3-4L LFNC at home. He  typically ambulates about 15ft each way in his home to get around in his home. He ambulates with a walker. He only leaves his house when his sister comes to get him to take him to doctors appointments. Patient told me he has widely metastatic prostate cancer. Review of most recent Heme/onc notes confirms this.    Overview/Hospital Course:  No notes on file    Interval History: BC 2/2 positive for enterococcus on rapid ID. Continuing IV ampicillin. Repeat cultures pending. TTE ordered. Patient will need transfer to facility with ID, Ortho, Pulmonlogy. Joint aspiration completed today with radiology. Studies pending.    Worsening O2 this am. On 5L LFNC. COVID and flu negative. Scheduled breathing treatments with some improvement. Continued patients home chronic steroids    Patient admitted today that he drinks 6 tall boy beers per day during the week. And possibly 1-2 cases beer on the weekend. Having mild tremor. Started on PO valium taper with PRN ativan. CIWA's ordered. Patient denies any hx of withdrawal.    Review of Systems  Objective:     Vital Signs (Most Recent):  Temp: 97.7 °F (36.5 °C) (11/21/23 0730)  Pulse: 91 (11/21/23 1500)  Resp: 20 (11/21/23 1500)  BP: (!) 161/77 (11/21/23 0730)  SpO2: 95 % (11/21/23 1500) Vital Signs (24h Range):  Temp:  [97.7 °F (36.5 °C)-97.9 °F (36.6 °C)] 97.7 °F (36.5 °C)  Pulse:  [84-99] 91  Resp:  [20-26] 20  SpO2:  [89 %-96 %] 95 %  BP: (159-168)/(77-87) 161/77     Weight: 86.2 kg (190 lb)  Body mass index is 22.53 kg/m².    Intake/Output Summary (Last 24 hours) at 11/21/2023 1607  Last data filed at 11/21/2023 1047  Gross per 24 hour   Intake 240 ml   Output 350 ml   Net -110 ml         Physical Exam       Vitals reviewed  General: NAD, Well developed, Well Nourished  Head: NC/AT  Eyes: EOMI, MELISSA  Cardiovascular: Pulses intact distally, Regular Rate  Pulmonary: Normal Respiratory Rate, No respiratory distress  Gi: Soft, Non-tender  Extremities: Warm, Swelling in right knee.  No redness present. Not warm to touch. Pain with flexion.  Skin: Warm, dry  Neuro: Alert, Oriented x3, No focal Deficit  Psych: Appropriate mood and affect       Significant Labs: All pertinent labs within the past 24 hours have been reviewed.    Significant Imaging: I have reviewed all pertinent imaging results/findings within the past 24 hours.    Assessment/Plan:      * Arthritis, septic  Patient is very clinically stable currently. Per ortho at outside facility where patient would be transferred to they would likely not do a joint washout currently. Per ED staff they did agree that if he needs surgery that if patient needs surgery it is not a bad idea to move him to a facility with pulmonology given hx of severe COPD.    RCRI- 3.9% risk of death,MI, Cardiac arrest in next 30 days  IZAGUIRRE- 1/2 % risk of MI or Cardiac arrest intra-operatively or up to 30 days post-op    Patient appears very high risk for remaining intubated post-procedure. He is on 4-5L LFNC saturating 88-90% on pulse ox. Today patient states his breathing is not at baseline this am. Improved after scheduling breathing treatments. States he takes 4 per day at home. His baseline functional status is poor.      BC 2/2 with Enterococcus- Repeat pending  TTE ordered  Continue ampicillin based off culture sensitivities from initial joint aspiration  Joint aspiration completed  F/U Cell count, cultures, and evaluation for crystals  Will need to be transferred for ID and ortho      Benign localized prostatic hyperplasia with lower urinary tract symptoms (LUTS)  Continue flomax and finasteride      Prostate cancer metastatic to bone  Continue home abiraterone and prednisone  F/U with oncology outpatient    On home oxygen therapy  Currently maintaining O2 saturations>88% on 5L LFNC. On 2-4L at home  Continuous pulse ox for tonight      PAF (paroxysmal atrial fibrillation)  Continue home amiodarone and diltiazem   Switch to lovenox since patient will  possibly be having procedures in next few days  Mary-operative ECG completed    COPD (chronic obstructive pulmonary disease)  Continue home inhalers  Scheduled nebs  COVID and flu negative  Continue home PO steroids    Hyperlipidemia  Continue statin      Tobacco abuse  Counseled on cessation  Nicotine patch prn      Anxiety  Continue home medications      HTN (hypertension)  Continue home medications      VTE Risk Mitigation (From admission, onward)           Ordered     enoxaparin injection 90 mg  Every 12 hours         11/20/23 1744     IP VTE HIGH RISK PATIENT  Once         11/20/23 1744     Place sequential compression device  Until discontinued         11/20/23 1744     Reason for No Pharmacological VTE Prophylaxis  Once        Question:  Reasons:  Answer:  Already adequately anticoagulated on oral Anticoagulants    11/20/23 1744                    Discharge Planning   CLEMENTINA:      Code Status: Full Code   Is the patient medically ready for discharge?:     Reason for patient still in hospital (select all that apply): Treatment  Discharge Plan A: Home Health                  Edu Meier MD  Department of Hospital Medicine   [facility] Emergency Dept

## 2023-11-21 NOTE — ASSESSMENT & PLAN NOTE
Continue home amiodarone and diltiazem   Switch to lovenox since patient will possibly be having procedures in next few days  Mary-operative ECG ordered

## 2023-11-21 NOTE — ASSESSMENT & PLAN NOTE
Patient is very clinically stable currently. Per ortho at outside facility where patient would be transferred to they would likely not do a joint washout currently. Per ED staff they did agree that if he needs surgery that if patient needs surgery it is not a bad idea to move him to a facility with pulmonology given hx of severe COPD.    RCRI- 3.9% risk of death,MI, Cardiac arrest in next 30 days  IZAGUIRRE- 1/2 % risk of MI or Cardiac arrest intra-operatively or up to 30 days post-op    Patient appears very high risk for remaining intubated post-procedure. He is on 4-5L LFNC saturating 88-90% on pulse ox. He states that he is at his baseline from a respiratory standpoint. His baseline functional status is poor.    E faecalis is a very uncommon cause of septic arthritis. Patient has no hardware in the knee. He is not clinically septic.   F/U blood cultures  Plan will be to start ampicillin based off culture sensitivities from initial joint aspiration  NPO MN for joint aspiration in am  Cell count, cultures, and evaluation for crystals  Will ask for ortho to help evaluate fluid studies  PT/OT tomorrow

## 2023-11-21 NOTE — ASSESSMENT & PLAN NOTE
Continue home amiodarone and diltiazem   Switch to lovenox since patient will possibly be having procedures in next few days  Mary-operative ECG completed

## 2023-11-21 NOTE — SUBJECTIVE & OBJECTIVE
Past Medical History:   Diagnosis Date    Abnormal CT scan 7/6/2023    Abnormal positron emission tomography (PET) scan 7/6/2023    Anxiety     Asbestosis 08/04/2015    Atrial fibrillation     Bilateral adrenal adenomas     COPD (chronic obstructive pulmonary disease)     COVID-19 virus infection 07/28/2022    Depression     Elevated PSA 7/6/2023    High cholesterol     HTN (hypertension)     Malignant neoplasm of prostate     Multiple lung nodules     Nodule of upper lobe of left lung 03/30/2023    6 mm spiculated on cta chest 3/21/23    On home oxygen therapy 05/24/2023    Pneumonia     Prostate cancer 5/27/2020    Prostate cancer metastatic to bone 7/6/2023    Prostate cancer metastatic to lung 7/6/2023    Ulcerative colitis        Past Surgical History:   Procedure Laterality Date    NECK SURGERY      right knee      TRANSRECTAL BIOPSY OF PROSTATE WITH ULTRASOUND GUIDANCE Bilateral 3/18/2020    Procedure: BIOPSY, PROSTATE, RECTAL APPROACH, WITH US GUIDANCE;  Surgeon: Bill Jeong MD;  Location: [facility];  Service: Urology;  Laterality: Bilateral;       Review of patient's allergies indicates:  No Known Allergies    No current facility-administered medications on file prior to encounter.     Current Outpatient Medications on File Prior to Encounter   Medication Sig    famotidine (PEPCID) 40 MG tablet     ketorolac (TORADOL) 10 mg tablet     abiraterone (ZYTIGA) 250 mg Tab Take 4 tablets (1,000 mg total) by mouth once daily.    albuterol (PROVENTIL/VENTOLIN HFA) 90 mcg/actuation inhaler Rescue    albuterol-ipratropium (DUO-NEB) 2.5 mg-0.5 mg/3 mL nebulizer solution PLACE ONE (1) VIAL IN NEBULIZER AND INHALE EVERY 6 HOURS AS DIRECTED AS NEEDED FOR WHEEZING    amiodarone (PACERONE) 200 MG Tab TAKE ONE (1) TABLET ONCE DAILY FOR HEART RYHTHM    amlodipine-benazepril 5-20 mg (LOTREL) 5-20 mg per capsule   90 cap, 0 Refill(s)    apixaban (ELIQUIS) 5 mg Tab Take 1 tablet (5 mg total) by mouth 2 (two) times daily.     aspirin (ECOTRIN) 81 MG EC tablet Take 1 tablet (81 mg total) by mouth once daily.    atorvastatin (LIPITOR) 10 MG tablet TAKE 1 TABLET AT BEDTIME FOR CHOLESTEROL (TAKE WITH CO-ENZYME Q-10)    bicalutamide (CASODEX) 50 MG Tab Take 1 tablet (50 mg total) by mouth once daily.    budesonide-glycopyr-formoterol (BREZTRI AEROSPHERE) 160-9-4.8 mcg/actuation HFAA Inhale 2 puffs into the lungs 2 (two) times a day.    diltiaZEM (CARDIZEM) 30 MG tablet Take 1 tablet (30 mg total) by mouth every 12 (twelve) hours.    ferrous sulfate (FEOSOL) 325 mg (65 mg iron) Tab tablet Take 1 tablet (325 mg total) by mouth every Mon, Wed, Fri.    finasteride (PROSCAR) 5 mg tablet TAKE ONE (1) TABLET EVERY MORNING FOR PROSTATE    levoFLOXacin (LEVAQUIN) 750 MG tablet Take 1 tablet (750 mg total) by mouth once daily.    losartan (COZAAR) 50 MG tablet Take 1 tablet (50 mg total) by mouth 2 (two) times a day. (Patient taking differently: Take 50 mg by mouth once daily.)    methocarbamoL (ROBAXIN) 750 MG Tab Take 1 tablet (750 mg total) by mouth 2 (two) times daily as needed (muscle pain).    OXYGEN-AIR DELIVERY SYSTEMS MISC by [facility] [place]Non-Drug; Combo Route) route.    pantoprazole (PROTONIX) 40 MG tablet TAKE ONE (1) TABLET EVERY MORNING (30 MINUTES BEFORE BREAKFAST) FOR STOMACH    paroxetine (PAXIL) 40 MG tablet TAKE ONE (1) TABLET EVERY MORNING FOR MOOD AND NERVE PAIN    predniSONE (DELTASONE) 5 MG tablet Take 1 tablet (5 mg total) by mouth once daily.    tamsulosin (FLOMAX) 0.4 mg Cap Take 1 capsule (0.4 mg total) by mouth once daily.    traMADoL (ULTRAM) 50 mg tablet Take 1-2 tablets every 8 hours as needed for pain    traZODone (DESYREL) 100 MG tablet TAKE ONE (1) TABLET AT BEDTIME FOR SLEEP AND NERVE PAIN    triamcinolone acetonide 0.1% (KENALOG) 0.1 % cream Apply topically 2 (two) times daily.     Family History       Problem Relation (Age of Onset)    Cancer Mother    Diabetes Mother, Sister          Tobacco Use    Smoking status: Every  Day     Current packs/day: 0.50     Average packs/day: 0.5 packs/day for 50.2 years (25.1 ttl pk-yrs)     Types: Cigarettes     Start date: 9/7/1973     Passive exposure: Past    Smokeless tobacco: Never   Substance and Sexual Activity    Alcohol use: Yes     Comment: 8 16oz beers per day    Drug use: No    Sexual activity: Not Currently     Review of Systems   All other systems reviewed and are negative.    Objective:     Vital Signs (Most Recent):  Temp: 98.4 °F (36.9 °C) (11/20/23 1521)  Pulse: 97 (11/20/23 1447)  Resp: 20 (11/20/23 1833)  BP: (!) 168/87 (11/20/23 1833)  SpO2: (!) 92 % (11/20/23 1833) Vital Signs (24h Range):  Temp:  [98.4 °F (36.9 °C)] 98.4 °F (36.9 °C)  Pulse:  [97] 97  Resp:  [18-20] 20  SpO2:  [86 %-96 %] 92 %  BP: (140-170)/(76-87) 168/87     Weight: 86.2 kg (190 lb)  Body mass index is 22.53 kg/m².     Physical Exam   Vitals reviewed  General: NAD, Well developed, Well Nourished  Head: NC/AT  Eyes: EOMI, MELISSA  Cardiovascular: Pulses intact distally, Regular Rate  Pulmonary: Normal Respiratory Rate, No respiratory distress  Gi: Soft, Non-tender  Extremities: Warm, Swelling in right knee. No redness present. Not warm to touch. Pain with flexion.  Skin: Warm, dry  Neuro: Alert, Oriented x3, No focal Deficit  Psych: Appropriate mood and affect          Significant Labs: All pertinent labs within the past 24 hours have been reviewed.    Significant Imaging: I have reviewed all pertinent imaging results/findings within the past 24 hours.

## 2023-11-21 NOTE — ASSESSMENT & PLAN NOTE
Currently maintaining O2 saturations>88% on 5L LFNC. On 2-4L at home  Continuous pulse ox for tonight

## 2023-11-21 NOTE — NURSING
70ml cloudy yellow fluid removed from right knee. Specimens collected and brought to lab. Pt tolerated well.   
70ml slightly cloudy yellow drainage removed from right knee total.  Specimens collected and broght to lab.  Patient tolerated well.   
No

## 2023-11-22 ENCOUNTER — ANESTHESIA (OUTPATIENT)
Dept: SURGERY | Facility: HOSPITAL | Age: 68
DRG: 853 | End: 2023-11-22
Payer: MEDICARE

## 2023-11-22 ENCOUNTER — ANESTHESIA EVENT (OUTPATIENT)
Dept: SURGERY | Facility: HOSPITAL | Age: 68
DRG: 853 | End: 2023-11-22
Payer: MEDICARE

## 2023-11-22 PROBLEM — M11.20 CALCIUM PYROPHOSPHATE DEPOSITION DISEASE (CPPD): Status: ACTIVE | Noted: 2023-11-22

## 2023-11-22 LAB
ALBUMIN SERPL BCP-MCNC: 3.3 G/DL (ref 3.5–5.2)
ALP SERPL-CCNC: 107 U/L (ref 55–135)
ALT SERPL W/O P-5'-P-CCNC: 9 U/L (ref 10–44)
ANION GAP SERPL CALC-SCNC: 2 MMOL/L (ref 8–16)
APPEARANCE FLD: NORMAL
AST SERPL-CCNC: 11 U/L (ref 10–40)
BASOPHILS NFR FLD MANUAL: 0 %
BILIRUB SERPL-MCNC: 0.3 MG/DL (ref 0.1–1)
BODY FLD TYPE: NORMAL
BODY FLD TYPE: NORMAL
BODY FLUID COMMENTS: NORMAL
BUN SERPL-MCNC: 7 MG/DL (ref 8–23)
CALCIUM SERPL-MCNC: 9.5 MG/DL (ref 8.7–10.5)
CHLORIDE SERPL-SCNC: 97 MMOL/L (ref 95–110)
CO2 SERPL-SCNC: 40 MMOL/L (ref 23–29)
COLOR FLD: YELLOW
CREAT SERPL-MCNC: 0.5 MG/DL (ref 0.5–1.4)
CRP SERPL-MCNC: 231.5 MG/L (ref 0–8.2)
CRYSTALS FLD MICRO: NEGATIVE
EOSINOPHIL NFR FLD MANUAL: 0 %
ERYTHROCYTE [DISTWIDTH] IN BLOOD BY AUTOMATED COUNT: 14.1 % (ref 11.5–14.5)
ERYTHROCYTE [SEDIMENTATION RATE] IN BLOOD BY WESTERGREN METHOD: 79 MM/HR (ref 0–10)
EST. GFR  (NO RACE VARIABLE): >60 ML/MIN/1.73 M^2
GLUCOSE SERPL-MCNC: 171 MG/DL (ref 70–110)
HCT VFR BLD AUTO: 32.6 % (ref 40–54)
HGB BLD-MCNC: 9.9 G/DL (ref 14–18)
INR PPP: 0.9 (ref 0.8–1.2)
LYMPHOCYTES NFR FLD MANUAL: 3 %
MCH RBC QN AUTO: 28 PG (ref 27–31)
MCHC RBC AUTO-ENTMCNC: 30.4 G/DL (ref 32–36)
MCV RBC AUTO: 92 FL (ref 82–98)
MESOTHL CELL NFR FLD MANUAL: 0 %
MONOS+MACROS NFR FLD MANUAL: 3 %
NEUTROPHILS NFR FLD MANUAL: 94 %
OTHER CELLS FLD MANUAL: 0 %
PATH INTERP FLD-IMP: NORMAL
PLATELET # BLD AUTO: 432 K/UL (ref 150–450)
PMV BLD AUTO: 9.7 FL (ref 9.2–12.9)
POTASSIUM SERPL-SCNC: 3.2 MMOL/L (ref 3.5–5.1)
PROCALCITONIN SERPL IA-MCNC: 0.14 NG/ML (ref 0–0.5)
PROT SERPL-MCNC: 6.9 G/DL (ref 6–8.4)
PROTHROMBIN TIME: 10.4 SEC (ref 9–12.5)
RBC # BLD AUTO: 3.53 M/UL (ref 4.6–6.2)
SODIUM SERPL-SCNC: 139 MMOL/L (ref 136–145)
WBC # BLD AUTO: 6.52 K/UL (ref 3.9–12.7)
WBC # FLD: NORMAL /CU MM

## 2023-11-22 PROCEDURE — 87186 SC STD MICRODIL/AGAR DIL: CPT | Performed by: ORTHOPAEDIC SURGERY

## 2023-11-22 PROCEDURE — 87116 MYCOBACTERIA CULTURE: CPT | Performed by: ORTHOPAEDIC SURGERY

## 2023-11-22 PROCEDURE — 85610 PROTHROMBIN TIME: CPT | Performed by: INTERNAL MEDICINE

## 2023-11-22 PROCEDURE — 63600175 PHARM REV CODE 636 W HCPCS: Mod: UD | Performed by: INTERNAL MEDICINE

## 2023-11-22 PROCEDURE — 25000003 PHARM REV CODE 250: Performed by: NURSE ANESTHETIST, CERTIFIED REGISTERED

## 2023-11-22 PROCEDURE — 27000675 HC TUBING MICRODRIP: Performed by: ANESTHESIOLOGY

## 2023-11-22 PROCEDURE — 71000039 HC RECOVERY, EACH ADD'L HOUR: Performed by: ORTHOPAEDIC SURGERY

## 2023-11-22 PROCEDURE — 25000003 PHARM REV CODE 250: Performed by: ORTHOPAEDIC SURGERY

## 2023-11-22 PROCEDURE — 85651 RBC SED RATE NONAUTOMATED: CPT | Performed by: INTERNAL MEDICINE

## 2023-11-22 PROCEDURE — 29871 PR KNEE SCOPE,CLEAN/DRAIN: ICD-10-PCS | Mod: RT,,, | Performed by: ORTHOPAEDIC SURGERY

## 2023-11-22 PROCEDURE — 36000710: Performed by: ORTHOPAEDIC SURGERY

## 2023-11-22 PROCEDURE — 37000009 HC ANESTHESIA EA ADD 15 MINS: Performed by: ORTHOPAEDIC SURGERY

## 2023-11-22 PROCEDURE — 85027 COMPLETE CBC AUTOMATED: CPT | Performed by: INTERNAL MEDICINE

## 2023-11-22 PROCEDURE — 25000003 PHARM REV CODE 250: Performed by: HOSPITALIST

## 2023-11-22 PROCEDURE — D9220A PRA ANESTHESIA: Mod: ANES,,, | Performed by: ANESTHESIOLOGY

## 2023-11-22 PROCEDURE — 99223 PR INITIAL HOSPITAL CARE,LEVL III: ICD-10-PCS | Mod: ,,, | Performed by: STUDENT IN AN ORGANIZED HEALTH CARE EDUCATION/TRAINING PROGRAM

## 2023-11-22 PROCEDURE — 27201423 OPTIME MED/SURG SUP & DEVICES STERILE SUPPLY: Performed by: ORTHOPAEDIC SURGERY

## 2023-11-22 PROCEDURE — 99223 PR INITIAL HOSPITAL CARE,LEVL III: ICD-10-PCS | Mod: 57,,, | Performed by: ORTHOPAEDIC SURGERY

## 2023-11-22 PROCEDURE — 94761 N-INVAS EAR/PLS OXIMETRY MLT: CPT

## 2023-11-22 PROCEDURE — 27000671 HC TUBING MICROBORE EXT: Performed by: ANESTHESIOLOGY

## 2023-11-22 PROCEDURE — 99900035 HC TECH TIME PER 15 MIN (STAT)

## 2023-11-22 PROCEDURE — 36415 COLL VENOUS BLD VENIPUNCTURE: CPT | Performed by: INTERNAL MEDICINE

## 2023-11-22 PROCEDURE — D9220A PRA ANESTHESIA: ICD-10-PCS | Mod: ANES,,, | Performed by: ANESTHESIOLOGY

## 2023-11-22 PROCEDURE — 87075 CULTR BACTERIA EXCEPT BLOOD: CPT | Performed by: ORTHOPAEDIC SURGERY

## 2023-11-22 PROCEDURE — 87102 FUNGUS ISOLATION CULTURE: CPT | Performed by: ORTHOPAEDIC SURGERY

## 2023-11-22 PROCEDURE — 87205 SMEAR GRAM STAIN: CPT | Performed by: ORTHOPAEDIC SURGERY

## 2023-11-22 PROCEDURE — 29871 ARTHRS KNEE SURG FOR INFCTJ: CPT | Mod: RT,,, | Performed by: ORTHOPAEDIC SURGERY

## 2023-11-22 PROCEDURE — 36000711: Performed by: ORTHOPAEDIC SURGERY

## 2023-11-22 PROCEDURE — 27200651 HC AIRWAY, LMA: Performed by: ANESTHESIOLOGY

## 2023-11-22 PROCEDURE — 86140 C-REACTIVE PROTEIN: CPT | Performed by: INTERNAL MEDICINE

## 2023-11-22 PROCEDURE — 63600175 PHARM REV CODE 636 W HCPCS: Performed by: ORTHOPAEDIC SURGERY

## 2023-11-22 PROCEDURE — 27000673 HC TUBING BLOOD Y: Performed by: ANESTHESIOLOGY

## 2023-11-22 PROCEDURE — 87070 CULTURE OTHR SPECIMN AEROBIC: CPT | Performed by: ORTHOPAEDIC SURGERY

## 2023-11-22 PROCEDURE — 63600175 PHARM REV CODE 636 W HCPCS: Mod: UD | Performed by: NURSE ANESTHETIST, CERTIFIED REGISTERED

## 2023-11-22 PROCEDURE — 87206 SMEAR FLUORESCENT/ACID STAI: CPT | Performed by: ORTHOPAEDIC SURGERY

## 2023-11-22 PROCEDURE — D9220A PRA ANESTHESIA: ICD-10-PCS | Mod: CRNA,,, | Performed by: NURSE ANESTHETIST, CERTIFIED REGISTERED

## 2023-11-22 PROCEDURE — 25000003 PHARM REV CODE 250: Mod: UD | Performed by: INTERNAL MEDICINE

## 2023-11-22 PROCEDURE — 63600175 PHARM REV CODE 636 W HCPCS: Performed by: NURSE ANESTHETIST, CERTIFIED REGISTERED

## 2023-11-22 PROCEDURE — 12000002 HC ACUTE/MED SURGE SEMI-PRIVATE ROOM

## 2023-11-22 PROCEDURE — 80053 COMPREHEN METABOLIC PANEL: CPT | Performed by: INTERNAL MEDICINE

## 2023-11-22 PROCEDURE — 37000008 HC ANESTHESIA 1ST 15 MINUTES: Performed by: ORTHOPAEDIC SURGERY

## 2023-11-22 PROCEDURE — 25000003 PHARM REV CODE 250: Performed by: STUDENT IN AN ORGANIZED HEALTH CARE EDUCATION/TRAINING PROGRAM

## 2023-11-22 PROCEDURE — 63600175 PHARM REV CODE 636 W HCPCS: Performed by: STUDENT IN AN ORGANIZED HEALTH CARE EDUCATION/TRAINING PROGRAM

## 2023-11-22 PROCEDURE — 99223 1ST HOSP IP/OBS HIGH 75: CPT | Mod: ,,, | Performed by: STUDENT IN AN ORGANIZED HEALTH CARE EDUCATION/TRAINING PROGRAM

## 2023-11-22 PROCEDURE — 87077 CULTURE AEROBIC IDENTIFY: CPT | Performed by: ORTHOPAEDIC SURGERY

## 2023-11-22 PROCEDURE — 84145 PROCALCITONIN (PCT): CPT | Performed by: INTERNAL MEDICINE

## 2023-11-22 PROCEDURE — 25000242 PHARM REV CODE 250 ALT 637 W/ HCPCS: Performed by: ANESTHESIOLOGY

## 2023-11-22 PROCEDURE — 71000033 HC RECOVERY, INTIAL HOUR: Performed by: ORTHOPAEDIC SURGERY

## 2023-11-22 PROCEDURE — D9220A PRA ANESTHESIA: Mod: CRNA,,, | Performed by: NURSE ANESTHETIST, CERTIFIED REGISTERED

## 2023-11-22 PROCEDURE — 25000003 PHARM REV CODE 250: Mod: UD | Performed by: ORTHOPAEDIC SURGERY

## 2023-11-22 PROCEDURE — 99223 1ST HOSP IP/OBS HIGH 75: CPT | Mod: 57,,, | Performed by: ORTHOPAEDIC SURGERY

## 2023-11-22 RX ORDER — SODIUM CHLORIDE, SODIUM LACTATE, POTASSIUM CHLORIDE, CALCIUM CHLORIDE 600; 310; 30; 20 MG/100ML; MG/100ML; MG/100ML; MG/100ML
INJECTION, SOLUTION INTRAVENOUS CONTINUOUS PRN
Status: DISCONTINUED | OUTPATIENT
Start: 2023-11-22 | End: 2023-11-22

## 2023-11-22 RX ORDER — HYDROCODONE BITARTRATE AND ACETAMINOPHEN 5; 325 MG/1; MG/1
1 TABLET ORAL EVERY 4 HOURS PRN
Status: DISCONTINUED | OUTPATIENT
Start: 2023-11-22 | End: 2023-11-28

## 2023-11-22 RX ORDER — HYDROCODONE BITARTRATE AND ACETAMINOPHEN 5; 325 MG/1; MG/1
1 TABLET ORAL EVERY 6 HOURS PRN
Status: DISCONTINUED | OUTPATIENT
Start: 2023-11-22 | End: 2023-11-22 | Stop reason: SDUPTHER

## 2023-11-22 RX ORDER — ONDANSETRON 2 MG/ML
4 INJECTION INTRAMUSCULAR; INTRAVENOUS EVERY 6 HOURS PRN
Status: DISCONTINUED | OUTPATIENT
Start: 2023-11-22 | End: 2023-11-30 | Stop reason: HOSPADM

## 2023-11-22 RX ORDER — IPRATROPIUM BROMIDE AND ALBUTEROL SULFATE 2.5; .5 MG/3ML; MG/3ML
3 SOLUTION RESPIRATORY (INHALATION) ONCE
Status: COMPLETED | OUTPATIENT
Start: 2023-11-22 | End: 2023-11-22

## 2023-11-22 RX ORDER — ACETAMINOPHEN 10 MG/ML
INJECTION, SOLUTION INTRAVENOUS
Status: DISCONTINUED | OUTPATIENT
Start: 2023-11-22 | End: 2023-11-22

## 2023-11-22 RX ORDER — LIDOCAINE HYDROCHLORIDE 10 MG/ML
INJECTION, SOLUTION EPIDURAL; INFILTRATION; INTRACAUDAL; PERINEURAL
Status: DISCONTINUED | OUTPATIENT
Start: 2023-11-22 | End: 2023-11-22

## 2023-11-22 RX ORDER — KETAMINE HYDROCHLORIDE 50 MG/ML
INJECTION, SOLUTION INTRAMUSCULAR; INTRAVENOUS
Status: DISCONTINUED | OUTPATIENT
Start: 2023-11-22 | End: 2023-11-22

## 2023-11-22 RX ORDER — PROPOFOL 10 MG/ML
VIAL (ML) INTRAVENOUS
Status: DISCONTINUED | OUTPATIENT
Start: 2023-11-22 | End: 2023-11-22

## 2023-11-22 RX ORDER — CHLORHEXIDINE GLUCONATE ORAL RINSE 1.2 MG/ML
15 SOLUTION DENTAL 2 TIMES DAILY
Status: DISCONTINUED | OUTPATIENT
Start: 2023-11-22 | End: 2023-11-30 | Stop reason: HOSPADM

## 2023-11-22 RX ORDER — BUPIVACAINE HCL/EPINEPHRINE 0.25-.0005
VIAL (ML) INJECTION
Status: DISCONTINUED | OUTPATIENT
Start: 2023-11-22 | End: 2023-11-22 | Stop reason: HOSPADM

## 2023-11-22 RX ORDER — FAMOTIDINE 10 MG/ML
INJECTION INTRAVENOUS
Status: DISCONTINUED | OUTPATIENT
Start: 2023-11-22 | End: 2023-11-22

## 2023-11-22 RX ADMIN — SODIUM CHLORIDE 100 ML/HR: 450 INJECTION, SOLUTION INTRAVENOUS at 09:11

## 2023-11-22 RX ADMIN — FAMOTIDINE 20 MG: 10 INJECTION, SOLUTION INTRAVENOUS at 09:11

## 2023-11-22 RX ADMIN — AMPICILLIN 2 G: 2 INJECTION, POWDER, FOR SOLUTION INTRAVENOUS at 09:11

## 2023-11-22 RX ADMIN — KETAMINE HYDROCHLORIDE 20 MG: 50 INJECTION, SOLUTION INTRAMUSCULAR; INTRAVENOUS at 09:11

## 2023-11-22 RX ADMIN — TAMSULOSIN HYDROCHLORIDE 0.4 MG: 0.4 CAPSULE ORAL at 02:11

## 2023-11-22 RX ADMIN — SODIUM CHLORIDE: 0.9 INJECTION, SOLUTION INTRAVENOUS at 01:11

## 2023-11-22 RX ADMIN — CHLORHEXIDINE GLUCONATE 15 ML: 1.2 RINSE ORAL at 09:11

## 2023-11-22 RX ADMIN — IPRATROPIUM BROMIDE AND ALBUTEROL SULFATE 3 ML: 2.5; .5 SOLUTION RESPIRATORY (INHALATION) at 10:11

## 2023-11-22 RX ADMIN — PROPOFOL 150 MG: 10 INJECTION, EMULSION INTRAVENOUS at 09:11

## 2023-11-22 RX ADMIN — FAMOTIDINE 20 MG: 20 TABLET ORAL at 12:11

## 2023-11-22 RX ADMIN — ACETAMINOPHEN 1000 MG: 10 INJECTION, SOLUTION INTRAVENOUS at 09:11

## 2023-11-22 RX ADMIN — HYDROCODONE BITARTRATE AND ACETAMINOPHEN 1 TABLET: 5; 325 TABLET ORAL at 02:11

## 2023-11-22 RX ADMIN — DILTIAZEM HYDROCHLORIDE 30 MG: 30 TABLET, FILM COATED ORAL at 12:11

## 2023-11-22 RX ADMIN — AMPICILLIN 2 G: 2 INJECTION, POWDER, FOR SOLUTION INTRAVENOUS at 12:11

## 2023-11-22 RX ADMIN — LIDOCAINE HYDROCHLORIDE 20 MG: 10 INJECTION, SOLUTION EPIDURAL; INFILTRATION; INTRACAUDAL; PERINEURAL at 09:11

## 2023-11-22 RX ADMIN — KETAMINE HYDROCHLORIDE 10 MG: 50 INJECTION INTRAMUSCULAR; INTRAVENOUS at 09:11

## 2023-11-22 RX ADMIN — VANCOMYCIN HYDROCHLORIDE 2000 MG: 500 INJECTION, POWDER, LYOPHILIZED, FOR SOLUTION INTRAVENOUS at 01:11

## 2023-11-22 RX ADMIN — FAMOTIDINE 20 MG: 20 TABLET ORAL at 09:11

## 2023-11-22 RX ADMIN — SODIUM CHLORIDE, SODIUM LACTATE, POTASSIUM CHLORIDE, AND CALCIUM CHLORIDE: .6; .31; .03; .02 INJECTION, SOLUTION INTRAVENOUS at 09:11

## 2023-11-22 RX ADMIN — SODIUM CHLORIDE: 0.9 INJECTION, SOLUTION INTRAVENOUS at 09:11

## 2023-11-22 RX ADMIN — AMPICILLIN 2 G: 2 INJECTION, POWDER, FOR SOLUTION INTRAVENOUS at 05:11

## 2023-11-22 RX ADMIN — CEFTRIAXONE SODIUM 2 G: 2 INJECTION, POWDER, FOR SOLUTION INTRAMUSCULAR; INTRAVENOUS at 12:11

## 2023-11-22 RX ADMIN — DILTIAZEM HYDROCHLORIDE 30 MG: 30 TABLET, FILM COATED ORAL at 09:11

## 2023-11-22 RX ADMIN — AMIODARONE HYDROCHLORIDE 200 MG: 200 TABLET ORAL at 02:11

## 2023-11-22 NOTE — NURSING
Nurses Note -- 4 Eyes      11/22/2023   3:11 AM      Skin assessed during: Admit      [] No Altered Skin Integrity Present    []Prevention Measures Documented      [] Yes- Altered Skin Integrity Present or Discovered   [] LDA Added if Not in Epic (Describe Wound)   [x] New Altered Skin Integrity was Present on Admit and Documented in LDA   [x] Wound Image Taken    Wound Care Consulted? No    Attending Nurse:  Maureen Buckley RN/Staff Member: [id number]

## 2023-11-22 NOTE — CONSULTS
Critical access hospital   Department of Infectious Disease  Consult Note        PATIENT NAME: Jama James  MRN: [id number]  TODAY'S DATE: 11/22/2023  ADMIT DATE: 11/21/2023    CHIEF COMPLAINT: No chief complaint on file.    PRINCIPLE PROBLEM: Septic arthritis of knee, right    REASON FOR CONSULT:  Septic arthritis    ASSESSMENT and PLAN     Sepsis secondary to Enterococcus faecalis bacteremia in the setting of right septic knee status post tap 11/15 & 11/21 at [place], washout 11/22  R knee fluid cultures Enterococcus faecalis, +crystals  Blood cultures 11/20 4/4 bottles Enterococcus faecalis   R knee fluid 11/21 WBC 32616, PMN: 94%, negative crystals  Echo 11/21 all valves visualized, no mention of vegetations    PMHx: COPD on home O2, metastatic prostate cancer, HTN, HLD, AFib on Eliquis     Recommendations:    Follow OR cultures  UA with reflex to culture  CXR  Stop vancomycin and Rocephin IV  Start ampicillin 2 g IV q4h for Enterococcus faecalis  Repeat blood cultures x2 sets  Aspiration precautions  Monitor O2 sats    D/w patient, nursing    Thank you for this consult. Please send [place] secure chat with any questions.    HPI      Jama James is a 68 y.o. male , with past medical history of COPD on home O2, metastatic prostate cancer, HTN, HLD, AFib on Eliquis who was transferred from [place] for higher level of care.  Patient has had right knee pain for the last 2 weeks with erythema, and limited range of motion.  He denies fever or chills, no nausea or vomiting, no chest pain, no cough, no shortness of breath.  He presented to [place] on 11/15 status post tap, no cell count sent, culture positive for Enterococcus faecalis.  He was called back on 11/20, due to limited resources at [place], patient was transferred to [facility] for ortho washout in addition to pulmonology and cardiology services.    Patient seen by Ortho, status post washout in the OR today.  As per operation note, gross purulent fluid upon  entering the knee joint.  Severe arthritic change tricompartmental with full-thickness articular wearing both medial lateral compartments.  Ran a total of 6 L of normal saline.  OR cultures with Gram stain with many WBCs, pending final.     Seen and examined at bedside, he is recovering from anesthesia, currently on facemask, he reports he has been on his knees for the last couple of weeks doing some work at home.  Noticed worsening edema, redness, and severe pain to right knee which prompted him to go to the hospital.    Labs reviewed, white count 6.5, differential from yesterday 72.3%, H&H 9.5/31.5, platelet count 355   ESR 79, .4 on 11/20, today to 31.5, high  Normal kidney function, AST 11/ALT 10, normal  Procalcitonin 0.137, negative   Hep C antibody negative  Blood cultures from outside facility 4/4 bottles Enterococcus faecalis detected by [facility], sensitivities pending.  Right knee fluid culture from 11/15 Enterococcus faecalis sensitive to ampicillin and vancomycin.  Echo performed yesterday at outside facility, all valves visualized, no mention of vegetations.    Empirically started on vancomycin and ceftriaxone IV.     Outdoor activities:  Lives at home with wife.  Active smoker, no alcohol use, denies drugs.  Travel:  None  Implants:  None  Antibiotic history:  See HPI    Past Medical History:   Diagnosis Date    Abnormal CT scan 7/6/2023    Abnormal positron emission tomography (PET) scan 7/6/2023    Anxiety     Asbestosis 08/04/2015    Atrial fibrillation     Bilateral adrenal adenomas     COPD (chronic obstructive pulmonary disease)     COVID-19 virus infection 07/28/2022    Depression     Elevated PSA 7/6/2023    High cholesterol     HTN (hypertension)     Malignant neoplasm of prostate     Multiple lung nodules     Nodule of upper lobe of left lung 03/30/2023    6 mm spiculated on cta chest 3/21/23    On home oxygen therapy 05/24/2023    Pneumonia     Prostate cancer 5/27/2020    Prostate  cancer metastatic to bone 7/6/2023    Prostate cancer metastatic to lung 7/6/2023    Ulcerative colitis        Past Surgical History:   Procedure Laterality Date    NECK SURGERY      right knee      TRANSRECTAL BIOPSY OF PROSTATE WITH ULTRASOUND GUIDANCE Bilateral 3/18/2020    Procedure: BIOPSY, PROSTATE, RECTAL APPROACH, WITH US GUIDANCE;  Surgeon: Bill Jeong MD;  Location: [facility] OR;  Service: Urology;  Laterality: Bilateral;       Family History   Problem Relation Age of Onset    Cancer Mother         Uterine and Ovarian cancer    Diabetes Mother     Diabetes Sister        Social History     Socioeconomic History    Marital status:    Tobacco Use    Smoking status: Every Day     Current packs/day: 0.50     Average packs/day: 0.5 packs/day for 50.2 years (25.1 ttl pk-yrs)     Types: Cigarettes     Start date: 9/7/1973     Passive exposure: Past    Smokeless tobacco: Never   Substance and Sexual Activity    Alcohol use: Yes     Comment: 8 16oz beers per day    Drug use: No    Sexual activity: Not Currently     Social Determinants of Health     Financial Resource Strain: Low Risk  (3/22/2023)    Overall Financial Resource Strain (CARDIA)     Difficulty of Paying Living Expenses: Not very hard   Food Insecurity: No Food Insecurity (3/22/2023)    Hunger Vital Sign     Worried About Running Out of Food in the Last Year: Never true     Ran Out of Food in the Last Year: Never true   Transportation Needs: No Transportation Needs (3/22/2023)    PRAPARE - Transportation     Lack of Transportation (Medical): No     Lack of Transportation (Non-Medical): No   Physical Activity: Insufficiently Active (3/22/2023)    Exercise Vital Sign     Days of Exercise per Week: 1 day     Minutes of Exercise per Session: 30 min   Stress: Stress Concern Present (3/22/2023)    [employer] [place] [employer] - Occupational Stress Questionnaire     Feeling of Stress : Rather much   Social Connections: Moderately Integrated  (3/22/2023)    Social Connection and Isolation Panel [NHANES]     Frequency of Communication with Friends and Family: More than three times a week     Frequency of Social Gatherings with Friends and Family: Once a week     Attends Anabaptism Services: Never     Active Member of Clubs or Organizations: Yes     Attends Club or Organization Meetings: Never     Marital Status:    Housing Stability: Unknown (3/22/2023)    Housing Stability Vital Sign     Unable to Pay for Housing in the Last Year: No     Unstable Housing in the Last Year: No       Review of patient's allergies indicates:  No Known Allergies    Current Outpatient Medications   Medication Instructions    abiraterone (ZYTIGA) 1,000 mg, Oral, Daily    albuterol (PROVENTIL/VENTOLIN HFA) 90 mcg/actuation inhaler Rescue    albuterol-ipratropium (DUO-NEB) 2.5 mg-0.5 mg/3 mL nebulizer solution PLACE ONE (1) VIAL IN NEBULIZER AND INHALE EVERY 6 HOURS AS DIRECTED AS NEEDED FOR WHEEZING    amiodarone (PACERONE) 200 MG Tab TAKE ONE (1) TABLET ONCE DAILY FOR HEART RYHTHM    amlodipine-benazepril 5-20 mg (LOTREL) 5-20 mg per capsule   90 cap, 0 Refill(s)    apixaban (ELIQUIS) 5 mg, Oral, 2 times daily    aspirin (ECOTRIN) 81 mg, Oral, Daily    atorvastatin (LIPITOR) 10 MG tablet TAKE 1 TABLET AT BEDTIME FOR CHOLESTEROL (TAKE WITH CO-ENZYME Q-10)    bicalutamide (CASODEX) 50 mg, Oral, Daily    budesonide-glycopyr-formoterol (BREZTRI AEROSPHERE) 160-9-4.8 mcg/actuation HFAA 2 puffs, Inhalation, 2 times daily    diltiaZEM (CARDIZEM) 30 mg, Oral, Every 12 hours    famotidine (PEPCID) 40 MG tablet     ferrous sulfate (FEOSOL) 325 mg, Oral, Every Mon, Wed, Fri    finasteride (PROSCAR) 5 mg tablet TAKE ONE (1) TABLET EVERY MORNING FOR PROSTATE    ketorolac (TORADOL) 10 mg tablet     levoFLOXacin (LEVAQUIN) 750 mg, Oral, Daily    losartan (COZAAR) 50 mg, Oral, 2 times daily    methocarbamoL (ROBAXIN) 750 mg, Oral, 2 times daily PRN    OXYGEN-AIR DELIVERY SYSTEMS [facility] [place]  Misc.(Non-Drug; Combo Route)    pantoprazole (PROTONIX) 40 MG tablet TAKE ONE (1) TABLET EVERY MORNING (30 MINUTES BEFORE BREAKFAST) FOR STOMACH    paroxetine (PAXIL) 40 MG tablet TAKE ONE (1) TABLET EVERY MORNING FOR MOOD AND NERVE PAIN    predniSONE (DELTASONE) 5 mg, Oral, Daily    tamsulosin (FLOMAX) 0.4 mg, Oral, Daily    traMADoL (ULTRAM) 50 mg tablet Take 1-2 tablets every 8 hours as needed for pain    traZODone (DESYREL) 100 MG tablet TAKE ONE (1) TABLET AT BEDTIME FOR SLEEP AND NERVE PAIN    triamcinolone acetonide 0.1% (KENALOG) 0.1 % cream Topical (Top), 2 times daily         Review of Systems    Constitutional:  Denies fevers, chills, night sweats, loss of appetite.  HEENT: Denies visual changes, ear pain, sinus congestion, mouth pain or trouble swallowing, sore throat or dental pain.  Neck: Denies neck pain or lumps.  Respiratory: Denies shortness of breath, coughing, wheezing or hemoptysis.  Cardiovascular:  Denies chest pain, palpitations or edema.  Gastrointestinal: Denies  nausea, vomiting, constipation or diarrhea.  Genitourinary:  Denies dysuria, frequency, urgency or hematuria   Musculoskeletal:  +R knee pain, edema, limited ROM   Skin:  Denies rash or itching.  VAD:  PIV  Neurologic:  Denies motor sensory loss, headaches or dizziness.    Psychiatric:  Denies changes in mood or behavior.       OBJECTIVE     Temp:  [97 °F (36.1 °C)-98.8 °F (37.1 °C)] 98.5 °F (36.9 °C)  Pulse:  [89-99] 93  Resp:  [17-20] 19  SpO2:  [91 %-100 %] 91 %  BP: (137-191)/(55-98) 158/73         Physical Exam      General:  Male long beard, recovering from anesthesia, on face mask  Eyes: Eyes with no icterus or injection. Vision grossly normal  Ears: Hearing grossly normal.  Nose: Nares patent  Mouth: Moist mucous membranes, dentition is very poor, missing multiple teeth, poor oral hygiene. No ulcerations, erythema or exudates.  Neck: Supple, no tenderness to palpation.  Cardiovascular: Regular rate and rhythm, no  "murmurs, no edema.    Respiratory:  Mostly clear to auscultation b/l  Gastrointestinal:  Soft with active bowel sounds, no tenderness to palpation, no distention.  Genitourinary:  No suprapubic tenderness.  Musculoskeletal:  R knee with dressing in place, not disturbed. Moves other extremities with good strength.    Skin:  Warm and dry, ecchymosis UE b/l and L hip  Neuro:   Oriented, conversant, follows commands.  Psych: Good mood, normal affect.     Wounds:     L hip    VAD: PIV  ISOLATION: None    CBC LAST 7  Recent Labs   Lab 11/20/23  1511 11/21/23  0527 11/21/23  1301 11/22/23  0520   WBC 6.78 5.50  --  6.52   RBC 3.56* 3.43*  --  3.53*   HGB 9.8* 9.5*  --  9.9*   HCT 32.3* 31.5*  --  32.6*   MCV 91 92  --  92   MCH 27.5 27.7  --  28.0   MCHC 30.3* 30.2*  --  30.4*   RDW 14.0 14.1  --  14.1    355  --  432   MPV 9.6 8.9*  --  9.7   GRAN 73.6*  5.0 72.3  4.0  --   --    LYMPH 10.9*  0.7* 9.1*  0.5*  --   --    MONO 13.0  0.9 15.1*  0.8  --   --    BASO 0.02 0.02 0  --    NRBC 0 0  --   --        CHEMISTRY LAST 7  Recent Labs   Lab 11/20/23  1511 11/21/23  0527 11/22/23  0520    144 139   K 3.6 3.7 3.2*   CL 97 98 97   CO2 33* 34* 40*   ANIONGAP 12 12 2*   BUN 7* 6* 7*   CREATININE 0.6 0.6 0.5   * 114* 171*   CALCIUM 9.7 9.8 9.5   MG  --  2.1  --    ALBUMIN 2.6* 2.5* 3.3*   PROT 6.9 7.1 6.9   ALKPHOS 129 129 107   ALT 10 11 9*   AST 11 11 11   BILITOT 0.4 0.3 0.3       Estimated Creatinine Clearance: 183.2 mL/min (based on SCr of 0.5 mg/dL).    INFLAMMATORY/PROCAL  LAST 7  Recent Labs   Lab 11/20/23  1511 11/22/23  0520   PROCAL  --  0.137   .4* 231.5*     No results found for: "ESR"  CRP   Date Value Ref Range Status   11/22/2023 231.5 (H) 0.0 - 8.2 mg/L Final   11/20/2023 259.4 (H) 0.0 - 8.2 mg/L Final       PRIOR  MICROBIOLOGY:    Susceptibility data from last 90 days.  Collected Specimen Info Organism Ampicillin Genatmicin Synergy Screen Vancomycin   11/21/23 Joint Fluid from " Knee, Right Culture in progress      11/20/23 Blood from Peripheral, Hand, Right Enterococcus faecalis      11/20/23 Blood from Peripheral, Forearm, Left Enterococcus faecalis      11/15/23 Body Fluid from Knee, Right Enterococcus faecalis  S  R  S         LAST 7 DAYS MICROBIOLOGY   Microbiology Results (last 7 days)       Procedure Component Value Units Date/Time    AFB Culture & Smear [[id number]] Collected: 11/22/23 0955    Order Status: Sent Specimen: Wound from Knee, Right Updated: 11/22/23 1025    Culture, Anaerobe [[id number]] Collected: 11/22/23 0955    Order Status: Sent Specimen: Wound from Knee, Right Updated: 11/22/23 1024    Fungus culture [[id number]] Collected: 11/22/23 0955    Order Status: Sent Specimen: Wound from Knee, Right Updated: 11/22/23 1023    Gram stain [[id number]] Collected: 11/22/23 0955    Order Status: Sent Specimen: Wound from Knee, Right Updated: 11/22/23 1023    Aerobic culture [[id number]] Collected: 11/22/23 0955    Order Status: Sent Specimen: Wound from Knee, Right Updated: 11/22/23 1021    Culture, Anaerobe [[id number]]     Order Status: Canceled Specimen: Body Fluid from Knee, Right            Latest Reference Range & Units 11/21/23 13:01   Fluid Color  Yellow   Fluid Appearance  Cloudy   WBC, Body Fluid /cu mm 58886   Body Fluid Type  Joint   Segs, Fluid % 94   Lymphs, Fluid % 3   Monocytes/Macrophages, Fluid % 3   Eos, Fluid % 0   Baso, Fluid % 0   Mesothelial Cells, Fluid % 0   Other Cells, Fluid 0 % 0      Latest Reference Range & Units 11/15/23 12:55 11/21/23 13:01   Body Fluid Crystal Negative  Positive ! Negative (C)   Body Fluid Source, Crystal Exam  Joint Joint   Mesothelial Cells, Fluid %  0   !: Data is abnormal  (C): Corrected    CURRENT/PREVIOUS VISIT EKG  Results for orders placed or performed in visit on 11/20/23   EKG 12-lead    Collection Time: 11/20/23  9:38 PM    Narrative    Test Reason :     Vent. Rate : 098 BPM     Atrial Rate : 098 BPM     P-R Int : 192  ms          QRS Dur : 084 ms      QT Int : 384 ms       P-R-T Axes : 067 018 059 degrees     QTc Int : 490 ms    Normal sinus rhythm  Possible Left atrial enlargement  Prolonged QT  Abnormal ECG    Confirmed by Yosi Danielle MD ([id number]) on 11/21/2023 12:59:05 PM    Referred By: [email or web address]   SELF           Confirmed By:Yosi Danielle MD     ECHO 11/21 at [place]:  Left Ventricle The left ventricle is normal in size. Mildly increased wall thickness. Normal wall motion. There is normal systolic function with a visually estimated ejection fraction of 55 - 70%. Ejection fraction by visual approximation is 70%. Global longitudinal strain is -16%. Reduced. There is normal diastolic function.   Right Ventricle Normal right ventricular cavity size. Systolic function is normal. TAPSE is 2.50 cm.   Left Atrium Left atrium is moderately dilated.   Right Atrium Normal right atrial size.   Aortic Valve The aortic valve is a trileaflet valve. There is normal leaflet mobility. Aortic valve peak velocity is 1.49 m/s. Mean gradient is 5 mmHg.   Mitral Valve The mitral valve is structurally normal. There is normal leaflet mobility. The mean pressure gradient across the mitral valve is 4 mmHg at a heart rate of  bpm. There is trace regurgitation.   Tricuspid Valve The tricuspid valve is structurally normal. There is normal leaflet mobility. There is trace regurgitation.   Pulmonic Valve The pulmonic valve is structurally normal.   IVC/SVC Normal venous pressure at 3 mmHg.   Ascending Aorta Aortic annulus is normal in size measuring 3.17 cm. Ascending aorta is normal measuring 2.75 cm. Descending aorta is normal.   Pericardium and Other Findings Epicardial fat appears visualized. There is a trivial effusion. No indication of cardiac tamponade.   Pulmonary Artery The estimated pulmonary artery systolic pressure is 13 mmHg.       Significant Labs: All pertinent labs within the past 24 hours have been reviewed.     Significant Imaging: I have reviewed  all relevant and available imaging results/findings within the past 24 hours.    R knee x-ray 11/20:  There are tricompartmental osteoarthritic changes of the right knee with a small joint effusion.     Chloe Woods MD  Date of Service: 11/22/2023  12:07 PM

## 2023-11-22 NOTE — HPI
68 w/ Chronic Hypoxic Respiratory Failure on 3-4 LFNC 2/2 COPD, Metastatic Prostate cancer, HTN, HLD, Afib on eliquis presenting with right knee pain. Patient states he has chronic pain in this knee but for past 7-10 days he has had swelling and significant pain in the knee. No fever, chills, NVD. Has remote hx of surgery to the knee but has no hardware. Endorsed mild redness to knee prior to coming to ED on 11/15. ED staff drained knee and sent for cultures. Patient states he improved a good bit after this procedure but he is still having trouble bearing weight on the knee. This fluid was Calcium pyrophosphate crystals  positive. Culture grew rare E. Faecalis sensitive to ampicillin. Patient was called to come back in for evaluation and possible washout with ortho.      Ortho at [facility] evaluated patient and determined he needed to go to OR for washout. Anesthesia evaluated patient and determined he would be high risk and might stay intubated after procedure. Decision was made by ortho and anesthesia that if patient was to go to OR he needed to be transferred to facility with pulmonology and cardiology services. I agreed with this recommendation. ED staff reached out to ortho through transfer line. After reviewing the chart they felt that it did not look like patient needed emergent washout and could likely be treated with outpatient PO abx with close ortho followup.     Patient not feeling like he will be able to ambulate effectively at home. I had prolonged conversation with patient about his functional status. He is on 3-4L LFNC at home. He typically ambulates about 15ft each way in his home to get around in his home. He ambulates with a walker. He only leaves his house when his sister comes to get him to take him to doctors appointments. Patient told me he has widely metastatic prostate cancer. Review of most recent Heme/onc notes confirms this.

## 2023-11-22 NOTE — PROGRESS NOTES
Pharmacist Dose Adjustment Note    Jama James is a 68 y.o. male being treated with Vancomycin.    Patient Data:    Vital Signs (Most Recent):  Temp: 98.3 °F (36.8 °C) (11/21/23 2350)  Pulse: 94 (11/21/23 2350)  Resp: 18 (11/21/23 2350)  BP: (!) 178/87 (11/21/23 2350)  SpO2: 98 % (11/21/23 2350) Vital Signs (72h Range):  Temp:  [97.7 °F (36.5 °C)-98.4 °F (36.9 °C)]   Pulse:  [84-99]   Resp:  [18-26]   BP: (137-178)/(71-98)   SpO2:  [86 %-99 %]      Recent Labs   Lab 11/20/23  1511 11/21/23  0527   CREATININE 0.6 0.6     Serum creatinine: 0.6 mg/dL 11/21/23 0527  Estimated creatinine clearance: 152.6 mL/min  Wt: 89.4 kg (197 lb 1.5 oz)    Vancomycin 1750 mg once will be changed to Vancomycin 2000 mg per pharmacy protocol.    Pharmacist's Name: Ernestine Suarez  Pharmacist's Extension: 1431

## 2023-11-22 NOTE — OP NOTE
[facility]  Orthopedic Surgery  Operative Note    SUMMARY     Date of Procedure: 11/22/2023     Procedure: Procedure(s) (LRB):  ARTHROSCOPY, KNEE (Right)   for irrigation and drainage    Surgeon(s) and Role:     * Harry Julien MD - Primary    Assistant: Pascual LÓPEZ    Pre-Operative Diagnosis: Arthritis of right knee due to other bacteria [M00.861]    Post-Operative Diagnosis: Post-Op Diagnosis Codes:     * Arthritis of right knee due to other bacteria [M00.861]    Anesthesia: Choice    Diagnostic arthroscopy findings: Diagnostic arthroscopy findings, the patient's medial compartment was thoroughly examined. The patient had gross purulent fluid on entering the knee joint.  Patient had severe arthritic change tricompartmentally with full-thickness articular wear in both medial lateral compartments.      Complications: No    Estimated Blood Loss (EBL):15ml    Tourniquet Time: 0 at 300mmHg           Implants: * No implants in log *    Specimens:   Cultures of right knee synovial fluid           Condition: Good    Disposition: PACU - hemodynamically stable.    Attestation: I was present and scrubbed for the entire procedure.    INDICATIONS FOR THE PROCEDURE:  68-year-old male with aspirated right knee with Enterococcus.  Consented for I and D of right septic knee arthritis    PROCEDURE IN DETAIL: Risks, benefits and alternatives of the procedure were   explained to the patient including, but not limited to damage to nerves,   arteries, blood vessels. Also explained risk of infection, DVT, PE, continued pain due to arthritis,  as well as   anesthetic complications including seizure, stroke, heart attack and death. They  understood this and signed informed consent. The patient's Right knee was marked prior to coming to the Operating Room. Once there a formal   timeout was done in which correct patient, procedure and op site were all   correctly identified and confirmed by the entire operating  team.  Appropriate preoperative antibiotic was   given prior to surgical incision. Laryngeal mask anesthesia was induced. The   patient's Right lower extremity was prepped and draped in normal sterile fashion.    Standard inferior lateral portal was then made.  Abundant purulent fluid came out of the cannula.  This was cultured.  A spinal needle was   used to localize an inferior medial portal and this was made under direct   arthroscopic visualization. Diagnostic arthroscopy was then performed with the   findings listed above. Shaver was used to remove redundant fat pad and   Synovium within the notch.  We spent time removing some synovium and inflamed tissue both within the notch as well as within the gutters and suprapatellar pouch.  Ran a total of 6 liters of normal saline    Proceeded with closing. All   excess water was removed from the knee joint. Portals were closed using   nylons. Portal was then injected with 0.25% Marcaine with epinephrine. Sterile   dressing was then applied. They were then extubated and awakened and transferred   from the Operating Room to the Recovery Room in stable condition.     Postop course is for an arthroscopic lavage and drainage

## 2023-11-22 NOTE — ASSESSMENT & PLAN NOTE
Start iv vancomycin  Consult Orthopedic MD for arthroscopy/washout  Arthrocentesis done 1 week ago growing E fecalis

## 2023-11-22 NOTE — ANESTHESIA PREPROCEDURE EVALUATION
11/22/2023  Jama James is a 68 y.o., male.      Patient Active Problem List   Diagnosis    HTN (hypertension)    Ulcerative colitis    Depression    Anxiety    SOB (shortness of breath)    SUBRAMANIAN (dyspnea on exertion)    Tobacco abuse    Asbestosis    Hyperlipidemia    Prostate cancer    COPD (chronic obstructive pulmonary disease)    PAF (paroxysmal atrial fibrillation)    Multiple lung nodules    On home oxygen therapy    Prostate cancer metastatic to bone    Prostate cancer metastatic to lung    Elevated PSA    Abnormal positron emission tomography (PET) scan    Abnormal CT scan    Arthritis, septic    Benign localized prostatic hyperplasia with lower urinary tract symptoms (LUTS)    Septic arthritis of knee, right    Bacteremia       Past Surgical History:   Procedure Laterality Date    NECK SURGERY      right knee      TRANSRECTAL BIOPSY OF PROSTATE WITH ULTRASOUND GUIDANCE Bilateral 3/18/2020    Procedure: BIOPSY, PROSTATE, RECTAL APPROACH, WITH US GUIDANCE;  Surgeon: Bill Jeong MD;  Location: [facility];  Service: Urology;  Laterality: Bilateral;        Tobacco Use:  The patient  reports that he has been smoking cigarettes. He started smoking about 50 years ago. He has a 25.1 pack-year smoking history. He has been exposed to tobacco smoke. He has never used smokeless tobacco.     Results for orders placed or performed in visit on 11/20/23   EKG 12-lead    Collection Time: 11/20/23  9:38 PM    Narrative    Test Reason :     Vent. Rate : 098 BPM     Atrial Rate : 098 BPM     P-R Int : 192 ms          QRS Dur : 084 ms      QT Int : 384 ms       P-R-T Axes : 067 018 059 degrees     QTc Int : 490 ms    Normal sinus rhythm  Possible Left atrial enlargement  Prolonged QT  Abnormal ECG    Confirmed by Yosi Danielle MD ([id number]) on 11/21/2023 12:59:05 PM    Referred By: KRIS   SELF           Confirmed By:Yosi  Shashi RALPH             Lab Results   Component Value Date    WBC 6.52 11/22/2023    HGB 9.9 (L) 11/22/2023    HCT 32.6 (L) 11/22/2023    MCV 92 11/22/2023     11/22/2023     BMP  Lab Results   Component Value Date     11/22/2023    K 3.2 (L) 11/22/2023    CL 97 11/22/2023    CO2 40 (H) 11/22/2023    BUN 7 (L) 11/22/2023    CREATININE 0.5 11/22/2023    CALCIUM 9.5 11/22/2023    ANIONGAP 2 (L) 11/22/2023     (H) 11/22/2023     (H) 11/21/2023     (H) 11/20/2023       Results for orders placed during the hospital encounter of 11/20/23    Echo    Interpretation Summary    Left Ventricle: The left ventricle is normal in size. Mildly increased wall thickness. Normal wall motion. There is normal systolic function with a visually estimated ejection fraction of 55 - 70%. Ejection fraction by visual approximation is 70%. Global longitudinal strain is -16%. Reduced. There is normal diastolic function.    Right Ventricle: Normal right ventricular cavity size. Systolic function is normal. TAPSE is 2.50 cm.    Left Atrium: Left atrium is moderately dilated.    Aortic Valve: The aortic valve is a trileaflet valve.    IVC/SVC: Normal venous pressure at 3 mmHg.    Pericardium: There is a trivial effusion. No indication of cardiac tamponade.    Some suggestion for LA enlargement from Echo in 3/2023.         Pre-op Assessment    I have reviewed the Patient Summary Reports.     I have reviewed the Nursing Notes. I have reviewed the NPO Status.   I have reviewed the Medications.     Review of Systems  Anesthesia Hx:  No problems with previous Anesthesia   History of prior surgery of interest to airway management or planning: cervical fusion.         Denies Family Hx of Anesthesia complications.    Denies Personal Hx of Anesthesia complications.                    Social:  Alcohol Use, Smoker       Hematology/Oncology:                   Hematology Comments: Eliquis Therapy     Current/Recent Cancer.  (metastatic prostate cancer)                Cardiovascular:     Hypertension, poorly controlled    Dysrhythmias (sinus on most recent EKG) atrial fibrillation      hyperlipidemia SUBRAMANIAN  ECG has been reviewed. Prolonged QT noted on EKG       Shortness of Breath Dyspnea On Extertion                   Hypertension     Atrial Fibrillation     Pulmonary:  Pneumonia COPD, severe   Shortness of breath   Prostate cancer metastatic to lung    Albuterol Inhaler use     Duo Neb Nebulizer use     Home Oxygen Therapy 3-4 L / NC     Multiple lung nodules    Asbestosis     Chronic Obstructive Pulmonary Disease (COPD):              Chest Tumor/Mass:   Metastatic Cancer of Lung Cancer     Pulmonary Infection:  Pneumonia.     Renal/:    BPH    Neoplasm/Tumor, Prostate Cancer.           Hepatic/GI:   PUD,        Peptic Ulcer Disease   Bowel Conditions:  Inflammatory Bowel Disease, Ulcerative Colitis        Musculoskeletal:  Arthritis   Septic arthritis of knee, right   Joint Disease:  Arthritis Arthritis, septic  Bone Disorders:     Prostate cancer metastatic to bone       Endocrine:       Adrenal Disease, Bilateral adrenal adenomas    Psych:  Psychiatric History anxiety depression                Physical Exam    Dental:  Periodontal disease  Severe periodontal disease, all teeth cracked and discolored, many broken off at gumline or missing, pt denies loose teeth      Anesthesia Plan  Type of Anesthesia, risks & benefits discussed:    Anesthesia Type: Gen Supraglottic Airway  Intra-op Monitoring Plan: Standard ASA Monitors  Post Op Pain Control Plan: multimodal analgesia and IV/PO Opioids PRN  Induction:  IV  Airway Plan: Video, Post-Induction  Informed Consent: Informed consent signed with the Patient and all parties understand the risks and agree with anesthesia plan.  All questions answered.   ASA Score: 4 Emergent  Anesthesia Plan Notes:   General LMA OK  No versed please, pt sleepy  IV tylenol  K+ 10 mEq intra-op   Zofran  Pepcid  No decadron please    Ready For Surgery From Anesthesia Perspective.     .

## 2023-11-22 NOTE — PROGRESS NOTES
[facility] Medicine  Progress Note    Patient Name: Jama James  MRN: [id number]  Patient Class: IP- Inpatient   Admission Date: 11/21/2023  Length of Stay: 0 days  Attending Physician: Gayle Joya MD  Primary Care Provider: Marisel Farrell III, MD        Subjective:     Principal Problem:Septic arthritis of knee, right        HPI:  68 year old pt getting transferred from [place] with R septic arthritis and Streptococcus bacteremia  One week ago pt went to [place] ER with painful swollen Knee  Arthrocentesis was done and pt was discharged to home  Synovial fluid Grew E Fecalis  He was called back to ER  and blood culture was done  Pt was evaluated by Ortho MD lisa evans decision was made to transfer pt here because of his co morbid complex disaese/factors  Blood culture done grew Streptococcus       Overview/Hospital Course:  No notes on file    Interval History:  Had right knee washout in OR today.  Seen after procedure. having expected surgical site pain.  No nausea vomiting or shortness a breath      Objective:     Vital Signs (Most Recent):  Temp: 98.5 °F (36.9 °C) (11/22/23 1141)  Pulse: 93 (11/22/23 1141)  Resp: 19 (11/22/23 1141)  BP: (!) 158/73 (11/22/23 1141)  SpO2: (!) 91 % (11/22/23 1141) Vital Signs (24h Range):  Temp:  [97 °F (36.1 °C)-98.8 °F (37.1 °C)] 98.5 °F (36.9 °C)  Pulse:  [89-99] 93  Resp:  [17-20] 19  SpO2:  [91 %-100 %] 91 %  BP: (137-191)/(55-98) 158/73     Weight: 89.4 kg (197 lb 1.5 oz)  Body mass index is 23.37 kg/m².    Intake/Output Summary (Last 24 hours) at 11/22/2023 1333  Last data filed at 11/22/2023 1009  Gross per 24 hour   Intake 1505 ml   Output 400 ml   Net 1105 ml         Physical Exam  GENERAL:  Alert and oriented x 3  HEENT:  EOMI. Conjunctivae intact.   NECK:  Supple   LUNGS:  No respiratory distress.  CARDIAC:  RRR without murmur, rub or gallop  ABDOMEN:  Soft,  Nontender and nondistended, no rebound or guarding, bowel sounds present   EXTREMITIES:   "No clubbing, cyanosis or edema. R knee wrapped  SKIN:  Skin color, texture and turgor normal. No rashes, ulcerations or nodules noted          Significant Labs: All pertinent labs within the past 24 hours have been reviewed.  BMP:   Recent Labs   Lab 11/21/23  0527 11/22/23  0520   * 171*    139   K 3.7 3.2*   CL 98 97   CO2 34* 40*   BUN 6* 7*   CREATININE 0.6 0.5   CALCIUM 9.8 9.5   MG 2.1  --      CBC:   Recent Labs   Lab 11/20/23  1511 11/21/23  0527 11/22/23  0520   WBC 6.78 5.50 6.52   HGB 9.8* 9.5* 9.9*   HCT 32.3* 31.5* 32.6*    355 432     CMP:   Recent Labs   Lab 11/20/23  1511 11/21/23  0527 11/22/23  0520    144 139   K 3.6 3.7 3.2*   CL 97 98 97   CO2 33* 34* 40*   * 114* 171*   BUN 7* 6* 7*   CREATININE 0.6 0.6 0.5   CALCIUM 9.7 9.8 9.5   PROT 6.9 7.1 6.9   ALBUMIN 2.6* 2.5* 3.3*   BILITOT 0.4 0.3 0.3   ALKPHOS 129 129 107   AST 11 11 11   ALT 10 11 9*   ANIONGAP 12 12 2*     Cardiac Markers: No results for input(s): "CKMB", "MYOGLOBIN", "BNP", "TROPISTAT" in the last 48 hours.    Significant Imaging: I have reviewed all pertinent imaging results/findings within the past 24 hours.    Assessment/Plan:        * Septic arthritis of knee, right  Continue ampicillin  ID consulted  Orthopedics performed washout today.  Arthrocentesis done 1 week ago growing E fecalis    norco for pain     Bacteremia  Enterococcus bacteremia  Consult ID MD         PAF (paroxysmal atrial fibrillation)  Stable  Hold NOAC and continue SQ lovenox     COPD (chronic obstructive pulmonary disease)  Stable issue now  Maintain nebs  Pt on Home oxygen usually on 3-4 L       Prostate cancer  Prostate carcinoma with metastatic disease to cervical, thoracic, abdominal and pelvic lymph nodes as well as metastatic disease to bone.   On Prednisone/Lupron and zytiga        VTE Risk Mitigation (From admission, onward)           Ordered     IP VTE HIGH RISK PATIENT  Once         11/21/23 2352     Place " sequential compression device  Until discontinued         11/21/23 4290                    Discharge Planning   CLEMENTINA: 11/25/2023     Code Status: Full Code   Is the patient medically ready for discharge?:     Reason for patient still in hospital (select all that apply): Patient trending condition and Treatment  Discharge Plan A: Home with family                  Gayle Joya MD  Department of Hospital Medicine   [facility] hospital

## 2023-11-22 NOTE — SUBJECTIVE & OBJECTIVE
Past Medical History:   Diagnosis Date    Abnormal CT scan 7/6/2023    Abnormal positron emission tomography (PET) scan 7/6/2023    Anxiety     Asbestosis 08/04/2015    Atrial fibrillation     Bilateral adrenal adenomas     COPD (chronic obstructive pulmonary disease)     COVID-19 virus infection 07/28/2022    Depression     Elevated PSA 7/6/2023    High cholesterol     HTN (hypertension)     Malignant neoplasm of prostate     Multiple lung nodules     Nodule of upper lobe of left lung 03/30/2023    6 mm spiculated on cta chest 3/21/23    On home oxygen therapy 05/24/2023    Pneumonia     Prostate cancer 5/27/2020    Prostate cancer metastatic to bone 7/6/2023    Prostate cancer metastatic to lung 7/6/2023    Ulcerative colitis        Past Surgical History:   Procedure Laterality Date    NECK SURGERY      right knee      TRANSRECTAL BIOPSY OF PROSTATE WITH ULTRASOUND GUIDANCE Bilateral 3/18/2020    Procedure: BIOPSY, PROSTATE, RECTAL APPROACH, WITH US GUIDANCE;  Surgeon: Bill Jeong MD;  Location: [facility];  Service: Urology;  Laterality: Bilateral;       Review of patient's allergies indicates:  No Known Allergies    Current Facility-Administered Medications   Medication    0.9%  NaCl infusion    acetaminophen tablet 650 mg    albuterol-ipratropium 2.5 mg-0.5 mg/3 mL nebulizer solution 3 mL    amiodarone tablet 200 mg    cefTRIAXone (ROCEPHIN) 2 g in dextrose 5 % 100 mL IVPB (ready to mix)    diltiaZEM tablet 30 mg    famotidine tablet 20 mg    magnesium oxide tablet 800 mg    magnesium oxide tablet 800 mg    potassium bicarbonate disintegrating tablet 35 mEq    potassium bicarbonate disintegrating tablet 50 mEq    potassium bicarbonate disintegrating tablet 60 mEq    potassium, sodium phosphates 280-160-250 mg packet 2 packet    potassium, sodium phosphates 280-160-250 mg packet 2 packet    potassium, sodium phosphates 280-160-250 mg packet 2 packet    tamsulosin 24 hr capsule 0.4 mg    vancomycin - pharmacy  "to dose    vancomycin 1.5 g in dextrose 5 % 250 mL IVPB (ready to mix)     Family History       Problem Relation (Age of Onset)    Cancer Mother    Diabetes Mother, Sister          Tobacco Use    Smoking status: Every Day     Current packs/day: 0.50     Average packs/day: 0.5 packs/day for 50.2 years (25.1 ttl pk-yrs)     Types: Cigarettes     Start date: 9/7/1973     Passive exposure: Past    Smokeless tobacco: Never   Substance and Sexual Activity    Alcohol use: Yes     Comment: 8 16oz beers per day    Drug use: No    Sexual activity: Not Currently     Review of Systems   Constitutional: Negative for chills and fever.   HENT:  Negative for congestion.    Eyes:  Negative for blurred vision.   Cardiovascular:  Positive for dyspnea on exertion. Negative for chest pain and syncope.   Respiratory:  Positive for cough and shortness of breath.    Endocrine: Negative for polyuria.   Hematologic/Lymphatic: Negative for bleeding problem. Does not bruise/bleed easily.   Skin:  Negative for rash.   Musculoskeletal:  Positive for joint pain and joint swelling. Negative for falls, muscle cramps, muscle weakness and myalgias.   Gastrointestinal:  Negative for abdominal pain, nausea and vomiting.   Genitourinary:  Negative for flank pain.   Neurological:  Negative for numbness and seizures.   Psychiatric/Behavioral:  Negative for altered mental status.    Allergic/Immunologic: Negative for persistent infections.     Objective:     Vital Signs (Most Recent):  Temp: 98.8 °F (37.1 °C) (11/22/23 0356)  Pulse: 89 (11/22/23 0356)  Resp: 18 (11/22/23 0356)  BP: (!) 173/82 (11/22/23 0356)  SpO2: 97 % (11/22/23 0356) Vital Signs (24h Range):  Temp:  [97.7 °F (36.5 °C)-98.8 °F (37.1 °C)] 98.8 °F (37.1 °C)  Pulse:  [84-99] 89  Resp:  [18-26] 18  SpO2:  [89 %-99 %] 97 %  BP: (137-178)/(71-98) 173/82     Weight: 89.4 kg (197 lb 1.5 oz)  Height: 6' 5" (195.6 cm)  Body mass index is 23.37 kg/m².      Intake/Output Summary (Last 24 hours) at " 11/22/2023 0622  Last data filed at 11/22/2023 0535  Gross per 24 hour   Intake 1005 ml   Output 400 ml   Net 605 ml        General    Nursing note and vitals reviewed.  Constitutional: He is oriented to person, place, and time. He appears well-developed and well-nourished. No distress.   HENT:   Head: Normocephalic and atraumatic.   Eyes: EOM are normal.   Cardiovascular:  Normal rate.            Pulmonary/Chest: Effort normal.   Abdominal: Soft.   Neurological: He is alert and oriented to person, place, and time.   Psychiatric: His behavior is normal.           Right Knee Exam     Inspection   Erythema: absent  Swelling: present  Effusion: present    Range of Motion   Extension:  0   Flexion:  40     Other   Sensation: normal    Left Knee Exam   Left knee exam is normal.    Muscle Strength   Right Lower Extremity   Quadriceps:  4/5     Vascular Exam     Right Pulses  Dorsalis Pedis:      2+             Significant Labs: Blood Culture:   Recent Labs   Lab 11/20/23  1519 11/20/23  1532   LABBLOO Gram stain janine bottle: Gram positive cocci in chains resembling Strep  Results called to and read back by: Emi Casiano RN  11/21/2023  09:47  Gram stain aer bottle: Gram positive cocci in chains resembling Strep  11/21/2023  12:15 Gram stain janine bottle: Gram positive cocci in chains resembling Strep  Results called to and read back by: Emi Casiano RN  11/21/2023  09:47  Gram stain aer bottle: Gram positive cocci in chains resembling Strep  11/21/2023  12:13     BMP:   Recent Labs   Lab 11/21/23  0527 11/22/23  0520   * 171*    139   K 3.7 3.2*   CL 98 97   CO2 34* 40*   BUN 6* 7*   CREATININE 0.6 0.5   CALCIUM 9.8 9.5   MG 2.1  --      CBC:   Recent Labs   Lab 11/20/23  1511 11/21/23  0527 11/22/23  0520   WBC 6.78 5.50 6.52   HGB 9.8* 9.5* 9.9*   HCT 32.3* 31.5* 32.6*    355 432     CMP:   Recent Labs   Lab 11/20/23  1511 11/21/23  0527 11/22/23  0520    144 139   K 3.6 3.7 3.2*  "  CL 97 98 97   CO2 33* 34* 40*   * 114* 171*   BUN 7* 6* 7*   CREATININE 0.6 0.6 0.5   CALCIUM 9.7 9.8 9.5   PROT 6.9 7.1 6.9   ALBUMIN 2.6* 2.5* 3.3*   BILITOT 0.4 0.3 0.3   ALKPHOS 129 129 107   AST 11 11 11   ALT 10 11 9*   ANIONGAP 12 12 2*     CRP:   Recent Labs   Lab 11/20/23  1511   .4*     Lactic Acid:   Recent Labs   Lab 11/20/23  1511   LACTATE 0.8     Wound Culture: No results for input(s): "LABAERO" in the last 48 hours.  All pertinent labs within the past 24 hours have been reviewed.    Significant Imaging: X-Ray: I have reviewed all pertinent results/findings and my personal findings are:  X-rays of the right knee are available for review which show pretty significant arthritis in a moderate joint effusion.  "

## 2023-11-22 NOTE — HOSPITAL COURSE
Right knee pain with aspirate that grew out Enterococcus.  Patient also has Enterococcus bacteremia.  S/p  scope I and D of the right knee.

## 2023-11-22 NOTE — PLAN OF CARE
[facility]  Initial Discharge Assessment       Primary Care Provider: Marisel Farrell III, MD    Admission Diagnosis: Arthritis, septic, knee [M00.9]    Admission Date: 11/21/2023  Expected Discharge Date: 11/25/2023     complete discharge assessment via chart review Pt is in surgery. Demographics, PCP, and insurance verified. Pt has home health but doesn't know what company. No dialysis. Pt reports ability to complete ADLs without assistance. Pt verbalized plan to discharge home via family transport. . DME- o2 (3L, portable & concentrator, [employer]) Pt has no other needs to be addressed at this time.    Transition of Care Barriers: None    Payor: HU[employer]NA MANAGED MEDICARE / Plan: [employer] One HMO PPO SPECIAL NEEDS / Product Type: Medicare Advantage /     Extended Emergency Contact Information  Primary Emergency Contact: Beti Moura  Address: [address] [place], [place] [id number] [place] [address]  Mobile Phone: [phone]  Relation: Sister  Preferred language: English   needed? No  Secondary Emergency Contact: EMEKA ARMANDO  Address: [address] [place] [address] [place]  Mobile Phone: [phone]  Relation: Spouse  Preferred language: English   needed? No    Discharge Plan A: Home with family  Discharge Plan B: Home with family      [facility] [place] - [address]  [facility] [address]  [place] [address]  Phone: [phone] Fax: [phone]    [facility], [facility] ([place]) - [address], [address], [address] [address]  Phone: [phone] Fax: [phone]      Initial Assessment (most recent)       Adult Discharge Assessment - 11/22/23 1149          Discharge Assessment    Assessment Type Discharge Planning Assessment     Confirmed/corrected address, phone number and insurance Yes     Confirmed Demographics  Correct on Facesheet     Source of Information health record     Communicated CLEMENTINA with patient/caregiver Date not available/Unable to determine     Reason For Admission Septic arthritis of knee, right     People in Home spouse;friend(s)     Do you expect to return to your current living situation? Yes     Do you have help at home or someone to help you manage your care at home? Yes     Who are your caregiver(s) and their phone number(s)? EMEKA ARMANDO  Spouse  [phone]     Prior to hospitilization cognitive status: Unable to Assess     Current cognitive status: Unable to Assess     Walking or Climbing Stairs ambulation difficulty, requires equipment     Dressing/Bathing bathing difficulty, requires equipment     Equipment Currently Used at Home nebulizer;oxygen;cane, straight;glucometer;shower chair;rollator;blood pressure machine     Readmission within 30 days? No     Patient currently being followed by outpatient case management? No     Do you currently have service(s) that help you manage your care at home? Yes     Is the pt/caregiver preference to resume services with current agency Yes     Do you take prescription medications? Yes     Do you have prescription coverage? Yes     Coverage Payor: HUMANA MANAGED MEDICARE - [facility] -     Do you have any problems affording any of your prescribed medications? No     Is the patient taking medications as prescribed? yes     Who is going to help you get home at discharge? EMEKA ARMANDO  Spouse  [phone]     How do you get to doctors appointments? family or friend will provide     Are you on dialysis? No     Do you take coumadin? No     Transition of Care Barriers None     Discharge Plan A Home with family     Discharge Plan B Home with family

## 2023-11-22 NOTE — ASSESSMENT & PLAN NOTE
To the OR for arthroscopic I and D of the right knee.    Risks, benefits, and alternatives to the procedure were explained to the patient including but not limited to damage to nerves, arteries, blood vessels, bones, tendons, ligaments, stiffness, instability, infection, permanent limb dysfunction, DVT, PE, as well as general anesthetic complications including seizure, stroke, heart attack and even death. The patient understood these risks and wished to proceed and signed the informed consent.

## 2023-11-22 NOTE — PROGRESS NOTES
VANCOMYCIN PHARMACOKINETIC NOTE:  Vancomycin Day # 1    Objective/Assessment:    Diagnosis/Indication for Vancomycin:Bacteremia and Septic Arthritis      68 y.o., male; Actual Body Weight = 89.4 kg (197 lb 1.5 oz).    The patient has the following labs:  11/22/2023 Estimated Creatinine Clearance: 152.6 mL/min (based on SCr of 0.6 mg/dL). Lab Results   Component Value Date    BUN 6 (L) 11/21/2023     Lab Results   Component Value Date    WBC 5.50 11/21/2023          Plan:  Adjust vancomycin dose and/or frequency based on the patient's actual weight and renal function:  Initiate Vancomycin 1500 mg IV every 12 hours following 2000 mg loading dose.  Orders have been entered into patient's chart.        Vancomycin trough level has been ordered for 11/23 at 13:00.    Pharmacy will manage vancomycin therapy, monitor serum vancomycin levels, monitor renal function and adjust regimen as necessary.    Thank you for allowing us to participate in this patient's care.     Ernestine Suarez 11/22/2023   Department of Pharmacy  Ext [phone]

## 2023-11-22 NOTE — PLAN OF CARE
11/22/23 1226   Final Note   Assessment Type Final Discharge Note   Anticipated Discharge Disposition Short Term

## 2023-11-22 NOTE — SUBJECTIVE & OBJECTIVE
Interval History:  Having right knee washout in OR today.      Objective:     Vital Signs (Most Recent):  Temp: 98.5 °F (36.9 °C) (11/22/23 1141)  Pulse: 93 (11/22/23 1141)  Resp: 19 (11/22/23 1141)  BP: (!) 158/73 (11/22/23 1141)  SpO2: (!) 91 % (11/22/23 1141) Vital Signs (24h Range):  Temp:  [97 °F (36.1 °C)-98.8 °F (37.1 °C)] 98.5 °F (36.9 °C)  Pulse:  [89-99] 93  Resp:  [17-20] 19  SpO2:  [91 %-100 %] 91 %  BP: (137-191)/(55-98) 158/73     Weight: 89.4 kg (197 lb 1.5 oz)  Body mass index is 23.37 kg/m².    Intake/Output Summary (Last 24 hours) at 11/22/2023 1333  Last data filed at 11/22/2023 1009  Gross per 24 hour   Intake 1505 ml   Output 400 ml   Net 1105 ml         Physical Exam  GENERAL:  Alert and oriented x 3  HEENT:  EOMI. Conjunctivae intact.   NECK:  Supple   LUNGS:  No respiratory distress. Clear to auscultation bilaterally with good air movement  CARDIAC:  RRR without murmur, rub or gallop  ABDOMEN:  Soft,  Nontender and nondistended, no rebound or guarding, bowel sounds present   EXTREMITIES:  No clubbing, cyanosis or edema  SKIN:  Skin color, texture and turgor normal. No rashes, ulcerations or nodules noted          Significant Labs: All pertinent labs within the past 24 hours have been reviewed.  BMP:   Recent Labs   Lab 11/21/23  0527 11/22/23  0520   * 171*    139   K 3.7 3.2*   CL 98 97   CO2 34* 40*   BUN 6* 7*   CREATININE 0.6 0.5   CALCIUM 9.8 9.5   MG 2.1  --      CBC:   Recent Labs   Lab 11/20/23  1511 11/21/23  0527 11/22/23  0520   WBC 6.78 5.50 6.52   HGB 9.8* 9.5* 9.9*   HCT 32.3* 31.5* 32.6*    355 432     CMP:   Recent Labs   Lab 11/20/23  1511 11/21/23  0527 11/22/23  0520    144 139   K 3.6 3.7 3.2*   CL 97 98 97   CO2 33* 34* 40*   * 114* 171*   BUN 7* 6* 7*   CREATININE 0.6 0.6 0.5   CALCIUM 9.7 9.8 9.5   PROT 6.9 7.1 6.9   ALBUMIN 2.6* 2.5* 3.3*   BILITOT 0.4 0.3 0.3   ALKPHOS 129 129 107   AST 11 11 11   ALT 10 11 9*   ANIONGAP 12 12 2*  "    Cardiac Markers: No results for input(s): "CKMB", "MYOGLOBIN", "BNP", "TROPISTAT" in the last 48 hours.    Significant Imaging: I have reviewed all pertinent imaging results/findings within the past 24 hours.  "

## 2023-11-22 NOTE — ANESTHESIA POSTPROCEDURE EVALUATION
Anesthesia Post Evaluation    Patient: Jama James    Procedure(s) Performed: Procedure(s) (LRB):  ARTHROSCOPY, KNEE (Right)    Final Anesthesia Type: general      Patient location during evaluation: PACU  Note status: Mental status same as prior to procedure.  Level of consciousness: awake and alert, oriented and awake  Post-procedure vital signs: reviewed and stable  Pain management: adequate  Airway patency: patent    PONV status at discharge: No PONV  Anesthetic complications: no      Cardiovascular status: blood pressure returned to baseline, hemodynamically stable and stable  Respiratory status: unassisted, spontaneous ventilation and face mask  Hydration status: euvolemic  Follow-up not needed.          Vitals Value Taken Time   /55 11/22/23 1100   Temp 36.1 °C (97 °F) 11/22/23 1045   Pulse 88 11/22/23 1107   Resp 17 11/22/23 1009   SpO2 99 % 11/22/23 1107   Vitals shown include unvalidated device data.      No case tracking events are documented in the log.      Pain/Jhon Score: Pain Rating Post Med Admin: 0 (11/21/2023 10:11 PM)  Jhon Score: 8 (11/22/2023 11:00 AM)

## 2023-11-22 NOTE — CONSULTS
[facility]  Orthopedics  Consult Note    Patient Name: Jama James  MRN: [id number]  Admission Date: 11/21/2023  Hospital Length of Stay: 0 days  Attending Provider: Tyler Barney MD  Primary Care Provider: Marisel Farrell III, MD    Patient information was obtained from patient, past medical records, and ER records.     Inpatient consult to Orthopedic Surgery  Consult performed by: Harry Julien MD  Consult ordered by: Tyler Barney MD  Reason for consult: Right knee septic arthritis      Subjective:     Principal Problem:Septic arthritis of knee, right    Chief Complaint: No chief complaint on file.       HPI: 68 w/ Chronic Hypoxic Respiratory Failure on 3-4 LFNC 2/2 COPD, Metastatic Prostate cancer, HTN, HLD, Afib on eliquis presenting with right knee pain. Patient states he has chronic pain in this knee but for past 7-10 days he has had swelling and significant pain in the knee. No fever, chills, NVD. Has remote hx of surgery to the knee but has no hardware. Endorsed mild redness to knee prior to coming to ED on 11/15. ED staff drained knee and sent for cultures. Patient states he improved a good bit after this procedure but he is still having trouble bearing weight on the knee. This fluid was Calcium pyrophosphate crystals  positive. Culture grew rare E. Faecalis sensitive to ampicillin. Patient was called to come back in for evaluation and possible washout with ortho.      Ortho at [facility] evaluated patient and determined he needed to go to OR for washout. Anesthesia evaluated patient and determined he would be high risk and might stay intubated after procedure. Decision was made by ortho and anesthesia that if patient was to go to OR he needed to be transferred to facility with pulmonology and cardiology services. I agreed with this recommendation. ED staff reached out to ortho through transfer line. After reviewing the chart they felt that it did not look like patient needed emergent washout  and could likely be treated with outpatient PO abx with close ortho followup.     Patient not feeling like he will be able to ambulate effectively at home. I had prolonged conversation with patient about his functional status. He is on 3-4L LFNC at home. He typically ambulates about 15ft each way in his home to get around in his home. He ambulates with a walker. He only leaves his house when his sister comes to get him to take him to doctors appointments. Patient told me he has widely metastatic prostate cancer. Review of most recent Heme/onc notes confirms this.    Past Medical History:   Diagnosis Date    Abnormal CT scan 7/6/2023    Abnormal positron emission tomography (PET) scan 7/6/2023    Anxiety     Asbestosis 08/04/2015    Atrial fibrillation     Bilateral adrenal adenomas     COPD (chronic obstructive pulmonary disease)     COVID-19 virus infection 07/28/2022    Depression     Elevated PSA 7/6/2023    High cholesterol     HTN (hypertension)     Malignant neoplasm of prostate     Multiple lung nodules     Nodule of upper lobe of left lung 03/30/2023    6 mm spiculated on cta chest 3/21/23    On home oxygen therapy 05/24/2023    Pneumonia     Prostate cancer 5/27/2020    Prostate cancer metastatic to bone 7/6/2023    Prostate cancer metastatic to lung 7/6/2023    Ulcerative colitis        Past Surgical History:   Procedure Laterality Date    NECK SURGERY      right knee      TRANSRECTAL BIOPSY OF PROSTATE WITH ULTRASOUND GUIDANCE Bilateral 3/18/2020    Procedure: BIOPSY, PROSTATE, RECTAL APPROACH, WITH US GUIDANCE;  Surgeon: Bill Jeong MD;  Location: [facility];  Service: Urology;  Laterality: Bilateral;       Review of patient's allergies indicates:  No Known Allergies    Current Facility-Administered Medications   Medication    0.9%  NaCl infusion    acetaminophen tablet 650 mg    albuterol-ipratropium 2.5 mg-0.5 mg/3 mL nebulizer solution 3 mL    amiodarone tablet 200 mg     cefTRIAXone (ROCEPHIN) 2 g in dextrose 5 % 100 mL IVPB (ready to mix)    diltiaZEM tablet 30 mg    famotidine tablet 20 mg    magnesium oxide tablet 800 mg    magnesium oxide tablet 800 mg    potassium bicarbonate disintegrating tablet 35 mEq    potassium bicarbonate disintegrating tablet 50 mEq    potassium bicarbonate disintegrating tablet 60 mEq    potassium, sodium phosphates 280-160-250 mg packet 2 packet    potassium, sodium phosphates 280-160-250 mg packet 2 packet    potassium, sodium phosphates 280-160-250 mg packet 2 packet    tamsulosin 24 hr capsule 0.4 mg    vancomycin - pharmacy to dose    vancomycin 1.5 g in dextrose 5 % 250 mL IVPB (ready to mix)     Family History       Problem Relation (Age of Onset)    Cancer Mother    Diabetes Mother, Sister          Tobacco Use    Smoking status: Every Day     Current packs/day: 0.50     Average packs/day: 0.5 packs/day for 50.2 years (25.1 ttl pk-yrs)     Types: Cigarettes     Start date: 9/7/1973     Passive exposure: Past    Smokeless tobacco: Never   Substance and Sexual Activity    Alcohol use: Yes     Comment: 8 16oz beers per day    Drug use: No    Sexual activity: Not Currently     Review of Systems   Constitutional: Negative for chills and fever.   HENT:  Negative for congestion.    Eyes:  Negative for blurred vision.   Cardiovascular:  Positive for dyspnea on exertion. Negative for chest pain and syncope.   Respiratory:  Positive for cough and shortness of breath.    Endocrine: Negative for polyuria.   Hematologic/Lymphatic: Negative for bleeding problem. Does not bruise/bleed easily.   Skin:  Negative for rash.   Musculoskeletal:  Positive for joint pain and joint swelling. Negative for falls, muscle cramps, muscle weakness and myalgias.   Gastrointestinal:  Negative for abdominal pain, nausea and vomiting.   Genitourinary:  Negative for flank pain.   Neurological:  Negative for numbness and seizures.   Psychiatric/Behavioral:   "Negative for altered mental status.    Allergic/Immunologic: Negative for persistent infections.     Objective:     Vital Signs (Most Recent):  Temp: 98.8 °F (37.1 °C) (11/22/23 0356)  Pulse: 89 (11/22/23 0356)  Resp: 18 (11/22/23 0356)  BP: (!) 173/82 (11/22/23 0356)  SpO2: 97 % (11/22/23 0356) Vital Signs (24h Range):  Temp:  [97.7 °F (36.5 °C)-98.8 °F (37.1 °C)] 98.8 °F (37.1 °C)  Pulse:  [84-99] 89  Resp:  [18-26] 18  SpO2:  [89 %-99 %] 97 %  BP: (137-178)/(71-98) 173/82     Weight: 89.4 kg (197 lb 1.5 oz)  Height: 6' 5" (195.6 cm)  Body mass index is 23.37 kg/m².      Intake/Output Summary (Last 24 hours) at 11/22/2023 0622  Last data filed at 11/22/2023 0535  Gross per 24 hour   Intake 1005 ml   Output 400 ml   Net 605 ml        General    Nursing note and vitals reviewed.  Constitutional: He is oriented to person, place, and time. He appears well-developed and well-nourished. No distress.   HENT:   Head: Normocephalic and atraumatic.   Eyes: EOM are normal.   Cardiovascular:  Normal rate.            Pulmonary/Chest: Effort normal.   Abdominal: Soft.   Neurological: He is alert and oriented to person, place, and time.   Psychiatric: His behavior is normal.           Right Knee Exam     Inspection   Erythema: absent  Swelling: present  Effusion: present    Range of Motion   Extension:  0   Flexion:  40     Other   Sensation: normal    Left Knee Exam   Left knee exam is normal.    Muscle Strength   Right Lower Extremity   Quadriceps:  4/5     Vascular Exam     Right Pulses  Dorsalis Pedis:      2+             Significant Labs: Blood Culture:   Recent Labs   Lab 11/20/23  1519 11/20/23  1532   LABBLOO Gram stain janine bottle: Gram positive cocci in chains resembling Strep  Results called to and read back by: Emi Casiano RN  11/21/2023  09:47  Gram stain aer bottle: Gram positive cocci in chains resembling Strep  11/21/2023  12:15 Gram stain janine bottle: Gram positive cocci in chains resembling Strep  " "Results called to and read back by: Emi Casiano RN  11/21/2023  09:47  Gram stain aer bottle: Gram positive cocci in chains resembling Strep  11/21/2023  12:13     BMP:   Recent Labs   Lab 11/21/23  0527 11/22/23  0520   * 171*    139   K 3.7 3.2*   CL 98 97   CO2 34* 40*   BUN 6* 7*   CREATININE 0.6 0.5   CALCIUM 9.8 9.5   MG 2.1  --      CBC:   Recent Labs   Lab 11/20/23  1511 11/21/23  0527 11/22/23  0520   WBC 6.78 5.50 6.52   HGB 9.8* 9.5* 9.9*   HCT 32.3* 31.5* 32.6*    355 432     CMP:   Recent Labs   Lab 11/20/23  1511 11/21/23  0527 11/22/23  0520    144 139   K 3.6 3.7 3.2*   CL 97 98 97   CO2 33* 34* 40*   * 114* 171*   BUN 7* 6* 7*   CREATININE 0.6 0.6 0.5   CALCIUM 9.7 9.8 9.5   PROT 6.9 7.1 6.9   ALBUMIN 2.6* 2.5* 3.3*   BILITOT 0.4 0.3 0.3   ALKPHOS 129 129 107   AST 11 11 11   ALT 10 11 9*   ANIONGAP 12 12 2*     CRP:   Recent Labs   Lab 11/20/23  1511   .4*     Lactic Acid:   Recent Labs   Lab 11/20/23  1511   LACTATE 0.8     Wound Culture: No results for input(s): "LABAERO" in the last 48 hours.  All pertinent labs within the past 24 hours have been reviewed.    Significant Imaging: X-Ray: I have reviewed all pertinent results/findings and my personal findings are:  X-rays of the right knee are available for review which show pretty significant arthritis in a moderate joint effusion.  Assessment/Plan:     * Septic arthritis of knee, right  To the OR for arthroscopic I and D of the right knee.    Risks, benefits, and alternatives to the procedure were explained to the patient including but not limited to damage to nerves, arteries, blood vessels, bones, tendons, ligaments, stiffness, instability, infection, permanent limb dysfunction, DVT, PE, as well as general anesthetic complications including seizure, stroke, heart attack and even death. The patient understood these risks and wished to proceed and signed the informed consent.           Thank you for " your consult. I will follow-up with patient. Please contact us if you have any additional questions.    Harry Julien MD  Orthopedics  [facility]

## 2023-11-22 NOTE — HPI
68 year old pt getting transferred from [place] with R septic arthritis and Streptococcus bacteremia  One week ago pt went to [place] ER with painful swollen Knee  Arthrocentesis was done and pt was discharged to home  Synovial fluid Grew E Fecalis  He was called back to ER  and blood culture was done  Pt was evaluated by Clarke gonzalez nd decision was made to transfer pt here because of his co morbid complex disaese/factors  Blood culture done grew Streptococcus

## 2023-11-22 NOTE — ASSESSMENT & PLAN NOTE
Prostate carcinoma with metastatic disease to cervical, thoracic, abdominal and pelvic lymph nodes as well as metastatic disease to bone.   On Prednisone/Lupron and zytiga

## 2023-11-22 NOTE — ED NOTES
[facility] at bedside for transport. Pt awake, alert and oriented x 4. Skin wdi, pale. Resp evenand unlabored on 4L NC. Pt is unable to bear weight to right leg. Pt has no c/o pain unless he is moving right leg/knee a lot.

## 2023-11-22 NOTE — H&P
[facility] Medicine  History & Physical    Patient Name: Jama James  MRN: [id number]  Patient Class: OP- Observation  Admission Date: 11/21/2023  Attending Physician: Tyler Barney MD   Primary Care Provider: Marisel Farrell III, MD         Patient information was obtained from patient, past medical records, and ER records.     Subjective:     Principal Problem:Septic arthritis of knee, right    Chief Complaint: No chief complaint on file.       HPI: 68 year old pt getting transferred from [place] with R septic arthritis and Streptococcus bacteremia  One week ago pt went to [place] ER with painful swollen Knee  Arthrocentesis was done and pt was discharged to home  Synovial fluid Grew E Fecalis  He was called back to ER  and blood culture was done  Pt was evaluated by Ortho MD lisa evans decision was made to transfer pt here because of his co morbid complex disaese/factors  Blood culture done grew Streptococcus       Past Medical History:   Diagnosis Date    Abnormal CT scan 7/6/2023    Abnormal positron emission tomography (PET) scan 7/6/2023    Anxiety     Asbestosis 08/04/2015    Atrial fibrillation     Bilateral adrenal adenomas     COPD (chronic obstructive pulmonary disease)     COVID-19 virus infection 07/28/2022    Depression     Elevated PSA 7/6/2023    High cholesterol     HTN (hypertension)     Malignant neoplasm of prostate     Multiple lung nodules     Nodule of upper lobe of left lung 03/30/2023    6 mm spiculated on cta chest 3/21/23    On home oxygen therapy 05/24/2023    Pneumonia     Prostate cancer 5/27/2020    Prostate cancer metastatic to bone 7/6/2023    Prostate cancer metastatic to lung 7/6/2023    Ulcerative colitis        Past Surgical History:   Procedure Laterality Date    NECK SURGERY      right knee      TRANSRECTAL BIOPSY OF PROSTATE WITH ULTRASOUND GUIDANCE Bilateral 3/18/2020    Procedure: BIOPSY, PROSTATE, RECTAL APPROACH, WITH US GUIDANCE;  Surgeon: Bill LÓPEZ  MD Jean-Paul;  Location: [facility] OR;  Service: Urology;  Laterality: Bilateral;       Review of patient's allergies indicates:  No Known Allergies    Current Facility-Administered Medications on File Prior to Encounter   Medication    [] ampicillin (OMNIPEN) 1 gram injection    [] ampicillin (OMNIPEN) 1 gram injection    [DISCONTINUED] abiraterone Tab 1,000 mg    [DISCONTINUED] albuterol-ipratropium 2.5 mg-0.5 mg/3 mL nebulizer solution 3 mL    [DISCONTINUED] albuterol-ipratropium 2.5 mg-0.5 mg/3 mL nebulizer solution 3 mL    [DISCONTINUED] amiodarone tablet 100 mg    [DISCONTINUED] ampicillin (OMNIPEN) 2 g in sodium chloride 0.9 % 100 mL IVPB (MB+)    [DISCONTINUED] atorvastatin tablet 10 mg    [DISCONTINUED] dextrose 10% bolus 125 mL 125 mL    [DISCONTINUED] dextrose 10% bolus 250 mL 250 mL    [DISCONTINUED] diazePAM tablet 5 mg    [DISCONTINUED] diltiaZEM tablet 30 mg    [DISCONTINUED] enoxaparin injection 90 mg    [DISCONTINUED] famotidine tablet 40 mg    [DISCONTINUED] finasteride tablet 5 mg    [DISCONTINUED] fluticasone furoate-vilanteroL 100-25 mcg/dose diskus inhaler 1 puff    [DISCONTINUED] glucagon (human recombinant) injection 1 mg    [DISCONTINUED] glucose chewable tablet 16 g    [DISCONTINUED] glucose chewable tablet 24 g    [DISCONTINUED] LORazepam injection 1 mg    [DISCONTINUED] losartan tablet 25 mg    [DISCONTINUED] morphine injection 2 mg    [DISCONTINUED] naloxone 0.4 mg/mL injection 0.02 mg    [DISCONTINUED] ondansetron disintegrating tablet 8 mg    [DISCONTINUED] oxyCODONE immediate release tablet 5 mg    [DISCONTINUED] paroxetine tablet 40 mg    [DISCONTINUED] polyethylene glycol packet 17 g    [DISCONTINUED] predniSONE tablet 10 mg    [DISCONTINUED] prochlorperazine injection Soln 5 mg    [DISCONTINUED] sodium chloride 0.9% flush 10 mL    [DISCONTINUED] tamsulosin 24 hr capsule 0.4 mg    [DISCONTINUED] trazodone split tablet 100 mg     Current Outpatient Medications on File Prior to  Encounter   Medication Sig    abiraterone (ZYTIGA) 250 mg Tab Take 4 tablets (1,000 mg total) by mouth once daily.    albuterol (PROVENTIL/VENTOLIN HFA) 90 mcg/actuation inhaler Rescue    albuterol-ipratropium (DUO-NEB) 2.5 mg-0.5 mg/3 mL nebulizer solution PLACE ONE (1) VIAL IN NEBULIZER AND INHALE EVERY 6 HOURS AS DIRECTED AS NEEDED FOR WHEEZING    amiodarone (PACERONE) 200 MG Tab TAKE ONE (1) TABLET ONCE DAILY FOR HEART RYHTHM    amlodipine-benazepril 5-20 mg (LOTREL) 5-20 mg per capsule   90 cap, 0 Refill(s)    apixaban (ELIQUIS) 5 mg Tab Take 1 tablet (5 mg total) by mouth 2 (two) times daily.    aspirin (ECOTRIN) 81 MG EC tablet Take 1 tablet (81 mg total) by mouth once daily.    atorvastatin (LIPITOR) 10 MG tablet TAKE 1 TABLET AT BEDTIME FOR CHOLESTEROL (TAKE WITH CO-ENZYME Q-10)    bicalutamide (CASODEX) 50 MG Tab Take 1 tablet (50 mg total) by mouth once daily.    budesonide-glycopyr-formoterol (BREZTRI AEROSPHERE) 160-9-4.8 mcg/actuation HFAA Inhale 2 puffs into the lungs 2 (two) times a day.    diltiaZEM (CARDIZEM) 30 MG tablet Take 1 tablet (30 mg total) by mouth every 12 (twelve) hours.    famotidine (PEPCID) 40 MG tablet     ferrous sulfate (FEOSOL) 325 mg (65 mg iron) Tab tablet Take 1 tablet (325 mg total) by mouth every Mon, Wed, Fri.    finasteride (PROSCAR) 5 mg tablet TAKE ONE (1) TABLET EVERY MORNING FOR PROSTATE    ketorolac (TORADOL) 10 mg tablet     levoFLOXacin (LEVAQUIN) 750 MG tablet Take 1 tablet (750 mg total) by mouth once daily.    losartan (COZAAR) 50 MG tablet Take 1 tablet (50 mg total) by mouth 2 (two) times a day. (Patient taking differently: Take 50 mg by mouth once daily.)    methocarbamoL (ROBAXIN) 750 MG Tab Take 1 tablet (750 mg total) by mouth 2 (two) times daily as needed (muscle pain).    OXYGEN-AIR DELIVERY SYSTEMS MIS by Misc.(Non-Drug; Combo Route) route.    pantoprazole (PROTONIX) 40 MG tablet TAKE ONE (1) TABLET EVERY MORNING (30 MINUTES BEFORE BREAKFAST) FOR  STOMACH    paroxetine (PAXIL) 40 MG tablet TAKE ONE (1) TABLET EVERY MORNING FOR MOOD AND NERVE PAIN    predniSONE (DELTASONE) 5 MG tablet Take 1 tablet (5 mg total) by mouth once daily.    tamsulosin (FLOMAX) 0.4 mg Cap Take 1 capsule (0.4 mg total) by mouth once daily.    traMADoL (ULTRAM) 50 mg tablet Take 1-2 tablets every 8 hours as needed for pain    traZODone (DESYREL) 100 MG tablet TAKE ONE (1) TABLET AT BEDTIME FOR SLEEP AND NERVE PAIN    triamcinolone acetonide 0.1% (KENALOG) 0.1 % cream Apply topically 2 (two) times daily.     Family History       Problem Relation (Age of Onset)    Cancer Mother    Diabetes Mother, Sister          Tobacco Use    Smoking status: Every Day     Current packs/day: 0.50     Average packs/day: 0.5 packs/day for 50.2 years (25.1 ttl pk-yrs)     Types: Cigarettes     Start date: 9/7/1973     Passive exposure: Past    Smokeless tobacco: Never   Substance and Sexual Activity    Alcohol use: Yes     Comment: 8 16oz beers per day    Drug use: No    Sexual activity: Not Currently     Review of Systems   Constitutional:  Negative for activity change and appetite change.   HENT:  Negative for congestion and dental problem.    Eyes:  Negative for discharge and itching.   Respiratory:  Negative for shortness of breath.    Cardiovascular:  Negative for chest pain.   Gastrointestinal:  Negative for abdominal distention and abdominal pain.   Endocrine: Negative for cold intolerance.   Genitourinary:  Negative for difficulty urinating and dysuria.   Musculoskeletal:  Positive for arthralgias. Negative for back pain.   Skin:  Negative for color change.   Neurological:  Negative for dizziness and facial asymmetry.   Hematological:  Negative for adenopathy.   Psychiatric/Behavioral:  Negative for agitation and behavioral problems.      Objective:     Vital Signs (Most Recent):  Temp: 98.3 °F (36.8 °C) (11/21/23 2350)  Pulse: 94 (11/21/23 2350)  Resp: 18 (11/21/23 2350)  BP: (!) 178/87 (11/21/23  2350)  SpO2: 98 % (11/21/23 2350) Vital Signs (24h Range):  Temp:  [97.7 °F (36.5 °C)-98.3 °F (36.8 °C)] 98.3 °F (36.8 °C)  Pulse:  [84-99] 94  Resp:  [18-26] 18  SpO2:  [89 %-99 %] 98 %  BP: (137-178)/(71-98) 178/87     Weight: 89.4 kg (197 lb 1.5 oz)  Body mass index is 23.37 kg/m².     Physical Exam  Vitals and nursing note reviewed.   Constitutional:       Appearance: He is well-developed.   HENT:      Head: Atraumatic.      Right Ear: External ear normal.      Left Ear: External ear normal.      Nose: Nose normal.      Mouth/Throat:      Mouth: Mucous membranes are dry.   Cardiovascular:      Rate and Rhythm: Normal rate.   Pulmonary:      Effort: Pulmonary effort is normal.   Abdominal:      Palpations: Abdomen is soft.   Musculoskeletal:         General: Normal range of motion.      Cervical back: Full passive range of motion without pain and normal range of motion.      Comments: R knee slightly swollen    Skin:     General: Skin is warm.   Neurological:      Mental Status: He is alert and oriented to person, place, and time.   Psychiatric:         Behavior: Behavior normal.                Significant Labs: All pertinent labs within the past 24 hours have been reviewed.  CBC:   Recent Labs   Lab 11/20/23  1511 11/21/23  0527   WBC 6.78 5.50   HGB 9.8* 9.5*   HCT 32.3* 31.5*    355     CMP:   Recent Labs   Lab 11/20/23  1511 11/21/23  0527    144   K 3.6 3.7   CL 97 98   CO2 33* 34*   * 114*   BUN 7* 6*   CREATININE 0.6 0.6   CALCIUM 9.7 9.8   PROT 6.9 7.1   ALBUMIN 2.6* 2.5*   BILITOT 0.4 0.3   ALKPHOS 129 129   AST 11 11   ALT 10 11   ANIONGAP 12 12       Significant Imaging: I have reviewed all pertinent imaging results/findings within the past 24 hours.      Assessment/Plan:     * Septic arthritis of knee, right  Start iv vancomycin  Consult Orthopedic MD for arthroscopy/washout  Arthrocentesis done 1 week ago growing E fecalis       Bacteremia  Streptococcus bacteremia  Consult ID MD        PAF (paroxysmal atrial fibrillation)  Stable  Hold NOAC and start SQ lovenox    COPD (chronic obstructive pulmonary disease)  Stable issue now  Maintain nebs  Pt on Home oxygen usually on 3-4 L      Prostate cancer  Prostate carcinoma with metastatic disease to cervical, thoracic, abdominal and pelvic lymph nodes as well as metastatic disease to bone.   On Prednisone/Lupron and zytiga        VTE Risk Mitigation (From admission, onward)           Ordered     IP VTE HIGH RISK PATIENT  Once         11/21/23 2352     Place sequential compression device  Until discontinued         11/21/23 2352                       On 11/22/2023, patient should be placed in hospital observation services under my care.        Pharmacist Dose Adjustment Note    Jama James is a 68 y.o. male being treated with Vancomycin.    Patient Data:    Vital Signs (Most Recent):  Temp: 98.3 °F (36.8 °C) (11/21/23 2350)  Pulse: 94 (11/21/23 2350)  Resp: 18 (11/21/23 2350)  BP: (!) 178/87 (11/21/23 2350)  SpO2: 98 % (11/21/23 2350) Vital Signs (72h Range):  Temp:  [97.7 °F (36.5 °C)-98.4 °F (36.9 °C)]   Pulse:  [84-99]   Resp:  [18-26]   BP: (137-178)/(71-98)   SpO2:  [86 %-99 %]      Recent Labs   Lab 11/20/23  1511 11/21/23  0527   CREATININE 0.6 0.6     Serum creatinine: 0.6 mg/dL 11/21/23 0527  Estimated creatinine clearance: 152.6 mL/min  Wt: 89.4 kg (197 lb 1.5 oz)    Vancomycin 1750 mg once will be changed to Vancomycin 2000 mg per pharmacy protocol.    Pharmacist's Name: Ernestine Suarez  Pharmacist's Extension: [phone]      Tyler Barney MD  Department of Hospital Medicine  [facility]

## 2023-11-23 LAB
ALBUMIN SERPL BCP-MCNC: 3 G/DL (ref 3.5–5.2)
ALP SERPL-CCNC: 98 U/L (ref 55–135)
ALT SERPL W/O P-5'-P-CCNC: 8 U/L (ref 10–44)
ANION GAP SERPL CALC-SCNC: 7 MMOL/L (ref 8–16)
AST SERPL-CCNC: 9 U/L (ref 10–40)
BILIRUB SERPL-MCNC: 0.3 MG/DL (ref 0.1–1)
BUN SERPL-MCNC: 6 MG/DL (ref 8–23)
CALCIUM SERPL-MCNC: 9.3 MG/DL (ref 8.7–10.5)
CHLORIDE SERPL-SCNC: 99 MMOL/L (ref 95–110)
CO2 SERPL-SCNC: 39 MMOL/L (ref 23–29)
CREAT SERPL-MCNC: 0.4 MG/DL (ref 0.5–1.4)
CRP SERPL-MCNC: 209.5 MG/L (ref 0–8.2)
ERYTHROCYTE [DISTWIDTH] IN BLOOD BY AUTOMATED COUNT: 14.3 % (ref 11.5–14.5)
EST. GFR  (NO RACE VARIABLE): >60 ML/MIN/1.73 M^2
GLUCOSE SERPL-MCNC: 135 MG/DL (ref 70–110)
GRAM STN SPEC: NORMAL
GRAM STN SPEC: NORMAL
HCT VFR BLD AUTO: 28.6 % (ref 40–54)
HGB BLD-MCNC: 8.4 G/DL (ref 14–18)
INR PPP: 1 (ref 0.8–1.2)
MCH RBC QN AUTO: 27.5 PG (ref 27–31)
MCHC RBC AUTO-ENTMCNC: 29.4 G/DL (ref 32–36)
MCV RBC AUTO: 94 FL (ref 82–98)
PLATELET # BLD AUTO: 360 K/UL (ref 150–450)
PMV BLD AUTO: 9.4 FL (ref 9.2–12.9)
POTASSIUM SERPL-SCNC: 3.1 MMOL/L (ref 3.5–5.1)
PROT SERPL-MCNC: 6.2 G/DL (ref 6–8.4)
PROTHROMBIN TIME: 10.8 SEC (ref 9–12.5)
RBC # BLD AUTO: 3.06 M/UL (ref 4.6–6.2)
SODIUM SERPL-SCNC: 145 MMOL/L (ref 136–145)
URATE SERPL-MCNC: 1.8 MG/DL (ref 3.4–7)
WBC # BLD AUTO: 6.66 K/UL (ref 3.9–12.7)

## 2023-11-23 PROCEDURE — 80053 COMPREHEN METABOLIC PANEL: CPT | Performed by: ORTHOPAEDIC SURGERY

## 2023-11-23 PROCEDURE — 94761 N-INVAS EAR/PLS OXIMETRY MLT: CPT

## 2023-11-23 PROCEDURE — 25000003 PHARM REV CODE 250: Performed by: ORTHOPAEDIC SURGERY

## 2023-11-23 PROCEDURE — 63600175 PHARM REV CODE 636 W HCPCS: Mod: UD | Performed by: INTERNAL MEDICINE

## 2023-11-23 PROCEDURE — 85027 COMPLETE CBC AUTOMATED: CPT | Performed by: ORTHOPAEDIC SURGERY

## 2023-11-23 PROCEDURE — 94760 N-INVAS EAR/PLS OXIMETRY 1: CPT

## 2023-11-23 PROCEDURE — 94640 AIRWAY INHALATION TREATMENT: CPT

## 2023-11-23 PROCEDURE — 84550 ASSAY OF BLOOD/URIC ACID: CPT | Performed by: STUDENT IN AN ORGANIZED HEALTH CARE EDUCATION/TRAINING PROGRAM

## 2023-11-23 PROCEDURE — 86140 C-REACTIVE PROTEIN: CPT | Performed by: ORTHOPAEDIC SURGERY

## 2023-11-23 PROCEDURE — 25000242 PHARM REV CODE 250 ALT 637 W/ HCPCS: Performed by: INTERNAL MEDICINE

## 2023-11-23 PROCEDURE — 27000221 HC OXYGEN, UP TO 24 HOURS

## 2023-11-23 PROCEDURE — 12000002 HC ACUTE/MED SURGE SEMI-PRIVATE ROOM

## 2023-11-23 PROCEDURE — 85610 PROTHROMBIN TIME: CPT | Performed by: ORTHOPAEDIC SURGERY

## 2023-11-23 PROCEDURE — 25000003 PHARM REV CODE 250: Performed by: STUDENT IN AN ORGANIZED HEALTH CARE EDUCATION/TRAINING PROGRAM

## 2023-11-23 PROCEDURE — 63600175 PHARM REV CODE 636 W HCPCS: Performed by: ORTHOPAEDIC SURGERY

## 2023-11-23 PROCEDURE — 36415 COLL VENOUS BLD VENIPUNCTURE: CPT | Performed by: ORTHOPAEDIC SURGERY

## 2023-11-23 PROCEDURE — 99900031 HC PATIENT EDUCATION (STAT)

## 2023-11-23 PROCEDURE — 99900035 HC TECH TIME PER 15 MIN (STAT)

## 2023-11-23 RX ORDER — ENOXAPARIN SODIUM 100 MG/ML
1 INJECTION SUBCUTANEOUS EVERY 12 HOURS
Status: DISCONTINUED | OUTPATIENT
Start: 2023-11-23 | End: 2023-11-30 | Stop reason: HOSPADM

## 2023-11-23 RX ORDER — IPRATROPIUM BROMIDE AND ALBUTEROL SULFATE 2.5; .5 MG/3ML; MG/3ML
3 SOLUTION RESPIRATORY (INHALATION) EVERY 4 HOURS
Status: DISCONTINUED | OUTPATIENT
Start: 2023-11-23 | End: 2023-11-27

## 2023-11-23 RX ADMIN — AMIODARONE HYDROCHLORIDE 200 MG: 200 TABLET ORAL at 08:11

## 2023-11-23 RX ADMIN — AMPICILLIN 2 G: 2 INJECTION, POWDER, FOR SOLUTION INTRAVENOUS at 07:11

## 2023-11-23 RX ADMIN — AMPICILLIN 2 G: 2 INJECTION, POWDER, FOR SOLUTION INTRAVENOUS at 01:11

## 2023-11-23 RX ADMIN — IPRATROPIUM BROMIDE AND ALBUTEROL SULFATE 3 ML: 2.5; .5 SOLUTION RESPIRATORY (INHALATION) at 03:11

## 2023-11-23 RX ADMIN — HYDROCODONE BITARTRATE AND ACETAMINOPHEN 1 TABLET: 5; 325 TABLET ORAL at 10:11

## 2023-11-23 RX ADMIN — POTASSIUM BICARBONATE 35 MEQ: 391 TABLET, EFFERVESCENT ORAL at 02:11

## 2023-11-23 RX ADMIN — AMPICILLIN 2 G: 2 INJECTION, POWDER, FOR SOLUTION INTRAVENOUS at 02:11

## 2023-11-23 RX ADMIN — CHLORHEXIDINE GLUCONATE 15 ML: 1.2 RINSE ORAL at 10:11

## 2023-11-23 RX ADMIN — FAMOTIDINE 20 MG: 20 TABLET ORAL at 10:11

## 2023-11-23 RX ADMIN — TAMSULOSIN HYDROCHLORIDE 0.4 MG: 0.4 CAPSULE ORAL at 08:11

## 2023-11-23 RX ADMIN — DILTIAZEM HYDROCHLORIDE 30 MG: 30 TABLET, FILM COATED ORAL at 10:11

## 2023-11-23 RX ADMIN — AMPICILLIN 2 G: 2 INJECTION, POWDER, FOR SOLUTION INTRAVENOUS at 10:11

## 2023-11-23 RX ADMIN — ENOXAPARIN SODIUM 90 MG: 100 INJECTION SUBCUTANEOUS at 09:11

## 2023-11-23 RX ADMIN — CHLORHEXIDINE GLUCONATE 15 ML: 1.2 RINSE ORAL at 08:11

## 2023-11-23 RX ADMIN — IPRATROPIUM BROMIDE AND ALBUTEROL SULFATE 3 ML: 2.5; .5 SOLUTION RESPIRATORY (INHALATION) at 07:11

## 2023-11-23 RX ADMIN — AMPICILLIN 2 G: 2 INJECTION, POWDER, FOR SOLUTION INTRAVENOUS at 05:11

## 2023-11-23 RX ADMIN — IPRATROPIUM BROMIDE AND ALBUTEROL SULFATE 3 ML: 2.5; .5 SOLUTION RESPIRATORY (INHALATION) at 11:11

## 2023-11-23 RX ADMIN — ENOXAPARIN SODIUM 90 MG: 100 INJECTION SUBCUTANEOUS at 10:11

## 2023-11-23 RX ADMIN — POTASSIUM BICARBONATE 35 MEQ: 391 TABLET, EFFERVESCENT ORAL at 12:11

## 2023-11-23 RX ADMIN — FAMOTIDINE 20 MG: 20 TABLET ORAL at 08:11

## 2023-11-23 RX ADMIN — DILTIAZEM HYDROCHLORIDE 30 MG: 30 TABLET, FILM COATED ORAL at 08:11

## 2023-11-23 NOTE — PROGRESS NOTES
[facility] Medicine  Progress Note    Patient name: Jama James  MRN: [id number]  Admit Date: 11/21/2023   LOS: 1 day     SUBJECTIVE:     Principal problem: Septic arthritis of knee, right    HPI: 68 year old pt getting transferred from [place] with R septic arthritis and Streptococcus bacteremia  One week ago pt went to [place] ER with painful swollen Knee. Arthrocentesis was done and pt was discharged to home Synovial fluid Grew E Fecalis. He was called back to ER  and blood culture was done  Pt was evaluated by Ortho MD lisa evans decision was made to transfer pt here because of his co morbid complex disaese/factors. Blood culture done grew gram positive cocci.     Hospital course:   Patient admitted with sepsis secondary to enterococcus fecalis bacteremia, right KJ septic arthritis, post tap 11/15 & 11/21 at [place], washout 11/22, R knee fluid cultures Enterococcus faecalis, +crystals, Blood cultures 11/20 4/4 bottles Enterococcus faecalis, R knee fluid 11/21 WBC 24017, PMN: 94%, negative crystals. Echo 11/21 all valves visualized, no mention of vegetations. Patient underwent arthroscopic washout again 11/22. Patient was initially on vancomycin and cefepime, ID consulted and switched to IV Ampicillin. Rpt blood culture pending.     Interval History:  Patient is very sleepy today. Wakes up and answer questions appropriately. States pain is 8/10. He is on 4 L oxygen.     Scheduled Meds:   amiodarone  200 mg Oral Daily    ampicillin IVPB  2 g Intravenous Q4H    chlorhexidine  15 mL Mouth/Throat BID    diltiaZEM  30 mg Oral Q12H    enoxparin  1 mg/kg Subcutaneous Q12H (treatment, non-standard time)    famotidine  20 mg Oral BID    tamsulosin  0.4 mg Oral Daily     Continuous Infusions:  PRN Meds:acetaminophen, albuterol-ipratropium, HYDROcodone-acetaminophen, magnesium oxide, magnesium oxide, ondansetron, potassium bicarbonate, potassium bicarbonate, potassium bicarbonate, potassium, sodium  phosphates, potassium, sodium phosphates, potassium, sodium phosphates    Review of patient's allergies indicates:  No Known Allergies    Review of Systems  As per subjective    OBJECTIVE:     Vital Signs (Most Recent)  Temp: 98.9 °F (37.2 °C) (11/23/23 0717)  Pulse: 88 (11/23/23 0747)  Resp: 16 (11/23/23 0747)  BP: (!) 161/74 (11/23/23 0717)  SpO2: (!) 93 % (11/23/23 0747)    Vital Signs Range (Last 24H):  Temp:  [97 °F (36.1 °C)-98.9 °F (37.2 °C)]   Pulse:  [84-99]   Resp:  [16-20]   BP: (112-191)/(55-84)   SpO2:  [91 %-100 %]     I & O (Last 24H):  Intake/Output Summary (Last 24 hours) at 11/23/2023 1011  Last data filed at 11/23/2023 0935  Gross per 24 hour   Intake 1463 ml   Output 600 ml   Net 863 ml       Physical Exam:  General: Patient resting comfortably in no acute distress. Appears as stated age. Calm  Eyes: EOM intact. No conjunctivae injection. No scleral icterus.  ENT: Hearing grossly intact. No discharge from ears. No nasal discharge.   CVS: RRR. No LE edema BL.  Lungs: CTA BL, no wheezing or crackles. Good breath sounds. No accessory muscle use. No acute respiratory distress  Neuro: Alert. GCS 15. Cranial nerves grossly intact. Moves all extremities equally. Follows commands. Responds appropriately   MSK: right knee on ace wrap     Laboratory:  All pertinent labs within the past 24 hours have been reviewed.  CBC:   Recent Labs   Lab 11/22/23 0520 11/23/23 0419   WBC 6.52 6.66   HGB 9.9* 8.4*   HCT 32.6* 28.6*    360     CMP:   Recent Labs   Lab 11/22/23 0520 11/23/23 0419    145   K 3.2* 3.1*   CL 97 99   CO2 40* 39*   * 135*   BUN 7* 6*   CREATININE 0.5 0.4*   CALCIUM 9.5 9.3   PROT 6.9 6.2   ALBUMIN 3.3* 3.0*   BILITOT 0.3 0.3   ALKPHOS 107 98   AST 11 9*   ALT 9* 8*   ANIONGAP 2* 7*       Microbiology Results (last 7 days)       Procedure Component Value Units Date/Time    Aerobic culture [[id number]] Collected: 11/22/23 0955    Order Status: Completed Specimen: Synovial  Fluid from Knee, Right Updated: 11/23/23 0943     Aerobic Bacterial Culture No growth    Narrative:      Right knee fluid    Gram stain [[id number]] Collected: 11/22/23 0955    Order Status: Completed Specimen: Synovial Fluid from Knee, Right Updated: 11/22/23 1610     Gram Stain Result Rare WBC's      No organisms seen    Narrative:      Right knee fluid    AFB Culture & Smear [[id number]] Collected: 11/22/23 0955    Order Status: Sent Specimen: Wound from Knee, Right Updated: 11/22/23 1025    Culture, Anaerobe [[id number]] Collected: 11/22/23 0955    Order Status: Sent Specimen: Wound from Knee, Right Updated: 11/22/23 1024    Fungus culture [[id number]] Collected: 11/22/23 0955    Order Status: Sent Specimen: Wound from Knee, Right Updated: 11/22/23 1023    Culture, Anaerobe [[id number]]     Order Status: Canceled Specimen: Body Fluid from Knee, Right              Diagnostic Results:      ASSESSMENT/PLAN:     Sepsis secondary to enterococcus fecalis bacteremia secondary to right knee joint septic arthritis   S/p arthroscopic wash out 11/15 and 11/21 at [place] and 11/22 at [facility].   Blood cultures 11/20 4/4 bottles Enterococcus faecalis   R knee fluid 11/21 WBC [id number], PMN: 94%, negative crystals  Echo 11/21 all valves visualized, no mention of vegetations  - ID Following, cont IV Ampicillin   - Follow OR cultures  - Follow rpt blood cultures   - Pain control with Norco     Paroxysmal Afib   Secondary hypercoagulable state   - Cont amiodarone and Cardizem   - Holding Eliquis, started therapeutic dose Lovenox     Chronic hypoxic respiratory failure   COPD: not in exacerbation   - changes duoneb to scheduled.     Prostate cancer  Prostate carcinoma with metastatic disease to cervical, thoracic, abdominal and pelvic lymph nodes as well as metastatic disease to bone.   On Prednisone/Lupron and zytiga       VTE Risk Mitigation (From admission, onward)           Ordered     enoxaparin injection 90 mg  Every 12 hours          11/23/23 0732     IP VTE HIGH RISK PATIENT  Once         11/22/23 1919     Place sequential compression device  Until discontinued         11/21/23 1702                          Department Hospital Medicine  [facility]  Celio Roberts MD  Date of service: 11/23/2023

## 2023-11-23 NOTE — CARE UPDATE
11/23/23 0747   Patient Assessment/Suction   Level of Consciousness (AVPU) alert   Expansion/Accessory Muscles/Retractions expansion symmetric   All Lung Fields Breath Sounds coarse   Rhythm/Pattern, Respiratory depth regular;pattern regular   Cough Frequency frequent   Cough Type congested;nonproductive   PRE-TX-O2   Device (Oxygen Therapy) nasal cannula   $ Is the patient on Low Flow Oxygen? Yes   Flow (L/min) 4   SpO2 (!) 93 %   Pulse Oximetry Type Intermittent   $ Pulse Oximetry - Multiple Charge Pulse Oximetry - Multiple   Pulse 88   Resp 16   Aerosol Therapy   $ Aerosol Therapy Charges Refused   Education   $ Education Bronchodilator;15 min

## 2023-11-24 LAB
ALBUMIN SERPL BCP-MCNC: 2.8 G/DL (ref 3.5–5.2)
ALLENS TEST: ABNORMAL
ALP SERPL-CCNC: 86 U/L (ref 55–135)
ALT SERPL W/O P-5'-P-CCNC: 8 U/L (ref 10–44)
ANION GAP SERPL CALC-SCNC: 7 MMOL/L (ref 8–16)
AST SERPL-CCNC: 8 U/L (ref 10–40)
BILIRUB SERPL-MCNC: 0.2 MG/DL (ref 0.1–1)
BUN SERPL-MCNC: 6 MG/DL (ref 8–23)
CALCIUM SERPL-MCNC: 9.1 MG/DL (ref 8.7–10.5)
CHLORIDE SERPL-SCNC: 98 MMOL/L (ref 95–110)
CO2 SERPL-SCNC: 41 MMOL/L (ref 23–29)
CREAT SERPL-MCNC: 0.4 MG/DL (ref 0.5–1.4)
CRP SERPL-MCNC: 179 MG/L (ref 0–8.2)
DELSYS: ABNORMAL
ERYTHROCYTE [DISTWIDTH] IN BLOOD BY AUTOMATED COUNT: 14.1 % (ref 11.5–14.5)
EST. GFR  (NO RACE VARIABLE): >60 ML/MIN/1.73 M^2
GLUCOSE SERPL-MCNC: 150 MG/DL (ref 70–110)
HCO3 UR-SCNC: 47.8 MMOL/L (ref 24–28)
HCT VFR BLD AUTO: 26.9 % (ref 40–54)
HGB BLD-MCNC: 7.8 G/DL (ref 14–18)
INR PPP: 1 (ref 0.8–1.2)
MCH RBC QN AUTO: 27.2 PG (ref 27–31)
MCHC RBC AUTO-ENTMCNC: 29 G/DL (ref 32–36)
MCV RBC AUTO: 94 FL (ref 82–98)
PCO2 BLDA: 73 MMHG (ref 35–45)
PH SMN: 7.42 [PH] (ref 7.35–7.45)
PLATELET # BLD AUTO: 330 K/UL (ref 150–450)
PMV BLD AUTO: 9.5 FL (ref 9.2–12.9)
PO2 BLDA: 62 MMHG (ref 80–100)
POC BE: 23 MMOL/L
POC SATURATED O2: 90 % (ref 95–100)
POC TCO2: 50 MMOL/L (ref 23–27)
POTASSIUM SERPL-SCNC: 3.1 MMOL/L (ref 3.5–5.1)
PROT SERPL-MCNC: 5.8 G/DL (ref 6–8.4)
PROTHROMBIN TIME: 11 SEC (ref 9–12.5)
RBC # BLD AUTO: 2.87 M/UL (ref 4.6–6.2)
SAMPLE: ABNORMAL
SITE: ABNORMAL
SODIUM SERPL-SCNC: 146 MMOL/L (ref 136–145)
WBC # BLD AUTO: 6.97 K/UL (ref 3.9–12.7)

## 2023-11-24 PROCEDURE — 94799 UNLISTED PULMONARY SVC/PX: CPT

## 2023-11-24 PROCEDURE — 25000003 PHARM REV CODE 250: Performed by: ORTHOPAEDIC SURGERY

## 2023-11-24 PROCEDURE — 99900031 HC PATIENT EDUCATION (STAT)

## 2023-11-24 PROCEDURE — 86140 C-REACTIVE PROTEIN: CPT | Performed by: ORTHOPAEDIC SURGERY

## 2023-11-24 PROCEDURE — 97535 SELF CARE MNGMENT TRAINING: CPT

## 2023-11-24 PROCEDURE — 82803 BLOOD GASES ANY COMBINATION: CPT

## 2023-11-24 PROCEDURE — C1751 CATH, INF, PER/CENT/MIDLINE: HCPCS

## 2023-11-24 PROCEDURE — 21000000 HC CCU ICU ROOM CHARGE

## 2023-11-24 PROCEDURE — 97165 OT EVAL LOW COMPLEX 30 MIN: CPT

## 2023-11-24 PROCEDURE — 94760 N-INVAS EAR/PLS OXIMETRY 1: CPT | Mod: XB

## 2023-11-24 PROCEDURE — 80053 COMPREHEN METABOLIC PANEL: CPT | Performed by: ORTHOPAEDIC SURGERY

## 2023-11-24 PROCEDURE — 94640 AIRWAY INHALATION TREATMENT: CPT

## 2023-11-24 PROCEDURE — 25000003 PHARM REV CODE 250: Performed by: STUDENT IN AN ORGANIZED HEALTH CARE EDUCATION/TRAINING PROGRAM

## 2023-11-24 PROCEDURE — 99900035 HC TECH TIME PER 15 MIN (STAT)

## 2023-11-24 PROCEDURE — 63600175 PHARM REV CODE 636 W HCPCS: Mod: UD | Performed by: INTERNAL MEDICINE

## 2023-11-24 PROCEDURE — 25000242 PHARM REV CODE 250 ALT 637 W/ HCPCS: Performed by: INTERNAL MEDICINE

## 2023-11-24 PROCEDURE — 97162 PT EVAL MOD COMPLEX 30 MIN: CPT

## 2023-11-24 PROCEDURE — 27000221 HC OXYGEN, UP TO 24 HOURS

## 2023-11-24 PROCEDURE — 85027 COMPLETE CBC AUTOMATED: CPT | Performed by: ORTHOPAEDIC SURGERY

## 2023-11-24 PROCEDURE — 94761 N-INVAS EAR/PLS OXIMETRY MLT: CPT | Mod: XB

## 2023-11-24 PROCEDURE — 63600175 PHARM REV CODE 636 W HCPCS: Performed by: ORTHOPAEDIC SURGERY

## 2023-11-24 PROCEDURE — 99233 PR SUBSEQUENT HOSPITAL CARE,LEVL III: ICD-10-PCS | Mod: ,,, | Performed by: STUDENT IN AN ORGANIZED HEALTH CARE EDUCATION/TRAINING PROGRAM

## 2023-11-24 PROCEDURE — 36415 COLL VENOUS BLD VENIPUNCTURE: CPT | Performed by: ORTHOPAEDIC SURGERY

## 2023-11-24 PROCEDURE — 36600 WITHDRAWAL OF ARTERIAL BLOOD: CPT

## 2023-11-24 PROCEDURE — 99233 SBSQ HOSP IP/OBS HIGH 50: CPT | Mod: ,,, | Performed by: STUDENT IN AN ORGANIZED HEALTH CARE EDUCATION/TRAINING PROGRAM

## 2023-11-24 PROCEDURE — 97530 THERAPEUTIC ACTIVITIES: CPT

## 2023-11-24 PROCEDURE — 36569 INSJ PICC 5 YR+ W/O IMAGING: CPT

## 2023-11-24 PROCEDURE — 85610 PROTHROMBIN TIME: CPT | Performed by: ORTHOPAEDIC SURGERY

## 2023-11-24 RX ADMIN — IPRATROPIUM BROMIDE AND ALBUTEROL SULFATE 3 ML: 2.5; .5 SOLUTION RESPIRATORY (INHALATION) at 11:11

## 2023-11-24 RX ADMIN — IPRATROPIUM BROMIDE AND ALBUTEROL SULFATE 3 ML: 2.5; .5 SOLUTION RESPIRATORY (INHALATION) at 12:11

## 2023-11-24 RX ADMIN — DILTIAZEM HYDROCHLORIDE 30 MG: 30 TABLET, FILM COATED ORAL at 08:11

## 2023-11-24 RX ADMIN — FAMOTIDINE 20 MG: 20 TABLET ORAL at 08:11

## 2023-11-24 RX ADMIN — AMPICILLIN 2 G: 2 INJECTION, POWDER, FOR SOLUTION INTRAVENOUS at 03:11

## 2023-11-24 RX ADMIN — CHLORHEXIDINE GLUCONATE 15 ML: 1.2 RINSE ORAL at 08:11

## 2023-11-24 RX ADMIN — AMPICILLIN 2 G: 2 INJECTION, POWDER, FOR SOLUTION INTRAVENOUS at 08:11

## 2023-11-24 RX ADMIN — AMPICILLIN 2 G: 2 INJECTION, POWDER, FOR SOLUTION INTRAVENOUS at 11:11

## 2023-11-24 RX ADMIN — ENOXAPARIN SODIUM 90 MG: 100 INJECTION SUBCUTANEOUS at 08:11

## 2023-11-24 RX ADMIN — HYDROCODONE BITARTRATE AND ACETAMINOPHEN 1 TABLET: 5; 325 TABLET ORAL at 07:11

## 2023-11-24 RX ADMIN — IPRATROPIUM BROMIDE AND ALBUTEROL SULFATE 3 ML: 2.5; .5 SOLUTION RESPIRATORY (INHALATION) at 03:11

## 2023-11-24 RX ADMIN — AMIODARONE HYDROCHLORIDE 200 MG: 200 TABLET ORAL at 10:11

## 2023-11-24 RX ADMIN — FAMOTIDINE 20 MG: 20 TABLET ORAL at 10:11

## 2023-11-24 RX ADMIN — ENOXAPARIN SODIUM 90 MG: 100 INJECTION SUBCUTANEOUS at 10:11

## 2023-11-24 RX ADMIN — CHLORHEXIDINE GLUCONATE 15 ML: 1.2 RINSE ORAL at 10:11

## 2023-11-24 RX ADMIN — IPRATROPIUM BROMIDE AND ALBUTEROL SULFATE 3 ML: 2.5; .5 SOLUTION RESPIRATORY (INHALATION) at 04:11

## 2023-11-24 RX ADMIN — IPRATROPIUM BROMIDE AND ALBUTEROL SULFATE 3 ML: 2.5; .5 SOLUTION RESPIRATORY (INHALATION) at 08:11

## 2023-11-24 RX ADMIN — TAMSULOSIN HYDROCHLORIDE 0.4 MG: 0.4 CAPSULE ORAL at 10:11

## 2023-11-24 RX ADMIN — IPRATROPIUM BROMIDE AND ALBUTEROL SULFATE 3 ML: 2.5; .5 SOLUTION RESPIRATORY (INHALATION) at 07:11

## 2023-11-24 RX ADMIN — DILTIAZEM HYDROCHLORIDE 30 MG: 30 TABLET, FILM COATED ORAL at 10:11

## 2023-11-24 RX ADMIN — AMPICILLIN 2 G: 2 INJECTION, POWDER, FOR SOLUTION INTRAVENOUS at 04:11

## 2023-11-24 RX ADMIN — AMPICILLIN 2 G: 2 INJECTION, POWDER, FOR SOLUTION INTRAVENOUS at 07:11

## 2023-11-24 NOTE — CARE UPDATE
11/24/23 0725   Patient Assessment/Suction   Level of Consciousness (AVPU) alert   Respiratory Effort Mild   Expansion/Accessory Muscles/Retractions expansion symmetric   All Lung Fields Breath Sounds coarse;wheezes, expiratory   Rhythm/Pattern, Respiratory pattern regular   Cough Frequency frequent   Cough Type productive   Secretions Amount moderate   Secretions Color yellow   Secretions Characteristics thick   PRE-TX-O2   Device (Oxygen Therapy) nasal cannula   $ Is the patient on Low Flow Oxygen? Yes   Flow (L/min) 6   SpO2 (!) 93 %   Pulse 82   Resp (!) 22   Aerosol Therapy   $ Aerosol Therapy Charges Aerosol Treatment   Daily Review of Necessity (SVN) completed   Respiratory Treatment Status (SVN) given   Treatment Route (SVN) mouthpiece;oxygen   Patient Position (SVN) HOB elevated   Post Treatment Assessment (SVN) breath sounds unchanged   Signs of Intolerance (SVN) none   Breath Sounds Post-Respiratory Treatment   Throughout All Fields Post-Treatment All Fields   Throughout All Fields Post-Treatment no change   Post-treatment Heart Rate (beats/min) 83   Post-treatment Resp Rate (breaths/min) 19   Education   $ Education Bronchodilator;15 min

## 2023-11-24 NOTE — PT/OT/SLP EVAL
Occupational Therapy   Evaluation    Name: Jama James  MRN: [id number]  Admitting Diagnosis: Septic arthritis of knee, right  Recent Surgery: Procedure(s) (LRB):  ARTHROSCOPY, KNEE (Right) 2 Days Post-Op    Recommendations:     Discharge Recommendations: Moderate Intensity Therapy  Discharge Equipment Recommendations:  wheelchair  Barriers to discharge:  Decreased caregiver support    Assessment:     Jama James is a 68 y.o. male with a medical diagnosis of Septic arthritis of knee, right.   Performance deficits affecting function: weakness, impaired endurance, impaired self care skills, impaired functional mobility, gait instability, impaired balance, pain, impaired cardiopulmonary response to activity.      Limited to bed level evaluation today; pt limited by pain/fatigue after PT session and getting to the bedside commode with nursing staff prior to OT evaluation.      Rehab Prognosis: Fair; patient would benefit from acute skilled OT services to address these deficits and reach maximum level of function.       Plan:     Patient to be seen 5 x/week to address the above listed problems via self-care/home management, therapeutic activities, therapeutic exercises  Plan of Care Expires: 12/24/23  Plan of Care Reviewed with: patient, family    Subjective     Chief Complaint: R knee pain  Patient/Family Comments/goals: to go to a rehab facility    Occupational Profile:  Living Environment: Lives with his wife and a roommate in a first floor apartment; tub/shower with grab bars in tub and near toilet   Previous level of function: MI with RW for short distance functional mobility; needs assist with bathing/dressing; leaves home for  Appointments; pt does not drive; family provides transportation  Equipment Used at Home: walker, rolling, shower chair  Assistance upon Discharge: wife/family    Pain/Comfort:  Pain Rating 1: 8/10  Location - Side 1: Right  Location - Orientation 1: generalized  Location 1: knee  Pain  Addressed 1: Nurse notified  Pain Rating Post-Intervention 1: 8/10    Objective:     Communicated with: nurse prior to session.  Patient found supine with telemetry, oxygen upon OT entry to room.    General Precautions: Standard, fall  Orthopedic Precautions: RLE weight bearing as tolerated  Braces: N/A  Respiratory Status: Nasal cannula, flow 6 L/min    Occupational Performance:    Activities of Daily Living:  Grooming: supervision HOB elevated  Lower Body Dressing: total assistance bed level     Cognitive/Visual Perceptual:  Cognitive/Psychosocial Skills:     -       Oriented to: Person, Place, Time, and Situation   -       Follows Commands/attention:Follows multistep  commands    Physical Exam:  Upper Extremity Range of Motion:     -       Right Upper Extremity: WFL except at shoulder; limited to ~90 degrees flexion at shoulder  -       Left Upper Extremity: WFL  Upper Extremity Strength:    -       Right Upper Extremity: WFL  -       Left Upper Extremity: WFL   Strength:    -       Right Upper Extremity: WFL  -       Left Upper Extremity: WFL  Fine Motor Coordination:    -       Intact  Gross motor coordination:   WFL    AMPAC 6 Click ADL:  AMPAC Total Score: 16    Treatment & Education:  Pt educated on OT role/POC    Patient left HOB elevated with all lines intact, call button in reach, and bed alarm on    GOALS:   Multidisciplinary Problems       Occupational Therapy Goals          Problem: Occupational Therapy    Goal Priority Disciplines Outcome Interventions   Occupational Therapy Goal     OT, PT/OT     Description: Goals to be met by: 12/24/23     Patient will increase functional independence with ADLs by performing:    UE Dressing with Set-up Assistance.  LE Dressing with Minimal Assistance and Assistive Devices as needed.  Grooming while seated at sink with Supervision.  Toileting from toilet with Minimal Assistance for hygiene and clothing management.   Toilet transfer to toilet with Minimal  Assistance.                         History:     Past Medical History:   Diagnosis Date    Abnormal CT scan 7/6/2023    Abnormal positron emission tomography (PET) scan 7/6/2023    Anxiety     Asbestosis 08/04/2015    Atrial fibrillation     Bilateral adrenal adenomas     COPD (chronic obstructive pulmonary disease)     COVID-19 virus infection 07/28/2022    Depression     Elevated PSA 7/6/2023    High cholesterol     HTN (hypertension)     Malignant neoplasm of prostate     Multiple lung nodules     Nodule of upper lobe of left lung 03/30/2023    6 mm spiculated on cta chest 3/21/23    On home oxygen therapy 05/24/2023    Pneumonia     Prostate cancer 5/27/2020    Prostate cancer metastatic to bone 7/6/2023    Prostate cancer metastatic to lung 7/6/2023    Ulcerative colitis          Past Surgical History:   Procedure Laterality Date    ARTHROSCOPY OF KNEE Right 11/22/2023    Procedure: ARTHROSCOPY, KNEE;  Surgeon: Harry Julien MD;  Location: [facility] OR;  Service: Orthopedics;  Laterality: Right;    NECK SURGERY      right knee      TRANSRECTAL BIOPSY OF PROSTATE WITH ULTRASOUND GUIDANCE Bilateral 3/18/2020    Procedure: BIOPSY, PROSTATE, RECTAL APPROACH, WITH US GUIDANCE;  Surgeon: Bill Jeong MD;  Location: [facility] OR;  Service: Urology;  Laterality: Bilateral;       Time Tracking:     OT Date of Treatment: 11/24/23  OT Start Time: 1254  OT Stop Time: 1310  OT Total Time (min): 16 min    Billable Minutes:Evaluation 08  Self Care/Home Management 08    11/24/2023

## 2023-11-24 NOTE — PLAN OF CARE
Noted recommendation for moderate intensity therapy. Met with patient at bedside, discussed disposition of LTAC or SNF and patient is in agreement with either and will accept where every approved. Obtained signed patient choice form, form scanned into , and referrals sent via careport to (for LTAC) [facility], [facility]; (for SNF) referrals to [facility], [facility], [facility], [facility], [facility], [facility], [facility], and [facility].        11/24/23 1301   Post-Acute Status   Post-Acute Authorization Placement   Post-Acute Placement Status Referrals Sent   Coverage [employer]   Discharge Delays None known at this time   Discharge Plan   Discharge Plan A Long-term acute care facility (LTAC)   Discharge Plan B Skilled Nursing Facility

## 2023-11-24 NOTE — PROGRESS NOTES
On license of [facility] Medicine  Progress Note    Patient name: Jama James  MRN: [id number]  Admit Date: 11/21/2023   LOS: 2 days     SUBJECTIVE:     Principal problem: Septic arthritis of knee, right    HPI: 68 year old pt getting transferred from [place] with R septic arthritis and Streptococcus bacteremia  One week ago pt went to [place] ER with painful swollen Knee. Arthrocentesis was done and pt was discharged to home Synovial fluid Grew E Fecalis. He was called back to ER  and blood culture was done  Pt was evaluated by Ortho MD lisa evans decision was made to transfer pt here because of his co morbid complex disaese/factors. Blood culture done grew gram positive cocci.     Hospital course:   Patient admitted with sepsis secondary to enterococcus fecalis bacteremia, right KJ septic arthritis, post tap 11/15 & 11/21 at [place], washout 11/22, R knee fluid cultures Enterococcus faecalis, +crystals, Blood cultures 11/20 4/4 bottles Enterococcus faecalis, R knee fluid 11/21 WBC 04910, PMN: 94%, negative crystals. Echo 11/21 all valves visualized, no mention of vegetations. Patient underwent arthroscopic washout again 11/22. Patient was initially on vancomycin and cefepime, ID consulted and switched to IV Ampicillin. Rpt blood culture 11/21 negative.     Interval History:  Patient is awake and answer questions today. He just walked few steps with PT and states that there is severe pain on the right knee. Otherwise afebrile and no other complains.     Scheduled Meds:   albuterol-ipratropium  3 mL Nebulization Q4H    amiodarone  200 mg Oral Daily    ampicillin IVPB  2 g Intravenous Q4H    chlorhexidine  15 mL Mouth/Throat BID    diltiaZEM  30 mg Oral Q12H    enoxparin  1 mg/kg Subcutaneous Q12H (treatment, non-standard time)    famotidine  20 mg Oral BID    tamsulosin  0.4 mg Oral Daily     Continuous Infusions:  PRN Meds:acetaminophen, HYDROcodone-acetaminophen, magnesium oxide, magnesium oxide,  ondansetron, potassium bicarbonate, potassium bicarbonate, potassium bicarbonate, potassium, sodium phosphates, potassium, sodium phosphates, potassium, sodium phosphates    Review of patient's allergies indicates:  No Known Allergies    Review of Systems  As per subjective    OBJECTIVE:     Vital Signs (Most Recent)  Temp: 98 °F (36.7 °C) (11/24/23 0721)  Pulse: 82 (11/24/23 0725)  Resp: (!) 22 (11/24/23 0725)  BP: (!) 149/73 (11/24/23 0721)  SpO2: (!) 93 % (11/24/23 0725)    Vital Signs Range (Last 24H):  Temp:  [97.9 °F (36.6 °C)-98.9 °F (37.2 °C)]   Pulse:  []   Resp:  [1-24]   BP: (130-165)/(62-77)   SpO2:  [90 %-97 %]     I & O (Last 24H):  Intake/Output Summary (Last 24 hours) at 11/24/2023 1017  Last data filed at 11/24/2023 0841  Gross per 24 hour   Intake 1220 ml   Output 1700 ml   Net -480 ml         Physical Exam:  General: Patient resting comfortably in no acute distress. Appears as stated age. Calm  Eyes: EOM intact. No conjunctivae injection. No scleral icterus.  ENT: Hearing grossly intact. No discharge from ears. No nasal discharge.   CVS: RRR. No LE edema BL.  Lungs: CTA BL, no wheezing or crackles. Good breath sounds. No accessory muscle use. No acute respiratory distress  Neuro: Alert. GCS 15. Cranial nerves grossly intact. Moves all extremities equally. Follows commands. Responds appropriately   MSK: right knee on ace wrap     Laboratory:  All pertinent labs within the past 24 hours have been reviewed.  CBC:   Recent Labs   Lab 11/23/23 0419 11/24/23  0500   WBC 6.66 6.97   HGB 8.4* 7.8*   HCT 28.6* 26.9*    330       CMP:   Recent Labs   Lab 11/23/23 0419 11/24/23  0500    146*   K 3.1* 3.1*   CL 99 98   CO2 39* 41*   * 150*   BUN 6* 6*   CREATININE 0.4* 0.4*   CALCIUM 9.3 9.1   PROT 6.2 5.8*   ALBUMIN 3.0* 2.8*   BILITOT 0.3 0.2   ALKPHOS 98 86   AST 9* 8*   ALT 8* 8*   ANIONGAP 7* 7*         Microbiology Results (last 7 days)       Procedure Component Value Units  Date/Time    Aerobic culture [[id number]]  (Abnormal) Collected: 11/22/23 0955    Order Status: Completed Specimen: Synovial Fluid from Knee, Right Updated: 11/24/23 0829     Aerobic Bacterial Culture ENTEROCOCCUS SPECIES  From broth only  Identification and susceptibility pending      Narrative:      Right knee fluid    Gram stain [[id number]] Collected: 11/22/23 0955    Order Status: Completed Specimen: Synovial Fluid from Knee, Right Updated: 11/23/23 1459     Gram Stain Result Rare WBC's      No organisms seen    Narrative:      Right knee fluid    AFB Culture & Smear [[id number]] Collected: 11/22/23 0955    Order Status: Sent Specimen: Wound from Knee, Right Updated: 11/22/23 1025    Culture, Anaerobe [[id number]] Collected: 11/22/23 0955    Order Status: Sent Specimen: Wound from Knee, Right Updated: 11/22/23 1024    Fungus culture [[id number]] Collected: 11/22/23 0955    Order Status: Sent Specimen: Wound from Knee, Right Updated: 11/22/23 1023    Culture, Anaerobe [[id number]]     Order Status: Canceled Specimen: Body Fluid from Knee, Right              Diagnostic Results:      ASSESSMENT/PLAN:     Sepsis secondary to enterococcus fecalis bacteremia secondary to right knee joint septic arthritis   S/p arthroscopic wash out 11/15 and 11/21 at [place] and 11/22 at [facility].   Blood cultures 11/20 4/4 bottles Enterococcus faecalis   R knee fluid 11/21 WBC 34386, PMN: 94%, negative crystals  Echo 11/21 all valves visualized, no mention of vegetations  Rpt blood cultures 4/4 no growth so far   - ID Following, cont IV Ampicillin, likely would need 6 weeks    - Follow OR cultures  - Pain control with Norco   - PT, OT ordered today, may need SNF     Paroxysmal Afib   Secondary hypercoagulable state   - Cont amiodarone and Cardizem   - Holding Eliquis, started therapeutic dose Lovenox, change back to Eliquis on discharge     Chronic hypoxic respiratory failure   COPD: not in exacerbation   - changes duoneb to scheduled.      Prostate cancer  Prostate carcinoma with metastatic disease to cervical, thoracic, abdominal and pelvic lymph nodes as well as metastatic disease to bone.   On Prednisone/Lupron and zytiga       VTE Risk Mitigation (From admission, onward)           Ordered     enoxaparin injection 90 mg  Every 12 hours         11/23/23 0732     IP VTE HIGH RISK PATIENT  Once         11/22/23 1919     Place sequential compression device  Until discontinued         11/21/23 7380                          Department Hospital Medicine  [facility]  Celio Roberts MD  Date of service: 11/24/2023

## 2023-11-24 NOTE — PROGRESS NOTES
[facility]  Department of Infectious Disease  Progress Note        PATIENT NAME: Jama James  MRN: [id number]  TODAY'S DATE: 11/24/2023  ADMIT DATE: 11/21/2023    PRINCIPLE PROBLEM: Septic arthritis of knee, right    INTERVAL HISTORY      11/24:  Interim reviewed, patient seen examined at bedside.  Awake, alert, on O2 by NC 4 L.  He complains of mild right knee pain postop.  His appetite is good, adequate urinary output, having regular bowel movements.  Labs reviewed, white count 6.9, no differential, H&H 7.8/26.9, platelet count 330.  Hypernatremia 136, hypokalemia 3.1, creatinine 0.4, AST 8/ALT 8, CRP down to 179.  Discussed with hospitalist, plan for LTAC to continue IV antibiotics.  Micro reviewed, repeat blood cultures x2 sets 11/21 no growth to date, pending final.  Right knee fluid from 11/21 grew Enterococcus faecalis.  OR cultures 11/22 Enterococcus species, likely faecalis.    Antibiotics (From admission, onward)      Start     Stop Route Frequency Ordered    11/22/23 0930  ampicillin 2 g in sodium chloride 0.9 % 100 mL IVPB (ready to mix)         -- IV Every 4 hours (non-standard times) 11/22/23 0819              ASSESSMENT and PLAN       Sepsis secondary to Enterococcus faecalis bacteremia in the setting of right septic knee status post tap 11/15 & 11/21 at [place], washout 11/22  R knee fluid cultures Enterococcus faecalis, +crystals  Blood cultures 11/20 4/4 bottles Enterococcus faecalis   R knee fluid 11/21 WBC 87020, PMN: 94%, negative crystals - culture Enterococcus spp likely faecalis   Echo 11/21 all valves visualized, no mention of vegetations     PMHx: COPD on home O2, metastatic prostate cancer, HTN, HLD, AFib on Eliquis      Recommendations:    PICC line placed   Continue ampicillin 2 g IV q.4 for Enterococcus faecalis for 4 weeks in the setting of right septic knee, estimated end date 12/20/2023   Weekly lab CBC with diff, CMP, CRP while on antibiotics   Please remove PICC line  after completing treatment  Patient needs follow-up outpatient with Ortho   Follow-up ID clinic/any provider after LTAC   Aspiration precautions  Monitor O2 sats      D/w patient, Dr Roberts     Will sign off, call us back with any questions     Thank you for this consult. Please send [facility] secure chat with any questions.      SUBJECTIVE        Review of Systems  Review of systems obtained and negative except as stated above in Interval History      OBJECTIVE     Temp:  [98 °F (36.7 °C)-98.9 °F (37.2 °C)] 98 °F (36.7 °C)  Pulse:  [] 82  Resp:  [1-24] 19  SpO2:  [91 %-97 %] 93 %  BP: (130-165)/(62-77) 149/73    Physical Exam  General:  Male long beard, awake, alert, on O2 by NC 4 L  Eyes: Eyes with no icterus or injection. Vision grossly normal  Ears: Hearing grossly normal.  Nose: Nares patent  Mouth: Moist mucous membranes, dentition is very poor, missing multiple teeth, poor oral hygiene. No ulcerations, erythema or exudates.  Neck: Supple, no tenderness to palpation.  Cardiovascular: Regular rate and rhythm, no murmurs, no edema.    Respiratory:  Mostly clear to auscultation b/l  Gastrointestinal:  Soft with active bowel sounds, no tenderness to palpation, no distention.  Genitourinary:  No suprapubic tenderness.  Musculoskeletal:  R knee with dressing in place, not disturbed. Moves other extremities with good strength.    Skin:  Warm and dry, ecchymosis UE b/l and L hip  Neuro:   Oriented, conversant, follows commands.  Psych: Good mood, normal affect.      Wounds:     L hip     VAD: L PICC line   ISOLATION: None    CBC LAST 7 DAYS  Recent Labs   Lab 11/20/23  1511 11/21/23  0527 11/21/23  1301 11/22/23  0520 11/23/23  0419 11/24/23  0500   WBC 6.78 5.50  --  6.52 6.66 6.97   RBC 3.56* 3.43*  --  3.53* 3.06* 2.87*   HGB 9.8* 9.5*  --  9.9* 8.4* 7.8*   HCT 32.3* 31.5*  --  32.6* 28.6* 26.9*   MCV 91 92  --  92 94 94   MCH 27.5 27.7  --  28.0 27.5 27.2   MCHC 30.3* 30.2*  --  30.4* 29.4* 29.0*  "  RDW 14.0 14.1  --  14.1 14.3 14.1    355  --  432 360 330   MPV 9.6 8.9*  --  9.7 9.4 9.5   GRAN 73.6*  5.0 72.3  4.0  --   --   --   --    LYMPH 10.9*  0.7* 9.1*  0.5*  --   --   --   --    MONO 13.0  0.9 15.1*  0.8  --   --   --   --    BASO 0.02 0.02 0  --   --   --    NRBC 0 0  --   --   --   --        CHEMISTRY LAST 7 DAYS  Recent Labs   Lab 11/20/23  1511 11/21/23  0527 11/22/23  0520 11/23/23 0419 11/24/23  0500    144 139 145 146*   K 3.6 3.7 3.2* 3.1* 3.1*   CL 97 98 97 99 98   CO2 33* 34* 40* 39* 41*   ANIONGAP 12 12 2* 7* 7*   BUN 7* 6* 7* 6* 6*   CREATININE 0.6 0.6 0.5 0.4* 0.4*   * 114* 171* 135* 150*   CALCIUM 9.7 9.8 9.5 9.3 9.1   MG  --  2.1  --   --   --    ALBUMIN 2.6* 2.5* 3.3* 3.0* 2.8*   PROT 6.9 7.1 6.9 6.2 5.8*   ALKPHOS 129 129 107 98 86   ALT 10 11 9* 8* 8*   AST 11 11 11 9* 8*   BILITOT 0.4 0.3 0.3 0.3 0.2       Estimated Creatinine Clearance: 228.9 mL/min (A) (based on SCr of 0.4 mg/dL (L)).    INFLAMMATORY/PROCAL  LAST 7 DAYS  Recent Labs   Lab 11/20/23  1511 11/22/23  0520 11/23/23  0419 11/24/23  0500   PROCAL  --  0.137  --   --    .4* 231.5* 209.5* 179.0*     No results found for: "ESR"  CRP   Date Value Ref Range Status   11/24/2023 179.0 (H) 0.0 - 8.2 mg/L Final   11/23/2023 209.5 (H) 0.0 - 8.2 mg/L Final   11/22/2023 231.5 (H) 0.0 - 8.2 mg/L Final   11/20/2023 259.4 (H) 0.0 - 8.2 mg/L Final       LAST 7 DAYS MICROBIOLOGY   Microbiology Results (last 7 days)       Procedure Component Value Units Date/Time    Aerobic culture [[id number]]  (Abnormal) Collected: 11/22/23 0955    Order Status: Completed Specimen: Synovial Fluid from Knee, Right Updated: 11/24/23 0829     Aerobic Bacterial Culture ENTEROCOCCUS SPECIES  From broth only  Identification and susceptibility pending      Narrative:      Right knee fluid    Gram stain [[id number]] Collected: 11/22/23 0955    Order Status: Completed Specimen: Synovial Fluid from Knee, Right Updated: 11/23/23 " 1459     Gram Stain Result Rare WBC's      No organisms seen    Narrative:      Right knee fluid    AFB Culture & Smear [[id number]] Collected: 11/22/23 0955    Order Status: Sent Specimen: Wound from Knee, Right Updated: 11/22/23 1025    Culture, Anaerobe [[id number]] Collected: 11/22/23 0955    Order Status: Sent Specimen: Wound from Knee, Right Updated: 11/22/23 1024    Fungus culture [[id number]] Collected: 11/22/23 0955    Order Status: Sent Specimen: Wound from Knee, Right Updated: 11/22/23 1023    Culture, Anaerobe [[id number]]     Order Status: Canceled Specimen: Body Fluid from Knee, Right               Latest Reference Range & Units 11/21/23 13:01   Fluid Color   Yellow   Fluid Appearance   Cloudy   WBC, Body Fluid /cu mm 24665   Body Fluid Type   Joint   Segs, Fluid % 94   Lymphs, Fluid % 3   Monocytes/Macrophages, Fluid % 3   Eos, Fluid % 0   Baso, Fluid % 0   Mesothelial Cells, Fluid % 0   Other Cells, Fluid 0 % 0        Latest Reference Range & Units 11/15/23 12:55 11/21/23 13:01   Body Fluid Crystal Negative  Positive ! Negative (C)   Body Fluid Source, Crystal Exam   Joint Joint   Mesothelial Cells, Fluid %   0         SUSCEPTIBILITY  Susceptibility data from last 90 days.  Collected Specimen Info Organism Ampicillin Genatmicin Synergy Screen Vancomycin   11/22/23 Synovial Fluid from Knee, Right Enterococcus species      11/21/23 Joint Fluid from Knee, Right Enterococcus faecalis      11/21/23 Joint Fluid from Knee, Right Culture in progress      11/20/23 Blood from Peripheral, Hand, Right Enterococcus faecalis      11/20/23 Blood from Peripheral, Forearm, Left Enterococcus faecalis      11/15/23 Body Fluid from Knee, Right Enterococcus faecalis  S  R  S       CURRENT/PREVIOUS VISIT EKG  Results for orders placed or performed in visit on 11/20/23   EKG 12-lead    Collection Time: 11/20/23  9:38 PM    Narrative    Test Reason :     Vent. Rate : 098 BPM     Atrial Rate : 098 BPM     P-R Int : 192 ms           QRS Dur : 084 ms      QT Int : 384 ms       P-R-T Axes : 067 018 059 degrees     QTc Int : 490 ms    Normal sinus rhythm  Possible Left atrial enlargement  Prolonged QT  Abnormal ECG    Confirmed by Yosi Danielle MD ([id number]) on 11/21/2023 12:59:05 PM    Referred By: KRIS   SELF           Confirmed By:Yosi Danielle MD       Significant Labs: All pertinent labs within the past 24 hours have been reviewed.     Significant Imaging: I have reviewed all relevant and available imaging results/findings within the past 24 hours.    ECHO 11/21 at [place]:       Left Ventricle The left ventricle is normal in size. Mildly increased wall thickness. Normal wall motion. There is normal systolic function with a visually estimated ejection fraction of 55 - 70%. Ejection fraction by visual approximation is 70%. Global longitudinal strain is -16%. Reduced. There is normal diastolic function.   Right Ventricle Normal right ventricular cavity size. Systolic function is normal. TAPSE is 2.50 cm.   Left Atrium Left atrium is moderately dilated.   Right Atrium Normal right atrial size.   Aortic Valve The aortic valve is a trileaflet valve. There is normal leaflet mobility. Aortic valve peak velocity is 1.49 m/s. Mean gradient is 5 mmHg.   Mitral Valve The mitral valve is structurally normal. There is normal leaflet mobility. The mean pressure gradient across the mitral valve is 4 mmHg at a heart rate of  bpm. There is trace regurgitation.   Tricuspid Valve The tricuspid valve is structurally normal. There is normal leaflet mobility. There is trace regurgitation.   Pulmonic Valve The pulmonic valve is structurally normal.   IVC/SVC Normal venous pressure at 3 mmHg.   Ascending Aorta Aortic annulus is normal in size measuring 3.17 cm. Ascending aorta is normal measuring 2.75 cm. Descending aorta is normal.   Pericardium and Other Findings Epicardial fat appears visualized. There is a trivial effusion. No indication of cardiac tamponade.    Pulmonary Artery The estimated pulmonary artery systolic pressure is 13 mmHg.     R knee x-ray 11/20:  There are tricompartmental osteoarthritic changes of the right knee with a small joint effusion.       Chloe Woods MD  Date of Service: 11/24/2023  2:33 PM

## 2023-11-24 NOTE — PT/OT/SLP EVAL
Physical Therapy Evaluation    Patient Name:  Jama James   MRN:  [id number]    Recommendations:     Discharge Recommendations: Moderate Intensity Therapy   Discharge Equipment Recommendations: none   Barriers to discharge:  unable to tolerate R LE WB, increase assist with mobility, high fall risk, decrease activity tolerance, R knee pain     Assessment:     Jama James is a 68 y.o. male admitted with a medical diagnosis of Septic arthritis of knee, right.  He presents with the following impairments/functional limitations: weakness, impaired endurance, impaired self care skills, impaired functional mobility, gait instability, impaired balance, decreased lower extremity function, decreased safety awareness, pain, impaired cardiopulmonary response to activity, orthopedic precautions.    Pt found in bed with HOB elevated. Pt agreeable to visit. Pt requires min A with bed mobility. Good sitting balance at EOB. Pt required multiple attempt and elevated EOB to achieve static stand at RW. Pt unable to tolerate R LE WB to assist with transfer increasing assist required. Pt tolerate lateral hopping along EOB  x 3 hops as patient continue to be unable to tolerate any weight on R LE. Short of breath post mobility due to pain    Rehab Prognosis: Fair; patient would benefit from acute skilled PT services to address these deficits and reach maximum level of function.    Recent Surgery: Procedure(s) (LRB):  ARTHROSCOPY, KNEE (Right) 2 Days Post-Op    Plan:     During this hospitalization, patient to be seen 6 x/week to address the identified rehab impairments via gait training, therapeutic activities, therapeutic exercises, neuromuscular re-education and progress toward the following goals:    Plan of Care Expires:  12/24/23    Subjective     Chief Complaint: R knee pain  Patient/Family Comments/goals: pain management  Pain/Comfort:  Pain Rating 1: 8/10  Location - Side 1: Right  Location 1: knee  Pain Addressed 1: Reposition,  Distraction, Cessation of Activity    Patients cultural, spiritual, Roman [employer] conflicts given the current situation: no    Living Environment:  Pt lives with his wife in a first floor apartment. No BROOK. 3L O2 via NC at home.   Prior to admission, patients level of function was MI Rollator.  Equipment used at home: oxygen, shower chair, rollator, cane, straight, walker, rolling.  DME owned (not currently used): none.  Upon discharge, patient will have assistance from wife.    Objective:     Communicated with RN prior to session.  Patient found HOB elevated with peripheral IV, telemetry  upon PT entry to room.    General Precautions: Standard, fall  Orthopedic Precautions:RLE weight bearing as tolerated   Braces:  (ace wrap around right knee)  Respiratory Status: Nasal cannula, flow 6 L/min    Exams:  RLE ROM: hip and knee WFL, unable to formally assess knee due to ace wrap and appear to present with decrease knee extension  RLE Strength: hip flexion and ankle DF 3-/5, unable to assess knee due to pain  LLE ROM: WFL  LLE Strength: WFL    Functional Mobility:  Bed Mobility:     Supine to Sit: minimum assistance  Sit to Supine: moderate assistance  Transfers:     Sit to Stand:  maximal assistance and of 2 persons with rolling walker      AM-PAC 6 CLICK MOBILITY  Total Score:11       Treatment & Education:  Pt educated on POC, discharge recommendation, WBAT R LE, importance of time OOB, need for assist with mobility, use of call bell to seek assistance as needed and fall prevention      Patient left HOB elevated with all lines intact, call button in reach, and bed alarm on.    GOALS:   Multidisciplinary Problems       Physical Therapy Goals          Problem: Physical Therapy    Goal Priority Disciplines Outcome Goal Variances Interventions   Physical Therapy Goal     PT, PT/OT Ongoing, Progressing     Description: Goals to be met by: 12/24/23     Patient will increase functional independence with mobility by  performin. Supine to sit with Supervision  2. Sit to stand transfer with Supervision  3. Bed to chair transfer with Supervision using Rolling Walker  4. Gait  x 150 feet with Supervision using Rolling Walker.                              History:     Past Medical History:   Diagnosis Date    Abnormal CT scan 2023    Abnormal positron emission tomography (PET) scan 2023    Anxiety     Asbestosis 2015    Atrial fibrillation     Bilateral adrenal adenomas     COPD (chronic obstructive pulmonary disease)     COVID-19 virus infection 2022    Depression     Elevated PSA 2023    High cholesterol     HTN (hypertension)     Malignant neoplasm of prostate     Multiple lung nodules     Nodule of upper lobe of left lung 2023    6 mm spiculated on cta chest 3/21/23    On home oxygen therapy 2023    Pneumonia     Prostate cancer 2020    Prostate cancer metastatic to bone 2023    Prostate cancer metastatic to lung 2023    Ulcerative colitis        Past Surgical History:   Procedure Laterality Date    ARTHROSCOPY OF KNEE Right 2023    Procedure: ARTHROSCOPY, KNEE;  Surgeon: Harry Julien MD;  Location: [facility] OR;  Service: Orthopedics;  Laterality: Right;    NECK SURGERY      right knee      TRANSRECTAL BIOPSY OF PROSTATE WITH ULTRASOUND GUIDANCE Bilateral 3/18/2020    Procedure: BIOPSY, PROSTATE, RECTAL APPROACH, WITH US GUIDANCE;  Surgeon: Bill Jeong MD;  Location: [facility] OR;  Service: Urology;  Laterality: Bilateral;       Time Tracking:     PT Received On: 23  PT Start Time: 0845     PT Stop Time: 0859  PT Total Time (min): 14 min     Billable Minutes: Evaluation 6 and Therapeutic Activity 8      2023

## 2023-11-24 NOTE — CONSULTS
PICC  Location procedure was performed: [address] MEDICAL SURGICAL UNIT  Performed by: Chitra Burnett RN  Consent Done: Yes  Time out: Immediately prior to procedure a time out was called to verify the correct patient, procedure, equipment, support staff and site/side marked as required  Indications: med administration and vascular access  Anesthesia: local infiltration  Local anesthetic: lidocaine 1% without epinephrine  Anesthetic Total (mL): 8  Preparation: skin prepped with ChloraPrep  Skin prep agent dried: skin prep agent completely dried prior to procedure  Sterile barriers: all five maximum sterile barriers used - cap, mask, sterile gown, sterile gloves, and large sterile sheet  Hand hygiene: hand hygiene performed prior to central venous catheter insertion  Location details: left basilic  Catheter type: double lumen  Catheter size: 5 Fr  Catheter Length: 36cm    Ultrasound guidance: yes  Vessel Caliber: medium and patent, compressibility normal  Vascular Doppler: not done  Needle advanced into vessel with real time Ultrasound guidance.  Guidewire confirmed in vessel.  Sterile sheath used.  no esophageal manometryNumber of attempts: 1  Post-procedure: blood return through all ports, chlorhexidine patch and sterile dressing applied  Estimated blood loss (mL): 5  Specimens: No  Implants: No  Assessment: placement verified by x-ray

## 2023-11-24 NOTE — PLAN OF CARE
Notified via [facility]port that [facility] interested and reviewing for acceptance;they are also reaching out to patient and family. Will continue to follow and update as appropriate.

## 2023-11-25 PROBLEM — J96.22 ACUTE ON CHRONIC RESPIRATORY FAILURE WITH HYPOXIA AND HYPERCAPNIA: Status: ACTIVE | Noted: 2023-11-25

## 2023-11-25 PROBLEM — J96.21 ACUTE ON CHRONIC RESPIRATORY FAILURE WITH HYPOXIA AND HYPERCAPNIA: Status: ACTIVE | Noted: 2023-11-25

## 2023-11-25 LAB
ALBUMIN SERPL BCP-MCNC: 3 G/DL (ref 3.5–5.2)
ALLENS TEST: ABNORMAL
ALLENS TEST: ABNORMAL
ALP SERPL-CCNC: 92 U/L (ref 55–135)
ALT SERPL W/O P-5'-P-CCNC: 8 U/L (ref 10–44)
ANION GAP SERPL CALC-SCNC: 5 MMOL/L (ref 8–16)
AST SERPL-CCNC: 10 U/L (ref 10–40)
BACTERIA BLD CULT: ABNORMAL
BACTERIA FLD AEROBE CULT: ABNORMAL
BACTERIA SPEC AEROBE CULT: ABNORMAL
BACTERIA SPEC ANAEROBE CULT: NORMAL
BASOPHILS # BLD AUTO: 0.04 K/UL (ref 0–0.2)
BASOPHILS NFR BLD: 0.5 % (ref 0–1.9)
BILIRUB SERPL-MCNC: 0.3 MG/DL (ref 0.1–1)
BUN SERPL-MCNC: 6 MG/DL (ref 8–23)
CALCIUM SERPL-MCNC: 9.6 MG/DL (ref 8.7–10.5)
CHLORIDE SERPL-SCNC: 95 MMOL/L (ref 95–110)
CO2 SERPL-SCNC: >45 MMOL/L (ref 23–29)
CREAT SERPL-MCNC: 0.4 MG/DL (ref 0.5–1.4)
CRP SERPL-MCNC: 191 MG/L (ref 0–8.2)
DELSYS: ABNORMAL
DELSYS: ABNORMAL
DIFFERENTIAL METHOD: ABNORMAL
EOSINOPHIL # BLD AUTO: 0.2 K/UL (ref 0–0.5)
EOSINOPHIL NFR BLD: 2.4 % (ref 0–8)
EP: 8
ERYTHROCYTE [DISTWIDTH] IN BLOOD BY AUTOMATED COUNT: 14.6 % (ref 11.5–14.5)
EST. GFR  (NO RACE VARIABLE): >60 ML/MIN/1.73 M^2
FIO2: 35
FLOW: 4
GLUCOSE SERPL-MCNC: 129 MG/DL (ref 70–110)
GLUCOSE SERPL-MCNC: 156 MG/DL (ref 70–110)
GRAM STN SPEC: ABNORMAL
GRAM STN SPEC: ABNORMAL
HCO3 UR-SCNC: 49.1 MMOL/L (ref 24–28)
HCO3 UR-SCNC: 49.9 MMOL/L (ref 24–28)
HCT VFR BLD AUTO: 30.4 % (ref 40–54)
HCT VFR BLD CALC: 25 %PCV (ref 36–54)
HGB BLD-MCNC: 9 G/DL (ref 14–18)
IMM GRANULOCYTES # BLD AUTO: 0.05 K/UL (ref 0–0.04)
IMM GRANULOCYTES NFR BLD AUTO: 0.6 % (ref 0–0.5)
IP: 14
LYMPHOCYTES # BLD AUTO: 1 K/UL (ref 1–4.8)
LYMPHOCYTES NFR BLD: 11.5 % (ref 18–48)
MCH RBC QN AUTO: 27.6 PG (ref 27–31)
MCHC RBC AUTO-ENTMCNC: 29.6 G/DL (ref 32–36)
MCV RBC AUTO: 93 FL (ref 82–98)
MODE: ABNORMAL
MODE: ABNORMAL
MONOCYTES # BLD AUTO: 0.9 K/UL (ref 0.3–1)
MONOCYTES NFR BLD: 10.7 % (ref 4–15)
NEUTROPHILS # BLD AUTO: 6.5 K/UL (ref 1.8–7.7)
NEUTROPHILS NFR BLD: 74.3 % (ref 38–73)
NRBC BLD-RTO: 0 /100 WBC
PCO2 BLDA: 79.6 MMHG (ref 35–45)
PCO2 BLDA: 83.7 MMHG (ref 35–45)
PH SMN: 7.38 [PH] (ref 7.35–7.45)
PH SMN: 7.4 [PH] (ref 7.35–7.45)
PLATELET # BLD AUTO: 399 K/UL (ref 150–450)
PMV BLD AUTO: 10.5 FL (ref 9.2–12.9)
PO2 BLDA: 124 MMHG (ref 80–100)
PO2 BLDA: 86 MMHG (ref 80–100)
POC BE: 24 MMOL/L
POC BE: 25 MMOL/L
POC IONIZED CALCIUM: 1.23 MMOL/L (ref 1.06–1.42)
POC SATURATED O2: 96 % (ref 95–100)
POC SATURATED O2: 98 % (ref 95–100)
POC TCO2: >50 MMOL/L (ref 23–27)
POC TCO2: >50 MMOL/L (ref 23–27)
POTASSIUM BLD-SCNC: 3.5 MMOL/L (ref 3.5–5.1)
POTASSIUM SERPL-SCNC: 3.3 MMOL/L (ref 3.5–5.1)
PROT SERPL-MCNC: 6.5 G/DL (ref 6–8.4)
RBC # BLD AUTO: 3.26 M/UL (ref 4.6–6.2)
SAMPLE: ABNORMAL
SAMPLE: ABNORMAL
SITE: ABNORMAL
SITE: ABNORMAL
SODIUM BLD-SCNC: 140 MMOL/L (ref 136–145)
SODIUM SERPL-SCNC: 145 MMOL/L (ref 136–145)
WBC # BLD AUTO: 8.76 K/UL (ref 3.9–12.7)

## 2023-11-25 PROCEDURE — 80053 COMPREHEN METABOLIC PANEL: CPT | Performed by: ORTHOPAEDIC SURGERY

## 2023-11-25 PROCEDURE — 99900035 HC TECH TIME PER 15 MIN (STAT)

## 2023-11-25 PROCEDURE — 82803 BLOOD GASES ANY COMBINATION: CPT

## 2023-11-25 PROCEDURE — 25000003 PHARM REV CODE 250: Performed by: ORTHOPAEDIC SURGERY

## 2023-11-25 PROCEDURE — 94799 UNLISTED PULMONARY SVC/PX: CPT

## 2023-11-25 PROCEDURE — 36415 COLL VENOUS BLD VENIPUNCTURE: CPT | Performed by: STUDENT IN AN ORGANIZED HEALTH CARE EDUCATION/TRAINING PROGRAM

## 2023-11-25 PROCEDURE — 99900031 HC PATIENT EDUCATION (STAT)

## 2023-11-25 PROCEDURE — 85025 COMPLETE CBC W/AUTO DIFF WBC: CPT | Performed by: STUDENT IN AN ORGANIZED HEALTH CARE EDUCATION/TRAINING PROGRAM

## 2023-11-25 PROCEDURE — 86140 C-REACTIVE PROTEIN: CPT | Performed by: ORTHOPAEDIC SURGERY

## 2023-11-25 PROCEDURE — 63600175 PHARM REV CODE 636 W HCPCS: Mod: UD | Performed by: STUDENT IN AN ORGANIZED HEALTH CARE EDUCATION/TRAINING PROGRAM

## 2023-11-25 PROCEDURE — 25000242 PHARM REV CODE 250 ALT 637 W/ HCPCS: Performed by: INTERNAL MEDICINE

## 2023-11-25 PROCEDURE — 21000000 HC CCU ICU ROOM CHARGE

## 2023-11-25 PROCEDURE — 94640 AIRWAY INHALATION TREATMENT: CPT

## 2023-11-25 PROCEDURE — 63600175 PHARM REV CODE 636 W HCPCS: Performed by: ORTHOPAEDIC SURGERY

## 2023-11-25 PROCEDURE — 27000221 HC OXYGEN, UP TO 24 HOURS

## 2023-11-25 PROCEDURE — 94761 N-INVAS EAR/PLS OXIMETRY MLT: CPT | Mod: XB

## 2023-11-25 PROCEDURE — 25000003 PHARM REV CODE 250: Performed by: STUDENT IN AN ORGANIZED HEALTH CARE EDUCATION/TRAINING PROGRAM

## 2023-11-25 PROCEDURE — 97530 THERAPEUTIC ACTIVITIES: CPT

## 2023-11-25 PROCEDURE — 25000242 PHARM REV CODE 250 ALT 637 W/ HCPCS: Performed by: STUDENT IN AN ORGANIZED HEALTH CARE EDUCATION/TRAINING PROGRAM

## 2023-11-25 PROCEDURE — 94660 CPAP INITIATION&MGMT: CPT

## 2023-11-25 PROCEDURE — 63600175 PHARM REV CODE 636 W HCPCS: Mod: UD | Performed by: INTERNAL MEDICINE

## 2023-11-25 PROCEDURE — 36600 WITHDRAWAL OF ARTERIAL BLOOD: CPT

## 2023-11-25 RX ORDER — MUPIROCIN 20 MG/G
OINTMENT TOPICAL 2 TIMES DAILY
Status: DISCONTINUED | OUTPATIENT
Start: 2023-11-25 | End: 2023-11-30 | Stop reason: HOSPADM

## 2023-11-25 RX ORDER — ARFORMOTEROL TARTRATE 15 UG/2ML
15 SOLUTION RESPIRATORY (INHALATION) 2 TIMES DAILY
Status: DISCONTINUED | OUTPATIENT
Start: 2023-11-25 | End: 2023-11-30 | Stop reason: HOSPADM

## 2023-11-25 RX ORDER — BUDESONIDE 0.5 MG/2ML
0.5 INHALANT ORAL EVERY 12 HOURS
Status: DISCONTINUED | OUTPATIENT
Start: 2023-11-25 | End: 2023-11-30 | Stop reason: HOSPADM

## 2023-11-25 RX ADMIN — POTASSIUM BICARBONATE 35 MEQ: 391 TABLET, EFFERVESCENT ORAL at 05:11

## 2023-11-25 RX ADMIN — FAMOTIDINE 20 MG: 20 TABLET ORAL at 08:11

## 2023-11-25 RX ADMIN — HYDROCODONE BITARTRATE AND ACETAMINOPHEN 1 TABLET: 5; 325 TABLET ORAL at 06:11

## 2023-11-25 RX ADMIN — AMPICILLIN 2 G: 2 INJECTION, POWDER, FOR SOLUTION INTRAVENOUS at 11:11

## 2023-11-25 RX ADMIN — ENOXAPARIN SODIUM 90 MG: 100 INJECTION SUBCUTANEOUS at 08:11

## 2023-11-25 RX ADMIN — ARFORMOTEROL TARTRATE 15 MCG: 15 SOLUTION RESPIRATORY (INHALATION) at 07:11

## 2023-11-25 RX ADMIN — HYDROCODONE BITARTRATE AND ACETAMINOPHEN 1 TABLET: 5; 325 TABLET ORAL at 05:11

## 2023-11-25 RX ADMIN — DILTIAZEM HYDROCHLORIDE 30 MG: 30 TABLET, FILM COATED ORAL at 08:11

## 2023-11-25 RX ADMIN — AMPICILLIN 2 G: 2 INJECTION, POWDER, FOR SOLUTION INTRAVENOUS at 03:11

## 2023-11-25 RX ADMIN — POTASSIUM BICARBONATE 35 MEQ: 391 TABLET, EFFERVESCENT ORAL at 08:11

## 2023-11-25 RX ADMIN — HYDROCODONE BITARTRATE AND ACETAMINOPHEN 1 TABLET: 5; 325 TABLET ORAL at 11:11

## 2023-11-25 RX ADMIN — IPRATROPIUM BROMIDE AND ALBUTEROL SULFATE 3 ML: 2.5; .5 SOLUTION RESPIRATORY (INHALATION) at 11:11

## 2023-11-25 RX ADMIN — IPRATROPIUM BROMIDE AND ALBUTEROL SULFATE 3 ML: 2.5; .5 SOLUTION RESPIRATORY (INHALATION) at 03:11

## 2023-11-25 RX ADMIN — IPRATROPIUM BROMIDE AND ALBUTEROL SULFATE 3 ML: 2.5; .5 SOLUTION RESPIRATORY (INHALATION) at 07:11

## 2023-11-25 RX ADMIN — CHLORHEXIDINE GLUCONATE 15 ML: 1.2 RINSE ORAL at 08:11

## 2023-11-25 RX ADMIN — TAMSULOSIN HYDROCHLORIDE 0.4 MG: 0.4 CAPSULE ORAL at 08:11

## 2023-11-25 RX ADMIN — AMPICILLIN 2 G: 2 INJECTION, POWDER, FOR SOLUTION INTRAVENOUS at 06:11

## 2023-11-25 RX ADMIN — BUDESONIDE INHALATION 0.5 MG: 0.5 SUSPENSION RESPIRATORY (INHALATION) at 07:11

## 2023-11-25 RX ADMIN — AMIODARONE HYDROCHLORIDE 200 MG: 200 TABLET ORAL at 08:11

## 2023-11-25 RX ADMIN — AMPICILLIN 2 G: 2 INJECTION, POWDER, FOR SOLUTION INTRAVENOUS at 07:11

## 2023-11-25 RX ADMIN — METHYLPREDNISOLONE SODIUM SUCCINATE 60 MG: 40 INJECTION, POWDER, FOR SOLUTION INTRAMUSCULAR; INTRAVENOUS at 06:11

## 2023-11-25 RX ADMIN — METHYLPREDNISOLONE SODIUM SUCCINATE 60 MG: 40 INJECTION, POWDER, FOR SOLUTION INTRAMUSCULAR; INTRAVENOUS at 11:11

## 2023-11-25 NOTE — PROGRESS NOTES
[facility] Medicine  Progress Note    Patient Name: Jama James  MRN: [id number]  Patient Class: IP- Inpatient   Admission Date: 11/21/2023  Length of Stay: 3 days  Attending Physician: Alex Britt MD  Primary Care Provider: Marisel Farrell III, MD        Subjective:     Principal Problem:Septic arthritis of knee, right        HPI:  68 year old pt getting transferred from [place] with R septic arthritis and Streptococcus bacteremia  One week ago pt went to [facility] ER with painful swollen Knee  Arthrocentesis was done and pt was discharged to home  Synovial fluid Grew E Fecalis  He was called back to ER  and blood culture was done  Pt was evaluated by Ortho MD lisa evans decision was made to transfer pt here because of his co morbid complex disaese/factors  Blood culture done grew Streptococcus       Overview/Hospital Course:  68M with PMH pAfib on eliquis, COPD w/ chronic respiratory failure on 4L NC, likely underlying interstitial lung disease, and prostate cancer with mets on chemo is admitted with R knee septic arthritis which underwent aspiration and washout which grew enterococcus faecalis on wound culture and blood cultures. Ortho followed initially. ID consulted and recommended ampicillin 2gm q4hr for 4 week course to end 12/20/23. Course complicated by acute on chronic respiratory failure with hypoxia and hypercapnia requiring increased O2 needs and bipap. Started on scheduled nebs.     Interval History: Patient seen and examined. NAEON. No complaints. Falls asleep quickly. Sister bedside says this is not normal for him. Going back on bipap.      Objective:     Vital Signs (Most Recent):  Temp: 98.4 °F (36.9 °C) (11/25/23 1500)  Pulse: 77 (11/25/23 1517)  Resp: 18 (11/25/23 1517)  BP: 139/65 (11/25/23 1500)  SpO2: 100 % (11/25/23 1517) Vital Signs (24h Range):  Temp:  [98.1 °F (36.7 °C)-98.7 °F (37.1 °C)] 98.4 °F (36.9 °C)  Pulse:  [76-92] 77  Resp:  [16-41] 18  SpO2:  [89 %-100 %]  100 %  BP: (127-179)/(59-88) 139/65     Weight: 89.4 kg (197 lb 1.5 oz)  Body mass index is 23.37 kg/m².    Intake/Output Summary (Last 24 hours) at 11/25/2023 1647  Last data filed at 11/25/2023 1231  Gross per 24 hour   Intake 860 ml   Output 700 ml   Net 160 ml         Physical Exam  Vitals reviewed.   Constitutional:       General: He is not in acute distress.  HENT:      Head: Normocephalic and atraumatic.   Cardiovascular:      Rate and Rhythm: Normal rate and regular rhythm.   Pulmonary:      Effort: Pulmonary effort is normal. No respiratory distress.      Breath sounds: Decreased breath sounds (bibasilar) present.   Musculoskeletal:      Comments: R knee wrapped   Skin:     General: Skin is warm and dry.   Neurological:      General: No focal deficit present.      Mental Status: He is alert and oriented to person, place, and time. Mental status is at baseline.   Psychiatric:         Speech: Speech normal.         Behavior: Behavior normal.      Comments: Wakes to voice and answers questions appropriate but is quick to fall asleep             Significant Labs: All pertinent labs within the past 24 hours have been reviewed.    Significant Imaging: I have reviewed all pertinent imaging results/findings within the past 24 hours.    Assessment/Plan:      * Septic arthritis of knee, right  S/p aspiration and washout  Wcx and Bcx E. Faecalis  - wound care  - prn pain meds  - ID rec ampicillin 2gm q4hr for 4 weeks to end 12/20/23  - looking into LTAC    Acute on chronic respiratory failure with hypoxia and hypercapnia  Retaining CO2 and becoming lethargic  - place on bipap  - trend abg  - schedule duoneb and add maintenance nebs as well  - consider adding steroid to cover  exac; complicated with joint infection and bacteremia  - consider pulm consult if not improving    Bacteremia  E. Faecalis  Repeat blood cultures negative  - plan per septic arthritis section    PAF (paroxysmal atrial  fibrillation)  Controlled  - continue dilt, amio, and lovenox in place of DOAC    COPD (chronic obstructive pulmonary disease)  May be contributing to resp fail; see that section    Prostate cancer  Prostate carcinoma with metastatic disease to cervical, thoracic, abdominal and pelvic lymph nodes as well as metastatic disease to bone.   On Prednisone/Lupron and zytiga  - hold steroids/chemo while dealing with acute infection  - f/u hem/onc outpatient        VTE Risk Mitigation (From admission, onward)           Ordered     enoxaparin injection 90 mg  Every 12 hours         11/23/23 0732     IP VTE HIGH RISK PATIENT  Once         11/22/23 1919     Place sequential compression device  Until discontinued         11/21/23 8396                    Discharge Planning   CLEMENT[place]: 11/27/2023     Code Status: Full Code   Is the patient medically ready for discharge?:     Reason for patient still in hospital (select all that apply): Patient trending condition, Treatment, and Pending disposition  Discharge Plan A: Long-term acute care facility (LTAC)   Discharge Delays: None known at this time              Alex Britt MD  Department of Hospital Medicine   [facility]

## 2023-11-25 NOTE — ASSESSMENT & PLAN NOTE
Retaining CO2 and becoming lethargic  - place on bipap  - trend abg  - schedule duoneb and add maintenance nebs as well  - consider adding steroid to cover  exac; complicated with joint infection and bacteremia  - consider pulm consult if not improving

## 2023-11-25 NOTE — ASSESSMENT & PLAN NOTE
S/p aspiration and washout  Wcx and Bcx E. Faecalis  - wound care  - prn pain meds  - ID rec ampicillin 2gm q4hr for 4 weeks to end 12/20/23  - looking into LTAC

## 2023-11-25 NOTE — HOSPITAL COURSE
68M with PMH pAfib on eliquis, COPD w/ chronic respiratory failure on 4L NC, likely underlying interstitial lung disease, and prostate cancer with mets on chemo is admitted with R knee septic arthritis which underwent aspiration and washout which grew enterococcus faecalis on wound culture and blood cultures. Ortho followed initially. ID consulted and recommended ampicillin 2gm q4hr for 4 week course to end 12/20/23. Course complicated by acute on chronic respiratory failure with hypoxia and hypercapnia requiring increased O2 needs and bipap. Treated for COPD exacerbation with scheduled nebs and steroids. Pulmonology consulted. Respiratory status stabilized. Suffered with postoperative anemia and his blood counts were trended and stable. Ortho recommended remove sutures 1 week from day of discharge and continue weight bearing as tolerated with crutches or walker. Accepted and transferred to LTAC. By time of discharge the patient was tolerating a regular diet with improved admission symptoms. Patient seen and examined on day of discharge.    Physical exam on day of discharge:  Constitutional:       General: He is not in acute distress.  HENT:      Head: Normocephalic and atraumatic.   Cardiovascular:      Rate and Rhythm: Normal rate and regular rhythm.   Pulmonary:      Effort: Pulmonary effort is normal. No respiratory distress.      Breath sounds: Decreased breath sounds (bibasilar) present.   Musculoskeletal:      Comments: R knee wrapped   Skin:     General: Skin is warm and dry.   Neurological:      General: No focal deficit present.      Mental Status: He is alert and oriented to person, place, and time. Mental status is at baseline.   Psychiatric:         Speech: Speech normal.         Behavior: Behavior normal.

## 2023-11-25 NOTE — PT/OT/SLP PROGRESS
Physical Therapy Treatment    Patient Name:  Jama James   MRN:  [id number]    Recommendations:     Discharge Recommendations: Moderate Intensity Therapy  Discharge Equipment Recommendations: to be determined by next level of care  Barriers to discharge:  unable to tolerate R LE WB, increase assist with mobility, high fall risk, decrease activity tolerance, R knee pain     Assessment:     Jama James is a 68 y.o. male admitted with a medical diagnosis of Septic arthritis of knee, right.  He presents with the following impairments/functional limitations: weakness, impaired endurance, impaired self care skills, impaired functional mobility, gait instability, impaired balance, decreased lower extremity function, decreased safety awareness, pain, impaired cardiopulmonary response to activity.    Pt found in bed with HOB elevated. Pt agreeable to visit. Pt requires min A with bed mobility. Good sitting balance at EOB but significant shortness of breath though SAO2 WFL. Pt able to perform sit to stand with single attempt this date with mod A x 2 to RW. Pt unable to tolerate R LE WB to assist with transfer increasing assist required. Pt tolerate lateral hopping along EOB  x 3 hops as patient continue to be unable to tolerate any weight on R LE. Short of breath post mobility. SAO2 WFL on  5L    Rehab Prognosis: Fair; patient would benefit from acute skilled PT services to address these deficits and reach maximum level of function.    Recent Surgery: Procedure(s) (LRB):  ARTHROSCOPY, KNEE (Right) 3 Days Post-Op    Plan:     During this hospitalization, patient to be seen 6 x/week to address the identified rehab impairments via therapeutic activities, therapeutic exercises, gait training, neuromuscular re-education and progress toward the following goals:    Plan of Care Expires:  12/25/23    Subjective     Chief Complaint: knee pain  Patient/Family Comments/goals: pain management and progress towards PLOF  Pain/Comfort:  Pain  Rating 1: 8/10  Location - Side 1: Right  Location 1: knee  Pain Addressed 1: Reposition, Distraction, Cessation of Activity      Objective:     Communicated with RN prior to session.  Patient found HOB elevated with peripheral IV, telemetry, oxygen upon PT entry to room.     General Precautions: Standard, fall  Orthopedic Precautions: RLE weight bearing as tolerated  Braces: N/A  Respiratory Status: Nasal cannula, flow 5 L/min     Functional Mobility:  Bed Mobility:     Supine to Sit: minimum assistance  Sit to Supine: minimum assistance and of 2 persons  Transfers:     Sit to Stand:  moderate assistance and of 2 persons with rolling walker      AM-PAC 6 CLICK MOBILITY  Turning over in bed (including adjusting bedclothes, sheets and blankets)?: 3  Sitting down on and standing up from a chair with arms (e.g., wheelchair, bedside commode, etc.): 2  Moving from lying on back to sitting on the side of the bed?: 2  Moving to and from a bed to a chair (including a wheelchair)?: 2  Need to walk in hospital room?: 1  Climbing 3-5 steps with a railing?: 1  Basic Mobility Total Score: 11       Treatment & Education:  Pt educated on POC, discharge recommendation, WBAT R LE, importance of time OOB, proper for with mobility, need for assist with mobility, use of call bell to seek assistance as needed and fall prevention     Patient left HOB elevated with all lines intact, call button in reach, and bed alarm on..    GOALS:   Multidisciplinary Problems       Physical Therapy Goals          Problem: Physical Therapy    Goal Priority Disciplines Outcome Goal Variances Interventions   Physical Therapy Goal     PT, PT/OT Ongoing, Progressing     Description: Goals to be met by: 23     Patient will increase functional independence with mobility by performin. Supine to sit with Supervision  2. Sit to stand transfer with Supervision  3. Bed to chair transfer with Supervision using Rolling Walker  4. Gait  x 150 feet with  Supervision using Rolling Walker.                              Time Tracking:     PT Received On: 11/25/23  PT Start Time: 0958     PT Stop Time: 1007  PT Total Time (min): 9 min     Billable Minutes: Therapeutic Activity 9    Treatment Type: Treatment  PT/PTA: PT     Number of PTA visits since last PT visit: 0     11/25/2023

## 2023-11-25 NOTE — CARE UPDATE
11/25/23 0754   Patient Assessment/Suction   Level of Consciousness (AVPU) alert   Respiratory Effort Normal;Unlabored   Expansion/Accessory Muscles/Retractions expansion symmetric;no use of accessory muscles   Rhythm/Pattern, Respiratory pattern regular   Skin Integrity   $ Wound Care Tech Time 15 min   Area Observed Right;Left   Skin Appearance without discoloration   PRE-TX-O2   Device (Oxygen Therapy) nasal cannula   $ Is the patient on Low Flow Oxygen? Yes   Flow (L/min) 4   SpO2 99 %   Pulse Oximetry Type Continuous   $ Pulse Oximetry - Multiple Charge Pulse Oximetry - Multiple   Pulse 85   Resp (!) 22   Aerosol Therapy   $ Aerosol Therapy Charges Aerosol Treatment   Daily Review of Necessity (SVN) completed   Respiratory Treatment Status (SVN) given   Treatment Route (SVN) mask   Patient Position (SVN) HOB elevated   Post Treatment Assessment (SVN) breath sounds unchanged   Signs of Intolerance (SVN) none   Education   $ Education Bronchodilator;15 min

## 2023-11-25 NOTE — NURSING
Nurses Note -- 4 Eyes      11/24/2023   6:37 PM      Skin assessed during: Transfer      [] No Altered Skin Integrity Present    []Prevention Measures Documented      [x] Yes- Altered Skin Integrity Present or Discovered   [] LDA Added if Not in Epic (Describe Wound)   [] New Altered Skin Integrity was Present on Admit and Documented in LDA   [] Wound Image Taken    Wound Care Consulted? Yes    Attending Nurse:  Sonya Mcfarland RN     Second RN/Staff Member: Nadia Lopez RN

## 2023-11-25 NOTE — ASSESSMENT & PLAN NOTE
Prostate carcinoma with metastatic disease to cervical, thoracic, abdominal and pelvic lymph nodes as well as metastatic disease to bone.   On Prednisone/Lupron and zytiga  - hold steroids/chemo while dealing with acute infection  - f/u hem/onc outpatient

## 2023-11-25 NOTE — SUBJECTIVE & OBJECTIVE
Interval History: Patient seen and examined. NAEON. No complaints. Falls asleep quickly. Sister bedside says this is not normal for him. Going back on bipap.      Objective:     Vital Signs (Most Recent):  Temp: 98.4 °F (36.9 °C) (11/25/23 1500)  Pulse: 77 (11/25/23 1517)  Resp: 18 (11/25/23 1517)  BP: 139/65 (11/25/23 1500)  SpO2: 100 % (11/25/23 1517) Vital Signs (24h Range):  Temp:  [98.1 °F (36.7 °C)-98.7 °F (37.1 °C)] 98.4 °F (36.9 °C)  Pulse:  [76-92] 77  Resp:  [16-41] 18  SpO2:  [89 %-100 %] 100 %  BP: (127-179)/(59-88) 139/65     Weight: 89.4 kg (197 lb 1.5 oz)  Body mass index is 23.37 kg/m².    Intake/Output Summary (Last 24 hours) at 11/25/2023 1647  Last data filed at 11/25/2023 1231  Gross per 24 hour   Intake 860 ml   Output 700 ml   Net 160 ml         Physical Exam  Vitals reviewed.   Constitutional:       General: He is not in acute distress.  HENT:      Head: Normocephalic and atraumatic.   Cardiovascular:      Rate and Rhythm: Normal rate and regular rhythm.   Pulmonary:      Effort: Pulmonary effort is normal. No respiratory distress.      Breath sounds: Decreased breath sounds (bibasilar) present.   Musculoskeletal:      Comments: R knee wrapped   Skin:     General: Skin is warm and dry.   Neurological:      General: No focal deficit present.      Mental Status: He is alert and oriented to person, place, and time. Mental status is at baseline.   Psychiatric:         Speech: Speech normal.         Behavior: Behavior normal.      Comments: Wakes to voice and answers questions appropriate but is quick to fall asleep             Significant Labs: All pertinent labs within the past 24 hours have been reviewed.    Significant Imaging: I have reviewed all pertinent imaging results/findings within the past 24 hours.

## 2023-11-25 NOTE — CARE UPDATE
11/24/23 2003   Patient Assessment/Suction   Level of Consciousness (AVPU) alert   Respiratory Effort Normal;Unlabored   Expansion/Accessory Muscles/Retractions no use of accessory muscles   All Lung Fields Breath Sounds equal bilaterally;diminished;coarse   Rhythm/Pattern, Respiratory unlabored   Cough Frequency frequent   Cough Type good;nonproductive   PRE-TX-O2   Device (Oxygen Therapy) nasal cannula with humidification   Flow (L/min) 6   SpO2 98 %   Pulse Oximetry Type Intermittent   $ Pulse Oximetry - Multiple Charge Pulse Oximetry - Multiple   Pulse 81   Resp 18   BP (!) 173/79   Aerosol Therapy   $ Aerosol Therapy Charges Aerosol Treatment   Daily Review of Necessity (SVN) completed   Respiratory Treatment Status (SVN) given   Treatment Route (SVN) mask   Patient Position (SVN) HOB elevated   Post Treatment Assessment (SVN) breath sounds unchanged   Signs of Intolerance (SVN) none   Breath Sounds Post-Respiratory Treatment   Throughout All Fields Post-Treatment All Fields   Throughout All Fields Post-Treatment no change   Post-treatment Heart Rate (beats/min) 80   Post-treatment Resp Rate (breaths/min) 20   Education   $ Education Bronchodilator;15 min   Respiratory Evaluation   $ Care Plan Tech Time 15 min   $ Eval/Re-eval Charges Evaluation   Evaluation For New Orders

## 2023-11-26 PROBLEM — R73.9 HYPERGLYCEMIA, DRUG-INDUCED: Status: ACTIVE | Noted: 2023-11-26

## 2023-11-26 PROBLEM — T50.905A HYPERGLYCEMIA, DRUG-INDUCED: Status: ACTIVE | Noted: 2023-11-26

## 2023-11-26 LAB
ALBUMIN SERPL BCP-MCNC: 2.8 G/DL (ref 3.5–5.2)
ALP SERPL-CCNC: 84 U/L (ref 55–135)
ALT SERPL W/O P-5'-P-CCNC: 7 U/L (ref 10–44)
ANION GAP SERPL CALC-SCNC: 4 MMOL/L (ref 8–16)
AST SERPL-CCNC: 7 U/L (ref 10–40)
BACTERIA SPEC ANAEROBE CULT: NORMAL
BASOPHILS # BLD AUTO: 0.02 K/UL (ref 0–0.2)
BASOPHILS NFR BLD: 0.2 % (ref 0–1.9)
BILIRUB SERPL-MCNC: 0.2 MG/DL (ref 0.1–1)
BUN SERPL-MCNC: 10 MG/DL (ref 8–23)
CALCIUM SERPL-MCNC: 9.2 MG/DL (ref 8.7–10.5)
CHLORIDE SERPL-SCNC: 94 MMOL/L (ref 95–110)
CO2 SERPL-SCNC: >45 MMOL/L (ref 23–29)
CREAT SERPL-MCNC: 0.5 MG/DL (ref 0.5–1.4)
CRP SERPL-MCNC: 169.7 MG/L (ref 0–8.2)
DIFFERENTIAL METHOD: ABNORMAL
EOSINOPHIL # BLD AUTO: 0 K/UL (ref 0–0.5)
EOSINOPHIL NFR BLD: 0 % (ref 0–8)
ERYTHROCYTE [DISTWIDTH] IN BLOOD BY AUTOMATED COUNT: 14.5 % (ref 11.5–14.5)
EST. GFR  (NO RACE VARIABLE): >60 ML/MIN/1.73 M^2
GLUCOSE SERPL-MCNC: 262 MG/DL (ref 70–110)
HCT VFR BLD AUTO: 26.2 % (ref 40–54)
HGB BLD-MCNC: 7.7 G/DL (ref 14–18)
IMM GRANULOCYTES # BLD AUTO: 0.04 K/UL (ref 0–0.04)
IMM GRANULOCYTES NFR BLD AUTO: 0.4 % (ref 0–0.5)
LYMPHOCYTES # BLD AUTO: 0.2 K/UL (ref 1–4.8)
LYMPHOCYTES NFR BLD: 1.8 % (ref 18–48)
MCH RBC QN AUTO: 27.6 PG (ref 27–31)
MCHC RBC AUTO-ENTMCNC: 29.4 G/DL (ref 32–36)
MCV RBC AUTO: 94 FL (ref 82–98)
MONOCYTES # BLD AUTO: 0.1 K/UL (ref 0.3–1)
MONOCYTES NFR BLD: 0.9 % (ref 4–15)
NEUTROPHILS # BLD AUTO: 9 K/UL (ref 1.8–7.7)
NEUTROPHILS NFR BLD: 96.7 % (ref 38–73)
NRBC BLD-RTO: 0 /100 WBC
PLATELET # BLD AUTO: 344 K/UL (ref 150–450)
PMV BLD AUTO: 9.9 FL (ref 9.2–12.9)
POTASSIUM SERPL-SCNC: 3.8 MMOL/L (ref 3.5–5.1)
PROT SERPL-MCNC: 6 G/DL (ref 6–8.4)
RBC # BLD AUTO: 2.79 M/UL (ref 4.6–6.2)
SODIUM SERPL-SCNC: 143 MMOL/L (ref 136–145)
WBC # BLD AUTO: 9.35 K/UL (ref 3.9–12.7)

## 2023-11-26 PROCEDURE — 25000003 PHARM REV CODE 250: Performed by: ORTHOPAEDIC SURGERY

## 2023-11-26 PROCEDURE — 94640 AIRWAY INHALATION TREATMENT: CPT

## 2023-11-26 PROCEDURE — 94799 UNLISTED PULMONARY SVC/PX: CPT

## 2023-11-26 PROCEDURE — 99223 PR INITIAL HOSPITAL CARE,LEVL III: ICD-10-PCS | Mod: ,,, | Performed by: STUDENT IN AN ORGANIZED HEALTH CARE EDUCATION/TRAINING PROGRAM

## 2023-11-26 PROCEDURE — 21000000 HC CCU ICU ROOM CHARGE

## 2023-11-26 PROCEDURE — 86140 C-REACTIVE PROTEIN: CPT | Performed by: ORTHOPAEDIC SURGERY

## 2023-11-26 PROCEDURE — 94761 N-INVAS EAR/PLS OXIMETRY MLT: CPT

## 2023-11-26 PROCEDURE — 25000242 PHARM REV CODE 250 ALT 637 W/ HCPCS: Performed by: INTERNAL MEDICINE

## 2023-11-26 PROCEDURE — 63600175 PHARM REV CODE 636 W HCPCS: Mod: UD | Performed by: INTERNAL MEDICINE

## 2023-11-26 PROCEDURE — 85025 COMPLETE CBC W/AUTO DIFF WBC: CPT | Performed by: STUDENT IN AN ORGANIZED HEALTH CARE EDUCATION/TRAINING PROGRAM

## 2023-11-26 PROCEDURE — 27000221 HC OXYGEN, UP TO 24 HOURS

## 2023-11-26 PROCEDURE — 99900035 HC TECH TIME PER 15 MIN (STAT)

## 2023-11-26 PROCEDURE — 99900031 HC PATIENT EDUCATION (STAT)

## 2023-11-26 PROCEDURE — 25000003 PHARM REV CODE 250: Performed by: STUDENT IN AN ORGANIZED HEALTH CARE EDUCATION/TRAINING PROGRAM

## 2023-11-26 PROCEDURE — 63600175 PHARM REV CODE 636 W HCPCS: Performed by: ORTHOPAEDIC SURGERY

## 2023-11-26 PROCEDURE — 80053 COMPREHEN METABOLIC PANEL: CPT | Performed by: ORTHOPAEDIC SURGERY

## 2023-11-26 PROCEDURE — 25000242 PHARM REV CODE 250 ALT 637 W/ HCPCS: Performed by: STUDENT IN AN ORGANIZED HEALTH CARE EDUCATION/TRAINING PROGRAM

## 2023-11-26 PROCEDURE — 99223 1ST HOSP IP/OBS HIGH 75: CPT | Mod: ,,, | Performed by: STUDENT IN AN ORGANIZED HEALTH CARE EDUCATION/TRAINING PROGRAM

## 2023-11-26 PROCEDURE — 94660 CPAP INITIATION&MGMT: CPT

## 2023-11-26 PROCEDURE — 63600175 PHARM REV CODE 636 W HCPCS: Performed by: STUDENT IN AN ORGANIZED HEALTH CARE EDUCATION/TRAINING PROGRAM

## 2023-11-26 RX ORDER — IBUPROFEN 200 MG
24 TABLET ORAL
Status: DISCONTINUED | OUTPATIENT
Start: 2023-11-26 | End: 2023-11-28

## 2023-11-26 RX ORDER — IBUPROFEN 200 MG
16 TABLET ORAL
Status: DISCONTINUED | OUTPATIENT
Start: 2023-11-26 | End: 2023-11-28

## 2023-11-26 RX ORDER — GLUCAGON 1 MG
1 KIT INJECTION
Status: DISCONTINUED | OUTPATIENT
Start: 2023-11-26 | End: 2023-11-28

## 2023-11-26 RX ADMIN — IPRATROPIUM BROMIDE AND ALBUTEROL SULFATE 3 ML: 2.5; .5 SOLUTION RESPIRATORY (INHALATION) at 04:11

## 2023-11-26 RX ADMIN — ENOXAPARIN SODIUM 90 MG: 100 INJECTION SUBCUTANEOUS at 09:11

## 2023-11-26 RX ADMIN — AMPICILLIN 2 G: 2 INJECTION, POWDER, FOR SOLUTION INTRAVENOUS at 07:11

## 2023-11-26 RX ADMIN — FAMOTIDINE 20 MG: 20 TABLET ORAL at 08:11

## 2023-11-26 RX ADMIN — TAMSULOSIN HYDROCHLORIDE 0.4 MG: 0.4 CAPSULE ORAL at 09:11

## 2023-11-26 RX ADMIN — AMPICILLIN 2 G: 2 INJECTION, POWDER, FOR SOLUTION INTRAVENOUS at 03:11

## 2023-11-26 RX ADMIN — MUPIROCIN 1 G: 20 OINTMENT TOPICAL at 08:11

## 2023-11-26 RX ADMIN — AMPICILLIN 2 G: 2 INJECTION, POWDER, FOR SOLUTION INTRAVENOUS at 02:11

## 2023-11-26 RX ADMIN — HYDROCODONE BITARTRATE AND ACETAMINOPHEN 1 TABLET: 5; 325 TABLET ORAL at 07:11

## 2023-11-26 RX ADMIN — DILTIAZEM HYDROCHLORIDE 30 MG: 30 TABLET, FILM COATED ORAL at 09:11

## 2023-11-26 RX ADMIN — BUDESONIDE INHALATION 0.5 MG: 0.5 SUSPENSION RESPIRATORY (INHALATION) at 07:11

## 2023-11-26 RX ADMIN — AMPICILLIN 2 G: 2 INJECTION, POWDER, FOR SOLUTION INTRAVENOUS at 11:11

## 2023-11-26 RX ADMIN — CHLORHEXIDINE GLUCONATE 15 ML: 1.2 RINSE ORAL at 08:11

## 2023-11-26 RX ADMIN — AMIODARONE HYDROCHLORIDE 200 MG: 200 TABLET ORAL at 09:11

## 2023-11-26 RX ADMIN — CHLORHEXIDINE GLUCONATE 15 ML: 1.2 RINSE ORAL at 09:11

## 2023-11-26 RX ADMIN — IPRATROPIUM BROMIDE AND ALBUTEROL SULFATE 3 ML: 2.5; .5 SOLUTION RESPIRATORY (INHALATION) at 03:11

## 2023-11-26 RX ADMIN — IPRATROPIUM BROMIDE AND ALBUTEROL SULFATE 3 ML: 2.5; .5 SOLUTION RESPIRATORY (INHALATION) at 11:11

## 2023-11-26 RX ADMIN — IPRATROPIUM BROMIDE AND ALBUTEROL SULFATE 3 ML: 2.5; .5 SOLUTION RESPIRATORY (INHALATION) at 07:11

## 2023-11-26 RX ADMIN — ARFORMOTEROL TARTRATE 15 MCG: 15 SOLUTION RESPIRATORY (INHALATION) at 08:11

## 2023-11-26 RX ADMIN — BUDESONIDE INHALATION 0.5 MG: 0.5 SUSPENSION RESPIRATORY (INHALATION) at 08:11

## 2023-11-26 RX ADMIN — HYDROCODONE BITARTRATE AND ACETAMINOPHEN 1 TABLET: 5; 325 TABLET ORAL at 11:11

## 2023-11-26 RX ADMIN — DILTIAZEM HYDROCHLORIDE 30 MG: 30 TABLET, FILM COATED ORAL at 08:11

## 2023-11-26 RX ADMIN — ARFORMOTEROL TARTRATE 15 MCG: 15 SOLUTION RESPIRATORY (INHALATION) at 07:11

## 2023-11-26 RX ADMIN — HYDROCODONE BITARTRATE AND ACETAMINOPHEN 1 TABLET: 5; 325 TABLET ORAL at 02:11

## 2023-11-26 RX ADMIN — PREDNISONE 50 MG: 20 TABLET ORAL at 09:11

## 2023-11-26 RX ADMIN — FAMOTIDINE 20 MG: 20 TABLET ORAL at 09:11

## 2023-11-26 RX ADMIN — IPRATROPIUM BROMIDE AND ALBUTEROL SULFATE 3 ML: 2.5; .5 SOLUTION RESPIRATORY (INHALATION) at 08:11

## 2023-11-26 NOTE — PROGRESS NOTES
[facility]  Progress Note    Patient Name: Jama James  MRN: [id number]  Patient Class: IP- Inpatient   Admission Date: 11/21/2023  Length of Stay: 4 days  Attending Physician: Alex Britt MD  Primary Care Provider: Marisel Farrell III, MD        Subjective:     Principal Problem:Septic arthritis of knee, right        HPI:  68 year old pt getting transferred from [place] with R septic arthritis and Streptococcus bacteremia  One week ago pt went to [place] ER with painful swollen Knee  Arthrocentesis was done and pt was discharged to home  Synovial fluid Grew E Fecalis  He was called back to ER  and blood culture was done  Pt was evaluated by Ortho MD lisa evans decision was made to transfer pt here because of his co morbid complex disaese/factors  Blood culture done grew Streptococcus       Overview/Hospital Course:  68M with PMH pAfib on eliquis, COPD w/ chronic respiratory failure on 4L NC, likely underlying interstitial lung disease, and prostate cancer with mets on chemo is admitted with R knee septic arthritis which underwent aspiration and washout which grew enterococcus faecalis on wound culture and blood cultures. Ortho followed initially. ID consulted and recommended ampicillin 2gm q4hr for 4 week course to end 12/20/23. Course complicated by acute on chronic respiratory failure with hypoxia and hypercapnia requiring increased O2 needs and bipap. Started on scheduled nebs and steroids. Pulmonology consulted.    Interval History: Patient seen and examined. NELIDAEON. Feels ok. R knee hurts. Has been stable off the bipap.      Objective:     Vital Signs (Most Recent):  Temp: 98.1 °F (36.7 °C) (11/26/23 1500)  Pulse: 86 (11/26/23 1500)  Resp: (!) 32 (11/26/23 1500)  BP: (!) 146/67 (11/26/23 1500)  SpO2: 96 % (11/26/23 1500) Vital Signs (24h Range):  Temp:  [98 °F (36.7 °C)-98.8 °F (37.1 °C)] 98.1 °F (36.7 °C)  Pulse:  [74-91] 86  Resp:  [16-40] 32  SpO2:  [86 %-100 %] 96 %  BP:  (120-168)/(60-77) 146/67     Weight: 89.4 kg (197 lb 1.5 oz)  Body mass index is 23.37 kg/m².    Intake/Output Summary (Last 24 hours) at 11/26/2023 1632  Last data filed at 11/26/2023 1615  Gross per 24 hour   Intake 480 ml   Output 750 ml   Net -270 ml         Physical Exam  Vitals reviewed.   Constitutional:       General: He is not in acute distress.  HENT:      Head: Normocephalic and atraumatic.   Cardiovascular:      Rate and Rhythm: Normal rate and regular rhythm.   Pulmonary:      Effort: Pulmonary effort is normal. No respiratory distress.      Breath sounds: Decreased breath sounds (bibasilar) present.   Musculoskeletal:      Comments: R knee wrapped   Skin:     General: Skin is warm and dry.   Neurological:      General: No focal deficit present.      Mental Status: He is alert and oriented to person, place, and time. Mental status is at baseline.   Psychiatric:         Speech: Speech normal.         Behavior: Behavior normal.             Significant Labs: All pertinent labs within the past 24 hours have been reviewed.    Significant Imaging: I have reviewed all pertinent imaging results/findings within the past 24 hours.    Assessment/Plan:      * Septic arthritis of knee, right  S/p aspiration and washout  Wcx and Bcx E. Faecalis  - wound care  - prn pain meds  - ID rec ampicillin 2gm q4hr for 4 weeks to end 12/20/23  - looking into LTAC    Hyperglycemia, drug-induced  Steroid-induced  - add accuchecks and LDSSI as needed    Acute on chronic respiratory failure with hypoxia and hypercapnia  Retaining CO2 and becoming lethargic  S/p bipap currently on 6L NC  - wean O2 for goal sat 88-92%  - tabg prn  - schedule duoneb and add maintenance nebs as well  - continue steroids  - pulm consult; questionable pneumonia which would be covered by his abx for the joint/blood infection    Bacteremia  E. Faecalis  Repeat blood cultures negative  - plan per septic arthritis section    PAF (paroxysmal atrial  fibrillation)  Controlled  - continue dilt, amio, and lovenox in place of DOAC    COPD (chronic obstructive pulmonary disease)  Contributing to resp fail; see that section    Prostate cancer  Prostate carcinoma with metastatic disease to cervical, thoracic, abdominal and pelvic lymph nodes as well as metastatic disease to bone.   On Prednisone/Lupron and zytiga  - hold home steroids/chemo while dealing with acute infection  - f/u hem/onc outpatient      VTE Risk Mitigation (From admission, onward)           Ordered     enoxaparin injection 90 mg  Every 12 hours         11/23/23 0732     IP VTE HIGH RISK PATIENT  Once         11/22/23 1919     Place sequential compression device  Until discontinued         11/21/23 5621                    Discharge Planning   CLEMENTINA: 11/27/2023     Code Status: Full Code   Is the patient medically ready for discharge?:     Reason for patient still in hospital (select all that apply): Patient trending condition, Treatment, and Pending disposition  Discharge Plan A: Long-term acute care facility (LTAC)   Discharge Delays: None known at this time              Alex Britt MD  Department of Hospital Medicine   [facility]

## 2023-11-26 NOTE — CARE UPDATE
11/26/23 0736   Patient Assessment/Suction   Level of Consciousness (AVPU) alert   Respiratory Effort Normal;Unlabored   Expansion/Accessory Muscles/Retractions no use of accessory muscles;no retractions;expansion symmetric   All Lung Fields Breath Sounds coarse;diminished   Rhythm/Pattern, Respiratory pattern regular;depth regular;unlabored   Skin Integrity   $ Wound Care Tech Time 15 min   Area Observed Left;Right;Cheek;Behind ear   Skin Appearance without discoloration   PRE-TX-O2   Device (Oxygen Therapy) nasal cannula with humidification   $ Is the patient on Low Flow Oxygen? Yes   Flow (L/min) 6   SpO2 100 %   Pulse Oximetry Type Continuous   $ Pulse Oximetry - Multiple Charge Pulse Oximetry - Multiple   Pulse 86   Resp 20   Aerosol Therapy   $ Aerosol Therapy Charges Aerosol Treatment   Daily Review of Necessity (SVN) completed   Respiratory Treatment Status (SVN) given   Treatment Route (SVN) mask   Patient Position (SVN) HOB elevated   Post Treatment Assessment (SVN) breath sounds unchanged   Signs of Intolerance (SVN) none   Preset CPAP/BiPAP Settings   Mode Of Delivery BiPAP;Standby   $ CPAP/BiPAP Daily Charge BiPAP/CPAP Daily   Education   $ Education BiPAP;Bronchodilator;30 min

## 2023-11-26 NOTE — ASSESSMENT & PLAN NOTE
Retaining CO2 and becoming lethargic  S/p bipap currently on 6L NC  - wean O2 for goal sat 88-92%  - tabg prn  - schedule duoneb and add maintenance nebs as well  - continue steroids  - pulm consult; questionable pneumonia which would be covered by his abx for the joint/blood infection

## 2023-11-26 NOTE — CARE UPDATE
11/25/23 1954   Patient Assessment/Suction   Level of Consciousness (AVPU) alert   Respiratory Effort Normal;Unlabored   Expansion/Accessory Muscles/Retractions no use of accessory muscles   All Lung Fields Breath Sounds equal bilaterally;coarse   Rhythm/Pattern, Respiratory unlabored   Cough Frequency frequent   Cough Type good;loose;nonproductive   Skin Integrity   $ Wound Care Tech Time 15 min   Area Observed Left;Right;Behind ear   Skin Appearance without discoloration   PRE-TX-O2   Device (Oxygen Therapy) nasal cannula with humidification   Flow (L/min) 6   SpO2 (!) 93 %   Pulse Oximetry Type Continuous   $ Pulse Oximetry - Multiple Charge Pulse Oximetry - Multiple   Pulse 83   Resp 20   Aerosol Therapy   $ Aerosol Therapy Charges Aerosol Treatment  (Pulmicort)   Daily Review of Necessity (SVN) completed   Respiratory Treatment Status (SVN) given   Treatment Route (SVN) mask   Patient Position (SVN) HOB elevated   Post Treatment Assessment (SVN) breath sounds unchanged   Signs of Intolerance (SVN) none   Breath Sounds Post-Respiratory Treatment   Throughout All Fields Post-Treatment All Fields   Throughout All Fields Post-Treatment no change   Post-treatment Heart Rate (beats/min) 85   Post-treatment Resp Rate (breaths/min) 20   Education   $ Education Bronchodilator;15 min   Respiratory Evaluation   $ Care Plan Tech Time 15 min   $ Eval/Re-eval Charges Evaluation   Evaluation For New Orders

## 2023-11-26 NOTE — ASSESSMENT & PLAN NOTE
Prostate carcinoma with metastatic disease to cervical, thoracic, abdominal and pelvic lymph nodes as well as metastatic disease to bone.   On Prednisone/Lupron and zytiga  - hold home steroids/chemo while dealing with acute infection  - f/u hem/onc outpatient

## 2023-11-26 NOTE — CONSULTS
Pulmonary/Critical Care Consult      PATIENT NAME: Jama James  MRN: [id number]  TODAY'S DATE: 2023  12:46 PM  ADMIT DATE: 2023  AGE: 68 y.o. : 1955    CONSULT REQUESTED BY: Alex Britt MD    REASON FOR CONSULT:   COPD resp distress     HPI:  Mr James is a 68 year old man who presented with R septic knee arthritis due to  enterococcus facealic complciated by bacteremia.  Pulmonary consulted due to underlying COPD, chronic respiratory failure and hypercapnia.    Patient reports to me that he is had frequent exacerbations as an outpatient.  He has had frequent pneumonias.  No pneumonias this past year.  He is still smoking.  He is on 4 L of oxygen at home      Review of Systems   Constitutional:  Negative for appetite change, chills, fever and unexpected weight change.   HENT:  Negative for nosebleeds, postnasal drip, trouble swallowing and voice change.    Eyes:  Negative for visual disturbance.   Respiratory:  Positive for cough, shortness of breath and wheezing.    Cardiovascular:  Negative for chest pain and leg swelling.   Gastrointestinal:  Negative for diarrhea, nausea and vomiting.   Endocrine: Negative for cold intolerance and heat intolerance.   Genitourinary:  Negative for dysuria, flank pain and frequency.   Musculoskeletal:  Negative for neck pain and neck stiffness.   Allergic/Immunologic: Negative for environmental allergies and immunocompromised state.   Neurological:  Negative for syncope, speech difficulty and weakness.   Hematological:  Negative for adenopathy.   Psychiatric/Behavioral:  Negative for confusion.            ALLERGIES  Review of patient's allergies indicates:  No Known Allergies    INPATIENT SCHEDULED MEDICATIONS   albuterol-ipratropium  3 mL Nebulization Q4H    amiodarone  200 mg Oral Daily    ampicillin IVPB  2 g Intravenous Q4H    arformoteroL  15 mcg Nebulization BID    budesonide  0.5 mg Nebulization Q12H    chlorhexidine  15 mL Mouth/Throat BID    diltiaZEM   30 mg Oral Q12H    enoxparin  1 mg/kg Subcutaneous Q12H (treatment, non-standard time)    famotidine  20 mg Oral BID    mupirocin   Nasal BID    predniSONE  50 mg Oral Daily    tamsulosin  0.4 mg Oral Daily         MEDICAL AND SURGICAL HISTORY  Past Medical History:   Diagnosis Date    Abnormal CT scan 7/6/2023    Abnormal positron emission tomography (PET) scan 7/6/2023    Anxiety     Asbestosis 08/04/2015    Atrial fibrillation     Bilateral adrenal adenomas     COPD (chronic obstructive pulmonary disease)     COVID-19 virus infection 07/28/2022    Depression     Elevated PSA 7/6/2023    High cholesterol     HTN (hypertension)     Malignant neoplasm of prostate     Multiple lung nodules     Nodule of upper lobe of left lung 03/30/2023    6 mm spiculated on cta chest 3/21/23    On home oxygen therapy 05/24/2023    Pneumonia     Prostate cancer 5/27/2020    Prostate cancer metastatic to bone 7/6/2023    Prostate cancer metastatic to lung 7/6/2023    Ulcerative colitis      Past Surgical History:   Procedure Laterality Date    ARTHROSCOPY OF KNEE Right 11/22/2023    Procedure: ARTHROSCOPY, KNEE;  Surgeon: Harry Julien MD;  Location: [facility] OR;  Service: Orthopedics;  Laterality: Right;    NECK SURGERY      right knee      TRANSRECTAL BIOPSY OF PROSTATE WITH ULTRASOUND GUIDANCE Bilateral 3/18/2020    Procedure: BIOPSY, PROSTATE, RECTAL APPROACH, WITH US GUIDANCE;  Surgeon: Bill Jeong MD;  Location: [facility] OR;  Service: Urology;  Laterality: Bilateral;       ALCOHOL, TOBACCO AND DRUG USE  Social History     Tobacco Use   Smoking Status Every Day    Current packs/day: 0.50    Average packs/day: 0.5 packs/day for 50.2 years (25.1 ttl pk-yrs)    Types: Cigarettes    Start date: 9/7/1973    Passive exposure: Past   Smokeless Tobacco Never     Social History     Substance and Sexual Activity   Alcohol Use Yes    Comment: 8 16oz beers per day     Social History     Substance and Sexual Activity   Drug Use No        FAMILY HISTORY  Family History   Problem Relation Age of Onset    Cancer Mother         Uterine and Ovarian cancer    Diabetes Mother     Diabetes Sister        VITAL SIGNS (MOST RECENT)  Temp: 98.8 °F (37.1 °C) (11/26/23 1100)  Pulse: 82 (11/26/23 1121)  Resp: 20 (11/26/23 1121)  BP: 129/61 (11/26/23 1100)  SpO2: 100 % (11/26/23 1121)    INTAKE AND OUTPUT (LAST 24 HOURS):  Intake/Output Summary (Last 24 hours) at 11/26/2023 1246  Last data filed at 11/26/2023 0835  Gross per 24 hour   Intake 340 ml   Output --   Net 340 ml       WEIGHT  Wt Readings from Last 1 Encounters:   11/21/23 89.4 kg (197 lb 1.5 oz)       Physical Exam  Vitals reviewed.   Constitutional:       General: He is not in acute distress.     Appearance: He is well-developed. He is not diaphoretic.   HENT:      Head: Normocephalic and atraumatic.      Mouth/Throat:      Pharynx: No oropharyngeal exudate or posterior oropharyngeal erythema.   Eyes:      General: No scleral icterus.     Pupils: Pupils are equal, round, and reactive to light.   Neck:      Vascular: No JVD.   Cardiovascular:      Rate and Rhythm: Normal rate and regular rhythm.      Heart sounds: Normal heart sounds. No murmur heard.  Pulmonary:      Effort: Pulmonary effort is normal. No respiratory distress.      Breath sounds: Wheezing present.   Abdominal:      General: Bowel sounds are normal. There is no distension.      Palpations: Abdomen is soft.      Tenderness: There is no abdominal tenderness.   Musculoskeletal:         General: No swelling.      Cervical back: Normal range of motion and neck supple. No rigidity.      Right lower leg: Edema present.      Left lower leg: Edema present.   Skin:     General: Skin is warm and dry.      Capillary Refill: Capillary refill takes less than 2 seconds.      Coloration: Skin is not pale.      Findings: No rash.   Neurological:      General: No focal deficit present.      Mental Status: He is alert and oriented to person, place,  "and time.      Cranial Nerves: No cranial nerve deficit.      Motor: No weakness or abnormal muscle tone.           ACUTE PHASE REACTANT (LAST 24 HOURS)  Recent Labs   Lab 11/26/23  0613   .7*       CBC LAST (LAST 24 HOURS)  Recent Labs   Lab 11/26/23  0613   WBC 9.35   RBC 2.79*   HGB 7.7*   HCT 26.2*   MCV 94   MCH 27.6   MCHC 29.4*   RDW 14.5      MPV 9.9   GRAN 96.7*  9.0*   LYMPH 1.8*  0.2*   MONO 0.9*  0.1*   BASO 0.02   NRBC 0       CHEMISTRY LAST (LAST 24 HOURS)  Recent Labs   Lab 11/25/23  1707 11/26/23  0613   NA  --  143   K  --  3.8   CL  --  94*   CO2  --  >45*   ANIONGAP  --  4*   BUN  --  10   CREATININE  --  0.5   GLU  --  262*   CALCIUM  --  9.2   PH 7.383  --    ALBUMIN  --  2.8*   PROT  --  6.0   ALKPHOS  --  84   ALT  --  7*   AST  --  7*   BILITOT  --  0.2       COAGULATION LAST (LAST 24 HOURS)  No results for input(s): "LABPT", "INR", "APTT" in the last 24 hours.    CARDIAC PROFILE (LAST 24 HOURS)  No results for input(s): "BNP", "CPK", "CPKMB", "LDH", "TROPONINI", "TROPONINIHS" in the last 168 hours.    LAST 7 DAYS MICROBIOLOGY   Microbiology Results (last 7 days)       Procedure Component Value Units Date/Time    Culture, Anaerobe [[id number]] Collected: 11/22/23 0955    Order Status: Completed Specimen: Synovial Fluid from Knee, Right Updated: 11/25/23 0839     Anaerobic Culture No anaerobes isolated    Narrative:      Right knee fluid    Aerobic culture [[id number]]  (Abnormal)  (Susceptibility) Collected: 11/22/23 0955    Order Status: Completed Specimen: Synovial Fluid from Knee, Right Updated: 11/25/23 0640     Aerobic Bacterial Culture ENTEROCOCCUS FAECALIS  From broth only      Narrative:      Right knee fluid    Gram stain [[id number]] Collected: 11/22/23 0955    Order Status: Completed Specimen: Synovial Fluid from Knee, Right Updated: 11/23/23 1459     Gram Stain Result Rare WBC's      No organisms seen    Narrative:      Right knee fluid    AFB Culture & Smear " [[id number]] Collected: 11/22/23 0955    Order Status: Sent Specimen: Wound from Knee, Right Updated: 11/22/23 1025    Fungus culture [[id number]] Collected: 11/22/23 0955    Order Status: Sent Specimen: Wound from Knee, Right Updated: 11/22/23 1023    Culture, Anaerobe [[id number]]     Order Status: Canceled Specimen: Body Fluid from Knee, Right             MOST RECENT IMAGING  X-Ray Chest AP Portable  History: Worsening oxygenation.    FINDINGS: Single AP view of the chest is compared to previous study 1 hour ago. Left upper extremity PICC remains in place with the tip projecting in the expected location of the left brachiocephalic vein unchanged. Bilateral diffuse interstitial infiltrates are present without significant interval change. Cardiac silhouette is stable in size. Atherosclerotic changes of the thoracic aorta are present. Postop changes of the cervical spine are present.    IMPRESSION:  1. Persistent bilateral interstitial infiltrates without significant interval change.    Electronically signed by:  Branden Vu MD  11/24/2023 05:04 PM CST Workstation: [id number]  X-Ray Chest 1 View  History: PICC insertion.    FINDINGS: Single AP view of the chest is compared to previous study dated November 22, 2023. Interval placement of left upper extremity PICC is noted with tip projecting in the expected location of the left brachiocephalic vein. Bilateral diffuse interstitial infiltrates persist without significant interval change. Atherosclerotic changes of the thoracic aorta are present. Cardiac silhouette is stable in size.    IMPRESSION:  1. Interval placement of left upper extremity PICC position as described above. Otherwise, stable interval appearance.    Electronically signed by:  Branden Vu MD  11/24/2023 04:03 PM CST Workstation: [id number]      CURRENT VISIT EKG  No results found for this visit on 11/21/23.    ECHOCARDIOGRAM RESULTS  Results for orders placed during the hospital encounter of  11/20/23    Echo    Interpretation Summary    Left Ventricle: The left ventricle is normal in size. Mildly increased wall thickness. Normal wall motion. There is normal systolic function with a visually estimated ejection fraction of 55 - 70%. Ejection fraction by visual approximation is 70%. Global longitudinal strain is -16%. Reduced. There is normal diastolic function.    Right Ventricle: Normal right ventricular cavity size. Systolic function is normal. TAPSE is 2.50 cm.    Left Atrium: Left atrium is moderately dilated.    Aortic Valve: The aortic valve is a trileaflet valve.    IVC/SVC: Normal venous pressure at 3 mmHg.    Pericardium: There is a trivial effusion. No indication of cardiac tamponade.    Some suggestion for LA enlargement from Echo in 3/2023.        VENTILATOR INFORMATION              LAST ARTERIAL BLOOD GAS  ABG  Recent Labs   Lab 11/25/23  1707   PH 7.383   PO2 124*   PCO2 83.7*   HCO3 49.9*   BE 25*                       ASSESSMENT:   Metastatic castrate sensitivity prostate cancer  Severe COPD FEV1 27, with air trapping and hyperinflation  Acute on chronic hypoxemic respiratory failure  Chronic hypercapneic respiraotry faliure with metabolic alkalosis for compensation   Septic arthritis complicated by enterococcus facalis bacteremia   Current smoker    Mr James is a 68-year-old man with known very severe COPD FEV1 27 who presents with Enterococcus bacteremia due to a septic knee.  His chest imaging suggests potential right lower lobe opacification which could be due to a potential pneumonia.  He has been reporting frequent cough and expectoration of sputum.  He is currently receiving Unasyn for Enterococcus infection.    PLAN:   - continue antibiotics for enterococcal bacteremia, I think that these antibiotics are likely to cover the possible pneumonia  - continue triple therapy home inhaler- may need to consider triple therapy with nebulizer therapy if his lung function is not good enough  for inhalers anymore- can also consider outpatietn azithromycin MWF and or roflumilast   - Normally due to severity of COPD would consider lung transplant but he is still smoking and due ot age- this may not be feasible.   - Agree with prednisone, would complete about 5 days of 40mg prednisone daily    Eloy Foster MD  Date of Service: 11/26/2023  12:46 PM

## 2023-11-26 NOTE — SUBJECTIVE & OBJECTIVE
Interval History: Patient seen and examined. VINCENT. Feels ok. R knee hurts. Has been stable off the bipap.      Objective:     Vital Signs (Most Recent):  Temp: 98.1 °F (36.7 °C) (11/26/23 1500)  Pulse: 86 (11/26/23 1500)  Resp: (!) 32 (11/26/23 1500)  BP: (!) 146/67 (11/26/23 1500)  SpO2: 96 % (11/26/23 1500) Vital Signs (24h Range):  Temp:  [98 °F (36.7 °C)-98.8 °F (37.1 °C)] 98.1 °F (36.7 °C)  Pulse:  [74-91] 86  Resp:  [16-40] 32  SpO2:  [86 %-100 %] 96 %  BP: (120-168)/(60-77) 146/67     Weight: 89.4 kg (197 lb 1.5 oz)  Body mass index is 23.37 kg/m².    Intake/Output Summary (Last 24 hours) at 11/26/2023 1632  Last data filed at 11/26/2023 1615  Gross per 24 hour   Intake 480 ml   Output 750 ml   Net -270 ml         Physical Exam  Vitals reviewed.   Constitutional:       General: He is not in acute distress.  HENT:      Head: Normocephalic and atraumatic.   Cardiovascular:      Rate and Rhythm: Normal rate and regular rhythm.   Pulmonary:      Effort: Pulmonary effort is normal. No respiratory distress.      Breath sounds: Decreased breath sounds (bibasilar) present.   Musculoskeletal:      Comments: R knee wrapped   Skin:     General: Skin is warm and dry.   Neurological:      General: No focal deficit present.      Mental Status: He is alert and oriented to person, place, and time. Mental status is at baseline.   Psychiatric:         Speech: Speech normal.         Behavior: Behavior normal.             Significant Labs: All pertinent labs within the past 24 hours have been reviewed.    Significant Imaging: I have reviewed all pertinent imaging results/findings within the past 24 hours.

## 2023-11-27 PROBLEM — J44.1 COPD EXACERBATION: Status: ACTIVE | Noted: 2023-11-27

## 2023-11-27 PROBLEM — J44.1 COPD EXACERBATION: Status: RESOLVED | Noted: 2023-11-27 | Resolved: 2023-11-27

## 2023-11-27 PROBLEM — D64.9 POSTOPERATIVE ANEMIA: Status: ACTIVE | Noted: 2023-11-27

## 2023-11-27 LAB
ALBUMIN SERPL BCP-MCNC: 2.7 G/DL (ref 3.5–5.2)
ALP SERPL-CCNC: 80 U/L (ref 55–135)
ALT SERPL W/O P-5'-P-CCNC: 6 U/L (ref 10–44)
ANION GAP SERPL CALC-SCNC: 4 MMOL/L (ref 8–16)
AST SERPL-CCNC: 6 U/L (ref 10–40)
BACTERIA BLD CULT: NORMAL
BACTERIA BLD CULT: NORMAL
BASOPHILS # BLD AUTO: 0.01 K/UL (ref 0–0.2)
BASOPHILS NFR BLD: 0.1 % (ref 0–1.9)
BILIRUB SERPL-MCNC: 0.2 MG/DL (ref 0.1–1)
BUN SERPL-MCNC: 13 MG/DL (ref 8–23)
CALCIUM SERPL-MCNC: 9 MG/DL (ref 8.7–10.5)
CHLORIDE SERPL-SCNC: 95 MMOL/L (ref 95–110)
CO2 SERPL-SCNC: >45 MMOL/L (ref 23–29)
CREAT SERPL-MCNC: 0.5 MG/DL (ref 0.5–1.4)
CRP SERPL-MCNC: 88.5 MG/L (ref 0–8.2)
DIFFERENTIAL METHOD: ABNORMAL
EOSINOPHIL # BLD AUTO: 0 K/UL (ref 0–0.5)
EOSINOPHIL NFR BLD: 0.1 % (ref 0–8)
ERYTHROCYTE [DISTWIDTH] IN BLOOD BY AUTOMATED COUNT: 14.6 % (ref 11.5–14.5)
EST. GFR  (NO RACE VARIABLE): >60 ML/MIN/1.73 M^2
GLUCOSE SERPL-MCNC: 132 MG/DL (ref 70–110)
GLUCOSE SERPL-MCNC: 183 MG/DL (ref 70–110)
GLUCOSE SERPL-MCNC: 197 MG/DL (ref 70–110)
GLUCOSE SERPL-MCNC: 209 MG/DL (ref 70–110)
GLUCOSE SERPL-MCNC: 214 MG/DL (ref 70–110)
HCT VFR BLD AUTO: 24.8 % (ref 40–54)
HGB BLD-MCNC: 7.4 G/DL (ref 14–18)
IMM GRANULOCYTES # BLD AUTO: 0.09 K/UL (ref 0–0.04)
IMM GRANULOCYTES NFR BLD AUTO: 0.9 % (ref 0–0.5)
LYMPHOCYTES # BLD AUTO: 0.6 K/UL (ref 1–4.8)
LYMPHOCYTES NFR BLD: 5.9 % (ref 18–48)
MCH RBC QN AUTO: 27.4 PG (ref 27–31)
MCHC RBC AUTO-ENTMCNC: 29.8 G/DL (ref 32–36)
MCV RBC AUTO: 92 FL (ref 82–98)
MONOCYTES # BLD AUTO: 0.7 K/UL (ref 0.3–1)
MONOCYTES NFR BLD: 7.1 % (ref 4–15)
NEUTROPHILS # BLD AUTO: 8.8 K/UL (ref 1.8–7.7)
NEUTROPHILS NFR BLD: 85.9 % (ref 38–73)
NRBC BLD-RTO: 0 /100 WBC
PLATELET # BLD AUTO: 373 K/UL (ref 150–450)
PMV BLD AUTO: 10 FL (ref 9.2–12.9)
POTASSIUM SERPL-SCNC: 3.4 MMOL/L (ref 3.5–5.1)
PROT SERPL-MCNC: 5.7 G/DL (ref 6–8.4)
RBC # BLD AUTO: 2.7 M/UL (ref 4.6–6.2)
SODIUM SERPL-SCNC: 144 MMOL/L (ref 136–145)
WBC # BLD AUTO: 10.25 K/UL (ref 3.9–12.7)

## 2023-11-27 PROCEDURE — 94761 N-INVAS EAR/PLS OXIMETRY MLT: CPT

## 2023-11-27 PROCEDURE — 36415 COLL VENOUS BLD VENIPUNCTURE: CPT | Performed by: STUDENT IN AN ORGANIZED HEALTH CARE EDUCATION/TRAINING PROGRAM

## 2023-11-27 PROCEDURE — 80053 COMPREHEN METABOLIC PANEL: CPT | Performed by: ORTHOPAEDIC SURGERY

## 2023-11-27 PROCEDURE — 94640 AIRWAY INHALATION TREATMENT: CPT

## 2023-11-27 PROCEDURE — 63600175 PHARM REV CODE 636 W HCPCS: Performed by: STUDENT IN AN ORGANIZED HEALTH CARE EDUCATION/TRAINING PROGRAM

## 2023-11-27 PROCEDURE — 97535 SELF CARE MNGMENT TRAINING: CPT

## 2023-11-27 PROCEDURE — 25000003 PHARM REV CODE 250: Performed by: STUDENT IN AN ORGANIZED HEALTH CARE EDUCATION/TRAINING PROGRAM

## 2023-11-27 PROCEDURE — 94660 CPAP INITIATION&MGMT: CPT

## 2023-11-27 PROCEDURE — 63600175 PHARM REV CODE 636 W HCPCS: Performed by: ORTHOPAEDIC SURGERY

## 2023-11-27 PROCEDURE — 85025 COMPLETE CBC W/AUTO DIFF WBC: CPT | Performed by: STUDENT IN AN ORGANIZED HEALTH CARE EDUCATION/TRAINING PROGRAM

## 2023-11-27 PROCEDURE — 25000003 PHARM REV CODE 250: Performed by: ORTHOPAEDIC SURGERY

## 2023-11-27 PROCEDURE — 82962 GLUCOSE BLOOD TEST: CPT

## 2023-11-27 PROCEDURE — 97530 THERAPEUTIC ACTIVITIES: CPT

## 2023-11-27 PROCEDURE — 99232 SBSQ HOSP IP/OBS MODERATE 35: CPT | Mod: ,,, | Performed by: INTERNAL MEDICINE

## 2023-11-27 PROCEDURE — 63600175 PHARM REV CODE 636 W HCPCS: Mod: UD | Performed by: INTERNAL MEDICINE

## 2023-11-27 PROCEDURE — 99232 PR SUBSEQUENT HOSPITAL CARE,LEVL II: ICD-10-PCS | Mod: ,,, | Performed by: INTERNAL MEDICINE

## 2023-11-27 PROCEDURE — 21000000 HC CCU ICU ROOM CHARGE

## 2023-11-27 PROCEDURE — 99900035 HC TECH TIME PER 15 MIN (STAT)

## 2023-11-27 PROCEDURE — 25000242 PHARM REV CODE 250 ALT 637 W/ HCPCS: Performed by: INTERNAL MEDICINE

## 2023-11-27 PROCEDURE — 27000221 HC OXYGEN, UP TO 24 HOURS

## 2023-11-27 PROCEDURE — 25000242 PHARM REV CODE 250 ALT 637 W/ HCPCS: Performed by: STUDENT IN AN ORGANIZED HEALTH CARE EDUCATION/TRAINING PROGRAM

## 2023-11-27 PROCEDURE — 99900031 HC PATIENT EDUCATION (STAT)

## 2023-11-27 PROCEDURE — 86140 C-REACTIVE PROTEIN: CPT | Performed by: ORTHOPAEDIC SURGERY

## 2023-11-27 RX ORDER — IPRATROPIUM BROMIDE AND ALBUTEROL SULFATE 2.5; .5 MG/3ML; MG/3ML
3 SOLUTION RESPIRATORY (INHALATION)
Status: DISCONTINUED | OUTPATIENT
Start: 2023-11-27 | End: 2023-11-30

## 2023-11-27 RX ADMIN — IPRATROPIUM BROMIDE AND ALBUTEROL SULFATE 3 ML: 2.5; .5 SOLUTION RESPIRATORY (INHALATION) at 08:11

## 2023-11-27 RX ADMIN — PREDNISONE 50 MG: 20 TABLET ORAL at 09:11

## 2023-11-27 RX ADMIN — FAMOTIDINE 20 MG: 20 TABLET ORAL at 09:11

## 2023-11-27 RX ADMIN — HYDROCODONE BITARTRATE AND ACETAMINOPHEN 1 TABLET: 5; 325 TABLET ORAL at 04:11

## 2023-11-27 RX ADMIN — AMPICILLIN 2 G: 2 INJECTION, POWDER, FOR SOLUTION INTRAVENOUS at 12:11

## 2023-11-27 RX ADMIN — AMPICILLIN 2 G: 2 INJECTION, POWDER, FOR SOLUTION INTRAVENOUS at 03:11

## 2023-11-27 RX ADMIN — MUPIROCIN 1 G: 20 OINTMENT TOPICAL at 09:11

## 2023-11-27 RX ADMIN — IPRATROPIUM BROMIDE AND ALBUTEROL SULFATE 3 ML: 2.5; .5 SOLUTION RESPIRATORY (INHALATION) at 12:11

## 2023-11-27 RX ADMIN — BUDESONIDE INHALATION 0.5 MG: 0.5 SUSPENSION RESPIRATORY (INHALATION) at 08:11

## 2023-11-27 RX ADMIN — AMPICILLIN 2 G: 2 INJECTION, POWDER, FOR SOLUTION INTRAVENOUS at 07:11

## 2023-11-27 RX ADMIN — POTASSIUM BICARBONATE 35 MEQ: 391 TABLET, EFFERVESCENT ORAL at 10:11

## 2023-11-27 RX ADMIN — ENOXAPARIN SODIUM 90 MG: 100 INJECTION SUBCUTANEOUS at 09:11

## 2023-11-27 RX ADMIN — AMPICILLIN 2 G: 2 INJECTION, POWDER, FOR SOLUTION INTRAVENOUS at 11:11

## 2023-11-27 RX ADMIN — IPRATROPIUM BROMIDE AND ALBUTEROL SULFATE 3 ML: 2.5; .5 SOLUTION RESPIRATORY (INHALATION) at 07:11

## 2023-11-27 RX ADMIN — AMPICILLIN 2 G: 2 INJECTION, POWDER, FOR SOLUTION INTRAVENOUS at 09:11

## 2023-11-27 RX ADMIN — IPRATROPIUM BROMIDE AND ALBUTEROL SULFATE 3 ML: 2.5; .5 SOLUTION RESPIRATORY (INHALATION) at 03:11

## 2023-11-27 RX ADMIN — ARFORMOTEROL TARTRATE 15 MCG: 15 SOLUTION RESPIRATORY (INHALATION) at 07:11

## 2023-11-27 RX ADMIN — TAMSULOSIN HYDROCHLORIDE 0.4 MG: 0.4 CAPSULE ORAL at 09:11

## 2023-11-27 RX ADMIN — BUDESONIDE INHALATION 0.5 MG: 0.5 SUSPENSION RESPIRATORY (INHALATION) at 07:11

## 2023-11-27 RX ADMIN — CHLORHEXIDINE GLUCONATE 15 ML: 1.2 RINSE ORAL at 09:11

## 2023-11-27 RX ADMIN — IPRATROPIUM BROMIDE AND ALBUTEROL SULFATE 3 ML: 2.5; .5 SOLUTION RESPIRATORY (INHALATION) at 04:11

## 2023-11-27 RX ADMIN — IPRATROPIUM BROMIDE AND ALBUTEROL SULFATE 3 ML: 2.5; .5 SOLUTION RESPIRATORY (INHALATION) at 11:11

## 2023-11-27 RX ADMIN — AMIODARONE HYDROCHLORIDE 200 MG: 200 TABLET ORAL at 09:11

## 2023-11-27 RX ADMIN — ARFORMOTEROL TARTRATE 15 MCG: 15 SOLUTION RESPIRATORY (INHALATION) at 08:11

## 2023-11-27 RX ADMIN — DILTIAZEM HYDROCHLORIDE 30 MG: 30 TABLET, FILM COATED ORAL at 09:11

## 2023-11-27 RX ADMIN — POTASSIUM BICARBONATE 35 MEQ: 391 TABLET, EFFERVESCENT ORAL at 06:11

## 2023-11-27 NOTE — CARE UPDATE
11/27/23 0848   Patient Assessment/Suction   Level of Consciousness (AVPU) alert   Respiratory Effort Unlabored   All Lung Fields Breath Sounds clear;diminished   Skin Integrity   $ Wound Care Tech Time 15 min   Area Observed Bridge of nose   Skin Appearance without discoloration   PRE-TX-O2   Device (Oxygen Therapy) high flow nasal cannula w/device   $ Is the patient on Low Flow Oxygen? Yes   Flow (L/min) 5   SpO2 (!) 94 %   Pulse Oximetry Type Continuous   $ Pulse Oximetry - Multiple Charge Pulse Oximetry - Multiple   Pulse 89   Resp 15   Aerosol Therapy   $ Aerosol Therapy Charges Aerosol Treatment  (duoneb)   Daily Review of Necessity (SVN) completed   Respiratory Treatment Status (SVN) given   Treatment Route (SVN) mask   Patient Position (SVN) semi-Chang's   Post Treatment Assessment (SVN) increased aeration   Signs of Intolerance (SVN) none   Breath Sounds Post-Respiratory Treatment   Throughout All Fields Post-Treatment aeration increased   Post-treatment Heart Rate (beats/min) 88   Post-treatment Resp Rate (breaths/min) 15   Preset CPAP/BiPAP Settings   $ CPAP/BiPAP Daily Charge BiPAP/CPAP Daily   Education   $ Education Bronchodilator;15 min   Respiratory Evaluation   $ Care Plan Tech Time 15 min

## 2023-11-27 NOTE — SUBJECTIVE & OBJECTIVE
Interval History: Patient seen and examined. TOMER Feels SOB which is intermittent for him but otherwise feeling well. Had decreased blood count today. He denies seeing any bleeding or dark stools.      Objective:     Vital Signs (Most Recent):  Temp: 99.7 °F (37.6 °C) (11/27/23 1101)  Pulse: 88 (11/27/23 1503)  Resp: 20 (11/27/23 1503)  BP: (!) 160/76 (11/27/23 1503)  SpO2: 99 % (11/27/23 1503) Vital Signs (24h Range):  Temp:  [99 °F (37.2 °C)-100.1 °F (37.8 °C)] 99.7 °F (37.6 °C)  Pulse:  [80-99] 88  Resp:  [15-36] 20  SpO2:  [89 %-100 %] 99 %  BP: (140-176)/(65-81) 160/76     Weight: 89.4 kg (197 lb 1.5 oz)  Body mass index is 23.37 kg/m².    Intake/Output Summary (Last 24 hours) at 11/27/2023 1619  Last data filed at 11/27/2023 1401  Gross per 24 hour   Intake 1680 ml   Output 1200 ml   Net 480 ml         Physical Exam  Vitals reviewed.   Constitutional:       General: He is not in acute distress.  HENT:      Head: Normocephalic and atraumatic.   Cardiovascular:      Rate and Rhythm: Normal rate and regular rhythm.   Pulmonary:      Effort: Pulmonary effort is normal. No respiratory distress.      Breath sounds: Decreased breath sounds (bibasilar) present.   Musculoskeletal:      Comments: R knee wrapped   Skin:     General: Skin is warm and dry.   Neurological:      General: No focal deficit present.      Mental Status: He is alert and oriented to person, place, and time. Mental status is at baseline.   Psychiatric:         Speech: Speech normal.         Behavior: Behavior normal.             Significant Labs: All pertinent labs within the past 24 hours have been reviewed.    Significant Imaging: I have reviewed all pertinent imaging results/findings within the past 24 hours.

## 2023-11-27 NOTE — ASSESSMENT & PLAN NOTE
Patient's anemia is currently uncontrolled. Has not received any PRBCs to date. Etiology likely d/t acute blood loss which was from postoperative state  Current CBC reviewed-   Lab Results   Component Value Date    HGB 7.4 (L) 11/27/2023    HCT 24.8 (L) 11/27/2023     Monitor serial CBC and transfuse if patient becomes hemodynamically unstable, symptomatic or H/H drops below 7/21.

## 2023-11-27 NOTE — CARE UPDATE
11/27/23 0049   Patient Assessment/Suction   Level of Consciousness (AVPU) alert   Respiratory Effort Unlabored   Expansion/Accessory Muscles/Retractions no use of accessory muscles   All Lung Fields Breath Sounds coarse   Rhythm/Pattern, Respiratory no shortness of breath reported   Cough Frequency infrequent   Cough Type no productive sputum;good   PRE-TX-O2   Device (Oxygen Therapy) nasal cannula with humidification   SpO2 96 %   Pulse Oximetry Type Continuous   Pulse 80   Resp (!) 22   Positioning   Head of Bed (HOB) Positioning HOB elevated;HOB at 30-45 degrees   Aerosol Therapy   $ Aerosol Therapy Charges Aerosol Treatment   Daily Review of Necessity (SVN) completed   Respiratory Treatment Status (SVN) given   Treatment Route (SVN) mask   Patient Position (SVN) semi-Chang's   Post Treatment Assessment (SVN) breath sounds improved   Signs of Intolerance (SVN) none   Breath Sounds Post-Respiratory Treatment   Throughout All Fields Post-Treatment All Fields   Throughout All Fields Post-Treatment aeration increased   Post-treatment Heart Rate (beats/min) 92   Post-treatment Resp Rate (breaths/min) 17   Preset CPAP/BiPAP Settings   Mode Of Delivery BiPAP S/T   $ Is patient using? No/refused

## 2023-11-27 NOTE — PROGRESS NOTES
[facility]  Progress Note    Patient Name: Jama James  MRN: [id number]  Patient Class: IP- Inpatient   Admission Date: 11/21/2023  Length of Stay: 5 days  Attending Physician: Alex Britt MD  Primary Care Provider: Marisel Farrell III, MD        Subjective:     Principal Problem:Septic arthritis of knee, right        HPI:  68 year old pt getting transferred from [place] with R septic arthritis and Streptococcus bacteremia  One week ago pt went to [place] ER with painful swollen Knee  Arthrocentesis was done and pt was discharged to home  Synovial fluid Grew E Fecalis  He was called back to ER  and blood culture was done  Pt was evaluated by Ortho MD lisa evans decision was made to transfer pt here because of his co morbid complex disaese/factors  Blood culture done grew Streptococcus       Overview/Hospital Course:  68M with PMH pAfib on eliquis, COPD w/ chronic respiratory failure on 4L NC, likely underlying interstitial lung disease, and prostate cancer with mets on chemo is admitted with R knee septic arthritis which underwent aspiration and washout which grew enterococcus faecalis on wound culture and blood cultures. Ortho followed initially. ID consulted and recommended ampicillin 2gm q4hr for 4 week course to end 12/20/23. Course complicated by acute on chronic respiratory failure with hypoxia and hypercapnia requiring increased O2 needs and bipap. Started on scheduled nebs and steroids. Pulmonology consulted. Respiratory status stabilized. Suffered with postoperative anemia and his blood counts were trended.    Interval History: Patient seen and examined. NAEON. Feels SOB which is intermittent for him but otherwise feeling well. Had decreased blood count today. He denies seeing any bleeding or dark stools.      Objective:     Vital Signs (Most Recent):  Temp: 99.7 °F (37.6 °C) (11/27/23 1101)  Pulse: 88 (11/27/23 1503)  Resp: 20 (11/27/23 1503)  BP: (!) 160/76 (11/27/23  1503)  SpO2: 99 % (11/27/23 1503) Vital Signs (24h Range):  Temp:  [99 °F (37.2 °C)-100.1 °F (37.8 °C)] 99.7 °F (37.6 °C)  Pulse:  [80-99] 88  Resp:  [15-36] 20  SpO2:  [89 %-100 %] 99 %  BP: (140-176)/(65-81) 160/76     Weight: 89.4 kg (197 lb 1.5 oz)  Body mass index is 23.37 kg/m².    Intake/Output Summary (Last 24 hours) at 11/27/2023 1619  Last data filed at 11/27/2023 1401  Gross per 24 hour   Intake 1680 ml   Output 1200 ml   Net 480 ml         Physical Exam  Vitals reviewed.   Constitutional:       General: He is not in acute distress.  HENT:      Head: Normocephalic and atraumatic.   Cardiovascular:      Rate and Rhythm: Normal rate and regular rhythm.   Pulmonary:      Effort: Pulmonary effort is normal. No respiratory distress.      Breath sounds: Decreased breath sounds (bibasilar) present.   Musculoskeletal:      Comments: R knee wrapped   Skin:     General: Skin is warm and dry.   Neurological:      General: No focal deficit present.      Mental Status: He is alert and oriented to person, place, and time. Mental status is at baseline.   Psychiatric:         Speech: Speech normal.         Behavior: Behavior normal.             Significant Labs: All pertinent labs within the past 24 hours have been reviewed.    Significant Imaging: I have reviewed all pertinent imaging results/findings within the past 24 hours.    Assessment/Plan:      * Septic arthritis of knee, right  S/p aspiration and washout  Wcx and Bcx E. Faecalis  - wound care  - prn pain meds  - ID rec ampicillin 2gm q4hr for 4 weeks to end 12/20/23  - looking into LTAC    Postoperative anemia  Patient's anemia is currently uncontrolled. Has not received any PRBCs to date. Etiology likely d/t acute blood loss which was from postoperative state  Current CBC reviewed-   Lab Results   Component Value Date    HGB 7.4 (L) 11/27/2023    HCT 24.8 (L) 11/27/2023     Monitor serial CBC and transfuse if patient becomes hemodynamically unstable,  symptomatic or H/H drops below 7/21.    Hyperglycemia, drug-induced  Mostly controlled. Steroid-induced  - accuchecks and LDSSI as needed    Acute on chronic respiratory failure with hypoxia and hypercapnia  Stable now  S/p bipap currently on 5L NC (baseline is 4L)  - wean O2 for goal sat 88-92%  - abg prn  - scheduled duoneb and add maintenance nebs   - continue steroids  - pulm consult; questionable pneumonia which would likely be covered by his abx for the joint/blood infection    Bacteremia  E. Faecalis  Repeat blood cultures negative  - plan per septic arthritis section    PAF (paroxysmal atrial fibrillation)  Controlled  - continue dilt, amio, and lovenox in place of DOAC    Chronic obstructive pulmonary disease with acute exacerbation  Contributing to resp fail; see that section    Prostate cancer  Prostate carcinoma with metastatic disease to cervical, thoracic, abdominal and pelvic lymph nodes as well as metastatic disease to bone.   On Prednisone/Lupron and zytiga  - hold home steroids/chemo while dealing with acute infection  - f/u hem/onc outpatient      VTE Risk Mitigation (From admission, onward)           Ordered     enoxaparin injection 90 mg  Every 12 hours         11/23/23 0732     IP VTE HIGH RISK PATIENT  Once         11/22/23 1919     Place sequential compression device  Until discontinued         11/21/23 1782                    Discharge Planning   CLEMENTINA: 11/28/2023     Code Status: Full Code   Is the patient medically ready for discharge?:     Reason for patient still in hospital (select all that apply): Patient trending condition and Laboratory test  Discharge Plan A: Long-term acute care facility (LTAC)   Discharge Delays: None known at this time              Alex Britt MD  Department of Hospital Medicine   [facility]

## 2023-11-27 NOTE — CARE UPDATE
11/26/23 2053   Patient Assessment/Suction   Level of Consciousness (AVPU) alert   Respiratory Effort Unlabored   Expansion/Accessory Muscles/Retractions no use of accessory muscles   CARMELO Breath Sounds clear;coarse;diminished   PRE-TX-O2   Device (Oxygen Therapy) nasal cannula   $ Is the patient on Low Flow Oxygen? Yes   Flow (L/min) 4   SpO2 98 %   Pulse Oximetry Type Continuous   $ Pulse Oximetry - Multiple Charge Pulse Oximetry - Multiple   Pulse 90   Resp 15   Positioning   Head of Bed (HOB) Positioning HOB elevated;HOB at 30-45 degrees   Aerosol Therapy   $ Aerosol Therapy Charges Aerosol Treatment   Daily Review of Necessity (SVN) completed   Respiratory Treatment Status (SVN) given   Treatment Route (SVN) mask   Patient Position (SVN) semi-Chang's   Post Treatment Assessment (SVN) breath sounds improved   Signs of Intolerance (SVN) none   Breath Sounds Post-Respiratory Treatment   Throughout All Fields Post-Treatment All Fields   Throughout All Fields Post-Treatment aeration increased   Post-treatment Heart Rate (beats/min) 80   Post-treatment Resp Rate (breaths/min) 15   Education   $ Education Bronchodilator;15 min   Respiratory Evaluation   $ Care Plan Tech Time 15 min   $ Eval/Re-eval Charges Re-evaluation

## 2023-11-27 NOTE — PT/OT/SLP PROGRESS
Occupational Therapy   Treatment    Name: Jama James  MRN: [id number]  Admitting Diagnosis:  Septic arthritis of knee, right  5 Days Post-Op    Recommendations:     Discharge Recommendations: Moderate Intensity Therapy  Discharge Equipment Recommendations:  wheelchair  Barriers to discharge:  Decreased caregiver support    Assessment:     Jama James is a 68 y.o. male with a medical diagnosis of Septic arthritis of knee, right.  He presents with general weakness. Patient participated in bed mobility, unsupported sitting EOB and grooming while sitting EOB. Performance deficits affecting function are weakness, impaired endurance, impaired self care skills, impaired functional mobility, gait instability, impaired balance, decreased upper extremity function, decreased lower extremity function, decreased safety awareness, orthopedic precautions.     Rehab Prognosis:  Fair; patient would benefit from acute skilled OT services to address these deficits and reach maximum level of function.       Plan:     Patient to be seen 5 x/week to address the above listed problems via self-care/home management, therapeutic activities, therapeutic exercises  Plan of Care Expires: 12/24/23  Plan of Care Reviewed with: patient    Subjective     Chief Complaint: General weakness  Patient/Family Comments/goals: Improved functional mobility and ADL independence.  Pain/Comfort:  Pain Rating 1: 0/10  Pain Rating Post-Intervention 1: 0/10    Objective:     Communicated with: nurse prior to session.  Patient found HOB elevated with telemetry, PICC line, PureWick, oxygen upon OT entry to room.    General Precautions: Standard, fall    Orthopedic Precautions:RLE weight bearing as tolerated  Braces: N/A  Respiratory Status: Nasal cannula, flow 4 L/min     Occupational Performance:     Bed Mobility:    Patient completed Scooting/Bridging with minimum assistance  Patient completed Supine to Sit with minimum assistance  Patient completed Sit to Supine  with minimum assistance   Performed unsupported sitting EOB with contact guard assistance.    Activities of Daily Living:  Grooming: contact guard assistance to brush teeth and wash face sitting EOB.    [facility] 6 Click ADL:      Treatment & Education:  Patient required assistance for RLE during bed mobility.     Patient left HOB elevated with all lines intact, call button in reach, and bed alarm on    GOALS:   Multidisciplinary Problems       Occupational Therapy Goals          Problem: Occupational Therapy    Goal Priority Disciplines Outcome Interventions   Occupational Therapy Goal     OT, PT/OT     Description: Goals to be met by: 12/24/23     Patient will increase functional independence with ADLs by performing:    UE Dressing with Set-up Assistance.  LE Dressing with Minimal Assistance and Assistive Devices as needed.  Grooming while seated at sink with Supervision.  Toileting from toilet with Minimal Assistance for hygiene and clothing management.   Toilet transfer to toilet with Minimal Assistance.                         Time Tracking:     OT Date of Treatment: 11/27/23  OT Start Time: 1101  OT Stop Time: 1119  OT Total Time (min): 18 min    Billable Minutes:Self Care/Home Management 18    OT/NICHO: OT          11/27/2023

## 2023-11-27 NOTE — PT/OT/SLP PROGRESS
Physical Therapy Treatment    Patient Name:  Jama James   MRN:  [id number]    Recommendations:     Discharge Recommendations: Moderate Intensity Therapy  Discharge Equipment Recommendations: to be determined by next level of care  Barriers to discharge:  unable to tolerate R LE WB, increase assist with mobility, high fall risk, decrease activity tolerance, R knee pain    Assessment:     Jama James is a 68 y.o. male admitted with a medical diagnosis of Septic arthritis of knee, right.  He presents with the following impairments/functional limitations: weakness, impaired endurance, impaired self care skills, impaired functional mobility, gait instability, impaired balance, decreased lower extremity function, decreased safety awareness, pain, impaired cardiopulmonary response to activity.    Pt found in bed with HOB elevated. Pt presents with improved mood and eagerness to participate. Pt agreeable to visit. Pt requires min A with bed mobility. Good sitting balance at EOB but presents with improved breath support with SAO2 WFL. Pt able to perform sit to stand with single attempt this date with min A x 2 to RW tolerating SPT bed to chair. Pt unable to tolerate R LE WB to assist with transfer increasing assist required.     Rehab Prognosis: Fair; patient would benefit from acute skilled PT services to address these deficits and reach maximum level of function.    Recent Surgery: Procedure(s) (LRB):  ARTHROSCOPY, KNEE (Right) 5 Days Post-Op    Plan:     During this hospitalization, patient to be seen 6 x/week to address the identified rehab impairments via therapeutic activities, therapeutic exercises, gait training, neuromuscular re-education and progress toward the following goals:    Plan of Care Expires:  12/27/23    Subjective     Chief Complaint: R knee pain  Patient/Family Comments/goals: get better  Pain/Comfort:  Pain Rating 1: 8/10  Location - Side 1: Right  Location 1: knee  Pain Addressed 1: Reposition,  Distraction, Cessation of Activity      Objective:     Communicated with RN prior to session.  Patient found HOB elevated with peripheral IV, telemetry, oxygen, PureWick upon PT entry to room.     General Precautions: Standard, fall  Orthopedic Precautions: RLE weight bearing as tolerated  Braces: N/A  Respiratory Status: Nasal cannula, flow 6 L/min     Functional Mobility:  Bed Mobility:     Supine to Sit: minimum assistance  Transfers:     Sit to Stand:  minimum assistance and of 2 persons with hand-held assist  Bed to Chair: minimum assistance and of 2 persons with  rolling walker  using  Stand Pivot      AM-PAC 6 CLICK MOBILITY  Turning over in bed (including adjusting bedclothes, sheets and blankets)?: 3  Sitting down on and standing up from a chair with arms (e.g., wheelchair, bedside commode, etc.): 2  Moving from lying on back to sitting on the side of the bed?: 2  Moving to and from a bed to a chair (including a wheelchair)?: 2  Need to walk in hospital room?: 1  Climbing 3-5 steps with a railing?: 1  Basic Mobility Total Score: 11       Treatment & Education:  Pt educated on POC, discharge recommendation, WBAT R LE, importance of time OOB, proper for with mobility, need for assist with mobility, use of call bell to seek assistance as needed and fall prevention        Patient left up in chair with all lines intact, call button in reach, chair alarm on, and RN present..    GOALS:   Multidisciplinary Problems       Physical Therapy Goals          Problem: Physical Therapy    Goal Priority Disciplines Outcome Goal Variances Interventions   Physical Therapy Goal     PT, PT/OT Ongoing, Progressing     Description: Goals to be met by: 23     Patient will increase functional independence with mobility by performin. Supine to sit with Supervision  2. Sit to stand transfer with Supervision  3. Bed to chair transfer with Supervision using Rolling Walker  4. Gait  x 150 feet with Supervision using Rolling  George.                              Time Tracking:     PT Received On: 11/27/23  PT Start Time: 0901     PT Stop Time: 0918  PT Total Time (min): 17 min     Billable Minutes: Therapeutic Activity 17    Treatment Type: Treatment  PT/PTA: PT     Number of PTA visits since last PT visit: 0     11/27/2023

## 2023-11-27 NOTE — PLAN OF CARE
spoke with Juju ([facility]) and Molly ([facility]) both are accepting. Pt's spouse declined both stating the distance is too far and they would prefer [facility].  sent message to Penelope ([facility]) to check on referral. Pt initially declined due to rev codes. Pt now has rev codes. [facility] to submit for insurance authorization today 11/27.   11/27/23 1546   Discharge Reassessment   Assessment Type Discharge Planning Reassessment   Did the patient's condition or plan change since previous assessment? No   Communicated CLEMENTINA with patient/caregiver Date not available/Unable to determine   Discharge Plan A Long-term acute care facility (LTAC)   Discharge Plan B Skilled Nursing Facility   Post-Acute Status   Discharge Delays None known at this time

## 2023-11-27 NOTE — PLAN OF CARE
Problem: Skin Injury Risk Increased  Goal: Skin Health and Integrity  Intervention: Promote and Optimize Oral Intake  Flowsheets (Taken 11/27/2023 1619)  Oral Nutrition Promotion: calorie-dense liquids provided     Problem: Oral Intake Inadequate  Goal: Improved Oral Intake  Outcome: Ongoing, Progressing  Intervention: Promote and Optimize Oral Intake  Flowsheets (Taken 11/27/2023 1619)  Oral Nutrition Promotion: calorie-dense liquids provided

## 2023-11-27 NOTE — ASSESSMENT & PLAN NOTE
Stable now  S/p bipap currently on 5L NC (baseline is 4L)  - wean O2 for goal sat 88-92%  - abg prn  - scheduled duoneb and add maintenance nebs   - continue steroids  - pulm consult; questionable pneumonia which would likely be covered by his abx for the joint/blood infection

## 2023-11-27 NOTE — PROGRESS NOTES
Pulmonary/Critical Care Progress Note      PATIENT NAME: Jama James  MRN: [id number]  TODAY'S DATE: 2023  12:46 PM  ADMIT DATE: 2023  AGE: 68 y.o. : 1955    CONSULT REQUESTED BY: Alex Britt MD    REASON FOR CONSULT:   COPD resp distress     HPI:  Mr James is a 68 year old man who presented with R septic knee arthritis due to  enterococcus facealic complciated by bacteremia.  Pulmonary consulted due to underlying COPD, chronic respiratory failure and hypercapnia.    Patient reports to me that he is had frequent exacerbations as an outpatient.  He has had frequent pneumonias.  No pneumonias this past year.  He is still smoking.  He is on 4 L of oxygen at home     the patient states he gets very short of breath when he gets out of bed.  He tells me he has not been smoking for several weeks now.  He states he is using his Breztri twice a day.      Review of Systems   Constitutional:  Positive for fatigue. Negative for appetite change, chills, fever and unexpected weight change.   HENT:  Negative for nosebleeds, postnasal drip, trouble swallowing and voice change.    Eyes:  Negative for visual disturbance.   Respiratory:  Positive for cough, shortness of breath and wheezing.    Cardiovascular:  Negative for chest pain and leg swelling.   Gastrointestinal:  Negative for diarrhea, nausea and vomiting.   Endocrine: Negative for cold intolerance and heat intolerance.   Genitourinary:  Negative for dysuria, flank pain and frequency.   Musculoskeletal:  Negative for neck pain and neck stiffness.   Allergic/Immunologic: Negative for environmental allergies and immunocompromised state.   Neurological:  Negative for syncope, speech difficulty and weakness.   Hematological:  Negative for adenopathy.   Psychiatric/Behavioral:  Negative for confusion.            ALLERGIES  Review of patient's allergies indicates:  No Known Allergies    INPATIENT SCHEDULED MEDICATIONS   albuterol-ipratropium  3 mL  Nebulization Q4H WAKE    amiodarone  200 mg Oral Daily    ampicillin IVPB  2 g Intravenous Q4H    arformoteroL  15 mcg Nebulization BID    budesonide  0.5 mg Nebulization Q12H    chlorhexidine  15 mL Mouth/Throat BID    diltiaZEM  30 mg Oral Q12H    enoxparin  1 mg/kg Subcutaneous Q12H (treatment, non-standard time)    famotidine  20 mg Oral BID    mupirocin   Nasal BID    predniSONE  50 mg Oral Daily    tamsulosin  0.4 mg Oral Daily         MEDICAL AND SURGICAL HISTORY  Past Medical History:   Diagnosis Date    Abnormal CT scan 7/6/2023    Abnormal positron emission tomography (PET) scan 7/6/2023    Anxiety     Asbestosis 08/04/2015    Atrial fibrillation     Bilateral adrenal adenomas     COPD (chronic obstructive pulmonary disease)     COVID-19 virus infection 07/28/2022    Depression     Elevated PSA 7/6/2023    High cholesterol     HTN (hypertension)     Malignant neoplasm of prostate     Multiple lung nodules     Nodule of upper lobe of left lung 03/30/2023    6 mm spiculated on cta chest 3/21/23    On home oxygen therapy 05/24/2023    Pneumonia     Prostate cancer 5/27/2020    Prostate cancer metastatic to bone 7/6/2023    Prostate cancer metastatic to lung 7/6/2023    Ulcerative colitis      Past Surgical History:   Procedure Laterality Date    ARTHROSCOPY OF KNEE Right 11/22/2023    Procedure: ARTHROSCOPY, KNEE;  Surgeon: Harry Julien MD;  Location: [facility] OR;  Service: Orthopedics;  Laterality: Right;    NECK SURGERY      right knee      TRANSRECTAL BIOPSY OF PROSTATE WITH ULTRASOUND GUIDANCE Bilateral 3/18/2020    Procedure: BIOPSY, PROSTATE, RECTAL APPROACH, WITH US GUIDANCE;  Surgeon: Bill Jeong MD;  Location: [facility] OR;  Service: Urology;  Laterality: Bilateral;       ALCOHOL, TOBACCO AND DRUG USE  Social History     Tobacco Use   Smoking Status Every Day    Current packs/day: 0.50    Average packs/day: 0.5 packs/day for 50.2 years (25.1 ttl pk-yrs)    Types: Cigarettes    Start date:  9/7/1973    Passive exposure: Past   Smokeless Tobacco Never     Social History     Substance and Sexual Activity   Alcohol Use Yes    Comment: 8 16oz beers per day     Social History     Substance and Sexual Activity   Drug Use No       FAMILY HISTORY  Family History   Problem Relation Age of Onset    Cancer Mother         Uterine and Ovarian cancer    Diabetes Mother     Diabetes Sister        VITAL SIGNS (MOST RECENT)  Temp: 99.7 °F (37.6 °C) (11/27/23 1101)  Pulse: 95 (11/27/23 1118)  Resp: (!) 25 (11/27/23 1118)  BP: (!) 160/76 (11/27/23 1100)  SpO2: 99 % (11/27/23 1118)    INTAKE AND OUTPUT (LAST 24 HOURS):  Intake/Output Summary (Last 24 hours) at 11/27/2023 1243  Last data filed at 11/27/2023 0631  Gross per 24 hour   Intake 1440 ml   Output 1150 ml   Net 290 ml         WEIGHT  Wt Readings from Last 1 Encounters:   11/21/23 89.4 kg (197 lb 1.5 oz)     PHYSICAL EXAM  GENERAL: Older patient in no distress.  HEENT: Pupils equal and reactive. Extraocular movements intact. Nose intact.      Pharynx moist.  NECK: Supple.   HEART: Regular rate and rhythm. No murmur or gallop auscultated.  LUNGS:  There are crackles in both bases.  Lung excursion symmetrical. No change in fremitus.  ABDOMEN: Bowel sounds present. Non-tender, no masses palpated.  : Normal anatomy.  EXTREMITIES:  The patient's right knee is dressed with a Ace wrap.  Normal muscle tone and joint movement, no cyanosis or clubbing.   LYMPHATICS: No adenopathy palpated, tr edema.  SKIN: Dry, intact, no lesions.   NEURO: Cranial nerves II-XII intact. Motor strength 5/5 bilaterally, upper and lower extremities.  PSYCH: Appropriate affect.                CBC LAST (LAST 24 HOURS)  Recent Labs   Lab 11/27/23  0409   WBC 10.25   RBC 2.70*   HGB 7.4*   HCT 24.8*   MCV 92   MCH 27.4   MCHC 29.8*   RDW 14.6*      MPV 10.0   GRAN 85.9*  8.8*   LYMPH 5.9*  0.6*   MONO 7.1  0.7   BASO 0.01   NRBC 0         CHEMISTRY LAST (LAST 24 HOURS)  Recent Labs   Lab  11/27/23  0408      K 3.4*   CL 95   CO2 >45*   ANIONGAP 4*   BUN 13   CREATININE 0.5   *   CALCIUM 9.0   ALBUMIN 2.7*   PROT 5.7*   ALKPHOS 80   ALT 6*   AST 6*   BILITOT 0.2       LAST 7 DAYS MICROBIOLOGY   Microbiology Results (last 7 days)       Procedure Component Value Units Date/Time    AFB Culture & Smear [[id number]] Collected: 11/22/23 0955    Order Status: Completed Specimen: Synovial Fluid from Knee, Right Updated: 11/27/23 1046     AFB CULTURE STAIN No acid fast bacilli seen.     AFB CULTURE STAIN Testing performed by:     AFB CULTURE STAIN Lab [facility] [place]     [facility]     [place] [address]     AFB CULTURE STAIN Dr.Brian Rad MD    Narrative:      Right knee fluid    Culture, Anaerobe [[id number]] Collected: 11/22/23 0955    Order Status: Completed Specimen: Synovial Fluid from Knee, Right Updated: 11/25/23 0839     Anaerobic Culture No anaerobes isolated    Narrative:      Right knee fluid    Aerobic culture [[id number]]  (Abnormal)  (Susceptibility) Collected: 11/22/23 0955    Order Status: Completed Specimen: Synovial Fluid from Knee, Right Updated: 11/25/23 0640     Aerobic Bacterial Culture ENTEROCOCCUS FAECALIS  From broth only      Narrative:      Right knee fluid    Gram stain [[id number]] Collected: 11/22/23 0955    Order Status: Completed Specimen: Synovial Fluid from Knee, Right Updated: 11/23/23 1459     Gram Stain Result Rare WBC's      No organisms seen    Narrative:      Right knee fluid    Fungus culture [[id number]] Collected: 11/22/23 0955    Order Status: Sent Specimen: Wound from Knee, Right Updated: 11/22/23 1023    Culture, Anaerobe [[id number]]     Order Status: Canceled Specimen: Body Fluid from Knee, Right             MOST RECENT IMAGING  X-Ray Chest AP Portable  History: Worsening oxygenation.    FINDINGS: Single AP view of the chest is compared to previous study 1 hour ago. Left upper extremity PICC  remains in place with the tip projecting in the expected location of the left brachiocephalic vein unchanged. Bilateral diffuse interstitial infiltrates are present without significant interval change. Cardiac silhouette is stable in size. Atherosclerotic changes of the thoracic aorta are present. Postop changes of the cervical spine are present.    IMPRESSION:  1. Persistent bilateral interstitial infiltrates without significant interval change.    Electronically signed by:  Branden Vu MD  11/24/2023 05:04 PM CST Workstation: [id number]  X-Ray Chest 1 View  History: PICC insertion.    FINDINGS: Single AP view of the chest is compared to previous study dated November 22, 2023. Interval placement of left upper extremity PICC is noted with tip projecting in the expected location of the left brachiocephalic vein. Bilateral diffuse interstitial infiltrates persist without significant interval change. Atherosclerotic changes of the thoracic aorta are present. Cardiac silhouette is stable in size.    IMPRESSION:  1. Interval placement of left upper extremity PICC position as described above. Otherwise, stable interval appearance.    Electronically signed by:  Branden Vu MD  11/24/2023 04:03 PM [facility] Workstation: [id number]      CURRENT VISIT EKG  No results found for this visit on 11/21/23.    ECHOCARDIOGRAM RESULTS  Results for orders placed during the hospital encounter of 11/20/23    Echo    Interpretation Summary    Left Ventricle: The left ventricle is normal in size. Mildly increased wall thickness. Normal wall motion. There is normal systolic function with a visually estimated ejection fraction of 55 - 70%. Ejection fraction by visual approximation is 70%. Global longitudinal strain is -16%. Reduced. There is normal diastolic function.    Right Ventricle: Normal right ventricular cavity size. Systolic function is normal. TAPSE is 2.50 cm.    Left Atrium: Left atrium is moderately dilated.    Aortic Valve: The  aortic valve is a trileaflet valve.    IVC/SVC: Normal venous pressure at 3 mmHg.    Pericardium: There is a trivial effusion. No indication of cardiac tamponade.    Some suggestion for LA enlargement from Echo in 3/2023.        VENTILATOR INFORMATION              LAST ARTERIAL BLOOD GAS  ABG  Recent Labs   Lab 11/25/23  1707   PH 7.383   PO2 124*   PCO2 83.7*   HCO3 49.9*   BE 25*                         ASSESSMENT:   Metastatic castrate sensitivity prostate cancer  Severe COPD FEV1 27%, with air trapping and hyperinflation  Acute on chronic hypercapneic hypoxemic respiratory failure  Septic arthritis complicated by enterococcus facalis bacteremia   Current smoker    Mr James is a 68-year-old man with known very severe COPD FEV1 27 who presents with Enterococcus bacteremia due to a septic knee.  His chest imaging suggests potential right lower lobe opacification which could be due to a potential pneumonia.  He has been reporting frequent cough and expectoration of sputum.  He is currently receiving Unasyn for Enterococcus infection.    PLAN:   - continue antibiotics for enterococcal bacteremia, I think that these antibiotics are likely to cover the possible pneumonia  - continue LABA, LAMA, and inhaled steroids  - Agree with prednisone, will taper given his symptomatology    Jacinta Kay MD  Date of Service: 11/27/2023  12:46 PM

## 2023-11-27 NOTE — PROGRESS NOTES
"[facility]  Adult Nutrition   Progress Note (Initial Assessment)     SUMMARY     Recommendations  1) Continue current Regular diet as tolerated.   2) RD recommends adding Ensure HP to pts meal trays to assist in meeting his energy and protein needs.   3)  continue to obtain daily food preferences.    Goals:   Pt to meet 100% of his energy and protein needs.    Nutrition Goal Status: progressing towards goal    Communication of RD Recs: reviewed with RN    Dietitian Rounds Brief  LOS: Pt is a 68 yobm admitted for septic arthritis of his L knee and a pmh of COPD, Covid-19, anxiety/depression, HTN and metastatic prostate cancer to bones and lungs as well as other issues. He is eating 75 to 100% of a regular diet with his LBM today. Ensure HP has been added to his meal trays to assist in meeting his nutritional needs. He has drug-induced hyperglycemia probably caused from the steroids he is on for his cancer. His skin is intact at this time and labs have been reviewed. Will continue to follow prn.    Diet order:   Current Diet Order: Regular      Evaluation of Received Nutrient/Fluid Intake  Energy Calories Required: meeting needs  Protein Required: meeting needs  Fluid Required: meeting needs  Tolerance: tolerating     % Intake of Estimated Energy Needs: 75 - 100 %  % Meal Intake: 75 - 100 %      Intake/Output Summary (Last 24 hours) at 11/27/2023 1606  Last data filed at 11/27/2023 1401  Gross per 24 hour   Intake 1680 ml   Output 1950 ml   Net -270 ml        Anthropometrics  Temp: 99.7 °F (37.6 °C)  Height Method: Stated  Height: 6' 5" (195.6 cm)  Height (inches): 77 in  Weight Method: Bed Scale  Weight: 89.4 kg (197 lb 1.5 oz)  Weight (lb): 197.09 lb  Ideal Body Weight (IBW), Male: 208 lb  % Ideal Body Weight, Male (lb): 94.75 %  BMI (Calculated): 23.4  BMI Grade: 18.5-24.9 - normal       Estimated/Assessed Needs  Weight Used For Calorie Calculations: 89.4 kg (197 lb 1.5 oz)  Energy Calorie " Requirements (kcal): 4621-6660 kcals/day (25-30 kcals/kg ABW)  Energy Need Method: Kcal/kg  Protein Requirements: 112-186 g/day (1.2-2.0 g/kg IBW)  Weight Used For Protein Calculations: 93 kg (205 lb 0.4 oz)     Estimated Fluid Requirement Method: RDA Method  RDA Method (mL): 2235       Reason for Assessment  Reason For Assessment: length of stay  Diagnosis: other (see comments)  Relevant Medical History: HTN (hypertension) (I10)    High cholesterol (E78.00)    Ulcerative colitis (K51.90)    Depression, Anxiety, COPD (chronic obstructive pulmonary disease), Asbestosis, Nodule of upper lobe of left lung, 6 mm spiculated on cta chest, Atrial fibrillation, Pneumonia, COVID-19 virus infection, Multiple lung nodules, Bilateral adrenal adenomas, Prostate cancer metastatic to bone, Prostate cancer metastatic to lung, Elevated PSA, Abnormal positron emission tomography (PET) scan    Nutrition/Diet History  Spiritual, Cultural Beliefs, Gnosticism Practices, Values that Affect Care: no  Food Allergies: NKFA  Factors Affecting Nutritional Intake: None identified at this time    Nutrition Risk Screen  Nutrition Risk Screen: no indicators present     MST Score: 0  Have you recently lost weight without trying?: No  Weight loss score: 0  Have you been eating poorly because of a decreased appetite?: No  Appetite score: 0       Weight History:  Wt Readings from Last 5 Encounters:   11/21/23 89.4 kg (197 lb 1.5 oz)   11/21/23 86.2 kg (190 lb)   11/21/23 86.2 kg (190 lb 0.6 oz)   11/15/23 86.2 kg (190 lb)   10/19/23 88.5 kg (195 lb)        Lab/Procedures/Meds: Pertinent Labs/Meds Reviewed    Medications:Pertinent Medications Reviewed  Scheduled Meds:   albuterol-ipratropium  3 mL Nebulization Q4H WAKE    amiodarone  200 mg Oral Daily    ampicillin IVPB  2 g Intravenous Q4H    arformoteroL  15 mcg Nebulization BID    budesonide  0.5 mg Nebulization Q12H    chlorhexidine  15 mL Mouth/Throat BID    diltiaZEM  30 mg Oral Q12H    enoxparin   1 mg/kg Subcutaneous Q12H (treatment, non-standard time)    famotidine  20 mg Oral BID    mupirocin   Nasal BID    predniSONE  50 mg Oral Daily    tamsulosin  0.4 mg Oral Daily     Continuous Infusions:  PRN Meds:.acetaminophen, dextrose 50%, dextrose 50%, glucagon (human recombinant), glucose, glucose, HYDROcodone-acetaminophen, magnesium oxide, magnesium oxide, ondansetron, potassium bicarbonate, potassium bicarbonate, potassium bicarbonate, potassium, sodium phosphates, potassium, sodium phosphates, potassium, sodium phosphates    Labs: Pertinent Labs Reviewed  Clinical Chemistry:  Recent Labs   Lab 11/21/23  0527 11/22/23  0520 11/25/23  0322 11/26/23  0613 11/27/23  0408      < > 145 143 144   K 3.7   < > 3.3* 3.8 3.4*   CL 98   < > 95 94* 95   CO2 34*   < > >45* >45* >45*   *   < > 129* 262* 183*   BUN 6*   < > 6* 10 13   CREATININE 0.6   < > 0.4* 0.5 0.5   CALCIUM 9.8   < > 9.6 9.2 9.0   PROT 7.1   < > 6.5 6.0 5.7*   ALBUMIN 2.5*   < > 3.0* 2.8* 2.7*   BILITOT 0.3   < > 0.3 0.2 0.2   ALKPHOS 129   < > 92 84 80   AST 11   < > 10 7* 6*   ALT 11   < > 8* 7* 6*   ANIONGAP 12   < > 5* 4* 4*   MG 2.1  --   --   --   --    PHOS 3.2  --   --   --   --     < > = values in this interval not displayed.     CBC:   Recent Labs   Lab 11/27/23  0409   WBC 10.25   RBC 2.70*   HGB 7.4*   HCT 24.8*      MCV 92   MCH 27.4   MCHC 29.8*     Inflammatory Labs:  Recent Labs   Lab 11/24/23  0500 11/25/23 0322 11/26/23  0613   .0* 191.0* 169.7*     Monitor and Evaluation  Food and Nutrient Intake: food and beverage intake, energy intake  Food and Nutrient Adminstration: diet order  Knowledge/Beliefs/Attitudes: food and nutrition knowledge/skill, beliefs and attitudes  Physical Activity and Function: nutrition-related ADLs and IADLs, factors affecting access to physical activity  Anthropometric Measurements: weight, weight change, body mass index  Biochemical Data, Medical Tests and Procedures: lipid  profile, inflammatory profile, glucose/endocrine profile, gastrointestinal profile, electrolyte and renal panel  Nutrition-Focused Physical Findings: overall appearance     Nutrition Risk  Level of Risk/Frequency of Follow-up: moderate     Nutrition Follow-Up  RD Follow-up?: Yes      Johanny Bansal RD 11/27/2023 4:06 PM

## 2023-11-28 ENCOUNTER — CLINICAL SUPPORT (OUTPATIENT)
Dept: SMOKING CESSATION | Facility: CLINIC | Age: 68
End: 2023-11-28
Payer: MEDICARE

## 2023-11-28 DIAGNOSIS — F17.200 NICOTINE DEPENDENCE: Primary | ICD-10-CM

## 2023-11-28 LAB
ALBUMIN SERPL BCP-MCNC: 2.8 G/DL (ref 3.5–5.2)
ALP SERPL-CCNC: 85 U/L (ref 55–135)
ALT SERPL W/O P-5'-P-CCNC: 9 U/L (ref 10–44)
ANION GAP SERPL CALC-SCNC: 1 MMOL/L (ref 8–16)
AST SERPL-CCNC: 9 U/L (ref 10–40)
BASOPHILS # BLD AUTO: 0.01 K/UL (ref 0–0.2)
BASOPHILS NFR BLD: 0.1 % (ref 0–1.9)
BILIRUB SERPL-MCNC: 0.2 MG/DL (ref 0.1–1)
BUN SERPL-MCNC: 14 MG/DL (ref 8–23)
CALCIUM SERPL-MCNC: 9.1 MG/DL (ref 8.7–10.5)
CHLORIDE SERPL-SCNC: 98 MMOL/L (ref 95–110)
CO2 SERPL-SCNC: 45 MMOL/L (ref 23–29)
CREAT SERPL-MCNC: 0.5 MG/DL (ref 0.5–1.4)
CRP SERPL-MCNC: 45.9 MG/L (ref 0–8.2)
DIFFERENTIAL METHOD: ABNORMAL
EOSINOPHIL # BLD AUTO: 0 K/UL (ref 0–0.5)
EOSINOPHIL NFR BLD: 0.1 % (ref 0–8)
ERYTHROCYTE [DISTWIDTH] IN BLOOD BY AUTOMATED COUNT: 14.7 % (ref 11.5–14.5)
EST. GFR  (NO RACE VARIABLE): >60 ML/MIN/1.73 M^2
GLUCOSE SERPL-MCNC: 105 MG/DL (ref 70–110)
GLUCOSE SERPL-MCNC: 176 MG/DL (ref 70–110)
HCT VFR BLD AUTO: 26.7 % (ref 40–54)
HGB BLD-MCNC: 7.8 G/DL (ref 14–18)
IMM GRANULOCYTES # BLD AUTO: 0.07 K/UL (ref 0–0.04)
IMM GRANULOCYTES NFR BLD AUTO: 0.9 % (ref 0–0.5)
LYMPHOCYTES # BLD AUTO: 0.8 K/UL (ref 1–4.8)
LYMPHOCYTES NFR BLD: 9.2 % (ref 18–48)
MCH RBC QN AUTO: 27 PG (ref 27–31)
MCHC RBC AUTO-ENTMCNC: 29.2 G/DL (ref 32–36)
MCV RBC AUTO: 92 FL (ref 82–98)
MONOCYTES # BLD AUTO: 0.6 K/UL (ref 0.3–1)
MONOCYTES NFR BLD: 7.5 % (ref 4–15)
NEUTROPHILS # BLD AUTO: 6.7 K/UL (ref 1.8–7.7)
NEUTROPHILS NFR BLD: 82.2 % (ref 38–73)
NRBC BLD-RTO: 0 /100 WBC
PLATELET # BLD AUTO: 380 K/UL (ref 150–450)
PMV BLD AUTO: 9.4 FL (ref 9.2–12.9)
POTASSIUM SERPL-SCNC: 3.8 MMOL/L (ref 3.5–5.1)
PROT SERPL-MCNC: 5.9 G/DL (ref 6–8.4)
RBC # BLD AUTO: 2.89 M/UL (ref 4.6–6.2)
SODIUM SERPL-SCNC: 144 MMOL/L (ref 136–145)
WBC # BLD AUTO: 8.14 K/UL (ref 3.9–12.7)

## 2023-11-28 PROCEDURE — 63600175 PHARM REV CODE 636 W HCPCS: Performed by: STUDENT IN AN ORGANIZED HEALTH CARE EDUCATION/TRAINING PROGRAM

## 2023-11-28 PROCEDURE — 94640 AIRWAY INHALATION TREATMENT: CPT

## 2023-11-28 PROCEDURE — 27100171 HC OXYGEN HIGH FLOW UP TO 24 HOURS

## 2023-11-28 PROCEDURE — 99900035 HC TECH TIME PER 15 MIN (STAT)

## 2023-11-28 PROCEDURE — 25000242 PHARM REV CODE 250 ALT 637 W/ HCPCS: Performed by: INTERNAL MEDICINE

## 2023-11-28 PROCEDURE — 21000000 HC CCU ICU ROOM CHARGE

## 2023-11-28 PROCEDURE — 99406 BEHAV CHNG SMOKING 3-10 MIN: CPT | Mod: S$GLB,,, | Performed by: INTERNAL MEDICINE

## 2023-11-28 PROCEDURE — 25000242 PHARM REV CODE 250 ALT 637 W/ HCPCS: Performed by: STUDENT IN AN ORGANIZED HEALTH CARE EDUCATION/TRAINING PROGRAM

## 2023-11-28 PROCEDURE — 63600175 PHARM REV CODE 636 W HCPCS: Performed by: ORTHOPAEDIC SURGERY

## 2023-11-28 PROCEDURE — 97110 THERAPEUTIC EXERCISES: CPT

## 2023-11-28 PROCEDURE — 63600175 PHARM REV CODE 636 W HCPCS: Mod: UD | Performed by: INTERNAL MEDICINE

## 2023-11-28 PROCEDURE — 99232 SBSQ HOSP IP/OBS MODERATE 35: CPT | Mod: 25,,, | Performed by: INTERNAL MEDICINE

## 2023-11-28 PROCEDURE — 99900031 HC PATIENT EDUCATION (STAT)

## 2023-11-28 PROCEDURE — 99232 PR SUBSEQUENT HOSPITAL CARE,LEVL II: ICD-10-PCS | Mod: 25,,, | Performed by: INTERNAL MEDICINE

## 2023-11-28 PROCEDURE — 25000003 PHARM REV CODE 250: Performed by: STUDENT IN AN ORGANIZED HEALTH CARE EDUCATION/TRAINING PROGRAM

## 2023-11-28 PROCEDURE — 85025 COMPLETE CBC W/AUTO DIFF WBC: CPT | Performed by: STUDENT IN AN ORGANIZED HEALTH CARE EDUCATION/TRAINING PROGRAM

## 2023-11-28 PROCEDURE — 86140 C-REACTIVE PROTEIN: CPT | Performed by: ORTHOPAEDIC SURGERY

## 2023-11-28 PROCEDURE — S4991 NICOTINE PATCH NONLEGEND: HCPCS | Performed by: STUDENT IN AN ORGANIZED HEALTH CARE EDUCATION/TRAINING PROGRAM

## 2023-11-28 PROCEDURE — 99223 PR INITIAL HOSPITAL CARE,LEVL III: ICD-10-PCS | Mod: ,,, | Performed by: INTERNAL MEDICINE

## 2023-11-28 PROCEDURE — 99406 BEHAV CHNG SMOKING 3-10 MIN: CPT

## 2023-11-28 PROCEDURE — C1751 CATH, INF, PER/CENT/MIDLINE: HCPCS

## 2023-11-28 PROCEDURE — 99223 1ST HOSP IP/OBS HIGH 75: CPT | Mod: ,,, | Performed by: INTERNAL MEDICINE

## 2023-11-28 PROCEDURE — 94660 CPAP INITIATION&MGMT: CPT

## 2023-11-28 PROCEDURE — 99406 PT REFUSED TOBACCO CESSATION: ICD-10-PCS | Mod: S$GLB,,, | Performed by: INTERNAL MEDICINE

## 2023-11-28 PROCEDURE — 25000003 PHARM REV CODE 250: Performed by: INTERNAL MEDICINE

## 2023-11-28 PROCEDURE — 80053 COMPREHEN METABOLIC PANEL: CPT | Performed by: ORTHOPAEDIC SURGERY

## 2023-11-28 PROCEDURE — 94799 UNLISTED PULMONARY SVC/PX: CPT

## 2023-11-28 PROCEDURE — 25000003 PHARM REV CODE 250: Performed by: ORTHOPAEDIC SURGERY

## 2023-11-28 PROCEDURE — 94761 N-INVAS EAR/PLS OXIMETRY MLT: CPT

## 2023-11-28 RX ORDER — HYDROCODONE BITARTRATE AND ACETAMINOPHEN 10; 325 MG/1; MG/1
1 TABLET ORAL EVERY 4 HOURS PRN
Status: DISCONTINUED | OUTPATIENT
Start: 2023-11-28 | End: 2023-11-29

## 2023-11-28 RX ORDER — IBUPROFEN 200 MG
1 TABLET ORAL DAILY
Status: DISCONTINUED | OUTPATIENT
Start: 2023-11-28 | End: 2023-11-30 | Stop reason: HOSPADM

## 2023-11-28 RX ADMIN — MUPIROCIN 1 G: 20 OINTMENT TOPICAL at 08:11

## 2023-11-28 RX ADMIN — HYDROCODONE BITARTRATE AND ACETAMINOPHEN 1 TABLET: 10; 325 TABLET ORAL at 09:11

## 2023-11-28 RX ADMIN — HYDROCODONE BITARTRATE AND ACETAMINOPHEN 1 TABLET: 5; 325 TABLET ORAL at 04:11

## 2023-11-28 RX ADMIN — AMPICILLIN 2 G: 2 INJECTION, POWDER, FOR SOLUTION INTRAVENOUS at 11:11

## 2023-11-28 RX ADMIN — AMPICILLIN 2 G: 2 INJECTION, POWDER, FOR SOLUTION INTRAVENOUS at 03:11

## 2023-11-28 RX ADMIN — BUDESONIDE INHALATION 0.5 MG: 0.5 SUSPENSION RESPIRATORY (INHALATION) at 08:11

## 2023-11-28 RX ADMIN — AMIODARONE HYDROCHLORIDE 200 MG: 200 TABLET ORAL at 08:11

## 2023-11-28 RX ADMIN — CHLORHEXIDINE GLUCONATE 15 ML: 1.2 RINSE ORAL at 08:11

## 2023-11-28 RX ADMIN — AMPICILLIN 2 G: 2 INJECTION, POWDER, FOR SOLUTION INTRAVENOUS at 04:11

## 2023-11-28 RX ADMIN — CHLORHEXIDINE GLUCONATE 15 ML: 1.2 RINSE ORAL at 09:11

## 2023-11-28 RX ADMIN — AMPICILLIN 2 G: 2 INJECTION, POWDER, FOR SOLUTION INTRAVENOUS at 06:11

## 2023-11-28 RX ADMIN — ENOXAPARIN SODIUM 90 MG: 100 INJECTION SUBCUTANEOUS at 08:11

## 2023-11-28 RX ADMIN — POTASSIUM BICARBONATE 50 MEQ: 977.5 TABLET, EFFERVESCENT ORAL at 03:11

## 2023-11-28 RX ADMIN — FAMOTIDINE 20 MG: 20 TABLET ORAL at 09:11

## 2023-11-28 RX ADMIN — TAMSULOSIN HYDROCHLORIDE 0.4 MG: 0.4 CAPSULE ORAL at 08:11

## 2023-11-28 RX ADMIN — IPRATROPIUM BROMIDE AND ALBUTEROL SULFATE 3 ML: 2.5; .5 SOLUTION RESPIRATORY (INHALATION) at 04:11

## 2023-11-28 RX ADMIN — ARFORMOTEROL TARTRATE 15 MCG: 15 SOLUTION RESPIRATORY (INHALATION) at 08:11

## 2023-11-28 RX ADMIN — IPRATROPIUM BROMIDE AND ALBUTEROL SULFATE 3 ML: 2.5; .5 SOLUTION RESPIRATORY (INHALATION) at 12:11

## 2023-11-28 RX ADMIN — AMPICILLIN 2 G: 2 INJECTION, POWDER, FOR SOLUTION INTRAVENOUS at 12:11

## 2023-11-28 RX ADMIN — ENOXAPARIN SODIUM 90 MG: 100 INJECTION SUBCUTANEOUS at 09:11

## 2023-11-28 RX ADMIN — FAMOTIDINE 20 MG: 20 TABLET ORAL at 08:11

## 2023-11-28 RX ADMIN — IPRATROPIUM BROMIDE AND ALBUTEROL SULFATE 3 ML: 2.5; .5 SOLUTION RESPIRATORY (INHALATION) at 08:11

## 2023-11-28 RX ADMIN — MUPIROCIN 1 G: 20 OINTMENT TOPICAL at 09:11

## 2023-11-28 RX ADMIN — DILTIAZEM HYDROCHLORIDE 30 MG: 30 TABLET, FILM COATED ORAL at 09:11

## 2023-11-28 RX ADMIN — DILTIAZEM HYDROCHLORIDE 30 MG: 30 TABLET, FILM COATED ORAL at 08:11

## 2023-11-28 RX ADMIN — AMPICILLIN 2 G: 2 INJECTION, POWDER, FOR SOLUTION INTRAVENOUS at 08:11

## 2023-11-28 RX ADMIN — PREDNISONE 50 MG: 20 TABLET ORAL at 08:11

## 2023-11-28 RX ADMIN — NICOTINE 1 PATCH: 21 PATCH, EXTENDED RELEASE TRANSDERMAL at 12:11

## 2023-11-28 NOTE — PT/OT/SLP PROGRESS
Physical Therapy      Patient Name:  Jama James   MRN:  [id number]    Patient not seen today secondary to declining therapy due to intense R knee pain . Will follow-up 11/29/23.

## 2023-11-28 NOTE — PROGRESS NOTES
Pulmonary/Critical Care Progress Note      PATIENT NAME: Jama James  MRN: [id number]  TODAY'S DATE: 2023  12:46 PM  ADMIT DATE: 2023  AGE: 68 y.o. : 1955    CONSULT REQUESTED BY: Alex Britt MD    REASON FOR CONSULT:   COPD resp distress     HPI:  Mr James is a 68 year old man who presented with R septic knee arthritis due to  enterococcus facealic complciated by bacteremia.  Pulmonary consulted due to underlying COPD, chronic respiratory failure and hypercapnia.    Patient reports to me that he is had frequent exacerbations as an outpatient.  He has had frequent pneumonias.  No pneumonias this past year.  He is still smoking.  He is on 4 L of oxygen at home     the patient states he gets very short of breath when he gets out of bed.  He tells me he has not been smoking for several weeks now.  He states he is using his Breztri twice a day.     the patient is about the same.  His dyspnea is not better nor worse.      Review of Systems   Constitutional:  Positive for fatigue. Negative for appetite change, chills, fever and unexpected weight change.   HENT:  Negative for nosebleeds, postnasal drip, trouble swallowing and voice change.    Eyes:  Negative for visual disturbance.   Respiratory:  Positive for cough, shortness of breath and wheezing.    Cardiovascular:  Negative for chest pain and leg swelling.   Gastrointestinal:  Negative for diarrhea, nausea and vomiting.   Endocrine: Negative for cold intolerance and heat intolerance.   Genitourinary:  Negative for dysuria, flank pain and frequency.   Musculoskeletal:  Negative for neck pain and neck stiffness.   Allergic/Immunologic: Negative for environmental allergies and immunocompromised state.   Neurological:  Negative for syncope, speech difficulty and weakness.   Hematological:  Negative for adenopathy.   Psychiatric/Behavioral:  Negative for confusion.            ALLERGIES  Review of patient's allergies indicates:  No Known  Allergies    INPATIENT SCHEDULED MEDICATIONS   albuterol-ipratropium  3 mL Nebulization Q4H WAKE    amiodarone  200 mg Oral Daily    ampicillin IVPB  2 g Intravenous Q4H    arformoteroL  15 mcg Nebulization BID    budesonide  0.5 mg Nebulization Q12H    chlorhexidine  15 mL Mouth/Throat BID    diltiaZEM  30 mg Oral Q12H    enoxparin  1 mg/kg Subcutaneous Q12H (treatment, non-standard time)    famotidine  20 mg Oral BID    mupirocin   Nasal BID    nicotine  1 patch Transdermal Daily    predniSONE  50 mg Oral Daily    tamsulosin  0.4 mg Oral Daily         MEDICAL AND SURGICAL HISTORY  Past Medical History:   Diagnosis Date    Abnormal CT scan 7/6/2023    Abnormal positron emission tomography (PET) scan 7/6/2023    Anxiety     Asbestosis 08/04/2015    Atrial fibrillation     Bilateral adrenal adenomas     COPD (chronic obstructive pulmonary disease)     COVID-19 virus infection 07/28/2022    Depression     Elevated PSA 7/6/2023    High cholesterol     HTN (hypertension)     Malignant neoplasm of prostate     Multiple lung nodules     Nodule of upper lobe of left lung 03/30/2023    6 mm spiculated on cta chest 3/21/23    On home oxygen therapy 05/24/2023    Pneumonia     Prostate cancer 5/27/2020    Prostate cancer metastatic to bone 7/6/2023    Prostate cancer metastatic to lung 7/6/2023    Ulcerative colitis      Past Surgical History:   Procedure Laterality Date    ARTHROSCOPY OF KNEE Right 11/22/2023    Procedure: ARTHROSCOPY, KNEE;  Surgeon: Harry Julien MD;  Location: [facility] OR;  Service: Orthopedics;  Laterality: Right;    NECK SURGERY      right knee      TRANSRECTAL BIOPSY OF PROSTATE WITH ULTRASOUND GUIDANCE Bilateral 3/18/2020    Procedure: BIOPSY, PROSTATE, RECTAL APPROACH, WITH US GUIDANCE;  Surgeon: Bill Jeong MD;  Location: [facility] OR;  Service: Urology;  Laterality: Bilateral;       ALCOHOL, TOBACCO AND DRUG USE  Social History     Tobacco Use   Smoking Status Every Day    Current  packs/day: 2.00    Average packs/day: 2.0 packs/day for 50.2 years (100.4 ttl pk-yrs)    Types: Cigarettes    Start date: 9/7/1973    Passive exposure: Past   Smokeless Tobacco Never   Tobacco Comments    Pt states he smokes around 1-2ppd. Nicotine patch requested during his hospital stay. Information and education provided on smoking cessation classes.     Social History     Substance and Sexual Activity   Alcohol Use Yes    Comment: 8 16oz beers per day     Social History     Substance and Sexual Activity   Drug Use No       FAMILY HISTORY  Family History   Problem Relation Age of Onset    Cancer Mother         Uterine and Ovarian cancer    Diabetes Mother     Diabetes Sister        VITAL SIGNS (MOST RECENT)  Temp: 98.5 °F (36.9 °C) (11/28/23 0701)  Pulse: 86 (11/28/23 1024)  Resp: 17 (11/28/23 1024)  BP: (!) 149/71 (11/28/23 0600)  SpO2: (!) 91 % (11/28/23 1024)    INTAKE AND OUTPUT (LAST 24 HOURS):  Intake/Output Summary (Last 24 hours) at 11/28/2023 1209  Last data filed at 11/28/2023 0531  Gross per 24 hour   Intake 840 ml   Output 2251 ml   Net -1411 ml         WEIGHT  Wt Readings from Last 1 Encounters:   11/21/23 89.4 kg (197 lb 1.5 oz)     PHYSICAL EXAM  GENERAL: Older patient in no distress.  HEENT: Pupils equal and reactive. Extraocular movements intact. Nose intact.      Pharynx moist.  NECK: Supple.   HEART: Regular rate and rhythm. No murmur or gallop auscultated.  LUNGS:  There are crackles in both bases.  Lung excursion symmetrical. No change in fremitus.  ABDOMEN: Bowel sounds present. Non-tender, no masses palpated.  : Normal anatomy.  EXTREMITIES:  The patient's right knee is dressed with a Ace wrap.  Normal muscle tone and joint movement, no cyanosis or clubbing.   LYMPHATICS: No adenopathy palpated, tr edema.  SKIN: Dry, intact, no lesions.   NEURO: Cranial nerves II-XII intact. Motor strength 5/5 bilaterally, upper and lower extremities.  PSYCH: Appropriate affect.          CBC LAST (LAST 24  HOURS)  Recent Labs   Lab 11/28/23  0413   WBC 8.14   RBC 2.89*   HGB 7.8*   HCT 26.7*   MCV 92   MCH 27.0   MCHC 29.2*   RDW 14.7*      MPV 9.4   GRAN 82.2*  6.7   LYMPH 9.2*  0.8*   MONO 7.5  0.6   BASO 0.01   NRBC 0         CHEMISTRY LAST (LAST 24 HOURS)  Recent Labs   Lab 11/28/23  0413      K 3.8   CL 98   CO2 45*   ANIONGAP 1*   BUN 14   CREATININE 0.5   *   CALCIUM 9.1   ALBUMIN 2.8*   PROT 5.9*   ALKPHOS 85   ALT 9*   AST 9*   BILITOT 0.2       LAST 7 DAYS MICROBIOLOGY   Microbiology Results (last 7 days)       Procedure Component Value Units Date/Time    AFB Culture & Smear [[id number]] Collected: 11/22/23 0955    Order Status: Completed Specimen: Synovial Fluid from Knee, Right Updated: 11/27/23 1046     AFB CULTURE STAIN No acid fast bacilli seen.     AFB CULTURE STAIN Testing performed by:     AFB CULTURE STAIN Lab [facility]     [facility] ST[facility] [address] [facility]     [place] [address]     AFB CULTURE STAIN Dr.Brian Rad MD    Narrative:      Right knee fluid    Culture, Anaerobe [[id number]] Collected: 11/22/23 0955    Order Status: Completed Specimen: Synovial Fluid from Knee, Right Updated: 11/25/23 0839     Anaerobic Culture No anaerobes isolated    Narrative:      Right knee fluid    Aerobic culture [[id number]]  (Abnormal)  (Susceptibility) Collected: 11/22/23 0955    Order Status: Completed Specimen: Synovial Fluid from Knee, Right Updated: 11/25/23 0640     Aerobic Bacterial Culture ENTEROCOCCUS FAECALIS  From broth only      Narrative:      Right knee fluid    Gram stain [[id number]] Collected: 11/22/23 0955    Order Status: Completed Specimen: Synovial Fluid from Knee, Right Updated: 11/23/23 1459     Gram Stain Result Rare WBC's      No organisms seen    Narrative:      Right knee fluid    Fungus culture [[id number]] Collected: 11/22/23 0955    Order Status: Sent Specimen: Wound from Knee, Right Updated: 11/22/23 1023    Culture,  Anaerobe [[id number]]     Order Status: Canceled Specimen: Body Fluid from Knee, Right             MOST RECENT IMAGING  X-Ray Chest AP Portable  History: Worsening oxygenation.    FINDINGS: Single AP view of the chest is compared to previous study 1 hour ago. Left upper extremity PICC remains in place with the tip projecting in the expected location of the left brachiocephalic vein unchanged. Bilateral diffuse interstitial infiltrates are present without significant interval change. Cardiac silhouette is stable in size. Atherosclerotic changes of the thoracic aorta are present. Postop changes of the cervical spine are present.    IMPRESSION:  1. Persistent bilateral interstitial infiltrates without significant interval change.    Electronically signed by:  Branden Vu MD  11/24/2023 05:04 PM CST Workstation: [id number]  X-Ray Chest 1 View  History: PICC insertion.    FINDINGS: Single AP view of the chest is compared to previous study dated November 22, 2023. Interval placement of left upper extremity PICC is noted with tip projecting in the expected location of the left brachiocephalic vein. Bilateral diffuse interstitial infiltrates persist without significant interval change. Atherosclerotic changes of the thoracic aorta are present. Cardiac silhouette is stable in size.    IMPRESSION:  1. Interval placement of left upper extremity PICC position as described above. Otherwise, stable interval appearance.    Electronically signed by:  Branden Vu MD  11/24/2023 04:03 PM CST Workstation: [id number]      CURRENT VISIT EKG  No results found for this visit on 11/21/23.    ECHOCARDIOGRAM RESULTS  Results for orders placed during the hospital encounter of 11/20/23    Echo    Interpretation Summary    Left Ventricle: The left ventricle is normal in size. Mildly increased wall thickness. Normal wall motion. There is normal systolic function with a visually estimated ejection fraction of 55 - 70%. Ejection fraction by  visual approximation is 70%. Global longitudinal strain is -16%. Reduced. There is normal diastolic function.    Right Ventricle: Normal right ventricular cavity size. Systolic function is normal. TAPSE is 2.50 cm.    Left Atrium: Left atrium is moderately dilated.    Aortic Valve: The aortic valve is a trileaflet valve.    IVC/SVC: Normal venous pressure at 3 mmHg.    Pericardium: There is a trivial effusion. No indication of cardiac tamponade.    Some suggestion for LA enlargement from Echo in 3/2023.        VENTILATOR INFORMATION              LAST ARTERIAL BLOOD GAS  ABG  Recent Labs   Lab 11/25/23  1707   PH 7.383   PO2 124*   PCO2 83.7*   HCO3 49.9*   BE 25*                         ASSESSMENT:   Metastatic castrate sensitivity prostate cancer  Severe COPD FEV1 27%, with air trapping and hyperinflation  Acute on chronic hypercapneic hypoxemic respiratory failure, down to 4 L  Septic arthritis complicated by enterococcus facalis bacteremia   Recent smoking cessation  Productive cough  Ulcerative colitis        PLAN:   - continue antibiotics for enterococcal bacteremia, I think that these antibiotics are likely to cover the possible pneumonia  - continue LABA, LAMA, and inhaled steroids  - no wheezing, will aggressively wean prednisone  - LTAC when accepted    Jacinta Kay MD  Date of Service: 11/28/2023  12:46 PM

## 2023-11-28 NOTE — PT/OT/SLP PROGRESS
Occupational Therapy   Treatment    Name: Jama James  MRN: [id number]  Admitting Diagnosis:  Septic arthritis of knee, right  6 Days Post-Op    Recommendations:     Discharge Recommendations: Moderate Intensity Therapy  Discharge Equipment Recommendations:  wheelchair  Barriers to discharge:  Decreased caregiver support    Assessment:     Jama James is a 68 y.o. male with a medical diagnosis of Septic arthritis of knee, right.  He presents with general weakness. Performance deficits affecting function are weakness, impaired endurance, impaired self care skills, impaired functional mobility, gait instability, impaired balance, decreased lower extremity function, decreased safety awareness.     Rehab Prognosis:  Fair; patient would benefit from acute skilled OT services to address these deficits and reach maximum level of function.       Plan:     Patient to be seen 5 x/week to address the above listed problems via self-care/home management, therapeutic activities, therapeutic exercises  Plan of Care Expires: 12/24/23  Plan of Care Reviewed with: patient    Subjective     Chief Complaint: Right knee pain.  Patient/Family Comments/goals: Improved functional mobility and ADL independence.  Pain/Comfort:  Pain Rating 1: 5/10  Location - Side 1: Right  Location 1: knee  Pain Addressed 1: Reposition, Distraction    Objective:     Communicated with: nurse prior to session.  Patient found HOB elevated with telemetry, peripheral IV, PureWick, pulse ox (continuous), oxygen upon OT entry to room.    General Precautions: Standard, fall    Orthopedic Precautions:RLE weight bearing as tolerated  Braces: N/A  Respiratory Status: High flow, flow 10 L/min     Occupational Performance:     BUE/BLE Therex:    Performed BUE therex x10 reps with Stand By Assist bed level.  Performed LLE therex x10 reps with Stand By Assist bed level.  Performed RLE therex x5 reps with Maximal Assist bed level.     [facility] 6 Click ADL: 16    Treatment &  Education:  Patient declined EOB and OOB activity today, but agreeable to bed level therex.    Patient left HOB elevated with all lines intact, call button in reach, and bed alarm on    GOALS:   Multidisciplinary Problems       Occupational Therapy Goals          Problem: Occupational Therapy    Goal Priority Disciplines Outcome Interventions   Occupational Therapy Goal     OT, PT/OT Ongoing, Progressing    Description: Goals to be met by: 12/24/23     Patient will increase functional independence with ADLs by performing:    UE Dressing with Set-up Assistance.  LE Dressing with Minimal Assistance and Assistive Devices as needed.  Grooming while seated at sink with Supervision.  Toileting from toilet with Minimal Assistance for hygiene and clothing management.   Toilet transfer to toilet with Minimal Assistance.                         Time Tracking:     OT Date of Treatment: 11/28/23  OT Start Time: 1312  OT Stop Time: 1322  OT Total Time (min): 10 min    Billable Minutes:Therapeutic Exercise 10    OT/NICHO: OT          11/28/2023

## 2023-11-28 NOTE — SUBJECTIVE & OBJECTIVE
Interval History: See HPI    Past Medical History:   Diagnosis Date    Abnormal CT scan 7/6/2023    Abnormal positron emission tomography (PET) scan 7/6/2023    Anxiety     Asbestosis 08/04/2015    Atrial fibrillation     Bilateral adrenal adenomas     COPD (chronic obstructive pulmonary disease)     COVID-19 virus infection 07/28/2022    Depression     Elevated PSA 7/6/2023    High cholesterol     HTN (hypertension)     Malignant neoplasm of prostate     Multiple lung nodules     Nodule of upper lobe of left lung 03/30/2023    6 mm spiculated on cta chest 3/21/23    On home oxygen therapy 05/24/2023    Pneumonia     Prostate cancer 5/27/2020    Prostate cancer metastatic to bone 7/6/2023    Prostate cancer metastatic to lung 7/6/2023    Ulcerative colitis        Past Surgical History:   Procedure Laterality Date    ARTHROSCOPY OF KNEE Right 11/22/2023    Procedure: ARTHROSCOPY, KNEE;  Surgeon: Harry Julien MD;  Location: [facility] OR;  Service: Orthopedics;  Laterality: Right;    NECK SURGERY      right knee      TRANSRECTAL BIOPSY OF PROSTATE WITH ULTRASOUND GUIDANCE Bilateral 3/18/2020    Procedure: BIOPSY, PROSTATE, RECTAL APPROACH, WITH US GUIDANCE;  Surgeon: Bill Jeong MD;  Location: [facility] OR;  Service: Urology;  Laterality: Bilateral;       Review of patient's allergies indicates:  No Known Allergies    Medications:  Continuous Infusions:  Scheduled Meds:   albuterol-ipratropium  3 mL Nebulization Q4H WAKE    amiodarone  200 mg Oral Daily    ampicillin IVPB  2 g Intravenous Q4H    arformoteroL  15 mcg Nebulization BID    budesonide  0.5 mg Nebulization Q12H    chlorhexidine  15 mL Mouth/Throat BID    diltiaZEM  30 mg Oral Q12H    enoxparin  1 mg/kg Subcutaneous Q12H (treatment, non-standard time)    famotidine  20 mg Oral BID    mupirocin   Nasal BID    nicotine  1 patch Transdermal Daily    [START ON 11/29/2023] predniSONE  30 mg Oral Daily    tamsulosin  0.4 mg Oral Daily     PRN  Meds:acetaminophen, HYDROcodone-acetaminophen, magnesium oxide, magnesium oxide, ondansetron, potassium bicarbonate, potassium bicarbonate, potassium bicarbonate, potassium, sodium phosphates, potassium, sodium phosphates, potassium, sodium phosphates    Family History       Problem Relation (Age of Onset)    Cancer Mother    Diabetes Mother, Sister          Tobacco Use    Smoking status: Every Day     Current packs/day: 2.00     Average packs/day: 2.0 packs/day for 50.2 years (100.4 ttl pk-yrs)     Types: Cigarettes     Start date: 9/7/1973     Passive exposure: Past    Smokeless tobacco: Never    Tobacco comments:     Pt states he smokes around 1-2ppd. Nicotine patch requested during his hospital stay. Information and education provided on smoking cessation classes.   Substance and Sexual Activity    Alcohol use: Yes     Comment: 8 16oz beers per day    Drug use: No    Sexual activity: Not Currently       Review of Systems  Objective:     Vital Signs (Most Recent):  Temp: 98.6 °F (37 °C) (11/28/23 1101)  Pulse: 84 (11/28/23 1300)  Resp: (!) 26 (11/28/23 1300)  BP: (!) 146/67 (11/28/23 1300)  SpO2: (!) 92 % (11/28/23 1353) Vital Signs (24h Range):  Temp:  [98.1 °F (36.7 °C)-98.6 °F (37 °C)] 98.6 °F (37 °C)  Pulse:  [79-95] 84  Resp:  [17-33] 26  SpO2:  [91 %-100 %] 92 %  BP: (146-170)/(67-81) 146/67     Weight: 89.4 kg (197 lb 1.5 oz)  Body mass index is 23.37 kg/m².       Physical Exam  Vitals reviewed.   Constitutional:       General: He is not in acute distress.     Appearance: He is ill-appearing. He is not toxic-appearing.   HENT:      Head: Normocephalic and atraumatic.      Right Ear: External ear normal.      Left Ear: External ear normal.      Nose: No congestion.      Mouth/Throat:      Mouth: Mucous membranes are moist.      Pharynx: No oropharyngeal exudate.   Eyes:      General:         Right eye: No discharge.         Left eye: No discharge.      Extraocular Movements: Extraocular movements intact.    Cardiovascular:      Rate and Rhythm: Normal rate.   Pulmonary:      Effort: Pulmonary effort is normal. No respiratory distress.   Abdominal:      General: There is no distension.      Palpations: Abdomen is soft.      Tenderness: There is no abdominal tenderness.   Skin:     General: Skin is warm.   Neurological:      General: No focal deficit present.      Mental Status: He is alert and oriented to person, place, and time.   Psychiatric:         Mood and Affect: Mood normal.         Behavior: Behavior normal.         Thought Content: Thought content normal.         Judgment: Judgment normal.            Review of Symptoms      Symptom Assessment (ESAS 0-10 Scale)  Pain:  6  Dyspnea:  3  Anxiety:  0  Nausea:  0  Depression:  0  Anorexia:  0  Fatigue:  6  Insomnia:  0  Restlessness:  0  Agitation:  0         Pain Assessment:    Location(s): leg    Leg       Location: right (Right knee)        Quality: Stabbing        Quantity: 6/10 in intensity        Chronicity: Onset 2 year(s) ago, gradually worsening        Aggravating Factors: Activity        Alleviating Factors: Recumbency        Associated Symptoms: None    Performance Status:  50    Living Arrangements:  Lives with spouse and Lives in home    Psychosocial/Cultural:   See Palliative Psychosocial Note: No  **Primary  to Follow**  Palliative Care  Consult: No        Advance Care Planning   Advance Directives:   Do Not Resuscitate Status: Yes      Decision Making:  Patient answered questions  Goals of Care: The patient endorses that what is most important right now is to focus on spending time at home, avoiding the hospital, remaining as independent as possible, and symptom/pain control    Accordingly, we have decided that the best plan to meet the patient's goals includes continuing with treatment         Significant Labs: BMP:   Recent Labs   Lab 11/28/23  0413   *      K 3.8   CL 98   CO2 45*   BUN 14   CREATININE 0.5    CALCIUM 9.1     CBC:   Recent Labs   Lab 11/27/23  0409 11/28/23 0413   WBC 10.25 8.14   HGB 7.4* 7.8*   HCT 24.8* 26.7*    380     CBC:   Recent Labs   Lab 11/28/23 0413   WBC 8.14   HGB 7.8*   HCT 26.7*   MCV 92        BMP:  Recent Labs   Lab 11/28/23 0413   *      K 3.8   CL 98   CO2 45*   BUN 14   CREATININE 0.5   CALCIUM 9.1     LFT:  Lab Results   Component Value Date    AST 9 (L) 11/28/2023    ALKPHOS 85 11/28/2023    BILITOT 0.2 11/28/2023     Albumin:   Albumin   Date Value Ref Range Status   11/28/2023 2.8 (L) 3.5 - 5.2 g/dL Final     Protein:   Total Protein   Date Value Ref Range Status   11/28/2023 5.9 (L) 6.0 - 8.4 g/dL Final     Lactic acid:   Lab Results   Component Value Date    LACTATE 0.8 11/20/2023    LACTATE 0.8 07/15/2021       Significant Imaging: I have reviewed all pertinent imaging results/findings within the past 24 hours.

## 2023-11-28 NOTE — CARE UPDATE
11/28/23 0848   Patient Assessment/Suction   Respiratory Effort Unlabored   Expansion/Accessory Muscles/Retractions no use of accessory muscles;no retractions;expansion symmetric   All Lung Fields Breath Sounds Anterior:;Lateral:;diminished   Rhythm/Pattern, Respiratory depth regular;pattern regular;unlabored   Skin Integrity   $ Wound Care Tech Time 15 min   Area Observed Bridge of nose   Skin Appearance without discoloration   PRE-TX-O2   Device (Oxygen Therapy) high flow nasal cannula   $ Is the patient on High Flow Oxygen? Yes   Flow (L/min) 8   SpO2 100 %   Pulse Oximetry Type Continuous   $ Pulse Oximetry - Multiple Charge Pulse Oximetry - Multiple   Pulse 80   Resp (!) 22   Aerosol Therapy   $ Aerosol Therapy Charges Aerosol Treatment  (Duoneb)   Daily Review of Necessity (SVN) completed   Respiratory Treatment Status (SVN) given   Treatment Route (SVN) mask   Patient Position (SVN) HOB elevated   Post Treatment Assessment (SVN) increased aeration   Signs of Intolerance (SVN) none   Breath Sounds Post-Respiratory Treatment   Throughout All Fields Post-Treatment All Fields   Throughout All Fields Post-Treatment Anterior:;Lateral:;aeration increased   Post-treatment Heart Rate (beats/min) 78   Post-treatment Resp Rate (breaths/min) 22   Preset CPAP/BiPAP Settings   Mode Of Delivery Standby   $ CPAP/BiPAP Daily Charge BiPAP/CPAP Daily   Education   $ Education Bronchodilator;15 min   Respiratory Evaluation   $ Care Plan Tech Time 15 min

## 2023-11-28 NOTE — ASSESSMENT & PLAN NOTE
Patient's anemia is currently controlled. Has not received any PRBCs to date. Etiology likely d/t acute blood loss which was from postoperative state  Current CBC reviewed-   Lab Results   Component Value Date    HGB 7.8 (L) 11/28/2023    HCT 26.7 (L) 11/28/2023     Monitor serial CBC and transfuse if patient becomes hemodynamically unstable, symptomatic or H/H drops below 7/21.

## 2023-11-28 NOTE — ACP (ADVANCE CARE PLANNING)
Advance Care Planning     Date: 11/28/2023    Code Status  In light of the patients advanced and life limiting illness,I engaged the the patient in a voluntary conversation about the patient's preferences for care  at the very end of life. The patient wishes to have a natural, peaceful death.  Along those lines, the patient does not wish to have CPR or other invasive treatments performed when his heart and/or breathing stops. I communicated to the patient that a DNR order would be placed in his medical record to reflect this preference and LaPOST form was completed to reflect other EOL preferences.  I spent a total of 20 minutes engaging the patient in this advance care planning discussion.

## 2023-11-28 NOTE — PROGRESS NOTES
11/28/23 1140   Tobacco Cessation Intervention   Do you use any type of tobacco product? Yes   Are you interested in quitting use of tobacco products? Thinking about quitting   Are you interested in Nicotine Replacement for symptom relief? Yes   $ Smoking Cessation Charges Smoking Cessation - Intermediate (Non-CTTS)

## 2023-11-28 NOTE — PLAN OF CARE
sent message to Zach ([facility]). Per Penelope, Pt still pending insurance authorization with [employer]. Penelope to message SW if authorization received today 11/28.    11/28/23 1412   Discharge Reassessment   Assessment Type Discharge Planning Reassessment   Did the patient's condition or plan change since previous assessment? No   Communicated CLEMENTINA with patient/caregiver Date not available/Unable to determine   Discharge Plan A Long-term acute care facility (LTAC)   Discharge Plan B Long-term acute care facility (LTAC)   DME Needed Upon Discharge  none   Transition of Care Barriers None   Why the patient remains in the hospital Requires continued medical care   Post-Acute Status   Post-Acute Authorization Placement   Post-Acute Placement Status Pending payor review/awaiting authorization (if required)   Coverage Payor: [employer] MANAGED MEDICARE - [facility] [facility] -   Discharge Delays None known at this time

## 2023-11-28 NOTE — NURSING
68 yr old male  awake and alert  Right knee  s/p I&D right knee   Dressing removed area cleaned  clena dry dressing applied

## 2023-11-28 NOTE — PLAN OF CARE
Problem: Occupational Therapy  Goal: Occupational Therapy Goal  Description: Goals to be met by: 12/24/23     Patient will increase functional independence with ADLs by performing:    UE Dressing with Set-up Assistance.  LE Dressing with Minimal Assistance and Assistive Devices as needed.  Grooming while seated at sink with Supervision.  Toileting from toilet with Minimal Assistance for hygiene and clothing management.   Toilet transfer to toilet with Minimal Assistance.    Outcome: Ongoing, Progressing

## 2023-11-28 NOTE — PROGRESS NOTES
[facility] Medicine  Progress Note    Patient Name: Jama James  MRN: [id number]  Patient Class: IP- Inpatient   Admission Date: 11/21/2023  Length of Stay: 6 days  Attending Physician: Alex Britt MD  Primary Care Provider: Marisel Farrell III, MD        Subjective:     Principal Problem:Septic arthritis of knee, right        HPI:  68 year old pt getting transferred from [place] with R septic arthritis and Streptococcus bacteremia  One week ago pt went to [place] ER with painful swollen Knee  Arthrocentesis was done and pt was discharged to home  Synovial fluid Grew E Fecalis  He was called back to ER  and blood culture was done  Pt was evaluated by Ortho MD lisa evans decision was made to transfer pt here because of his co morbid complex disaese/factors  Blood culture done grew Streptococcus       Overview/Hospital Course:  68M with PMH pAfib on eliquis, COPD w/ chronic respiratory failure on 4L NC, likely underlying interstitial lung disease, and prostate cancer with mets on chemo is admitted with R knee septic arthritis which underwent aspiration and washout which grew enterococcus faecalis on wound culture and blood cultures. Ortho followed initially. ID consulted and recommended ampicillin 2gm q4hr for 4 week course to end 12/20/23. Course complicated by acute on chronic respiratory failure with hypoxia and hypercapnia requiring increased O2 needs and bipap. Started on scheduled nebs and steroids. Pulmonology consulted. Respiratory status stabilized. Suffered with postoperative anemia and his blood counts were trended.     Interval History: Patient seen and examined. NAEON. No major changes. Pending LTAC insurance auth.      Objective:     Vital Signs (Most Recent):  Temp: 98.6 °F (37 °C) (11/28/23 1101)  Pulse: 84 (11/28/23 1300)  Resp: (!) 26 (11/28/23 1300)  BP: (!) 150/70 (11/28/23 1500)  SpO2: (!) 92 % (11/28/23 1353) Vital Signs (24h Range):  Temp:  [98.1 °F (36.7 °C)-98.6 °F (37  °C)] 98.6 °F (37 °C)  Pulse:  [79-95] 84  Resp:  [17-33] 26  SpO2:  [91 %-100 %] 92 %  BP: (146-170)/(67-81) 150/70     Weight: 89.4 kg (197 lb 1.5 oz)  Body mass index is 23.37 kg/m².    Intake/Output Summary (Last 24 hours) at 11/28/2023 1534  Last data filed at 11/28/2023 1300  Gross per 24 hour   Intake 1080 ml   Output 1451 ml   Net -371 ml         Physical Exam  Vitals reviewed.   Constitutional:       General: He is not in acute distress.  HENT:      Head: Normocephalic and atraumatic.   Cardiovascular:      Rate and Rhythm: Normal rate and regular rhythm.   Pulmonary:      Effort: Pulmonary effort is normal. No respiratory distress.      Breath sounds: Decreased breath sounds (bibasilar) present.   Musculoskeletal:      Comments: R knee wrapped   Skin:     General: Skin is warm and dry.   Neurological:      General: No focal deficit present.      Mental Status: He is alert and oriented to person, place, and time. Mental status is at baseline.   Psychiatric:         Speech: Speech normal.         Behavior: Behavior normal.             Significant Labs: All pertinent labs within the past 24 hours have been reviewed.    Significant Imaging: I have reviewed all pertinent imaging results/findings within the past 24 hours.    Assessment/Plan:      * Septic arthritis of knee, right  S/p aspiration and washout  Wcx and Bcx E. Faecalis  - wound care  - prn pain meds  - ID rec ampicillin 2gm q4hr for 4 weeks to end 12/20/23  - looking into LTAC    Postoperative anemia  Patient's anemia is currently controlled. Has not received any PRBCs to date. Etiology likely d/t acute blood loss which was from postoperative state  Current CBC reviewed-   Lab Results   Component Value Date    HGB 7.8 (L) 11/28/2023    HCT 26.7 (L) 11/28/2023     Monitor serial CBC and transfuse if patient becomes hemodynamically unstable, symptomatic or H/H drops below 7/21.    Hyperglycemia, drug-induced  Mostly controlled. Steroid-induced  -  accuchecks and LDSSI as needed    Acute on chronic respiratory failure with hypoxia and hypercapnia  Stable now  S/p bipap currently on 5L NC (baseline is 4L)  - wean O2 for goal sat 88-92%  - abg prn  - scheduled duoneb and add maintenance nebs   - continue steroids  - pulm consult; questionable pneumonia which would likely be covered by his abx for the joint/blood infection    Bacteremia  E. Faecalis  Repeat blood cultures negative  - plan per septic arthritis section    PAF (paroxysmal atrial fibrillation)  Controlled  - continue dilt, amio, and lovenox in place of DOAC    Chronic obstructive pulmonary disease with acute exacerbation  Contributing to resp fail; see that section    Prostate cancer  Prostate carcinoma with metastatic disease to cervical, thoracic, abdominal and pelvic lymph nodes as well as metastatic disease to bone.   On Prednisone/Lupron and zytiga  - hold home steroids/chemo while dealing with acute infection  - f/u hem/onc outpatient      VTE Risk Mitigation (From admission, onward)           Ordered     enoxaparin injection 90 mg  Every 12 hours         11/23/23 0732     IP VTE HIGH RISK PATIENT  Once         11/22/23 1919     Place sequential compression device  Until discontinued         11/21/23 1132                    Discharge Planning   CLEMENTINA: 11/28/2023     Code Status: DNR - confirmed today with palliative  Is the patient medically ready for discharge?:     Reason for patient still in hospital (select all that apply): Patient trending condition and Pending disposition  Discharge Plan A: Long-term acute care facility (LTAC)   Discharge Delays: None known at this time              Alex Britt MD  Department of Hospital Medicine   [facility]

## 2023-11-28 NOTE — CARE UPDATE
11/27/23 1931   Patient Assessment/Suction   Level of Consciousness (AVPU) alert   Respiratory Effort Unlabored   Expansion/Accessory Muscles/Retractions no use of accessory muscles   All Lung Fields Breath Sounds diminished   Rhythm/Pattern, Respiratory unlabored   Cough Frequency infrequent   Cough Type productive   PRE-TX-O2   Device (Oxygen Therapy) high flow nasal cannula   Flow (L/min) 12   SpO2 100 %   Pulse Oximetry Type Continuous   $ Pulse Oximetry - Multiple Charge Pulse Oximetry - Multiple   Pulse 88   Resp (!) 22   Aerosol Therapy   $ Aerosol Therapy Charges Aerosol Treatment   Daily Review of Necessity (SVN) completed   Respiratory Treatment Status (SVN) given   Treatment Route (SVN) oxygen;mask   Patient Position (SVN) HOB elevated   Post Treatment Assessment (SVN) increased aeration   Signs of Intolerance (SVN) none   Breath Sounds Post-Respiratory Treatment   Post-treatment Heart Rate (beats/min) 89   Post-treatment Resp Rate (breaths/min) 22   Preset CPAP/BiPAP Settings   Mode Of Delivery BiPAP;Standby  (Pt stated he will not wear tonight.)   Respiratory Evaluation   $ Care Plan Tech Time 15 min

## 2023-11-28 NOTE — HPI
"Per admit H&P: "68 year old pt getting transferred from [place] with R septic arthritis and Streptococcus bacteremia  One week ago pt went to [place] ER with painful swollen Knee  Arthrocentesis was done and pt was discharged to home  Synovial fluid Grew E Fecalis  He was called back to ER  and blood culture was done  Pt was evaluated by Ortho MD lisa evans decision was made to transfer pt here because of his co morbid complex disaese/factors  Blood culture done grew Streptococcus"    He has been evaluated by Infectious Disease and Orthopedics.  He will require prolonged course of IV antibiotics.  Course has been complicated by acute on chronic respiratory failure and physical debilitation.  At this point he carries a guarded prognosis in the short term and a poor prognosis and long-term.  I have been asked to assist goals of care.  "

## 2023-11-28 NOTE — SUBJECTIVE & OBJECTIVE
Interval History: Patient seen and examined. NAEON. No major changes. Pending LTAC insurance auth.      Objective:     Vital Signs (Most Recent):  Temp: 98.6 °F (37 °C) (11/28/23 1101)  Pulse: 84 (11/28/23 1300)  Resp: (!) 26 (11/28/23 1300)  BP: (!) 150/70 (11/28/23 1500)  SpO2: (!) 92 % (11/28/23 1353) Vital Signs (24h Range):  Temp:  [98.1 °F (36.7 °C)-98.6 °F (37 °C)] 98.6 °F (37 °C)  Pulse:  [79-95] 84  Resp:  [17-33] 26  SpO2:  [91 %-100 %] 92 %  BP: (146-170)/(67-81) 150/70     Weight: 89.4 kg (197 lb 1.5 oz)  Body mass index is 23.37 kg/m².    Intake/Output Summary (Last 24 hours) at 11/28/2023 1534  Last data filed at 11/28/2023 1300  Gross per 24 hour   Intake 1080 ml   Output 1451 ml   Net -371 ml         Physical Exam  Vitals reviewed.   Constitutional:       General: He is not in acute distress.  HENT:      Head: Normocephalic and atraumatic.   Cardiovascular:      Rate and Rhythm: Normal rate and regular rhythm.   Pulmonary:      Effort: Pulmonary effort is normal. No respiratory distress.      Breath sounds: Decreased breath sounds (bibasilar) present.   Musculoskeletal:      Comments: R knee wrapped   Skin:     General: Skin is warm and dry.   Neurological:      General: No focal deficit present.      Mental Status: He is alert and oriented to person, place, and time. Mental status is at baseline.   Psychiatric:         Speech: Speech normal.         Behavior: Behavior normal.             Significant Labs: All pertinent labs within the past 24 hours have been reviewed.    Significant Imaging: I have reviewed all pertinent imaging results/findings within the past 24 hours.

## 2023-11-29 PROBLEM — Z71.89 GOALS OF CARE, COUNSELING/DISCUSSION: Status: ACTIVE | Noted: 2023-11-29

## 2023-11-29 PROBLEM — R73.9 HYPERGLYCEMIA, DRUG-INDUCED: Status: RESOLVED | Noted: 2023-11-26 | Resolved: 2023-11-29

## 2023-11-29 PROBLEM — M25.561 ACUTE PAIN OF RIGHT KNEE: Status: ACTIVE | Noted: 2023-11-29

## 2023-11-29 PROBLEM — T50.905A HYPERGLYCEMIA, DRUG-INDUCED: Status: RESOLVED | Noted: 2023-11-26 | Resolved: 2023-11-29

## 2023-11-29 LAB
ALBUMIN SERPL BCP-MCNC: 2.7 G/DL (ref 3.5–5.2)
ALP SERPL-CCNC: 81 U/L (ref 55–135)
ALT SERPL W/O P-5'-P-CCNC: 9 U/L (ref 10–44)
ANION GAP SERPL CALC-SCNC: 3 MMOL/L (ref 8–16)
AST SERPL-CCNC: 9 U/L (ref 10–40)
BASOPHILS # BLD AUTO: 0.02 K/UL (ref 0–0.2)
BASOPHILS NFR BLD: 0.2 % (ref 0–1.9)
BILIRUB SERPL-MCNC: 0.2 MG/DL (ref 0.1–1)
BUN SERPL-MCNC: 13 MG/DL (ref 8–23)
CALCIUM SERPL-MCNC: 9 MG/DL (ref 8.7–10.5)
CHLORIDE SERPL-SCNC: 98 MMOL/L (ref 95–110)
CO2 SERPL-SCNC: 43 MMOL/L (ref 23–29)
CREAT SERPL-MCNC: 0.5 MG/DL (ref 0.5–1.4)
DIFFERENTIAL METHOD: ABNORMAL
EOSINOPHIL # BLD AUTO: 0.1 K/UL (ref 0–0.5)
EOSINOPHIL NFR BLD: 0.8 % (ref 0–8)
ERYTHROCYTE [DISTWIDTH] IN BLOOD BY AUTOMATED COUNT: 14.7 % (ref 11.5–14.5)
EST. GFR  (NO RACE VARIABLE): >60 ML/MIN/1.73 M^2
FERRITIN SERPL-MCNC: 58.9 NG/ML (ref 20–300)
FOLATE SERPL-MCNC: 7 NG/ML (ref 4–24)
GLUCOSE SERPL-MCNC: 105 MG/DL (ref 70–110)
HCT VFR BLD AUTO: 25.7 % (ref 40–54)
HGB BLD-MCNC: 7.5 G/DL (ref 14–18)
IMM GRANULOCYTES # BLD AUTO: 0.06 K/UL (ref 0–0.04)
IMM GRANULOCYTES NFR BLD AUTO: 0.7 % (ref 0–0.5)
IRON SERPL-MCNC: 16 UG/DL (ref 45–160)
LYMPHOCYTES # BLD AUTO: 1 K/UL (ref 1–4.8)
LYMPHOCYTES NFR BLD: 12.2 % (ref 18–48)
MCH RBC QN AUTO: 26.9 PG (ref 27–31)
MCHC RBC AUTO-ENTMCNC: 29.2 G/DL (ref 32–36)
MCV RBC AUTO: 92 FL (ref 82–98)
MONOCYTES # BLD AUTO: 0.7 K/UL (ref 0.3–1)
MONOCYTES NFR BLD: 8.6 % (ref 4–15)
NEUTROPHILS # BLD AUTO: 6.4 K/UL (ref 1.8–7.7)
NEUTROPHILS NFR BLD: 77.5 % (ref 38–73)
NRBC BLD-RTO: 0 /100 WBC
PLATELET # BLD AUTO: 398 K/UL (ref 150–450)
PMV BLD AUTO: 9.8 FL (ref 9.2–12.9)
POTASSIUM SERPL-SCNC: 3.9 MMOL/L (ref 3.5–5.1)
PROT SERPL-MCNC: 5.4 G/DL (ref 6–8.4)
RBC # BLD AUTO: 2.79 M/UL (ref 4.6–6.2)
SATURATED IRON: 8 % (ref 20–50)
SODIUM SERPL-SCNC: 144 MMOL/L (ref 136–145)
TOTAL IRON BINDING CAPACITY: 211 UG/DL (ref 250–450)
TRANSFERRIN SERPL-MCNC: 151 MG/DL (ref 200–375)
VIT B12 SERPL-MCNC: 267 PG/ML (ref 210–950)
WBC # BLD AUTO: 8.33 K/UL (ref 3.9–12.7)

## 2023-11-29 PROCEDURE — 82607 VITAMIN B-12: CPT | Performed by: STUDENT IN AN ORGANIZED HEALTH CARE EDUCATION/TRAINING PROGRAM

## 2023-11-29 PROCEDURE — 83540 ASSAY OF IRON: CPT | Performed by: STUDENT IN AN ORGANIZED HEALTH CARE EDUCATION/TRAINING PROGRAM

## 2023-11-29 PROCEDURE — 99233 SBSQ HOSP IP/OBS HIGH 50: CPT | Mod: ,,, | Performed by: INTERNAL MEDICINE

## 2023-11-29 PROCEDURE — 25000003 PHARM REV CODE 250: Performed by: INTERNAL MEDICINE

## 2023-11-29 PROCEDURE — 84466 ASSAY OF TRANSFERRIN: CPT | Performed by: STUDENT IN AN ORGANIZED HEALTH CARE EDUCATION/TRAINING PROGRAM

## 2023-11-29 PROCEDURE — 94660 CPAP INITIATION&MGMT: CPT

## 2023-11-29 PROCEDURE — C1751 CATH, INF, PER/CENT/MIDLINE: HCPCS

## 2023-11-29 PROCEDURE — 99900031 HC PATIENT EDUCATION (STAT)

## 2023-11-29 PROCEDURE — 25000003 PHARM REV CODE 250: Performed by: ORTHOPAEDIC SURGERY

## 2023-11-29 PROCEDURE — 99900035 HC TECH TIME PER 15 MIN (STAT)

## 2023-11-29 PROCEDURE — 25000003 PHARM REV CODE 250: Performed by: STUDENT IN AN ORGANIZED HEALTH CARE EDUCATION/TRAINING PROGRAM

## 2023-11-29 PROCEDURE — 82746 ASSAY OF FOLIC ACID SERUM: CPT | Performed by: STUDENT IN AN ORGANIZED HEALTH CARE EDUCATION/TRAINING PROGRAM

## 2023-11-29 PROCEDURE — 94640 AIRWAY INHALATION TREATMENT: CPT

## 2023-11-29 PROCEDURE — 25000242 PHARM REV CODE 250 ALT 637 W/ HCPCS: Performed by: INTERNAL MEDICINE

## 2023-11-29 PROCEDURE — 99232 SBSQ HOSP IP/OBS MODERATE 35: CPT | Mod: ,,, | Performed by: INTERNAL MEDICINE

## 2023-11-29 PROCEDURE — 63600175 PHARM REV CODE 636 W HCPCS: Performed by: ORTHOPAEDIC SURGERY

## 2023-11-29 PROCEDURE — 25000242 PHARM REV CODE 250 ALT 637 W/ HCPCS: Performed by: STUDENT IN AN ORGANIZED HEALTH CARE EDUCATION/TRAINING PROGRAM

## 2023-11-29 PROCEDURE — 27000221 HC OXYGEN, UP TO 24 HOURS

## 2023-11-29 PROCEDURE — 63600175 PHARM REV CODE 636 W HCPCS: Performed by: INTERNAL MEDICINE

## 2023-11-29 PROCEDURE — 21000000 HC CCU ICU ROOM CHARGE

## 2023-11-29 PROCEDURE — 97110 THERAPEUTIC EXERCISES: CPT

## 2023-11-29 PROCEDURE — 63600175 PHARM REV CODE 636 W HCPCS: Mod: UD | Performed by: INTERNAL MEDICINE

## 2023-11-29 PROCEDURE — 97535 SELF CARE MNGMENT TRAINING: CPT

## 2023-11-29 PROCEDURE — 94761 N-INVAS EAR/PLS OXIMETRY MLT: CPT

## 2023-11-29 PROCEDURE — 82728 ASSAY OF FERRITIN: CPT | Performed by: STUDENT IN AN ORGANIZED HEALTH CARE EDUCATION/TRAINING PROGRAM

## 2023-11-29 PROCEDURE — 85025 COMPLETE CBC W/AUTO DIFF WBC: CPT | Performed by: STUDENT IN AN ORGANIZED HEALTH CARE EDUCATION/TRAINING PROGRAM

## 2023-11-29 PROCEDURE — 80053 COMPREHEN METABOLIC PANEL: CPT | Performed by: ORTHOPAEDIC SURGERY

## 2023-11-29 PROCEDURE — 99233 PR SUBSEQUENT HOSPITAL CARE,LEVL III: ICD-10-PCS | Mod: ,,, | Performed by: INTERNAL MEDICINE

## 2023-11-29 PROCEDURE — 97530 THERAPEUTIC ACTIVITIES: CPT

## 2023-11-29 PROCEDURE — 99232 PR SUBSEQUENT HOSPITAL CARE,LEVL II: ICD-10-PCS | Mod: ,,, | Performed by: INTERNAL MEDICINE

## 2023-11-29 PROCEDURE — 63600175 PHARM REV CODE 636 W HCPCS: Mod: UD | Performed by: STUDENT IN AN ORGANIZED HEALTH CARE EDUCATION/TRAINING PROGRAM

## 2023-11-29 PROCEDURE — 97530 THERAPEUTIC ACTIVITIES: CPT | Mod: CQ

## 2023-11-29 PROCEDURE — S4991 NICOTINE PATCH NONLEGEND: HCPCS | Performed by: STUDENT IN AN ORGANIZED HEALTH CARE EDUCATION/TRAINING PROGRAM

## 2023-11-29 RX ORDER — OXYCODONE HYDROCHLORIDE 5 MG/1
10 TABLET ORAL EVERY 4 HOURS PRN
Status: DISCONTINUED | OUTPATIENT
Start: 2023-11-29 | End: 2023-11-30 | Stop reason: HOSPADM

## 2023-11-29 RX ORDER — HYDROCODONE BITARTRATE AND ACETAMINOPHEN 10; 325 MG/1; MG/1
1 TABLET ORAL
Status: DISCONTINUED | OUTPATIENT
Start: 2023-11-29 | End: 2023-11-29

## 2023-11-29 RX ORDER — OXYCODONE HYDROCHLORIDE 5 MG/1
5 TABLET ORAL EVERY 4 HOURS PRN
Status: DISCONTINUED | OUTPATIENT
Start: 2023-11-29 | End: 2023-11-30 | Stop reason: HOSPADM

## 2023-11-29 RX ORDER — OXYCODONE HYDROCHLORIDE 5 MG/1
15 TABLET ORAL EVERY 4 HOURS PRN
Status: DISCONTINUED | OUTPATIENT
Start: 2023-11-29 | End: 2023-11-30 | Stop reason: HOSPADM

## 2023-11-29 RX ORDER — ACETAMINOPHEN 500 MG
1000 TABLET ORAL EVERY 8 HOURS PRN
Status: DISCONTINUED | OUTPATIENT
Start: 2023-11-29 | End: 2023-11-30 | Stop reason: HOSPADM

## 2023-11-29 RX ORDER — OXYCODONE HYDROCHLORIDE 5 MG/1
5 TABLET ORAL EVERY 6 HOURS PRN
Status: DISCONTINUED | OUTPATIENT
Start: 2023-11-29 | End: 2023-11-29

## 2023-11-29 RX ORDER — FOLIC ACID 1 MG/1
1 TABLET ORAL DAILY
Status: DISCONTINUED | OUTPATIENT
Start: 2023-11-29 | End: 2023-11-30 | Stop reason: HOSPADM

## 2023-11-29 RX ORDER — OXYCODONE HYDROCHLORIDE 5 MG/1
15 TABLET ORAL EVERY 6 HOURS PRN
Status: DISCONTINUED | OUTPATIENT
Start: 2023-11-29 | End: 2023-11-29

## 2023-11-29 RX ORDER — LANOLIN ALCOHOL/MO/W.PET/CERES
2000 CREAM (GRAM) TOPICAL DAILY
Status: DISCONTINUED | OUTPATIENT
Start: 2023-11-29 | End: 2023-11-30 | Stop reason: HOSPADM

## 2023-11-29 RX ADMIN — DILTIAZEM HYDROCHLORIDE 30 MG: 30 TABLET, FILM COATED ORAL at 08:11

## 2023-11-29 RX ADMIN — CYANOCOBALAMIN TAB 1000 MCG 2000 MCG: 1000 TAB at 10:11

## 2023-11-29 RX ADMIN — PREDNISONE 30 MG: 20 TABLET ORAL at 08:11

## 2023-11-29 RX ADMIN — OXYCODONE HYDROCHLORIDE 15 MG: 5 TABLET ORAL at 08:11

## 2023-11-29 RX ADMIN — IPRATROPIUM BROMIDE AND ALBUTEROL SULFATE 3 ML: 2.5; .5 SOLUTION RESPIRATORY (INHALATION) at 03:11

## 2023-11-29 RX ADMIN — SODIUM CHLORIDE 125 MG: 9 INJECTION, SOLUTION INTRAVENOUS at 10:11

## 2023-11-29 RX ADMIN — ARFORMOTEROL TARTRATE 15 MCG: 15 SOLUTION RESPIRATORY (INHALATION) at 07:11

## 2023-11-29 RX ADMIN — OXYCODONE HYDROCHLORIDE 15 MG: 5 TABLET ORAL at 12:11

## 2023-11-29 RX ADMIN — FAMOTIDINE 20 MG: 20 TABLET ORAL at 08:11

## 2023-11-29 RX ADMIN — ENOXAPARIN SODIUM 90 MG: 100 INJECTION SUBCUTANEOUS at 08:11

## 2023-11-29 RX ADMIN — AMPICILLIN 2 G: 2 INJECTION, POWDER, FOR SOLUTION INTRAVENOUS at 06:11

## 2023-11-29 RX ADMIN — IPRATROPIUM BROMIDE AND ALBUTEROL SULFATE 3 ML: 2.5; .5 SOLUTION RESPIRATORY (INHALATION) at 07:11

## 2023-11-29 RX ADMIN — AMPICILLIN 2 G: 2 INJECTION, POWDER, FOR SOLUTION INTRAVENOUS at 02:11

## 2023-11-29 RX ADMIN — MUPIROCIN 1 G: 20 OINTMENT TOPICAL at 08:11

## 2023-11-29 RX ADMIN — IPRATROPIUM BROMIDE AND ALBUTEROL SULFATE 3 ML: 2.5; .5 SOLUTION RESPIRATORY (INHALATION) at 08:11

## 2023-11-29 RX ADMIN — AMIODARONE HYDROCHLORIDE 200 MG: 200 TABLET ORAL at 08:11

## 2023-11-29 RX ADMIN — HYDROCODONE BITARTRATE AND ACETAMINOPHEN 1 TABLET: 10; 325 TABLET ORAL at 11:11

## 2023-11-29 RX ADMIN — CHLORHEXIDINE GLUCONATE 15 ML: 1.2 RINSE ORAL at 08:11

## 2023-11-29 RX ADMIN — ARFORMOTEROL TARTRATE 15 MCG: 15 SOLUTION RESPIRATORY (INHALATION) at 08:11

## 2023-11-29 RX ADMIN — AMPICILLIN 2 G: 2 INJECTION, POWDER, FOR SOLUTION INTRAVENOUS at 10:11

## 2023-11-29 RX ADMIN — IPRATROPIUM BROMIDE AND ALBUTEROL SULFATE 3 ML: 2.5; .5 SOLUTION RESPIRATORY (INHALATION) at 11:11

## 2023-11-29 RX ADMIN — TAMSULOSIN HYDROCHLORIDE 0.4 MG: 0.4 CAPSULE ORAL at 08:11

## 2023-11-29 RX ADMIN — BUDESONIDE INHALATION 0.5 MG: 0.5 SUSPENSION RESPIRATORY (INHALATION) at 07:11

## 2023-11-29 RX ADMIN — FOLIC ACID 1 MG: 1 TABLET ORAL at 10:11

## 2023-11-29 RX ADMIN — BUDESONIDE INHALATION 0.5 MG: 0.5 SUSPENSION RESPIRATORY (INHALATION) at 08:11

## 2023-11-29 RX ADMIN — NICOTINE 1 PATCH: 21 PATCH, EXTENDED RELEASE TRANSDERMAL at 08:11

## 2023-11-29 RX ADMIN — AMPICILLIN 2 G: 2 INJECTION, POWDER, FOR SOLUTION INTRAVENOUS at 04:11

## 2023-11-29 NOTE — ASSESSMENT & PLAN NOTE
Norco 10 not much more effective than Norco 5    Will increase prn tylenol to 1000mg TID prn for pain  Will d/c [place] to avoid tylenol toxicity  To avoid tylenol toxicity and rotate opioids will add oxycodone 5mg, oxycodone 10mg, oxycodone 15mg q4hr prn based on pain score.     D/w primary and RN

## 2023-11-29 NOTE — ASSESSMENT & PLAN NOTE
I reviewed his chart discussed his case with his team.      I examined Mr. James the bedside.  Maintains capacity for complex medical decision-making.    His goals remain clear,  unchanged, and aligned with current plan of care.  He had no questions for me today.    I appreciate being involved.  Hope I have been helpful.

## 2023-11-29 NOTE — ASSESSMENT & PLAN NOTE
S/p aspiration and washout  Wcx and Bcx E. Faecalis  - wound care  - prn pain meds; increased  - ID rec ampicillin 2gm q4hr for 4 weeks to end 12/20/23  - looking into LTAC; pending insurance auth

## 2023-11-29 NOTE — PROGRESS NOTES
[facility] Medicine  Progress Note    Patient Name: Jama James  MRN: [id number]  Patient Class: IP- Inpatient   Admission Date: 11/21/2023  Length of Stay: 7 days  Attending Physician: Alex Britt MD  Primary Care Provider: Marisel Farrell III, MD        Subjective:     Principal Problem:Septic arthritis of knee, right        HPI:  68 year old pt getting transferred from [place] with R septic arthritis and Streptococcus bacteremia  One week ago pt went to [place] ER with painful swollen Knee  Arthrocentesis was done and pt was discharged to home  Synovial fluid Grew E Fecalis  He was called back to ER  and blood culture was done  Pt was evaluated by Ortho MD lisa evans decision was made to transfer pt here because of his co morbid complex disaese/factors  Blood culture done grew Streptococcus       Overview/Hospital Course:  68M with PMH pAfib on eliquis, COPD w/ chronic respiratory failure on 4L NC, likely underlying interstitial lung disease, and prostate cancer with mets on chemo is admitted with R knee septic arthritis which underwent aspiration and washout which grew enterococcus faecalis on wound culture and blood cultures. Ortho followed initially. ID consulted and recommended ampicillin 2gm q4hr for 4 week course to end 12/20/23. Course complicated by acute on chronic respiratory failure with hypoxia and hypercapnia requiring increased O2 needs and bipap. Started on scheduled nebs and steroids. Pulmonology consulted. Respiratory status stabilized. Suffered with postoperative anemia and his blood counts were trended.     Interval History: Patient seen and examined. NAEON. Still short of breath not far from his baseline it seems. Awaiting insurance auth for LTAC. Pain is still uncontrolled mostly with regards to performing with therapy.      Objective:     Vital Signs (Most Recent):  Temp: 98.2 °F (36.8 °C) (11/29/23 1101)  Pulse: 86 (11/29/23 1301)  Resp: 20 (11/29/23 1301)  BP: (!)  158/74 (11/29/23 1301)  SpO2: 95 % (11/29/23 1301) Vital Signs (24h Range):  Temp:  [97.7 °F (36.5 °C)-99.4 °F (37.4 °C)] 98.2 °F (36.8 °C)  Pulse:  [73-91] 86  Resp:  [18-27] 20  SpO2:  [95 %-100 %] 95 %  BP: (140-170)/(65-81) 158/74     Weight: 89.4 kg (197 lb 1.5 oz)  Body mass index is 23.37 kg/m².    Intake/Output Summary (Last 24 hours) at 11/29/2023 1437  Last data filed at 11/29/2023 1334  Gross per 24 hour   Intake 1180 ml   Output 1950 ml   Net -770 ml         Physical Exam  Vitals reviewed.   Constitutional:       General: He is not in acute distress.  HENT:      Head: Normocephalic and atraumatic.   Cardiovascular:      Rate and Rhythm: Normal rate and regular rhythm.   Pulmonary:      Effort: Pulmonary effort is normal. No respiratory distress.      Breath sounds: Decreased breath sounds (bibasilar) present.   Musculoskeletal:      Comments: R knee wrapped   Skin:     General: Skin is warm and dry.   Neurological:      General: No focal deficit present.      Mental Status: He is alert and oriented to person, place, and time. Mental status is at baseline.   Psychiatric:         Speech: Speech normal.         Behavior: Behavior normal.             Significant Labs: All pertinent labs within the past 24 hours have been reviewed.    Significant Imaging: I have reviewed all pertinent imaging results/findings within the past 24 hours.    Assessment/Plan:      * Septic arthritis of knee, right  S/p aspiration and washout  Wcx and Bcx E. Faecalis  - wound care  - prn pain meds; increased  - ID rec ampicillin 2gm q4hr for 4 weeks to end 12/20/23  - looking into LTAC; pending insurance auth    Goals of care, counseling/discussion  - see palliative care notes  - DNR    Postoperative anemia  Patient's anemia is currently controlled. Has not received any PRBCs to date. Etiology likely d/t acute blood loss which was from postoperative state . Also noted low iron and low-normal B12 and folate levels.  Current CBC  reviewed-   Lab Results   Component Value Date    HGB 7.5 (L) 11/29/2023    HCT 25.7 (L) 11/29/2023     - Monitor serial CBC and transfuse if patient becomes hemodynamically unstable, symptomatic or H/H drops below 7/21.  - add IV iron, PO B12, and PO folate supplements    Acute on chronic respiratory failure with hypoxia and hypercapnia  Stable now  S/p bipap currently on 5L NC (baseline is 4L)  - wean O2 for goal sat 88-92%  - abg prn  - scheduled duoneb and add maintenance nebs   - continue steroids  - pulm consult; questionable pneumonia which would likely be covered by his abx for the joint/blood infection    Bacteremia  E. Faecalis  Repeat blood cultures negative  - plan per septic arthritis section    PAF (paroxysmal atrial fibrillation)  Controlled  - continue dilt, amio, and lovenox in place of DOAC    Chronic obstructive pulmonary disease with acute exacerbation  Contributing to resp fail; see that section    Prostate cancer  Prostate carcinoma with metastatic disease to cervical, thoracic, abdominal and pelvic lymph nodes as well as metastatic disease to bone.   On Prednisone/Lupron and zytiga  - hold home steroids/chemo while dealing with acute infection  - f/u hem/onc outpatient      VTE Risk Mitigation (From admission, onward)           Ordered     enoxaparin injection 90 mg  Every 12 hours         11/23/23 0732     IP VTE HIGH RISK PATIENT  Once         11/22/23 1919     Place sequential compression device  Until discontinued         11/21/23 1044                    Discharge Planning   CLEMENTINA: 11/29/2023     Code Status: DNR   Is the patient medically ready for discharge?:     Reason for patient still in hospital (select all that apply): Patient trending condition and Pending disposition  Discharge Plan A: Long-term acute care facility (LTAC)   Discharge Delays: None known at this time              Alex Britt MD  Department of Hospital Medicine   [facility]

## 2023-11-29 NOTE — PROGRESS NOTES
Pulmonary/Critical Care Progress Note      PATIENT NAME: Jama James  MRN: [id number]  TODAY'S DATE: 2023  12:46 PM  ADMIT DATE: 2023  AGE: 68 y.o. : 1955    CONSULT REQUESTED BY: Alex Britt MD    REASON FOR CONSULT:   COPD resp distress     HPI:  Mr James is a 68 year old man who presented with R septic knee arthritis due to  enterococcus facealic complciated by bacteremia.  Pulmonary consulted due to underlying COPD, chronic respiratory failure and hypercapnia.    Patient reports to me that he is had frequent exacerbations as an outpatient.  He has had frequent pneumonias.  No pneumonias this past year.  He is still smoking.  He is on 4 L of oxygen at home     the patient states he gets very short of breath when he gets out of bed.  He tells me he has not been smoking for several weeks now.  He states he is using his Breztri twice a day.     the patient is about the same.  His dyspnea is not better nor worse.     the patient desaturated when he sat up in bed today.  Questioned the patient again about how long he had been off cigarettes and this time he told me he was smoking till the day he came in.  He is still expectorating mucus.      Review of Systems   Constitutional:  Positive for fatigue. Negative for appetite change, chills, fever and unexpected weight change.   HENT:  Negative for nosebleeds, postnasal drip, trouble swallowing and voice change.    Eyes:  Negative for visual disturbance.   Respiratory:  Positive for cough, shortness of breath and wheezing.    Cardiovascular:  Negative for chest pain and leg swelling.   Gastrointestinal:  Negative for diarrhea, nausea and vomiting.   Endocrine: Negative for cold intolerance and heat intolerance.   Genitourinary:  Negative for dysuria, flank pain and frequency.   Musculoskeletal:  Negative for neck pain and neck stiffness.   Allergic/Immunologic: Negative for environmental allergies and immunocompromised state.    Neurological:  Negative for syncope, speech difficulty and weakness.   Hematological:  Negative for adenopathy.   Psychiatric/Behavioral:  Negative for confusion.            No change in the patient's Past Medical History, Past Surgical History, Social History or Family History since admission.      VITAL SIGNS (MOST RECENT)  Temp: 97.7 °F (36.5 °C) (11/29/23 0300)  Pulse: 79 (11/29/23 0732)  Resp: 20 (11/29/23 0732)  BP: (!) 170/81 (11/29/23 0701)  SpO2: 98 % (11/29/23 0732)    INTAKE AND OUTPUT (LAST 24 HOURS):  Intake/Output Summary (Last 24 hours) at 11/29/2023 0836  Last data filed at 11/29/2023 0559  Gross per 24 hour   Intake 840 ml   Output 1950 ml   Net -1110 ml         WEIGHT  Wt Readings from Last 1 Encounters:   11/21/23 89.4 kg (197 lb 1.5 oz)     PHYSICAL EXAM  GENERAL: Older patient in no distress.  HEENT: Pupils equal and reactive. Extraocular movements intact. Nose intact.      Pharynx moist.  NECK: Supple.   HEART: Regular rate and rhythm. No murmur or gallop auscultated.  LUNGS:  There are crackles in both bases.  Lung excursion symmetrical. No change in fremitus.  ABDOMEN: Bowel sounds present. Non-tender, no masses palpated.  : Normal anatomy.  EXTREMITIES:  The patient's right knee is dressed with a Ace wrap.  Normal muscle tone and joint movement, no cyanosis or clubbing.   LYMPHATICS: No adenopathy palpated, tr edema.  SKIN: Dry, intact, no lesions.   NEURO: Cranial nerves II-XII intact. Motor strength 5/5 bilaterally, upper and lower extremities.  PSYCH: Appropriate affect.          CBC LAST (LAST 24 HOURS)  Recent Labs   Lab 11/29/23  0405   WBC 8.33   RBC 2.79*   HGB 7.5*   HCT 25.7*   MCV 92   MCH 26.9*   MCHC 29.2*   RDW 14.7*      MPV 9.8   GRAN 77.5*  6.4   LYMPH 12.2*  1.0   MONO 8.6  0.7   BASO 0.02   NRBC 0         CHEMISTRY LAST (LAST 24 HOURS)  Recent Labs   Lab 11/29/23  0405      K 3.9   CL 98   CO2 43*   ANIONGAP 3*   BUN 13   CREATININE 0.5       CALCIUM 9.0   ALBUMIN 2.7*   PROT 5.4*   ALKPHOS 81   ALT 9*   AST 9*   BILITOT 0.2       LAST 7 DAYS MICROBIOLOGY   Microbiology Results (last 7 days)       Procedure Component Value Units Date/Time    AFB Culture & Smear [[id number]] Collected: 11/22/23 0955    Order Status: Completed Specimen: Synovial Fluid from Knee, Right Updated: 11/27/23 1046     AFB CULTURE STAIN No acid fast bacilli seen.     AFB CULTURE STAIN Testing performed by:     AFB CULTURE STAIN Lab [facility]     AFB CULTURE STAIN [address] [facility]     [place] [address]     AFB CULTURE STAIN Dr.Brian Rad MD    Narrative:      Right knee fluid    Culture, Anaerobe [[id number]] Collected: 11/22/23 0955    Order Status: Completed Specimen: Synovial Fluid from Knee, Right Updated: 11/25/23 0839     Anaerobic Culture No anaerobes isolated    Narrative:      Right knee fluid    Aerobic culture [[id number]]  (Abnormal)  (Susceptibility) Collected: 11/22/23 0955    Order Status: Completed Specimen: Synovial Fluid from Knee, Right Updated: 11/25/23 0640     Aerobic Bacterial Culture ENTEROCOCCUS FAECALIS  From broth only      Narrative:      Right knee fluid    Gram stain [[id number]] Collected: 11/22/23 0955    Order Status: Completed Specimen: Synovial Fluid from Knee, Right Updated: 11/23/23 1459     Gram Stain Result Rare WBC's      No organisms seen    Narrative:      Right knee fluid    Fungus culture [[id number]] Collected: 11/22/23 0955    Order Status: Sent Specimen: Wound from Knee, Right Updated: 11/22/23 1023    Culture, Anaerobe [[id number]]     Order Status: Canceled Specimen: Body Fluid from Knee, Right             MOST RECENT IMAGING  X-Ray Chest AP Portable  History: Worsening oxygenation.    FINDINGS: Single AP view of the chest is compared to previous study 1 hour ago. Left upper extremity PICC remains in place with the tip projecting in the expected location of the left brachiocephalic vein  unchanged. Bilateral diffuse interstitial infiltrates are present without significant interval change. Cardiac silhouette is stable in size. Atherosclerotic changes of the thoracic aorta are present. Postop changes of the cervical spine are present.    IMPRESSION:  1. Persistent bilateral interstitial infiltrates without significant interval change.    Electronically signed by:  Branden Vu MD  11/24/2023 05:04 PM CST Workstation: [id number]  X-Ray Chest 1 View  History: PICC insertion.    FINDINGS: Single AP view of the chest is compared to previous study dated November 22, 2023. Interval placement of left upper extremity PICC is noted with tip projecting in the expected location of the left brachiocephalic vein. Bilateral diffuse interstitial infiltrates persist without significant interval change. Atherosclerotic changes of the thoracic aorta are present. Cardiac silhouette is stable in size.    IMPRESSION:  1. Interval placement of left upper extremity PICC position as described above. Otherwise, stable interval appearance.    Electronically signed by:  Branden Vu MD  11/24/2023 04:03 PM [facility] Workstation: [id number]      CURRENT VISIT EKG  No results found for this visit on 11/21/23.    ECHOCARDIOGRAM RESULTS  Results for orders placed during the hospital encounter of 11/20/23    Echo    Interpretation Summary    Left Ventricle: The left ventricle is normal in size. Mildly increased wall thickness. Normal wall motion. There is normal systolic function with a visually estimated ejection fraction of 55 - 70%. Ejection fraction by visual approximation is 70%. Global longitudinal strain is -16%. Reduced. There is normal diastolic function.    Right Ventricle: Normal right ventricular cavity size. Systolic function is normal. TAPSE is 2.50 cm.    Left Atrium: Left atrium is moderately dilated.    Aortic Valve: The aortic valve is a trileaflet valve.    IVC/SVC: Normal venous pressure at 3 mmHg.    Pericardium:  There is a trivial effusion. No indication of cardiac tamponade.    Some suggestion for LA enlargement from Echo in 3/2023.        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  Productive cough  Ulcerative colitis        PLAN:   - continue antibiotics for enterococcal bacteremia, I think that these antibiotics are likely to cover the possible pneumonia  - continue LABA, LAMA, and inhaled steroids  - no wheezing, will aggressively wean prednisone  - LTAC when accepted    Saw Dr. Villafuerte note about his hopes to go home and care for his ailing wife.  The patient's lung function is so severely diminished that I do not think that this is going to be a reasonable plan of action.  I did mention this to the patient.  I have tried again to impress upon him the importance of never smoking again with his preterminal COPD.  I get lip service back, but I am not sure that he will follow my instructions.    Jacinta Kay MD  Date of Service: 11/29/2023  12:46 PM

## 2023-11-29 NOTE — SUBJECTIVE & OBJECTIVE
Interval History: No major changes    Past Medical History:   Diagnosis Date    Abnormal CT scan 7/6/2023    Abnormal positron emission tomography (PET) scan 7/6/2023    Anxiety     Asbestosis 08/04/2015    Atrial fibrillation     Bilateral adrenal adenomas     COPD (chronic obstructive pulmonary disease)     COVID-19 virus infection 07/28/2022    Depression     Elevated PSA 7/6/2023    High cholesterol     HTN (hypertension)     Malignant neoplasm of prostate     Multiple lung nodules     Nodule of upper lobe of left lung 03/30/2023    6 mm spiculated on cta chest 3/21/23    On home oxygen therapy 05/24/2023    Pneumonia     Prostate cancer 5/27/2020    Prostate cancer metastatic to bone 7/6/2023    Prostate cancer metastatic to lung 7/6/2023    Ulcerative colitis        Past Surgical History:   Procedure Laterality Date    ARTHROSCOPY OF KNEE Right 11/22/2023    Procedure: ARTHROSCOPY, KNEE;  Surgeon: Harry Julien MD;  Location: [facility] OR;  Service: Orthopedics;  Laterality: Right;    NECK SURGERY      right knee      TRANSRECTAL BIOPSY OF PROSTATE WITH ULTRASOUND GUIDANCE Bilateral 3/18/2020    Procedure: BIOPSY, PROSTATE, RECTAL APPROACH, WITH US GUIDANCE;  Surgeon: Bill Jeong MD;  Location: [facility] OR;  Service: Urology;  Laterality: Bilateral;       Review of patient's allergies indicates:  No Known Allergies    Medications:  Continuous Infusions:  Scheduled Meds:   albuterol-ipratropium  3 mL Nebulization Q4H WAKE    amiodarone  200 mg Oral Daily    ampicillin IVPB  2 g Intravenous Q4H    arformoteroL  15 mcg Nebulization BID    budesonide  0.5 mg Nebulization Q12H    chlorhexidine  15 mL Mouth/Throat BID    cyanocobalamin  2,000 mcg Oral Daily    diltiaZEM  30 mg Oral Q12H    enoxparin  1 mg/kg Subcutaneous Q12H (treatment, non-standard time)    famotidine  20 mg Oral BID    ferric gluconate (FERRLECIT) 125 mg in sodium chloride 0.9% 100 mL IVPB  125 mg Intravenous Daily    folic acid  1 mg  Oral Daily    mupirocin   Nasal BID    nicotine  1 patch Transdermal Daily    predniSONE  30 mg Oral Daily    tamsulosin  0.4 mg Oral Daily     PRN Meds:acetaminophen, magnesium oxide, magnesium oxide, ondansetron, oxyCODONE, oxyCODONE, oxyCODONE, potassium bicarbonate, potassium bicarbonate, potassium bicarbonate, potassium, sodium phosphates, potassium, sodium phosphates, potassium, sodium phosphates    Family History       Problem Relation (Age of Onset)    Cancer Mother    Diabetes Mother, Sister          Tobacco Use    Smoking status: Every Day     Current packs/day: 2.00     Average packs/day: 2.0 packs/day for 50.2 years (100.5 ttl pk-yrs)     Types: Cigarettes     Start date: 9/7/1973     Passive exposure: Past    Smokeless tobacco: Never    Tobacco comments:     Pt states he smokes around 1-2ppd. Nicotine patch requested during his hospital stay. Information and education provided on smoking cessation classes.   Substance and Sexual Activity    Alcohol use: Yes     Comment: 8 16oz beers per day    Drug use: No    Sexual activity: Not Currently       Review of Systems  Objective:     Vital Signs (Most Recent):  Temp: 98.2 °F (36.8 °C) (11/29/23 1101)  Pulse: 83 (11/29/23 1135)  Resp: 20 (11/29/23 1135)  BP: (!) 149/69 (11/29/23 1101)  SpO2: 98 % (11/29/23 1135) Vital Signs (24h Range):  Temp:  [97.7 °F (36.5 °C)-99.4 °F (37.4 °C)] 98.2 °F (36.8 °C)  Pulse:  [73-91] 83  Resp:  [18-27] 20  SpO2:  [92 %-100 %] 98 %  BP: (140-170)/(65-81) 149/69     Weight: 89.4 kg (197 lb 1.5 oz)  Body mass index is 23.37 kg/m².       Physical Exam  Vitals reviewed.   Constitutional:       General: He is not in acute distress.     Appearance: He is ill-appearing. He is not toxic-appearing.   HENT:      Head: Normocephalic and atraumatic.      Right Ear: External ear normal.      Left Ear: External ear normal.      Nose: No congestion.      Mouth/Throat:      Mouth: Mucous membranes are moist.      Pharynx: No oropharyngeal  exudate.   Eyes:      General:         Right eye: No discharge.         Left eye: No discharge.      Extraocular Movements: Extraocular movements intact.   Cardiovascular:      Rate and Rhythm: Normal rate.   Pulmonary:      Effort: Pulmonary effort is normal. No respiratory distress.   Abdominal:      General: There is no distension.      Palpations: Abdomen is soft.      Tenderness: There is no abdominal tenderness.   Skin:     General: Skin is warm.   Neurological:      General: No focal deficit present.      Mental Status: He is alert and oriented to person, place, and time.   Psychiatric:         Mood and Affect: Mood normal.         Behavior: Behavior normal.         Thought Content: Thought content normal.         Judgment: Judgment normal.            Review of Symptoms      Symptom Assessment (ESAS 0-10 Scale)  Pain:  4  Dyspnea:  0  Anxiety:  0  Nausea:  0  Depression:  0  Anorexia:  0  Fatigue:  0  Insomnia:  0  Restlessness:  0  Agitation:  0         Pain Assessment:    Location(s): leg    Leg       Location: right (Right knee)        Quality: Stabbing        Quantity: 4/10 in intensity        Chronicity: Onset 2 year(s) ago, gradually worsening        Aggravating Factors: Activity        Alleviating Factors: Recumbency        Associated Symptoms: None    Performance Status:  50    Living Arrangements:  Lives with spouse and Lives in home    Psychosocial/Cultural:   See Palliative Psychosocial Note: No  **Primary  to Follow**  Palliative Care  Consult: No        Advance Care Planning   Advance Directives:   Do Not Resuscitate Status: Yes    Medical Power of : Yes      Decision Making:  Patient answered questions  Goals of Care: What is most important right now is to focus on spending time at home, avoiding the hospital, remaining as independent as possible, symptom/pain control. Accordingly, we have decided that the best plan to meet the patient's goals includes  continuing with treatment.         Significant Labs: BMP:   Recent Labs   Lab 11/29/23  0405         K 3.9   CL 98   CO2 43*   BUN 13   CREATININE 0.5   CALCIUM 9.0       CBC:   Recent Labs   Lab 11/28/23  0413 11/29/23  0405   WBC 8.14 8.33   HGB 7.8* 7.5*   HCT 26.7* 25.7*    398       CBC:   Recent Labs   Lab 11/29/23  0405   WBC 8.33   HGB 7.5*   HCT 25.7*   MCV 92          BMP:  Recent Labs   Lab 11/29/23  0405         K 3.9   CL 98   CO2 43*   BUN 13   CREATININE 0.5   CALCIUM 9.0       LFT:  Lab Results   Component Value Date    AST 9 (L) 11/29/2023    ALKPHOS 81 11/29/2023    BILITOT 0.2 11/29/2023     Albumin:   Albumin   Date Value Ref Range Status   11/29/2023 2.7 (L) 3.5 - 5.2 g/dL Final     Protein:   Total Protein   Date Value Ref Range Status   11/29/2023 5.4 (L) 6.0 - 8.4 g/dL Final     Lactic acid:   Lab Results   Component Value Date    LACTATE 0.8 11/20/2023    LACTATE 0.8 07/15/2021       Significant Imaging: I have reviewed all pertinent imaging results/findings within the past 24 hours.

## 2023-11-29 NOTE — PLAN OF CARE
sent message to [facility] ([facility]). Per Ravi, insurance authorization still pending. Penelope to message SW back if auth is approved.         11/29/23 1345   Post-Acute Status   Post-Acute Authorization Placement   Post-Acute Placement Status Pending payor review/awaiting authorization (if required)   Discharge Delays None known at this time   Discharge Plan   Discharge Plan A Long-term acute care facility (LTAC)   Discharge Plan B Long-term acute care facility (LTAC)

## 2023-11-29 NOTE — PLAN OF CARE
Problem: Adult Inpatient Plan of Care  Goal: Plan of Care Review  Outcome: Ongoing, Progressing  Goal: Patient-Specific Goal (Individualized)  Outcome: Ongoing, Progressing  Goal: Absence of Hospital-Acquired Illness or Injury  Outcome: Ongoing, Progressing  Goal: Optimal Comfort and Wellbeing  Outcome: Ongoing, Progressing  Goal: Readiness for Transition of Care  Outcome: Ongoing, Progressing     Problem: Skin Injury Risk Increased  Goal: Skin Health and Integrity  Outcome: Ongoing, Progressing     Problem: Fall Injury Risk  Goal: Absence of Fall and Fall-Related Injury  Outcome: Ongoing, Progressing     Problem: Infection  Goal: Absence of Infection Signs and Symptoms  Outcome: Ongoing, Progressing     Problem: Coping Ineffective  Goal: Effective Coping  Outcome: Ongoing, Progressing     Problem: Oral Intake Inadequate  Goal: Improved Oral Intake  Outcome: Ongoing, Progressing

## 2023-11-29 NOTE — CARE UPDATE
11/28/23 2033   Patient Assessment/Suction   Level of Consciousness (AVPU) alert   Respiratory Effort Normal;Unlabored   Expansion/Accessory Muscles/Retractions no use of accessory muscles   All Lung Fields Breath Sounds equal bilaterally;coarse   Rhythm/Pattern, Respiratory unlabored   Cough Frequency frequent   Cough Type loose;nonproductive   Skin Integrity   $ Wound Care Tech Time 15 min   Area Observed Bridge of nose   Skin Appearance without discoloration   PRE-TX-O2   Device (Oxygen Therapy) nasal cannula with humidification   Flow (L/min) 5   SpO2 98 %   Pulse Oximetry Type Continuous   $ Pulse Oximetry - Multiple Charge Pulse Oximetry - Multiple   Pulse 83   Resp 18   Aerosol Therapy   $ Aerosol Therapy Charges Aerosol Treatment  (Duoneb)   Daily Review of Necessity (SVN) completed   Respiratory Treatment Status (SVN) given   Treatment Route (SVN) mask   Patient Position (SVN) HOB elevated   Post Treatment Assessment (SVN) breath sounds unchanged   Signs of Intolerance (SVN) none   Breath Sounds Post-Respiratory Treatment   Throughout All Fields Post-Treatment All Fields   Throughout All Fields Post-Treatment no change   Post-treatment Heart Rate (beats/min) 72   Post-treatment Resp Rate (breaths/min) 18   Education   $ Education Bronchodilator;BiPAP   Respiratory Evaluation   $ Care Plan Tech Time 15 min   $ Eval/Re-eval Charges Re-evaluation   Evaluation For Re-Eval 5+ day

## 2023-11-29 NOTE — CARE UPDATE
11/29/23 0732   Patient Assessment/Suction   Level of Consciousness (AVPU) alert   Respiratory Effort Unlabored   Expansion/Accessory Muscles/Retractions no retractions;expansion symmetric;no use of accessory muscles   All Lung Fields Breath Sounds Anterior:;Lateral:;diminished;coarse   Rhythm/Pattern, Respiratory depth regular;pattern regular;unlabored   Skin Integrity   $ Wound Care Tech Time 15 min   Area Observed Bridge of nose   Skin Appearance without discoloration   PRE-TX-O2   Device (Oxygen Therapy) nasal cannula with humidification   $ Is the patient on Low Flow Oxygen? Yes   Flow (L/min) 5   SpO2 98 %   Pulse Oximetry Type Continuous   $ Pulse Oximetry - Multiple Charge Pulse Oximetry - Multiple   Pulse 79   Resp 20   Aerosol Therapy   $ Aerosol Therapy Charges Aerosol Treatment  (Duoneb)   Daily Review of Necessity (SVN) completed   Respiratory Treatment Status (SVN) given   Treatment Route (SVN) mask   Patient Position (SVN) HOB elevated   Post Treatment Assessment (SVN) increased aeration   Signs of Intolerance (SVN) none   Breath Sounds Post-Respiratory Treatment   Throughout All Fields Post-Treatment All Fields   Throughout All Fields Post-Treatment Anterior:;Lateral:;aeration increased   Post-treatment Heart Rate (beats/min) 80   Post-treatment Resp Rate (breaths/min) 20   Preset CPAP/BiPAP Settings   Mode Of Delivery Standby   $ CPAP/BiPAP Daily Charge BiPAP/CPAP Daily   Education   $ Education BiPAP;Bronchodilator;15 min   Respiratory Evaluation   $ Care Plan Tech Time 15 min

## 2023-11-29 NOTE — PROGRESS NOTES
"[facility]  Palliative Medicine  Progress Note    Patient Name: Jama James  MRN: [id number]  Admission Date: 11/21/2023  Hospital Length of Stay: 7 days  Code Status: DNR   Attending Provider: Alex Britt MD  Consulting Provider: Raleigh Buitrago MD  Primary Care Physician: Marisel Farrell III, MD  Principal Problem:Septic arthritis of knee, right    Patient information was obtained from patient, past medical records, and primary team.      Assessment/Plan:     Orthopedic  Acute pain of right knee  Norco 10 not much more effective than Norco 5    Will increase prn tylenol to 1000mg TID prn for pain  Will d/c [place] to avoid tylenol toxicity  To avoid tylenol toxicity and rotate opioids will add oxycodone 5mg, oxycodone 10mg, oxycodone 15mg q4hr prn based on pain score.     D/w primary and RN    Palliative Care  Goals of care, counseling/discussion  I reviewed his chart discussed his case with his team.      I examined Mr. James the bedside.  Maintains capacity for complex medical decision-making.    His goals remain clear,  unchanged, and aligned with current plan of care.  He had no questions for me today.    I appreciate being involved.  Hope I have been helpful.          I will follow-up with patient. Please contact us if you have any additional questions.    Subjective:     Chief Complaint: No chief complaint on file.      HPI:   Per admit H&P: "68 year old pt getting transferred from [place] with R septic arthritis and Streptococcus bacteremia  One week ago pt went to [place] ER with painful swollen Knee  Arthrocentesis was done and pt was discharged to home  Synovial fluid Grew E Fecalis  He was called back to ER  and blood culture was done  Pt was evaluated by Ortho MD lisa evans decision was made to transfer pt here because of his co morbid complex disaese/factors  Blood culture done grew Streptococcus"    He has been evaluated by Infectious Disease and Orthopedics.  He will require prolonged " course of IV antibiotics.  Course has been complicated by acute on chronic respiratory failure and physical debilitation.  At this point he carries a guarded prognosis in the short term and a poor prognosis and long-term.  I have been asked to assist goals of care.    Hospital Course:  No notes on file    Interval History: No major changes    Past Medical History:   Diagnosis Date    Abnormal CT scan 7/6/2023    Abnormal positron emission tomography (PET) scan 7/6/2023    Anxiety     Asbestosis 08/04/2015    Atrial fibrillation     Bilateral adrenal adenomas     COPD (chronic obstructive pulmonary disease)     COVID-19 virus infection 07/28/2022    Depression     Elevated PSA 7/6/2023    High cholesterol     HTN (hypertension)     Malignant neoplasm of prostate     Multiple lung nodules     Nodule of upper lobe of left lung 03/30/2023    6 mm spiculated on cta chest 3/21/23    On home oxygen therapy 05/24/2023    Pneumonia     Prostate cancer 5/27/2020    Prostate cancer metastatic to bone 7/6/2023    Prostate cancer metastatic to lung 7/6/2023    Ulcerative colitis        Past Surgical History:   Procedure Laterality Date    ARTHROSCOPY OF KNEE Right 11/22/2023    Procedure: ARTHROSCOPY, KNEE;  Surgeon: Harry Julien MD;  Location: [facility] OR;  Service: Orthopedics;  Laterality: Right;    NECK SURGERY      right knee      TRANSRECTAL BIOPSY OF PROSTATE WITH ULTRASOUND GUIDANCE Bilateral 3/18/2020    Procedure: BIOPSY, PROSTATE, RECTAL APPROACH, WITH US GUIDANCE;  Surgeon: Bill Jeong MD;  Location: [facility] OR;  Service: Urology;  Laterality: Bilateral;       Review of patient's allergies indicates:  No Known Allergies    Medications:  Continuous Infusions:  Scheduled Meds:   albuterol-ipratropium  3 mL Nebulization Q4H WAKE    amiodarone  200 mg Oral Daily    ampicillin IVPB  2 g Intravenous Q4H    arformoteroL  15 mcg Nebulization BID    budesonide  0.5 mg Nebulization Q12H    chlorhexidine  15 mL  Mouth/Throat BID    cyanocobalamin  2,000 mcg Oral Daily    diltiaZEM  30 mg Oral Q12H    enoxparin  1 mg/kg Subcutaneous Q12H (treatment, non-standard time)    famotidine  20 mg Oral BID    ferric gluconate (FERRLECIT) 125 mg in sodium chloride 0.9% 100 mL IVPB  125 mg Intravenous Daily    folic acid  1 mg Oral Daily    mupirocin   Nasal BID    nicotine  1 patch Transdermal Daily    predniSONE  30 mg Oral Daily    tamsulosin  0.4 mg Oral Daily     PRN Meds:acetaminophen, magnesium oxide, magnesium oxide, ondansetron, oxyCODONE, oxyCODONE, oxyCODONE, potassium bicarbonate, potassium bicarbonate, potassium bicarbonate, potassium, sodium phosphates, potassium, sodium phosphates, potassium, sodium phosphates    Family History       Problem Relation (Age of Onset)    Cancer Mother    Diabetes Mother, Sister          Tobacco Use    Smoking status: Every Day     Current packs/day: 2.00     Average packs/day: 2.0 packs/day for 50.2 years (100.5 ttl pk-yrs)     Types: Cigarettes     Start date: 9/7/1973     Passive exposure: Past    Smokeless tobacco: Never    Tobacco comments:     Pt states he smokes around 1-2ppd. Nicotine patch requested during his hospital stay. Information and education provided on smoking cessation classes.   Substance and Sexual Activity    Alcohol use: Yes     Comment: 8 16oz beers per day    Drug use: No    Sexual activity: Not Currently       Review of Systems  Objective:     Vital Signs (Most Recent):  Temp: 98.2 °F (36.8 °C) (11/29/23 1101)  Pulse: 83 (11/29/23 1135)  Resp: 20 (11/29/23 1135)  BP: (!) 149/69 (11/29/23 1101)  SpO2: 98 % (11/29/23 1135) Vital Signs (24h Range):  Temp:  [97.7 °F (36.5 °C)-99.4 °F (37.4 °C)] 98.2 °F (36.8 °C)  Pulse:  [73-91] 83  Resp:  [18-27] 20  SpO2:  [92 %-100 %] 98 %  BP: (140-170)/(65-81) 149/69     Weight: 89.4 kg (197 lb 1.5 oz)  Body mass index is 23.37 kg/m².       Physical Exam  Vitals reviewed.   Constitutional:       General: He is not in acute  distress.     Appearance: He is ill-appearing. He is not toxic-appearing.   HENT:      Head: Normocephalic and atraumatic.      Right Ear: External ear normal.      Left Ear: External ear normal.      Nose: No congestion.      Mouth/Throat:      Mouth: Mucous membranes are moist.      Pharynx: No oropharyngeal exudate.   Eyes:      General:         Right eye: No discharge.         Left eye: No discharge.      Extraocular Movements: Extraocular movements intact.   Cardiovascular:      Rate and Rhythm: Normal rate.   Pulmonary:      Effort: Pulmonary effort is normal. No respiratory distress.   Abdominal:      General: There is no distension.      Palpations: Abdomen is soft.      Tenderness: There is no abdominal tenderness.   Skin:     General: Skin is warm.   Neurological:      General: No focal deficit present.      Mental Status: He is alert and oriented to person, place, and time.   Psychiatric:         Mood and Affect: Mood normal.         Behavior: Behavior normal.         Thought Content: Thought content normal.         Judgment: Judgment normal.            Review of Symptoms      Symptom Assessment (ESAS 0-10 Scale)  Pain:  4  Dyspnea:  0  Anxiety:  0  Nausea:  0  Depression:  0  Anorexia:  0  Fatigue:  0  Insomnia:  0  Restlessness:  0  Agitation:  0         Pain Assessment:    Location(s): leg    Leg       Location: right (Right knee)        Quality: Stabbing        Quantity: 4/10 in intensity        Chronicity: Onset 2 year(s) ago, gradually worsening        Aggravating Factors: Activity        Alleviating Factors: Recumbency        Associated Symptoms: None    Performance Status:  50    Living Arrangements:  Lives with spouse and Lives in home    Psychosocial/Cultural:   See Palliative Psychosocial Note: No  **Primary  to Follow**  Palliative Care  Consult: No        Advance Care Planning  Advance Directives:   Do Not Resuscitate Status: Yes    Medical Power of : Yes       Decision Making:  Patient answered questions  Goals of Care: What is most important right now is to focus on spending time at home, avoiding the hospital, remaining as independent as possible, symptom/pain control. Accordingly, we have decided that the best plan to meet the patient's goals includes continuing with treatment.         Significant Labs: BMP:   Recent Labs   Lab 11/29/23  0405         K 3.9   CL 98   CO2 43*   BUN 13   CREATININE 0.5   CALCIUM 9.0       CBC:   Recent Labs   Lab 11/28/23  0413 11/29/23  0405   WBC 8.14 8.33   HGB 7.8* 7.5*   HCT 26.7* 25.7*    398       CBC:   Recent Labs   Lab 11/29/23 0405   WBC 8.33   HGB 7.5*   HCT 25.7*   MCV 92          BMP:  Recent Labs   Lab 11/29/23  0405         K 3.9   CL 98   CO2 43*   BUN 13   CREATININE 0.5   CALCIUM 9.0       LFT:  Lab Results   Component Value Date    AST 9 (L) 11/29/2023    ALKPHOS 81 11/29/2023    BILITOT 0.2 11/29/2023     Albumin:   Albumin   Date Value Ref Range Status   11/29/2023 2.7 (L) 3.5 - 5.2 g/dL Final     Protein:   Total Protein   Date Value Ref Range Status   11/29/2023 5.4 (L) 6.0 - 8.4 g/dL Final     Lactic acid:   Lab Results   Component Value Date    LACTATE 0.8 11/20/2023    LACTATE 0.8 07/15/2021       Significant Imaging: I have reviewed all pertinent imaging results/findings within the past 24 hours.    > 45 min visit spent in chart review, face to face discussion of goals of care,  symptom assessment, coordination of care and emotional support.    Raleigh Buitrago MD  Palliative Medicine  [facility]

## 2023-11-29 NOTE — ASSESSMENT & PLAN NOTE
I reviewed his chart discussed his case with his team.      I examined Mr. James the bedside.  Maintains capacity for complex medical decision-making.    I introduced myself and my role as palliative care physician.  He was agreeable to speaking.      We discussed his medical illness, prognosis, and values in detail.      Briefly, he understands he is currently admitted and being treated for severe infection involving his right knee.  He also understands that he was very advanced underlying lung failure and the advanced prostate cancer.      We discussed his prognosis.  I explained my worry that he was at risk of complications that could become life-threatening and ultimately result in his death in the coming year.  He understood and was not at all surprised by this news.      We discussed his values.  Currently, he is living a very reasonable quality of life.  He is hopeful to eventually return home and continue to care for his ailing wife.  As long as he can eventually return home he can find quality in life.  He would be willing to pursue further hospitalization even if it means prolong stent in rehab.  His only worry is his wife.  He otherwise does not have any worries or fears.  He does not have any particular fear death.  He finds strength from his gentry.      We spoke back and forth.  He was very clear understanding of his current situation.      Ultimately, I recommended that we continue with supportive measures being hopeful for the best and prepared for the worst.  He understood and agreed.    And preparing for the worst I did take a moment to discuss a couple of topics.    Healthcare power of .  He let us know that his wife is very for ill in his not capable of making reasonable decisions.  He desires to name his sister his healthcare power of .  We have completed in uploaded a copy of this.  Hospice in the philosophy of hospice care.  He was very open to the concept of hospice.  His goals  are not aligned with hospice today.   Code status in the clinical realities of a code and his situation.  He desires a DNR code status.  I will place this order.  We have also completed a LaPOST.    I have updated his primary team.      I appreciate being involved.  Hope I have been helpful.

## 2023-11-29 NOTE — SUBJECTIVE & OBJECTIVE
Interval History: Patient seen and examined. VINCENT. Still short of breath not far from his baseline it seems. Awaiting insurance auth for LTAC. Pain is still uncontrolled mostly with regards to performing with therapy.      Objective:     Vital Signs (Most Recent):  Temp: 98.2 °F (36.8 °C) (11/29/23 1101)  Pulse: 86 (11/29/23 1301)  Resp: 20 (11/29/23 1301)  BP: (!) 158/74 (11/29/23 1301)  SpO2: 95 % (11/29/23 1301) Vital Signs (24h Range):  Temp:  [97.7 °F (36.5 °C)-99.4 °F (37.4 °C)] 98.2 °F (36.8 °C)  Pulse:  [73-91] 86  Resp:  [18-27] 20  SpO2:  [95 %-100 %] 95 %  BP: (140-170)/(65-81) 158/74     Weight: 89.4 kg (197 lb 1.5 oz)  Body mass index is 23.37 kg/m².    Intake/Output Summary (Last 24 hours) at 11/29/2023 1437  Last data filed at 11/29/2023 1334  Gross per 24 hour   Intake 1180 ml   Output 1950 ml   Net -770 ml         Physical Exam  Vitals reviewed.   Constitutional:       General: He is not in acute distress.  HENT:      Head: Normocephalic and atraumatic.   Cardiovascular:      Rate and Rhythm: Normal rate and regular rhythm.   Pulmonary:      Effort: Pulmonary effort is normal. No respiratory distress.      Breath sounds: Decreased breath sounds (bibasilar) present.   Musculoskeletal:      Comments: R knee wrapped   Skin:     General: Skin is warm and dry.   Neurological:      General: No focal deficit present.      Mental Status: He is alert and oriented to person, place, and time. Mental status is at baseline.   Psychiatric:         Speech: Speech normal.         Behavior: Behavior normal.             Significant Labs: All pertinent labs within the past 24 hours have been reviewed.    Significant Imaging: I have reviewed all pertinent imaging results/findings within the past 24 hours.

## 2023-11-29 NOTE — PLAN OF CARE
sent message to [facility] ([facility]). Per Ravi, insurance authorization still pending. Penelope to message SW back if auth is approved.     11/29/23 1045   Post-Acute Status   Post-Acute Authorization Placement   Post-Acute Placement Status Pending payor review/awaiting authorization (if required)   Discharge Plan   Discharge Plan A Long-term acute care facility (LTAC)   Discharge Plan B Skilled Nursing Facility

## 2023-11-29 NOTE — ASSESSMENT & PLAN NOTE
Patient's anemia is currently controlled. Has not received any PRBCs to date. Etiology likely d/t acute blood loss which was from postoperative state . Also noted low iron and low-normal B12 and folate levels.  Current CBC reviewed-   Lab Results   Component Value Date    HGB 7.5 (L) 11/29/2023    HCT 25.7 (L) 11/29/2023     - Monitor serial CBC and transfuse if patient becomes hemodynamically unstable, symptomatic or H/H drops below 7/21.  - add IV iron, PO B12, and PO folate supplements

## 2023-11-29 NOTE — CONSULTS
[facility]  Palliative Medicine  Consult Note    Patient Name: Jama James  MRN: [id number]  Admission Date: 11/21/2023  Hospital Length of Stay: 7 days  Code Status: DNR   Attending Provider: Alex Britt MD  Consulting Provider: Raleigh Buitrago MD  Primary Care Physician: Marisel Farrell III, MD  Principal Problem:Septic arthritis of knee, right    Patient information was obtained from patient, past medical records, and primary team.      Inpatient consult to Palliative Care  Consult performed by: Raleigh Buitrago MD  Consult ordered by: Alex Britt MD        Assessment/Plan:     Orthopedic  Acute pain of right knee  He is currently prescribed Norco 5 -325 for pain control.  This is helpful to some degree.  However, his knee pain prevents him from working with physical therapy and current dosing Norco is not alleviate enough pain to allow him to work with physical therapy.  I recommended increasing to [place] .  He agrees.  I have modified this order.  I also updated Dr. Britt    Palliative Care  Goals of care, counseling/discussion  I reviewed his chart discussed his case with his team.      I examined Mr. James the bedside.  Maintains capacity for complex medical decision-making.    I introduced myself and my role as palliative care physician.  He was agreeable to speaking.      We discussed his medical illness, prognosis, and values in detail.      Briefly, he understands he is currently admitted and being treated for severe infection involving his right knee.  He also understands that he was very advanced underlying lung failure and the advanced prostate cancer.      We discussed his prognosis.  I explained my worry that he was at risk of complications that could become life-threatening and ultimately result in his death in the coming year.  He understood and was not at all surprised by this news.      We discussed his values.  Currently, he is living a very reasonable quality of  "life.  He is hopeful to eventually return home and continue to care for his ailing wife.  As long as he can eventually return home he can find quality in life.  He would be willing to pursue further hospitalization even if it means prolong stent in rehab.  His only worry is his wife.  He otherwise does not have any worries or fears.  He does not have any particular fear death.  He finds strength from his gentry.      We spoke back and forth.  He was very clear understanding of his current situation.      Ultimately, I recommended that we continue with supportive measures being hopeful for the best and prepared for the worst.  He understood and agreed.    And preparing for the worst I did take a moment to discuss a couple of topics.    Healthcare power of .  He let us know that his wife is very for ill in his not capable of making reasonable decisions.  He desires to name his sister his healthcare power of .  We have completed in uploaded a copy of this.  Hospice in the philosophy of hospice care.  He was very open to the concept of hospice.  His goals are not aligned with hospice today.   Code status in the clinical realities of a code and his situation.  He desires a DNR code status.  I will place this order.  We have also completed a [place].    I have updated his primary team.      I appreciate being involved.  Hope I have been helpful.          Thank you for your consult. I will sign off. Please contact us if you have any additional questions.    Subjective:     HPI:   Per admit H&P: "68 year old pt getting transferred from [place] with R septic arthritis and Streptococcus bacteremia  One week ago pt went to [place] ER with painful swollen Knee  Arthrocentesis was done and pt was discharged to home  Synovial fluid Grew E Fecalis  He was called back to ER  and blood culture was done  Pt was evaluated by [facility] gonzalez nd decision was made to transfer pt here because of his co morbid complex " "disaese/factors  Blood culture done grew Streptococcus"    He has been evaluated by Infectious Disease and Orthopedics.  He will require prolonged course of IV antibiotics.  Course has been complicated by acute on chronic respiratory failure and physical debilitation.  At this point he carries a guarded prognosis in the short term and a poor prognosis and long-term.  I have been asked to assist goals of care.    Hospital Course:  No notes on file    Interval History: See HPI    Past Medical History:   Diagnosis Date    Abnormal CT scan 7/6/2023    Abnormal positron emission tomography (PET) scan 7/6/2023    Anxiety     Asbestosis 08/04/2015    Atrial fibrillation     Bilateral adrenal adenomas     COPD (chronic obstructive pulmonary disease)     COVID-19 virus infection 07/28/2022    Depression     Elevated PSA 7/6/2023    High cholesterol     HTN (hypertension)     Malignant neoplasm of prostate     Multiple lung nodules     Nodule of upper lobe of left lung 03/30/2023    6 mm spiculated on cta chest 3/21/23    On home oxygen therapy 05/24/2023    Pneumonia     Prostate cancer 5/27/2020    Prostate cancer metastatic to bone 7/6/2023    Prostate cancer metastatic to lung 7/6/2023    Ulcerative colitis        Past Surgical History:   Procedure Laterality Date    ARTHROSCOPY OF KNEE Right 11/22/2023    Procedure: ARTHROSCOPY, KNEE;  Surgeon: Harry Julien MD;  Location: [facility] OR;  Service: Orthopedics;  Laterality: Right;    NECK SURGERY      right knee      TRANSRECTAL BIOPSY OF PROSTATE WITH ULTRASOUND GUIDANCE Bilateral 3/18/2020    Procedure: BIOPSY, PROSTATE, RECTAL APPROACH, WITH US GUIDANCE;  Surgeon: Bill Jeong MD;  Location: [facility] OR;  Service: Urology;  Laterality: Bilateral;       Review of patient's allergies indicates:  No Known Allergies    Medications:  Continuous Infusions:  Scheduled Meds:   albuterol-ipratropium  3 mL Nebulization Q4H WAKE    amiodarone  200 mg Oral Daily    " ampicillin IVPB  2 g Intravenous Q4H    arformoteroL  15 mcg Nebulization BID    budesonide  0.5 mg Nebulization Q12H    chlorhexidine  15 mL Mouth/Throat BID    diltiaZEM  30 mg Oral Q12H    enoxparin  1 mg/kg Subcutaneous Q12H (treatment, non-standard time)    famotidine  20 mg Oral BID    mupirocin   Nasal BID    nicotine  1 patch Transdermal Daily    [START ON 11/29/2023] predniSONE  30 mg Oral Daily    tamsulosin  0.4 mg Oral Daily     PRN Meds:acetaminophen, HYDROcodone-acetaminophen, magnesium oxide, magnesium oxide, ondansetron, potassium bicarbonate, potassium bicarbonate, potassium bicarbonate, potassium, sodium phosphates, potassium, sodium phosphates, potassium, sodium phosphates    Family History       Problem Relation (Age of Onset)    Cancer Mother    Diabetes Mother, Sister          Tobacco Use    Smoking status: Every Day     Current packs/day: 2.00     Average packs/day: 2.0 packs/day for 50.2 years (100.4 ttl pk-yrs)     Types: Cigarettes     Start date: 9/7/1973     Passive exposure: Past    Smokeless tobacco: Never    Tobacco comments:     Pt states he smokes around 1-2ppd. Nicotine patch requested during his hospital stay. Information and education provided on smoking cessation classes.   Substance and Sexual Activity    Alcohol use: Yes     Comment: 8 16oz beers per day    Drug use: No    Sexual activity: Not Currently       Review of Systems  Objective:     Vital Signs (Most Recent):  Temp: 98.6 °F (37 °C) (11/28/23 1101)  Pulse: 84 (11/28/23 1300)  Resp: (!) 26 (11/28/23 1300)  BP: (!) 146/67 (11/28/23 1300)  SpO2: (!) 92 % (11/28/23 1353) Vital Signs (24h Range):  Temp:  [98.1 °F (36.7 °C)-98.6 °F (37 °C)] 98.6 °F (37 °C)  Pulse:  [79-95] 84  Resp:  [17-33] 26  SpO2:  [91 %-100 %] 92 %  BP: (146-170)/(67-81) 146/67     Weight: 89.4 kg (197 lb 1.5 oz)  Body mass index is 23.37 kg/m².       Physical Exam  Vitals reviewed.   Constitutional:       General: He is not in acute distress.      Appearance: He is ill-appearing. He is not toxic-appearing.   HENT:      Head: Normocephalic and atraumatic.      Right Ear: External ear normal.      Left Ear: External ear normal.      Nose: No congestion.      Mouth/Throat:      Mouth: Mucous membranes are moist.      Pharynx: No oropharyngeal exudate.   Eyes:      General:         Right eye: No discharge.         Left eye: No discharge.      Extraocular Movements: Extraocular movements intact.   Cardiovascular:      Rate and Rhythm: Normal rate.   Pulmonary:      Effort: Pulmonary effort is normal. No respiratory distress.   Abdominal:      General: There is no distension.      Palpations: Abdomen is soft.      Tenderness: There is no abdominal tenderness.   Skin:     General: Skin is warm.   Neurological:      General: No focal deficit present.      Mental Status: He is alert and oriented to person, place, and time.   Psychiatric:         Mood and Affect: Mood normal.         Behavior: Behavior normal.         Thought Content: Thought content normal.         Judgment: Judgment normal.            Review of Symptoms      Symptom Assessment (ESAS 0-10 Scale)  Pain:  6  Dyspnea:  3  Anxiety:  0  Nausea:  0  Depression:  0  Anorexia:  0  Fatigue:  6  Insomnia:  0  Restlessness:  0  Agitation:  0         Pain Assessment:    Location(s): leg    Leg       Location: right (Right knee)        Quality: Stabbing        Quantity: 6/10 in intensity        Chronicity: Onset 2 year(s) ago, gradually worsening        Aggravating Factors: Activity        Alleviating Factors: Recumbency        Associated Symptoms: None    Performance Status:  50    Living Arrangements:  Lives with spouse and Lives in home    Psychosocial/Cultural:   See Palliative Psychosocial Note: No  **Primary  to Follow**  Palliative Care  Consult: No        Advance Care Planning  Advance Directives:   Do Not Resuscitate Status: Yes      Decision Making:  Patient answered  questions  Goals of Care: The patient endorses that what is most important right now is to focus on spending time at home, avoiding the hospital, remaining as independent as possible, and symptom/pain control    Accordingly, we have decided that the best plan to meet the patient's goals includes continuing with treatment         Significant Labs: BMP:   Recent Labs   Lab 11/28/23 0413   *      K 3.8   CL 98   CO2 45*   BUN 14   CREATININE 0.5   CALCIUM 9.1     CBC:   Recent Labs   Lab 11/27/23  0409 11/28/23 0413   WBC 10.25 8.14   HGB 7.4* 7.8*   HCT 24.8* 26.7*    380     CBC:   Recent Labs   Lab 11/28/23 0413   WBC 8.14   HGB 7.8*   HCT 26.7*   MCV 92        BMP:  Recent Labs   Lab 11/28/23 0413   *      K 3.8   CL 98   CO2 45*   BUN 14   CREATININE 0.5   CALCIUM 9.1     LFT:  Lab Results   Component Value Date    AST 9 (L) 11/28/2023    ALKPHOS 85 11/28/2023    BILITOT 0.2 11/28/2023     Albumin:   Albumin   Date Value Ref Range Status   11/28/2023 2.8 (L) 3.5 - 5.2 g/dL Final     Protein:   Total Protein   Date Value Ref Range Status   11/28/2023 5.9 (L) 6.0 - 8.4 g/dL Final     Lactic acid:   Lab Results   Component Value Date    LACTATE 0.8 11/20/2023    LACTATE 0.8 07/15/2021       Significant Imaging: I have reviewed all pertinent imaging results/findings within the past 24 hours.      I spent a total of 100 minutes on the day of the visit. This includes face to face time in discussion of goals of care, symptom assessment, coordination of care and emotional support.  This also includes non-face to face time preparing to see the patient (eg, review of tests/imaging), obtaining and/or reviewing separately obtained history, documenting clinical information in the electronic or other health record, independently interpreting results and communicating results to the patient/family/caregiver, or care coordinator.    Raleigh Buitrago MD  Palliative Medicine  [facility]

## 2023-11-29 NOTE — PT/OT/SLP PROGRESS
Physical Therapy Treatment    Patient Name:  Jama James   MRN:  [id number]    Recommendations:     Discharge Recommendations: Moderate Intensity Therapy  Discharge Equipment Recommendations: to be determined by next level of care  Barriers to discharge:  increased assist with mobility, respiratory deficits, R knee pain, decreased activity tolerance, high fall risk    Assessment:     Jama James is a 68 y.o. male admitted with a medical diagnosis of Septic arthritis of knee, right.  He presents with the following impairments/functional limitations: weakness, impaired endurance, impaired self care skills, impaired functional mobility, gait instability, impaired balance, decreased lower extremity function, decreased safety awareness.    Pt agreeable to visit. Pt premedicated for activity but reports pain in R knee 7/10. Pt required min assist for supine to sit and sit to supine transfers. Pt on 5 L O2 via NC with SPO2 88-90% at rest. Pt performed sit to stand transfer with mod to max assist and RW for two trials. On first trial, pt unable to achieve full stand as he could not tolerate WB through RLE to offload weight from RUE to reach for RW. On second trial, pt instructed to only put weight through LLE and keep RLE off the ground with pt able to achieve a full stand. Pt desats 83-85% with stand trials. Pt cued for pursed lip breathing with extra time for SPO2 to recover.    Rehab Prognosis: Fair; patient would benefit from acute skilled PT services to address these deficits and reach maximum level of function.    Recent Surgery: Procedure(s) (LRB):  ARTHROSCOPY, KNEE (Right) 7 Days Post-Op    Plan:     During this hospitalization, patient to be seen 6 x/week to address the identified rehab impairments via gait training, therapeutic activities, therapeutic exercises, neuromuscular re-education and progress toward the following goals:    Plan of Care Expires:  12/27/23    Subjective     Chief Complaint: R knee pain and  shortness of breath  Patient/Family Comments/goals: to get better  Pain/Comfort:  Pain Rating 1: 7/10 (at rest)  Location - Side 1: Right  Location 1: knee  Pain Addressed 1: Reposition, Distraction, Pre-medicate for activity, Cessation of Activity  Pain Rating Post-Intervention 1: 9/10 (after activity)      Objective:     Communicated with RN prior to session.  Patient found HOB elevated with telemetry, peripheral IV, PureWick, pulse ox (continuous), oxygen, blood pressure cuff upon PT entry to room.     General Precautions: Standard, fall  Orthopedic Precautions: RLE weight bearing as tolerated  Braces: N/A  Respiratory Status: Nasal cannula, flow 5 L/min     Functional Mobility:  Bed Mobility:     Scooting: stand by assistance  Supine to Sit: minimum assistance  Sit to Supine: minimum assistance  Transfers:     Sit to Stand:  moderate assistance and maximal assistance with rolling walker  Balance: SBA for sitting balance EOB      AM-PAC 6 CLICK MOBILITY          Treatment & Education:  Pt educated on importance of time OOB, importance of intermittent mobility, safe techniques for transfers/ambulation, discharge recommendations/options, and use of call light for assistance and fall prevention.      Patient left HOB elevated with all lines intact, call button in reach, bed alarm on, and RN notified..    GOALS:   Multidisciplinary Problems       Physical Therapy Goals          Problem: Physical Therapy    Goal Priority Disciplines Outcome Goal Variances Interventions   Physical Therapy Goal     PT, PT/OT Ongoing, Progressing     Description: Goals to be met by: 23     Patient will increase functional independence with mobility by performin. Supine to sit with Supervision  2. Sit to stand transfer with Supervision  3. Bed to chair transfer with Supervision using Rolling Walker  4. Gait  x 150 feet with Supervision using Rolling Walker.                              Time Tracking:     PT Received On:  11/29/23  PT Start Time: 1153     PT Stop Time: 1216  PT Total Time (min): 23 min     Billable Minutes: Therapeutic Activity 23    Treatment Type: Treatment  PT/PTA: PTA     Number of PTA visits since last PT visit: 1 11/29/2023

## 2023-11-29 NOTE — ASSESSMENT & PLAN NOTE
He is currently prescribed Norco 5 -325 for pain control.  This is helpful to some degree.  However, his knee pain prevents him from working with physical therapy and current dosing Norco is not alleviate enough pain to allow him to work with physical therapy.  I recommended increasing to [place] .  He agrees.  I have modified this order.  I also updated Dr. Britt

## 2023-11-29 NOTE — PLAN OF CARE
Pt denied by skilled nursing facilities due to q4 abx. LTAC pending denial with [facility]. [id number] offered.  called [employer] [id number] line and spoke with Miriam. P2p scheduled for 2:30pm 11/30 with          11/29/23 1552   Post-Acute Status   Post-Acute Authorization Placement   Post-Acute Placement Status Pending payor medical review/second level review   Discharge Plan   Discharge Plan A Long-term acute care facility (LTAC)   Discharge Plan B Skilled Nursing Facility

## 2023-11-29 NOTE — PT/OT/SLP PROGRESS
Occupational Therapy   Treatment    Name: Jama James  MRN: [id number]  Admitting Diagnosis:  Septic arthritis of knee, right  7 Days Post-Op  Procedure(s):  ARTHROSCOPY, KNEE   The primary encounter diagnosis was Arthritis of right knee due to other bacteria. Diagnoses of Arthritis, septic, knee, Septic arthritis of knee, right, and COPD exacerbation were also pertinent to this visit.      Recommendations:     Discharge Recommendations: Moderate Intensity Therapy  Discharge Equipment Recommendations:  wheelchair  Barriers to discharge:   (pain, increaesd level of assistance)    Assessment:     Jama James is a 68 y.o. male with a medical diagnosis of Septic arthritis of knee, right.  He presents with Performance deficits affecting function are weakness, impaired endurance, impaired self care skills, impaired functional mobility, gait instability, impaired balance, decreased lower extremity function, decreased safety awareness.     Rehab Prognosis:  Good; patient would benefit from acute skilled OT services to address these deficits and reach maximum level of function.       Plan:     Patient to be seen 5 x/week to address the above listed problems via self-care/home management, therapeutic activities, therapeutic exercises  Plan of Care Expires: 12/24/23  Plan of Care Reviewed with: patient    Subjective     Chief Complaint: R knee pain 8/10  Patient/Family Comments/goals: Pt agreeable to OT.  Pain/Comfort:  Pain Rating 1: 8/10  Location - Side 1: Right  Location - Orientation 1: generalized  Location 1: knee  Pain Addressed 1: Pre-medicate for activity, Reposition, Distraction, Cessation of Activity  Pain Rating Post-Intervention 1: 9/10    Objective:     Communicated with: nurse prior to session.  Patient found HOB elevated with telemetry, oxygen, pulse ox (continuous), blood pressure cuff, PICC line upon OT entry to room.    General Precautions: Standard, fall    Orthopedic Precautions:RLE weight bearing as  tolerated  Braces: N/A  Respiratory Status: Nasal cannula, flow 5 L/min     Occupational Performance:     Bed Mobility:    Patient completed Scooting/Bridging with minimum assistance  Patient completed Supine to Sit with minimum assistance and for RLE support/assist  Patient completed Sit to Supine with minimum assistance     Functional Mobility/Transfers:  Patient completed Sit <> Stand Transfer with maximal assistance and bed raised high, extra time and effort from pt to transition 2/2 R knee pain  with  rolling walker   Functional Mobility: seated scooting min A for RLE assist/support as pt scooted laterally toward HOB on left side.    Activities of Daily Living:  Grooming: stand by assistance washed face and brushed teeth seated EOB      Treatment & Education:  Purpose of OT and POC  Pt. seen for self care retraining and functional mobility retraining with adapted techniques and modifications as stated above.  Pt sat 30 min EOB for self care and UE ex.  Pt performed B shld flexion AAROM ex to wnl 10 x 1 set flexion/ext and abd/add  PLB ex performed seated EOB, pt desatted to 84% on 5L with activity, pt quickly recovered to low 90's using PLB tech and rest; pt's post tx SpO2 95%.  All questions and concerns addressed within scope.  Pt instructed in fall prevention strategies.  Call don't fall explained to pt and call be use instruction provided.    Patient left HOB elevated with all lines intact, call button in reach, and bed alarm on    GOALS:   Multidisciplinary Problems       Occupational Therapy Goals          Problem: Occupational Therapy    Goal Priority Disciplines Outcome Interventions   Occupational Therapy Goal     OT, PT/OT Ongoing, Progressing    Description: Goals to be met by: 12/24/23     Patient will increase functional independence with ADLs by performing:    UE Dressing with Set-up Assistance.  LE Dressing with Minimal Assistance and Assistive Devices as needed.  Grooming while seated at sink  with Supervision.  Toileting from toilet with Minimal Assistance for hygiene and clothing management.   Toilet transfer to toilet with Minimal Assistance.                         Time Tracking:     OT Date of Treatment: 11/29/23  OT Start Time: 1457  OT Stop Time: 1539  OT Total Time (min): 42 min    Billable Minutes:Self Care/Home Management 15  Therapeutic Activity 15  Therapeutic Exercise 12  Total Time 42    OT/NICHO: OT          11/29/2023

## 2023-11-30 VITALS
WEIGHT: 197.06 LBS | SYSTOLIC BLOOD PRESSURE: 130 MMHG | BODY MASS INDEX: 23.27 KG/M2 | OXYGEN SATURATION: 99 % | HEART RATE: 75 BPM | RESPIRATION RATE: 19 BRPM | HEIGHT: 77 IN | DIASTOLIC BLOOD PRESSURE: 61 MMHG | TEMPERATURE: 98 F

## 2023-11-30 LAB
ALBUMIN SERPL BCP-MCNC: 2.8 G/DL (ref 3.5–5.2)
ALP SERPL-CCNC: 81 U/L (ref 55–135)
ALT SERPL W/O P-5'-P-CCNC: 9 U/L (ref 10–44)
ANION GAP SERPL CALC-SCNC: 3 MMOL/L (ref 8–16)
AST SERPL-CCNC: 8 U/L (ref 10–40)
BASOPHILS # BLD AUTO: 0.01 K/UL (ref 0–0.2)
BASOPHILS NFR BLD: 0.1 % (ref 0–1.9)
BILIRUB SERPL-MCNC: 0.2 MG/DL (ref 0.1–1)
BUN SERPL-MCNC: 8 MG/DL (ref 8–23)
CALCIUM SERPL-MCNC: 9.2 MG/DL (ref 8.7–10.5)
CHLORIDE SERPL-SCNC: 95 MMOL/L (ref 95–110)
CO2 SERPL-SCNC: 41 MMOL/L (ref 23–29)
CREAT SERPL-MCNC: 0.5 MG/DL (ref 0.5–1.4)
DIFFERENTIAL METHOD: ABNORMAL
EOSINOPHIL # BLD AUTO: 0 K/UL (ref 0–0.5)
EOSINOPHIL NFR BLD: 0.5 % (ref 0–8)
ERYTHROCYTE [DISTWIDTH] IN BLOOD BY AUTOMATED COUNT: 14.6 % (ref 11.5–14.5)
EST. GFR  (NO RACE VARIABLE): >60 ML/MIN/1.73 M^2
GLUCOSE SERPL-MCNC: 110 MG/DL (ref 70–110)
HCT VFR BLD AUTO: 26.8 % (ref 40–54)
HGB BLD-MCNC: 8 G/DL (ref 14–18)
IMM GRANULOCYTES # BLD AUTO: 0.06 K/UL (ref 0–0.04)
IMM GRANULOCYTES NFR BLD AUTO: 0.7 % (ref 0–0.5)
LYMPHOCYTES # BLD AUTO: 0.9 K/UL (ref 1–4.8)
LYMPHOCYTES NFR BLD: 10.1 % (ref 18–48)
MCH RBC QN AUTO: 27.4 PG (ref 27–31)
MCHC RBC AUTO-ENTMCNC: 29.9 G/DL (ref 32–36)
MCV RBC AUTO: 92 FL (ref 82–98)
MONOCYTES # BLD AUTO: 0.6 K/UL (ref 0.3–1)
MONOCYTES NFR BLD: 6.4 % (ref 4–15)
NEUTROPHILS # BLD AUTO: 7.1 K/UL (ref 1.8–7.7)
NEUTROPHILS NFR BLD: 82.2 % (ref 38–73)
NRBC BLD-RTO: 0 /100 WBC
PLATELET # BLD AUTO: 390 K/UL (ref 150–450)
PMV BLD AUTO: 9.6 FL (ref 9.2–12.9)
POTASSIUM SERPL-SCNC: 4 MMOL/L (ref 3.5–5.1)
PROT SERPL-MCNC: 5.7 G/DL (ref 6–8.4)
RBC # BLD AUTO: 2.92 M/UL (ref 4.6–6.2)
SODIUM SERPL-SCNC: 139 MMOL/L (ref 136–145)
WBC # BLD AUTO: 8.59 K/UL (ref 3.9–12.7)

## 2023-11-30 PROCEDURE — S4991 NICOTINE PATCH NONLEGEND: HCPCS | Performed by: STUDENT IN AN ORGANIZED HEALTH CARE EDUCATION/TRAINING PROGRAM

## 2023-11-30 PROCEDURE — 27000221 HC OXYGEN, UP TO 24 HOURS

## 2023-11-30 PROCEDURE — 94799 UNLISTED PULMONARY SVC/PX: CPT

## 2023-11-30 PROCEDURE — 25000242 PHARM REV CODE 250 ALT 637 W/ HCPCS: Performed by: STUDENT IN AN ORGANIZED HEALTH CARE EDUCATION/TRAINING PROGRAM

## 2023-11-30 PROCEDURE — 25000003 PHARM REV CODE 250: Performed by: INTERNAL MEDICINE

## 2023-11-30 PROCEDURE — 63600175 PHARM REV CODE 636 W HCPCS: Mod: UD | Performed by: INTERNAL MEDICINE

## 2023-11-30 PROCEDURE — 94640 AIRWAY INHALATION TREATMENT: CPT

## 2023-11-30 PROCEDURE — 97535 SELF CARE MNGMENT TRAINING: CPT

## 2023-11-30 PROCEDURE — 99900035 HC TECH TIME PER 15 MIN (STAT)

## 2023-11-30 PROCEDURE — 97530 THERAPEUTIC ACTIVITIES: CPT | Mod: CQ

## 2023-11-30 PROCEDURE — 99232 PR SUBSEQUENT HOSPITAL CARE,LEVL II: ICD-10-PCS | Mod: ,,, | Performed by: INTERNAL MEDICINE

## 2023-11-30 PROCEDURE — 85025 COMPLETE CBC W/AUTO DIFF WBC: CPT | Performed by: STUDENT IN AN ORGANIZED HEALTH CARE EDUCATION/TRAINING PROGRAM

## 2023-11-30 PROCEDURE — 63600175 PHARM REV CODE 636 W HCPCS: Mod: UD | Performed by: STUDENT IN AN ORGANIZED HEALTH CARE EDUCATION/TRAINING PROGRAM

## 2023-11-30 PROCEDURE — 25000003 PHARM REV CODE 250: Performed by: STUDENT IN AN ORGANIZED HEALTH CARE EDUCATION/TRAINING PROGRAM

## 2023-11-30 PROCEDURE — 63600175 PHARM REV CODE 636 W HCPCS: Performed by: INTERNAL MEDICINE

## 2023-11-30 PROCEDURE — 25000003 PHARM REV CODE 250: Performed by: ORTHOPAEDIC SURGERY

## 2023-11-30 PROCEDURE — 63600175 PHARM REV CODE 636 W HCPCS: Performed by: ORTHOPAEDIC SURGERY

## 2023-11-30 PROCEDURE — 99900031 HC PATIENT EDUCATION (STAT)

## 2023-11-30 PROCEDURE — 94761 N-INVAS EAR/PLS OXIMETRY MLT: CPT

## 2023-11-30 PROCEDURE — 80053 COMPREHEN METABOLIC PANEL: CPT | Performed by: ORTHOPAEDIC SURGERY

## 2023-11-30 PROCEDURE — 99232 SBSQ HOSP IP/OBS MODERATE 35: CPT | Mod: ,,, | Performed by: INTERNAL MEDICINE

## 2023-11-30 PROCEDURE — 25000242 PHARM REV CODE 250 ALT 637 W/ HCPCS: Performed by: INTERNAL MEDICINE

## 2023-11-30 RX ORDER — PREDNISONE 20 MG/1
20 TABLET ORAL DAILY
Status: DISCONTINUED | OUTPATIENT
Start: 2023-12-01 | End: 2023-11-30

## 2023-11-30 RX ORDER — PREDNISONE 10 MG/1
TABLET ORAL
Qty: 9 TABLET | Refills: 0 | Status: ON HOLD
Start: 2023-12-01 | End: 2023-12-22 | Stop reason: HOSPADM

## 2023-11-30 RX ORDER — IBUPROFEN 200 MG
1 TABLET ORAL DAILY
Status: ON HOLD
Start: 2023-11-30 | End: 2023-12-22 | Stop reason: HOSPADM

## 2023-11-30 RX ORDER — IPRATROPIUM BROMIDE AND ALBUTEROL SULFATE 2.5; .5 MG/3ML; MG/3ML
3 SOLUTION RESPIRATORY (INHALATION) EVERY 6 HOURS
Status: DISCONTINUED | OUTPATIENT
Start: 2023-11-30 | End: 2023-11-30 | Stop reason: HOSPADM

## 2023-11-30 RX ORDER — PREDNISONE 5 MG/1
10 TABLET ORAL DAILY
Status: DISCONTINUED | OUTPATIENT
Start: 2023-12-01 | End: 2023-11-30 | Stop reason: HOSPADM

## 2023-11-30 RX ORDER — FOLIC ACID 1 MG/1
1 TABLET ORAL DAILY
Refills: 0
Start: 2023-12-01 | End: 2024-11-30

## 2023-11-30 RX ORDER — CYANOCOBALAMIN (VITAMIN B-12) 2000 MCG
2000 TABLET ORAL DAILY
Start: 2023-12-01

## 2023-11-30 RX ADMIN — IPRATROPIUM BROMIDE AND ALBUTEROL SULFATE 3 ML: .5; 3 SOLUTION RESPIRATORY (INHALATION) at 01:11

## 2023-11-30 RX ADMIN — ENOXAPARIN SODIUM 90 MG: 100 INJECTION SUBCUTANEOUS at 09:11

## 2023-11-30 RX ADMIN — OXYCODONE HYDROCHLORIDE 15 MG: 5 TABLET ORAL at 10:11

## 2023-11-30 RX ADMIN — AMIODARONE HYDROCHLORIDE 200 MG: 200 TABLET ORAL at 09:11

## 2023-11-30 RX ADMIN — ARFORMOTEROL TARTRATE 15 MCG: 15 SOLUTION RESPIRATORY (INHALATION) at 07:11

## 2023-11-30 RX ADMIN — TAMSULOSIN HYDROCHLORIDE 0.4 MG: 0.4 CAPSULE ORAL at 09:11

## 2023-11-30 RX ADMIN — CYANOCOBALAMIN TAB 1000 MCG 2000 MCG: 1000 TAB at 09:11

## 2023-11-30 RX ADMIN — AMPICILLIN 2 G: 2 INJECTION, POWDER, FOR SOLUTION INTRAVENOUS at 04:11

## 2023-11-30 RX ADMIN — CHLORHEXIDINE GLUCONATE 15 ML: 1.2 RINSE ORAL at 09:11

## 2023-11-30 RX ADMIN — FAMOTIDINE 20 MG: 20 TABLET ORAL at 09:11

## 2023-11-30 RX ADMIN — BUDESONIDE INHALATION 0.5 MG: 0.5 SUSPENSION RESPIRATORY (INHALATION) at 07:11

## 2023-11-30 RX ADMIN — FOLIC ACID 1 MG: 1 TABLET ORAL at 09:11

## 2023-11-30 RX ADMIN — OXYCODONE HYDROCHLORIDE 15 MG: 5 TABLET ORAL at 06:11

## 2023-11-30 RX ADMIN — AMPICILLIN 2 G: 2 INJECTION, POWDER, FOR SOLUTION INTRAVENOUS at 08:11

## 2023-11-30 RX ADMIN — AMPICILLIN 2 G: 2 INJECTION, POWDER, FOR SOLUTION INTRAVENOUS at 11:11

## 2023-11-30 RX ADMIN — IPRATROPIUM BROMIDE AND ALBUTEROL SULFATE 3 ML: 2.5; .5 SOLUTION RESPIRATORY (INHALATION) at 07:11

## 2023-11-30 RX ADMIN — MUPIROCIN 1 G: 20 OINTMENT TOPICAL at 09:11

## 2023-11-30 RX ADMIN — NICOTINE 1 PATCH: 21 PATCH, EXTENDED RELEASE TRANSDERMAL at 09:11

## 2023-11-30 RX ADMIN — SODIUM CHLORIDE 125 MG: 9 INJECTION, SOLUTION INTRAVENOUS at 09:11

## 2023-11-30 RX ADMIN — PREDNISONE 30 MG: 20 TABLET ORAL at 09:11

## 2023-11-30 RX ADMIN — AMPICILLIN 2 G: 2 INJECTION, POWDER, FOR SOLUTION INTRAVENOUS at 02:11

## 2023-11-30 RX ADMIN — DILTIAZEM HYDROCHLORIDE 30 MG: 30 TABLET, FILM COATED ORAL at 09:11

## 2023-11-30 NOTE — PT/OT/SLP PROGRESS
Occupational Therapy   Treatment    Name: Jama James  MRN: [id number]  Admitting Diagnosis:  Septic arthritis of knee, right  8 Days Post-Op    Recommendations:     Discharge Recommendations: Moderate Intensity Therapy  Discharge Equipment Recommendations:  wheelchair  Barriers to discharge:  Decreased caregiver support    Assessment:     Jama James is a 68 y.o. male with a medical diagnosis of Septic arthritis of knee, right.  He presents with right knee pain and general weakness. Patient participated in bed mobility, unsupported sitting EOB and grooming sitting EOB. Performance deficits affecting function are weakness, impaired endurance, impaired sensation, impaired self care skills, impaired functional mobility, gait instability, impaired balance, decreased lower extremity function, decreased safety awareness, impaired cardiopulmonary response to activity.     Rehab Prognosis:  Fair; patient would benefit from acute skilled OT services to address these deficits and reach maximum level of function.       Plan:     Patient to be seen 5 x/week to address the above listed problems via self-care/home management, therapeutic activities, therapeutic exercises  Plan of Care Expires: 12/24/23  Plan of Care Reviewed with: patient    Subjective     Chief Complaint: Right knee pain  Patient/Family Comments/goals: Improved functional mobility and ADL independence.  Pain/Comfort:  Pain Rating 1: 6/10  Location 1:  (Right knee)  Pain Addressed 1: Reposition, Distraction  Pain Rating Post-Intervention 1: 6/10    Objective:     Communicated with: nurse prior to session.  Patient found HOB elevated with telemetry, PICC line, pulse ox (continuous), oxygen, PureWick upon OT entry to room.    General Precautions: Standard, fall    Orthopedic Precautions:RLE weight bearing as tolerated  Braces: N/A  Respiratory Status: Nasal cannula, flow 3 L/min     Occupational Performance:     Bed Mobility:    Patient completed Scooting/Bridging  with minimum assistance  Patient completed Supine to Sit with minimum assistance  Patient completed Sit to Supine with minimum assistance   Performed unsupported sitting EOB with contact guard assistance.    Activities of Daily Living:  Grooming: contact guard assistance to brush teeth and wash face sitting EOB.      [facility] 6 Click ADL: 15    Treatment & Education:  Made plans with patient to progress towards OOB Adl activity in future sessions.     Patient left HOB elevated with all lines intact, call button in reach, and bed alarm on    GOALS:   Multidisciplinary Problems       Occupational Therapy Goals          Problem: Occupational Therapy    Goal Priority Disciplines Outcome Interventions   Occupational Therapy Goal     OT, PT/OT Ongoing, Progressing    Description: Goals to be met by: 12/24/23     Patient will increase functional independence with ADLs by performing:    UE Dressing with Set-up Assistance.  LE Dressing with Minimal Assistance and Assistive Devices as needed.  Grooming while seated at sink with Supervision.  Toileting from toilet with Minimal Assistance for hygiene and clothing management.   Toilet transfer to toilet with Minimal Assistance.                         Time Tracking:     OT Date of Treatment: 11/30/23  OT Start Time: 1135  OT Stop Time: 1150  OT Total Time (min): 15 min    Billable Minutes:Self Care/Home Management 15    OT/NICHO: OT          11/30/2023

## 2023-11-30 NOTE — PLAN OF CARE
Chart and orders reviewed. Pt discharging to [facility]. Cari RN given information to call report. Facility to arrange wheelchair transport. Pt has no other needs to be addressed at this time. Pt cleared to discharge from case management standpoint.   11/30/23 1548   Final Note   Assessment Type Final Discharge Note   Anticipated Discharge Disposition Long Term   What phone number can be called within the next 1-3 days to see how you are doing after discharge? [phone]   Hospital Resources/Appts/Education Provided Provided patient/caregiver with written discharge plan information   Post-Acute Status   Post-Acute Authorization Placement   Post-Acute Placement Status Set-up Complete/Auth obtained   Coverage Payor: [employer] [employer] [facility] -   Discharge Delays None known at this time

## 2023-11-30 NOTE — PLAN OF CARE
Problem: Adult Inpatient Plan of Care  Goal: Plan of Care Review  Outcome: Ongoing, Progressing     Problem: Skin Injury Risk Increased  Goal: Skin Health and Integrity  Outcome: Ongoing, Progressing  Intervention: Promote and Optimize Oral Intake  Flowsheets (Taken 11/30/2023 0359)  Oral Nutrition Promotion: rest periods promoted     Problem: Fall Injury Risk  Goal: Absence of Fall and Fall-Related Injury  Outcome: Ongoing, Progressing     Problem: Infection  Goal: Absence of Infection Signs and Symptoms  Intervention: Prevent or Manage Infection  Flowsheets (Taken 11/30/2023 0359)  Infection Management: aseptic technique maintained     Problem: Coping Ineffective  Goal: Effective Coping  Outcome: Ongoing, Progressing  Intervention: Support and Enhance Coping Strategies  Flowsheets (Taken 11/30/2023 0359)  Supportive Measures:   active listening utilized   verbalization of feelings encouraged     Problem: Oral Intake Inadequate  Goal: Improved Oral Intake  Intervention: Promote and Optimize Oral Intake  Flowsheets (Taken 11/30/2023 0359)  Oral Nutrition Promotion: rest periods promoted

## 2023-11-30 NOTE — DISCHARGE SUMMARY
[facility] Medicine  Discharge Summary      Patient Name: Jama James  MRN: [id number]  PARK: [id number]  Patient Class: IP- Inpatient  Admission Date: 11/21/2023  Hospital Length of Stay: 8 days  Discharge Date and Time:  11/30/2023 4:53 PM  Attending Physician: Alxe Britt MD   Discharging Provider: Alex Britt MD  Primary Care Provider: Marisel Farrell III, MD    Primary Care Team: Networked reference to record PCT     HPI:   68 year old pt getting transferred from [place] with R septic arthritis and Streptococcus bacteremia  One week ago pt went to [place] ER with painful swollen Knee  Arthrocentesis was done and pt was discharged to home  Synovial fluid Grew E Fecalis  He was called back to ER  and blood culture was done  Pt was evaluated by Ortho MD lisa evans decision was made to transfer pt here because of his co morbid complex disaese/factors  Blood culture done grew Streptococcus       Procedure(s) (LRB):  ARTHROSCOPY, KNEE (Right)      Hospital Course:   68M with PMH pAfib on eliquis, COPD w/ chronic respiratory failure on 4L NC, likely underlying interstitial lung disease, and prostate cancer with mets on chemo is admitted with R knee septic arthritis which underwent aspiration and washout which grew enterococcus faecalis on wound culture and blood cultures. Ortho followed initially. ID consulted and recommended ampicillin 2gm q4hr for 4 week course to end 12/20/23. Course complicated by acute on chronic respiratory failure with hypoxia and hypercapnia requiring increased O2 needs and bipap. Treated for COPD exacerbation with scheduled nebs and steroids. Pulmonology consulted. Respiratory status stabilized. Suffered with postoperative anemia and his blood counts were trended and stable. Ortho recommended remove sutures 1 week from day of discharge and continue weight bearing as tolerated with crutches or walker. Accepted and transferred to LTAC. By time of discharge the patient  was tolerating a regular diet with improved admission symptoms. Patient seen and examined on day of discharge.    Physical exam on day of discharge:  Constitutional:       General: He is not in acute distress.  HENT:      Head: Normocephalic and atraumatic.   Cardiovascular:      Rate and Rhythm: Normal rate and regular rhythm.   Pulmonary:      Effort: Pulmonary effort is normal. No respiratory distress.      Breath sounds: Decreased breath sounds (bibasilar) present.   Musculoskeletal:      Comments: R knee wrapped   Skin:     General: Skin is warm and dry.   Neurological:      General: No focal deficit present.      Mental Status: He is alert and oriented to person, place, and time. Mental status is at baseline.   Psychiatric:         Speech: Speech normal.         Behavior: Behavior normal.        Goals of Care Treatment Preferences:  Code Status: DNR       LaPOST: Yes  What is most important right now is to focus on spending time at home, avoiding the hospital, remaining as independent as possible, symptom/pain control.  Accordingly, we have decided that the best plan to meet the patient's goals includes continuing with treatment.      Consults:   Consults (From admission, onward)          Status Ordering Provider     Inpatient consult to Pulmonology  Once        Provider:  Eloy Foster MD    Completed DC DRAPER     Inpatient consult to Palliative Care  Once        Provider:  Raleigh Buitrago MD    Completed DC DRAPER     Inpatient consult to   Once        Provider:  (Not yet assigned)    Acknowledged MAKI WHYTE     Inpatient consult to   Once        Provider:  (Not yet assigned)    Completed EVITA CROOK     Inpatient consult to PICC team (NIAS)  Once        Provider:  (Not yet assigned)    Completed EVITA CROOK     Inpatient consult to Infectious Diseases  Once        Provider:  Maki Whyte MD    Completed ROBYN  ROBERT     Inpatient consult to Orthopedic Surgery  Once        Provider:  Harry Julien MD    Completed ROBERT CARLSON            No new Assessment & Plan notes have been filed under this hospital service since the last note was generated.  Service: Hospital Medicine    Final Active Diagnoses:    Diagnosis Date Noted POA    PRINCIPAL PROBLEM:  Septic arthritis of knee, right [M00.9] 11/21/2023 Yes    Goals of care, counseling/discussion [Z71.89] 11/29/2023 Not Applicable    Acute pain of right knee [M25.561] 11/29/2023 Yes    Postoperative anemia [D64.9] 11/27/2023 No    Acute on chronic respiratory failure with hypoxia and hypercapnia [J96.21, J96.22] 11/25/2023 No    Bacteremia [R78.81] 11/21/2023 Yes    PAF (paroxysmal atrial fibrillation) [I48.0] 03/26/2023 Yes    Chronic obstructive pulmonary disease with acute exacerbation [J44.1] 03/21/2023 Yes    Prostate cancer [C61] 05/27/2020 Yes      Problems Resolved During this Admission:    Diagnosis Date Noted Date Resolved POA    COPD exacerbation [J44.1] 11/27/2023 11/27/2023 Unknown    Hyperglycemia, drug-induced [R73.9, T50.905A] 11/26/2023 11/29/2023 No       Discharged Condition: fair    Disposition: Long Term Acute Care    Follow Up:   Follow-up Information       LTAC provider. Go today.                           Patient Instructions:      Ambulatory referral/consult to Smoking Cessation Program   Standing Status: Future   Referral Priority: Routine Referral Type: Consultation   Referral Reason: Specialty Services Required   Requested Specialty: CTTS   Number of Visits Requested: 1     Diet Adult Regular     Notify your health care provider if you experience any of the following:  temperature >100.4     Notify your health care provider if you experience any of the following:  persistent nausea and vomiting or diarrhea     Notify your health care provider if you experience any of the following:  severe uncontrolled pain     Notify your health care provider  if you experience any of the following:  redness, tenderness, or signs of infection (pain, swelling, redness, odor or green/yellow discharge around incision site)     Notify your health care provider if you experience any of the following:  difficulty breathing or increased cough     Notify your health care provider if you experience any of the following:  severe persistent headache     Notify your health care provider if you experience any of the following:  worsening rash     Notify your health care provider if you experience any of the following:  persistent dizziness, light-headedness, or visual disturbances     Notify your health care provider if you experience any of the following:  increased confusion or weakness     Activity as tolerated       Significant Diagnostic Studies:     Lab Results   Component Value Date    WBC 8.59 11/30/2023    HGB 8.0 (L) 11/30/2023    HCT 26.8 (L) 11/30/2023    MCV 92 11/30/2023     11/30/2023       BMP  Lab Results   Component Value Date     11/30/2023    K 4.0 11/30/2023    CL 95 11/30/2023    CO2 41 (HH) 11/30/2023    BUN 8 11/30/2023    CREATININE 0.5 11/30/2023    CALCIUM 9.2 11/30/2023    ANIONGAP 3 (L) 11/30/2023    EGFRNORACEVR >60.0 11/30/2023     Microbiology Results (last 7 days)       Procedure Component Value Units Date/Time    Fungus culture [[id number]] Collected: 11/22/23 0955    Order Status: Completed Specimen: Synovial Fluid from Knee, Right Updated: 11/30/23 0444     Fungus (Mycology) Culture No fungal growth to date    Narrative:      Right knee fluid    AFB Culture & Smear [[id number]] Collected: 11/22/23 0955    Order Status: Completed Specimen: Synovial Fluid from Knee, Right Updated: 11/27/23 1046     AFB CULTURE STAIN No acid fast bacilli seen.     AFB CULTURE STAIN Testing performed by:     AFB CULTURE STAIN Lab [facility] [place]     AFB CULTURE STAIN [id number] [facility]     [place] [address]     AFB CULTURE STAIN  Dr.Brian aRd MD    Narrative:      Right knee fluid    Culture, Anaerobe [[id number]] Collected: 11/22/23 0955    Order Status: Completed Specimen: Synovial Fluid from Knee, Right Updated: 11/25/23 0839     Anaerobic Culture No anaerobes isolated    Narrative:      Right knee fluid    Aerobic culture [[id number]]  (Abnormal)  (Susceptibility) Collected: 11/22/23 0955    Order Status: Completed Specimen: Synovial Fluid from Knee, Right Updated: 11/25/23 0640     Aerobic Bacterial Culture ENTEROCOCCUS FAECALIS  From broth only      Narrative:      Right knee fluid                Blood Culture,    Gram stain janine bottle: Gram positive cocci in chains resembling Strep  Blood Culture, Routine   Results called to and read back by: Emi Casiano RN  11/21/2023  Blood Culture, Routine   09:47  Blood Culture, Routine   Gram stain aer bottle: Gram positive cocci in chains resembling Strep  Blood Culture, Routine   11/21/2023  12:13  Blood Culture, Routine ENTEROCOCCUS FAECALIS Abnormal   Resulting Agency [facility]  Susceptibility  Enterococcus faecalis   CULTURE, BLOOD   Ampicillin <=2 mcg/mL Sensitive   Gentamicin Synergy Screen >500 mcg/mL Resistant   Vancomycin 2 mcg/mL Sensitive   Specimen Collected: 11/20/23 15:32 Last Resulted: 11/25/23 14:38        Pending Diagnostic Studies:       None           Medications:  Reconciled Home Medications:      Medication List        START taking these medications      AMPICILLIN 2 G/100 ML NS (READY TO MIX)  Inject 100 mLs (2 g total) into the vein every 4 (four) hours. for 20 days     cyanocobalamin (vitamin B-12) 2,000 mcg Tab  Take 2,000 mcg by mouth once daily.  Start taking on: December 1, 2023     folic acid 1 MG tablet  Commonly known as: FOLVITE  Take 1 tablet (1 mg total) by mouth once daily.  Start taking on: December 1, 2023     nicotine 21 mg/24 hr  Commonly known as: NICODERM CQ  Place 1 patch onto the skin once daily.            CHANGE how you take these medications       predniSONE 10 MG tablet  Commonly known as: DELTASONE  Take 2 tablets (20 mg total) by mouth once daily for 3 days, THEN 1 tablet (10 mg total) once daily for 3 days.  Start taking on: December 1, 2023  What changed:   medication strength  See the new instructions.            CONTINUE taking these medications      abiraterone 250 mg Tab  Commonly known as: ZYTIGA  Take 4 tablets (1,000 mg total) by mouth once daily.     albuterol 90 mcg/actuation inhaler  Commonly known as: PROVENTIL/VENTOLIN HFA  Rescue     albuterol-ipratropium 2.5 mg-0.5 mg/3 mL nebulizer solution  Commonly known as: DUO-NEB  PLACE ONE (1) VIAL IN NEBULIZER AND INHALE EVERY 6 HOURS AS DIRECTED AS NEEDED FOR WHEEZING     amiodarone 200 MG Tab  Commonly known as: PACERONE  TAKE ONE (1) TABLET ONCE DAILY FOR HEART RYHTHM     apixaban 5 mg Tab  Commonly known as: ELIQUIS  Take 1 tablet (5 mg total) by mouth 2 (two) times daily.     aspirin 81 MG EC tablet  Commonly known as: ECOTRIN  Take 1 tablet (81 mg total) by mouth once daily.     atorvastatin 10 MG tablet  Commonly known as: LIPITOR  TAKE 1 TABLET AT BEDTIME FOR CHOLESTEROL (TAKE WITH CO-ENZYME Q-10)     bicalutamide 50 MG Tab  Commonly known as: CASODEX  Take 1 tablet (50 mg total) by mouth once daily.     BREZTRI AEROSPHERE 160-9-4.8 mcg/actuation Hfaa  Generic drug: budesonide-glycopyr-formoterol  Inhale 2 puffs into the lungs 2 (two) times a day.     diltiaZEM 30 MG tablet  Commonly known as: CARDIZEM  Take 1 tablet (30 mg total) by mouth every 12 (twelve) hours.     famotidine 40 MG tablet  Commonly known as: PEPCID     ferrous sulfate 325 mg (65 mg iron) Tab tablet  Commonly known as: FEOSOL  Take 1 tablet (325 mg total) by mouth every Mon, Wed, Fri.     finasteride 5 mg tablet  Commonly known as: PROSCAR  TAKE ONE (1) TABLET EVERY MORNING FOR PROSTATE     OXYGEN-AIR DELIVERY SYSTEMS MISC  by Misc.(Non-Drug; Combo Route) route.     tamsulosin 0.4 mg Cap  Commonly known as:  FLOMAX  Take 1 capsule (0.4 mg total) by mouth once daily.            STOP taking these medications      amlodipine-benazepril 5-20 mg 5-20 mg per capsule  Commonly known as: LOTREL     ketorolac 10 mg tablet  Commonly known as: TORADOL     levoFLOXacin 750 MG tablet  Commonly known as: LEVAQUIN     losartan 50 MG tablet  Commonly known as: COZAAR     methocarbamoL 750 MG Tab  Commonly known as: ROBAXIN     pantoprazole 40 MG tablet  Commonly known as: PROTONIX     paroxetine 40 MG tablet  Commonly known as: PAXIL     traMADoL 50 mg tablet  Commonly known as: ULTRAM     traZODone 100 MG tablet  Commonly known as: DESYREL     triamcinolone acetonide 0.1% 0.1 % cream  Commonly known as: KENALOG              Indwelling Lines/Drains at time of discharge:   Lines/Drains/Airways       None                   Time spent on the discharge of patient: 39 minutes         Alex Britt MD  Department of Hospital Medicine  [facility]

## 2023-11-30 NOTE — ASSESSMENT & PLAN NOTE
May remove sutures in a week.  Continue weight-bearing as tolerated on the right lower extremity with crutches or a walker.  Placement.    Needs IV antibiotics per Infectious Disease.

## 2023-11-30 NOTE — CARE UPDATE
11/30/23 0732   Patient Assessment/Suction   Level of Consciousness (AVPU) alert   Respiratory Effort Normal;Unlabored   Expansion/Accessory Muscles/Retractions expansion symmetric;no retractions;no use of accessory muscles   All Lung Fields Breath Sounds coarse;diminished   PRE-TX-O2   Device (Oxygen Therapy) nasal cannula   $ Is the patient on Low Flow Oxygen? Yes   Flow (L/min) 3   SpO2 100 %   Pulse Oximetry Type Continuous   $ Pulse Oximetry - Multiple Charge Pulse Oximetry - Multiple   Pulse 73   Resp 18   Aerosol Therapy   $ Aerosol Therapy Charges Aerosol Treatment   Daily Review of Necessity (SVN) completed   Respiratory Treatment Status (SVN) given   Treatment Route (SVN) mask;oxygen   Patient Position (SVN) HOB elevated   Post Treatment Assessment (SVN) increased aeration   Signs of Intolerance (SVN) none   Education   $ Education Bronchodilator;15 min   Respiratory Evaluation   $ Care Plan Tech Time 15 min   $ Eval/Re-eval Charges Re-evaluation

## 2023-11-30 NOTE — PLAN OF CARE
11/29/23 2026   Patient Assessment/Suction   Level of Consciousness (AVPU) alert   Respiratory Effort Normal;Unlabored   CARMELO Breath Sounds coarse;rhonchi   Rhythm/Pattern, Respiratory unlabored   Cough Type congested;nonproductive   Skin Integrity   $ Wound Care Tech Time 15 min   Area Observed Bridge of nose   Skin Appearance without discoloration   PRE-TX-O2   Device (Oxygen Therapy) nasal cannula with humidification   $ Is the patient on Low Flow Oxygen? Yes   Flow (L/min) 5   SpO2 98 %   Pulse Oximetry Type Continuous   $ Pulse Oximetry - Multiple Charge Pulse Oximetry - Multiple   Pulse 82   Resp 18   Aerosol Therapy   $ Aerosol Therapy Charges Aerosol Treatment   Daily Review of Necessity (SVN) completed   Respiratory Treatment Status (SVN) given   Treatment Route (SVN) mask;oxygen   Patient Position (SVN) Chang's;HOB elevated   Post Treatment Assessment (SVN) breath sounds improved   Signs of Intolerance (SVN) none   Preset CPAP/BiPAP Settings   Mode Of Delivery Standby   Equipment Type V60   Ipap 10   EPAP (cm H2O) 5   Pressure Support (cm H2O) 5   Set Rate (Breaths/Min) 12   ITime (sec) 1   Rise Time (sec) 3   Education   $ Education Bronchodilator;BiPAP;15 min   Respiratory Evaluation   $ Care Plan Tech Time 15 min   Home Oxygen   Has Home Oxygen? Yes   Liter Flow 3   Route nasal cannula

## 2023-11-30 NOTE — PT/OT/SLP PROGRESS
Physical Therapy Treatment    Patient Name:  Jama James   MRN:  [id number]    Recommendations:     Discharge Recommendations: Moderate Intensity Therapy  Discharge Equipment Recommendations: to be determined by next level of care  Barriers to discharge:  increased assist with mobility, orthopedic precautions, decreased activity tolerance, pain    Assessment:     Jama James is a 68 y.o. male admitted with a medical diagnosis of Septic arthritis of knee, right.  He presents with the following impairments/functional limitations: weakness, impaired endurance, impaired self care skills, impaired functional mobility, gait instability, impaired balance, decreased lower extremity function, decreased safety awareness.    Pt agreeable to visit. Pt 94% on 4 L O2 at rest with HOB elevated. Pt required min assist for bed mobility with most assist for RLE secondary to pain. Pt required extra time sitting EOB to recover SPO2 due to pt desatting to the 80s with mobility. Pt required mod to max assist with RW for sit to stand transfer with bed elevated due to pt's height. Pt instructed to put most of his weight through LLE and BUE when standing due to pain in RLE. Pt tolerated standing for 1-2 minutes with contact guard assist and RW, however pt was unable to tolerate putting weight through RLE. Pt again required extra time sitting EOB with pursed lip breathing to recover SPO2 back into 90s after desatting to the 80s. Pt left with HOB elevated and RN present changing IV bandage as therapist noted blood around bandage.    Rehab Prognosis: Fair; patient would benefit from acute skilled PT services to address these deficits and reach maximum level of function.    Recent Surgery: Procedure(s) (LRB):  ARTHROSCOPY, KNEE (Right) 8 Days Post-Op    Plan:     During this hospitalization, patient to be seen 6 x/week to address the identified rehab impairments via gait training, therapeutic activities, therapeutic exercises, neuromuscular  re-education and progress toward the following goals:    Plan of Care Expires:  23    Subjective     Chief Complaint: R knee pain  Patient/Family Comments/goals: to get better  Pain/Comfort:  Pain Rating 1: 710  Location - Side 1: Right  Location - Orientation 1: generalized  Location 1: knee  Pain Addressed 1: Reposition, Distraction, Cessation of Activity, Nurse notified  Pain Rating Post-Intervention 1: 9/10      Objective:     Communicated with RN prior to session.  Patient found HOB elevated with telemetry, peripheral IV, PureWick, pulse ox (continuous), oxygen, blood pressure cuff upon PT entry to room.     General Precautions: Standard, fall  Orthopedic Precautions: RLE weight bearing as tolerated  Braces: N/A  Respiratory Status: High flow, flow 4 L/min     Functional Mobility:  Bed Mobility:     Scooting: minimum assistance  Supine to Sit: minimum assistance  Sit to Supine: minimum assistance  Transfers:     Sit to Stand:  moderate assistance, maximal assistance, and bed elevated with rolling walker  Balance: standing at RW with CGA x 1-2 minutes, unable to WB through RLE due to knee pain      AM-PAC 6 CLICK MOBILITY          Treatment & Education:  Pt educated on importance of time OOB, importance of intermittent mobility, safe techniques for transfers/ambulation, discharge recommendations/options, and use of call light for assistance and fall prevention.      Patient left HOB elevated with all lines intact, call button in reach, and RN present..    GOALS:   Multidisciplinary Problems       Physical Therapy Goals          Problem: Physical Therapy    Goal Priority Disciplines Outcome Goal Variances Interventions   Physical Therapy Goal     PT, PT/OT Ongoing, Progressing     Description: Goals to be met by: 23     Patient will increase functional independence with mobility by performin. Supine to sit with Supervision  2. Sit to stand transfer with Supervision  3. Bed to chair transfer  with Supervision using Rolling Walker  4. Gait  x 150 feet with Supervision using Rolling Walker.                              Time Tracking:     PT Received On: 11/30/23  PT Start Time: 0958     PT Stop Time: 1025  PT Total Time (min): 27 min     Billable Minutes: Therapeutic Activity 27    Treatment Type: Treatment  PT/PTA: PTA     Number of PTA visits since last PT visit: 2     11/30/2023

## 2023-11-30 NOTE — SUBJECTIVE & OBJECTIVE
"Principal Problem:Septic arthritis of knee, right    Interval History:  Waiting on placement.  Appeal to LTAC pending    Review of patient's allergies indicates:  No Known Allergies    Current Facility-Administered Medications   Medication    acetaminophen tablet 1,000 mg    albuterol-ipratropium 2.5 mg-0.5 mg/3 mL nebulizer solution 3 mL    amiodarone tablet 200 mg    ampicillin 2 g in sodium chloride 0.9 % 100 mL IVPB (ready to mix)    arformoteroL nebulizer solution 15 mcg    budesonide nebulizer solution 0.5 mg    chlorhexidine 0.12 % solution 15 mL    cyanocobalamin tablet 2,000 mcg    diltiaZEM tablet 30 mg    enoxaparin injection 90 mg    famotidine tablet 20 mg    ferric gluconate (FERRLECIT) 125 mg in sodium chloride 0.9% 100 mL IVPB    folic acid tablet 1 mg    magnesium oxide tablet 800 mg    magnesium oxide tablet 800 mg    mupirocin 2 % ointment    nicotine 21 mg/24 hr 1 patch    ondansetron injection 4 mg    oxyCODONE immediate release tablet 10 mg    oxyCODONE immediate release tablet 15 mg    oxyCODONE immediate release tablet 5 mg    potassium bicarbonate disintegrating tablet 35 mEq    potassium bicarbonate disintegrating tablet 50 mEq    potassium bicarbonate disintegrating tablet 60 mEq    potassium, sodium phosphates 280-160-250 mg packet 2 packet    potassium, sodium phosphates 280-160-250 mg packet 2 packet    potassium, sodium phosphates 280-160-250 mg packet 2 packet    [START ON 12/1/2023] predniSONE tablet 20 mg    tamsulosin 24 hr capsule 0.4 mg     Objective:     Vital Signs (Most Recent):  Temp: 97.9 °F (36.6 °C) (11/30/23 1034)  Pulse: 74 (11/30/23 1100)  Resp: 20 (11/30/23 1100)  BP: (!) 148/68 (11/30/23 1100)  SpO2: 100 % (11/30/23 1100) Vital Signs (24h Range):  Temp:  [97.5 °F (36.4 °C)-98.6 °F (37 °C)] 97.9 °F (36.6 °C)  Pulse:  [70-89] 74  Resp:  [14-32] 20  SpO2:  [90 %-100 %] 100 %  BP: (142-175)/(63-77) 148/68     Weight: 89.4 kg (197 lb 1.5 oz)  Height: 6' 5" (195.6 cm)  Body " "mass index is 23.37 kg/m².      Intake/Output Summary (Last 24 hours) at 11/30/2023 1225  Last data filed at 11/30/2023 0500  Gross per 24 hour   Intake 1000 ml   Output 2000 ml   Net -1000 ml        General    Nursing note and vitals reviewed.  Constitutional: He is oriented to person, place, and time. He appears well-developed and well-nourished. No distress.   HENT:   Head: Normocephalic and atraumatic.   Eyes: EOM are normal.   Cardiovascular:  Normal rate.            Pulmonary/Chest: Effort normal.   Abdominal: Soft.   Neurological: He is alert and oriented to person, place, and time.   Psychiatric: His behavior is normal.           Right Knee Exam     Inspection   Erythema: absent  Swelling: present  Effusion: present    Range of Motion   Extension:  20   Flexion:  100     Other   Sensation: normal    Comments:  Incisions c/d/I.  Less irritable joint.  Bends and straightens need to a moderate degree.  Most of his pain is when weight-bearing with therapy.    Left Knee Exam   Left knee exam is normal.    Muscle Strength   Right Lower Extremity   Quadriceps:  4/5     Vascular Exam     Right Pulses  Dorsalis Pedis:      2+             Significant Labs: Blood Culture: No results for input(s): "LABBLOO" in the last 48 hours.  BMP:   Recent Labs   Lab 11/30/23  0336         K 4.0   CL 95   CO2 41*   BUN 8   CREATININE 0.5   CALCIUM 9.2     CBC:   Recent Labs   Lab 11/29/23  0405 11/30/23  0336   WBC 8.33 8.59   HGB 7.5* 8.0*   HCT 25.7* 26.8*    390     CMP:   Recent Labs   Lab 11/29/23  0405 11/30/23  0336    139   K 3.9 4.0   CL 98 95   CO2 43* 41*    110   BUN 13 8   CREATININE 0.5 0.5   CALCIUM 9.0 9.2   PROT 5.4* 5.7*   ALBUMIN 2.7* 2.8*   BILITOT 0.2 0.2   ALKPHOS 81 81   AST 9* 8*   ALT 9* 9*   ANIONGAP 3* 3*     Coagulation: No results for input(s): "LABPROT", "INR", "APTT" in the last 48 hours.  CRP: No results for input(s): "CRP" in the last 48 hours.  Lactic Acid: No " "results for input(s): "LACTATE" in the last 48 hours.  Wound Culture: No results for input(s): "LABAERO" in the last 48 hours.  All pertinent labs within the past 24 hours have been reviewed.    Significant Imaging: None  "

## 2023-11-30 NOTE — PROGRESS NOTES
Pulmonary/Critical Care Progress Note      PATIENT NAME: Jama James  MRN: [id number]  TODAY'S DATE: 2023  12:46 PM  ADMIT DATE: 2023  AGE: 68 y.o. : 1955    CONSULT REQUESTED BY: Alex Britt MD    REASON FOR CONSULT:   COPD resp distress     HPI:  Mr James is a 68 year old man who presented with R septic knee arthritis due to  enterococcus facealic complciated by bacteremia.  Pulmonary consulted due to underlying COPD, chronic respiratory failure and hypercapnia.    Patient reports to me that he is had frequent exacerbations as an outpatient.  He has had frequent pneumonias.  No pneumonias this past year.  He is still smoking.  He is on 4 L of oxygen at home     the patient states he gets very short of breath when he gets out of bed.  He tells me he has not been smoking for several weeks now.  He states he is using his Breztri twice a day.     the patient is about the same.  His dyspnea is not better nor worse.     the patient desaturated when he sat up in bed today.  Questioned the patient again about how long he had been off cigarettes and this time he told me he was smoking till the day he came in.  He is still expectorating mucus.     the patient states he is feeling about the same.  He did ambulate in the room with PT. He still has a very productive sounding cough.      Review of Systems   Constitutional:  Positive for fatigue. Negative for appetite change, chills, fever and unexpected weight change.   HENT:  Negative for nosebleeds, postnasal drip, trouble swallowing and voice change.    Eyes:  Negative for visual disturbance.   Respiratory:  Positive for cough, shortness of breath and wheezing.    Cardiovascular:  Negative for chest pain and leg swelling.   Gastrointestinal:  Negative for diarrhea, nausea and vomiting.   Endocrine: Negative for cold intolerance and heat intolerance.   Genitourinary:  Negative for dysuria, flank pain and frequency.   Musculoskeletal:   Negative for neck pain and neck stiffness.   Allergic/Immunologic: Negative for environmental allergies and immunocompromised state.   Neurological:  Negative for syncope, speech difficulty and weakness.   Hematological:  Negative for adenopathy.   Psychiatric/Behavioral:  Negative for confusion.            No change in the patient's Past Medical History, Past Surgical History, Social History or Family History since admission.      VITAL SIGNS (MOST RECENT)  Temp: 97.9 °F (36.6 °C) (11/30/23 1034)  Pulse: 82 (11/30/23 1337)  Resp: 19 (11/30/23 1337)  BP: (!) 166/73 (11/30/23 1300)  SpO2: 97 % (11/30/23 1337)    INTAKE AND OUTPUT (LAST 24 HOURS):  Intake/Output Summary (Last 24 hours) at 11/30/2023 1631  Last data filed at 11/30/2023 1557  Gross per 24 hour   Intake 740 ml   Output 2000 ml   Net -1260 ml         WEIGHT  Wt Readings from Last 1 Encounters:   11/21/23 89.4 kg (197 lb 1.5 oz)     PHYSICAL EXAM  GENERAL: Older patient in no distress.  HEENT: Pupils equal and reactive. Extraocular movements intact. Nose intact.      Pharynx moist.  NECK: Supple.   HEART: Regular rate and rhythm. No murmur or gallop auscultated.  LUNGS:  There are crackles in both bases.  Lung excursion symmetrical. No change in fremitus.  ABDOMEN: Bowel sounds present. Non-tender, no masses palpated.  : Normal anatomy.  EXTREMITIES:  The patient's right knee is dressed with a Ace wrap.  Normal muscle tone and joint movement, no cyanosis or clubbing.   LYMPHATICS: No adenopathy palpated, tr edema.  SKIN: Dry, intact, no lesions.   NEURO: Cranial nerves II-XII intact. Motor strength 5/5 bilaterally, upper and lower extremities.  PSYCH: Appropriate affect.          CBC LAST (LAST 24 HOURS)  Recent Labs   Lab 11/30/23  0336   WBC 8.59   RBC 2.92*   HGB 8.0*   HCT 26.8*   MCV 92   MCH 27.4   MCHC 29.9*   RDW 14.6*      MPV 9.6   GRAN 82.2*  7.1   LYMPH 10.1*  0.9*   MONO 6.4  0.6   BASO 0.01   NRBC 0         CHEMISTRY LAST (LAST 24  HOURS)  Recent Labs   Lab 11/30/23  0336      K 4.0   CL 95   CO2 41*   ANIONGAP 3*   BUN 8   CREATININE 0.5      CALCIUM 9.2   ALBUMIN 2.8*   PROT 5.7*   ALKPHOS 81   ALT 9*   AST 8*   BILITOT 0.2       LAST 7 DAYS MICROBIOLOGY   Microbiology Results (last 7 days)       Procedure Component Value Units Date/Time    Fungus culture [[id number]] Collected: 11/22/23 0955    Order Status: Completed Specimen: Synovial Fluid from Knee, Right Updated: 11/30/23 0444     Fungus (Mycology) Culture No fungal growth to date    Narrative:      Right knee fluid    AFB Culture & Smear [[id number]] Collected: 11/22/23 0955    Order Status: Completed Specimen: Synovial Fluid from Knee, Right Updated: 11/27/23 1046     AFB CULTURE STAIN No acid fast bacilli seen.     AFB CULTURE STAIN Testing performed by:     AFB CULTURE STAIN [facility]     [facility] [address] [facility]     [place] [address]     AFB CULTURE STAIN Dr.Brian Rad MD    Narrative:      Right knee fluid    Culture, Anaerobe [[id number]] Collected: 11/22/23 0955    Order Status: Completed Specimen: Synovial Fluid from Knee, Right Updated: 11/25/23 0839     Anaerobic Culture No anaerobes isolated    Narrative:      Right knee fluid    Aerobic culture [[id number]]  (Abnormal)  (Susceptibility) Collected: 11/22/23 0955    Order Status: Completed Specimen: Synovial Fluid from Knee, Right Updated: 11/25/23 0640     Aerobic Bacterial Culture ENTEROCOCCUS FAECALIS  From broth only      Narrative:      Right knee fluid            MOST RECENT IMAGING  X-Ray Chest AP Portable  History: Worsening oxygenation.    FINDINGS: Single AP view of the chest is compared to previous study 1 hour ago. Left upper extremity PICC remains in place with the tip projecting in the expected location of the left brachiocephalic vein unchanged. Bilateral diffuse interstitial infiltrates are present without significant interval change. Cardiac  silhouette is stable in size. Atherosclerotic changes of the thoracic aorta are present. Postop changes of the cervical spine are present.    IMPRESSION:  1. Persistent bilateral interstitial infiltrates without significant interval change.    Electronically signed by:  Branden Vu MD  11/24/2023 05:04 PM CST Workstation: [id number]  X-Ray Chest 1 View  History: PICC insertion.    FINDINGS: Single AP view of the chest is compared to previous study dated November 22, 2023. Interval placement of left upper extremity PICC is noted with tip projecting in the expected location of the left brachiocephalic vein. Bilateral diffuse interstitial infiltrates persist without significant interval change. Atherosclerotic changes of the thoracic aorta are present. Cardiac silhouette is stable in size.    IMPRESSION:  1. Interval placement of left upper extremity PICC position as described above. Otherwise, stable interval appearance.    Electronically signed by:  Brandne Vu MD  11/24/2023 04:03 PM [facility] Workstation: [id number]      CURRENT VISIT EKG  No results found for this visit on 11/21/23.    ECHOCARDIOGRAM RESULTS  Results for orders placed during the hospital encounter of 11/20/23    Echo    Interpretation Summary    Left Ventricle: The left ventricle is normal in size. Mildly increased wall thickness. Normal wall motion. There is normal systolic function with a visually estimated ejection fraction of 55 - 70%. Ejection fraction by visual approximation is 70%. Global longitudinal strain is -16%. Reduced. There is normal diastolic function.    Right Ventricle: Normal right ventricular cavity size. Systolic function is normal. TAPSE is 2.50 cm.    Left Atrium: Left atrium is moderately dilated.    Aortic Valve: The aortic valve is a trileaflet valve.    IVC/SVC: Normal venous pressure at 3 mmHg.    Pericardium: There is a trivial effusion. No indication of cardiac tamponade.    Some suggestion for LA enlargement from  Echo in 3/2023.        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  Productive cough  Ulcerative colitis        PLAN:   - continue antibiotics for enterococcal bacteremia, I think that these antibiotics are likely to cover the possible pneumonia  - continue LABA, LAMA, and inhaled steroids  - no wheezing, will aggressively wean prednisone  - LTAC when accepted    Saw Dr. Villafuerte note about his hopes to go home and care for his ailing wife.  The patient's lung function is so severely diminished that I do not think that this is going to be a reasonable plan of action.  I did mention this to the patient.  I have tried again to impress upon him the importance of never smoking again with his preterminal COPD.     Jacinta Kay MD  Date of Service: 11/30/2023  12:46 PM

## 2023-11-30 NOTE — PROGRESS NOTES
[facility]  Orthopedics  Progress Note    Patient Name: Jama James  MRN: [id number]  Admission Date: 11/21/2023  Hospital Length of Stay: 8 days  Attending Provider: Alex Britt MD  Primary Care Provider: Marisel Farrell III, MD  Follow-up For: Procedure(s) (LRB):  ARTHROSCOPY, KNEE (Right)    Post-Operative Day: 8 Days Post-Op  Subjective:     Principal Problem:Septic arthritis of knee, right    Interval History:  Waiting on placement.  Appeal to LTAC pending    Review of patient's allergies indicates:  No Known Allergies    Current Facility-Administered Medications   Medication    acetaminophen tablet 1,000 mg    albuterol-ipratropium 2.5 mg-0.5 mg/3 mL nebulizer solution 3 mL    amiodarone tablet 200 mg    ampicillin 2 g in sodium chloride 0.9 % 100 mL IVPB (ready to mix)    arformoteroL nebulizer solution 15 mcg    budesonide nebulizer solution 0.5 mg    chlorhexidine 0.12 % solution 15 mL    cyanocobalamin tablet 2,000 mcg    diltiaZEM tablet 30 mg    enoxaparin injection 90 mg    famotidine tablet 20 mg    ferric gluconate (FERRLECIT) 125 mg in sodium chloride 0.9% 100 mL IVPB    folic acid tablet 1 mg    magnesium oxide tablet 800 mg    magnesium oxide tablet 800 mg    mupirocin 2 % ointment    nicotine 21 mg/24 hr 1 patch    ondansetron injection 4 mg    oxyCODONE immediate release tablet 10 mg    oxyCODONE immediate release tablet 15 mg    oxyCODONE immediate release tablet 5 mg    potassium bicarbonate disintegrating tablet 35 mEq    potassium bicarbonate disintegrating tablet 50 mEq    potassium bicarbonate disintegrating tablet 60 mEq    potassium, sodium phosphates 280-160-250 mg packet 2 packet    potassium, sodium phosphates 280-160-250 mg packet 2 packet    potassium, sodium phosphates 280-160-250 mg packet 2 packet    [START ON 12/1/2023] predniSONE tablet 20 mg    tamsulosin 24 hr capsule 0.4 mg     Objective:     Vital Signs (Most Recent):  Temp: 97.9 °F (36.6 °C) (11/30/23  "1034)  Pulse: 74 (11/30/23 1100)  Resp: 20 (11/30/23 1100)  BP: (!) 148/68 (11/30/23 1100)  SpO2: 100 % (11/30/23 1100) Vital Signs (24h Range):  Temp:  [97.5 °F (36.4 °C)-98.6 °F (37 °C)] 97.9 °F (36.6 °C)  Pulse:  [70-89] 74  Resp:  [14-32] 20  SpO2:  [90 %-100 %] 100 %  BP: (142-175)/(63-77) 148/68     Weight: 89.4 kg (197 lb 1.5 oz)  Height: 6' 5" (195.6 cm)  Body mass index is 23.37 kg/m².      Intake/Output Summary (Last 24 hours) at 11/30/2023 1225  Last data filed at 11/30/2023 0500  Gross per 24 hour   Intake 1000 ml   Output 2000 ml   Net -1000 ml        General    Nursing note and vitals reviewed.  Constitutional: He is oriented to person, place, and time. He appears well-developed and well-nourished. No distress.   HENT:   Head: Normocephalic and atraumatic.   Eyes: EOM are normal.   Cardiovascular:  Normal rate.            Pulmonary/Chest: Effort normal.   Abdominal: Soft.   Neurological: He is alert and oriented to person, place, and time.   Psychiatric: His behavior is normal.           Right Knee Exam     Inspection   Erythema: absent  Swelling: present  Effusion: present    Range of Motion   Extension:  20   Flexion:  100     Other   Sensation: normal    Comments:  Incisions c/d/I.  Less irritable joint.  Bends and straightens need to a moderate degree.  Most of his pain is when weight-bearing with therapy.    Left Knee Exam   Left knee exam is normal.    Muscle Strength   Right Lower Extremity   Quadriceps:  4/5     Vascular Exam     Right Pulses  Dorsalis Pedis:      2+             Significant Labs: Blood Culture: No results for input(s): "LABBLOO" in the last 48 hours.  BMP:   Recent Labs   Lab 11/30/23  0336         K 4.0   CL 95   CO2 41*   BUN 8   CREATININE 0.5   CALCIUM 9.2     CBC:   Recent Labs   Lab 11/29/23  0405 11/30/23  0336   WBC 8.33 8.59   HGB 7.5* 8.0*   HCT 25.7* 26.8*    390     CMP:   Recent Labs   Lab 11/29/23  0405 11/30/23  0336    139   K 3.9 4.0 " "  CL 98 95   CO2 43* 41*    110   BUN 13 8   CREATININE 0.5 0.5   CALCIUM 9.0 9.2   PROT 5.4* 5.7*   ALBUMIN 2.7* 2.8*   BILITOT 0.2 0.2   ALKPHOS 81 81   AST 9* 8*   ALT 9* 9*   ANIONGAP 3* 3*     Coagulation: No results for input(s): "LABPROT", "INR", "APTT" in the last 48 hours.  CRP: No results for input(s): "CRP" in the last 48 hours.  Lactic Acid: No results for input(s): "LACTATE" in the last 48 hours.  Wound Culture: No results for input(s): "LABAERO" in the last 48 hours.  All pertinent labs within the past 24 hours have been reviewed.    Significant Imaging: None  Assessment/Plan:     * Septic arthritis of knee, right  May remove sutures in a week.  Continue weight-bearing as tolerated on the right lower extremity with crutches or a walker.  Placement.    Needs IV antibiotics per Infectious Disease.          Harry Julien MD  Orthopedics  [facility]    "

## 2023-12-01 PROBLEM — M00.261 STREPTOCOCCAL ARTHRITIS OF RIGHT KNEE: Status: ACTIVE | Noted: 2023-11-20

## 2023-12-08 ENCOUNTER — TELEPHONE (OUTPATIENT)
Dept: HEMATOLOGY/ONCOLOGY | Facility: CLINIC | Age: 68
End: 2023-12-08

## 2023-12-08 NOTE — TELEPHONE ENCOUNTER
----- Message from DIMPLE Lyon sent at 12/7/2023  1:57 PM CST -----  We are not changing doses.    Marlen  ----- Message -----  From: Taylor Broussard, RN  Sent: 12/7/2023  12:53 PM CST  To: DIMPLE Lyon    Can you review and let me know if I need to change orders to anything different?   FYI patient cancelled last appt on 12/5 as he is in LTAC and does not have a f/u scheduled so not sure if can make any med changes with out seeing him first?   ----- Message -----  From: Shantal Tian  Sent: 12/7/2023  11:37 AM CST  To: Renny Thorpe Staff    Jessica Reyes is calling to let us know that the pharm is suggesting a different dosage than Zytiga 1000 mg 1 tab , suggestion is 250 mg  (1) with a low fat breakfast     [phone]    Ref# [id number]

## 2023-12-08 NOTE — TELEPHONE ENCOUNTER
I spoke to Jerry with [facility], made aware that per Marlen, we will not be making any changes to the patient medication at this time. Verbalized understanding and states she will note this in his chart. Cari MONTILLA also made aware that patient does not have f/u scheduled.

## 2023-12-21 ENCOUNTER — TELEPHONE (OUTPATIENT)
Dept: HEMATOLOGY/ONCOLOGY | Facility: CLINIC | Age: 68
End: 2023-12-21

## 2023-12-21 NOTE — TELEPHONE ENCOUNTER
Message reviewed with Dr. Lawson and per his verbal orders I spoke to Anusha, made aware that per Dr. Lawson it is OK for patient to hold Luperon while in SNF. Verbalized understanding and states she will let  know.

## 2023-12-21 NOTE — TELEPHONE ENCOUNTER
----- Message from Shantal Tian sent at 12/20/2023  3:47 PM CST -----  Anusha with [facility] is calling to let us know that pt will be needing to be transferred to a SNF unit due to needing 2 more wks of antibiotics. She is needing confirmation that Dr. JEFFREY said that Lupjake could be on hold while at SNF (not yet transferred waiting for conf)     [phone]

## 2023-12-24 ENCOUNTER — HOSPITAL ENCOUNTER (EMERGENCY)
Facility: HOSPITAL | Age: 68
Discharge: NURSING, REHAB OR LONG-TERM CARE | End: 2023-12-25
Attending: EMERGENCY MEDICINE
Payer: MEDICARE

## 2023-12-24 DIAGNOSIS — S09.90XA INJURY OF HEAD, INITIAL ENCOUNTER: Primary | ICD-10-CM

## 2023-12-24 DIAGNOSIS — R41.82 AMS (ALTERED MENTAL STATUS): ICD-10-CM

## 2023-12-24 DIAGNOSIS — S00.03XA HEMATOMA OF SCALP, INITIAL ENCOUNTER: ICD-10-CM

## 2023-12-24 LAB
ALBUMIN SERPL BCP-MCNC: 2.7 G/DL (ref 3.5–5.2)
ALP SERPL-CCNC: 123 U/L (ref 55–135)
ALT SERPL W/O P-5'-P-CCNC: 9 U/L (ref 10–44)
AMPHET+METHAMPHET UR QL: NEGATIVE
ANION GAP SERPL CALC-SCNC: 12 MMOL/L (ref 8–16)
AST SERPL-CCNC: 13 U/L (ref 10–40)
BACTERIA #/AREA URNS HPF: ABNORMAL /HPF
BARBITURATES UR QL SCN>200 NG/ML: NEGATIVE
BASOPHILS # BLD AUTO: 0.04 K/UL (ref 0–0.2)
BASOPHILS NFR BLD: 0.7 % (ref 0–1.9)
BENZODIAZ UR QL SCN>200 NG/ML: ABNORMAL
BILIRUB SERPL-MCNC: 0.4 MG/DL (ref 0.1–1)
BILIRUB UR QL STRIP: NEGATIVE
BUN SERPL-MCNC: 12 MG/DL (ref 8–23)
BZE UR QL SCN: NEGATIVE
CALCIUM SERPL-MCNC: 9 MG/DL (ref 8.7–10.5)
CANNABINOIDS UR QL SCN: NEGATIVE
CHLORIDE SERPL-SCNC: 101 MMOL/L (ref 95–110)
CLARITY UR: CLEAR
CO2 SERPL-SCNC: 34 MMOL/L (ref 23–29)
COLOR UR: YELLOW
CREAT SERPL-MCNC: 0.9 MG/DL (ref 0.5–1.4)
CREAT UR-MCNC: 86.2 MG/DL (ref 23–375)
DIFFERENTIAL METHOD BLD: ABNORMAL
EOSINOPHIL # BLD AUTO: 0.2 K/UL (ref 0–0.5)
EOSINOPHIL NFR BLD: 3.5 % (ref 0–8)
ERYTHROCYTE [DISTWIDTH] IN BLOOD BY AUTOMATED COUNT: 15.1 % (ref 11.5–14.5)
EST. GFR  (NO RACE VARIABLE): >60 ML/MIN/1.73 M^2
ETHANOL SERPL-MCNC: <10 MG/DL (ref 0–10)
FUNGUS SPEC CULT: NORMAL
GLUCOSE SERPL-MCNC: 117 MG/DL (ref 70–110)
GLUCOSE UR QL STRIP: NEGATIVE
HCT VFR BLD AUTO: 29.7 % (ref 40–54)
HGB BLD-MCNC: 9 G/DL (ref 14–18)
HGB UR QL STRIP: ABNORMAL
HYALINE CASTS #/AREA URNS LPF: 1 /LPF
IMM GRANULOCYTES # BLD AUTO: 0.02 K/UL (ref 0–0.04)
IMM GRANULOCYTES NFR BLD AUTO: 0.4 % (ref 0–0.5)
KETONES UR QL STRIP: NEGATIVE
LEUKOCYTE ESTERASE UR QL STRIP: NEGATIVE
LYMPHOCYTES # BLD AUTO: 0.8 K/UL (ref 1–4.8)
LYMPHOCYTES NFR BLD: 14.9 % (ref 18–48)
MAGNESIUM SERPL-MCNC: 2.1 MG/DL (ref 1.6–2.6)
MCH RBC QN AUTO: 25.6 PG (ref 27–31)
MCHC RBC AUTO-ENTMCNC: 30.3 G/DL (ref 32–36)
MCV RBC AUTO: 85 FL (ref 82–98)
METHADONE UR QL SCN>300 NG/ML: NEGATIVE
MICROSCOPIC COMMENT: ABNORMAL
MONOCYTES # BLD AUTO: 0.5 K/UL (ref 0.3–1)
MONOCYTES NFR BLD: 9.7 % (ref 4–15)
NEUTROPHILS # BLD AUTO: 3.9 K/UL (ref 1.8–7.7)
NEUTROPHILS NFR BLD: 70.8 % (ref 38–73)
NITRITE UR QL STRIP: NEGATIVE
NRBC BLD-RTO: 0 /100 WBC
OPIATES UR QL SCN: NEGATIVE
PCP UR QL SCN>25 NG/ML: NEGATIVE
PH UR STRIP: 6 [PH] (ref 5–8)
PLATELET # BLD AUTO: 274 K/UL (ref 150–450)
PMV BLD AUTO: 8.8 FL (ref 9.2–12.9)
POTASSIUM SERPL-SCNC: 3 MMOL/L (ref 3.5–5.1)
PROT SERPL-MCNC: 6.2 G/DL (ref 6–8.4)
PROT UR QL STRIP: NEGATIVE
RBC # BLD AUTO: 3.51 M/UL (ref 4.6–6.2)
RBC #/AREA URNS HPF: 25 /HPF (ref 0–4)
SODIUM SERPL-SCNC: 147 MMOL/L (ref 136–145)
SP GR UR STRIP: 1.02 (ref 1–1.03)
SQUAMOUS #/AREA URNS HPF: 2 /HPF
TOXICOLOGY INFORMATION: ABNORMAL
TROPONIN I SERPL DL<=0.01 NG/ML-MCNC: 0.02 NG/ML (ref 0–0.03)
URN SPEC COLLECT METH UR: ABNORMAL
UROBILINOGEN UR STRIP-ACNC: 1 EU/DL
WBC # BLD AUTO: 5.49 K/UL (ref 3.9–12.7)
WBC #/AREA URNS HPF: 2 /HPF (ref 0–5)

## 2023-12-24 PROCEDURE — 99285 EMERGENCY DEPT VISIT HI MDM: CPT | Mod: 25

## 2023-12-24 PROCEDURE — 96372 THER/PROPH/DIAG INJ SC/IM: CPT | Mod: 59 | Performed by: EMERGENCY MEDICINE

## 2023-12-24 PROCEDURE — 96365 THER/PROPH/DIAG IV INF INIT: CPT

## 2023-12-24 PROCEDURE — 96361 HYDRATE IV INFUSION ADD-ON: CPT

## 2023-12-24 PROCEDURE — 93005 ELECTROCARDIOGRAM TRACING: CPT

## 2023-12-24 PROCEDURE — 96375 TX/PRO/DX INJ NEW DRUG ADDON: CPT

## 2023-12-24 PROCEDURE — 25000003 PHARM REV CODE 250: Performed by: EMERGENCY MEDICINE

## 2023-12-24 PROCEDURE — 80053 COMPREHEN METABOLIC PANEL: CPT | Performed by: EMERGENCY MEDICINE

## 2023-12-24 PROCEDURE — 83735 ASSAY OF MAGNESIUM: CPT | Performed by: EMERGENCY MEDICINE

## 2023-12-24 PROCEDURE — 81000 URINALYSIS NONAUTO W/SCOPE: CPT | Mod: 59 | Performed by: EMERGENCY MEDICINE

## 2023-12-24 PROCEDURE — 80307 DRUG TEST PRSMV CHEM ANLYZR: CPT | Performed by: EMERGENCY MEDICINE

## 2023-12-24 PROCEDURE — 71045 X-RAY EXAM CHEST 1 VIEW: CPT | Mod: TC

## 2023-12-24 PROCEDURE — 71045 X-RAY EXAM CHEST 1 VIEW: CPT | Mod: 26,,, | Performed by: RADIOLOGY

## 2023-12-24 PROCEDURE — 70450 CT HEAD/BRAIN W/O DYE: CPT | Mod: TC

## 2023-12-24 PROCEDURE — 85025 COMPLETE CBC W/AUTO DIFF WBC: CPT | Performed by: EMERGENCY MEDICINE

## 2023-12-24 PROCEDURE — 84484 ASSAY OF TROPONIN QUANT: CPT | Performed by: EMERGENCY MEDICINE

## 2023-12-24 PROCEDURE — 70450 CT HEAD/BRAIN W/O DYE: CPT | Mod: 26,,, | Performed by: RADIOLOGY

## 2023-12-24 PROCEDURE — 82077 ASSAY SPEC XCP UR&BREATH IA: CPT | Performed by: EMERGENCY MEDICINE

## 2023-12-24 PROCEDURE — 93010 ELECTROCARDIOGRAM REPORT: CPT | Mod: ,,, | Performed by: INTERNAL MEDICINE

## 2023-12-24 PROCEDURE — 63600175 PHARM REV CODE 636 W HCPCS: Mod: UD | Performed by: EMERGENCY MEDICINE

## 2023-12-24 RX ORDER — THIAMINE HYDROCHLORIDE 100 MG/ML
100 INJECTION, SOLUTION INTRAMUSCULAR; INTRAVENOUS
Status: COMPLETED | OUTPATIENT
Start: 2023-12-24 | End: 2023-12-24

## 2023-12-24 RX ORDER — ACETAMINOPHEN 500 MG
1000 TABLET ORAL
Status: COMPLETED | OUTPATIENT
Start: 2023-12-24 | End: 2023-12-24

## 2023-12-24 RX ORDER — LORAZEPAM 2 MG/ML
1 INJECTION INTRAMUSCULAR
Status: COMPLETED | OUTPATIENT
Start: 2023-12-24 | End: 2023-12-24

## 2023-12-24 RX ORDER — POTASSIUM CHLORIDE 7.45 MG/ML
10 INJECTION INTRAVENOUS
Status: COMPLETED | OUTPATIENT
Start: 2023-12-24 | End: 2023-12-24

## 2023-12-24 RX ADMIN — ACETAMINOPHEN 1000 MG: 500 TABLET ORAL at 09:12

## 2023-12-24 RX ADMIN — POTASSIUM CHLORIDE 10 MEQ: 7.46 INJECTION, SOLUTION INTRAVENOUS at 09:12

## 2023-12-24 RX ADMIN — LORAZEPAM 1 MG: 2 INJECTION INTRAMUSCULAR; INTRAVENOUS at 09:12

## 2023-12-24 RX ADMIN — THIAMINE HYDROCHLORIDE 100 MG: 100 INJECTION, SOLUTION INTRAMUSCULAR; INTRAVENOUS at 09:12

## 2023-12-24 RX ADMIN — SODIUM CHLORIDE 1000 ML: 9 INJECTION, SOLUTION INTRAVENOUS at 08:12

## 2023-12-25 VITALS
HEART RATE: 61 BPM | OXYGEN SATURATION: 98 % | TEMPERATURE: 98 F | HEIGHT: 77 IN | BODY MASS INDEX: 20.07 KG/M2 | DIASTOLIC BLOOD PRESSURE: 80 MMHG | WEIGHT: 170 LBS | SYSTOLIC BLOOD PRESSURE: 168 MMHG | RESPIRATION RATE: 20 BRPM

## 2023-12-25 NOTE — ED NOTES
Patient back to stretcher without incident. Remains weak on left side due recent R knee surgery. Does exhibit now some signs of possible sundown syndrome. Remains alert to person and place but disoriented to time and place. Nursing home does express he has had some recent hallucinations and altered mental status.

## 2023-12-25 NOTE — ED PROVIDER NOTES
Encounter Date: 12/24/2023       History     Chief Complaint   Patient presents with    Fall    Headache     Fall out of bed with hematoma noted to right parietal region. Resident of local nursing home     Pt here from NH for eval head injury after fall out of bed hitting his head on the floor. Unknown LOC. Pt is on eliquis for Afib. He c/o HA. No neck pain. He is recent postop right knee arthroscopy for strep arthritis and is on vanc via PICC. Pt says he fell bc he saw a rat crawling in his beard and he rolled over to get it out and fell. No rat in the room per report. Pt admits to hx of ETOHism but last drink was over a month ago. He denies having withdrawal sxs in the NH. His only complaint is HA.     The history is provided by the patient and the EMS personnel.   Head Injury   The incident occurred just prior to arrival. He came to the ER via by ambulance. The injury mechanism was a fall. There was no blood loss. The quality of the pain is described as dull and throbbing. The pain is at a severity of 7/10. The pain has been constant since the injury. Pertinent negatives include no numbness, no blurred vision, no vomiting, no tinnitus, no disorientation, no weakness and no memory loss. He was found Conscious by EMS personnel. He has tried nothing for the symptoms.     Review of patient's allergies indicates:  No Known Allergies  Past Medical History:   Diagnosis Date    Abnormal CT scan 7/6/2023    Abnormal positron emission tomography (PET) scan 7/6/2023    Anxiety     Asbestosis 08/04/2015    Atrial fibrillation     Bilateral adrenal adenomas     COPD (chronic obstructive pulmonary disease)     COVID-19 virus infection 07/28/2022    Depression     Elevated PSA 7/6/2023    High cholesterol     HTN (hypertension)     Malignant neoplasm of prostate     Multiple lung nodules     Nodule of upper lobe of left lung 03/30/2023    6 mm spiculated on cta chest 3/21/23    On home oxygen therapy 05/24/2023    Pneumonia      Prostate cancer 5/27/2020    Prostate cancer metastatic to bone 7/6/2023    Prostate cancer metastatic to lung 7/6/2023    Ulcerative colitis      Past Surgical History:   Procedure Laterality Date    ARTHROSCOPY OF KNEE Right 11/22/2023    Procedure: ARTHROSCOPY, KNEE;  Surgeon: Harry Julien MD;  Location: [facility] OR;  Service: Orthopedics;  Laterality: Right;    NECK SURGERY      right knee      TRANSRECTAL BIOPSY OF PROSTATE WITH ULTRASOUND GUIDANCE Bilateral 3/18/2020    Procedure: BIOPSY, PROSTATE, RECTAL APPROACH, WITH US GUIDANCE;  Surgeon: Bill Jeong MD;  Location: [facility] OR;  Service: Urology;  Laterality: Bilateral;     Family History   Problem Relation Age of Onset    Cancer Mother         Uterine and Ovarian cancer    Diabetes Mother     Diabetes Sister      Social History     Tobacco Use    Smoking status: Every Day     Current packs/day: 2.00     Average packs/day: 2.0 packs/day for 50.3 years (100.6 ttl pk-yrs)     Types: Cigarettes     Start date: 9/7/1973     Passive exposure: Past    Smokeless tobacco: Never    Tobacco comments:     Pt states he smokes around 1-2ppd. Nicotine patch requested during his hospital stay. Information and education provided on smoking cessation classes.   Substance Use Topics    Alcohol use: Yes     Comment: 8 16oz beers per day    Drug use: No     Review of Systems   HENT:  Negative for tinnitus.    Eyes:  Negative for blurred vision.   Gastrointestinal:  Negative for vomiting.   Neurological:  Positive for headaches. Negative for weakness and numbness.   Psychiatric/Behavioral:  Negative for memory loss.    All other systems reviewed and are negative.      Physical Exam     Initial Vitals [12/24/23 2019]   BP Pulse Resp Temp SpO2   (!) 180/85 62 19 98 °F (36.7 °C) 98 %      MAP       --         Physical Exam    Nursing note and vitals reviewed.  Constitutional:   Thin, frail elderly WM in nad. Pleasant and cooperative   HENT:   Head: Normocephalic.    Small hematoma right parietal scalp. No deformity. No lac. OP clear, MM dry.    Eyes: Conjunctivae and EOM are normal. Pupils are equal, round, and reactive to light. No scleral icterus.   Neck: Neck supple. No thyromegaly present.   Normal range of motion.  Cardiovascular:  Normal rate, regular rhythm, normal heart sounds and intact distal pulses.           Pulmonary/Chest: No respiratory distress. He exhibits no tenderness.   Diminished anteriorly, scant exp wheezing.    Abdominal: Abdomen is soft. He exhibits no distension. There is no abdominal tenderness.   Musculoskeletal:         General: No tenderness or edema. Normal range of motion.      Cervical back: Normal range of motion and neck supple.      Comments: Dressing right knee that is clean. Distal pulses intact. Mild pain with ROM     Neurological: He is alert and oriented to person, place, and time. GCS score is 15. GCS eye subscore is 4. GCS verbal subscore is 5. GCS motor subscore is 6.   Generally weak, nothing focal.   Skin: Skin is warm and dry. Capillary refill takes less than 2 seconds. No rash noted. No erythema. No pallor.         ED Course   Procedures  Labs Reviewed   CBC W/ AUTO DIFFERENTIAL - Abnormal; Notable for the following components:       Result Value    RBC 3.51 (*)     Hemoglobin 9.0 (*)     Hematocrit 29.7 (*)     MCH 25.6 (*)     MCHC 30.3 (*)     RDW 15.1 (*)     MPV 8.8 (*)     Lymph # 0.8 (*)     Lymph % 14.9 (*)     All other components within normal limits   COMPREHENSIVE METABOLIC PANEL - Abnormal; Notable for the following components:    Sodium 147 (*)     Potassium 3.0 (*)     CO2 34 (*)     Glucose 117 (*)     Albumin 2.7 (*)     ALT 9 (*)     All other components within normal limits   URINALYSIS, REFLEX TO URINE CULTURE - Abnormal; Notable for the following components:    Occult Blood UA 2+ (*)     All other components within normal limits    Narrative:     Preferred Collection Type->Urine, Clean Catch  Specimen  Source->Urine   DRUG SCREEN PANEL, URINE EMERGENCY - Abnormal; Notable for the following components:    Benzodiazepines Presumptive Positive (*)     All other components within normal limits    Narrative:     Preferred Collection Type->Urine, Clean Catch  Specimen Source->Urine   URINALYSIS MICROSCOPIC - Abnormal; Notable for the following components:    RBC, UA 25 (*)     All other components within normal limits    Narrative:     Preferred Collection Type->Urine, Clean Catch  Specimen Source->Urine   MAGNESIUM   TROPONIN I   ALCOHOL,MEDICAL (ETHANOL)   ALCOHOL,MEDICAL (ETHANOL)     EKG Readings: (Independently Interpreted)   Rhythm: Normal Sinus Rhythm. Heart Rate: 66. Ectopy: No Ectopy. Conduction: Normal. ST Segments: Normal ST Segments. T Waves: Normal. Axis: Normal. Other Findings: Prolonged QT Interval. Clinical Impression: Normal Sinus Rhythm       Imaging Results              CT Head Without Contrast (Final result)  Result time 12/24/23 21:00:11      Final result by Leonardo Lee MD (12/24/23 21:00:11)                   Impression:      No acute intracranial abnormality.    Large right parietal scalp contusion/hematoma.      Electronically signed by: Leonardo Lee  Date:    12/24/2023  Time:    21:00               Narrative:    EXAMINATION:  CT HEAD WITHOUT CONTRAST    CLINICAL HISTORY:  Head trauma, minor (Age >= 65y);    TECHNIQUE:  Low dose axial CT images obtained throughout the head without intravenous contrast. Sagittal and coronal reconstructions were performed.    COMPARISON:  MRI brain 10/27/2022    FINDINGS:  Intracranial compartment:    Ventricles and sulci are normal in size for age without evidence of hydrocephalus. No extra-axial blood or fluid collections.    Mild chronic microvascular ischemia with small remote lacunar infarct in the left basal ganglia.  No parenchymal mass, hemorrhage, edema or major vascular distribution infarct.    Skull/extracranial contents (limited  evaluation): No fracture.  Large right parietal scalp contusion/hematoma.  Moderate bilateral mastoid effusions.                                       X-Ray Chest AP Portable (Final result)  Result time 12/24/23 20:52:28      Final result by Leonardo Lee MD (12/24/23 20:52:28)                   Impression:      As above.      Electronically signed by: Leonardo Lee  Date:    12/24/2023  Time:    20:52               Narrative:    EXAMINATION:  XR CHEST AP PORTABLE    CLINICAL HISTORY:  AMS;    TECHNIQUE:  Single frontal view of the chest was performed.    COMPARISON:  12/18/2023    FINDINGS:  Chronic interstitial lung changes.  Small left pleural effusion versus pleural thickening.  Mild bibasilar atelectasis and/or scarring.  Cardiac silhouette is mildly enlarged.  Atherosclerosis of the aortic arch.                                       Medications   sodium chloride 0.9% bolus 1,000 mL 1,000 mL (0 mLs Intravenous Stopped 12/24/23 2154)   LORazepam injection 1 mg (1 mg Intravenous Given 12/24/23 2140)   acetaminophen tablet 1,000 mg (1,000 mg Oral Given 12/24/23 2144)   thiamine injection 100 mg (100 mg Intramuscular Given 12/24/23 2141)   potassium chloride 10 mEq in 100 mL IVPB (0 mEq Intravenous Stopped 12/24/23 2243)     Medical Decision Making  Pt presented from local NH where he is staying postop knee washout for septic joint with HA after fall from bed after allegedly seeing a rat crawling in his beard. Pt on eliquis for Afib. Only injury found was right scalp hematoma. CT head was neg. CBC showed mild anemia which was chronic. CMP unremarkable except K 3.0. He was given 10meq IV. He was also given 1L NS and thiamine 100mg given his hx of ETOHism. Neg UA, Mg, troponin and ETOH. CXR nothing acute. Nonischemic EKG He was given tylenol for pain and dose of ativan for his possible withdrawals. Pt will dc back to NH. No emergent reason for admission.     Amount and/or Complexity of Data  Reviewed  Labs: ordered. Decision-making details documented in ED Course.  Radiology: ordered. Decision-making details documented in ED Course.  ECG/medicine tests: ordered and independent interpretation performed. Decision-making details documented in ED Course.    Risk  OTC drugs.  Prescription drug management.                                      Clinical Impression:  Final diagnoses:  [R41.82] AMS (altered mental status)  [S09.90XA] Injury of head, initial encounter (Primary)  [S00.03XA] Hematoma of scalp, initial encounter          ED Disposition Condition    Discharge Stable          ED Prescriptions    None       Follow-up Information       Follow up With Specialties Details Why Contact Info    Marisel Farrell III, MD Internal Medicine, Cardiology Schedule an appointment as soon as possible for a visit   [address]  [place] [facility] [place] [address] [phone]               Garrison Rush Jr., MD  12/24/23 [id number]

## 2023-12-25 NOTE — ED NOTES
Patient presents to ER via EMS after fall at local nursing home. EMS reports that patient fell out of bed after thinking a rat or a squirrel was crawling in his bed. Patient is alert to person, time and situation but unable to orient to place. Patient c/o pain to right parietal scalp and hematoma is noted. Denies any LOC. Patient is currently at nursing home for rehab and long term IVABT for recent right knee surgery. PiCC line in place to left arm. Vitally stable. FLOYD. Denies any other pain.

## 2023-12-25 NOTE — ED NOTES
[facility] here for transport. Report given to crew. Patient transported to residence at [facility]. Report called to facility and notified of return.

## 2023-12-26 ENCOUNTER — HOSPITAL ENCOUNTER (INPATIENT)
Facility: HOSPITAL | Age: 68
LOS: 6 days | Discharge: HOSPICE | DRG: 485 | End: 2024-01-04
Attending: EMERGENCY MEDICINE | Admitting: STUDENT IN AN ORGANIZED HEALTH CARE EDUCATION/TRAINING PROGRAM
Payer: MEDICARE

## 2023-12-26 DIAGNOSIS — R53.1 WEAKNESS: ICD-10-CM

## 2023-12-26 DIAGNOSIS — I48.0 PAF (PAROXYSMAL ATRIAL FIBRILLATION): ICD-10-CM

## 2023-12-26 DIAGNOSIS — C61 PROSTATE CANCER METASTATIC TO BONE: ICD-10-CM

## 2023-12-26 DIAGNOSIS — R93.89 ABNORMAL CT SCAN: ICD-10-CM

## 2023-12-26 DIAGNOSIS — R06.02 SOB (SHORTNESS OF BREATH): ICD-10-CM

## 2023-12-26 DIAGNOSIS — R78.81 BACTEREMIA: ICD-10-CM

## 2023-12-26 DIAGNOSIS — R06.09 DOE (DYSPNEA ON EXERTION): ICD-10-CM

## 2023-12-26 DIAGNOSIS — K51.919 ULCERATIVE COLITIS WITH COMPLICATION, UNSPECIFIED LOCATION: ICD-10-CM

## 2023-12-26 DIAGNOSIS — R97.20 ELEVATED PSA: ICD-10-CM

## 2023-12-26 DIAGNOSIS — F32.A DEPRESSION, UNSPECIFIED DEPRESSION TYPE: ICD-10-CM

## 2023-12-26 DIAGNOSIS — E78.2 MIXED HYPERLIPIDEMIA: ICD-10-CM

## 2023-12-26 DIAGNOSIS — F41.9 ANXIETY: ICD-10-CM

## 2023-12-26 DIAGNOSIS — Z72.0 TOBACCO ABUSE: ICD-10-CM

## 2023-12-26 DIAGNOSIS — C78.00 PROSTATE CANCER METASTATIC TO LUNG: ICD-10-CM

## 2023-12-26 DIAGNOSIS — Z71.89 GOALS OF CARE, COUNSELING/DISCUSSION: ICD-10-CM

## 2023-12-26 DIAGNOSIS — R41.82 CHANGE IN MENTAL STATUS: ICD-10-CM

## 2023-12-26 DIAGNOSIS — M00.9 PYOGENIC ARTHRITIS OF RIGHT KNEE JOINT, DUE TO UNSPECIFIED ORGANISM: ICD-10-CM

## 2023-12-26 DIAGNOSIS — C61 PROSTATE CANCER METASTATIC TO LUNG: ICD-10-CM

## 2023-12-26 DIAGNOSIS — J44.1 CHRONIC OBSTRUCTIVE PULMONARY DISEASE WITH ACUTE EXACERBATION: ICD-10-CM

## 2023-12-26 DIAGNOSIS — D64.9 POSTOPERATIVE ANEMIA: ICD-10-CM

## 2023-12-26 DIAGNOSIS — Z99.81 ON HOME OXYGEN THERAPY: ICD-10-CM

## 2023-12-26 DIAGNOSIS — R07.9 CHEST PAIN: ICD-10-CM

## 2023-12-26 DIAGNOSIS — G93.41 ACUTE METABOLIC ENCEPHALOPATHY: ICD-10-CM

## 2023-12-26 DIAGNOSIS — M00.261 STREPTOCOCCAL ARTHRITIS OF RIGHT KNEE: ICD-10-CM

## 2023-12-26 DIAGNOSIS — N40.1 BENIGN LOCALIZED PROSTATIC HYPERPLASIA WITH LOWER URINARY TRACT SYMPTOMS (LUTS): ICD-10-CM

## 2023-12-26 DIAGNOSIS — C61 PROSTATE CANCER: ICD-10-CM

## 2023-12-26 DIAGNOSIS — I10 PRIMARY HYPERTENSION: Primary | ICD-10-CM

## 2023-12-26 DIAGNOSIS — J61 ASBESTOSIS: ICD-10-CM

## 2023-12-26 DIAGNOSIS — J96.22 ACUTE ON CHRONIC RESPIRATORY FAILURE WITH HYPOXIA AND HYPERCAPNIA: ICD-10-CM

## 2023-12-26 DIAGNOSIS — R94.8 ABNORMAL POSITRON EMISSION TOMOGRAPHY (PET) SCAN: ICD-10-CM

## 2023-12-26 DIAGNOSIS — C79.51 PROSTATE CANCER METASTATIC TO BONE: ICD-10-CM

## 2023-12-26 DIAGNOSIS — R91.8 MULTIPLE LUNG NODULES: ICD-10-CM

## 2023-12-26 DIAGNOSIS — M25.461 EFFUSION OF RIGHT KNEE: ICD-10-CM

## 2023-12-26 DIAGNOSIS — J96.21 ACUTE ON CHRONIC RESPIRATORY FAILURE WITH HYPOXIA AND HYPERCAPNIA: ICD-10-CM

## 2023-12-26 DIAGNOSIS — M25.561 ACUTE PAIN OF RIGHT KNEE: ICD-10-CM

## 2023-12-26 LAB
ALBUMIN SERPL BCP-MCNC: 3 G/DL (ref 3.5–5.2)
ALP SERPL-CCNC: 143 U/L (ref 55–135)
ALT SERPL W/O P-5'-P-CCNC: 13 U/L (ref 10–44)
AMPHET+METHAMPHET UR QL: NEGATIVE
ANION GAP SERPL CALC-SCNC: 12 MMOL/L (ref 8–16)
AST SERPL-CCNC: 18 U/L (ref 10–40)
BARBITURATES UR QL SCN>200 NG/ML: NEGATIVE
BASOPHILS # BLD AUTO: 0.05 K/UL (ref 0–0.2)
BASOPHILS NFR BLD: 0.7 % (ref 0–1.9)
BENZODIAZ UR QL SCN>200 NG/ML: ABNORMAL
BILIRUB SERPL-MCNC: 0.5 MG/DL (ref 0.1–1)
BILIRUB UR QL STRIP: NEGATIVE
BILIRUB UR QL STRIP: NEGATIVE
BUN SERPL-MCNC: 8 MG/DL (ref 8–23)
BZE UR QL SCN: NEGATIVE
CALCIUM SERPL-MCNC: 9.4 MG/DL (ref 8.7–10.5)
CANNABINOIDS UR QL SCN: NEGATIVE
CHLORIDE SERPL-SCNC: 100 MMOL/L (ref 95–110)
CLARITY UR: CLEAR
CLARITY UR: CLEAR
CO2 SERPL-SCNC: 34 MMOL/L (ref 23–29)
COLOR UR: YELLOW
COLOR UR: YELLOW
CREAT SERPL-MCNC: 0.9 MG/DL (ref 0.5–1.4)
CREAT UR-MCNC: 37.3 MG/DL (ref 23–375)
DIFFERENTIAL METHOD BLD: ABNORMAL
EOSINOPHIL # BLD AUTO: 0.2 K/UL (ref 0–0.5)
EOSINOPHIL NFR BLD: 2.5 % (ref 0–8)
ERYTHROCYTE [DISTWIDTH] IN BLOOD BY AUTOMATED COUNT: 14.9 % (ref 11.5–14.5)
EST. GFR  (NO RACE VARIABLE): >60 ML/MIN/1.73 M^2
ETHANOL SERPL-MCNC: <10 MG/DL (ref 0–10)
GLUCOSE SERPL-MCNC: 128 MG/DL (ref 70–110)
GLUCOSE UR QL STRIP: NEGATIVE
GLUCOSE UR QL STRIP: NEGATIVE
HCT VFR BLD AUTO: 34.8 % (ref 40–54)
HGB BLD-MCNC: 10.6 G/DL (ref 14–18)
HGB UR QL STRIP: ABNORMAL
HGB UR QL STRIP: NEGATIVE
IMM GRANULOCYTES # BLD AUTO: 0.03 K/UL (ref 0–0.04)
IMM GRANULOCYTES NFR BLD AUTO: 0.4 % (ref 0–0.5)
INFLUENZA A, MOLECULAR: NEGATIVE
INFLUENZA B, MOLECULAR: NEGATIVE
KETONES UR QL STRIP: ABNORMAL
KETONES UR QL STRIP: NEGATIVE
LEUKOCYTE ESTERASE UR QL STRIP: NEGATIVE
LEUKOCYTE ESTERASE UR QL STRIP: NEGATIVE
LYMPHOCYTES # BLD AUTO: 0.6 K/UL (ref 1–4.8)
LYMPHOCYTES NFR BLD: 9 % (ref 18–48)
MCH RBC QN AUTO: 25.7 PG (ref 27–31)
MCHC RBC AUTO-ENTMCNC: 30.5 G/DL (ref 32–36)
MCV RBC AUTO: 85 FL (ref 82–98)
METHADONE UR QL SCN>300 NG/ML: NEGATIVE
MONOCYTES # BLD AUTO: 0.5 K/UL (ref 0.3–1)
MONOCYTES NFR BLD: 6.4 % (ref 4–15)
NEUTROPHILS # BLD AUTO: 5.8 K/UL (ref 1.8–7.7)
NEUTROPHILS NFR BLD: 81 % (ref 38–73)
NITRITE UR QL STRIP: NEGATIVE
NITRITE UR QL STRIP: NEGATIVE
NRBC BLD-RTO: 0 /100 WBC
OPIATES UR QL SCN: NEGATIVE
PCP UR QL SCN>25 NG/ML: NEGATIVE
PH UR STRIP: 7 [PH] (ref 5–8)
PH UR STRIP: 7 [PH] (ref 5–8)
PLATELET # BLD AUTO: 318 K/UL (ref 150–450)
PMV BLD AUTO: 9.8 FL (ref 9.2–12.9)
POTASSIUM SERPL-SCNC: 2.7 MMOL/L (ref 3.5–5.1)
PROT SERPL-MCNC: 6.9 G/DL (ref 6–8.4)
PROT UR QL STRIP: NEGATIVE
PROT UR QL STRIP: NEGATIVE
RBC # BLD AUTO: 4.12 M/UL (ref 4.6–6.2)
SARS-COV-2 RDRP RESP QL NAA+PROBE: NEGATIVE
SODIUM SERPL-SCNC: 146 MMOL/L (ref 136–145)
SP GR UR STRIP: 1.02 (ref 1–1.03)
SP GR UR STRIP: 1.02 (ref 1–1.03)
SPECIMEN SOURCE: NORMAL
TOXICOLOGY INFORMATION: ABNORMAL
URN SPEC COLLECT METH UR: ABNORMAL
URN SPEC COLLECT METH UR: NORMAL
UROBILINOGEN UR STRIP-ACNC: 1 EU/DL
UROBILINOGEN UR STRIP-ACNC: 1 EU/DL
WBC # BLD AUTO: 7.14 K/UL (ref 3.9–12.7)

## 2023-12-26 PROCEDURE — G0378 HOSPITAL OBSERVATION PER HR: HCPCS

## 2023-12-26 PROCEDURE — 73562 X-RAY EXAM OF KNEE 3: CPT | Mod: 26,RT,, | Performed by: RADIOLOGY

## 2023-12-26 PROCEDURE — 27000221 HC OXYGEN, UP TO 24 HOURS

## 2023-12-26 PROCEDURE — 93010 ELECTROCARDIOGRAM REPORT: CPT | Mod: ,,, | Performed by: INTERNAL MEDICINE

## 2023-12-26 PROCEDURE — 80053 COMPREHEN METABOLIC PANEL: CPT | Performed by: EMERGENCY MEDICINE

## 2023-12-26 PROCEDURE — 70450 CT HEAD/BRAIN W/O DYE: CPT | Mod: TC

## 2023-12-26 PROCEDURE — 73562 X-RAY EXAM OF KNEE 3: CPT | Mod: TC,RT

## 2023-12-26 PROCEDURE — 85025 COMPLETE CBC W/AUTO DIFF WBC: CPT | Performed by: EMERGENCY MEDICINE

## 2023-12-26 PROCEDURE — 63600175 PHARM REV CODE 636 W HCPCS: Performed by: STUDENT IN AN ORGANIZED HEALTH CARE EDUCATION/TRAINING PROGRAM

## 2023-12-26 PROCEDURE — 82077 ASSAY SPEC XCP UR&BREATH IA: CPT | Performed by: EMERGENCY MEDICINE

## 2023-12-26 PROCEDURE — 99285 EMERGENCY DEPT VISIT HI MDM: CPT | Mod: 25

## 2023-12-26 PROCEDURE — U0002 COVID-19 LAB TEST NON-CDC: HCPCS | Performed by: STUDENT IN AN ORGANIZED HEALTH CARE EDUCATION/TRAINING PROGRAM

## 2023-12-26 PROCEDURE — 73700 CT LOWER EXTREMITY W/O DYE: CPT | Mod: 26,RT,, | Performed by: RADIOLOGY

## 2023-12-26 PROCEDURE — 70450 CT HEAD/BRAIN W/O DYE: CPT | Mod: 26,,, | Performed by: RADIOLOGY

## 2023-12-26 PROCEDURE — 87502 INFLUENZA DNA AMP PROBE: CPT | Performed by: STUDENT IN AN ORGANIZED HEALTH CARE EDUCATION/TRAINING PROGRAM

## 2023-12-26 PROCEDURE — 25000003 PHARM REV CODE 250: Mod: UD | Performed by: STUDENT IN AN ORGANIZED HEALTH CARE EDUCATION/TRAINING PROGRAM

## 2023-12-26 PROCEDURE — 93005 ELECTROCARDIOGRAM TRACING: CPT

## 2023-12-26 PROCEDURE — 81003 URINALYSIS AUTO W/O SCOPE: CPT | Mod: 91,59 | Performed by: EMERGENCY MEDICINE

## 2023-12-26 PROCEDURE — 80307 DRUG TEST PRSMV CHEM ANLYZR: CPT | Performed by: EMERGENCY MEDICINE

## 2023-12-26 PROCEDURE — 81003 URINALYSIS AUTO W/O SCOPE: CPT | Mod: 59 | Performed by: EMERGENCY MEDICINE

## 2023-12-26 PROCEDURE — 73700 CT LOWER EXTREMITY W/O DYE: CPT | Mod: TC,RT

## 2023-12-26 PROCEDURE — 99223 1ST HOSP IP/OBS HIGH 75: CPT | Mod: AI,,, | Performed by: STUDENT IN AN ORGANIZED HEALTH CARE EDUCATION/TRAINING PROGRAM

## 2023-12-26 RX ORDER — POTASSIUM CHLORIDE 20 MEQ/1
40 TABLET, EXTENDED RELEASE ORAL EVERY 4 HOURS
Status: DISCONTINUED | OUTPATIENT
Start: 2023-12-26 | End: 2023-12-26

## 2023-12-26 RX ORDER — PREDNISONE 1 MG/1
5 TABLET ORAL DAILY
Status: DISCONTINUED | OUTPATIENT
Start: 2023-12-27 | End: 2024-01-04 | Stop reason: HOSPADM

## 2023-12-26 RX ORDER — ONDANSETRON 2 MG/ML
4 INJECTION INTRAMUSCULAR; INTRAVENOUS EVERY 8 HOURS PRN
Status: DISCONTINUED | OUTPATIENT
Start: 2023-12-26 | End: 2024-01-04 | Stop reason: HOSPADM

## 2023-12-26 RX ORDER — ATORVASTATIN CALCIUM 10 MG/1
10 TABLET, FILM COATED ORAL NIGHTLY
Status: DISCONTINUED | OUTPATIENT
Start: 2023-12-26 | End: 2024-01-04 | Stop reason: HOSPADM

## 2023-12-26 RX ORDER — AMIODARONE HYDROCHLORIDE 100 MG/1
100 TABLET ORAL DAILY
Status: DISCONTINUED | OUTPATIENT
Start: 2023-12-27 | End: 2024-01-04 | Stop reason: HOSPADM

## 2023-12-26 RX ORDER — FINASTERIDE 5 MG/1
5 TABLET, FILM COATED ORAL DAILY
Status: DISCONTINUED | OUTPATIENT
Start: 2023-12-27 | End: 2024-01-04 | Stop reason: HOSPADM

## 2023-12-26 RX ORDER — IBUPROFEN 200 MG
24 TABLET ORAL
Status: DISCONTINUED | OUTPATIENT
Start: 2023-12-26 | End: 2024-01-04 | Stop reason: HOSPADM

## 2023-12-26 RX ORDER — FLUTICASONE FUROATE AND VILANTEROL 100; 25 UG/1; UG/1
1 POWDER RESPIRATORY (INHALATION) DAILY
Status: DISCONTINUED | OUTPATIENT
Start: 2023-12-27 | End: 2024-01-04 | Stop reason: HOSPADM

## 2023-12-26 RX ORDER — TRAZODONE HYDROCHLORIDE 50 MG/1
100 TABLET ORAL NIGHTLY
Status: DISCONTINUED | OUTPATIENT
Start: 2023-12-26 | End: 2024-01-04 | Stop reason: HOSPADM

## 2023-12-26 RX ORDER — OXYCODONE HYDROCHLORIDE 5 MG/1
5 TABLET ORAL EVERY 6 HOURS PRN
Status: DISCONTINUED | OUTPATIENT
Start: 2023-12-26 | End: 2023-12-29

## 2023-12-26 RX ORDER — PROCHLORPERAZINE EDISYLATE 5 MG/ML
5 INJECTION INTRAMUSCULAR; INTRAVENOUS EVERY 6 HOURS PRN
Status: DISCONTINUED | OUTPATIENT
Start: 2023-12-26 | End: 2024-01-04 | Stop reason: HOSPADM

## 2023-12-26 RX ORDER — DILTIAZEM HYDROCHLORIDE 30 MG/1
30 TABLET, FILM COATED ORAL EVERY 12 HOURS
Status: DISCONTINUED | OUTPATIENT
Start: 2023-12-26 | End: 2024-01-04 | Stop reason: HOSPADM

## 2023-12-26 RX ORDER — IPRATROPIUM BROMIDE AND ALBUTEROL SULFATE 2.5; .5 MG/3ML; MG/3ML
3 SOLUTION RESPIRATORY (INHALATION)
Status: DISCONTINUED | OUTPATIENT
Start: 2023-12-27 | End: 2023-12-31

## 2023-12-26 RX ORDER — NALOXONE HCL 0.4 MG/ML
0.02 VIAL (ML) INJECTION
Status: DISCONTINUED | OUTPATIENT
Start: 2023-12-26 | End: 2024-01-04 | Stop reason: HOSPADM

## 2023-12-26 RX ORDER — SODIUM CHLORIDE 0.9 % (FLUSH) 0.9 %
10 SYRINGE (ML) INJECTION EVERY 12 HOURS PRN
Status: DISCONTINUED | OUTPATIENT
Start: 2023-12-26 | End: 2024-01-04 | Stop reason: HOSPADM

## 2023-12-26 RX ORDER — LOSARTAN POTASSIUM 25 MG/1
25 TABLET ORAL 2 TIMES DAILY
Status: DISCONTINUED | OUTPATIENT
Start: 2023-12-26 | End: 2024-01-04 | Stop reason: HOSPADM

## 2023-12-26 RX ORDER — TAMSULOSIN HYDROCHLORIDE 0.4 MG/1
0.4 CAPSULE ORAL DAILY
Status: DISCONTINUED | OUTPATIENT
Start: 2023-12-27 | End: 2024-01-04 | Stop reason: HOSPADM

## 2023-12-26 RX ORDER — GLUCAGON 1 MG
1 KIT INJECTION
Status: DISCONTINUED | OUTPATIENT
Start: 2023-12-26 | End: 2024-01-04 | Stop reason: HOSPADM

## 2023-12-26 RX ORDER — FAMOTIDINE 20 MG/1
40 TABLET, FILM COATED ORAL DAILY
Status: DISCONTINUED | OUTPATIENT
Start: 2023-12-27 | End: 2024-01-04 | Stop reason: HOSPADM

## 2023-12-26 RX ORDER — IBUPROFEN 200 MG
16 TABLET ORAL
Status: DISCONTINUED | OUTPATIENT
Start: 2023-12-26 | End: 2024-01-04 | Stop reason: HOSPADM

## 2023-12-26 RX ORDER — PAROXETINE 10 MG/1
40 TABLET, FILM COATED ORAL DAILY
Status: DISCONTINUED | OUTPATIENT
Start: 2023-12-27 | End: 2024-01-04 | Stop reason: HOSPADM

## 2023-12-26 RX ORDER — DIAZEPAM 5 MG/1
5 TABLET ORAL EVERY 8 HOURS PRN
Status: DISCONTINUED | OUTPATIENT
Start: 2023-12-26 | End: 2023-12-29

## 2023-12-26 RX ORDER — FOLIC ACID 1 MG/1
1 TABLET ORAL DAILY
Status: DISCONTINUED | OUTPATIENT
Start: 2023-12-27 | End: 2024-01-04 | Stop reason: HOSPADM

## 2023-12-26 RX ORDER — MAG HYDROX/ALUMINUM HYD/SIMETH 200-200-20
30 SUSPENSION, ORAL (FINAL DOSE FORM) ORAL 4 TIMES DAILY PRN
Status: DISCONTINUED | OUTPATIENT
Start: 2023-12-26 | End: 2024-01-04 | Stop reason: HOSPADM

## 2023-12-26 RX ADMIN — SODIUM CHLORIDE, POTASSIUM CHLORIDE, SODIUM LACTATE AND CALCIUM CHLORIDE 500 ML: 600; 310; 30; 20 INJECTION, SOLUTION INTRAVENOUS at 07:12

## 2023-12-26 RX ADMIN — ATORVASTATIN CALCIUM 10 MG: 10 TABLET, FILM COATED ORAL at 09:12

## 2023-12-26 RX ADMIN — VANCOMYCIN HYDROCHLORIDE 1250 MG: 1.25 INJECTION, POWDER, LYOPHILIZED, FOR SOLUTION INTRAVENOUS at 07:12

## 2023-12-26 RX ADMIN — TRAZODONE HYDROCHLORIDE 100 MG: 50 TABLET ORAL at 09:12

## 2023-12-26 RX ADMIN — LOSARTAN POTASSIUM 25 MG: 25 TABLET, FILM COATED ORAL at 09:12

## 2023-12-26 RX ADMIN — POTASSIUM BICARBONATE 40 MEQ: 782 TABLET, EFFERVESCENT ORAL at 07:12

## 2023-12-26 RX ADMIN — DILTIAZEM HYDROCHLORIDE 30 MG: 30 TABLET, FILM COATED ORAL at 09:12

## 2023-12-26 NOTE — HPI
Patient is a 68 w/ Chronic Hypoxic Respiratory Failure on 3-4 LFNC 2/2 COPD, Metastatic Prostate cancer, HTN, HLD, Afib on eliquis presenting with  presenting with confusion x 1 day. Has been feeling weak and lethargic for past 3-4 days. Recently discharged from [facility] to [facility]. Recent ED visit on 12/24 after fall at [facility] where he hit his head. Work up negative and discharged back to [facility]. Since then patient has become progressively weak. Today they called family because patient was not acting himself and he was brought to ED. No fever, chills, NVD. Patient was recently admitted to [facility] for septic arthritis right knee. He had washout via arthroscopy, and ID recommended ampicillin 2gm q4hr for 4 week course to end 12/20/23. Patient had swelling in right knee during stay at [facility]. Showed joint effusion. Plan was to set up aspiration with ortho as an outpatient.

## 2023-12-26 NOTE — ED NOTES
Linens / brief and gown changed at this time. Family at bedside . VSS . No distress noted. Call light within reach

## 2023-12-26 NOTE — ED NOTES
Swabs collected and sent to lab at this time. Family remains at bedside , denies further needs at present.

## 2023-12-26 NOTE — ED NOTES
Patient sleeping , respirations even / unlabored. No distress noted. Call light within reach . Vss. Side rails up x 2

## 2023-12-27 ENCOUNTER — ANESTHESIA EVENT (OUTPATIENT)
Dept: SURGERY | Facility: HOSPITAL | Age: 68
DRG: 485 | End: 2023-12-27
Payer: MEDICARE

## 2023-12-27 ENCOUNTER — ANESTHESIA (OUTPATIENT)
Dept: SURGERY | Facility: HOSPITAL | Age: 68
DRG: 485 | End: 2023-12-27
Payer: MEDICARE

## 2023-12-27 LAB
ALBUMIN SERPL BCP-MCNC: 2.7 G/DL (ref 3.5–5.2)
ALP SERPL-CCNC: 128 U/L (ref 55–135)
ALT SERPL W/O P-5'-P-CCNC: 11 U/L (ref 10–44)
ANION GAP SERPL CALC-SCNC: 14 MMOL/L (ref 8–16)
ANION GAP SERPL CALC-SCNC: 15 MMOL/L (ref 8–16)
ANION GAP SERPL CALC-SCNC: 16 MMOL/L (ref 8–16)
AST SERPL-CCNC: 14 U/L (ref 10–40)
BASOPHILS # BLD AUTO: 0.05 K/UL (ref 0–0.2)
BASOPHILS NFR BLD: 0.7 % (ref 0–1.9)
BILIRUB SERPL-MCNC: 0.5 MG/DL (ref 0.1–1)
BUN SERPL-MCNC: 6 MG/DL (ref 8–23)
BUN SERPL-MCNC: 7 MG/DL (ref 8–23)
BUN SERPL-MCNC: 7 MG/DL (ref 8–23)
CALCIUM SERPL-MCNC: 8.9 MG/DL (ref 8.7–10.5)
CALCIUM SERPL-MCNC: 9 MG/DL (ref 8.7–10.5)
CALCIUM SERPL-MCNC: 9.2 MG/DL (ref 8.7–10.5)
CHLORIDE SERPL-SCNC: 98 MMOL/L (ref 95–110)
CHLORIDE SERPL-SCNC: 99 MMOL/L (ref 95–110)
CHLORIDE SERPL-SCNC: 99 MMOL/L (ref 95–110)
CO2 SERPL-SCNC: 31 MMOL/L (ref 23–29)
CO2 SERPL-SCNC: 33 MMOL/L (ref 23–29)
CO2 SERPL-SCNC: 33 MMOL/L (ref 23–29)
CREAT SERPL-MCNC: 0.8 MG/DL (ref 0.5–1.4)
DIFFERENTIAL METHOD BLD: ABNORMAL
EOSINOPHIL # BLD AUTO: 0.4 K/UL (ref 0–0.5)
EOSINOPHIL NFR BLD: 5.3 % (ref 0–8)
ERYTHROCYTE [DISTWIDTH] IN BLOOD BY AUTOMATED COUNT: 14.7 % (ref 11.5–14.5)
EST. GFR  (NO RACE VARIABLE): >60 ML/MIN/1.73 M^2
GLUCOSE SERPL-MCNC: 101 MG/DL (ref 70–110)
GLUCOSE SERPL-MCNC: 105 MG/DL (ref 70–110)
GLUCOSE SERPL-MCNC: 113 MG/DL (ref 70–110)
HCT VFR BLD AUTO: 32 % (ref 40–54)
HGB BLD-MCNC: 9.8 G/DL (ref 14–18)
IMM GRANULOCYTES # BLD AUTO: 0.02 K/UL (ref 0–0.04)
IMM GRANULOCYTES NFR BLD AUTO: 0.3 % (ref 0–0.5)
LYMPHOCYTES # BLD AUTO: 1 K/UL (ref 1–4.8)
LYMPHOCYTES NFR BLD: 12.4 % (ref 18–48)
MAGNESIUM SERPL-MCNC: 1.9 MG/DL (ref 1.6–2.6)
MAGNESIUM SERPL-MCNC: 2.4 MG/DL (ref 1.6–2.6)
MAGNESIUM SERPL-MCNC: 2.6 MG/DL (ref 1.6–2.6)
MCH RBC QN AUTO: 25.7 PG (ref 27–31)
MCHC RBC AUTO-ENTMCNC: 30.6 G/DL (ref 32–36)
MCV RBC AUTO: 84 FL (ref 82–98)
MONOCYTES # BLD AUTO: 0.7 K/UL (ref 0.3–1)
MONOCYTES NFR BLD: 9.1 % (ref 4–15)
NEUTROPHILS # BLD AUTO: 5.6 K/UL (ref 1.8–7.7)
NEUTROPHILS NFR BLD: 72.2 % (ref 38–73)
NRBC BLD-RTO: 0 /100 WBC
PLATELET # BLD AUTO: 317 K/UL (ref 150–450)
PMV BLD AUTO: 9.8 FL (ref 9.2–12.9)
POTASSIUM SERPL-SCNC: 2.3 MMOL/L (ref 3.5–5.1)
POTASSIUM SERPL-SCNC: 2.5 MMOL/L (ref 3.5–5.1)
POTASSIUM SERPL-SCNC: 2.5 MMOL/L (ref 3.5–5.1)
POTASSIUM SERPL-SCNC: 3.9 MMOL/L (ref 3.5–5.1)
PROT SERPL-MCNC: 6.4 G/DL (ref 6–8.4)
RBC # BLD AUTO: 3.82 M/UL (ref 4.6–6.2)
SODIUM SERPL-SCNC: 145 MMOL/L (ref 136–145)
SODIUM SERPL-SCNC: 146 MMOL/L (ref 136–145)
SODIUM SERPL-SCNC: 147 MMOL/L (ref 136–145)
WBC # BLD AUTO: 7.69 K/UL (ref 3.9–12.7)

## 2023-12-27 PROCEDURE — 94640 AIRWAY INHALATION TREATMENT: CPT

## 2023-12-27 PROCEDURE — 94761 N-INVAS EAR/PLS OXIMETRY MLT: CPT

## 2023-12-27 PROCEDURE — 63600175 PHARM REV CODE 636 W HCPCS: Mod: UD | Performed by: NURSE ANESTHETIST, CERTIFIED REGISTERED

## 2023-12-27 PROCEDURE — 27000221 HC OXYGEN, UP TO 24 HOURS

## 2023-12-27 PROCEDURE — 63600175 PHARM REV CODE 636 W HCPCS: Performed by: ANESTHESIOLOGY

## 2023-12-27 PROCEDURE — 80053 COMPREHEN METABOLIC PANEL: CPT | Performed by: STUDENT IN AN ORGANIZED HEALTH CARE EDUCATION/TRAINING PROGRAM

## 2023-12-27 PROCEDURE — 99233 SBSQ HOSP IP/OBS HIGH 50: CPT | Mod: ,,, | Performed by: STUDENT IN AN ORGANIZED HEALTH CARE EDUCATION/TRAINING PROGRAM

## 2023-12-27 PROCEDURE — 25000003 PHARM REV CODE 250: Performed by: HOSPITALIST

## 2023-12-27 PROCEDURE — 85025 COMPLETE CBC W/AUTO DIFF WBC: CPT | Performed by: STUDENT IN AN ORGANIZED HEALTH CARE EDUCATION/TRAINING PROGRAM

## 2023-12-27 PROCEDURE — 25000003 PHARM REV CODE 250: Performed by: ORTHOPAEDIC SURGERY

## 2023-12-27 PROCEDURE — 25000003 PHARM REV CODE 250: Performed by: NURSE ANESTHETIST, CERTIFIED REGISTERED

## 2023-12-27 PROCEDURE — 63600175 PHARM REV CODE 636 W HCPCS: Mod: UD | Performed by: STUDENT IN AN ORGANIZED HEALTH CARE EDUCATION/TRAINING PROGRAM

## 2023-12-27 PROCEDURE — 25000242 PHARM REV CODE 250 ALT 637 W/ HCPCS: Performed by: STUDENT IN AN ORGANIZED HEALTH CARE EDUCATION/TRAINING PROGRAM

## 2023-12-27 PROCEDURE — 29871 ARTHRS KNEE SURG FOR INFCTJ: CPT | Mod: RT,,, | Performed by: ORTHOPAEDIC SURGERY

## 2023-12-27 PROCEDURE — 94640 AIRWAY INHALATION TREATMENT: CPT | Mod: XB

## 2023-12-27 PROCEDURE — 87075 CULTR BACTERIA EXCEPT BLOOD: CPT | Performed by: ORTHOPAEDIC SURGERY

## 2023-12-27 PROCEDURE — 83735 ASSAY OF MAGNESIUM: CPT | Mod: 91 | Performed by: HOSPITALIST

## 2023-12-27 PROCEDURE — 37000008 HC ANESTHESIA 1ST 15 MINUTES: Performed by: ORTHOPAEDIC SURGERY

## 2023-12-27 PROCEDURE — 83735 ASSAY OF MAGNESIUM: CPT | Mod: 91 | Performed by: EMERGENCY MEDICINE

## 2023-12-27 PROCEDURE — 25000003 PHARM REV CODE 250: Performed by: STUDENT IN AN ORGANIZED HEALTH CARE EDUCATION/TRAINING PROGRAM

## 2023-12-27 PROCEDURE — D9220A PRA ANESTHESIA: Mod: ANES,,, | Performed by: ANESTHESIOLOGY

## 2023-12-27 PROCEDURE — 37000009 HC ANESTHESIA EA ADD 15 MINS: Performed by: ORTHOPAEDIC SURGERY

## 2023-12-27 PROCEDURE — 80048 BASIC METABOLIC PNL TOTAL CA: CPT | Mod: 91,XB | Performed by: EMERGENCY MEDICINE

## 2023-12-27 PROCEDURE — 63600175 PHARM REV CODE 636 W HCPCS: Mod: UD | Performed by: ORTHOPAEDIC SURGERY

## 2023-12-27 PROCEDURE — 83735 ASSAY OF MAGNESIUM: CPT | Performed by: STUDENT IN AN ORGANIZED HEALTH CARE EDUCATION/TRAINING PROGRAM

## 2023-12-27 PROCEDURE — D9220A PRA ANESTHESIA: ICD-10-PCS | Mod: ANES,,, | Performed by: ANESTHESIOLOGY

## 2023-12-27 PROCEDURE — D9220A PRA ANESTHESIA: ICD-10-PCS | Mod: CRNA,,, | Performed by: NURSE ANESTHETIST, CERTIFIED REGISTERED

## 2023-12-27 PROCEDURE — G0378 HOSPITAL OBSERVATION PER HR: HCPCS

## 2023-12-27 PROCEDURE — 71000033 HC RECOVERY, INTIAL HOUR: Performed by: ORTHOPAEDIC SURGERY

## 2023-12-27 PROCEDURE — 87070 CULTURE OTHR SPECIMN AEROBIC: CPT | Performed by: ORTHOPAEDIC SURGERY

## 2023-12-27 PROCEDURE — D9220A PRA ANESTHESIA: Mod: CRNA,,, | Performed by: NURSE ANESTHETIST, CERTIFIED REGISTERED

## 2023-12-27 PROCEDURE — 0SJC3ZZ INSPECTION OF RIGHT KNEE JOINT, PERCUTANEOUS APPROACH: ICD-10-PCS | Performed by: RADIOLOGY

## 2023-12-27 PROCEDURE — 36000710: Performed by: ORTHOPAEDIC SURGERY

## 2023-12-27 PROCEDURE — 87040 BLOOD CULTURE FOR BACTERIA: CPT | Mod: 59 | Performed by: STUDENT IN AN ORGANIZED HEALTH CARE EDUCATION/TRAINING PROGRAM

## 2023-12-27 PROCEDURE — 0SBC4ZZ EXCISION OF RIGHT KNEE JOINT, PERCUTANEOUS ENDOSCOPIC APPROACH: ICD-10-PCS | Performed by: ORTHOPAEDIC SURGERY

## 2023-12-27 PROCEDURE — 25000003 PHARM REV CODE 250: Mod: UD | Performed by: STUDENT IN AN ORGANIZED HEALTH CARE EDUCATION/TRAINING PROGRAM

## 2023-12-27 PROCEDURE — 63600175 PHARM REV CODE 636 W HCPCS: Mod: UD | Performed by: HOSPITALIST

## 2023-12-27 PROCEDURE — 36000711: Performed by: ORTHOPAEDIC SURGERY

## 2023-12-27 PROCEDURE — 27201423 OPTIME MED/SURG SUP & DEVICES STERILE SUPPLY: Performed by: ORTHOPAEDIC SURGERY

## 2023-12-27 PROCEDURE — 80048 BASIC METABOLIC PNL TOTAL CA: CPT | Mod: XB | Performed by: HOSPITALIST

## 2023-12-27 RX ORDER — HYDRALAZINE HYDROCHLORIDE 20 MG/ML
5 INJECTION INTRAMUSCULAR; INTRAVENOUS EVERY 6 HOURS PRN
Status: DISCONTINUED | OUTPATIENT
Start: 2023-12-27 | End: 2024-01-04 | Stop reason: HOSPADM

## 2023-12-27 RX ORDER — MUPIROCIN 20 MG/G
OINTMENT TOPICAL 2 TIMES DAILY
Status: CANCELLED | OUTPATIENT
Start: 2023-12-27 | End: 2024-01-01

## 2023-12-27 RX ORDER — ONDANSETRON HYDROCHLORIDE 2 MG/ML
INJECTION, SOLUTION INTRAMUSCULAR; INTRAVENOUS
Status: DISCONTINUED | OUTPATIENT
Start: 2023-12-27 | End: 2023-12-27

## 2023-12-27 RX ORDER — LIDOCAINE HYDROCHLORIDE 10 MG/ML
1 INJECTION, SOLUTION EPIDURAL; INFILTRATION; INTRACAUDAL; PERINEURAL ONCE
Status: DISCONTINUED | OUTPATIENT
Start: 2023-12-27 | End: 2023-12-27 | Stop reason: HOSPADM

## 2023-12-27 RX ORDER — LIDOCAINE HYDROCHLORIDE AND EPINEPHRINE 20; 10 MG/ML; UG/ML
INJECTION, SOLUTION INFILTRATION; PERINEURAL
Status: DISCONTINUED | OUTPATIENT
Start: 2023-12-27 | End: 2023-12-27 | Stop reason: HOSPADM

## 2023-12-27 RX ORDER — POTASSIUM CHLORIDE 20 MEQ/1
40 TABLET, EXTENDED RELEASE ORAL
Status: COMPLETED | OUTPATIENT
Start: 2023-12-27 | End: 2023-12-27

## 2023-12-27 RX ORDER — SODIUM CHLORIDE 9 MG/ML
INJECTION, SOLUTION INTRAVENOUS CONTINUOUS
Status: CANCELLED | OUTPATIENT
Start: 2023-12-27

## 2023-12-27 RX ORDER — ACETAMINOPHEN 10 MG/ML
INJECTION, SOLUTION INTRAVENOUS
Status: DISCONTINUED | OUTPATIENT
Start: 2023-12-27 | End: 2023-12-27

## 2023-12-27 RX ORDER — DEXAMETHASONE SODIUM PHOSPHATE 4 MG/ML
INJECTION, SOLUTION INTRA-ARTICULAR; INTRALESIONAL; INTRAMUSCULAR; INTRAVENOUS; SOFT TISSUE
Status: DISCONTINUED | OUTPATIENT
Start: 2023-12-27 | End: 2023-12-27

## 2023-12-27 RX ORDER — PROPOFOL 10 MG/ML
VIAL (ML) INTRAVENOUS
Status: DISCONTINUED | OUTPATIENT
Start: 2023-12-27 | End: 2023-12-27

## 2023-12-27 RX ORDER — BUPIVACAINE HYDROCHLORIDE 5 MG/ML
INJECTION, SOLUTION EPIDURAL; INTRACAUDAL
Status: DISCONTINUED | OUTPATIENT
Start: 2023-12-27 | End: 2023-12-27 | Stop reason: HOSPADM

## 2023-12-27 RX ORDER — POTASSIUM CHLORIDE 20 MEQ/1
20 TABLET, EXTENDED RELEASE ORAL
Status: COMPLETED | OUTPATIENT
Start: 2023-12-27 | End: 2023-12-27

## 2023-12-27 RX ORDER — EPINEPHRINE 1 MG/ML
INJECTION INTRAMUSCULAR; INTRAVENOUS; SUBCUTANEOUS
Status: DISCONTINUED | OUTPATIENT
Start: 2023-12-27 | End: 2023-12-27 | Stop reason: HOSPADM

## 2023-12-27 RX ORDER — POTASSIUM CHLORIDE 7.45 MG/ML
10 INJECTION INTRAVENOUS
Status: COMPLETED | OUTPATIENT
Start: 2023-12-27 | End: 2023-12-27

## 2023-12-27 RX ORDER — ACETAMINOPHEN 650 MG/1
650 SUPPOSITORY RECTAL EVERY 4 HOURS PRN
Status: CANCELLED | OUTPATIENT
Start: 2023-12-27

## 2023-12-27 RX ORDER — SODIUM CHLORIDE, SODIUM LACTATE, POTASSIUM CHLORIDE, CALCIUM CHLORIDE 600; 310; 30; 20 MG/100ML; MG/100ML; MG/100ML; MG/100ML
INJECTION, SOLUTION INTRAVENOUS CONTINUOUS
Status: DISCONTINUED | OUTPATIENT
Start: 2023-12-27 | End: 2023-12-27

## 2023-12-27 RX ORDER — MAGNESIUM SULFATE HEPTAHYDRATE 40 MG/ML
2 INJECTION, SOLUTION INTRAVENOUS
Status: COMPLETED | OUTPATIENT
Start: 2023-12-27 | End: 2023-12-27

## 2023-12-27 RX ORDER — ONDANSETRON 2 MG/ML
4 INJECTION INTRAMUSCULAR; INTRAVENOUS DAILY PRN
Status: DISCONTINUED | OUTPATIENT
Start: 2023-12-27 | End: 2023-12-27 | Stop reason: HOSPADM

## 2023-12-27 RX ORDER — SODIUM CHLORIDE, SODIUM LACTATE, POTASSIUM CHLORIDE, CALCIUM CHLORIDE 600; 310; 30; 20 MG/100ML; MG/100ML; MG/100ML; MG/100ML
125 INJECTION, SOLUTION INTRAVENOUS CONTINUOUS
Status: DISCONTINUED | OUTPATIENT
Start: 2023-12-27 | End: 2023-12-27

## 2023-12-27 RX ORDER — LIDOCAINE HYDROCHLORIDE 20 MG/ML
INJECTION INTRAVENOUS
Status: DISCONTINUED | OUTPATIENT
Start: 2023-12-27 | End: 2023-12-27

## 2023-12-27 RX ADMIN — POTASSIUM CHLORIDE 10 MEQ: 7.46 INJECTION, SOLUTION INTRAVENOUS at 08:12

## 2023-12-27 RX ADMIN — PROPOFOL 120 MG: 10 INJECTION, EMULSION INTRAVENOUS at 02:12

## 2023-12-27 RX ADMIN — IPRATROPIUM BROMIDE AND ALBUTEROL SULFATE 3 ML: 2.5; .5 SOLUTION RESPIRATORY (INHALATION) at 05:12

## 2023-12-27 RX ADMIN — OXYCODONE 5 MG: 5 TABLET ORAL at 08:12

## 2023-12-27 RX ADMIN — PAROXETINE HYDROCHLORIDE 40 MG: 10 TABLET, FILM COATED ORAL at 08:12

## 2023-12-27 RX ADMIN — POTASSIUM CHLORIDE 10 MEQ: 7.46 INJECTION, SOLUTION INTRAVENOUS at 11:12

## 2023-12-27 RX ADMIN — LIDOCAINE HYDROCHLORIDE 60 MG: 20 INJECTION, SOLUTION INTRAVENOUS at 02:12

## 2023-12-27 RX ADMIN — HYDRALAZINE HYDROCHLORIDE 5 MG: 20 INJECTION, SOLUTION INTRAMUSCULAR; INTRAVENOUS at 08:12

## 2023-12-27 RX ADMIN — TRAZODONE HYDROCHLORIDE 100 MG: 50 TABLET ORAL at 08:12

## 2023-12-27 RX ADMIN — TAMSULOSIN HYDROCHLORIDE 0.4 MG: 0.4 CAPSULE ORAL at 08:12

## 2023-12-27 RX ADMIN — AMIODARONE HYDROCHLORIDE 100 MG: 100 TABLET ORAL at 08:12

## 2023-12-27 RX ADMIN — IPRATROPIUM BROMIDE AND ALBUTEROL SULFATE 3 ML: 2.5; .5 SOLUTION RESPIRATORY (INHALATION) at 08:12

## 2023-12-27 RX ADMIN — POTASSIUM CHLORIDE 10 MEQ: 7.46 INJECTION, SOLUTION INTRAVENOUS at 09:12

## 2023-12-27 RX ADMIN — SODIUM CHLORIDE, POTASSIUM CHLORIDE, SODIUM LACTATE AND CALCIUM CHLORIDE: 600; 310; 30; 20 INJECTION, SOLUTION INTRAVENOUS at 01:12

## 2023-12-27 RX ADMIN — POTASSIUM BICARBONATE 40 MEQ: 782 TABLET, EFFERVESCENT ORAL at 12:12

## 2023-12-27 RX ADMIN — VANCOMYCIN HYDROCHLORIDE 1250 MG: 1.25 INJECTION, POWDER, LYOPHILIZED, FOR SOLUTION INTRAVENOUS at 08:12

## 2023-12-27 RX ADMIN — ATORVASTATIN CALCIUM 10 MG: 10 TABLET, FILM COATED ORAL at 08:12

## 2023-12-27 RX ADMIN — ONDANSETRON 8 MG: 2 INJECTION INTRAMUSCULAR; INTRAVENOUS at 03:12

## 2023-12-27 RX ADMIN — FOLIC ACID 1 MG: 1 TABLET ORAL at 08:12

## 2023-12-27 RX ADMIN — POTASSIUM CHLORIDE 10 MEQ: 7.46 INJECTION, SOLUTION INTRAVENOUS at 06:12

## 2023-12-27 RX ADMIN — FAMOTIDINE 40 MG: 20 TABLET ORAL at 08:12

## 2023-12-27 RX ADMIN — POTASSIUM CHLORIDE 40 MEQ: 1500 TABLET, EXTENDED RELEASE ORAL at 06:12

## 2023-12-27 RX ADMIN — ACETAMINOPHEN 1000 MG: 10 INJECTION, SOLUTION INTRAVENOUS at 02:12

## 2023-12-27 RX ADMIN — POTASSIUM CHLORIDE 40 MEQ: 1500 TABLET, EXTENDED RELEASE ORAL at 08:12

## 2023-12-27 RX ADMIN — DEXAMETHASONE SODIUM PHOSPHATE 4 MG: 4 INJECTION, SOLUTION INTRAMUSCULAR; INTRAVENOUS at 02:12

## 2023-12-27 RX ADMIN — DILTIAZEM HYDROCHLORIDE 30 MG: 30 TABLET, FILM COATED ORAL at 08:12

## 2023-12-27 RX ADMIN — LOSARTAN POTASSIUM 25 MG: 25 TABLET, FILM COATED ORAL at 08:12

## 2023-12-27 RX ADMIN — PREDNISONE 5 MG: 1 TABLET ORAL at 08:12

## 2023-12-27 RX ADMIN — POTASSIUM CHLORIDE 20 MEQ: 1500 TABLET, EXTENDED RELEASE ORAL at 03:12

## 2023-12-27 RX ADMIN — MAGNESIUM SULFATE HEPTAHYDRATE 2 G: 40 INJECTION, SOLUTION INTRAVENOUS at 03:12

## 2023-12-27 RX ADMIN — FLUTICASONE FUROATE AND VILANTEROL TRIFENATATE 1 PUFF: 100; 25 POWDER RESPIRATORY (INHALATION) at 08:12

## 2023-12-27 RX ADMIN — FINASTERIDE 5 MG: 5 TABLET, FILM COATED ORAL at 08:12

## 2023-12-27 NOTE — ASSESSMENT & PLAN NOTE
Patient with Paroxysmal (<7 days) atrial fibrillation which is controlled currently with Calcium Channel Blocker. Patient is currently in sinus rhythm.[id number] Score: 1. Hold AC for now given possible need for surgical intervention

## 2023-12-27 NOTE — PLAN OF CARE
Received care of patient from the OR, report received from ROSEANN Moss RN and MATT Mota CRNA.  Patient has oral airway in place and is asleep.  Purwick in place.  Purwick to continous low suction, O2 on per nc.  Orders received for transfer to ICU room 11

## 2023-12-27 NOTE — ASSESSMENT & PLAN NOTE
Recent admission for septic arthritis right knee and enterococcus bacteremia. Finished abx on 12/20  Knee with significant swelling and warmth to palpation  Reviewed xray and CT scan  Consult ortho in am to determine if patient should have repeat aspiration vs surgical management  Started IV vancomycin based of past cultures

## 2023-12-27 NOTE — OP NOTE
[facility] - Surgery  Brief Operative Note     SUMMARY     Surgery Date: 12/27/2023     Surgeon(s) and Role:     * Lexa Mcneill MD - Primary    First Assisstant: none    Pre-op Diagnosis:  Streptococcal arthritis of right knee [M00.261]    Post-op Diagnosis:  Same    Procedure: Procedure(s):  ARTHROSCOPY, KNEE, INCISION AND DRAINAGE    Anesthesia: General    Implants:* No implants in log *    Estimated Blood Loss: 10 mL         Specimens:   Specimen (24h ago, onward)      None            Description of Procedure:  After informed consent was obtained correctly identifying the patient he was taken to the operating room and after adequate anesthesia prepped and draped in usual standard fashion with the operative leg and a thigh gregorio nonoperative leg comfortably positioned in a gyn Maurilio stirrup.  Diagnostic arthroscopy was carried out through standard anteromedial standard lateral previous scope portals.  Examination of the knee revealed severe degenerative changes primarily of the medial patellofemoral compartments.  There was extensive thickened synovitis which was resected.  There was some loose cartilaginous and chondral pieces that were debrided and removed.  Primarily over the medial femoral condyle there was a fibrinous gelatinous covering that was debrided with a motorized shaver.  There was no gross purulence upon entering the knee.  I did however prior to beginning my arthroscopy after making my scope portals took aerobic and anaerobic culture of the knee as well as for Gram stain.  After copious irrigation and thorough debridement and synovectomy removed the scope portals closed them with 4-0 nylon sutures this was followed by Xeroform dressing sponges Webril and a lightly compressive Ace wrap.  Patient tolerated the procedure well was taken to the recovery room in stable condition with brisk capillary refill of his digits.

## 2023-12-27 NOTE — ED NOTES
Changed linens and brief at this time and placed purewick to suction . Vss . Warm blanket provided for comfort. Patient denies further needs at this time

## 2023-12-27 NOTE — PROGRESS NOTES
[facility] Emergency Dept  [facility] Medicine  Progress Note    Patient Name: Jama James  MRN: [id number]  Patient Class: OP- Observation   Admission Date: 12/26/2023  Length of Stay: 0 days  Attending Physician: Edu Meier MD  Primary Care Provider: Marisel Farrell III, MD        Subjective:     Principal Problem:Effusion of right knee        HPI:  Patient is a 68 w/ Chronic Hypoxic Respiratory Failure on 3-4 LFNC 2/2 COPD, Metastatic Prostate cancer, HTN, HLD, Afib on eliquis presenting with  presenting with confusion x 1 day. Has been feeling weak and lethargic for past 3-4 days. Recently discharged from [facility] to [facility]. Recent ED visit on 12/24 after fall at [place] where he hit his head. Work up negative and discharged back to [place]. Since then patient has become progressively weak. Today they called family because patient was not acting himself and he was brought to ED. No fever, chills, NVD. Patient was recently admitted to [facility] for septic arthritis right knee. He had washout via arthroscopy, and ID recommended ampicillin 2gm q4hr for 4 week course to end 12/20/23. Patient had swelling in right knee during stay at [facility]. Showed joint effusion. Plan was to set up aspiration with ortho as an outpatient.     Overview/Hospital Course:  No notes on file    Interval History: NPO overnight for procedure. Obtaining blood cultures to determine if patient is bacteremic from his possible infected knee. Patient with PICC in place that would need to be removed.    Review of Systems   All other systems reviewed and are negative.    Objective:     Vital Signs (Most Recent):  Temp: 98.5 °F (36.9 °C) (12/27/23 0830)  Pulse: 69 (12/27/23 0930)  Resp: (!) 22 (12/27/23 0930)  BP: (!) 165/84 (12/27/23 0930)  SpO2: 98 % (12/27/23 0930) Vital Signs (24h Range):  Temp:  [98 °F (36.7 °C)-98.5 °F (36.9 °C)] 98.5 °F (36.9 °C)  Pulse:  [40-85] 69  Resp:  [14-28] 22  SpO2:  [95 %-100 %] 98 %  BP: (162-186)/(70-98) 165/84      Weight: 77.1 kg (170 lb)  Body mass index is 20.16 kg/m².    Intake/Output Summary (Last 24 hours) at 12/27/2023 1125  Last data filed at 12/27/2023 1119  Gross per 24 hour   Intake 800 ml   Output 1200 ml   Net -400 ml         Physical Exam        Vitals reviewed  General: NAD, Well developed, Well Nourished  Head: NC/AT  Eyes: EOMI, MELISSA  Cardiovascular: Pulses intact distally, Regular Rate and rhythm  Pulmonary: Normal Respiratory Rate, No respiratory distress  Gi: Soft, Non-tender  Extremities: Warm, Right knee with significant swelling and warmth.   Skin: Warm, dry  Neuro: Alert, Oriented x3, No focal Deficit, globally very weak  Psych: Appropriate mood and affect      Significant Labs: All pertinent labs within the past 24 hours have been reviewed.    Significant Imaging: I have reviewed all pertinent imaging results/findings within the past 24 hours.    Assessment/Plan:      * Effusion of right knee  Recent admission for septic arthritis right knee and enterococcus bacteremia. Finished abx on 12/20  Knee with significant swelling and warmth to palpation  Reviewed xray and CT scan  Consulted ortho  Holding [facility]  Radiology consult placed for joint aspiration- fluid studies ordered  Patient NPO  I informed patient that he is a high risk patient for any surgical procedure due to his advanced comorbid conditions. He states that if he needs surgery he wants it to happen and appears to understand the risk.    Started IV vancomycin based of past cultures      Goals of care, counseling/discussion  Advance Care Planning  Patient confirmed to me that he is DNR/DNI      Benign localized prostatic hyperplasia with lower urinary tract symptoms (LUTS)  Continue home medications      PAF (paroxysmal atrial fibrillation)  Patient with Paroxysmal (<7 days) atrial fibrillation which is controlled currently with Calcium Channel Blocker. Patient is currently in sinus rhythm.[id number] Score: 1. Hold AC for now given  possible need for surgical intervention    COPD (chronic obstructive pulmonary disease)  On home O2 regimen currently  Continue home inhaler  Duoneb q8h  Continue home chronic steroids    Prostate cancer  Noted  Patient's family will have to bring in his home medication      Hyperlipidemia  Continue statin      Tobacco abuse  Nicotine patch PRN      Weakness  Likely due to deconditioning vs acute infection  PT/OT      Anxiety  Continue home medications      Depression  Continue home medicaitons    HTN (hypertension)  Chronic, controlled. Latest blood pressure and vitals reviewed-     Temp:  [98.4 °F (36.9 °C)]   Pulse:  [67-85]   Resp:  [14-27]   BP: (167-181)/(75-91)   SpO2:  [96 %-100 %] .   Home meds for hypertension were reviewed and noted below.   Hypertension Medications               diltiaZEM (CARDIZEM) 30 MG tablet Take 1 tablet (30 mg total) by mouth every 12 (twelve) hours.    losartan (COZAAR) 25 MG tablet Take 1 tablet (25 mg total) by mouth 2 (two) times daily.            While in the hospital, will manage blood pressure as follows; Continue home antihypertensive regimen    Will utilize p.r.n. blood pressure medication only if patient's blood pressure greater than 180/110 and he develops symptoms such as worsening chest pain or shortness of breath.      VTE Risk Mitigation (From admission, onward)           Ordered     Reason for No Pharmacological VTE Prophylaxis  Once        Question:  Reasons:  Answer:  Already adequately anticoagulated on oral Anticoagulants    12/26/23 1718     IP VTE HIGH RISK PATIENT  Once         12/26/23 1718     Place sequential compression device  Until discontinued         12/26/23 1718                    Discharge Planning   CLEMENTINA:      Code Status: DNR   Is the patient medically ready for discharge?:     Reason for patient still in hospital (select all that apply): Treatment                     Edu Meier MD  Department of Hospital Medicine   [facility]  Dept

## 2023-12-27 NOTE — ANESTHESIA PROCEDURE NOTES
Intubation    Date/Time: 12/27/2023 2:09 PM    Performed by: Becky Kwok CRNA  Authorized by: Zeus Mata MD    Intubation:     Induction:  Intravenous    Intubated:  Postinduction    Mask Ventilation:  Easy mask    Attempts:  1    Attempted By:  CRNA    Difficult Airway Encountered?: No      Complications:  None    Airway Device:  Supraglottic airway/LMA    Airway Device Size:  4.0 (igel)    Secured at:  The lips    Placement Verified By:  Capnometry    Complicating Factors:  None    Findings Post-Intubation:  BS equal bilateral and atraumatic/condition of teeth unchanged

## 2023-12-27 NOTE — ED NOTES
Changed patient brief and assisted with urinal at this time. No changes to status noted. Vitals stable. Additional warm blankets provided for comfort . Fluids infusing - site wnl. Patient denies further needs at this time.

## 2023-12-27 NOTE — H&P
[facility] Medicine  History & Physical    Patient Name: Jama James  MRN: [id number]  Patient Class: OP- Observation  Admission Date: 12/26/2023  Attending Physician: Edu Meier MD   Primary Care Provider: Marisel Farrell III, MD         Patient information was obtained from patient, past medical records, and ER records.     Subjective:     Principal Problem:Effusion of right knee    Chief Complaint:   Chief Complaint   Patient presents with    Altered Mental Status     EMS states patient from nursing home who reports patient is not acting like himself this morning.          HPI: Patient is a 68 w/ Chronic Hypoxic Respiratory Failure on 3-4 LFNC 2/2 COPD, Metastatic Prostate cancer, HTN, HLD, Afib on eliquis presenting with  presenting with confusion x 1 day. Has been feeling weak and lethargic for past 3-4 days. Recently discharged from [facility] [place] to [place]. Recent ED visit on 12/24 after fall at [place] where he hit his head. Work up negative and discharged back to [place]. Since then patient has become progressively weak. Today they called family because patient was not acting himself and he was brought to ED. No fever, chills, NVD. Patient was recently admitted to [facility] for septic arthritis right knee. He had washout via arthroscopy, and ID recommended ampicillin 2gm q4hr for 4 week course to end 12/20/23. Patient had swelling in right knee during stay at [facility] [place]. Showed joint effusion. Plan was to set up aspiration with ortho as an outpatient.     Past Medical History:   Diagnosis Date    Abnormal CT scan 7/6/2023    Abnormal positron emission tomography (PET) scan 7/6/2023    Anxiety     Asbestosis 08/04/2015    Atrial fibrillation     Bilateral adrenal adenomas     COPD (chronic obstructive pulmonary disease)     COVID-19 virus infection 07/28/2022    Depression     Elevated PSA 7/6/2023    High cholesterol     HTN (hypertension)     Malignant neoplasm of prostate     Multiple lung  nodules     Nodule of upper lobe of left lung 03/30/2023    6 mm spiculated on cta chest 3/21/23    On home oxygen therapy 05/24/2023    Pneumonia     Prostate cancer 5/27/2020    Prostate cancer metastatic to bone 7/6/2023    Prostate cancer metastatic to lung 7/6/2023    Ulcerative colitis        Past Surgical History:   Procedure Laterality Date    ARTHROSCOPY OF KNEE Right 11/22/2023    Procedure: ARTHROSCOPY, KNEE;  Surgeon: Harry Julien MD;  Location: [facility] OR;  Service: Orthopedics;  Laterality: Right;    NECK SURGERY      right knee      TRANSRECTAL BIOPSY OF PROSTATE WITH ULTRASOUND GUIDANCE Bilateral 3/18/2020    Procedure: BIOPSY, PROSTATE, RECTAL APPROACH, WITH US GUIDANCE;  Surgeon: Bill Jeong MD;  Location: [facility] OR;  Service: Urology;  Laterality: Bilateral;       Review of patient's allergies indicates:  No Known Allergies    No current facility-administered medications on file prior to encounter.     Current Outpatient Medications on File Prior to Encounter   Medication Sig    abiraterone (ZYTIGA) 250 mg Tab Take 4 tablets (1,000 mg total) by mouth once daily.    albuterol-ipratropium (DUO-NEB) 2.5 mg-0.5 mg/3 mL nebulizer solution PLACE ONE (1) VIAL IN NEBULIZER AND INHALE EVERY 6 HOURS AS DIRECTED AS NEEDED FOR WHEEZING    amiodarone (PACERONE) 100 MG Tab Take 1 tablet (100 mg total) by mouth once daily.    apixaban (ELIQUIS) 5 mg Tab Take 1 tablet (5 mg total) by mouth 2 (two) times daily.    aspirin (ECOTRIN) 81 MG EC tablet Take 1 tablet (81 mg total) by mouth once daily.    atorvastatin (LIPITOR) 10 MG tablet TAKE 1 TABLET AT BEDTIME FOR CHOLESTEROL (TAKE WITH CO-ENZYME Q-10)    bicalutamide (CASODEX) 50 MG Tab Take 1 tablet (50 mg total) by mouth once daily.    cyanocobalamin 2,000 mcg Tab Take 2,000 mcg by mouth once daily.    diazePAM (VALIUM) 5 MG tablet Take 1 tablet (5 mg total) by mouth every 8 (eight) hours.    diclofenac sodium (VOLTAREN) 1 % Gel Apply 2 g topically  once daily.    diltiaZEM (CARDIZEM) 30 MG tablet Take 1 tablet (30 mg total) by mouth every 12 (twelve) hours.    famotidine (PEPCID) 40 MG tablet Take 1 tablet (40 mg total) by mouth once daily.    ferrous sulfate (FEOSOL) 325 mg (65 mg iron) Tab tablet Take 1 tablet (325 mg total) by mouth every Mon, Wed, Fri.    finasteride (PROSCAR) 5 mg tablet Take 1 tablet (5 mg total) by mouth once daily.    fluticasone furoate-vilanteroL (BREO) 100-25 mcg/dose diskus inhaler Inhale 1 puff into the lungs once daily. Controller    folic acid (FOLVITE) 1 MG tablet Take 1 tablet (1 mg total) by mouth once daily.    losartan (COZAAR) 25 MG tablet Take 1 tablet (25 mg total) by mouth 2 (two) times daily.    oxyCODONE (ROXICODONE) 5 MG immediate release tablet Take 1 tablet (5 mg total) by mouth every 6 (six) hours as needed for Pain.    paroxetine (PAXIL) 40 MG tablet Take 1 tablet (40 mg total) by mouth once daily.    predniSONE (DELTASONE) 10 MG tablet Take 0.5 tablets (5 mg total) by mouth once daily.    tamsulosin (FLOMAX) 0.4 mg Cap Take 1 capsule (0.4 mg total) by mouth once daily.    traZODone (DESYREL) 100 MG tablet Take 1 tablet (100 mg total) by mouth every evening.    vancomycin HCl (VANCOMYCIN 1.25 G/250 ML NS, READY TO MIX,) Inject 250 mLs (1,250 mg total) into the vein every 12 (twelve) hours.     Family History       Problem Relation (Age of Onset)    Cancer Mother    Diabetes Mother, Sister          Tobacco Use    Smoking status: Every Day     Current packs/day: 2.00     Average packs/day: 2.0 packs/day for 50.3 years (100.6 ttl pk-yrs)     Types: Cigarettes     Start date: 9/7/1973     Passive exposure: Past    Smokeless tobacco: Never    Tobacco comments:     Pt states he smokes around 1-2ppd. Nicotine patch requested during his hospital stay. Information and education provided on smoking cessation classes.   Substance and Sexual Activity    Alcohol use: Yes     Comment: 8 16oz beers per day    Drug use: No     Sexual activity: Not Currently     Review of Systems   All other systems reviewed and are negative.    Objective:     Vital Signs (Most Recent):  Temp: 98.4 °F (36.9 °C) (12/26/23 1159)  Pulse: 77 (12/26/23 1842)  Resp: 17 (12/26/23 1814)  BP: (!) 176/82 (12/26/23 1842)  SpO2: 98 % (12/26/23 1842) Vital Signs (24h Range):  Temp:  [98.4 °F (36.9 °C)] 98.4 °F (36.9 °C)  Pulse:  [67-85] 77  Resp:  [14-27] 17  SpO2:  [96 %-100 %] 98 %  BP: (167-181)/(75-91) 176/82     Weight: 77.1 kg (170 lb)  Body mass index is 20.16 kg/m².     Physical Exam     Vitals reviewed  General: NAD, Well developed, Well Nourished  Head: NC/AT  Eyes: EOMI, MELISSA  Cardiovascular: Pulses intact distally, Regular Rate and rhythm  Pulmonary: Normal Respiratory Rate, No respiratory distress  Gi: Soft, Non-tender  Extremities: Warm, Right knee with significant swelling and warmth.   Skin: Warm, dry  Neuro: Alert, Oriented x3, No focal Deficit, globally very weak  Psych: Appropriate mood and affect          Significant Labs: All pertinent labs within the past 24 hours have been reviewed.    Significant Imaging: I have reviewed all pertinent imaging results/findings within the past 24 hours.  Assessment/Plan:     * Effusion of right knee  Recent admission for septic arthritis right knee and enterococcus bacteremia. Finished abx on 12/20  Knee with significant swelling and warmth to palpation  Reviewed xray and CT scan  Consult ortho in am to determine if patient should have repeat aspiration vs surgical management  Started IV vancomycin based of past cultures      Goals of care, counseling/discussion  Advance Care Planning  Patient confirmed to me that he is DNR/DNI      Benign localized prostatic hyperplasia with lower urinary tract symptoms (LUTS)  Continue home medications      PAF (paroxysmal atrial fibrillation)  Patient with Paroxysmal (<7 days) atrial fibrillation which is controlled currently with Calcium Channel Blocker. Patient is currently  in sinus rhythm.[id number] Score: 1. Hold AC for now given possible need for surgical intervention    COPD (chronic obstructive pulmonary disease)  On home O2 regimen currently  Continue home inhaler  Duoneb q8h  Continue home chronic steroids    Prostate cancer  Noted  Patient's family will have to bring in his home medication      Hyperlipidemia  Continue statin      Tobacco abuse  Nicotine patch PRN      Weakness  Likely due to deconditioning vs acute infection  PT/OT      Anxiety  Continue home medications      Depression  Continue home medicaitons    HTN (hypertension)  Chronic, controlled. Latest blood pressure and vitals reviewed-     Temp:  [98.4 °F (36.9 °C)]   Pulse:  [67-85]   Resp:  [14-27]   BP: (167-181)/(75-91)   SpO2:  [96 %-100 %] .   Home meds for hypertension were reviewed and noted below.   Hypertension Medications               diltiaZEM (CARDIZEM) 30 MG tablet Take 1 tablet (30 mg total) by mouth every 12 (twelve) hours.    losartan (COZAAR) 25 MG tablet Take 1 tablet (25 mg total) by mouth 2 (two) times daily.            While in the hospital, will manage blood pressure as follows; Continue home antihypertensive regimen    Will utilize p.r.n. blood pressure medication only if patient's blood pressure greater than 180/110 and he develops symptoms such as worsening chest pain or shortness of breath.      VTE Risk Mitigation (From admission, onward)           Ordered     Reason for No Pharmacological VTE Prophylaxis  Once        Question:  Reasons:  Answer:  Already adequately anticoagulated on oral Anticoagulants    12/26/23 1718     IP VTE HIGH RISK PATIENT  Once         12/26/23 1718     Place sequential compression device  Until discontinued         12/26/23 1718                       On 12/26/2023, patient should be placed in hospital observation services under my care.            Edu Meier MD  Department of Hospital Medicine  [facility] Emergency Dept

## 2023-12-27 NOTE — ASSESSMENT & PLAN NOTE
Recent admission for septic arthritis right knee and enterococcus bacteremia. Finished abx on 12/20  Knee with significant swelling and warmth to palpation  Reviewed xray and CT scan  Consulted ortho  Holding eliquis  Radiology consult placed for joint aspiration- fluid studies ordered  Patient NPO  I informed patient that he is a high risk patient for any surgical procedure due to his advanced comorbid conditions. He states that if he needs surgery he wants it to happen and appears to understand the risk.    Started IV vancomycin based of past cultures

## 2023-12-27 NOTE — ED NOTES
Patient sleeping , respirations even / unlabored . No distress noted. VSS . Call light within reach . Will continue to monitor.

## 2023-12-27 NOTE — SUBJECTIVE & OBJECTIVE
Interval History: NPO overnight for procedure. Obtaining blood cultures to determine if patient is bacteremic from his possible infected knee. Patient with PICC in place that would need to be removed.    Review of Systems   All other systems reviewed and are negative.    Objective:     Vital Signs (Most Recent):  Temp: 98.5 °F (36.9 °C) (12/27/23 0830)  Pulse: 69 (12/27/23 0930)  Resp: (!) 22 (12/27/23 0930)  BP: (!) 165/84 (12/27/23 0930)  SpO2: 98 % (12/27/23 0930) Vital Signs (24h Range):  Temp:  [98 °F (36.7 °C)-98.5 °F (36.9 °C)] 98.5 °F (36.9 °C)  Pulse:  [40-85] 69  Resp:  [14-28] 22  SpO2:  [95 %-100 %] 98 %  BP: (162-186)/(70-98) 165/84     Weight: 77.1 kg (170 lb)  Body mass index is 20.16 kg/m².    Intake/Output Summary (Last 24 hours) at 12/27/2023 1125  Last data filed at 12/27/2023 1119  Gross per 24 hour   Intake 800 ml   Output 1200 ml   Net -400 ml         Physical Exam        Vitals reviewed  General: NAD, Well developed, Well Nourished  Head: NC/AT  Eyes: EOMI, MELISSA  Cardiovascular: Pulses intact distally, Regular Rate and rhythm  Pulmonary: Normal Respiratory Rate, No respiratory distress  Gi: Soft, Non-tender  Extremities: Warm, Right knee with significant swelling and warmth.   Skin: Warm, dry  Neuro: Alert, Oriented x3, No focal Deficit, globally very weak  Psych: Appropriate mood and affect      Significant Labs: All pertinent labs within the past 24 hours have been reviewed.    Significant Imaging: I have reviewed all pertinent imaging results/findings within the past 24 hours.

## 2023-12-27 NOTE — TRANSFER OF CARE
"Anesthesia Transfer of Care Note    Patient: Jama James    Procedure(s) Performed: Procedure(s) (LRB):  ARTHROSCOPY, KNEE, INCISION AND DRAINAGE (Right)    Patient location: PACU    Anesthesia Type: general    Transport from OR: Transported from OR on room air with adequate spontaneous ventilation    Post pain: adequate analgesia    Post assessment: no apparent anesthetic complications and tolerated procedure well    Post vital signs: stable    Level of consciousness: awake, alert and oriented    Nausea/Vomiting: no nausea/vomiting    Complications: none    Transfer of care protocol was followed      Last vitals: Visit Vitals  BP (!) 174/89 (BP Location: Left arm)   Pulse 73   Temp 36.7 °C (98 °F) (Oral)   Resp 20   Ht 6' 5" (1.956 m)   Wt 77.1 kg (170 lb)   SpO2 97%   BMI 20.16 kg/m²     "

## 2023-12-27 NOTE — ASSESSMENT & PLAN NOTE
Chronic, controlled. Latest blood pressure and vitals reviewed-     Temp:  [98.4 °F (36.9 °C)]   Pulse:  [67-85]   Resp:  [14-27]   BP: (167-181)/(75-91)   SpO2:  [96 %-100 %] .   Home meds for hypertension were reviewed and noted below.   Hypertension Medications               diltiaZEM (CARDIZEM) 30 MG tablet Take 1 tablet (30 mg total) by mouth every 12 (twelve) hours.    losartan (COZAAR) 25 MG tablet Take 1 tablet (25 mg total) by mouth 2 (two) times daily.            While in the hospital, will manage blood pressure as follows; Continue home antihypertensive regimen    Will utilize p.r.n. blood pressure medication only if patient's blood pressure greater than 180/110 and he develops symptoms such as worsening chest pain or shortness of breath.

## 2023-12-27 NOTE — SUBJECTIVE & OBJECTIVE
Past Medical History:   Diagnosis Date    Abnormal CT scan 7/6/2023    Abnormal positron emission tomography (PET) scan 7/6/2023    Anxiety     Asbestosis 08/04/2015    Atrial fibrillation     Bilateral adrenal adenomas     COPD (chronic obstructive pulmonary disease)     COVID-19 virus infection 07/28/2022    Depression     Elevated PSA 7/6/2023    High cholesterol     HTN (hypertension)     Malignant neoplasm of prostate     Multiple lung nodules     Nodule of upper lobe of left lung 03/30/2023    6 mm spiculated on cta chest 3/21/23    On home oxygen therapy 05/24/2023    Pneumonia     Prostate cancer 5/27/2020    Prostate cancer metastatic to bone 7/6/2023    Prostate cancer metastatic to lung 7/6/2023    Ulcerative colitis        Past Surgical History:   Procedure Laterality Date    ARTHROSCOPY OF KNEE Right 11/22/2023    Procedure: ARTHROSCOPY, KNEE;  Surgeon: Haryr Julien MD;  Location: [facility] OR;  Service: Orthopedics;  Laterality: Right;    NECK SURGERY      right knee      TRANSRECTAL BIOPSY OF PROSTATE WITH ULTRASOUND GUIDANCE Bilateral 3/18/2020    Procedure: BIOPSY, PROSTATE, RECTAL APPROACH, WITH US GUIDANCE;  Surgeon: Bill Jeong MD;  Location: [facility] OR;  Service: Urology;  Laterality: Bilateral;       Review of patient's allergies indicates:  No Known Allergies    No current facility-administered medications on file prior to encounter.     Current Outpatient Medications on File Prior to Encounter   Medication Sig    abiraterone (ZYTIGA) 250 mg Tab Take 4 tablets (1,000 mg total) by mouth once daily.    albuterol-ipratropium (DUO-NEB) 2.5 mg-0.5 mg/3 mL nebulizer solution PLACE ONE (1) VIAL IN NEBULIZER AND INHALE EVERY 6 HOURS AS DIRECTED AS NEEDED FOR WHEEZING    amiodarone (PACERONE) 100 MG Tab Take 1 tablet (100 mg total) by mouth once daily.    apixaban (ELIQUIS) 5 mg Tab Take 1 tablet (5 mg total) by mouth 2 (two) times daily.    aspirin (ECOTRIN) 81 MG EC tablet Take 1 tablet (81  mg total) by mouth once daily.    atorvastatin (LIPITOR) 10 MG tablet TAKE 1 TABLET AT BEDTIME FOR CHOLESTEROL (TAKE WITH CO-ENZYME Q-10)    bicalutamide (CASODEX) 50 MG Tab Take 1 tablet (50 mg total) by mouth once daily.    cyanocobalamin 2,000 mcg Tab Take 2,000 mcg by mouth once daily.    diazePAM (VALIUM) 5 MG tablet Take 1 tablet (5 mg total) by mouth every 8 (eight) hours.    diclofenac sodium (VOLTAREN) 1 % Gel Apply 2 g topically once daily.    diltiaZEM (CARDIZEM) 30 MG tablet Take 1 tablet (30 mg total) by mouth every 12 (twelve) hours.    famotidine (PEPCID) 40 MG tablet Take 1 tablet (40 mg total) by mouth once daily.    ferrous sulfate (FEOSOL) 325 mg (65 mg iron) Tab tablet Take 1 tablet (325 mg total) by mouth every Mon, Wed, Fri.    finasteride (PROSCAR) 5 mg tablet Take 1 tablet (5 mg total) by mouth once daily.    fluticasone furoate-vilanteroL (BREO) 100-25 mcg/dose diskus inhaler Inhale 1 puff into the lungs once daily. Controller    folic acid (FOLVITE) 1 MG tablet Take 1 tablet (1 mg total) by mouth once daily.    losartan (COZAAR) 25 MG tablet Take 1 tablet (25 mg total) by mouth 2 (two) times daily.    oxyCODONE (ROXICODONE) 5 MG immediate release tablet Take 1 tablet (5 mg total) by mouth every 6 (six) hours as needed for Pain.    paroxetine (PAXIL) 40 MG tablet Take 1 tablet (40 mg total) by mouth once daily.    predniSONE (DELTASONE) 10 MG tablet Take 0.5 tablets (5 mg total) by mouth once daily.    tamsulosin (FLOMAX) 0.4 mg Cap Take 1 capsule (0.4 mg total) by mouth once daily.    traZODone (DESYREL) 100 MG tablet Take 1 tablet (100 mg total) by mouth every evening.    vancomycin HCl (VANCOMYCIN 1.25 G/250 ML NS, READY TO MIX,) Inject 250 mLs (1,250 mg total) into the vein every 12 (twelve) hours.     Family History       Problem Relation (Age of Onset)    Cancer Mother    Diabetes Mother, Sister          Tobacco Use    Smoking status: Every Day     Current packs/day: 2.00     Average  packs/day: 2.0 packs/day for 50.3 years (100.6 ttl pk-yrs)     Types: Cigarettes     Start date: 9/7/1973     Passive exposure: Past    Smokeless tobacco: Never    Tobacco comments:     Pt states he smokes around 1-2ppd. Nicotine patch requested during his hospital stay. Information and education provided on smoking cessation classes.   Substance and Sexual Activity    Alcohol use: Yes     Comment: 8 16oz beers per day    Drug use: No    Sexual activity: Not Currently     Review of Systems   All other systems reviewed and are negative.    Objective:     Vital Signs (Most Recent):  Temp: 98.4 °F (36.9 °C) (12/26/23 1159)  Pulse: 77 (12/26/23 1842)  Resp: 17 (12/26/23 1814)  BP: (!) 176/82 (12/26/23 1842)  SpO2: 98 % (12/26/23 1842) Vital Signs (24h Range):  Temp:  [98.4 °F (36.9 °C)] 98.4 °F (36.9 °C)  Pulse:  [67-85] 77  Resp:  [14-27] 17  SpO2:  [96 %-100 %] 98 %  BP: (167-181)/(75-91) 176/82     Weight: 77.1 kg (170 lb)  Body mass index is 20.16 kg/m².     Physical Exam     Vitals reviewed  General: NAD, Well developed, Well Nourished  Head: NC/AT  Eyes: EOMI, MELISSA  Cardiovascular: Pulses intact distally, Regular Rate and rhythm  Pulmonary: Normal Respiratory Rate, No respiratory distress  Gi: Soft, Non-tender  Extremities: Warm, Right knee with significant swelling and warmth.   Skin: Warm, dry  Neuro: Alert, Oriented x3, No focal Deficit, globally very weak  Psych: Appropriate mood and affect          Significant Labs: All pertinent labs within the past 24 hours have been reviewed.    Significant Imaging: I have reviewed all pertinent imaging results/findings within the past 24 hours.

## 2023-12-27 NOTE — ED NOTES
Care assumed of pt. Pt resting in bed with eyes closed; respirations even, unlabored; arouses to verbal stimuli. Pt moved to hospital bed. External urinary catheter in place. Pt has intermittent episodes of bigeminy noted on cardiac monitor. Pt denies chest pain, SOB or any other needs at this time.

## 2023-12-27 NOTE — PROGRESS NOTES
Pharmacokinetic Initial Assessment: IV Vancomycin    Assessment/Plan:    Initiate intravenous vancomycin with maintenance dose of 1250 mg every 12 hours.  Desired empiric serum trough concentration is 10 to 20 mcg/mL  Draw vancomycin trough level 60 min prior to third dose on 12/27 at approximately 1900.  Pharmacy will continue to follow and monitor vancomycin.      Please contact pharmacy at extension [phone] with any questions regarding this assessment.     Thank you for the consult,   Kerwin Cerna, MichelleD       Patient brief summary:  Jama James is a 68 y.o. male initiated on antimicrobial therapy with IV Vancomycin for treatment of suspected bone/joint infection    Drug Allergies:   Review of patient's allergies indicates:  No Known Allergies    Actual Body Weight:   77.1 kg    Renal Function:   Estimated Creatinine Clearance: 88 mL/min (based on SCr of 0.9 mg/dL).,     Dialysis Method (if applicable):  N/A    CBC (last 72 hours):  Recent Labs   Lab Result Units 12/24/23 2053 12/26/23  1320   WBC K/uL 5.49 7.14   Hemoglobin g/dL 9.0* 10.6*   Hematocrit % 29.7* 34.8*   Platelets K/uL 274 318   Gran % % 70.8 81.0*   Lymph % % 14.9* 9.0*   Mono % % 9.7 6.4   Eosinophil % % 3.5 2.5   Basophil % % 0.7 0.7   Differential Method  Automated Automated       Metabolic Panel (last 72 hours):  Recent Labs   Lab Result Units 12/24/23 2053 12/24/23  2228 12/26/23  1320 12/26/23  1323 12/26/23  1646   Sodium mmol/L 147*  --  146*  --   --    Potassium mmol/L 3.0*  --  2.7*  --   --    Chloride mmol/L 101  --  100  --   --    CO2 mmol/L 34*  --  34*  --   --    Glucose mg/dL 117*  --  128*  --   --    Glucose, UA   --  Negative  --  Negative Negative   BUN mg/dL 12  --  8  --   --    Creatinine mg/dL 0.9  --  0.9  --   --    Creatinine, Urine mg/dL  --  86.2  --  37.3  --    Albumin g/dL 2.7*  --  3.0*  --   --    Total Bilirubin mg/dL 0.4  --  0.5  --   --    Alkaline Phosphatase U/L 123  --  143*  --   --    AST U/L 13  --   "18  --   --    ALT U/L 9*  --  13  --   --    Magnesium mg/dL 2.1  --   --   --   --        Drug levels (last 3 results):  No results for input(s): "VANCOMYCINRA", "VANCORANDOM", "VANCOMYCINPE", "VANCOPEAK", "VANCOMYCINTR", "VANCOTROUGH" in the last 72 hours.    Microbiologic Results:  Microbiology Results (last 7 days)       Procedure Component Value Units Date/Time    Influenza A & B by Molecular [[id number]] Collected: 12/26/23 1653    Order Status: Completed Specimen: Nasopharyngeal Swab Updated: 12/26/23 0397     Influenza A, Molecular Negative     Influenza B, Molecular Negative     Flu A & B Source Nasal Swab            "

## 2023-12-27 NOTE — ANESTHESIA PREPROCEDURE EVALUATION
12/27/2023  Jama James is a 68 y.o., male.      Patient Active Problem List   Diagnosis    HTN (hypertension)    Ulcerative colitis    Depression    Anxiety    Weakness    SOB (shortness of breath)    SUBRAMANIAN (dyspnea on exertion)    Tobacco abuse    Asbestosis    Hyperlipidemia    Prostate cancer    COPD (chronic obstructive pulmonary disease)    PAF (paroxysmal atrial fibrillation)    Multiple lung nodules    On home oxygen therapy    Prostate cancer metastatic to bone    Prostate cancer metastatic to lung    Elevated PSA    Abnormal positron emission tomography (PET) scan    Abnormal CT scan    Streptococcal arthritis of right knee    Benign localized prostatic hyperplasia with lower urinary tract symptoms (LUTS)    Septic arthritis of knee, right    Bacteremia    Acute on chronic respiratory failure with hypoxia and hypercapnia    Postoperative anemia    Goals of care, counseling/discussion    Acute pain of right knee    Effusion of right knee       Past Surgical History:   Procedure Laterality Date    ARTHROSCOPY OF KNEE Right 11/22/2023    Procedure: ARTHROSCOPY, KNEE;  Surgeon: Harry Julien MD;  Location: [facility] OR;  Service: Orthopedics;  Laterality: Right;    NECK SURGERY      right knee      TRANSRECTAL BIOPSY OF PROSTATE WITH ULTRASOUND GUIDANCE Bilateral 3/18/2020    Procedure: BIOPSY, PROSTATE, RECTAL APPROACH, WITH US GUIDANCE;  Surgeon: Bill Jeong MD;  Location: [facility] OR;  Service: Urology;  Laterality: Bilateral;        Tobacco Use:  The patient  reports that he has been smoking cigarettes. He started smoking about 50 years ago. He has a 100.6 pack-year smoking history. He has been exposed to tobacco smoke. He has never used smokeless tobacco.     Results for orders placed or performed during the hospital encounter of 12/26/23   EKG 12-lead    Collection Time: 12/26/23  1:42 PM     Narrative    Test Reason : R41.82    Vent. Rate : 071 BPM     Atrial Rate : 071 BPM     P-R Int : 172 ms          QRS Dur : 084 ms      QT Int : 468 ms       P-R-T Axes : 107 223 106 degrees     QTc Int : 508 ms     Suspect arm lead reversal, interpretation assumes no reversal  Normal sinus rhythm  Low voltage QRS  Lateral infarct ,age undetermined  Prolonged QT  Abnormal ECG  When compared with ECG of 24-DEC-2023 20:46,  The axis Shifted left due  to current limb lead misplacements.  Nonspecific T wave abnormality now evident in Lateral leads  Confirmed by Yosi Danielle MD ([id number]) on 12/26/2023 3:10:37 PM    Referred By: [email or web address]   SELF           Confirmed By:Yosi Danielle MD        Imaging Results              US Guided Needle Placement (In process)                      CT Knee Without Contrast Right (Final result)  Result time 12/26/23 18:47:55      Final result by Dmitry Reno MD (12/26/23 18:47:55)                   Impression:      No evidence for acute fracture.    Severe osteoarthritis.    Multiple intra-articular bodies.    Moderate joint effusion.      Electronically signed by: Dmitry Reno MD  Date:    12/26/2023  Time:    18:47               Narrative:    EXAMINATION:  CT KNEE WITHOUT CONTRAST RIGHT    CLINICAL HISTORY:  Septic arthritis suspected, knee, xray done;    TECHNIQUE:  Axial images through the right knee were acquired without contrast with multiplanar reformatted images created.    COMPARISON:  None    FINDINGS:  There is no fracture or dislocation.  There is severe medial and lateral tibiofemoral joint space loss and bulky tricompartmental marginal osteophyte formation in keeping with severe osteoarthritis.  A moderate joint effusion is present.  There are 3 intra-articular bodies within the medial aspect of the suprapatellar bursa, the largest measuring 3.1 cm.  A 1.2 cm intra-articular body is present within the anterior medial tibiofemoral compartment.  Several possible distal  loose bodies are present potentially within the popliteus tendon sheath.                                       X-Ray Knee 3 View Right (Final result)  Result time 12/26/23 17:13:26      Final result by Dmitry Reno MD (12/26/23 17:13:26)                   Impression:      Severe osteoarthritis.  Joint effusion with possible suprapatellar loose bodies.      Electronically signed by: Dmitry Reno MD  Date:    12/26/2023  Time:    17:13               Narrative:    EXAMINATION:  XR KNEE 3 VIEW RIGHT    CLINICAL HISTORY:  Knee swelling;    TECHNIQUE:  AP, lateral, and Merchant views of the right knee were performed.    COMPARISON:  11/20/2023    FINDINGS:  No fracture or dislocation.  There is a small joint effusion, with possible loose body/bodies within the medial suprapatellar bursa measuring 3.5 cm in total.  There is severe tricompartmental degenerative change.                                       CT Head Without Contrast (Final result)  Result time 12/26/23 13:45:58      Final result by Lisa Ortega MD (12/26/23 13:45:58)                   Impression:      There is no evidence acute intracranial abnormality.      Electronically signed by: Lisa Ortega MD  Date:    12/26/2023  Time:    13:45               Narrative:    EXAMINATION:  CT HEAD WITHOUT CONTRAST    CLINICAL HISTORY:  Mental status change, unknown cause;    TECHNIQUE:  Low dose axial CT images obtained throughout the head without intravenous contrast. Sagittal and coronal reconstructions were performed.    COMPARISON:  None.    FINDINGS:  Intracranial compartment:    Ventricles and sulci are normal in size for age without evidence of hydrocephalus. No extra-axial blood or fluid collections.    The brain parenchyma appears normal. No parenchymal mass, hemorrhage, edema or major vascular distribution infarct.    Skull/extracranial contents (limited evaluation): No fracture. Mastoid air cells and paranasal sinuses are essentially  clear.  There is soft tissue swelling overlying the right parietal bone.                                       Lab Results   Component Value Date    WBC 7.69 12/27/2023    HGB 9.8 (L) 12/27/2023    HCT 32.0 (L) 12/27/2023    MCV 84 12/27/2023     12/27/2023     BMP  Lab Results   Component Value Date     (H) 12/27/2023    K 2.5 (LL) 12/27/2023    K 2.5 (LL) 12/27/2023    CL 98 12/27/2023    CO2 33 (H) 12/27/2023    BUN 6 (L) 12/27/2023    CREATININE 0.8 12/27/2023    CALCIUM 9.2 12/27/2023    ANIONGAP 16 12/27/2023     (H) 12/27/2023     12/27/2023     12/27/2023       Results for orders placed during the hospital encounter of 11/20/23    Echo    Interpretation Summary    Left Ventricle: The left ventricle is normal in size. Mildly increased wall thickness. Normal wall motion. There is normal systolic function with a visually estimated ejection fraction of 55 - 70%. Ejection fraction by visual approximation is 70%. Global longitudinal strain is -16%. Reduced. There is normal diastolic function.    Right Ventricle: Normal right ventricular cavity size. Systolic function is normal. TAPSE is 2.50 cm.    Left Atrium: Left atrium is moderately dilated.    Aortic Valve: The aortic valve is a trileaflet valve.    IVC/SVC: Normal venous pressure at 3 mmHg.    Pericardium: There is a trivial effusion. No indication of cardiac tamponade.    Some suggestion for LA enlargement from Echo in 3/2023.         Pre-op Assessment    I have reviewed the Patient Summary Reports.     I have reviewed the Nursing Notes. I have reviewed the NPO Status.   I have reviewed the Medications.     Review of Systems  Anesthesia Hx:  No problems with previous Anesthesia   History of prior surgery of interest to airway management or planning: cervical fusion.         Denies Family Hx of Anesthesia complications.    Denies Personal Hx of Anesthesia complications.                    Social:  Alcohol Use, Smoker        Hematology/Oncology:                   Hematology Comments: Eliquis Therapy     Current/Recent Cancer. (metastatic prostate cancer)                Cardiovascular:     Hypertension, poorly controlled    Dysrhythmias (sinus on most recent EKG) atrial fibrillation      hyperlipidemia SUBRAMANIAN  ECG has been reviewed. Prolonged QT noted on EKG       Shortness of Breath Dyspnea On Extertion                   Hypertension     Atrial Fibrillation     Pulmonary:  Pneumonia COPD, severe   Shortness of breath   Prostate cancer metastatic to lung    Albuterol Inhaler use     Duo Neb Nebulizer use     Home Oxygen Therapy 3-4 L / NC     Multiple lung nodules    Asbestosis     Chronic Obstructive Pulmonary Disease (COPD):              Chest Tumor/Mass:   Metastatic Cancer of Lung Cancer     Pulmonary Infection:  Pneumonia.     Renal/:    BPH    Neoplasm/Tumor, Prostate Cancer.           Hepatic/GI:   PUD,        Peptic Ulcer Disease   Bowel Conditions:  Inflammatory Bowel Disease, Ulcerative Colitis        Musculoskeletal:  Arthritis   Septic arthritis of knee, right   Joint Disease:  Arthritis Arthritis, septic  Bone Disorders:     Prostate cancer metastatic to bone       Endocrine:       Adrenal Disease, Bilateral adrenal adenomas    Psych:  Psychiatric History anxiety depression                Physical Exam  General: Somnolent    Airway:  Mallampati: II   Mouth Opening: Normal  TM Distance: Normal  Tongue: Normal  Neck ROM: Extension Decreased    Dental:  Periodontal disease  Severe periodontal disease, all teeth cracked and discolored, many broken off at gumline or missing, pt denies loose teeth      Anesthesia Plan  Type of Anesthesia, risks & benefits discussed:    Anesthesia Type: Gen Supraglottic Airway  Intra-op Monitoring Plan: Standard ASA Monitors  Post Op Pain Control Plan: multimodal analgesia and IV/PO Opioids PRN  Induction:  IV  Airway Plan: Video, Post-Induction  Informed Consent: Informed consent signed with the  Patient and all parties understand the risks and agree with anesthesia plan.  All questions answered.   ASA Score: 4 Emergent  Day of Surgery Review of History & Physical: H&P Update referred to the surgeon/provider.    Ready For Surgery From Anesthesia Perspective.     .  Lab Results   Component Value Date    WBC 7.69 12/27/2023    HGB 9.8 (L) 12/27/2023    HCT 32.0 (L) 12/27/2023    MCV 84 12/27/2023     12/27/2023

## 2023-12-27 NOTE — CONSULTS
Past Medical History:   Diagnosis Date    Abnormal CT scan 7/6/2023    Abnormal positron emission tomography (PET) scan 7/6/2023    Anxiety     Asbestosis 08/04/2015    Atrial fibrillation     Bilateral adrenal adenomas     COPD (chronic obstructive pulmonary disease)     COVID-19 virus infection 07/28/2022    Depression     Elevated PSA 7/6/2023    High cholesterol     HTN (hypertension)     Malignant neoplasm of prostate     Multiple lung nodules     Nodule of upper lobe of left lung 03/30/2023    6 mm spiculated on cta chest 3/21/23    On home oxygen therapy 05/24/2023    Pneumonia     Prostate cancer 5/27/2020    Prostate cancer metastatic to bone 7/6/2023    Prostate cancer metastatic to lung 7/6/2023    Ulcerative colitis        Past Surgical History:   Procedure Laterality Date    ARTHROSCOPY OF KNEE Right 11/22/2023    Procedure: ARTHROSCOPY, KNEE;  Surgeon: Harry Julien MD;  Location: [facility] OR;  Service: Orthopedics;  Laterality: Right;    NECK SURGERY      right knee      TRANSRECTAL BIOPSY OF PROSTATE WITH ULTRASOUND GUIDANCE Bilateral 3/18/2020    Procedure: BIOPSY, PROSTATE, RECTAL APPROACH, WITH US GUIDANCE;  Surgeon: Bill Jeong MD;  Location: [facility] OR;  Service: Urology;  Laterality: Bilateral;         Current Facility-Administered Medications:     albuterol-ipratropium 2.5 mg-0.5 mg/3 mL nebulizer solution 3 mL, 3 mL, Nebulization, Q8H WA, Edu Meier MD, 3 mL at 12/27/23 0803    aluminum-magnesium hydroxide-simethicone 200-200-20 mg/5 mL suspension 30 mL, 30 mL, Oral, QID PRN, Edu Meier MD    amiodarone tablet 100 mg, 100 mg, Oral, Daily, Tate Goodrich MD, 100 mg at 12/27/23 0805    atorvastatin tablet 10 mg, 10 mg, Oral, QHS, Edu Meier MD, 10 mg at 12/26/23 2128    dextrose 10% bolus 125 mL 125 mL, 12.5 g, Intravenous, PRN, Edu Meier MD    dextrose 10% bolus 250 mL 250 mL, 25 g, Intravenous, PRN, Edu Meier MD    diazePAM  tablet 5 mg, 5 mg, Oral, Q8H PRN, Edu Meier MD    diltiaZEM tablet 30 mg, 30 mg, Oral, Q12H, Tate Goodrich MD, 30 mg at 12/26/23 2128    famotidine tablet 40 mg, 40 mg, Oral, Daily, Edu Meier MD, 40 mg at 12/27/23 0816    finasteride tablet 5 mg, 5 mg, Oral, Daily, Edu Meier MD, 5 mg at 12/27/23 0805    fluticasone furoate-vilanteroL 100-25 mcg/dose diskus inhaler 1 puff, 1 puff, Inhalation, Daily, Edu Meier MD, 1 puff at 12/27/23 0802    folic acid tablet 1 mg, 1 mg, Oral, Daily, Edu Meier MD, 1 mg at 12/27/23 0804    glucagon (human recombinant) injection 1 mg, 1 mg, Intramuscular, PRN, Edu Meier MD    glucose chewable tablet 16 g, 16 g, Oral, PRN, Edu Meier MD    glucose chewable tablet 24 g, 24 g, Oral, PRN, Edu Meier MD    hydrALAZINE injection 5 mg, 5 mg, Intravenous, Q6H PRN, Edu Meier MD, 5 mg at 12/27/23 0816    losartan tablet 25 mg, 25 mg, Oral, BID, Edu Meier MD, 25 mg at 12/27/23 0816    naloxone 0.4 mg/mL injection 0.02 mg, 0.02 mg, Intravenous, PRN, Edu Meier MD    ondansetron injection 4 mg, 4 mg, Intravenous, Q8H PRN, Edu Meier MD    oxyCODONE immediate release tablet 5 mg, 5 mg, Oral, Q6H PRN, Edu Meier MD, 5 mg at 12/27/23 0840    paroxetine tablet 40 mg, 40 mg, Oral, Daily, Edu Meier MD, 40 mg at 12/27/23 0805    predniSONE tablet 5 mg, 5 mg, Oral, Daily, Edu Meier MD, 5 mg at 12/27/23 0805    prochlorperazine injection Soln 5 mg, 5 mg, Intravenous, Q6H PRN, Edu Meier MD    sodium chloride 0.9% flush 10 mL, 10 mL, Intravenous, Q12H PRN, Edu Meier MD    tamsulosin 24 hr capsule 0.4 mg, 0.4 mg, Oral, Daily, Edu Meier MD, 0.4 mg at 12/27/23 0816    traZODone tablet 100 mg, 100 mg, Oral, QHS, Edu Meier MD, 100 mg at 12/26/23 2128    Pharmacy to dose Vancomycin consult, , ,  Once **AND** vancomycin - pharmacy to dose, , Intravenous, pharmacy to manage frequency, Edu Meier MD    vancomycin 1,250 mg in dextrose 5 % (D5W) 250 mL IVPB (Vial-Mate), 1,250 mg, Intravenous, Q12H, Edu Meier MD, Stopped at 12/27/23 1009    Current Outpatient Medications:     abiraterone (ZYTIGA) 250 mg Tab, Take 4 tablets (1,000 mg total) by mouth once daily., Disp: 120 tablet, Rfl: 11    albuterol-ipratropium (DUO-NEB) 2.5 mg-0.5 mg/3 mL nebulizer solution, PLACE ONE (1) VIAL IN NEBULIZER AND INHALE EVERY 6 HOURS AS DIRECTED AS NEEDED FOR WHEEZING, Disp: 180 mL, Rfl: 2    amiodarone (PACERONE) 100 MG Tab, Take 1 tablet (100 mg total) by mouth once daily., Disp: 30 tablet, Rfl: 11    apixaban (ELIQUIS) 5 mg Tab, Take 1 tablet (5 mg total) by mouth 2 (two) times daily., Disp: 180 tablet, Rfl: 1    aspirin (ECOTRIN) 81 MG EC tablet, Take 1 tablet (81 mg total) by mouth once daily., Disp: 90 tablet, Rfl: 3    atorvastatin (LIPITOR) 10 MG tablet, TAKE 1 TABLET AT BEDTIME FOR CHOLESTEROL (TAKE WITH CO-ENZYME Q-10), Disp: 90 tablet, Rfl: 3    bicalutamide (CASODEX) 50 MG Tab, Take 1 tablet (50 mg total) by mouth once daily., Disp: 14 tablet, Rfl: 0    cyanocobalamin 2,000 mcg Tab, Take 2,000 mcg by mouth once daily., Disp: , Rfl:     diazePAM (VALIUM) 5 MG tablet, Take 1 tablet (5 mg total) by mouth every 8 (eight) hours., Disp: 90 tablet, Rfl: 0    diclofenac sodium (VOLTAREN) 1 % Gel, Apply 2 g topically once daily., Disp: 100 g, Rfl: 3    diltiaZEM (CARDIZEM) 30 MG tablet, Take 1 tablet (30 mg total) by mouth every 12 (twelve) hours., Disp: 60 tablet, Rfl: 11    famotidine (PEPCID) 40 MG tablet, Take 1 tablet (40 mg total) by mouth once daily., Disp: 30 tablet, Rfl: 11    ferrous sulfate (FEOSOL) 325 mg (65 mg iron) Tab tablet, Take 1 tablet (325 mg total) by mouth every Mon, Wed, Fri., Disp: 30 tablet, Rfl: 1    finasteride (PROSCAR) 5 mg tablet, Take 1 tablet (5 mg total) by mouth once  daily., Disp: 30 tablet, Rfl: 11    fluticasone furoate-vilanteroL (BREO) 100-25 mcg/dose diskus inhaler, Inhale 1 puff into the lungs once daily. Controller, Disp: 1 each, Rfl: 3    folic acid (FOLVITE) 1 MG tablet, Take 1 tablet (1 mg total) by mouth once daily., Disp: , Rfl: 0    losartan (COZAAR) 25 MG tablet, Take 1 tablet (25 mg total) by mouth 2 (two) times daily., Disp: 180 tablet, Rfl: 3    oxyCODONE (ROXICODONE) 5 MG immediate release tablet, Take 1 tablet (5 mg total) by mouth every 6 (six) hours as needed for Pain., Disp: 30 tablet, Rfl: 0    paroxetine (PAXIL) 40 MG tablet, Take 1 tablet (40 mg total) by mouth once daily., Disp: 30 tablet, Rfl: 11    predniSONE (DELTASONE) 10 MG tablet, Take 0.5 tablets (5 mg total) by mouth once daily., Disp: 5 tablet, Rfl: 0    tamsulosin (FLOMAX) 0.4 mg Cap, Take 1 capsule (0.4 mg total) by mouth once daily., Disp: 90 capsule, Rfl: 3    traZODone (DESYREL) 100 MG tablet, Take 1 tablet (100 mg total) by mouth every evening., Disp: 30 tablet, Rfl: 11    vancomycin HCl (VANCOMYCIN 1.25 G/250 ML NS, READY TO MIX,), Inject 250 mLs (1,250 mg total) into the vein every 12 (twelve) hours., Disp: 14 Bag, Rfl: 1    Allergies as of 12/26/2023    (No Known Allergies)       Family History   Problem Relation Age of Onset    Cancer Mother         Uterine and Ovarian cancer    Diabetes Mother     Diabetes Sister        Social History     Socioeconomic History    Marital status:    Tobacco Use    Smoking status: Every Day     Current packs/day: 2.00     Average packs/day: 2.0 packs/day for 50.3 years (100.6 ttl pk-yrs)     Types: Cigarettes     Start date: 9/7/1973     Passive exposure: Past    Smokeless tobacco: Never    Tobacco comments:     Pt states he smokes around 1-2ppd. Nicotine patch requested during his hospital stay. Information and education provided on smoking cessation classes.   Substance and Sexual Activity    Alcohol use: Yes     Comment: 8 16oz beers per day     Drug use: No    Sexual activity: Not Currently     Social Determinants of Health     Financial Resource Strain: Low Risk  (12/1/2023)    Overall Financial Resource Strain (CARDIA)     Difficulty of Paying Living Expenses: Not hard at all   Food Insecurity: No Food Insecurity (12/1/2023)    Hunger Vital Sign     Worried About Running Out of Food in the Last Year: Never true     Ran Out of Food in the Last Year: Never true   Transportation Needs: No Transportation Needs (12/1/2023)    PRAPARE - Transportation     Lack of Transportation (Medical): No     Lack of Transportation (Non-Medical): No   Physical Activity: Inactive (12/1/2023)    Exercise Vital Sign     Days of Exercise per Week: 0 days     Minutes of Exercise per Session: 0 min   Stress: Stress Concern Present (12/1/2023)    [employer] [place] [employer] - Occupational Stress Questionnaire     Feeling of Stress : To some extent   Social Connections: Moderately Isolated (12/1/2023)    Social Connection and Isolation Panel [NHANES]     Frequency of Communication with Friends and Family: More than three times a week     Frequency of Social Gatherings with Friends and Family: Once a week     Attends [employer] Services: Never     Active Member of Clubs or Organizations: No     Attends Club or Organization Meetings: Never     Marital Status:    Housing Stability: Low Risk  (12/1/2023)    Housing Stability Vital Sign     Unable to Pay for Housing in the Last Year: No     Number of Places Lived in the Last Year: 1     Unstable Housing in the Last Year: No         CC:  Right knee pain    HPI:  Patient is a 68-year-old male with a 6 week history intra-articular right knee infection.  Was seen and [facility] and deemed not an appropriate candidate for this facility at the time due to his comorbidities was transferred to [facility] where an arthroscopic washout was performed.  He had a flare-up of his pain and swelling while in rehab and apparently the  recommendation was made by orthopedic services at that time to get another aspirate as an outpatient.  With increasing pain transferred to the emergency department with increased pain and swelling.      Review of Systems   Constitution: Negative for chills and fever.   HENT: Negative for headaches and blurry vision.   Cardiovascular: Negative for chest pain, irregular heartbeat, leg swelling and palpitations.   Respiratory: Negative for cough and shortness of breath.   Endocrine: Negative for polyuria.   Hematologic/Lymphatic: Negative for bleeding problem. Does not bruise/bleed easily.   Skin: Negative for poor wound healing and rash.   Musculoskeletal: Negative for joint pain. Negative for arthritis, joint swelling, muscle weakness and myalgias.   Gastrointestinal: Negative for abdominal pain, heartburn, melena, nausea and vomiting.   Genitourinary: Negative for bladder incontinence and dysuria.   Neurological: Negative for numbness.   Psychiatric/Behavioral: Negative for depression. The patient is not nervous/anxious.     PE:  Temp:  [98 °F (36.7 °C)-98.5 °F (36.9 °C)] 98.5 °F (36.9 °C)  Pulse:  [40-85] 66  Resp:  [14-28] 20  SpO2:  [95 %-100 %] 100 %  BP: (138-186)/(70-98) 138/74    Constitutional:   Patient is alert  and oriented in no acute distress  HEENT:  normocephalic atraumatic; PERRL EOMI  Neck:  Supple without adenopathy  Cardiovascular:  Normal rate and rhythm  Pulmonary:  Normal respiratory effort normal chest wall expansion  Abdominal:  Nonprotuberant nondistended  Musculoskeletal:  Patient has mild tenderness over the left greater trochanter  Limited range of motion of right knee secondary to pain.  Moves foot toes and ankles without pain has brisk capillary refill of her digits.  Neurological:  No focal defect; cranial nerves 2-12 grossly intact  Psychiatric/behavioral:  Mood and behavior normal    Xrays:  Radiographs of the right knee consistent with severe degenerative  changes      Labs:    Recent Results (from the past 24 hour(s))   CBC auto differential    Collection Time: 12/26/23  1:20 PM   Result Value Ref Range    WBC 7.14 3.90 - 12.70 K/uL    RBC 4.12 (L) 4.60 - 6.20 M/uL    Hemoglobin 10.6 (L) 14.0 - 18.0 g/dL    Hematocrit 34.8 (L) 40.0 - 54.0 %    MCV 85 82 - 98 fL    MCH 25.7 (L) 27.0 - 31.0 pg    MCHC 30.5 (L) 32.0 - 36.0 g/dL    RDW 14.9 (H) 11.5 - 14.5 %    Platelets 318 150 - 450 K/uL    MPV 9.8 9.2 - 12.9 fL    Immature Granulocytes 0.4 0.0 - 0.5 %    Gran # (ANC) 5.8 1.8 - 7.7 K/uL    Immature Grans (Abs) 0.03 0.00 - 0.04 K/uL    Lymph # 0.6 (L) 1.0 - 4.8 K/uL    Mono # 0.5 0.3 - 1.0 K/uL    Eos # 0.2 0.0 - 0.5 K/uL    Baso # 0.05 0.00 - 0.20 K/uL    nRBC 0 0 /100 WBC    Gran % 81.0 (H) 38.0 - 73.0 %    Lymph % 9.0 (L) 18.0 - 48.0 %    Mono % 6.4 4.0 - 15.0 %    Eosinophil % 2.5 0.0 - 8.0 %    Basophil % 0.7 0.0 - 1.9 %    Differential Method Automated    Comprehensive metabolic panel    Collection Time: 12/26/23  1:20 PM   Result Value Ref Range    Sodium 146 (H) 136 - 145 mmol/L    Potassium 2.7 (LL) 3.5 - 5.1 mmol/L    Chloride 100 95 - 110 mmol/L    CO2 34 (H) 23 - 29 mmol/L    Glucose 128 (H) 70 - 110 mg/dL    BUN 8 8 - 23 mg/dL    Creatinine 0.9 0.5 - 1.4 mg/dL    Calcium 9.4 8.7 - 10.5 mg/dL    Total Protein 6.9 6.0 - 8.4 g/dL    Albumin 3.0 (L) 3.5 - 5.2 g/dL    Total Bilirubin 0.5 0.1 - 1.0 mg/dL    Alkaline Phosphatase 143 (H) 55 - 135 U/L    AST 18 10 - 40 U/L    ALT 13 10 - 44 U/L    eGFR >60.0 >60 mL/min/1.73 m^2    Anion Gap 12 8 - 16 mmol/L   Ethanol    Collection Time: 12/26/23  1:20 PM   Result Value Ref Range    Alcohol, Serum <10 0 - 10 mg/dL   Urinalysis, Reflex to Urine Culture Urine, Clean Catch    Collection Time: 12/26/23  1:23 PM    Specimen: Urine, Clean Catch   Result Value Ref Range    Specimen UA Urine, Unspecified     Color, UA Yellow Yellow, Straw, Briana    Appearance, UA Clear Clear    pH, UA 7.0 5.0 - 8.0    Specific Gravity, UA  1.020 1.005 - 1.030    Protein, UA Negative Negative    Glucose, UA Negative Negative    Ketones, UA Negative Negative    Bilirubin (UA) Negative Negative    Occult Blood UA Negative Negative    Nitrite, UA Negative Negative    Urobilinogen, UA 1.0 Negative EU/dL    Leukocytes, UA Negative Negative   Drug screen panel, emergency    Collection Time: 12/26/23  1:23 PM   Result Value Ref Range    Benzodiazepines Presumptive Positive (A) Negative    Methadone metabolites Negative Negative    Cocaine (Metab.) Negative Negative    Opiate Scrn, Ur Negative Negative    Barbiturate Screen, Ur Negative Negative    Amphetamine Screen, Ur Negative Negative    THC Negative Negative    Phencyclidine Negative Negative    Creatinine, Urine 37.3 23.0 - 375.0 mg/dL    Toxicology Information SEE COMMENT    Urinalysis    Collection Time: 12/26/23  4:46 PM   Result Value Ref Range    Specimen UA Urine, Clean Catch     Color, UA Yellow Yellow, Straw, Briana    Appearance, UA Clear Clear    pH, UA 7.0 5.0 - 8.0    Specific Gravity, UA 1.020 1.005 - 1.030    Protein, UA Negative Negative    Glucose, UA Negative Negative    Ketones, UA Trace (A) Negative    Bilirubin (UA) Negative Negative    Occult Blood UA Trace (A) Negative    Nitrite, UA Negative Negative    Urobilinogen, UA 1.0 Negative EU/dL    Leukocytes, UA Negative Negative   COVID-19 Rapid Screening    Collection Time: 12/26/23  4:53 PM   Result Value Ref Range    SARS-CoV-2 RNA, Amplification, Qual Negative Negative   Influenza A & B by Molecular    Collection Time: 12/26/23  4:53 PM    Specimen: Nasopharyngeal Swab   Result Value Ref Range    Influenza A, Molecular Negative Negative    Influenza B, Molecular Negative Negative    Flu A & B Source Nasal Swab    Basic metabolic panel    Collection Time: 12/27/23  1:59 AM   Result Value Ref Range    Sodium 145 136 - 145 mmol/L    Potassium 3.9 3.5 - 5.1 mmol/L    Chloride 99 95 - 110 mmol/L    CO2 31 (H) 23 - 29 mmol/L    Glucose 101  70 - 110 mg/dL    BUN 7 (L) 8 - 23 mg/dL    Creatinine 0.8 0.5 - 1.4 mg/dL    Calcium 9.0 8.7 - 10.5 mg/dL    Anion Gap 15 8 - 16 mmol/L    eGFR >60.0 >60 mL/min/1.73 m^2   Magnesium    Collection Time: 12/27/23  1:59 AM   Result Value Ref Range    Magnesium 1.9 1.6 - 2.6 mg/dL   CBC Auto Differential    Collection Time: 12/27/23  4:08 AM   Result Value Ref Range    WBC 7.69 3.90 - 12.70 K/uL    RBC 3.82 (L) 4.60 - 6.20 M/uL    Hemoglobin 9.8 (L) 14.0 - 18.0 g/dL    Hematocrit 32.0 (L) 40.0 - 54.0 %    MCV 84 82 - 98 fL    MCH 25.7 (L) 27.0 - 31.0 pg    MCHC 30.6 (L) 32.0 - 36.0 g/dL    RDW 14.7 (H) 11.5 - 14.5 %    Platelets 317 150 - 450 K/uL    MPV 9.8 9.2 - 12.9 fL    Immature Granulocytes 0.3 0.0 - 0.5 %    Gran # (ANC) 5.6 1.8 - 7.7 K/uL    Immature Grans (Abs) 0.02 0.00 - 0.04 K/uL    Lymph # 1.0 1.0 - 4.8 K/uL    Mono # 0.7 0.3 - 1.0 K/uL    Eos # 0.4 0.0 - 0.5 K/uL    Baso # 0.05 0.00 - 0.20 K/uL    nRBC 0 0 /100 WBC    Gran % 72.2 38.0 - 73.0 %    Lymph % 12.4 (L) 18.0 - 48.0 %    Mono % 9.1 4.0 - 15.0 %    Eosinophil % 5.3 0.0 - 8.0 %    Basophil % 0.7 0.0 - 1.9 %    Differential Method Automated    Comprehensive Metabolic Panel    Collection Time: 12/27/23  4:08 AM   Result Value Ref Range    Sodium 146 (H) 136 - 145 mmol/L    Potassium 2.3 (LL) 3.5 - 5.1 mmol/L    Chloride 99 95 - 110 mmol/L    CO2 33 (H) 23 - 29 mmol/L    Glucose 105 70 - 110 mg/dL    BUN 7 (L) 8 - 23 mg/dL    Creatinine 0.8 0.5 - 1.4 mg/dL    Calcium 8.9 8.7 - 10.5 mg/dL    Total Protein 6.4 6.0 - 8.4 g/dL    Albumin 2.7 (L) 3.5 - 5.2 g/dL    Total Bilirubin 0.5 0.1 - 1.0 mg/dL    Alkaline Phosphatase 128 55 - 135 U/L    AST 14 10 - 40 U/L    ALT 11 10 - 44 U/L    eGFR >60.0 >60 mL/min/1.73 m^2    Anion Gap 14 8 - 16 mmol/L   Magnesium    Collection Time: 12/27/23  4:08 AM   Result Value Ref Range    Magnesium 2.6 1.6 - 2.6 mg/dL   Potassium    Collection Time: 12/27/23  5:38 AM   Result Value Ref Range    Potassium 2.5 (LL) 3.5 - 5.1  mmol/L   Basic Metabolic Panel    Collection Time: 12/27/23  5:38 AM   Result Value Ref Range    Sodium 147 (H) 136 - 145 mmol/L    Potassium 2.5 (LL) 3.5 - 5.1 mmol/L    Chloride 98 95 - 110 mmol/L    CO2 33 (H) 23 - 29 mmol/L    Glucose 113 (H) 70 - 110 mg/dL    BUN 6 (L) 8 - 23 mg/dL    Creatinine 0.8 0.5 - 1.4 mg/dL    Calcium 9.2 8.7 - 10.5 mg/dL    Anion Gap 16 8 - 16 mmol/L    eGFR >60.0 >60 mL/min/1.73 m^2   Magnesium    Collection Time: 12/27/23  5:38 AM   Result Value Ref Range    Magnesium 2.4 1.6 - 2.6 mg/dL       A:  Right knee pain/osteoarthritis with suspected septic joint      P:  I have discussed medical condition and treatment options with him at length.  We will also discussed this with his family.  I have discussed indications alternatives and potential complications of the planned arthroscopic surgeries to washout of potential septic knee.  I have had discussions with both anesthesia Services who deemed him an appropriate surgical candidate at this point although certainly not an optimal candidate with his comorbidities.  I have also discussed this with hospitalist services who feels strongly that his mentation in his clinical course as is deteriorating secondary to a potential septic picture stemming from his knee.  We attempted an ultrasound/CT directed aspirate but radiology was not able to obtain enough for cultures nor for even a Gram stain.  We will prescribe seen with arthroscopic lavage and intraoperative cultures.

## 2023-12-27 NOTE — NURSING
Unable to obtain fluid for specimens after several attempts. Pt tolerated well. Pt back to ER via bed. No fluid for specimens obtained.

## 2023-12-28 LAB
ALBUMIN SERPL BCP-MCNC: 2.7 G/DL (ref 3.5–5.2)
ALLENS TEST: ABNORMAL
ALP SERPL-CCNC: 126 U/L (ref 55–135)
ALT SERPL W/O P-5'-P-CCNC: 11 U/L (ref 10–44)
AMMONIA PLAS-SCNC: 42 UMOL/L (ref 10–50)
ANION GAP SERPL CALC-SCNC: 10 MMOL/L (ref 8–16)
AST SERPL-CCNC: 11 U/L (ref 10–40)
BASOPHILS # BLD AUTO: 0.04 K/UL (ref 0–0.2)
BASOPHILS NFR BLD: 0.5 % (ref 0–1.9)
BILIRUB SERPL-MCNC: 0.4 MG/DL (ref 0.1–1)
BUN SERPL-MCNC: 10 MG/DL (ref 8–23)
CALCIUM SERPL-MCNC: 8.9 MG/DL (ref 8.7–10.5)
CHLORIDE SERPL-SCNC: 100 MMOL/L (ref 95–110)
CO2 SERPL-SCNC: 34 MMOL/L (ref 23–29)
CREAT SERPL-MCNC: 0.8 MG/DL (ref 0.5–1.4)
DELSYS: ABNORMAL
DIFFERENTIAL METHOD BLD: ABNORMAL
EOSINOPHIL # BLD AUTO: 0 K/UL (ref 0–0.5)
EOSINOPHIL NFR BLD: 0 % (ref 0–8)
ERYTHROCYTE [DISTWIDTH] IN BLOOD BY AUTOMATED COUNT: 14.6 % (ref 11.5–14.5)
EST. GFR  (NO RACE VARIABLE): >60 ML/MIN/1.73 M^2
FLOW: 10
GLUCOSE SERPL-MCNC: 113 MG/DL (ref 70–110)
HCO3 UR-SCNC: 38.8 MMOL/L (ref 24–28)
HCT VFR BLD AUTO: 28.6 % (ref 40–54)
HGB BLD-MCNC: 8.6 G/DL (ref 14–18)
IMM GRANULOCYTES # BLD AUTO: 0.04 K/UL (ref 0–0.04)
IMM GRANULOCYTES NFR BLD AUTO: 0.5 % (ref 0–0.5)
LYMPHOCYTES # BLD AUTO: 0.5 K/UL (ref 1–4.8)
LYMPHOCYTES NFR BLD: 6.5 % (ref 18–48)
MAGNESIUM SERPL-MCNC: 2 MG/DL (ref 1.6–2.6)
MCH RBC QN AUTO: 25.7 PG (ref 27–31)
MCHC RBC AUTO-ENTMCNC: 30.1 G/DL (ref 32–36)
MCV RBC AUTO: 86 FL (ref 82–98)
MODE: ABNORMAL
MONOCYTES # BLD AUTO: 0.6 K/UL (ref 0.3–1)
MONOCYTES NFR BLD: 7.3 % (ref 4–15)
NEUTROPHILS # BLD AUTO: 7 K/UL (ref 1.8–7.7)
NEUTROPHILS NFR BLD: 85.2 % (ref 38–73)
NRBC BLD-RTO: 0 /100 WBC
PCO2 BLDA: 73.7 MMHG (ref 35–45)
PH SMN: 7.33 [PH] (ref 7.35–7.45)
PLATELET # BLD AUTO: 349 K/UL (ref 150–450)
PMV BLD AUTO: 10 FL (ref 9.2–12.9)
PO2 BLDA: 335 MMHG (ref 80–100)
POC BE: 13 MMOL/L
POC SATURATED O2: 100 % (ref 95–100)
POCT GLUCOSE: 115 MG/DL (ref 70–110)
POTASSIUM SERPL-SCNC: 3.8 MMOL/L (ref 3.5–5.1)
PROT SERPL-MCNC: 6.4 G/DL (ref 6–8.4)
RBC # BLD AUTO: 3.34 M/UL (ref 4.6–6.2)
SAMPLE: ABNORMAL
SITE: ABNORMAL
SODIUM SERPL-SCNC: 144 MMOL/L (ref 136–145)
SP02: 100
VANCOMYCIN TROUGH SERPL-MCNC: 28.9 UG/ML (ref 10–22)
WBC # BLD AUTO: 8.26 K/UL (ref 3.9–12.7)

## 2023-12-28 PROCEDURE — 25000003 PHARM REV CODE 250: Performed by: HOSPITALIST

## 2023-12-28 PROCEDURE — 82140 ASSAY OF AMMONIA: CPT | Performed by: STUDENT IN AN ORGANIZED HEALTH CARE EDUCATION/TRAINING PROGRAM

## 2023-12-28 PROCEDURE — 85025 COMPLETE CBC W/AUTO DIFF WBC: CPT | Performed by: STUDENT IN AN ORGANIZED HEALTH CARE EDUCATION/TRAINING PROGRAM

## 2023-12-28 PROCEDURE — 83735 ASSAY OF MAGNESIUM: CPT | Performed by: STUDENT IN AN ORGANIZED HEALTH CARE EDUCATION/TRAINING PROGRAM

## 2023-12-28 PROCEDURE — 25000242 PHARM REV CODE 250 ALT 637 W/ HCPCS: Performed by: STUDENT IN AN ORGANIZED HEALTH CARE EDUCATION/TRAINING PROGRAM

## 2023-12-28 PROCEDURE — 80053 COMPREHEN METABOLIC PANEL: CPT | Performed by: STUDENT IN AN ORGANIZED HEALTH CARE EDUCATION/TRAINING PROGRAM

## 2023-12-28 PROCEDURE — 36415 COLL VENOUS BLD VENIPUNCTURE: CPT | Performed by: STUDENT IN AN ORGANIZED HEALTH CARE EDUCATION/TRAINING PROGRAM

## 2023-12-28 PROCEDURE — 94761 N-INVAS EAR/PLS OXIMETRY MLT: CPT | Mod: XB

## 2023-12-28 PROCEDURE — 99233 SBSQ HOSP IP/OBS HIGH 50: CPT | Mod: ,,, | Performed by: STUDENT IN AN ORGANIZED HEALTH CARE EDUCATION/TRAINING PROGRAM

## 2023-12-28 PROCEDURE — G0378 HOSPITAL OBSERVATION PER HR: HCPCS

## 2023-12-28 PROCEDURE — 99900035 HC TECH TIME PER 15 MIN (STAT)

## 2023-12-28 PROCEDURE — 36600 WITHDRAWAL OF ARTERIAL BLOOD: CPT

## 2023-12-28 PROCEDURE — 63600175 PHARM REV CODE 636 W HCPCS: Mod: UD | Performed by: STUDENT IN AN ORGANIZED HEALTH CARE EDUCATION/TRAINING PROGRAM

## 2023-12-28 PROCEDURE — 25000003 PHARM REV CODE 250: Performed by: STUDENT IN AN ORGANIZED HEALTH CARE EDUCATION/TRAINING PROGRAM

## 2023-12-28 PROCEDURE — 27000221 HC OXYGEN, UP TO 24 HOURS

## 2023-12-28 PROCEDURE — 80202 ASSAY OF VANCOMYCIN: CPT | Performed by: STUDENT IN AN ORGANIZED HEALTH CARE EDUCATION/TRAINING PROGRAM

## 2023-12-28 PROCEDURE — 94640 AIRWAY INHALATION TREATMENT: CPT

## 2023-12-28 PROCEDURE — 82803 BLOOD GASES ANY COMBINATION: CPT

## 2023-12-28 RX ADMIN — VANCOMYCIN HYDROCHLORIDE 1500 MG: 1.5 INJECTION, POWDER, LYOPHILIZED, FOR SOLUTION INTRAVENOUS at 08:12

## 2023-12-28 RX ADMIN — IPRATROPIUM BROMIDE AND ALBUTEROL SULFATE 3 ML: 2.5; .5 SOLUTION RESPIRATORY (INHALATION) at 03:12

## 2023-12-28 RX ADMIN — ATORVASTATIN CALCIUM 10 MG: 10 TABLET, FILM COATED ORAL at 08:12

## 2023-12-28 RX ADMIN — LOSARTAN POTASSIUM 25 MG: 25 TABLET, FILM COATED ORAL at 08:12

## 2023-12-28 RX ADMIN — TRAZODONE HYDROCHLORIDE 100 MG: 50 TABLET ORAL at 08:12

## 2023-12-28 RX ADMIN — IPRATROPIUM BROMIDE AND ALBUTEROL SULFATE 3 ML: 2.5; .5 SOLUTION RESPIRATORY (INHALATION) at 07:12

## 2023-12-28 RX ADMIN — DILTIAZEM HYDROCHLORIDE 30 MG: 30 TABLET, FILM COATED ORAL at 08:12

## 2023-12-28 RX ADMIN — APIXABAN 5 MG: 2.5 TABLET, FILM COATED ORAL at 08:12

## 2023-12-28 NOTE — NURSING
Oral care performed and face cleanse with warm rag, patient awoke. Patient able to have conversation with nurse, patient able to follow commands, weakness noted but attempted commands.  Patient unaware of place, situation, time but able to state full name, . Patient vital signs stable. MD notified of improvement of mental status at this time

## 2023-12-28 NOTE — PLAN OF CARE
12/28/23 1211   GAMBLE Message   Medicare Outpatient and Observation Notification regarding financial responsibility Given to patient/caregiver;Explained to patient/caregiver;Signed/date by patient/caregiver   Date GAMBLE was signed 12/28/23   Time GAMBLE was signed 1211

## 2023-12-28 NOTE — PLAN OF CARE
[facility] - Intensive Care  Initial Discharge Assessment       Primary Care Provider: aMrisel Farrell III, MD    Admission Diagnosis: Change in mental status [R41.82]  Chest pain [R07.9]  Acute metabolic encephalopathy [G93.41]    Admission Date: 12/26/2023  Expected Discharge Date:     Transition of Care Barriers: None    Assessment done with patient & his sister in law Jodie Gipson [phone] who was at bedside & helps assist him as needed. Patient awake & alert has been in the hospital since before Thanksgiving with his leg. He went from hospital to [facility] to [facility] for SNF. He does not want to return to [facility]. They have already spoken to Leilani at [facility] & they have beds. Referral will be sent to them. He says he has not been able to bear weight on his leg so has only been doing transfers at LT & SNF.He has access to a wheelchair, rollator, nebulizer, oxygen. Denies any other needs at this time. Will continue to follow.     Payor: [employer] MEDICARE / Plan: [email or web address] O PPO SPECIAL NEEDS / Product Type: Medicare Advantage /     Extended Emergency Contact Information  Primary Emergency Contact: Beti Moura  Address: [address] [address] [place]  Mobile Phone: [phone]  Relation: Sister  Preferred language: English   needed? No  Secondary Emergency Contact: EMEKA ARMANDO  Address: [address]           [place] [address] [place]  Mobile Phone: [phone]  Relation: Spouse  Preferred language: English   needed? No    Discharge Plan A: Skilled Nursing Facility  Discharge Plan B: Skilled Nursing Facility      [facility] [place]  [place] - [address]  [address] [address]  Phone: [phone] Fax: [phone]    [facility] [place] - [address] [facility] [address]  [place] [address]  Phone: [phone] Fax:  [phone]    [email or web address] - [place] - [address]  [address]  [facility] [address]  Phone: [phone] Fax: [phone]      Initial Assessment (most recent)       Adult Discharge Assessment - 12/28/23 1211          Discharge Assessment    Assessment Type Discharge Planning Assessment     Confirmed/corrected address, phone number and insurance Yes     Confirmed Demographics Correct on Facesheet     Source of Information patient;family     If unable to respond/provide information was family/caregiver contacted? Yes     Contact Name/Number Jodie rajan in law [phone]     When was your last doctors appointment? --   unsure    Does patient/caregiver understand observation status Yes     Communicated CLEMENTINA with patient/caregiver Date not available/Unable to determine     Reason For Admission infected knee     People in Home spouse     Do you expect to return to your current living situation? Yes     Do you have help at home or someone to help you manage your care at home? Yes     Who are your caregiver(s) and their phone number(s)? Margaret James spouse [phone] or Jodie Gipson sister in law [phone]     Prior to hospitilization cognitive status: Alert/Oriented     Current cognitive status: Alert/Oriented     Walking or Climbing Stairs Difficulty yes     Walking or Climbing Stairs ambulation difficulty, requires equipment;ambulation difficulty, assistance 1 person     Mobility Management has rollator, wheelchair & cane-since surgery hasn't been out of bed     Dressing/Bathing Difficulty yes     Dressing/Bathing bathing difficulty, requires equipment;bathing difficulty, assistance 1 person     Dressing/Bathing Management shower chair     Do you have any problems with: Errands/Grocery     Equipment Currently Used at Home nebulizer;oxygen;cane, straight;glucometer;shower chair;other (see comments);wheelchair   blood  pressure machine    Readmission within 30 days? Yes     Patient currently being followed by outpatient case management? No     Do you currently have service(s) that help you manage your care at home? No     Do you take prescription medications? Yes     Do you have prescription coverage? Yes     Coverage [employer]     Do you have any problems affording any of your prescribed medications? No     Is the patient taking medications as prescribed? yes     Who is going to help you get home at discharge? Margaret James spouse [phone] or Jodie Gipson sister in law [phone]     How do you get to doctors appointments? family or friend will provide     Are you on dialysis? No     Do you take coumadin? No     Discharge Plan A Skilled Nursing Facility     Discharge Plan B Skilled Nursing Facility     DME Needed Upon Discharge  none     Discharge Plan discussed with: Patient;Caregiver     Name(s) and Number(s) Jodie Gipson sister in law [phone]     Transition of Care Barriers None        OTHER    Name(s) of People in Home Margaret James spouse [phone]

## 2023-12-28 NOTE — SUBJECTIVE & OBJECTIVE
Interval History: Surgery completed yesterday. No obvious source of infection identified. Cultures sent. BC NGTD. Decreased level of responsiveness this am. ABG wnl. Ammonia wnl. CT head without acute abnormality. Waking up more as the day has advanced. Will continue to monitor.    Review of Systems   Reason unable to perform ROS: patient altered.     Objective:     Vital Signs (Most Recent):  Temp: 98.5 °F (36.9 °C) (12/28/23 0715)  Pulse: 89 (12/28/23 0800)  Resp: 20 (12/28/23 0800)  BP: (!) 147/65 (12/28/23 0800)  SpO2: 98 % (12/28/23 0800) Vital Signs (24h Range):  Temp:  [98 °F (36.7 °C)-98.5 °F (36.9 °C)] 98.5 °F (36.9 °C)  Pulse:  [66-99] 89  Resp:  [10-25] 20  SpO2:  [90 %-100 %] 98 %  BP: (122-174)/(65-97) 147/65     Weight: 77.1 kg (170 lb)  Body mass index is 20.16 kg/m².    Intake/Output Summary (Last 24 hours) at 12/28/2023 1102  Last data filed at 12/27/2023 1507  Gross per 24 hour   Intake 500 ml   Output 1610 ml   Net -1110 ml         Physical Exam      Vitals reviewed  General: NAD, Well developed, Well Nourished  Head: NC/AT  Eyes: EOMI, MELISSA  Cardiovascular: Pulses intact distally, Regular Rate and rhythm  Pulmonary: Normal Respiratory Rate, No respiratory distress  Gi: Soft, Non-tender  Extremities: Warm, Right knee wrapped with surgical dressing and ACE wrap  Skin: Warm, dry  Neuro: somnolent and disoriented, No focal Deficit, globally very weak    Significant Labs: All pertinent labs within the past 24 hours have been reviewed.    Significant Imaging: I have reviewed all pertinent imaging results/findings within the past 24 hours.

## 2023-12-28 NOTE — NURSING
0715-resp notified RN at this time regarding patient mental status. Patient responds slowly when spoken to. Blood glucose 115. PERRLA noted. Patient unable to follow commands, non verbal at this time. Vital signs stable. Dr. Meier at bedside. ABG ordered. CT head ordered.

## 2023-12-28 NOTE — ASSESSMENT & PLAN NOTE
Recent admission for septic arthritis right knee and enterococcus bacteremia. Finished abx on 12/20  Knee with significant swelling and warmth to palpation  Reviewed xray and CT scan  Consulted ortho  Restart eliquis  Radiology consult placed for joint aspiration but unable to aspirate any fluid  Ortho did scope of knee but did not find any obvious source of infection  F/U cultures from blood and joint fluid.    Started IV vancomycin based of past cultures- continue until cultures result

## 2023-12-28 NOTE — ANESTHESIA POSTPROCEDURE EVALUATION
Anesthesia Post Evaluation    Patient: Jama James    Procedure(s) Performed: Procedure(s) (LRB):  ARTHROSCOPY, KNEE, INCISION AND DRAINAGE (Right)    Final Anesthesia Type: general      Patient location during evaluation: PACU  Patient participation: Yes- Able to Participate  Level of consciousness: awake and alert and oriented  Post-procedure vital signs: reviewed and stable  Pain management: adequate  Airway patency: patent    PONV status at discharge: No PONV  Anesthetic complications: no      Cardiovascular status: hemodynamically stable  Respiratory status: unassisted, spontaneous ventilation and room air  Hydration status: euvolemic  Follow-up not needed.              Vitals Value Taken Time   /74 12/28/23 1202   Temp 37.1 °C (98.8 °F) 12/28/23 1102   Pulse 74 12/28/23 1225   Resp 31 12/28/23 1225   SpO2 100 % 12/28/23 1225   Vitals shown include unvalidated device data.      Event Time   Out of Recovery 12/27/2023 16:04:43         Pain/Jhon Score: Pain Rating Prior to Med Admin: 7 (12/27/2023  8:35 PM)  Pain Rating Post Med Admin: 2 (12/27/2023  9:35 PM)  Jhon Score: 9 (12/27/2023  3:45 PM)

## 2023-12-28 NOTE — PLAN OF CARE
Problem: Gas Exchange Impaired  Goal: Optimal Gas Exchange  Outcome: Ongoing, Progressing  Intervention: Optimize Oxygenation and Ventilation  Flowsheets (Taken 12/28/2023 5028)  Airway/Ventilation Management: airway patency maintained  Head of Bed (HOB) Positioning: HOB elevated   Patient received on 3 lpm nasal cannula. Sat's 99%. He wears home O2 at 4 lpm. He is not responsive to commands this AM. He opens eyes to voice. DAHIANA Barnes and Dr. Meier notified. ABG done while patient was receiving aerosol treatment. Results reported to Dr. Meier. Patient is unable to do Daily Breo inhaler this AM. Will continue to monitor.

## 2023-12-28 NOTE — PROGRESS NOTES
Pharmacokinetic Assessment Follow Up: IV Vancomycin    Vancomycin serum concentration assessment(s):    The trough level was drawn correctly and can be used to guide therapy at this time. The measurement is above the desired definitive target range of 10 to 20 mcg/mL.    Vancomycin Regimen Plan:    Change regimen to Vancomycin 1000 mg IV every 12 hours with next serum trough concentration measured at 1900 prior to 3rd dose on 12/29/2023    Drug levels (last 3 results):  Recent Labs   Lab Result Units 12/28/23  0509   Vancomycin-Trough ug/mL 28.9*       Pharmacy will continue to follow and monitor vancomycin.    Please contact pharmacy at extension [phone] for questions regarding this assessment.    Thank you for the consult,   Horace Kim       Patient brief summary:  Jama James is a 68 y.o. male initiated on antimicrobial therapy with IV Vancomycin for treatment of bone/joint infection    The patient's current regimen is Vancomycin 1250 mg q12h    Drug Allergies:   Review of patient's allergies indicates:  No Known Allergies    Actual Body Weight:   77.1 Kg    Renal Function:   Estimated Creatinine Clearance: 99.1 mL/min (based on SCr of 0.8 mg/dL).,     Dialysis Method (if applicable):  N/A    CBC (last 72 hours):  Recent Labs   Lab Result Units 12/26/23  1320 12/27/23  0408 12/28/23  0509   WBC K/uL 7.14 7.69 8.26   Hemoglobin g/dL 10.6* 9.8* 8.6*   Hematocrit % 34.8* 32.0* 28.6*   Platelets K/uL 318 317 349   Gran % % 81.0* 72.2 85.2*   Lymph % % 9.0* 12.4* 6.5*   Mono % % 6.4 9.1 7.3   Eosinophil % % 2.5 5.3 0.0   Basophil % % 0.7 0.7 0.5   Differential Method  Automated Automated Automated       Metabolic Panel (last 72 hours):  Recent Labs   Lab Result Units 12/26/23  1320 12/26/23  1323 12/26/23  1646 12/27/23  0159 12/27/23  0408 12/27/23  0538 12/28/23  0509   Sodium mmol/L 146*  --   --  145 146* 147* 144   Potassium mmol/L 2.7*  --   --  3.9 2.3* 2.5*  2.5* 3.8   Chloride mmol/L 100  --   --  99 99  98 100   CO2 mmol/L 34*  --   --  31* 33* 33* 34*   Glucose mg/dL 128*  --   --  101 105 113* 113*   Glucose, UA   --  Negative Negative  --   --   --   --    BUN mg/dL 8  --   --  7* 7* 6* 10   Creatinine mg/dL 0.9  --   --  0.8 0.8 0.8 0.8   Creatinine, Urine mg/dL  --  37.3  --   --   --   --   --    Albumin g/dL 3.0*  --   --   --  2.7*  --  2.7*   Total Bilirubin mg/dL 0.5  --   --   --  0.5  --  0.4   Alkaline Phosphatase U/L 143*  --   --   --  128  --  126   AST U/L 18  --   --   --  14  --  11   ALT U/L 13  --   --   --  11  --  11   Magnesium mg/dL  --   --   --  1.9 2.6 2.4 2.0       Vancomycin Administrations:  vancomycin given in the last 96 hours                     vancomycin 1,250 mg in dextrose 5 % (D5W) 250 mL IVPB (Vial-Mate) (mg) 1,250 mg New Bag 12/27/23 2035     1,250 mg New Bag  0839     1,250 mg New Bag 12/26/23 1922                    Microbiologic Results:  Microbiology Results (last 7 days)       Procedure Component Value Units Date/Time    Blood culture [[id number]] Collected: 12/27/23 0837    Order Status: Completed Specimen: Blood from Peripheral, Antecubital, Left Updated: 12/28/23 0115     Blood Culture, Routine No Growth to date    Narrative:      Specimen # 1    Blood culture [[id number]] Collected: 12/27/23 0832    Order Status: Completed Specimen: Blood from Line, PICC Left Basilic Updated: 12/28/23 0115     Blood Culture, Routine No Growth to date    Narrative:      Specimen # 2    Culture, Anaerobe [[id number]] Collected: 12/27/23 1440    Order Status: Sent Specimen: Incision site from Knee, Right Updated: 12/27/23 2205    Aerobic culture [[id number]] Collected: 12/27/23 1440    Order Status: Sent Specimen: Incision site from Knee, Right Updated: 12/27/23 2205    Gram stain [[id number]]     Order Status: Canceled Specimen: Joint Fluid from Knee, Right     Culture, Anaerobe [[id number]]     Order Status: Canceled Specimen: Joint Fluid     Aerobic culture [[id number]]     Order  Status: Canceled Specimen: Knee     Influenza A & B by Molecular [[id number]] Collected: 12/26/23 1651    Order Status: Completed Specimen: Nasopharyngeal Swab Updated: 12/26/23 7407     Influenza A, Molecular Negative     Influenza B, Molecular Negative     Flu A & B Source Nasal Swab

## 2023-12-28 NOTE — PROGRESS NOTES
Pharmacokinetic Assessment Follow Up: IV Vancomycin    Vancomycin serum concentration assessment(s):    The trough level was drawn correctly and can be used to guide therapy at this time. The measurement is above the desired definitive target range of 10 to 20 mcg/mL.    Vancomycin Regimen Plan:    Change regimen to Vancomycin 1500 mg IV every 24 hours with next serum trough concentration measured at 1900 prior to 3rd dose on 12/30/2023    Drug levels (last 3 results):  Recent Labs   Lab Result Units 12/28/23  0509   Vancomycin-Trough ug/mL 28.9*       Pharmacy will continue to follow and monitor vancomycin.    Please contact pharmacy at extension [phone] for questions regarding this assessment.    Thank you for the consult,   Horace Kim       Patient brief summary:  Jama James is a 68 y.o. male initiated on antimicrobial therapy with IV Vancomycin for treatment of bone/joint infection    The patient's current regimen is [id number] mg q12h    Drug Allergies:   Review of patient's allergies indicates:  No Known Allergies    Actual Body Weight:   77.1 Kg    Renal Function:   Estimated Creatinine Clearance: 99.1 mL/min (based on SCr of 0.8 mg/dL).,     Dialysis Method (if applicable):  N/A    CBC (last 72 hours):  Recent Labs   Lab Result Units 12/26/23  1320 12/27/23  0408 12/28/23  0509   WBC K/uL 7.14 7.69 8.26   Hemoglobin g/dL 10.6* 9.8* 8.6*   Hematocrit % 34.8* 32.0* 28.6*   Platelets K/uL 318 317 349   Gran % % 81.0* 72.2 85.2*   Lymph % % 9.0* 12.4* 6.5*   Mono % % 6.4 9.1 7.3   Eosinophil % % 2.5 5.3 0.0   Basophil % % 0.7 0.7 0.5   Differential Method  Automated Automated Automated       Metabolic Panel (last 72 hours):  Recent Labs   Lab Result Units 12/26/23  1320 12/26/23  1323 12/26/23  1646 12/27/23  0159 12/27/23  0408 12/27/23  0538 12/28/23  0509   Sodium mmol/L 146*  --   --  145 146* 147* 144   Potassium mmol/L 2.7*  --   --  3.9 2.3* 2.5*  2.5* 3.8   Chloride mmol/L 100  --   --  99 99  98 100   CO2 mmol/L 34*  --   --  31* 33* 33* 34*   Glucose mg/dL 128*  --   --  101 105 113* 113*   Glucose, UA   --  Negative Negative  --   --   --   --    BUN mg/dL 8  --   --  7* 7* 6* 10   Creatinine mg/dL 0.9  --   --  0.8 0.8 0.8 0.8   Creatinine, Urine mg/dL  --  37.3  --   --   --   --   --    Albumin g/dL 3.0*  --   --   --  2.7*  --  2.7*   Total Bilirubin mg/dL 0.5  --   --   --  0.5  --  0.4   Alkaline Phosphatase U/L 143*  --   --   --  128  --  126   AST U/L 18  --   --   --  14  --  11   ALT U/L 13  --   --   --  11  --  11   Magnesium mg/dL  --   --   --  1.9 2.6 2.4 2.0       Vancomycin Administrations:  vancomycin given in the last 96 hours                     vancomycin 1,250 mg in dextrose 5 % (D5W) 250 mL IVPB (Vial-Mate) (mg) 1,250 mg New Bag 12/27/23 2035     1,250 mg New Bag  0839     1,250 mg New Bag 12/26/23 1922                    Microbiologic Results:  Microbiology Results (last 7 days)       Procedure Component Value Units Date/Time    Blood culture [[id number]] Collected: 12/27/23 0837    Order Status: Completed Specimen: Blood from Peripheral, Antecubital, Left Updated: 12/28/23 0115     Blood Culture, Routine No Growth to date    Narrative:      Specimen # 1    Blood culture [[id number]] Collected: 12/27/23 0832    Order Status: Completed Specimen: Blood from Line, PICC Left Basilic Updated: 12/28/23 0115     Blood Culture, Routine No Growth to date    Narrative:      Specimen # 2    Culture, Anaerobe [[id number]] Collected: 12/27/23 1440    Order Status: Sent Specimen: Incision site from Knee, Right Updated: 12/27/23 2205    Aerobic culture [[id number]] Collected: 12/27/23 1440    Order Status: Sent Specimen: Incision site from Knee, Right Updated: 12/27/23 2205    Gram stain [[id number]]     Order Status: Canceled Specimen: Joint Fluid from Knee, Right     Culture, Anaerobe [[id number]]     Order Status: Canceled Specimen: Joint Fluid     Aerobic culture [[id number]]     Order  Status: Canceled Specimen: Knee     Influenza A & B by Molecular [[id number]] Collected: 12/26/23 1655    Order Status: Completed Specimen: Nasopharyngeal Swab Updated: 12/26/23 1857     Influenza A, Molecular Negative     Influenza B, Molecular Negative     Flu A & B Source Nasal Swab

## 2023-12-28 NOTE — NURSING
0958-attempted to call family x2 to update on patient status and results from testing, no answer. Will continue to attempt to reach.

## 2023-12-28 NOTE — PLAN OF CARE
Problem: Adult Inpatient Plan of Care  Goal: Plan of Care Review  Outcome: Ongoing, Progressing  Goal: Patient-Specific Goal (Individualized)  Outcome: Ongoing, Progressing  Goal: Absence of Hospital-Acquired Illness or Injury  Outcome: Ongoing, Progressing  Goal: Optimal Comfort and Wellbeing  Outcome: Ongoing, Progressing  Goal: Readiness for Transition of Care  Outcome: Ongoing, Progressing     Problem: Skin Injury Risk Increased  Goal: Skin Health and Integrity  Outcome: Ongoing, Progressing     Problem: Fall Injury Risk  Goal: Absence of Fall and Fall-Related Injury  Outcome: Ongoing, Progressing     Problem: Infection  Goal: Absence of Infection Signs and Symptoms  Outcome: Ongoing, Progressing     Problem: Gas Exchange Impaired  Goal: Optimal Gas Exchange  Outcome: Ongoing, Progressing

## 2023-12-28 NOTE — PROGRESS NOTES
[facility] Care  [facility] Medicine  Progress Note    Patient Name: Jama James  MRN: [id number]  Patient Class: OP- Observation   Admission Date: 12/26/2023  Length of Stay: 0 days  Attending Physician: Edu Meier MD  Primary Care Provider: Marisel Farrell III, MD        Subjective:     Principal Problem:Effusion of right knee        HPI:  Patient is a 68 w/ Chronic Hypoxic Respiratory Failure on 3-4 LFNC 2/2 COPD, Metastatic Prostate cancer, HTN, HLD, Afib on eliquis presenting with  presenting with confusion x 1 day. Has been feeling weak and lethargic for past 3-4 days. Recently discharged from [facility] to [place]. Recent ED visit on 12/24 after fall at [place] where he hit his head. Work up negative and discharged back to [place]. Since then patient has become progressively weak. Today they called family because patient was not acting himself and he was brought to ED. No fever, chills, NVD. Patient was recently admitted to [facility] for septic arthritis right knee. He had washout via arthroscopy, and ID recommended ampicillin 2gm q4hr for 4 week course to end 12/20/23. Patient had swelling in right knee during stay at [facility]. Showed joint effusion. Plan was to set up aspiration with ortho as an outpatient.     Overview/Hospital Course:  No notes on file    Interval History: Surgery completed yesterday. No obvious source of infection identified. Cultures sent. BC NGTD. Decreased level of responsiveness this am. ABG wnl. Ammonia wnl. CT head without acute abnormality. Waking up more as the day has advanced. Will continue to monitor.    Review of Systems   Reason unable to perform ROS: patient altered.     Objective:     Vital Signs (Most Recent):  Temp: 98.5 °F (36.9 °C) (12/28/23 0715)  Pulse: 89 (12/28/23 0800)  Resp: 20 (12/28/23 0800)  BP: (!) 147/65 (12/28/23 0800)  SpO2: 98 % (12/28/23 0800) Vital Signs (24h Range):  Temp:  [98 °F (36.7 °C)-98.5 °F (36.9 °C)] 98.5 °F (36.9 °C)  Pulse:  [66-99]  89  Resp:  [10-25] 20  SpO2:  [90 %-100 %] 98 %  BP: (122-174)/(65-97) 147/65     Weight: 77.1 kg (170 lb)  Body mass index is 20.16 kg/m².    Intake/Output Summary (Last 24 hours) at 12/28/2023 1102  Last data filed at 12/27/2023 1507  Gross per 24 hour   Intake 500 ml   Output 1610 ml   Net -1110 ml         Physical Exam      Vitals reviewed  General: NAD, Well developed, Well Nourished  Head: NC/AT  Eyes: EOMI, MELISSA  Cardiovascular: Pulses intact distally, Regular Rate and rhythm  Pulmonary: Normal Respiratory Rate, No respiratory distress  Gi: Soft, Non-tender  Extremities: Warm, Right knee wrapped with surgical dressing and ACE wrap  Skin: Warm, dry  Neuro: somnolent and disoriented, No focal Deficit, globally very weak    Significant Labs: All pertinent labs within the past 24 hours have been reviewed.    Significant Imaging: I have reviewed all pertinent imaging results/findings within the past 24 hours.    Assessment/Plan:      * Effusion of right knee  Recent admission for septic arthritis right knee and enterococcus bacteremia. Finished abx on 12/20  Knee with significant swelling and warmth to palpation  Reviewed xray and CT scan  Consulted ortho  Restart eliquis  Radiology consult placed for joint aspiration but unable to aspirate any fluid  Ortho did scope of knee but did not find any obvious source of infection  F/U cultures from blood and joint fluid.    Started IV vancomycin based of past cultures- continue until cultures result      Goals of care, counseling/discussion  Advance Care Planning  Patient confirmed to me that he is DNR/DNI      Benign localized prostatic hyperplasia with lower urinary tract symptoms (LUTS)  Continue home medications      PAF (paroxysmal atrial fibrillation)  Patient with Paroxysmal (<7 days) atrial fibrillation which is controlled currently with Calcium Channel Blocker. Patient is currently in sinus rhythm.[id number] Score: 1. Hold AC for now given possible need for  surgical intervention    COPD (chronic obstructive pulmonary disease)  On home O2 regimen currently  Continue home inhaler  Duoneb q8h  Continue home chronic steroids    Prostate cancer  Noted  Patient's family will have to bring in his home medication      Hyperlipidemia  Continue statin      Tobacco abuse  Nicotine patch PRN      Weakness  Likely due to deconditioning vs acute infection  PT/OT      Anxiety  Continue home medications      Depression  Continue home medicaitons    HTN (hypertension)  Chronic, controlled. Latest blood pressure and vitals reviewed-     Temp:  [98.4 °F (36.9 °C)]   Pulse:  [67-85]   Resp:  [14-27]   BP: (167-181)/(75-91)   SpO2:  [96 %-100 %] .   Home meds for hypertension were reviewed and noted below.   Hypertension Medications               diltiaZEM (CARDIZEM) 30 MG tablet Take 1 tablet (30 mg total) by mouth every 12 (twelve) hours.    losartan (COZAAR) 25 MG tablet Take 1 tablet (25 mg total) by mouth 2 (two) times daily.            While in the hospital, will manage blood pressure as follows; Continue home antihypertensive regimen    Will utilize p.r.n. blood pressure medication only if patient's blood pressure greater than 180/110 and he develops symptoms such as worsening chest pain or shortness of breath.      VTE Risk Mitigation (From admission, onward)           Ordered     apixaban tablet 5 mg  2 times daily         12/28/23 0711     Reason for No Pharmacological VTE Prophylaxis  Once        Question:  Reasons:  Answer:  Already adequately anticoagulated on oral Anticoagulants    12/26/23 1718     IP VTE HIGH RISK PATIENT  Once         12/26/23 1718     Place sequential compression device  Until discontinued         12/26/23 1718                    Discharge Planning   CLEMENTINA:      Code Status: DNR   Is the patient medically ready for discharge?:     Reason for patient still in hospital (select all that apply): Treatment                     Edu Meier MD  Department  [facility] Intensive [facility]

## 2023-12-29 PROBLEM — G93.41 ACUTE METABOLIC ENCEPHALOPATHY: Status: ACTIVE | Noted: 2023-12-29

## 2023-12-29 LAB
ALBUMIN SERPL BCP-MCNC: 2.6 G/DL (ref 3.5–5.2)
ALP SERPL-CCNC: 125 U/L (ref 55–135)
ALT SERPL W/O P-5'-P-CCNC: 7 U/L (ref 10–44)
ANION GAP SERPL CALC-SCNC: 10 MMOL/L (ref 8–16)
AST SERPL-CCNC: 9 U/L (ref 10–40)
BASOPHILS # BLD AUTO: 0.04 K/UL (ref 0–0.2)
BASOPHILS NFR BLD: 0.5 % (ref 0–1.9)
BILIRUB SERPL-MCNC: 0.4 MG/DL (ref 0.1–1)
BUN SERPL-MCNC: 11 MG/DL (ref 8–23)
CALCIUM SERPL-MCNC: 9 MG/DL (ref 8.7–10.5)
CHLORIDE SERPL-SCNC: 99 MMOL/L (ref 95–110)
CO2 SERPL-SCNC: 35 MMOL/L (ref 23–29)
CREAT SERPL-MCNC: 0.8 MG/DL (ref 0.5–1.4)
DIFFERENTIAL METHOD BLD: ABNORMAL
EOSINOPHIL # BLD AUTO: 0.3 K/UL (ref 0–0.5)
EOSINOPHIL NFR BLD: 4.2 % (ref 0–8)
ERYTHROCYTE [DISTWIDTH] IN BLOOD BY AUTOMATED COUNT: 15 % (ref 11.5–14.5)
EST. GFR  (NO RACE VARIABLE): >60 ML/MIN/1.73 M^2
GLUCOSE SERPL-MCNC: 98 MG/DL (ref 70–110)
HCT VFR BLD AUTO: 30.1 % (ref 40–54)
HGB BLD-MCNC: 8.9 G/DL (ref 14–18)
IMM GRANULOCYTES # BLD AUTO: 0.03 K/UL (ref 0–0.04)
IMM GRANULOCYTES NFR BLD AUTO: 0.4 % (ref 0–0.5)
LYMPHOCYTES # BLD AUTO: 0.9 K/UL (ref 1–4.8)
LYMPHOCYTES NFR BLD: 10.8 % (ref 18–48)
MAGNESIUM SERPL-MCNC: 1.9 MG/DL (ref 1.6–2.6)
MCH RBC QN AUTO: 25.8 PG (ref 27–31)
MCHC RBC AUTO-ENTMCNC: 29.6 G/DL (ref 32–36)
MCV RBC AUTO: 87 FL (ref 82–98)
MONOCYTES # BLD AUTO: 0.6 K/UL (ref 0.3–1)
MONOCYTES NFR BLD: 7.6 % (ref 4–15)
NEUTROPHILS # BLD AUTO: 6.1 K/UL (ref 1.8–7.7)
NEUTROPHILS NFR BLD: 76.5 % (ref 38–73)
NRBC BLD-RTO: 0 /100 WBC
PLATELET # BLD AUTO: 290 K/UL (ref 150–450)
PMV BLD AUTO: 10 FL (ref 9.2–12.9)
POTASSIUM SERPL-SCNC: 3.1 MMOL/L (ref 3.5–5.1)
PROT SERPL-MCNC: 6.1 G/DL (ref 6–8.4)
RBC # BLD AUTO: 3.45 M/UL (ref 4.6–6.2)
SODIUM SERPL-SCNC: 144 MMOL/L (ref 136–145)
VANCOMYCIN TROUGH SERPL-MCNC: 17.4 UG/ML (ref 10–22)
WBC # BLD AUTO: 8.02 K/UL (ref 3.9–12.7)

## 2023-12-29 PROCEDURE — 80202 ASSAY OF VANCOMYCIN: CPT | Performed by: STUDENT IN AN ORGANIZED HEALTH CARE EDUCATION/TRAINING PROGRAM

## 2023-12-29 PROCEDURE — 99900035 HC TECH TIME PER 15 MIN (STAT)

## 2023-12-29 PROCEDURE — 25000003 PHARM REV CODE 250: Performed by: STUDENT IN AN ORGANIZED HEALTH CARE EDUCATION/TRAINING PROGRAM

## 2023-12-29 PROCEDURE — 27000221 HC OXYGEN, UP TO 24 HOURS

## 2023-12-29 PROCEDURE — 85025 COMPLETE CBC W/AUTO DIFF WBC: CPT | Performed by: STUDENT IN AN ORGANIZED HEALTH CARE EDUCATION/TRAINING PROGRAM

## 2023-12-29 PROCEDURE — 99233 SBSQ HOSP IP/OBS HIGH 50: CPT | Mod: ,,, | Performed by: STUDENT IN AN ORGANIZED HEALTH CARE EDUCATION/TRAINING PROGRAM

## 2023-12-29 PROCEDURE — 94640 AIRWAY INHALATION TREATMENT: CPT

## 2023-12-29 PROCEDURE — 25000242 PHARM REV CODE 250 ALT 637 W/ HCPCS: Performed by: STUDENT IN AN ORGANIZED HEALTH CARE EDUCATION/TRAINING PROGRAM

## 2023-12-29 PROCEDURE — 83735 ASSAY OF MAGNESIUM: CPT | Performed by: STUDENT IN AN ORGANIZED HEALTH CARE EDUCATION/TRAINING PROGRAM

## 2023-12-29 PROCEDURE — 21400001 HC TELEMETRY ROOM

## 2023-12-29 PROCEDURE — 80053 COMPREHEN METABOLIC PANEL: CPT | Performed by: STUDENT IN AN ORGANIZED HEALTH CARE EDUCATION/TRAINING PROGRAM

## 2023-12-29 PROCEDURE — 25000003 PHARM REV CODE 250: Performed by: HOSPITALIST

## 2023-12-29 RX ORDER — MUPIROCIN 20 MG/G
OINTMENT TOPICAL 2 TIMES DAILY
Status: DISPENSED | OUTPATIENT
Start: 2023-12-29 | End: 2024-01-03

## 2023-12-29 RX ADMIN — IPRATROPIUM BROMIDE AND ALBUTEROL SULFATE 3 ML: 2.5; .5 SOLUTION RESPIRATORY (INHALATION) at 07:12

## 2023-12-29 RX ADMIN — APIXABAN 5 MG: 2.5 TABLET, FILM COATED ORAL at 08:12

## 2023-12-29 RX ADMIN — LOSARTAN POTASSIUM 25 MG: 25 TABLET, FILM COATED ORAL at 08:12

## 2023-12-29 RX ADMIN — IPRATROPIUM BROMIDE AND ALBUTEROL SULFATE 3 ML: 2.5; .5 SOLUTION RESPIRATORY (INHALATION) at 03:12

## 2023-12-29 RX ADMIN — DILTIAZEM HYDROCHLORIDE 30 MG: 30 TABLET, FILM COATED ORAL at 08:12

## 2023-12-29 RX ADMIN — OXYCODONE 5 MG: 5 TABLET ORAL at 05:12

## 2023-12-29 RX ADMIN — MUPIROCIN: 20 OINTMENT TOPICAL at 08:12

## 2023-12-29 RX ADMIN — ATORVASTATIN CALCIUM 10 MG: 10 TABLET, FILM COATED ORAL at 08:12

## 2023-12-29 RX ADMIN — TRAZODONE HYDROCHLORIDE 100 MG: 50 TABLET ORAL at 08:12

## 2023-12-29 NOTE — PROGRESS NOTES
[facility] Medicine  Progress Note    Patient Name: Jama James  MRN: [id number]  Patient Class: IP- Inpatient   Admission Date: 12/26/2023  Length of Stay: 0 days  Attending Physician: Edu Meier MD  Primary Care Provider: Marisel Farrell III, MD        Subjective:     Principal Problem:Effusion of right knee        HPI:  Patient is a 68 w/ Chronic Hypoxic Respiratory Failure on 3-4 LFNC 2/2 COPD, Metastatic Prostate cancer, HTN, HLD, Afib on eliquis presenting with  presenting with confusion x 1 day. Has been feeling weak and lethargic for past 3-4 days. Recently discharged from [facility] to [place]. Recent ED visit on 12/24 after fall at [place] where he hit his head. Work up negative and discharged back to [place]. Since then patient has become progressively weak. Today they called family because patient was not acting himself and he was brought to ED. No fever, chills, NVD. Patient was recently admitted to [facility] for septic arthritis right knee. He had washout via arthroscopy, and ID recommended ampicillin 2gm q4hr for 4 week course to end 12/20/23. Patient had swelling in right knee during stay at [facility]. Showed joint effusion. Plan was to set up aspiration with ortho as an outpatient.     Overview/Hospital Course:  No notes on file    Interval History: Patient very sedated again this am. Received PO pain medication at 0500. Discontinue this going forward.    Review of Systems   All other systems reviewed and are negative.    Objective:     Vital Signs (Most Recent):  Temp: 98.7 °F (37.1 °C) (12/29/23 0730)  Pulse: 91 (12/29/23 0730)  Resp: (!) 22 (12/29/23 0730)  BP: (!) 161/79 (12/29/23 0730)  SpO2: 100 % (12/29/23 0730) Vital Signs (24h Range):  Temp:  [98 °F (36.7 °C)-98.9 °F (37.2 °C)] 98.7 °F (37.1 °C)  Pulse:  [73-92] 91  Resp:  [19-36] 22  SpO2:  [94 %-100 %] 100 %  BP: (110-161)/(63-84) 161/79     Weight: 77.1 kg (170 lb)  Body mass index is 20.16 kg/m².    Intake/Output  Summary (Last 24 hours) at 12/29/2023 1129  Last data filed at 12/29/2023 0641  Gross per 24 hour   Intake 1219.89 ml   Output 1500 ml   Net -280.11 ml         Physical Exam      Vitals reviewed  General: NAD, Well developed, Well Nourished  Head: NC/AT  Eyes: EOMI, MELISSA  Cardiovascular: Pulses intact distally, Regular Rate and rhythm  Pulmonary: Normal Respiratory Rate, No respiratory distress  Gi: Soft, Non-tender  Extremities: Warm, Right knee wrapped with surgical dressing and ACE wrap  Skin: Warm, dry  Neuro: somnolent and disoriented, No focal Deficit, globally very weak    Significant Labs: All pertinent labs within the past 24 hours have been reviewed.    Significant Imaging: I have reviewed all pertinent imaging results/findings within the past 24 hours.     Assessment/Plan:      * Effusion of right knee  Recent admission for septic arthritis right knee and enterococcus bacteremia. Finished abx on 12/20  Knee with significant swelling and warmth to palpation  Reviewed xray and CT scan  Consulted ortho  Restarted eliquis  Radiology consult placed for joint aspiration but unable to aspirate any fluid  Ortho did scope of knee but did not find any obvious source of infection  Blood and cultures from knee NGTD- stop abx and monitor clinically        Acute metabolic encephalopathy  Waxing and waning mental status during admission. Stopping sedating medications. Possibly hypoactive delirium  Q4h neurochecks  Delirium precautions.      Goals of care, counseling/discussion  Advance Care Planning  Patient confirmed to me that he is DNR/DNI      Benign localized prostatic hyperplasia with lower urinary tract symptoms (LUTS)  Continue home medications      PAF (paroxysmal atrial fibrillation)  Patient with Paroxysmal (<7 days) atrial fibrillation which is controlled currently with Calcium Channel Blocker. Patient is currently in sinus rhythm.[id number] Score: 1. Hold AC for now given possible need for surgical  intervention    COPD (chronic obstructive pulmonary disease)  On home O2 regimen currently  Continue home inhaler  Duoneb q8h  Continue home chronic steroids    Prostate cancer  Noted  Patient's family will have to bring in his home medication      Hyperlipidemia  Continue statin      Tobacco abuse  Nicotine patch PRN      Weakness  Likely due to deconditioning vs acute infection  PT/OT      Anxiety  Continue home medications      Depression  Continue home medicaitons    HTN (hypertension)  Chronic, controlled. Latest blood pressure and vitals reviewed-     Temp:  [98.4 °F (36.9 °C)]   Pulse:  [67-85]   Resp:  [14-27]   BP: (167-181)/(75-91)   SpO2:  [96 %-100 %] .   Home meds for hypertension were reviewed and noted below.   Hypertension Medications               diltiaZEM (CARDIZEM) 30 MG tablet Take 1 tablet (30 mg total) by mouth every 12 (twelve) hours.    losartan (COZAAR) 25 MG tablet Take 1 tablet (25 mg total) by mouth 2 (two) times daily.            While in the hospital, will manage blood pressure as follows; Continue home antihypertensive regimen    Will utilize p.r.n. blood pressure medication only if patient's blood pressure greater than 180/110 and he develops symptoms such as worsening chest pain or shortness of breath.      VTE Risk Mitigation (From admission, onward)           Ordered     apixaban tablet 5 mg  2 times daily         12/28/23 0711     Reason for No Pharmacological VTE Prophylaxis  Once        Question:  Reasons:  Answer:  Already adequately anticoagulated on oral Anticoagulants    12/26/23 1718     IP VTE HIGH RISK PATIENT  Once         12/26/23 1718     Place sequential compression device  Until discontinued         12/26/23 1718                    Discharge Planning   CLEMENTINA:      Code Status: DNR   Is the patient medically ready for discharge?:     Reason for patient still in hospital (select all that apply): Treatment  Discharge Plan A: Skilled Nursing Facility                   Edu Meier MD  Department of Hospital Medicine   [place] - Intensive Care

## 2023-12-29 NOTE — SUBJECTIVE & OBJECTIVE
Interval History: Patient very sedated again this am. Received PO pain medication at 0500. Discontinue this going forward.    Review of Systems   All other systems reviewed and are negative.    Objective:     Vital Signs (Most Recent):  Temp: 98.7 °F (37.1 °C) (12/29/23 0730)  Pulse: 91 (12/29/23 0730)  Resp: (!) 22 (12/29/23 0730)  BP: (!) 161/79 (12/29/23 0730)  SpO2: 100 % (12/29/23 0730) Vital Signs (24h Range):  Temp:  [98 °F (36.7 °C)-98.9 °F (37.2 °C)] 98.7 °F (37.1 °C)  Pulse:  [73-92] 91  Resp:  [19-36] 22  SpO2:  [94 %-100 %] 100 %  BP: (110-161)/(63-84) 161/79     Weight: 77.1 kg (170 lb)  Body mass index is 20.16 kg/m².    Intake/Output Summary (Last 24 hours) at 12/29/2023 1129  Last data filed at 12/29/2023 0641  Gross per 24 hour   Intake 1219.89 ml   Output 1500 ml   Net -280.11 ml         Physical Exam      Vitals reviewed  General: NAD, Well developed, Well Nourished  Head: NC/AT  Eyes: EOMI, MELISSA  Cardiovascular: Pulses intact distally, Regular Rate and rhythm  Pulmonary: Normal Respiratory Rate, No respiratory distress  Gi: Soft, Non-tender  Extremities: Warm, Right knee wrapped with surgical dressing and ACE wrap  Skin: Warm, dry  Neuro: somnolent and disoriented, No focal Deficit, globally very weak    Significant Labs: All pertinent labs within the past 24 hours have been reviewed.    Significant Imaging: I have reviewed all pertinent imaging results/findings within the past 24 hours.

## 2023-12-29 NOTE — NURSING
Pt experiencing increased confusion after waking up this morning. Able to state name, , and situation but unable to state time and where he was at. Pt mumbling incoherent sentences. MD notified. Plan of care ongoing

## 2023-12-29 NOTE — PLAN OF CARE
Problem: Gas Exchange Impaired  Goal: Optimal Gas Exchange  Outcome: Ongoing, Progressing  Intervention: Optimize Oxygenation and Ventilation  Flowsheets (Taken 12/29/2023 1613)  Airway/Ventilation Management: airway patency maintained  Head of Bed (HOB) Positioning: HOB elevated   Patient in no apparent distress. Patient on 3 lpm. He wears 4 lpm at home. Aerosol treatments given Q 8 wa. Breo held as patient unable to take a deep breath on command.  . Will continue to monitor.

## 2023-12-29 NOTE — ASSESSMENT & PLAN NOTE
Recent admission for septic arthritis right knee and enterococcus bacteremia. Finished abx on 12/20  Knee with significant swelling and warmth to palpation  Reviewed xray and CT scan  Consulted ortho  Restarted eliquis  Radiology consult placed for joint aspiration but unable to aspirate any fluid  Ortho did scope of knee but did not find any obvious source of infection  Blood and cultures from knee NGTD- stop abx and monitor clinically

## 2023-12-29 NOTE — NURSING
Upon initial assessment patient is hard to arouse. Arouses to repeated stimuli/painful stimuli. Can open eyes for a short period of time but unable to keep them open. Pupils equal, round and reactive to light. Snoring but maintaining o2 sats.Remains on o2 @3l per nc.Can not follow any other commands. Vss. Further assessment charted.  Repositioned in bed, washed pts face with no response. Will cont plan of care.

## 2023-12-29 NOTE — ED PROVIDER NOTES
Encounter Date: 12/26/2023       History     Chief Complaint   Patient presents with    Altered Mental Status     EMS states patient from nursing home who reports patient is not acting like himself this morning.       58-year-old male, here from local nursing home for evaluation and treatment of altered mental status.  EMS relates that nursing home staff stated patient was not acting like himself this morning.  Patient is awake but somnolent shows no signs of facial droop, extremity weakness etc..  Sits upright in bed, speech is very slow and soft.  Answers questions with short answers.  Vital signs are stable, no fever, normal O2 saturations, no patient denies pain except for pain in the right knee.  History of right septic knee joint.  Patient does confirm that he is DNR.      Review of patient's allergies indicates:  No Known Allergies  Past Medical History:   Diagnosis Date    Abnormal CT scan 7/6/2023    Abnormal positron emission tomography (PET) scan 7/6/2023    Anxiety     Asbestosis 08/04/2015    Atrial fibrillation     Bilateral adrenal adenomas     COPD (chronic obstructive pulmonary disease)     COVID-19 virus infection 07/28/2022    Depression     Elevated PSA 7/6/2023    High cholesterol     HTN (hypertension)     Malignant neoplasm of prostate     Multiple lung nodules     Nodule of upper lobe of left lung 03/30/2023    6 mm spiculated on cta chest 3/21/23    On home oxygen therapy 05/24/2023    Pneumonia     Prostate cancer 5/27/2020    Prostate cancer metastatic to bone 7/6/2023    Prostate cancer metastatic to lung 7/6/2023    Ulcerative colitis      Past Surgical History:   Procedure Laterality Date    ARTHROSCOPY OF KNEE Right 11/22/2023    Procedure: ARTHROSCOPY, KNEE;  Surgeon: Harry Julien MD;  Location: [facility];  Service: Orthopedics;  Laterality: Right;    NECK SURGERY      right knee      TRANSRECTAL BIOPSY OF PROSTATE WITH ULTRASOUND GUIDANCE Bilateral 3/18/2020    Procedure:  BIOPSY, PROSTATE, RECTAL APPROACH, WITH US GUIDANCE;  Surgeon: Bill Jeong MD;  Location: [facility] OR;  Service: Urology;  Laterality: Bilateral;     Family History   Problem Relation Age of Onset    Cancer Mother         Uterine and Ovarian cancer    Diabetes Mother     Diabetes Sister      Social History     Tobacco Use    Smoking status: Every Day     Current packs/day: 2.00     Average packs/day: 2.0 packs/day for 50.3 years (100.6 ttl pk-yrs)     Types: Cigarettes     Start date: 9/7/1973     Passive exposure: Past    Smokeless tobacco: Never    Tobacco comments:     Pt states he smokes around 1-2ppd. Nicotine patch requested during his hospital stay. Information and education provided on smoking cessation classes.   Substance Use Topics    Alcohol use: Yes     Comment: 8 16oz beers per day    Drug use: No     Review of Systems   Constitutional:  Negative for chills and fever.   HENT:  Negative for congestion, rhinorrhea and sore throat.    Respiratory:  Negative for cough, shortness of breath and wheezing.    Cardiovascular:  Negative for chest pain and palpitations.   Gastrointestinal:  Negative for abdominal pain.   Genitourinary:  Negative for difficulty urinating, dysuria, flank pain and hematuria.   Musculoskeletal:  Positive for arthralgias (Right knee pain). Negative for myalgias and neck pain.   Skin:  Negative for rash.   Neurological:  Negative for dizziness, syncope and headaches.   Psychiatric/Behavioral:  Negative for confusion.         Patient has had a mental status change, per nursing home staff       Physical Exam     Initial Vitals [12/26/23 1159]   BP Pulse Resp Temp SpO2   (!) 181/91 70 16 98.4 °F (36.9 °C) 100 %      MAP       --         Physical Exam    Nursing note and vitals reviewed.  Constitutional: He appears well-developed and well-nourished. No distress.   HENT:   Head: Normocephalic and atraumatic.   Nose: Nose normal.   Mouth/Throat: Oropharynx is clear and moist. No  oropharyngeal exudate.   Eyes: Conjunctivae and EOM are normal. Pupils are equal, round, and reactive to light. No scleral icterus.   Neck: Neck supple. No JVD present.   Normal range of motion.  Cardiovascular:  Normal rate, regular rhythm, normal heart sounds and intact distal pulses.           Pulmonary/Chest: Breath sounds normal. No stridor. No respiratory distress. He has no wheezes.   Abdominal: Abdomen is soft. Bowel sounds are normal. He exhibits no distension. There is no abdominal tenderness.   Musculoskeletal:         General: Tenderness and edema present.      Cervical back: Normal range of motion and neck supple.      Comments: Right knee is edematous.  No excess warmth or erythema.  There is a sterile bandage on the anterior of the knee, which clean and dry.  Mild generalized tenderness with palpation, exacerbated with movement of the joint.     Neurological: He is alert. No cranial nerve deficit or sensory deficit.   14 secondary to somnolence and apparent generalized weakness, does not seem confused, no obvious hallucinations, answers questions with short answers, appears weak and frail.  Speech is not slurred, but he answers with a very frail and weak voice.   Skin: Skin is warm and dry. Capillary refill takes less than 2 seconds. No rash noted. No erythema.   Psychiatric:   Affect is flat, no hallucinations or delusions noted.         ED Course   Procedures  Labs Reviewed   CBC W/ AUTO DIFFERENTIAL - Abnormal; Notable for the following components:       Result Value    RBC 4.12 (*)     Hemoglobin 10.6 (*)     Hematocrit 34.8 (*)     MCH 25.7 (*)     MCHC 30.5 (*)     RDW 14.9 (*)     Lymph # 0.6 (*)     Gran % 81.0 (*)     Lymph % 9.0 (*)     All other components within normal limits   COMPREHENSIVE METABOLIC PANEL - Abnormal; Notable for the following components:    Sodium 146 (*)     Potassium 2.7 (*)     CO2 34 (*)     Glucose 128 (*)     Albumin 3.0 (*)     Alkaline Phosphatase 143 (*)     All  other components within normal limits   DRUG SCREEN PANEL, URINE EMERGENCY - Abnormal; Notable for the following components:    Benzodiazepines Presumptive Positive (*)     All other components within normal limits    Narrative:     Preferred Collection Type->Urine, Clean Catch  Specimen Source->Urine   URINALYSIS - Abnormal; Notable for the following components:    Ketones, UA Trace (*)     Occult Blood UA Trace (*)     All other components within normal limits    Narrative:     Collection Type->Urine, Clean Catch   CBC W/ AUTO DIFFERENTIAL - Abnormal; Notable for the following components:    RBC 3.82 (*)     Hemoglobin 9.8 (*)     Hematocrit 32.0 (*)     MCH 25.7 (*)     MCHC 30.6 (*)     RDW 14.7 (*)     Lymph % 12.4 (*)     All other components within normal limits   COMPREHENSIVE METABOLIC PANEL - Abnormal; Notable for the following components:    Sodium 146 (*)     Potassium 2.3 (*)     CO2 33 (*)     BUN 7 (*)     Albumin 2.7 (*)     All other components within normal limits    Narrative:     potassium critical result(s) called and verbal readback obtained from   CAMILA Cuevas RN by [facility] 12/27/2023 05:30   BASIC METABOLIC PANEL - Abnormal; Notable for the following components:    CO2 31 (*)     BUN 7 (*)     All other components within normal limits   POTASSIUM - Abnormal; Notable for the following components:    Potassium 2.5 (*)     All other components within normal limits    Narrative:     potassium critical result(s) called and verbal readback obtained from   CAMILA Cuevas RN by [facility] 12/27/2023 06:04   BASIC METABOLIC PANEL - Abnormal; Notable for the following components:    Sodium 147 (*)     Potassium 2.5 (*)     CO2 33 (*)     Glucose 113 (*)     BUN 6 (*)     All other components within normal limits    Narrative:     potassium critical result(s) called and verbal readback obtained from   CAMILA Cuevas RN by [facility] 12/27/2023 06:04   INFLUENZA A & B BY MOLECULAR   URINALYSIS, REFLEX TO URINE CULTURE     Narrative:     Preferred Collection Type->Urine, Clean Catch  Specimen Source->Urine   ALCOHOL,MEDICAL (ETHANOL)   SARS-COV-2 RNA AMPLIFICATION, QUAL    Narrative:     Is the patient symptomatic?->No   MAGNESIUM   MAGNESIUM   BASIC METABOLIC PANEL   MAGNESIUM   MAGNESIUM     EKG Readings: (Independently Interpreted)   EKG, personally reviewed by me, shows sinus rhythm at 71 beats per minute, , QT somewhat prolonged at 468.  Low voltage, possible lateral infarct of undetermined age.  No obvious STEMI, no obvious ischemic change.     ECG Results              EKG 12-lead (Final result)  Result time 12/26/23 15:10:45      Final result by Interface, Lab In [facility] (12/26/23 15:10:45)                   Narrative:    Test Reason : R41.82    Vent. Rate : 071 BPM     Atrial Rate : 071 BPM     P-R Int : 172 ms          QRS Dur : 084 ms      QT Int : 468 ms       P-R-T Axes : 107 223 106 degrees     QTc Int : 508 ms     Suspect arm lead reversal, interpretation assumes no reversal  Normal sinus rhythm  Low voltage QRS  Lateral infarct ,age undetermined  Prolonged QT  Abnormal ECG  When compared with ECG of 24-DEC-2023 20:46,  The axis Shifted left due  to current limb lead misplacements.  Nonspecific T wave abnormality now evident in Lateral leads  Confirmed by Yosi Danielle MD ([id number]) on 12/26/2023 3:10:37 PM    Referred By: [email or web address]   SELF           Confirmed By:Yosi Danielle MD                                  Imaging Results              US Guided Needle Placement (Final result)  Result time 12/27/23 13:37:23      Final result by Priscilla Billy MD (12/27/23 13:37:23)                   Impression:      As above.      Electronically signed by: Priscilla Billy  Date:    12/27/2023  Time:    13:37               Narrative:    EXAMINATION:  US GUIDED NEEDLE PLACEMENT    CLINICAL HISTORY:  Right knee effusion- concern for septic joint;    TECHNIQUE:  Informed consent was obtained from the patient's wife via telephone, as  the patient was unable to consent.  Real-time ultrasound of the right knee was performed.  A small amount of complex appearing fluid was identified in the knee joint.  The skin of the right knee was prepped and draped, and local anesthetic was administered.  Attempts were made to aspirate fluid both from the medial and lateral suprapatellar portions of the joint with an 18 gauge needle.  Although the needle was clearly documented to be with fluid on ultrasound, only a few drops of bloody fluid were aspirated, perhaps as a result of very complex or thick material in the joint.    COMPARISON:  CT of the knee 12/26/2023    FINDINGS:  Ultrasound guided knee aspiration was attempted as described above but was unsuccessful, yielding only a few drops of fluid.  No complications were encountered.                                       CT Knee Without Contrast Right (Final result)  Result time 12/26/23 18:47:55      Final result by Dmitry Reno MD (12/26/23 18:47:55)                   Impression:      No evidence for acute fracture.    Severe osteoarthritis.    Multiple intra-articular bodies.    Moderate joint effusion.      Electronically signed by: Dmitry Reno MD  Date:    12/26/2023  Time:    18:47               Narrative:    EXAMINATION:  CT KNEE WITHOUT CONTRAST RIGHT    CLINICAL HISTORY:  Septic arthritis suspected, knee, xray done;    TECHNIQUE:  Axial images through the right knee were acquired without contrast with multiplanar reformatted images created.    COMPARISON:  None    FINDINGS:  There is no fracture or dislocation.  There is severe medial and lateral tibiofemoral joint space loss and bulky tricompartmental marginal osteophyte formation in keeping with severe osteoarthritis.  A moderate joint effusion is present.  There are 3 intra-articular bodies within the medial aspect of the suprapatellar bursa, the largest measuring 3.1 cm.  A 1.2 cm intra-articular body is present within the anterior  medial tibiofemoral compartment.  Several possible distal loose bodies are present potentially within the popliteus tendon sheath.                                       X-Ray Knee 3 View Right (Final result)  Result time 12/26/23 17:13:26      Final result by Dmitry Reno MD (12/26/23 17:13:26)                   Impression:      Severe osteoarthritis.  Joint effusion with possible suprapatellar loose bodies.      Electronically signed by: Dmitry Reno MD  Date:    12/26/2023  Time:    17:13               Narrative:    EXAMINATION:  XR KNEE 3 VIEW RIGHT    CLINICAL HISTORY:  Knee swelling;    TECHNIQUE:  AP, lateral, and Merchant views of the right knee were performed.    COMPARISON:  11/20/2023    FINDINGS:  No fracture or dislocation.  There is a small joint effusion, with possible loose body/bodies within the medial suprapatellar bursa measuring 3.5 cm in total.  There is severe tricompartmental degenerative change.                                       CT Head Without Contrast (Final result)  Result time 12/26/23 13:45:58      Final result by Lisa Ortega MD (12/26/23 13:45:58)                   Impression:      There is no evidence acute intracranial abnormality.      Electronically signed by: Lisa Ortega MD  Date:    12/26/2023  Time:    13:45               Narrative:    EXAMINATION:  CT HEAD WITHOUT CONTRAST    CLINICAL HISTORY:  Mental status change, unknown cause;    TECHNIQUE:  Low dose axial CT images obtained throughout the head without intravenous contrast. Sagittal and coronal reconstructions were performed.    COMPARISON:  None.    FINDINGS:  Intracranial compartment:    Ventricles and sulci are normal in size for age without evidence of hydrocephalus. No extra-axial blood or fluid collections.    The brain parenchyma appears normal. No parenchymal mass, hemorrhage, edema or major vascular distribution infarct.    Skull/extracranial contents (limited evaluation): No fracture.  Mastoid air cells and paranasal sinuses are essentially clear.  There is soft tissue swelling overlying the right parietal bone.                                    X-Rays:   Independently Interpreted Readings:   Other Readings:  CT brain personally reviewed by me shows no evidence for intracranial hemorrhage, no mass, no mass effect, age-related changes only, no skull fracture or other evidence of trauma.    Medications   vancomycin - pharmacy to dose (has no administration in time range)   amiodarone tablet 100 mg (100 mg Oral Not Given 12/28/23 0900)   atorvastatin tablet 10 mg (10 mg Oral Given 12/28/23 2056)   famotidine tablet 40 mg (40 mg Oral Not Given 12/28/23 0900)   finasteride tablet 5 mg (5 mg Oral Not Given 12/28/23 0900)   folic acid tablet 1 mg (1 mg Oral Not Given 12/28/23 0900)   fluticasone furoate-vilanteroL 100-25 mcg/dose diskus inhaler 1 puff (0 puffs Inhalation Hold 12/28/23 0900)   losartan tablet 25 mg (25 mg Oral Given 12/28/23 2056)   paroxetine tablet 40 mg (40 mg Oral Not Given 12/28/23 0900)   oxyCODONE immediate release tablet 5 mg (5 mg Oral Given 12/29/23 0510)   predniSONE tablet 5 mg (5 mg Oral Not Given 12/28/23 0900)   tamsulosin 24 hr capsule 0.4 mg (0.4 mg Oral Not Given 12/28/23 0900)   traZODone tablet 100 mg (100 mg Oral Given 12/28/23 2056)   diltiaZEM tablet 30 mg (30 mg Oral Given 12/28/23 2056)   sodium chloride 0.9% flush 10 mL (has no administration in time range)   naloxone 0.4 mg/mL injection 0.02 mg (has no administration in time range)   glucose chewable tablet 16 g (has no administration in time range)   glucose chewable tablet 24 g (has no administration in time range)   glucagon (human recombinant) injection 1 mg (has no administration in time range)   ondansetron injection 4 mg (has no administration in time range)   prochlorperazine injection Soln 5 mg (has no administration in time range)   albuterol-ipratropium 2.5 mg-0.5 mg/3 mL nebulizer solution 3 mL (3  mLs Nebulization Given 12/28/23 1502)   aluminum-magnesium hydroxide-simethicone 200-200-20 mg/5 mL suspension 30 mL (has no administration in time range)   dextrose 10% bolus 125 mL 125 mL (has no administration in time range)   dextrose 10% bolus 250 mL 250 mL (has no administration in time range)   diazePAM tablet 5 mg (has no administration in time range)   hydrALAZINE injection 5 mg (5 mg Intravenous Given 12/27/23 0816)   apixaban tablet 5 mg (5 mg Oral Given 12/28/23 2056)   vancomycin (VANCOCIN) 1,500 mg in dextrose 5 % (D5W) 250 mL IVPB (0 mg Intravenous Stopped 12/28/23 2225)   lactated ringers bolus 500 mL (0 mLs Intravenous Stopped 12/26/23 2021)   potassium bicarbonate disintegrating tablet 40 mEq (40 mEq Oral Given 12/27/23 0017)   potassium chloride SA CR tablet 20 mEq (20 mEq Oral Given 12/27/23 0320)   magnesium sulfate 2g in water 50mL IVPB (premix) (0 g Intravenous Stopped 12/27/23 0520)   potassium chloride 10 mEq in 100 mL IVPB (10 mEq Intravenous New Bag 12/27/23 1116)   potassium chloride SA CR tablet 40 mEq (40 mEq Oral Given 12/27/23 0804)     Medical Decision Making  Differential includes urinary tract infection, CVA, TIA, sepsis, viral illness, metabolic encephalopathy, etc.    Labs are unrevealing as to cause of his mental status change.  Potassium somewhat low at 2.7, white count normal, UA negative, CT head negative for any acute changes.  His knee is somewhat painful and edematous, and sepsis of the right knee joint is possible.  I have discussed this case with Dr. Meier, hospitalist, who will admit for further evaluation and treatment.    Amount and/or Complexity of Data Reviewed  Labs: ordered.  Radiology: ordered.                                      Clinical Impression:  Final diagnoses:  [R41.82] Change in mental status  [G93.41] Acute metabolic encephalopathy          ED Disposition Condition    Observation                 Yomi Kay MD  12/29/23 3022

## 2023-12-29 NOTE — ASSESSMENT & PLAN NOTE
Waxing and waning mental status during admission. Stopping sedating medications. Possibly hypoactive delirium  Q4h neurochecks  Delirium precautions.

## 2023-12-29 NOTE — PLAN OF CARE
12/28/23 1400   Post-Acute Status   Post-Acute Authorization Placement   Post-Acute Placement Status Referrals Sent   Discharge Plan   Discharge Plan A Skilled Nursing Facility   Discharge Plan B Home Health     Preference form signed for [facility]. Referral sent to them. Pending review.

## 2023-12-29 NOTE — PLAN OF CARE
Problem: Adult Inpatient Plan of Care  Goal: Plan of Care Review  Outcome: Ongoing, Progressing     Problem: Adult Inpatient Plan of Care  Goal: Patient-Specific Goal (Individualized)  Outcome: Ongoing, Progressing     Problem: Adult Inpatient Plan of Care  Goal: Absence of Hospital-Acquired Illness or Injury  Outcome: Ongoing, Progressing     Problem: Adult Inpatient Plan of Care  Goal: Optimal Comfort and Wellbeing  Outcome: Ongoing, Progressing     Problem: Adult Inpatient Plan of Care  Goal: Readiness for Transition of Care  Outcome: Ongoing, Progressing     Problem: Skin Injury Risk Increased  Goal: Skin Health and Integrity  Outcome: Ongoing, Progressing     Problem: Fall Injury Risk  Goal: Absence of Fall and Fall-Related Injury  Outcome: Ongoing, Progressing     Problem: Gas Exchange Impaired  Goal: Optimal Gas Exchange  Outcome: Ongoing, Progressing

## 2023-12-30 PROCEDURE — 94761 N-INVAS EAR/PLS OXIMETRY MLT: CPT

## 2023-12-30 PROCEDURE — 63600175 PHARM REV CODE 636 W HCPCS: Performed by: STUDENT IN AN ORGANIZED HEALTH CARE EDUCATION/TRAINING PROGRAM

## 2023-12-30 PROCEDURE — 94640 AIRWAY INHALATION TREATMENT: CPT

## 2023-12-30 PROCEDURE — 25000003 PHARM REV CODE 250: Performed by: HOSPITALIST

## 2023-12-30 PROCEDURE — 27000221 HC OXYGEN, UP TO 24 HOURS

## 2023-12-30 PROCEDURE — 99233 SBSQ HOSP IP/OBS HIGH 50: CPT | Mod: ,,, | Performed by: STUDENT IN AN ORGANIZED HEALTH CARE EDUCATION/TRAINING PROGRAM

## 2023-12-30 PROCEDURE — 25000242 PHARM REV CODE 250 ALT 637 W/ HCPCS: Performed by: STUDENT IN AN ORGANIZED HEALTH CARE EDUCATION/TRAINING PROGRAM

## 2023-12-30 PROCEDURE — 21400001 HC TELEMETRY ROOM

## 2023-12-30 PROCEDURE — 25000003 PHARM REV CODE 250: Performed by: STUDENT IN AN ORGANIZED HEALTH CARE EDUCATION/TRAINING PROGRAM

## 2023-12-30 RX ORDER — ACETAMINOPHEN 325 MG/1
650 TABLET ORAL EVERY 6 HOURS PRN
Status: DISCONTINUED | OUTPATIENT
Start: 2023-12-30 | End: 2024-01-04 | Stop reason: HOSPADM

## 2023-12-30 RX ORDER — OLANZAPINE 10 MG/2ML
2.5 INJECTION, POWDER, FOR SOLUTION INTRAMUSCULAR EVERY 8 HOURS PRN
Status: DISCONTINUED | OUTPATIENT
Start: 2023-12-30 | End: 2024-01-04 | Stop reason: HOSPADM

## 2023-12-30 RX ADMIN — PAROXETINE HYDROCHLORIDE 40 MG: 10 TABLET, FILM COATED ORAL at 08:12

## 2023-12-30 RX ADMIN — APIXABAN 5 MG: 2.5 TABLET, FILM COATED ORAL at 08:12

## 2023-12-30 RX ADMIN — TAMSULOSIN HYDROCHLORIDE 0.4 MG: 0.4 CAPSULE ORAL at 08:12

## 2023-12-30 RX ADMIN — MUPIROCIN: 20 OINTMENT TOPICAL at 09:12

## 2023-12-30 RX ADMIN — ACETAMINOPHEN 650 MG: 325 TABLET ORAL at 08:12

## 2023-12-30 RX ADMIN — DILTIAZEM HYDROCHLORIDE 30 MG: 30 TABLET, FILM COATED ORAL at 08:12

## 2023-12-30 RX ADMIN — PREDNISONE 5 MG: 1 TABLET ORAL at 08:12

## 2023-12-30 RX ADMIN — APIXABAN 5 MG: 2.5 TABLET, FILM COATED ORAL at 09:12

## 2023-12-30 RX ADMIN — FAMOTIDINE 40 MG: 20 TABLET ORAL at 08:12

## 2023-12-30 RX ADMIN — FOLIC ACID 1 MG: 1 TABLET ORAL at 08:12

## 2023-12-30 RX ADMIN — LOSARTAN POTASSIUM 25 MG: 25 TABLET, FILM COATED ORAL at 08:12

## 2023-12-30 RX ADMIN — FINASTERIDE 5 MG: 5 TABLET, FILM COATED ORAL at 08:12

## 2023-12-30 RX ADMIN — TRAZODONE HYDROCHLORIDE 100 MG: 50 TABLET ORAL at 09:12

## 2023-12-30 RX ADMIN — DILTIAZEM HYDROCHLORIDE 30 MG: 30 TABLET, FILM COATED ORAL at 09:12

## 2023-12-30 RX ADMIN — IPRATROPIUM BROMIDE AND ALBUTEROL SULFATE 3 ML: 2.5; .5 SOLUTION RESPIRATORY (INHALATION) at 07:12

## 2023-12-30 RX ADMIN — ATORVASTATIN CALCIUM 10 MG: 10 TABLET, FILM COATED ORAL at 09:12

## 2023-12-30 RX ADMIN — FLUTICASONE FUROATE AND VILANTEROL TRIFENATATE 1 PUFF: 100; 25 POWDER RESPIRATORY (INHALATION) at 07:12

## 2023-12-30 RX ADMIN — IPRATROPIUM BROMIDE AND ALBUTEROL SULFATE 3 ML: 2.5; .5 SOLUTION RESPIRATORY (INHALATION) at 03:12

## 2023-12-30 RX ADMIN — MUPIROCIN: 20 OINTMENT TOPICAL at 08:12

## 2023-12-30 RX ADMIN — AMIODARONE HYDROCHLORIDE 100 MG: 100 TABLET ORAL at 08:12

## 2023-12-30 RX ADMIN — LOSARTAN POTASSIUM 25 MG: 25 TABLET, FILM COATED ORAL at 09:12

## 2023-12-30 NOTE — ASSESSMENT & PLAN NOTE
Waxing and waning mental status during admission. Stopping sedating medications. Possibly hypoactive delirium  Q4h neurochecks  Delirium precautions.  Improving today

## 2023-12-30 NOTE — PLAN OF CARE
[facility] Intensive Care  Discharge Reassessment    Primary Care Provider: Marisel Farrell III, MD    Expected Discharge Date:     Patient has been lethargic the two times I've been in his room. No family at bedside. Will continue to follow for needs.     Reassessment (most recent)       Discharge Reassessment - 12/29/23 1500          Discharge Reassessment    Assessment Type Discharge Planning Reassessment     Did the patient's condition or plan change since previous assessment? No     Discharge Plan A Skilled Nursing Facility     Discharge Plan B Home Health;Home with family

## 2023-12-30 NOTE — PLAN OF CARE
Problem: Adult Inpatient Plan of Care  Goal: Plan of Care Review  Outcome: Ongoing, Progressing  Goal: Patient-Specific Goal (Individualized)  Outcome: Ongoing, Progressing  Goal: Absence of Hospital-Acquired Illness or Injury  Outcome: Ongoing, Progressing  Goal: Optimal Comfort and Wellbeing  Outcome: Ongoing, Progressing  Intervention: Monitor Pain and Promote Comfort  Flowsheets (Taken 12/30/2023 0327)  Pain Management Interventions:   care clustered   medication offered  Goal: Readiness for Transition of Care  Outcome: Ongoing, Progressing     Problem: Skin Injury Risk Increased  Goal: Skin Health and Integrity  Outcome: Ongoing, Progressing     Problem: Fall Injury Risk  Goal: Absence of Fall and Fall-Related Injury  Outcome: Ongoing, Progressing  Intervention: Identify and Manage Contributors  Flowsheets (Taken 12/30/2023 0327)  Medication Review/Management: medications reviewed     Problem: Infection  Goal: Absence of Infection Signs and Symptoms  Outcome: Ongoing, Progressing     Problem: Gas Exchange Impaired  Goal: Optimal Gas Exchange  Outcome: Ongoing, Progressing  Intervention: Optimize Oxygenation and Ventilation  Flowsheets (Taken 12/30/2023 0327)  Airway/Ventilation Management: airway patency maintained  Head of Bed (HOB) Positioning: HOB at 30 degrees

## 2023-12-30 NOTE — PLAN OF CARE
Problem: Adult Inpatient Plan of Care  Goal: Optimal Comfort and Wellbeing  Outcome: Ongoing, Progressing  Goal: Readiness for Transition of Care  Outcome: Ongoing, Progressing     Problem: Fall Injury Risk  Goal: Absence of Fall and Fall-Related Injury  Outcome: Ongoing, Progressing     Problem: Infection  Goal: Absence of Infection Signs and Symptoms  Outcome: Ongoing, Progressing     Problem: Gas Exchange Impaired  Goal: Optimal Gas Exchange  Outcome: Ongoing, Progressing

## 2023-12-30 NOTE — PLAN OF CARE
12/29/23 1500   Medicare Message   Important Message from Medicare regarding Discharge Appeal Rights Other (comments)  (patient unable to sign as lethargic; no family at bedside)

## 2023-12-30 NOTE — PLAN OF CARE
Problem: Gas Exchange Impaired  Goal: Optimal Gas Exchange  Outcome: Ongoing, Progressing  Intervention: Optimize Oxygenation and Ventilation  Flowsheets (Taken 12/30/2023 1610)  Airway/Ventilation Management: airway patency maintained  Head of Bed (HOB) Positioning: HOB elevated   Patient in no apparent distress. Patient on 3 lpm. Aerosol treatments given Q 8 wa. Breo given today  . Will continue to monitor.

## 2023-12-30 NOTE — PROGRESS NOTES
[facility]  Progress Note    Patient Name: Jama James  MRN: [id number]  Patient Class: IP- Inpatient   Admission Date: 12/26/2023  Length of Stay: 1 days  Attending Physician: Edu Meier MD  Primary Care Provider: Marisel Farrell III, MD        Subjective:     Principal Problem:Effusion of right knee        HPI:  Patient is a 68 w/ Chronic Hypoxic Respiratory Failure on 3-4 LFNC 2/2 COPD, Metastatic Prostate cancer, HTN, HLD, Afib on eliquis presenting with  presenting with confusion x 1 day. Has been feeling weak and lethargic for past 3-4 days. Recently discharged from [facility] to [place]. Recent ED visit on 12/24 after fall at [place] where he hit his head. Work up negative and discharged back to [place]. Since then patient has become progressively weak. Today they called family because patient was not acting himself and he was brought to ED. No fever, chills, NVD. Patient was recently admitted to [facility] for septic arthritis right knee. He had washout via arthroscopy, and ID recommended ampicillin 2gm q4hr for 4 week course to end 12/20/23. Patient had swelling in right knee during stay at [facility]. Showed joint effusion. Plan was to set up aspiration with ortho as an outpatient.     Overview/Hospital Course:  No notes on file    Interval History: Mental status greatly improved from yesterday but remains confused. Awake and eating today.    Review of Systems   All other systems reviewed and are negative.    Objective:     Vital Signs (Most Recent):  Temp: 98.3 °F (36.8 °C) (12/30/23 0400)  Pulse: 80 (12/30/23 0800)  Resp: 16 (12/30/23 0800)  BP: (!) 160/77 (12/30/23 0816)  SpO2: (!) 94 % (12/30/23 0800) Vital Signs (24h Range):  Temp:  [98.1 °F (36.7 °C)-98.3 °F (36.8 °C)] 98.3 °F (36.8 °C)  Pulse:  [70-86] 80  Resp:  [9-26] 16  SpO2:  [92 %-98 %] 94 %  BP: (137-160)/() 160/77     Weight: 77.1 kg (170 lb)  Body mass index is 20.16 kg/m².    Intake/Output Summary (Last 24  hours) at 12/30/2023 1110  Last data filed at 12/30/2023 0900  Gross per 24 hour   Intake 920 ml   Output 1901 ml   Net -981 ml           Physical Exam      Vitals reviewed  General: NAD, Well developed, Well Nourished  Head: NC/AT  Eyes: EOMI, MELISSA  Cardiovascular: Pulses intact distally, Regular Rate and rhythm  Pulmonary: Normal Respiratory Rate, No respiratory distress  Gi: Soft, Non-tender  Extremities: Warm, Right knee wrapped with surgical dressing and ACE wrap  Skin: Warm, dry  Neuro: alert and disoriented, No focal Deficit, globally very weak    Significant Labs: All pertinent labs within the past 24 hours have been reviewed.    Significant Imaging: I have reviewed all pertinent imaging results/findings within the past 24 hours.    Assessment/Plan:      * Effusion of right knee  Recent admission for septic arthritis right knee and enterococcus bacteremia. Finished abx on 12/20  Knee with significant swelling and warmth to palpation  Reviewed xray and CT scan  Consulted ortho  Restarted eliquis  Radiology consult placed for joint aspiration but unable to aspirate any fluid  Ortho did scope of knee but did not find any obvious source of infection  Blood and cultures from knee NGTD- stop abx and monitor clinically        Acute metabolic encephalopathy  Waxing and waning mental status during admission. Stopping sedating medications. Possibly hypoactive delirium  Q4h neurochecks  Delirium precautions.  Improving today      Goals of care, counseling/discussion  Advance Care Planning  Patient confirmed to me that he is DNR/DNI      Benign localized prostatic hyperplasia with lower urinary tract symptoms (LUTS)  Continue home medications      PAF (paroxysmal atrial fibrillation)  Patient with Paroxysmal (<7 days) atrial fibrillation which is controlled currently with Calcium Channel Blocker. Patient is currently in sinus rhythm.[id number] Score: 1. Hold AC for now given possible need for surgical  intervention    COPD (chronic obstructive pulmonary disease)  On home O2 regimen currently  Continue home inhaler  Duoneb q8h  Continue home chronic steroids    Prostate cancer  Noted  Patient's family will have to bring in his home medication      Hyperlipidemia  Continue statin      Tobacco abuse  Nicotine patch PRN      Weakness  Likely due to deconditioning vs acute infection  PT/OT      Anxiety  Continue home medications      Depression  Continue home medicaitons    HTN (hypertension)  Chronic, controlled. Latest blood pressure and vitals reviewed-     Temp:  [98.4 °F (36.9 °C)]   Pulse:  [67-85]   Resp:  [14-27]   BP: (167-181)/(75-91)   SpO2:  [96 %-100 %] .   Home meds for hypertension were reviewed and noted below.   Hypertension Medications               diltiaZEM (CARDIZEM) 30 MG tablet Take 1 tablet (30 mg total) by mouth every 12 (twelve) hours.    losartan (COZAAR) 25 MG tablet Take 1 tablet (25 mg total) by mouth 2 (two) times daily.            While in the hospital, will manage blood pressure as follows; Continue home antihypertensive regimen    Will utilize p.r.n. blood pressure medication only if patient's blood pressure greater than 180/110 and he develops symptoms such as worsening chest pain or shortness of breath.      VTE Risk Mitigation (From admission, onward)           Ordered     apixaban tablet 5 mg  2 times daily         12/28/23 0711     Reason for No Pharmacological VTE Prophylaxis  Once        Question:  Reasons:  Answer:  Already adequately anticoagulated on oral Anticoagulants    12/26/23 1718     IP VTE HIGH RISK PATIENT  Once         12/26/23 1718     Place sequential compression device  Until discontinued         12/26/23 1718                    Discharge Planning   CLEMENTINA:      Code Status: DNR   Is the patient medically ready for discharge?:     Reason for patient still in hospital (select all that apply): Treatment  Discharge Plan A: Skilled Nursing Facility                   Edu Meier MD  Department of Hospital Medicine   [place] - Intensive Care

## 2023-12-30 NOTE — SUBJECTIVE & OBJECTIVE
Interval History: Mental status greatly improved from yesterday but remains confused. Awake and eating today.    Review of Systems   All other systems reviewed and are negative.    Objective:     Vital Signs (Most Recent):  Temp: 98.3 °F (36.8 °C) (12/30/23 0400)  Pulse: 80 (12/30/23 0800)  Resp: 16 (12/30/23 0800)  BP: (!) 160/77 (12/30/23 0816)  SpO2: (!) 94 % (12/30/23 0800) Vital Signs (24h Range):  Temp:  [98.1 °F (36.7 °C)-98.3 °F (36.8 °C)] 98.3 °F (36.8 °C)  Pulse:  [70-86] 80  Resp:  [9-26] 16  SpO2:  [92 %-98 %] 94 %  BP: (137-160)/() 160/77     Weight: 77.1 kg (170 lb)  Body mass index is 20.16 kg/m².    Intake/Output Summary (Last 24 hours) at 12/30/2023 1110  Last data filed at 12/30/2023 0900  Gross per 24 hour   Intake 920 ml   Output 1901 ml   Net -981 ml           Physical Exam      Vitals reviewed  General: NAD, Well developed, Well Nourished  Head: NC/AT  Eyes: EOMI, MELISSA  Cardiovascular: Pulses intact distally, Regular Rate and rhythm  Pulmonary: Normal Respiratory Rate, No respiratory distress  Gi: Soft, Non-tender  Extremities: Warm, Right knee wrapped with surgical dressing and ACE wrap  Skin: Warm, dry  Neuro: alert and disoriented, No focal Deficit, globally very weak    Significant Labs: All pertinent labs within the past 24 hours have been reviewed.    Significant Imaging: I have reviewed all pertinent imaging results/findings within the past 24 hours.

## 2023-12-31 LAB — BACTERIA SPEC AEROBE CULT: NO GROWTH

## 2023-12-31 PROCEDURE — 94761 N-INVAS EAR/PLS OXIMETRY MLT: CPT

## 2023-12-31 PROCEDURE — 27000221 HC OXYGEN, UP TO 24 HOURS

## 2023-12-31 PROCEDURE — 25000242 PHARM REV CODE 250 ALT 637 W/ HCPCS: Performed by: STUDENT IN AN ORGANIZED HEALTH CARE EDUCATION/TRAINING PROGRAM

## 2023-12-31 PROCEDURE — 21400001 HC TELEMETRY ROOM

## 2023-12-31 PROCEDURE — 94640 AIRWAY INHALATION TREATMENT: CPT

## 2023-12-31 PROCEDURE — 25000003 PHARM REV CODE 250: Performed by: HOSPITALIST

## 2023-12-31 PROCEDURE — 63600175 PHARM REV CODE 636 W HCPCS: Performed by: STUDENT IN AN ORGANIZED HEALTH CARE EDUCATION/TRAINING PROGRAM

## 2023-12-31 PROCEDURE — 25000003 PHARM REV CODE 250: Performed by: STUDENT IN AN ORGANIZED HEALTH CARE EDUCATION/TRAINING PROGRAM

## 2023-12-31 PROCEDURE — 99233 SBSQ HOSP IP/OBS HIGH 50: CPT | Mod: ,,, | Performed by: STUDENT IN AN ORGANIZED HEALTH CARE EDUCATION/TRAINING PROGRAM

## 2023-12-31 RX ORDER — IPRATROPIUM BROMIDE AND ALBUTEROL SULFATE 2.5; .5 MG/3ML; MG/3ML
3 SOLUTION RESPIRATORY (INHALATION) EVERY 4 HOURS PRN
Status: DISCONTINUED | OUTPATIENT
Start: 2023-12-31 | End: 2024-01-04 | Stop reason: HOSPADM

## 2023-12-31 RX ADMIN — ATORVASTATIN CALCIUM 10 MG: 10 TABLET, FILM COATED ORAL at 08:12

## 2023-12-31 RX ADMIN — APIXABAN 5 MG: 2.5 TABLET, FILM COATED ORAL at 09:12

## 2023-12-31 RX ADMIN — APIXABAN 5 MG: 2.5 TABLET, FILM COATED ORAL at 08:12

## 2023-12-31 RX ADMIN — FAMOTIDINE 40 MG: 20 TABLET ORAL at 09:12

## 2023-12-31 RX ADMIN — FLUTICASONE FUROATE AND VILANTEROL TRIFENATATE 1 PUFF: 100; 25 POWDER RESPIRATORY (INHALATION) at 07:12

## 2023-12-31 RX ADMIN — FINASTERIDE 5 MG: 5 TABLET, FILM COATED ORAL at 09:12

## 2023-12-31 RX ADMIN — DILTIAZEM HYDROCHLORIDE 30 MG: 30 TABLET, FILM COATED ORAL at 08:12

## 2023-12-31 RX ADMIN — FOLIC ACID 1 MG: 1 TABLET ORAL at 09:12

## 2023-12-31 RX ADMIN — MUPIROCIN: 20 OINTMENT TOPICAL at 09:12

## 2023-12-31 RX ADMIN — AMIODARONE HYDROCHLORIDE 100 MG: 100 TABLET ORAL at 09:12

## 2023-12-31 RX ADMIN — PAROXETINE HYDROCHLORIDE 40 MG: 10 TABLET, FILM COATED ORAL at 09:12

## 2023-12-31 RX ADMIN — LOSARTAN POTASSIUM 25 MG: 25 TABLET, FILM COATED ORAL at 08:12

## 2023-12-31 RX ADMIN — OLANZAPINE 2.5 MG: 10 INJECTION, POWDER, FOR SOLUTION INTRAMUSCULAR at 11:12

## 2023-12-31 RX ADMIN — PREDNISONE 5 MG: 1 TABLET ORAL at 09:12

## 2023-12-31 RX ADMIN — DILTIAZEM HYDROCHLORIDE 30 MG: 30 TABLET, FILM COATED ORAL at 09:12

## 2023-12-31 RX ADMIN — LOSARTAN POTASSIUM 25 MG: 25 TABLET, FILM COATED ORAL at 09:12

## 2023-12-31 RX ADMIN — IPRATROPIUM BROMIDE AND ALBUTEROL SULFATE 3 ML: 2.5; .5 SOLUTION RESPIRATORY (INHALATION) at 07:12

## 2023-12-31 RX ADMIN — TAMSULOSIN HYDROCHLORIDE 0.4 MG: 0.4 CAPSULE ORAL at 09:12

## 2023-12-31 RX ADMIN — TRAZODONE HYDROCHLORIDE 100 MG: 50 TABLET ORAL at 08:12

## 2023-12-31 NOTE — PROGRESS NOTES
[facility]  Progress Note    Patient Name: Jama James  MRN: [id number]  Patient Class: IP- Inpatient   Admission Date: 12/26/2023  Length of Stay: 2 days  Attending Physician: Edu Meier MD  Primary Care Provider: Marisel Farrell III, MD        Subjective:     Principal Problem:Effusion of right knee        HPI:  Patient is a 68 w/ Chronic Hypoxic Respiratory Failure on 3-4 LFNC 2/2 COPD, Metastatic Prostate cancer, HTN, HLD, Afib on eliquis presenting with  presenting with confusion x 1 day. Has been feeling weak and lethargic for past 3-4 days. Recently discharged from [facility] to [place]. Recent ED visit on 12/24 after fall at [place] where he hit his head. Work up negative and discharged back to [place]. Since then patient has become progressively weak. Today they called family because patient was not acting himself and he was brought to ED. No fever, chills, NVD. Patient was recently admitted to [facility] for septic arthritis right knee. He had washout via arthroscopy, and ID recommended ampicillin 2gm q4hr for 4 week course to end 12/20/23. Patient had swelling in right knee during stay at [facility]. Showed joint effusion. Plan was to set up aspiration with ortho as an outpatient.     Overview/Hospital Course:  No notes on file    Interval History: NAEON. Mental status stable from yesterday. If he does not show appreciable improvement in mental status by Tuesday family would like to take him home with hospice.    Review of Systems   All other systems reviewed and are negative.    Objective:     Vital Signs (Most Recent):  Temp: 98.2 °F (36.8 °C) (12/31/23 0355)  Pulse: 70 (12/31/23 0920)  Resp: 18 (12/31/23 0920)  BP: (!) 175/100 (12/31/23 0920)  SpO2: 99 % (12/31/23 0920) Vital Signs (24h Range):  Temp:  [98.2 °F (36.8 °C)-98.5 °F (36.9 °C)] 98.2 °F (36.8 °C)  Pulse:  [66-72] 70  Resp:  [17-24] 18  SpO2:  [99 %-100 %] 99 %  BP: (131-175)/() 175/100     Weight: 77.1 kg (170  lb)  Body mass index is 20.16 kg/m².    Intake/Output Summary (Last 24 hours) at 12/31/2023 1137  Last data filed at 12/31/2023 0600  Gross per 24 hour   Intake --   Output 500 ml   Net -500 ml           Physical Exam      Vitals reviewed  General: NAD, Well developed, Well Nourished  Head: NC/AT  Eyes: EOMI, MELISSA  Cardiovascular: Pulses intact distally, Regular Rate and rhythm  Pulmonary: Normal Respiratory Rate, No respiratory distress  Gi: Soft, Non-tender  Extremities: Warm, Right knee wrapped with surgical dressing and ACE wrap  Skin: Warm, dry  Neuro: alert and disoriented, No focal Deficit, globally very weak    Significant Labs: All pertinent labs within the past 24 hours have been reviewed.    Significant Imaging: I have reviewed all pertinent imaging results/findings within the past 24 hours.    Assessment/Plan:      * Effusion of right knee  Recent admission for septic arthritis right knee and enterococcus bacteremia. Finished abx on 12/20  Knee with significant swelling and warmth to palpation  Reviewed xray and CT scan  Consulted ortho  Restarted eliquis  Radiology consult placed for joint aspiration but unable to aspirate any fluid  Ortho did scope of knee but did not find any obvious source of infection  Blood and cultures from knee NGTD- stop abx and monitor clinically        Acute metabolic encephalopathy  Waxing and waning mental status during admission. Stopping sedating medications. Possibly hypoactive delirium  Q4h neurochecks  Delirium precautions.  Stable      Goals of care, counseling/discussion  Advance Care Planning  Patient confirmed to me that he is DNR/DNI      Benign localized prostatic hyperplasia with lower urinary tract symptoms (LUTS)  Continue home medications      PAF (paroxysmal atrial fibrillation)  Patient with Paroxysmal (<7 days) atrial fibrillation which is controlled currently with Calcium Channel Blocker. Patient is currently in sinus rhythm.[id number] Score: 1. Hold AC  for now given possible need for surgical intervention    COPD (chronic obstructive pulmonary disease)  On home O2 regimen currently  Continue home inhaler  Duoneb q8h  Continue home chronic steroids    Prostate cancer  Noted  Patient's family will have to bring in his home medication- Will order      Hyperlipidemia  Continue statin      Tobacco abuse  Nicotine patch PRN      Weakness  Likely due to deconditioning vs acute infection  PT/OT      Anxiety  Hold valium      Depression  Continue home medicaitons    HTN (hypertension)  Chronic, controlled. Latest blood pressure and vitals reviewed-     Temp:  [98.2 °F (36.8 °C)-98.5 °F (36.9 °C)]   Pulse:  [66-72]   Resp:  [17-24]   BP: (131-175)/()   SpO2:  [99 %-100 %] .   Home meds for hypertension were reviewed and noted below.   Hypertension Medications               diltiaZEM (CARDIZEM) 30 MG tablet Take 1 tablet (30 mg total) by mouth every 12 (twelve) hours.    losartan (COZAAR) 25 MG tablet Take 1 tablet (25 mg total) by mouth 2 (two) times daily.            While in the hospital, will manage blood pressure as follows; Continue home antihypertensive regimen    Will utilize p.r.n. blood pressure medication only if patient's blood pressure greater than 180/110 and he develops symptoms such as worsening chest pain or shortness of breath.      VTE Risk Mitigation (From admission, onward)           Ordered     apixaban tablet 5 mg  2 times daily         12/28/23 0711     Reason for No Pharmacological VTE Prophylaxis  Once        Question:  Reasons:  Answer:  Already adequately anticoagulated on oral Anticoagulants    12/26/23 1718     IP VTE HIGH RISK PATIENT  Once         12/26/23 1718     Place sequential compression device  Until discontinued         12/26/23 1718                    Discharge Planning   CLEMENTINA:      Code Status: DNR   Is the patient medically ready for discharge?:     Reason for patient still in hospital (select all that apply):  Treatment  Discharge Plan A: Skilled Nursing Facility                  Edu Meier MD  Department of Hospital Medicine   [place] - Intensive Care

## 2023-12-31 NOTE — PLAN OF CARE
Problem: Adult Inpatient Plan of Care  Goal: Optimal Comfort and Wellbeing  Outcome: Ongoing, Progressing  Goal: Readiness for Transition of Care  Outcome: Ongoing, Progressing     Problem: Skin Injury Risk Increased  Goal: Skin Health and Integrity  Outcome: Ongoing, Progressing     Problem: Fall Injury Risk  Goal: Absence of Fall and Fall-Related Injury  Outcome: Ongoing, Progressing     Problem: Infection  Goal: Absence of Infection Signs and Symptoms  Outcome: Ongoing, Progressing     Problem: Gas Exchange Impaired  Goal: Optimal Gas Exchange  Outcome: Ongoing, Progressing

## 2023-12-31 NOTE — PLAN OF CARE
Problem: Adult Inpatient Plan of Care  Goal: Plan of Care Review  Outcome: Ongoing, Progressing  Goal: Patient-Specific Goal (Individualized)  Outcome: Ongoing, Progressing  Goal: Absence of Hospital-Acquired Illness or Injury  Outcome: Ongoing, Progressing  Goal: Optimal Comfort and Wellbeing  Outcome: Ongoing, Progressing  Intervention: Monitor Pain and Promote Comfort  Flowsheets (Taken 12/31/2023 0325)  Pain Management Interventions: quiet environment facilitated  Goal: Readiness for Transition of Care  Outcome: Ongoing, Progressing     Problem: Skin Injury Risk Increased  Goal: Skin Health and Integrity  Outcome: Ongoing, Progressing     Problem: Infection  Goal: Absence of Infection Signs and Symptoms  Outcome: Ongoing, Progressing     Problem: Gas Exchange Impaired  Goal: Optimal Gas Exchange  Outcome: Ongoing, Progressing  Intervention: Optimize Oxygenation and Ventilation  Flowsheets (Taken 12/31/2023 0325)  Airway/Ventilation Management: airway patency maintained  Head of Bed (HOB) Positioning: HOB at 30 degrees

## 2023-12-31 NOTE — SUBJECTIVE & OBJECTIVE
Interval History: NAEON. Mental status stable from yesterday. If he does not show appreciable improvement in mental status by Tuesday family would like to take him home with hospice.    Review of Systems   All other systems reviewed and are negative.    Objective:     Vital Signs (Most Recent):  Temp: 98.2 °F (36.8 °C) (12/31/23 0355)  Pulse: 70 (12/31/23 0920)  Resp: 18 (12/31/23 0920)  BP: (!) 175/100 (12/31/23 0920)  SpO2: 99 % (12/31/23 0920) Vital Signs (24h Range):  Temp:  [98.2 °F (36.8 °C)-98.5 °F (36.9 °C)] 98.2 °F (36.8 °C)  Pulse:  [66-72] 70  Resp:  [17-24] 18  SpO2:  [99 %-100 %] 99 %  BP: (131-175)/() 175/100     Weight: 77.1 kg (170 lb)  Body mass index is 20.16 kg/m².    Intake/Output Summary (Last 24 hours) at 12/31/2023 1137  Last data filed at 12/31/2023 0600  Gross per 24 hour   Intake --   Output 500 ml   Net -500 ml           Physical Exam      Vitals reviewed  General: NAD, Well developed, Well Nourished  Head: NC/AT  Eyes: EOMI, MELISSA  Cardiovascular: Pulses intact distally, Regular Rate and rhythm  Pulmonary: Normal Respiratory Rate, No respiratory distress  Gi: Soft, Non-tender  Extremities: Warm, Right knee wrapped with surgical dressing and ACE wrap  Skin: Warm, dry  Neuro: alert and disoriented, No focal Deficit, globally very weak    Significant Labs: All pertinent labs within the past 24 hours have been reviewed.    Significant Imaging: I have reviewed all pertinent imaging results/findings within the past 24 hours.

## 2023-12-31 NOTE — ASSESSMENT & PLAN NOTE
Chronic, controlled. Latest blood pressure and vitals reviewed-     Temp:  [98.2 °F (36.8 °C)-98.5 °F (36.9 °C)]   Pulse:  [66-72]   Resp:  [17-24]   BP: (131-175)/()   SpO2:  [99 %-100 %] .   Home meds for hypertension were reviewed and noted below.   Hypertension Medications               diltiaZEM (CARDIZEM) 30 MG tablet Take 1 tablet (30 mg total) by mouth every 12 (twelve) hours.    losartan (COZAAR) 25 MG tablet Take 1 tablet (25 mg total) by mouth 2 (two) times daily.            While in the hospital, will manage blood pressure as follows; Continue home antihypertensive regimen    Will utilize p.r.n. blood pressure medication only if patient's blood pressure greater than 180/110 and he develops symptoms such as worsening chest pain or shortness of breath.

## 2023-12-31 NOTE — ASSESSMENT & PLAN NOTE
Waxing and waning mental status during admission. Stopping sedating medications. Possibly hypoactive delirium  Q4h neurochecks  Delirium precautions.  Stable     [FreeTextEntry1] : \par The patient has a history of mild mitral insufficiency, hypertension and hypercholesterolemia PAF sympotatic using flecanined  dx 3 yearsa a go. CHADVASC 1 Pt had back pain on eliqus and xarlto and lovenox. \par \par Pt had fecanied increased but still has one episode despite increase in dose. pT took pradaxa and had no back pain\par \par EKG SR 69 bpm

## 2023-12-31 NOTE — PLAN OF CARE
Problem: Gas Exchange Impaired  Goal: Optimal Gas Exchange  Outcome: Ongoing, Progressing  Intervention: Optimize Oxygenation and Ventilation  Flowsheets (Taken 12/31/2023 0903)  Airway/Ventilation Management: airway patency maintained  Head of Bed (HOB) Positioning: HOB elevated   Patient in no apparent distress. Sat's 99  % on 3 lpm. Aerosol treatments changed to PRN. Breo daily . Will continue to monitor.

## 2024-01-01 LAB
ALBUMIN SERPL BCP-MCNC: 2.6 G/DL (ref 3.5–5.2)
ALP SERPL-CCNC: 107 U/L (ref 55–135)
ALT SERPL W/O P-5'-P-CCNC: 9 U/L (ref 10–44)
ANION GAP SERPL CALC-SCNC: 13 MMOL/L (ref 8–16)
AST SERPL-CCNC: 10 U/L (ref 10–40)
BACTERIA BLD CULT: NORMAL
BACTERIA BLD CULT: NORMAL
BASOPHILS # BLD AUTO: 0.05 K/UL (ref 0–0.2)
BASOPHILS NFR BLD: 0.8 % (ref 0–1.9)
BILIRUB SERPL-MCNC: 0.3 MG/DL (ref 0.1–1)
BUN SERPL-MCNC: 8 MG/DL (ref 8–23)
CALCIUM SERPL-MCNC: 8.9 MG/DL (ref 8.7–10.5)
CHLORIDE SERPL-SCNC: 98 MMOL/L (ref 95–110)
CO2 SERPL-SCNC: 36 MMOL/L (ref 23–29)
CREAT SERPL-MCNC: 0.9 MG/DL (ref 0.5–1.4)
DIFFERENTIAL METHOD BLD: ABNORMAL
EOSINOPHIL # BLD AUTO: 0.3 K/UL (ref 0–0.5)
EOSINOPHIL NFR BLD: 4.6 % (ref 0–8)
ERYTHROCYTE [DISTWIDTH] IN BLOOD BY AUTOMATED COUNT: 14.6 % (ref 11.5–14.5)
EST. GFR  (NO RACE VARIABLE): >60 ML/MIN/1.73 M^2
GLUCOSE SERPL-MCNC: 144 MG/DL (ref 70–110)
HCT VFR BLD AUTO: 29.9 % (ref 40–54)
HGB BLD-MCNC: 9.2 G/DL (ref 14–18)
IMM GRANULOCYTES # BLD AUTO: 0.02 K/UL (ref 0–0.04)
IMM GRANULOCYTES NFR BLD AUTO: 0.3 % (ref 0–0.5)
LYMPHOCYTES # BLD AUTO: 0.9 K/UL (ref 1–4.8)
LYMPHOCYTES NFR BLD: 14.9 % (ref 18–48)
MAGNESIUM SERPL-MCNC: 1.9 MG/DL (ref 1.6–2.6)
MCH RBC QN AUTO: 25.9 PG (ref 27–31)
MCHC RBC AUTO-ENTMCNC: 30.8 G/DL (ref 32–36)
MCV RBC AUTO: 84 FL (ref 82–98)
MONOCYTES # BLD AUTO: 0.5 K/UL (ref 0.3–1)
MONOCYTES NFR BLD: 8.3 % (ref 4–15)
NEUTROPHILS # BLD AUTO: 4.2 K/UL (ref 1.8–7.7)
NEUTROPHILS NFR BLD: 71.1 % (ref 38–73)
NRBC BLD-RTO: 0 /100 WBC
PHOSPHATE SERPL-MCNC: 3 MG/DL (ref 2.7–4.5)
PLATELET # BLD AUTO: 343 K/UL (ref 150–450)
PMV BLD AUTO: 9.7 FL (ref 9.2–12.9)
POTASSIUM SERPL-SCNC: 2.5 MMOL/L (ref 3.5–5.1)
PROT SERPL-MCNC: 6.2 G/DL (ref 6–8.4)
RBC # BLD AUTO: 3.55 M/UL (ref 4.6–6.2)
SODIUM SERPL-SCNC: 147 MMOL/L (ref 136–145)
WBC # BLD AUTO: 5.92 K/UL (ref 3.9–12.7)

## 2024-01-01 PROCEDURE — 21400001 HC TELEMETRY ROOM

## 2024-01-01 PROCEDURE — 25000003 PHARM REV CODE 250: Performed by: INTERNAL MEDICINE

## 2024-01-01 PROCEDURE — 27000221 HC OXYGEN, UP TO 24 HOURS

## 2024-01-01 PROCEDURE — 80053 COMPREHEN METABOLIC PANEL: CPT | Performed by: STUDENT IN AN ORGANIZED HEALTH CARE EDUCATION/TRAINING PROGRAM

## 2024-01-01 PROCEDURE — 99900035 HC TECH TIME PER 15 MIN (STAT)

## 2024-01-01 PROCEDURE — 84100 ASSAY OF PHOSPHORUS: CPT | Performed by: STUDENT IN AN ORGANIZED HEALTH CARE EDUCATION/TRAINING PROGRAM

## 2024-01-01 PROCEDURE — 63600175 PHARM REV CODE 636 W HCPCS: Mod: UD | Performed by: INTERNAL MEDICINE

## 2024-01-01 PROCEDURE — 83735 ASSAY OF MAGNESIUM: CPT | Performed by: STUDENT IN AN ORGANIZED HEALTH CARE EDUCATION/TRAINING PROGRAM

## 2024-01-01 PROCEDURE — 25000003 PHARM REV CODE 250: Performed by: HOSPITALIST

## 2024-01-01 PROCEDURE — 63600175 PHARM REV CODE 636 W HCPCS: Performed by: STUDENT IN AN ORGANIZED HEALTH CARE EDUCATION/TRAINING PROGRAM

## 2024-01-01 PROCEDURE — 94640 AIRWAY INHALATION TREATMENT: CPT

## 2024-01-01 PROCEDURE — 25000003 PHARM REV CODE 250: Performed by: STUDENT IN AN ORGANIZED HEALTH CARE EDUCATION/TRAINING PROGRAM

## 2024-01-01 PROCEDURE — 85025 COMPLETE CBC W/AUTO DIFF WBC: CPT | Performed by: STUDENT IN AN ORGANIZED HEALTH CARE EDUCATION/TRAINING PROGRAM

## 2024-01-01 PROCEDURE — 94761 N-INVAS EAR/PLS OXIMETRY MLT: CPT

## 2024-01-01 RX ORDER — SODIUM CHLORIDE AND POTASSIUM CHLORIDE 150; 450 MG/100ML; MG/100ML
INJECTION, SOLUTION INTRAVENOUS CONTINUOUS
Status: DISCONTINUED | OUTPATIENT
Start: 2024-01-01 | End: 2024-01-01

## 2024-01-01 RX ORDER — POTASSIUM CHLORIDE 7.45 MG/ML
10 INJECTION INTRAVENOUS ONCE
Status: COMPLETED | OUTPATIENT
Start: 2024-01-01 | End: 2024-01-01

## 2024-01-01 RX ORDER — POTASSIUM CHLORIDE 20 MEQ/1
20 TABLET, EXTENDED RELEASE ORAL ONCE
Status: COMPLETED | OUTPATIENT
Start: 2024-01-01 | End: 2024-01-01

## 2024-01-01 RX ADMIN — POTASSIUM CHLORIDE 20 MEQ: 1500 TABLET, EXTENDED RELEASE ORAL at 06:01

## 2024-01-01 RX ADMIN — TRAZODONE HYDROCHLORIDE 100 MG: 50 TABLET ORAL at 09:01

## 2024-01-01 RX ADMIN — TAMSULOSIN HYDROCHLORIDE 0.4 MG: 0.4 CAPSULE ORAL at 09:01

## 2024-01-01 RX ADMIN — DILTIAZEM HYDROCHLORIDE 30 MG: 30 TABLET, FILM COATED ORAL at 09:01

## 2024-01-01 RX ADMIN — FLUTICASONE FUROATE AND VILANTEROL TRIFENATATE 1 PUFF: 100; 25 POWDER RESPIRATORY (INHALATION) at 08:01

## 2024-01-01 RX ADMIN — FAMOTIDINE 40 MG: 20 TABLET ORAL at 09:01

## 2024-01-01 RX ADMIN — LOSARTAN POTASSIUM 25 MG: 25 TABLET, FILM COATED ORAL at 09:01

## 2024-01-01 RX ADMIN — AMIODARONE HYDROCHLORIDE 100 MG: 100 TABLET ORAL at 09:01

## 2024-01-01 RX ADMIN — ATORVASTATIN CALCIUM 10 MG: 10 TABLET, FILM COATED ORAL at 09:01

## 2024-01-01 RX ADMIN — APIXABAN 5 MG: 2.5 TABLET, FILM COATED ORAL at 09:01

## 2024-01-01 RX ADMIN — PAROXETINE HYDROCHLORIDE 40 MG: 10 TABLET, FILM COATED ORAL at 09:01

## 2024-01-01 RX ADMIN — POTASSIUM CHLORIDE 10 MEQ: 7.46 INJECTION, SOLUTION INTRAVENOUS at 06:01

## 2024-01-01 RX ADMIN — FINASTERIDE 5 MG: 5 TABLET, FILM COATED ORAL at 09:01

## 2024-01-01 RX ADMIN — MUPIROCIN: 20 OINTMENT TOPICAL at 09:01

## 2024-01-01 RX ADMIN — PREDNISONE 5 MG: 1 TABLET ORAL at 09:01

## 2024-01-01 RX ADMIN — FOLIC ACID 1 MG: 1 TABLET ORAL at 09:01

## 2024-01-01 NOTE — CLINICAL REVIEW
Add on Mg and Phos      Current Facility-Administered Medications:     0.45% NaCl with KCl 20 mEq infusion, , Intravenous, Continuous, 100 cc/hr x 10 hours    potassium chloride 10 mEq in 100 mL IVPB, 10 mEq, Intravenous, Once     potassium chloride SA CR tablet 20 mEq, 20 mEq, Oral, Once     traZODone tablet 100 mg, 100 mg, Oral, QHS, Edu Meier MD, 100 mg at 12/31/23 2050      Recent Results (from the past 4 hour(s))   CBC Auto Differential    Collection Time: 01/01/24  3:58 AM   Result Value Ref Range    WBC 5.92 3.90 - 12.70 K/uL    RBC 3.55 (L) 4.60 - 6.20 M/uL    Hemoglobin 9.2 (L) 14.0 - 18.0 g/dL    Hematocrit 29.9 (L) 40.0 - 54.0 %    MCV 84 82 - 98 fL    MCH 25.9 (L) 27.0 - 31.0 pg    MCHC 30.8 (L) 32.0 - 36.0 g/dL    RDW 14.6 (H) 11.5 - 14.5 %    Platelets 343 150 - 450 K/uL    MPV 9.7 9.2 - 12.9 fL    Immature Granulocytes 0.3 0.0 - 0.5 %    Gran # (ANC) 4.2 1.8 - 7.7 K/uL    Immature Grans (Abs) 0.02 0.00 - 0.04 K/uL    Lymph # 0.9 (L) 1.0 - 4.8 K/uL    Mono # 0.5 0.3 - 1.0 K/uL    Eos # 0.3 0.0 - 0.5 K/uL    Baso # 0.05 0.00 - 0.20 K/uL    nRBC 0 0 /100 WBC    Gran % 71.1 38.0 - 73.0 %    Lymph % 14.9 (L) 18.0 - 48.0 %    Mono % 8.3 4.0 - 15.0 %    Eosinophil % 4.6 0.0 - 8.0 %    Basophil % 0.8 0.0 - 1.9 %    Differential Method Automated    Comprehensive Metabolic Panel    Collection Time: 01/01/24  3:58 AM   Result Value Ref Range    Sodium 147 (H) 136 - 145 mmol/L    Potassium 2.5 (LL) 3.5 - 5.1 mmol/L    Chloride 98 95 - 110 mmol/L    CO2 36 (H) 23 - 29 mmol/L    Glucose 144 (H) 70 - 110 mg/dL    BUN 8 8 - 23 mg/dL    Creatinine 0.9 0.5 - 1.4 mg/dL    Calcium 8.9 8.7 - 10.5 mg/dL    Total Protein 6.2 6.0 - 8.4 g/dL    Albumin 2.6 (L) 3.5 - 5.2 g/dL    Total Bilirubin 0.3 0.1 - 1.0 mg/dL    Alkaline Phosphatase 107 55 - 135 U/L    AST 10 10 - 40 U/L    ALT 9 (L) 10 - 44 U/L    eGFR >60.0 >60 mL/min/1.73 m^2    Anion Gap 13 8 - 16 mmol/L       Current Facility-Administered Medications:      acetaminophen tablet 650 mg, 650 mg, Oral, Q6H PRN, Edu Meier MD, 650 mg at 12/30/23 0853    albuterol-ipratropium 2.5 mg-0.5 mg/3 mL nebulizer solution 3 mL, 3 mL, Nebulization, Q4H PRN, Edu Meier MD    aluminum-magnesium hydroxide-simethicone 200-200-20 mg/5 mL suspension 30 mL, 30 mL, Oral, QID PRN, Edu Meier MD    amiodarone tablet 100 mg, 100 mg, Oral, Daily, Tate Goodrich MD, 100 mg at 12/31/23 0917    apixaban tablet 5 mg, 5 mg, Oral, BID, Edu Meier MD, 5 mg at 12/31/23 2049    atorvastatin tablet 10 mg, 10 mg, Oral, QHS, Edu Meier MD, 10 mg at 12/31/23 2050    dextrose 10% bolus 125 mL 125 mL, 12.5 g, Intravenous, PRN, Edu Meier MD    dextrose 10% bolus 250 mL 250 mL, 25 g, Intravenous, PRN, Edu Meier MD    diltiaZEM tablet 30 mg, 30 mg, Oral, Q12H, Tate Goodrich MD, 30 mg at 12/31/23 2050    famotidine tablet 40 mg, 40 mg, Oral, Daily, Edu Meier MD, 40 mg at 12/31/23 0917    finasteride tablet 5 mg, 5 mg, Oral, Daily, Edu Meier MD, 5 mg at 12/31/23 0918    fluticasone furoate-vilanteroL 100-25 mcg/dose diskus inhaler 1 puff, 1 puff, Inhalation, Daily, Edu Meier MD, 1 puff at 12/31/23 0759    folic acid tablet 1 mg, 1 mg, Oral, Daily, Edu Meier MD, 1 mg at 12/31/23 0918    glucagon (human recombinant) injection 1 mg, 1 mg, Intramuscular, PRN, Edu Meier MD    glucose chewable tablet 16 g, 16 g, Oral, PRN, Edu Meier MD    glucose chewable tablet 24 g, 24 g, Oral, PRN, Edu Meier MD    hydrALAZINE injection 5 mg, 5 mg, Intravenous, Q6H PRN, Edu Meier MD, 5 mg at 12/27/23 0816    losartan tablet 25 mg, 25 mg, Oral, BID, Edu Meier MD, 25 mg at 12/31/23 2050    mupirocin 2 % ointment, , Nasal, BID, Edu Meier MD, Given at 12/31/23 2137    naloxone 0.4 mg/mL injection 0.02 mg, 0.02 mg, Intravenous, PRN,  Edu Meier MD    OLANZapine injection 2.5 mg, 2.5 mg, Intramuscular, Q8H PRN, Edu Meier MD, 2.5 mg at 12/31/23 1159    ondansetron injection 4 mg, 4 mg, Intravenous, Q8H PRN, Edu Meier MD    paroxetine tablet 40 mg, 40 mg, Oral, Daily, Edu Meier MD, 40 mg at 12/31/23 0918    potassium bicarbonate disintegrating tablet 50 mEq, 50 mEq, Oral, Once, Edu Meier MD    predniSONE tablet 5 mg, 5 mg, Oral, Daily, Edu Meier MD, 5 mg at 12/31/23 0918    prochlorperazine injection Soln 5 mg, 5 mg, Intravenous, Q6H PRN, Edu Meier MD    sodium chloride 0.9% flush 10 mL, 10 mL, Intravenous, Q12H PRN, Edu Meier MD    tamsulosin 24 hr capsule 0.4 mg, 0.4 mg, Oral, Daily, Edu Meier MD, 0.4 mg at 12/31/23 0917    traZODone tablet 100 mg, 100 mg, Oral, QHS, Edu Meier MD, 100 mg at 12/31/23 2050

## 2024-01-01 NOTE — PLAN OF CARE
Problem: Gas Exchange Impaired  Goal: Optimal Gas Exchange  Outcome: Ongoing, Progressing  Intervention: Optimize Oxygenation and Ventilation  Flowsheets (Taken 1/1/2024 4983)  Airway/Ventilation Management: airway patency maintained  Head of Bed (HOB) Positioning: HOB elevated   Patient in no apparent distress. Sat's 100  % on 3 lpm. Patient wears home O2 at 3-4 lpm.PRN treatments not needed. Breo daily . Will continue to monitor.

## 2024-01-02 LAB
ALBUMIN SERPL BCP-MCNC: 2.8 G/DL (ref 3.5–5.2)
ALBUMIN SERPL BCP-MCNC: 2.8 G/DL (ref 3.5–5.2)
ALP SERPL-CCNC: 121 U/L (ref 55–135)
ALP SERPL-CCNC: 121 U/L (ref 55–135)
ALT SERPL W/O P-5'-P-CCNC: 9 U/L (ref 10–44)
ALT SERPL W/O P-5'-P-CCNC: 9 U/L (ref 10–44)
ANION GAP SERPL CALC-SCNC: 13 MMOL/L (ref 8–16)
ANION GAP SERPL CALC-SCNC: 13 MMOL/L (ref 8–16)
AST SERPL-CCNC: 13 U/L (ref 10–40)
AST SERPL-CCNC: 13 U/L (ref 10–40)
BASOPHILS # BLD AUTO: 0.05 K/UL (ref 0–0.2)
BASOPHILS # BLD AUTO: 0.05 K/UL (ref 0–0.2)
BASOPHILS NFR BLD: 0.8 % (ref 0–1.9)
BASOPHILS NFR BLD: 0.8 % (ref 0–1.9)
BILIRUB SERPL-MCNC: 0.5 MG/DL (ref 0.1–1)
BILIRUB SERPL-MCNC: 0.5 MG/DL (ref 0.1–1)
BUN SERPL-MCNC: 7 MG/DL (ref 8–23)
BUN SERPL-MCNC: 7 MG/DL (ref 8–23)
CALCIUM SERPL-MCNC: 9.2 MG/DL (ref 8.7–10.5)
CALCIUM SERPL-MCNC: 9.2 MG/DL (ref 8.7–10.5)
CHLORIDE SERPL-SCNC: 98 MMOL/L (ref 95–110)
CHLORIDE SERPL-SCNC: 98 MMOL/L (ref 95–110)
CO2 SERPL-SCNC: 36 MMOL/L (ref 23–29)
CO2 SERPL-SCNC: 36 MMOL/L (ref 23–29)
CREAT SERPL-MCNC: 0.9 MG/DL (ref 0.5–1.4)
CREAT SERPL-MCNC: 0.9 MG/DL (ref 0.5–1.4)
DIFFERENTIAL METHOD BLD: ABNORMAL
DIFFERENTIAL METHOD BLD: ABNORMAL
EOSINOPHIL # BLD AUTO: 0.4 K/UL (ref 0–0.5)
EOSINOPHIL # BLD AUTO: 0.4 K/UL (ref 0–0.5)
EOSINOPHIL NFR BLD: 7 % (ref 0–8)
EOSINOPHIL NFR BLD: 7 % (ref 0–8)
ERYTHROCYTE [DISTWIDTH] IN BLOOD BY AUTOMATED COUNT: 14.6 % (ref 11.5–14.5)
ERYTHROCYTE [DISTWIDTH] IN BLOOD BY AUTOMATED COUNT: 14.6 % (ref 11.5–14.5)
EST. GFR  (NO RACE VARIABLE): >60 ML/MIN/1.73 M^2
EST. GFR  (NO RACE VARIABLE): >60 ML/MIN/1.73 M^2
GLUCOSE SERPL-MCNC: 111 MG/DL (ref 70–110)
GLUCOSE SERPL-MCNC: 111 MG/DL (ref 70–110)
HCT VFR BLD AUTO: 30.9 % (ref 40–54)
HCT VFR BLD AUTO: 30.9 % (ref 40–54)
HGB BLD-MCNC: 9.4 G/DL (ref 14–18)
HGB BLD-MCNC: 9.4 G/DL (ref 14–18)
IMM GRANULOCYTES # BLD AUTO: 0.01 K/UL (ref 0–0.04)
IMM GRANULOCYTES # BLD AUTO: 0.01 K/UL (ref 0–0.04)
IMM GRANULOCYTES NFR BLD AUTO: 0.2 % (ref 0–0.5)
IMM GRANULOCYTES NFR BLD AUTO: 0.2 % (ref 0–0.5)
LYMPHOCYTES # BLD AUTO: 1.2 K/UL (ref 1–4.8)
LYMPHOCYTES # BLD AUTO: 1.2 K/UL (ref 1–4.8)
LYMPHOCYTES NFR BLD: 18.3 % (ref 18–48)
LYMPHOCYTES NFR BLD: 18.3 % (ref 18–48)
MAGNESIUM SERPL-MCNC: 1.8 MG/DL (ref 1.6–2.6)
MCH RBC QN AUTO: 25.4 PG (ref 27–31)
MCH RBC QN AUTO: 25.4 PG (ref 27–31)
MCHC RBC AUTO-ENTMCNC: 30.4 G/DL (ref 32–36)
MCHC RBC AUTO-ENTMCNC: 30.4 G/DL (ref 32–36)
MCV RBC AUTO: 84 FL (ref 82–98)
MCV RBC AUTO: 84 FL (ref 82–98)
MONOCYTES # BLD AUTO: 0.6 K/UL (ref 0.3–1)
MONOCYTES # BLD AUTO: 0.6 K/UL (ref 0.3–1)
MONOCYTES NFR BLD: 9.8 % (ref 4–15)
MONOCYTES NFR BLD: 9.8 % (ref 4–15)
NEUTROPHILS # BLD AUTO: 4.1 K/UL (ref 1.8–7.7)
NEUTROPHILS # BLD AUTO: 4.1 K/UL (ref 1.8–7.7)
NEUTROPHILS NFR BLD: 63.9 % (ref 38–73)
NEUTROPHILS NFR BLD: 63.9 % (ref 38–73)
NRBC BLD-RTO: 0 /100 WBC
NRBC BLD-RTO: 0 /100 WBC
PLATELET # BLD AUTO: 341 K/UL (ref 150–450)
PLATELET # BLD AUTO: 341 K/UL (ref 150–450)
PMV BLD AUTO: 9.1 FL (ref 9.2–12.9)
PMV BLD AUTO: 9.1 FL (ref 9.2–12.9)
POTASSIUM SERPL-SCNC: 2.9 MMOL/L (ref 3.5–5.1)
POTASSIUM SERPL-SCNC: 2.9 MMOL/L (ref 3.5–5.1)
PROT SERPL-MCNC: 6.5 G/DL (ref 6–8.4)
PROT SERPL-MCNC: 6.5 G/DL (ref 6–8.4)
RBC # BLD AUTO: 3.7 M/UL (ref 4.6–6.2)
RBC # BLD AUTO: 3.7 M/UL (ref 4.6–6.2)
SODIUM SERPL-SCNC: 147 MMOL/L (ref 136–145)
SODIUM SERPL-SCNC: 147 MMOL/L (ref 136–145)
WBC # BLD AUTO: 6.33 K/UL (ref 3.9–12.7)
WBC # BLD AUTO: 6.33 K/UL (ref 3.9–12.7)

## 2024-01-02 PROCEDURE — 94761 N-INVAS EAR/PLS OXIMETRY MLT: CPT

## 2024-01-02 PROCEDURE — 80053 COMPREHEN METABOLIC PANEL: CPT | Performed by: STUDENT IN AN ORGANIZED HEALTH CARE EDUCATION/TRAINING PROGRAM

## 2024-01-02 PROCEDURE — 25000003 PHARM REV CODE 250: Performed by: INTERNAL MEDICINE

## 2024-01-02 PROCEDURE — 21400001 HC TELEMETRY ROOM

## 2024-01-02 PROCEDURE — 83735 ASSAY OF MAGNESIUM: CPT | Performed by: INTERNAL MEDICINE

## 2024-01-02 PROCEDURE — 25000003 PHARM REV CODE 250: Performed by: STUDENT IN AN ORGANIZED HEALTH CARE EDUCATION/TRAINING PROGRAM

## 2024-01-02 PROCEDURE — 25000003 PHARM REV CODE 250: Performed by: HOSPITALIST

## 2024-01-02 PROCEDURE — 85025 COMPLETE CBC W/AUTO DIFF WBC: CPT | Performed by: STUDENT IN AN ORGANIZED HEALTH CARE EDUCATION/TRAINING PROGRAM

## 2024-01-02 PROCEDURE — 92610 EVALUATE SWALLOWING FUNCTION: CPT

## 2024-01-02 PROCEDURE — 92523 SPEECH SOUND LANG COMPREHEN: CPT

## 2024-01-02 PROCEDURE — 94640 AIRWAY INHALATION TREATMENT: CPT

## 2024-01-02 PROCEDURE — 27000221 HC OXYGEN, UP TO 24 HOURS

## 2024-01-02 RX ORDER — POTASSIUM CHLORIDE 7.45 MG/ML
10 INJECTION INTRAVENOUS ONCE
Status: DISCONTINUED | OUTPATIENT
Start: 2024-01-02 | End: 2024-01-04 | Stop reason: HOSPADM

## 2024-01-02 RX ORDER — POTASSIUM CHLORIDE 20 MEQ/1
40 TABLET, EXTENDED RELEASE ORAL ONCE
Status: COMPLETED | OUTPATIENT
Start: 2024-01-02 | End: 2024-01-02

## 2024-01-02 RX ADMIN — ATORVASTATIN CALCIUM 10 MG: 10 TABLET, FILM COATED ORAL at 09:01

## 2024-01-02 RX ADMIN — MUPIROCIN: 20 OINTMENT TOPICAL at 09:01

## 2024-01-02 RX ADMIN — FAMOTIDINE 40 MG: 20 TABLET ORAL at 09:01

## 2024-01-02 RX ADMIN — POTASSIUM CHLORIDE 40 MEQ: 1500 TABLET, EXTENDED RELEASE ORAL at 11:01

## 2024-01-02 RX ADMIN — APIXABAN 5 MG: 2.5 TABLET, FILM COATED ORAL at 09:01

## 2024-01-02 RX ADMIN — DILTIAZEM HYDROCHLORIDE 30 MG: 30 TABLET, FILM COATED ORAL at 09:01

## 2024-01-02 RX ADMIN — PAROXETINE HYDROCHLORIDE 40 MG: 10 TABLET, FILM COATED ORAL at 09:01

## 2024-01-02 RX ADMIN — TRAZODONE HYDROCHLORIDE 100 MG: 50 TABLET ORAL at 09:01

## 2024-01-02 RX ADMIN — LOSARTAN POTASSIUM 25 MG: 25 TABLET, FILM COATED ORAL at 09:01

## 2024-01-02 RX ADMIN — FINASTERIDE 5 MG: 5 TABLET, FILM COATED ORAL at 09:01

## 2024-01-02 RX ADMIN — TAMSULOSIN HYDROCHLORIDE 0.4 MG: 0.4 CAPSULE ORAL at 09:01

## 2024-01-02 RX ADMIN — FOLIC ACID 1 MG: 1 TABLET ORAL at 09:01

## 2024-01-02 RX ADMIN — AMIODARONE HYDROCHLORIDE 100 MG: 100 TABLET ORAL at 09:01

## 2024-01-02 RX ADMIN — FLUTICASONE FUROATE AND VILANTEROL TRIFENATATE 1 PUFF: 100; 25 POWDER RESPIRATORY (INHALATION) at 07:01

## 2024-01-02 NOTE — PLAN OF CARE
01/02/24 1120   Medicare Message   Important Message from Medicare regarding Discharge Appeal Rights Given to patient/caregiver;Explained to patient/caregiver;Signed/date by patient/caregiver   Date IMM was signed 01/02/24   Time IMM was signed 1120

## 2024-01-02 NOTE — PT/OT/SLP EVAL
Speech Language Pathology Evaluation  Cognitive/Bedside Swallow    Patient Name:  Jama James   MRN:  [id number]  Admitting Diagnosis: Effusion of right knee    Recommendations:                  General Recommendations:  Speech/language therapy and Cognitive-linguistic therapy  Diet recommendations:  Mechanical soft, Thin liquids - IDDSI Level 0   Aspiration Precautions: Standard aspiration precautions   General Precautions: Standard,    Communication strategies:  none    Assessment:     Jama James is a 68 y.o. male who is hospitalized for effusion of the right knee. ST spoke with RN who stated patient has been complaining of increased difficulty swallowing but did not like his medications crushed. Patient did not demonstrate s/s of aspiration with soft or pudding trials and/or thin liquids. However, patient stated he was not able to chew regular texture trial appropriately due to poor dentition. Patient also c/o that medications become stuck when they are taken whole. ST recommends mechanical soft diet and thin liquids with crushed meds in pudding or applesauce. Patient is only oriented to self and to situation. Deficits in expressive language especially in conversation and with word finding. ST also noted deficits with short-term recall. ST will continue to follow as indicated.     History:     Past Medical History:   Diagnosis Date    Abnormal CT scan 7/6/2023    Abnormal positron emission tomography (PET) scan 7/6/2023    Anxiety     Asbestosis 08/04/2015    Atrial fibrillation     Bilateral adrenal adenomas     COPD (chronic obstructive pulmonary disease)     COVID-19 virus infection 07/28/2022    Depression     Elevated PSA 7/6/2023    High cholesterol     HTN (hypertension)     Malignant neoplasm of prostate     Multiple lung nodules     Nodule of upper lobe of left lung 03/30/2023    6 mm spiculated on cta chest 3/21/23    On home oxygen therapy 05/24/2023    Pneumonia     Prostate cancer 5/27/2020     Prostate cancer metastatic to bone 7/6/2023    Prostate cancer metastatic to lung 7/6/2023    Ulcerative colitis        Past Surgical History:   Procedure Laterality Date    ARTHROSCOPY OF KNEE Right 11/22/2023    Procedure: ARTHROSCOPY, KNEE;  Surgeon: Harry Julien MD;  Location: [facility];  Service: Orthopedics;  Laterality: Right;    ARTHROSCOPY OF KNEE Right 12/27/2023    Procedure: ARTHROSCOPY, KNEE, INCISION AND DRAINAGE;  Surgeon: Lexa Mcneill MD;  Location: [facility] OR;  Service: Orthopedics;  Laterality: Right;    NECK SURGERY      right knee      TRANSRECTAL BIOPSY OF PROSTATE WITH ULTRASOUND GUIDANCE Bilateral 3/18/2020    Procedure: BIOPSY, PROSTATE, RECTAL APPROACH, WITH US GUIDANCE;  Surgeon: Bill Jeong MD;  Location: [facility] OR;  Service: Urology;  Laterality: Bilateral;       Social History: Patient lives at [facility].    Prior Intubation HX:  None during this hospitalization.    Modified Barium Swallow: None.    Chest X-Rays:   EXAMINATION:  XR CHEST AP PORTABLE     CLINICAL HISTORY:  AMS;     TECHNIQUE:  Single frontal view of the chest was performed.     COMPARISON:  12/18/2023     FINDINGS:  Chronic interstitial lung changes.  Small left pleural effusion versus pleural thickening.  Mild bibasilar atelectasis and/or scarring.  Cardiac silhouette is mildly enlarged.  Atherosclerosis of the aortic arch.     Impression:     As above.        Electronically signed by: Leonardo Lee  Date:                                            12/24/2023  Time:                                           20:52    Prior diet: Regular diet with thin liquids and crushed medications.    Occupation/hobbies/homemaking: None disclosed.    Subjective     Patient in bed watching TV. Requesting coffee.  Patient goals: None disclosed.     Pain/Comfort:       Respiratory Status: Nasal cannula, flow 3 L/min    Objective:     Cognitive Status:    Orientation Person and  Situation  Memory Delayed: 50%       Receptive Language:   Comprehension:      Questions Simple yes/no 100%  Open ended questions 100%  Conversation 100%    Delay of response noted possibly due to expressive language deficits.    Pragmatics:    [facility]    Expressive Language:  Verbal:    Naming Confrontation 100%  Conversational speech 50%      Motor Speech:  Intelligibility 80% intelligible    Voice:   Quality Rough    Visual-Spatial:  [facility]    Reading:   -unable to assess     Written Expression:   - unable to assess    Oral Musculature Evaluation  Oral Musculature: general weakness  Dentition: teeth in poor condition  Lingual Strength and Mobility:  (noted appearance of ball-like structure on tongue)  Voice Prior to PO Intake: clear    Bedside Swallow Eval:   Consistencies Assessed:  Thin liquids water via straw  Puree pudding   Soft solids diced pears in juice      Oral Phase:   WFL    Pharyngeal Phase:   no overt clinical signs/symptoms of aspiration  no overt clinical signs/symptoms of pharyngeal dysphagia    Compensatory Strategies  None    Treatment: Impressions and recommendations given to patient and RN.     Goals:   Multidisciplinary Problems       SLP Goals          Problem: SLP    Goal Priority Disciplines Outcome   SLP Goal     SLP Ongoing, Progressing   Description: 1) Client will use external memory aids and compensatory strategies to recall routine, personal information and recent events to improve orientation to time & recall daily events with 60% accuracy and mod cues.    2) The patient will respond to simple questions with mod verbal, visual and tactile cues and compensatory strategies to increase communications within functional living environment.    3) The patient will increase word finding skills to improve communication within functional living environment.                        Plan:     Patient to be seen:  2 x/week   Plan of Care expires:     Plan of Care reviewed with:  patient, other (see  comments) (RN)   SLP Follow-Up:  Yes       Discharge recommendations:      Barriers to Discharge:  None    Time Tracking:     SLP Treatment Date:   01/02/24  Speech Start Time:  1000  Speech Stop Time:  1023     Speech Total Time (min):  23 min    Billable Minutes: Eval 13 minutes  and Eval Swallow and Oral Function 10 minutes    01/02/2024

## 2024-01-02 NOTE — PLAN OF CARE
[facility] - Intensive Care  Discharge Reassessment    Primary Care Provider: Marisel Farrell III, MD    Expected Discharge Date: 1/5/2024    Spoke to patient, spouse & sister in law about options for discharge. He would like to go with hospice at home. Referral sent to [facility]. Ila will be here at 11:30am tomorrow to talk to family about hospice. Denies any other needs at this time.     Reassessment (most recent)       Discharge Reassessment - 01/02/24 1120          Discharge Reassessment    Assessment Type Discharge Planning Reassessment     Did the patient's condition or plan change since previous assessment? Yes     Discharge Plan discussed with: Patient;Caregiver;Spouse/sig other     Name(s) and Number(s) Margaret James [phone] & Jodie Gipson [phone]     Communicated CLEMENTINA with patient/caregiver Yes     Discharge Plan A Hospice/home     Discharge Plan B Skilled Nursing Facility     DME Needed Upon Discharge  none     Transition of Care Barriers None     Why the patient remains in the hospital Requires continued medical care        Post-Acute Status    Post-Acute Authorization Hospice;Placement     Post-Acute Placement Status Referrals Sent     Hospice Status Referrals Sent     Discharge Delays None known at this time

## 2024-01-02 NOTE — PROGRESS NOTES
[facility] Medicine  Progress Note    Patient Name: Jama James  MRN: [id number]  Patient Class: IP- Inpatient   Admission Date: 12/26/2023  Length of Stay: 4 days  Attending Physician: Mohsen Mari MD  Primary Care Provider: Marisel Farrell III, MD        Subjective:     Principal Problem:Effusion of right knee    Interval History: Patient remains confused., right knee is improved less painful    Review of Systems   Respiratory:  Negative for shortness of breath, wheezing and stridor.    Cardiovascular:  Positive for leg swelling. Negative for chest pain.   Musculoskeletal:  Positive for arthralgias and joint swelling.   Psychiatric/Behavioral:  Positive for confusion and decreased concentration.    All other systems reviewed and are negative.    Objective:     Vital Signs (Most Recent):  Temp: 98.9 °F (37.2 °C) (01/01/24 2315)  Pulse: 65 (01/02/24 0300)  Resp: 20 (01/02/24 0300)  BP: (!) 157/84 (01/02/24 0300)  SpO2: 100 % (01/02/24 0300) Vital Signs (24h Range):  Temp:  [97.9 °F (36.6 °C)-98.9 °F (37.2 °C)] 98.9 °F (37.2 °C)  Pulse:  [61-78] 65  Resp:  [12-27] 20  SpO2:  [94 %-100 %] 100 %  BP: (116-163)/(60-99) 157/84     Weight: 77.1 kg (170 lb)  Body mass index is 20.16 kg/m².    Intake/Output Summary (Last 24 hours) at 1/2/2024 0310  Last data filed at 1/1/2024 2341  Gross per 24 hour   Intake 1920 ml   Output 1500 ml   Net 420 ml      Physical Exam  Vitals and nursing note reviewed.   HENT:      Head: Normocephalic.   Pulmonary:      Breath sounds: Rales present.   Musculoskeletal:         General: Swelling and tenderness present.   Neurological:      Mental Status: He is lethargic and disoriented.      Motor: Weakness present.           Overview/Hospital Course: Patient arousable but confused    Significant Labs: All pertinent labs within the past 24 hours have been reviewed.  Recent Lab Results         01/01/24  0358        Albumin 2.6              ALT 9       Anion  Gap 13       AST 10       Baso # 0.05       Basophil % 0.8       BILIRUBIN TOTAL 0.3  Comment: For infants and newborns, interpretation of results should be based  on gestational age, weight and in agreement with clinical  observations.    Premature Infant recommended reference ranges:  Up to 24 hours.............<8.0 mg/dL  Up to 48 hours............<12.0 mg/dL  3-5 days..................<15.0 mg/dL  6-29 days.................<15.0 mg/dL         BUN 8       Calcium 8.9       Chloride 98       CO2 36       Creatinine 0.9       Differential Method Automated       eGFR >60.0       Eos # 0.3       Eosinophil % 4.6       Glucose 144       Gran # (ANC) 4.2       Gran % 71.1       Hematocrit 29.9       Hemoglobin 9.2       Immature Grans (Abs) 0.02  Comment: Mild elevation in immature granulocytes is non specific and   can be seen in a variety of conditions including stress response,   acute inflammation, trauma and pregnancy. Correlation with other   laboratory and clinical findings is essential.         Immature Granulocytes 0.3       Lymph # 0.9       Lymph % 14.9       Magnesium  1.9       MCH 25.9       MCHC 30.8       MCV 84       Mono # 0.5       Mono % 8.3       MPV 9.7       nRBC 0       Phosphorus Level 3.0       Platelet Count 343       Potassium 2.5  Comment: Potassium critical result(s) called and verbal readback obtained from   Donn Jordan RN.   by TH3 01/01/2024 05:40         PROTEIN TOTAL 6.2       RBC 3.55       RDW 14.6       Sodium 147       WBC 5.92               Significant Imaging: I have reviewed all pertinent imaging results/findings within the past 24 hours.    Assessment/Plan:      Active Diagnoses:    Diagnosis Date Noted POA    PRINCIPAL PROBLEM:  Effusion of right knee [M25.461] 12/26/2023 Yes    Acute metabolic encephalopathy [G93.41] 12/29/2023 Yes    Goals of care, counseling/discussion [Z71.89] 11/29/2023 Not Applicable    Benign localized prostatic hyperplasia with lower urinary tract  symptoms (LUTS) [N40.1] 11/20/2023 Yes    PAF (paroxysmal atrial fibrillation) [I48.0] 03/26/2023 Yes    COPD (chronic obstructive pulmonary disease) [J44.9] 03/21/2023 Yes    Prostate cancer [C61] 05/27/2020 Yes    Hyperlipidemia [E78.5] 08/04/2015 Yes    Tobacco abuse [Z72.0] 06/11/2014 Yes    Weakness [R53.1] 03/06/2014 Yes    Anxiety [F41.9]  Yes    Depression [F32.A]  Yes    HTN (hypertension) [I10]  Yes      Problems Resolved During this Admission:     VTE Risk Mitigation (From admission, onward)           Ordered     apixaban tablet 5 mg  2 times daily         12/28/23 0711     Reason for No Pharmacological VTE Prophylaxis  Once        Question:  Reasons:  Answer:  Already adequately anticoagulated on oral Anticoagulants    12/26/23 1718     IP VTE HIGH RISK PATIENT  Once         12/26/23 1718     Place sequential compression device  Until discontinued         12/26/23 1718                       Mohsen Mari MD  Department of [facility] Medicine   [facility]

## 2024-01-03 LAB
ALBUMIN SERPL BCP-MCNC: 2.8 G/DL (ref 3.5–5.2)
ALP SERPL-CCNC: 120 U/L (ref 55–135)
ALT SERPL W/O P-5'-P-CCNC: 7 U/L (ref 10–44)
ANION GAP SERPL CALC-SCNC: 11 MMOL/L (ref 8–16)
ANION GAP SERPL CALC-SCNC: 11 MMOL/L (ref 8–16)
AST SERPL-CCNC: 12 U/L (ref 10–40)
BASOPHILS # BLD AUTO: 0.04 K/UL (ref 0–0.2)
BASOPHILS # BLD AUTO: 0.04 K/UL (ref 0–0.2)
BASOPHILS NFR BLD: 0.8 % (ref 0–1.9)
BASOPHILS NFR BLD: 0.8 % (ref 0–1.9)
BILIRUB SERPL-MCNC: 0.4 MG/DL (ref 0.1–1)
BUN SERPL-MCNC: 8 MG/DL (ref 8–23)
BUN SERPL-MCNC: 8 MG/DL (ref 8–23)
CALCIUM SERPL-MCNC: 9.3 MG/DL (ref 8.7–10.5)
CALCIUM SERPL-MCNC: 9.3 MG/DL (ref 8.7–10.5)
CHLORIDE SERPL-SCNC: 100 MMOL/L (ref 95–110)
CHLORIDE SERPL-SCNC: 100 MMOL/L (ref 95–110)
CO2 SERPL-SCNC: 34 MMOL/L (ref 23–29)
CO2 SERPL-SCNC: 34 MMOL/L (ref 23–29)
CREAT SERPL-MCNC: 0.9 MG/DL (ref 0.5–1.4)
CREAT SERPL-MCNC: 0.9 MG/DL (ref 0.5–1.4)
DIFFERENTIAL METHOD BLD: ABNORMAL
DIFFERENTIAL METHOD BLD: ABNORMAL
EOSINOPHIL # BLD AUTO: 0.3 K/UL (ref 0–0.5)
EOSINOPHIL # BLD AUTO: 0.3 K/UL (ref 0–0.5)
EOSINOPHIL NFR BLD: 5.4 % (ref 0–8)
EOSINOPHIL NFR BLD: 5.4 % (ref 0–8)
ERYTHROCYTE [DISTWIDTH] IN BLOOD BY AUTOMATED COUNT: 14.8 % (ref 11.5–14.5)
ERYTHROCYTE [DISTWIDTH] IN BLOOD BY AUTOMATED COUNT: 14.8 % (ref 11.5–14.5)
EST. GFR  (NO RACE VARIABLE): >60 ML/MIN/1.73 M^2
EST. GFR  (NO RACE VARIABLE): >60 ML/MIN/1.73 M^2
GLUCOSE SERPL-MCNC: 116 MG/DL (ref 70–110)
GLUCOSE SERPL-MCNC: 116 MG/DL (ref 70–110)
HCT VFR BLD AUTO: 29.1 % (ref 40–54)
HCT VFR BLD AUTO: 29.1 % (ref 40–54)
HGB BLD-MCNC: 8.8 G/DL (ref 14–18)
HGB BLD-MCNC: 8.8 G/DL (ref 14–18)
IMM GRANULOCYTES # BLD AUTO: 0.01 K/UL (ref 0–0.04)
IMM GRANULOCYTES # BLD AUTO: 0.01 K/UL (ref 0–0.04)
IMM GRANULOCYTES NFR BLD AUTO: 0.2 % (ref 0–0.5)
IMM GRANULOCYTES NFR BLD AUTO: 0.2 % (ref 0–0.5)
LYMPHOCYTES # BLD AUTO: 1 K/UL (ref 1–4.8)
LYMPHOCYTES # BLD AUTO: 1 K/UL (ref 1–4.8)
LYMPHOCYTES NFR BLD: 19.4 % (ref 18–48)
LYMPHOCYTES NFR BLD: 19.4 % (ref 18–48)
MCH RBC QN AUTO: 25.5 PG (ref 27–31)
MCH RBC QN AUTO: 25.5 PG (ref 27–31)
MCHC RBC AUTO-ENTMCNC: 30.2 G/DL (ref 32–36)
MCHC RBC AUTO-ENTMCNC: 30.2 G/DL (ref 32–36)
MCV RBC AUTO: 84 FL (ref 82–98)
MCV RBC AUTO: 84 FL (ref 82–98)
MONOCYTES # BLD AUTO: 0.5 K/UL (ref 0.3–1)
MONOCYTES # BLD AUTO: 0.5 K/UL (ref 0.3–1)
MONOCYTES NFR BLD: 10.4 % (ref 4–15)
MONOCYTES NFR BLD: 10.4 % (ref 4–15)
NEUTROPHILS # BLD AUTO: 3.2 K/UL (ref 1.8–7.7)
NEUTROPHILS # BLD AUTO: 3.2 K/UL (ref 1.8–7.7)
NEUTROPHILS NFR BLD: 63.8 % (ref 38–73)
NEUTROPHILS NFR BLD: 63.8 % (ref 38–73)
NRBC BLD-RTO: 0 /100 WBC
NRBC BLD-RTO: 0 /100 WBC
PLATELET # BLD AUTO: 361 K/UL (ref 150–450)
PLATELET # BLD AUTO: 361 K/UL (ref 150–450)
PMV BLD AUTO: 10 FL (ref 9.2–12.9)
PMV BLD AUTO: 10 FL (ref 9.2–12.9)
POTASSIUM SERPL-SCNC: 3.2 MMOL/L (ref 3.5–5.1)
POTASSIUM SERPL-SCNC: 3.2 MMOL/L (ref 3.5–5.1)
PROT SERPL-MCNC: 6.3 G/DL (ref 6–8.4)
RBC # BLD AUTO: 3.45 M/UL (ref 4.6–6.2)
RBC # BLD AUTO: 3.45 M/UL (ref 4.6–6.2)
SODIUM SERPL-SCNC: 145 MMOL/L (ref 136–145)
SODIUM SERPL-SCNC: 145 MMOL/L (ref 136–145)
WBC # BLD AUTO: 5.01 K/UL (ref 3.9–12.7)
WBC # BLD AUTO: 5.01 K/UL (ref 3.9–12.7)

## 2024-01-03 PROCEDURE — 85025 COMPLETE CBC W/AUTO DIFF WBC: CPT | Performed by: STUDENT IN AN ORGANIZED HEALTH CARE EDUCATION/TRAINING PROGRAM

## 2024-01-03 PROCEDURE — 36415 COLL VENOUS BLD VENIPUNCTURE: CPT | Performed by: STUDENT IN AN ORGANIZED HEALTH CARE EDUCATION/TRAINING PROGRAM

## 2024-01-03 PROCEDURE — 25000003 PHARM REV CODE 250: Performed by: STUDENT IN AN ORGANIZED HEALTH CARE EDUCATION/TRAINING PROGRAM

## 2024-01-03 PROCEDURE — 80053 COMPREHEN METABOLIC PANEL: CPT | Performed by: STUDENT IN AN ORGANIZED HEALTH CARE EDUCATION/TRAINING PROGRAM

## 2024-01-03 PROCEDURE — 94761 N-INVAS EAR/PLS OXIMETRY MLT: CPT

## 2024-01-03 PROCEDURE — 25000003 PHARM REV CODE 250: Performed by: INTERNAL MEDICINE

## 2024-01-03 PROCEDURE — 25000003 PHARM REV CODE 250: Performed by: HOSPITALIST

## 2024-01-03 PROCEDURE — 94640 AIRWAY INHALATION TREATMENT: CPT

## 2024-01-03 PROCEDURE — 21400001 HC TELEMETRY ROOM

## 2024-01-03 PROCEDURE — 27000221 HC OXYGEN, UP TO 24 HOURS

## 2024-01-03 PROCEDURE — 99900035 HC TECH TIME PER 15 MIN (STAT)

## 2024-01-03 PROCEDURE — 63600175 PHARM REV CODE 636 W HCPCS: Performed by: STUDENT IN AN ORGANIZED HEALTH CARE EDUCATION/TRAINING PROGRAM

## 2024-01-03 RX ORDER — CLINDAMYCIN HYDROCHLORIDE 150 MG/1
300 CAPSULE ORAL EVERY 6 HOURS
Status: DISCONTINUED | OUTPATIENT
Start: 2024-01-03 | End: 2024-01-04 | Stop reason: HOSPADM

## 2024-01-03 RX ADMIN — TAMSULOSIN HYDROCHLORIDE 0.4 MG: 0.4 CAPSULE ORAL at 09:01

## 2024-01-03 RX ADMIN — POTASSIUM BICARBONATE 20 MEQ: 782 TABLET, EFFERVESCENT ORAL at 11:01

## 2024-01-03 RX ADMIN — APIXABAN 5 MG: 2.5 TABLET, FILM COATED ORAL at 09:01

## 2024-01-03 RX ADMIN — PAROXETINE HYDROCHLORIDE 40 MG: 10 TABLET, FILM COATED ORAL at 09:01

## 2024-01-03 RX ADMIN — FINASTERIDE 5 MG: 5 TABLET, FILM COATED ORAL at 09:01

## 2024-01-03 RX ADMIN — CLINDAMYCIN HYDROCHLORIDE 300 MG: 150 CAPSULE ORAL at 12:01

## 2024-01-03 RX ADMIN — LOSARTAN POTASSIUM 25 MG: 25 TABLET, FILM COATED ORAL at 09:01

## 2024-01-03 RX ADMIN — FLUTICASONE FUROATE AND VILANTEROL TRIFENATATE 1 PUFF: 100; 25 POWDER RESPIRATORY (INHALATION) at 08:01

## 2024-01-03 RX ADMIN — POTASSIUM BICARBONATE 20 MEQ: 782 TABLET, EFFERVESCENT ORAL at 09:01

## 2024-01-03 RX ADMIN — TRAZODONE HYDROCHLORIDE 100 MG: 50 TABLET ORAL at 09:01

## 2024-01-03 RX ADMIN — PREDNISONE 5 MG: 1 TABLET ORAL at 09:01

## 2024-01-03 RX ADMIN — FAMOTIDINE 40 MG: 20 TABLET ORAL at 09:01

## 2024-01-03 RX ADMIN — CLINDAMYCIN HYDROCHLORIDE 300 MG: 150 CAPSULE ORAL at 05:01

## 2024-01-03 RX ADMIN — FOLIC ACID 1 MG: 1 TABLET ORAL at 09:01

## 2024-01-03 RX ADMIN — DILTIAZEM HYDROCHLORIDE 30 MG: 30 TABLET, FILM COATED ORAL at 09:01

## 2024-01-03 RX ADMIN — AMIODARONE HYDROCHLORIDE 100 MG: 100 TABLET ORAL at 09:01

## 2024-01-03 RX ADMIN — ATORVASTATIN CALCIUM 10 MG: 10 TABLET, FILM COATED ORAL at 09:01

## 2024-01-03 NOTE — SUBJECTIVE & OBJECTIVE
Interval History:     Review of Systems   Constitutional:  Negative for activity change, appetite change, fatigue and fever.   HENT:  Negative for congestion, ear discharge, mouth sores, nosebleeds, rhinorrhea, sinus pressure, sinus pain and tinnitus.    Eyes: Negative.  Negative for pain, redness and itching.   Respiratory:  Negative for apnea, cough, choking, chest tightness, shortness of breath, wheezing and stridor.    Cardiovascular:  Positive for leg swelling. Negative for chest pain and palpitations.   Gastrointestinal:  Negative for abdominal distention, abdominal pain, anal bleeding, blood in stool, constipation and diarrhea.   Endocrine: Negative.    Genitourinary:  Negative for difficulty urinating, flank pain, frequency and urgency.   Musculoskeletal:  Positive for arthralgias and gait problem. Negative for back pain and myalgias.   Skin:  Negative for color change and pallor.   Allergic/Immunologic: Negative.    Neurological:  Negative for dizziness, facial asymmetry, weakness, light-headedness and headaches.   Hematological:  Negative for adenopathy. Does not bruise/bleed easily.   Psychiatric/Behavioral:  Positive for decreased concentration.      Objective:     Vital Signs (Most Recent):  Temp: 98 °F (36.7 °C) (01/02/24 1946)  Pulse: 75 (01/02/24 1946)  Resp: (!) 22 (01/02/24 1946)  BP: (!) 145/72 (01/02/24 1946)  SpO2: 97 % (01/02/24 1920) Vital Signs (24h Range):  Temp:  [98 °F (36.7 °C)-98.9 °F (37.2 °C)] 98 °F (36.7 °C)  Pulse:  [65-78] 75  Resp:  [13-28] 22  SpO2:  [94 %-100 %] 97 %  BP: (126-161)/(67-84) 145/72     Weight: 77.1 kg (170 lb)  Body mass index is 20.16 kg/m².    Intake/Output Summary (Last 24 hours) at 1/2/2024 2223  Last data filed at 1/2/2024 1948  Gross per 24 hour   Intake 360 ml   Output 2300 ml   Net -1940 ml         Physical Exam  Vitals and nursing note reviewed.   HENT:      Head: Normocephalic and atraumatic.      Mouth/Throat:      Mouth: Mucous membranes are moist.    Eyes:      Extraocular Movements: Extraocular movements intact.      Conjunctiva/sclera: Conjunctivae normal.      Pupils: Pupils are equal, round, and reactive to light.   Cardiovascular:      Rate and Rhythm: Normal rate and regular rhythm.      Heart sounds: Normal heart sounds.   Pulmonary:      Breath sounds: Rales present.   Abdominal:      General: Bowel sounds are normal.      Palpations: Abdomen is soft.   Musculoskeletal:         General: No tenderness or deformity. Normal range of motion.      Cervical back: Normal range of motion and neck supple.   Skin:     General: Skin is warm and dry.      Capillary Refill: Capillary refill takes less than 2 seconds.   Neurological:      General: No focal deficit present.      Mental Status: He is alert and oriented to person, place, and time.      Motor: Weakness present.   Psychiatric:         Mood and Affect: Mood normal.         Behavior: Behavior normal.         Thought Content: Thought content normal.             Significant Labs: All pertinent labs within the past 24 hours have been reviewed.  Recent Lab Results         01/02/24  0509        Albumin 2.8        2.8               121       ALT 9        9       Anion Gap 13        13       AST 13        13       Baso # 0.05        0.05       Basophil % 0.8        0.8       BILIRUBIN TOTAL 0.5  Comment: For infants and newborns, interpretation of results should be based  on gestational age, weight and in agreement with clinical  observations.    Premature Infant recommended reference ranges:  Up to 24 hours.............<8.0 mg/dL  Up to 48 hours............<12.0 mg/dL  3-5 days..................<15.0 mg/dL  6-29 days.................<15.0 mg/dL          0.5  Comment: For infants and newborns, interpretation of results should be based  on gestational age, weight and in agreement with clinical  observations.    Premature Infant recommended reference ranges:  Up to 24 hours.............<8.0 mg/dL  Up to 48  hours............<12.0 mg/dL  3-5 days..................<15.0 mg/dL  6-29 days.................<15.0 mg/dL         BUN 7        7       Calcium 9.2        9.2       Chloride 98        98       CO2 36        36       Creatinine 0.9        0.9       Differential Method Automated        Automated       eGFR >60.0        >60.0       Eos # 0.4        0.4       Eosinophil % 7.0        7.0       Glucose 111        111       Gran # (ANC) 4.1        4.1       Gran % 63.9        63.9       Hematocrit 30.9        30.9       Hemoglobin 9.4        9.4       Immature Grans (Abs) 0.01  Comment: Mild elevation in immature granulocytes is non specific and   can be seen in a variety of conditions including stress response,   acute inflammation, trauma and pregnancy. Correlation with other   laboratory and clinical findings is essential.          0.01  Comment: Mild elevation in immature granulocytes is non specific and   can be seen in a variety of conditions including stress response,   acute inflammation, trauma and pregnancy. Correlation with other   laboratory and clinical findings is essential.         Immature Granulocytes 0.2        0.2       Lymph # 1.2        1.2       Lymph % 18.3        18.3       Magnesium  1.8       MCH 25.4        25.4       MCHC 30.4        30.4       MCV 84        84       Mono # 0.6        0.6       Mono % 9.8        9.8       MPV 9.1        9.1       nRBC 0        0       Platelet Count 341        341       Potassium 2.9        2.9       PROTEIN TOTAL 6.5        6.5       RBC 3.70        3.70       RDW 14.6        14.6       Sodium 147        147       WBC 6.33        6.33               Significant Imaging: I have reviewed all pertinent imaging results/findings within the past 24 hours.  I have reviewed and interpreted all pertinent imaging results/findings within the past 24 hours.

## 2024-01-03 NOTE — HOSPITAL COURSE
Patient complains of some residual knee pain and difficulty with weight-bearing but patient is otherwise improving.  Mental status today was much improved than on the previous day.  Patient was alert and oriented at his baseline.  Patient is awaiting nursing home placement for rehabilitation due to his knee in surgical infections.    1/3/24  Patient is stable with no complaints, awake alert and pleasant  Labs reviewed showed potassium of 3.2 otherwise labs were unremarkable.  Replace electrolytes   Patient is ready for placement if excepted at [facility]    01/04/2024:   Patient is stable and having no other difficulties this morning.  Potassium is mildly decreased at 3.2 we will continue potassium 10 mEq daily underneath as-needed basis.  Patient is ready for discharge and he will be discharged to hospice services at home.

## 2024-01-03 NOTE — PLAN OF CARE
Problem: Gas Exchange Impaired  Goal: Optimal Gas Exchange  Outcome: Ongoing, Progressing  Intervention: Optimize Oxygenation and Ventilation  Flowsheets (Taken 1/3/2024 0803)  Airway/Ventilation Management: airway patency maintained  Head of Bed (HOB) Positioning: HOB elevated   Patient in no apparent distress. Sat's  96 % on 1 lpm. PRN treatment not needed. Breo daily . Will continue to monitor.

## 2024-01-03 NOTE — PROGRESS NOTES
[facility] Medicine  Progress Note    Patient Name: Jama James  MRN: [id number]  Patient Class: IP- Inpatient   Admission Date: 12/26/2023  Length of Stay: 4 days  Attending Physician: Moshen Mari MD  Primary Care Provider: Marisel Farrell III, MD        Subjective:     Principal Problem:COPD (chronic obstructive pulmonary disease)        HPI:  Patient is a 68 w/ Chronic Hypoxic Respiratory Failure on 3-4 LFNC 2/2 COPD, Metastatic Prostate cancer, HTN, HLD, Afib on eliquis presenting with  presenting with confusion x 1 day. Has been feeling weak and lethargic for past 3-4 days. Recently discharged from [facility] to [place]. Recent ED visit on 12/24 after fall at [place] where he hit his head. Work up negative and discharged back to [place]. Since then patient has become progressively weak. Today they called family because patient was not acting himself and he was brought to ED. No fever, chills, NVD. Patient was recently admitted to [facility] for septic arthritis right knee. He had washout via arthroscopy, and ID recommended ampicillin 2gm q4hr for 4 week course to end 12/20/23. Patient had swelling in right knee during stay at [facility]. Showed joint effusion. Plan was to set up aspiration with ortho as an outpatient.     Overview/Hospital Course:  Patient complains of some residual knee pain and difficulty with weight-bearing but patient is otherwise improving.  Mental status today was much improved than on the previous day.  Patient was alert and oriented at his baseline.  Patient is awaiting nursing home placement for rehabilitation due to his knee in surgical infections.    Interval History:     Review of Systems   Constitutional:  Negative for activity change, appetite change, fatigue and fever.   HENT:  Negative for congestion, ear discharge, mouth sores, nosebleeds, rhinorrhea, sinus pressure, sinus pain and tinnitus.    Eyes: Negative.  Negative for pain, redness and itching.   Respiratory:   Negative for apnea, cough, choking, chest tightness, shortness of breath, wheezing and stridor.    Cardiovascular:  Positive for leg swelling. Negative for chest pain and palpitations.   Gastrointestinal:  Negative for abdominal distention, abdominal pain, anal bleeding, blood in stool, constipation and diarrhea.   Endocrine: Negative.    Genitourinary:  Negative for difficulty urinating, flank pain, frequency and urgency.   Musculoskeletal:  Positive for arthralgias and gait problem. Negative for back pain and myalgias.   Skin:  Negative for color change and pallor.   Allergic/Immunologic: Negative.    Neurological:  Negative for dizziness, facial asymmetry, weakness, light-headedness and headaches.   Hematological:  Negative for adenopathy. Does not bruise/bleed easily.   Psychiatric/Behavioral:  Positive for decreased concentration.      Objective:     Vital Signs (Most Recent):  Temp: 98 °F (36.7 °C) (01/02/24 1946)  Pulse: 75 (01/02/24 1946)  Resp: (!) 22 (01/02/24 1946)  BP: (!) 145/72 (01/02/24 1946)  SpO2: 97 % (01/02/24 1920) Vital Signs (24h Range):  Temp:  [98 °F (36.7 °C)-98.9 °F (37.2 °C)] 98 °F (36.7 °C)  Pulse:  [65-78] 75  Resp:  [13-28] 22  SpO2:  [94 %-100 %] 97 %  BP: (126-161)/(67-84) 145/72     Weight: 77.1 kg (170 lb)  Body mass index is 20.16 kg/m².    Intake/Output Summary (Last 24 hours) at 1/2/2024 2223  Last data filed at 1/2/2024 1948  Gross per 24 hour   Intake 360 ml   Output 2300 ml   Net -1940 ml         Physical Exam  Vitals and nursing note reviewed.   HENT:      Head: Normocephalic and atraumatic.      Mouth/Throat:      Mouth: Mucous membranes are moist.   Eyes:      Extraocular Movements: Extraocular movements intact.      Conjunctiva/sclera: Conjunctivae normal.      Pupils: Pupils are equal, round, and reactive to light.   Cardiovascular:      Rate and Rhythm: Normal rate and regular rhythm.      Heart sounds: Normal heart sounds.   Pulmonary:      Breath sounds: Rales present.    Abdominal:      General: Bowel sounds are normal.      Palpations: Abdomen is soft.   Musculoskeletal:         General: No tenderness or deformity. Normal range of motion.      Cervical back: Normal range of motion and neck supple.   Skin:     General: Skin is warm and dry.      Capillary Refill: Capillary refill takes less than 2 seconds.   Neurological:      General: No focal deficit present.      Mental Status: He is alert and oriented to person, place, and time.      Motor: Weakness present.   Psychiatric:         Mood and Affect: Mood normal.         Behavior: Behavior normal.         Thought Content: Thought content normal.             Significant Labs: All pertinent labs within the past 24 hours have been reviewed.  Recent Lab Results         01/02/24  0509        Albumin 2.8        2.8               121       ALT 9        9       Anion Gap 13        13       AST 13        13       Baso # 0.05        0.05       Basophil % 0.8        0.8       BILIRUBIN TOTAL 0.5  Comment: For infants and newborns, interpretation of results should be based  on gestational age, weight and in agreement with clinical  observations.    Premature Infant recommended reference ranges:  Up to 24 hours.............<8.0 mg/dL  Up to 48 hours............<12.0 mg/dL  3-5 days..................<15.0 mg/dL  6-29 days.................<15.0 mg/dL          0.5  Comment: For infants and newborns, interpretation of results should be based  on gestational age, weight and in agreement with clinical  observations.    Premature Infant recommended reference ranges:  Up to 24 hours.............<8.0 mg/dL  Up to 48 hours............<12.0 mg/dL  3-5 days..................<15.0 mg/dL  6-29 days.................<15.0 mg/dL         BUN 7        7       Calcium 9.2        9.2       Chloride 98        98       CO2 36        36       Creatinine 0.9        0.9       Differential Method Automated        Automated       eGFR >60.0        >60.0       Eos  # 0.4        0.4       Eosinophil % 7.0        7.0       Glucose 111        111       Gran # (ANC) 4.1        4.1       Gran % 63.9        63.9       Hematocrit 30.9        30.9       Hemoglobin 9.4        9.4       Immature Grans (Abs) 0.01  Comment: Mild elevation in immature granulocytes is non specific and   can be seen in a variety of conditions including stress response,   acute inflammation, trauma and pregnancy. Correlation with other   laboratory and clinical findings is essential.          0.01  Comment: Mild elevation in immature granulocytes is non specific and   can be seen in a variety of conditions including stress response,   acute inflammation, trauma and pregnancy. Correlation with other   laboratory and clinical findings is essential.         Immature Granulocytes 0.2        0.2       Lymph # 1.2        1.2       Lymph % 18.3        18.3       Magnesium  1.8       MCH 25.4        25.4       MCHC 30.4        30.4       MCV 84        84       Mono # 0.6        0.6       Mono % 9.8        9.8       MPV 9.1        9.1       nRBC 0        0       Platelet Count 341        341       Potassium 2.9        2.9       PROTEIN TOTAL 6.5        6.5       RBC 3.70        3.70       RDW 14.6        14.6       Sodium 147        147       WBC 6.33        6.33               Significant Imaging: I have reviewed all pertinent imaging results/findings within the past 24 hours.  I have reviewed and interpreted all pertinent imaging results/findings within the past 24 hours.    Assessment/Plan:      * COPD (chronic obstructive pulmonary disease)  On home O2 regimen currently  Continue home inhaler  Duoneb q8h  Continue home chronic steroids    Await placement for rehab  Right knee much improved  Consult PT/OT  Repeat labs in AM    Septic arthritis of knee, right        Acute metabolic encephalopathy  Waxing and waning mental status during admission. Stopping sedating medications. Possibly hypoactive delirium  Q4h  neurochecks  Delirium precautions.  Stable      Effusion of right knee  Recent admission for septic arthritis right knee and enterococcus bacteremia. Finished abx on 12/20  Knee with significant swelling and warmth to palpation  Reviewed xray and CT scan  Consulted ortho  Restarted eliquis  Radiology consult placed for joint aspiration but unable to aspirate any fluid  Ortho did scope of knee but did not find any obvious source of infection  Blood and cultures from knee NGTD- stop abx and monitor clinically        Goals of care, counseling/discussion  Advance Care Planning  Patient confirmed to me that he is DNR/DNI      Benign localized prostatic hyperplasia with lower urinary tract symptoms (LUTS)  Continue home medications      PAF (paroxysmal atrial fibrillation)  Patient with Paroxysmal (<7 days) atrial fibrillation which is controlled currently with Calcium Channel Blocker. Patient is currently in sinus rhythm.[id number] Score: 1. Hold AC for now given possible need for surgical intervention    Prostate cancer  Noted  Patient's family will have to bring in his home medication- Will order      Hyperlipidemia  Continue statin      Tobacco abuse  Nicotine patch PRN      Weakness  Likely due to deconditioning vs acute infection  PT/OT      Anxiety  Hold valium      Depression  Continue home medicaitons    HTN (hypertension)  Chronic, controlled. Latest blood pressure and vitals reviewed-     Temp:  [98.2 °F (36.8 °C)-98.5 °F (36.9 °C)]   Pulse:  [66-72]   Resp:  [17-24]   BP: (131-175)/()   SpO2:  [99 %-100 %] .   Home meds for hypertension were reviewed and noted below.   Hypertension Medications               diltiaZEM (CARDIZEM) 30 MG tablet Take 1 tablet (30 mg total) by mouth every 12 (twelve) hours.    losartan (COZAAR) 25 MG tablet Take 1 tablet (25 mg total) by mouth 2 (two) times daily.            While in the hospital, will manage blood pressure as follows; Continue home antihypertensive  regimen    Will utilize p.r.n. blood pressure medication only if patient's blood pressure greater than 180/110 and he develops symptoms such as worsening chest pain or shortness of breath.      VTE Risk Mitigation (From admission, onward)           Ordered     apixaban tablet 5 mg  2 times daily         12/28/23 0711     Reason for No Pharmacological VTE Prophylaxis  Once        Question:  Reasons:  Answer:  Already adequately anticoagulated on oral Anticoagulants    12/26/23 1718     IP VTE HIGH RISK PATIENT  Once         12/26/23 1718     Place sequential compression device  Until discontinued         12/26/23 1718                    Discharge Planning   CLEMENT[place]: 1/5/2024     Code Status: DNR   Is the patient medically ready for discharge?:     Reason for patient still in hospital (select all that apply): Pending disposition  Discharge Plan A: Hospice/home   Discharge Delays: None known at this time              Mohsen Mari MD  Department of [facility] Medicine   [facility]

## 2024-01-03 NOTE — ASSESSMENT & PLAN NOTE
On home O2 regimen currently  Continue home inhaler  Duoneb q8h  Continue home chronic steroids    Await placement for rehab  Right knee much improved  Consult PT/OT  Repeat labs in AM

## 2024-01-04 VITALS
DIASTOLIC BLOOD PRESSURE: 71 MMHG | HEART RATE: 70 BPM | RESPIRATION RATE: 22 BRPM | WEIGHT: 178.38 LBS | TEMPERATURE: 98 F | BODY MASS INDEX: 21.06 KG/M2 | HEIGHT: 77 IN | OXYGEN SATURATION: 93 % | SYSTOLIC BLOOD PRESSURE: 133 MMHG

## 2024-01-04 LAB — BACTERIA SPEC ANAEROBE CULT: NORMAL

## 2024-01-04 PROCEDURE — 27000221 HC OXYGEN, UP TO 24 HOURS

## 2024-01-04 PROCEDURE — 94640 AIRWAY INHALATION TREATMENT: CPT

## 2024-01-04 PROCEDURE — 94761 N-INVAS EAR/PLS OXIMETRY MLT: CPT

## 2024-01-04 PROCEDURE — 63600175 PHARM REV CODE 636 W HCPCS: Performed by: STUDENT IN AN ORGANIZED HEALTH CARE EDUCATION/TRAINING PROGRAM

## 2024-01-04 PROCEDURE — 25000003 PHARM REV CODE 250: Performed by: INTERNAL MEDICINE

## 2024-01-04 PROCEDURE — 99900035 HC TECH TIME PER 15 MIN (STAT)

## 2024-01-04 PROCEDURE — 25000003 PHARM REV CODE 250: Performed by: HOSPITALIST

## 2024-01-04 PROCEDURE — 25000003 PHARM REV CODE 250: Performed by: STUDENT IN AN ORGANIZED HEALTH CARE EDUCATION/TRAINING PROGRAM

## 2024-01-04 RX ORDER — ATORVASTATIN CALCIUM 10 MG/1
10 TABLET, FILM COATED ORAL NIGHTLY
Qty: 90 TABLET | Refills: 3 | Status: SHIPPED | OUTPATIENT
Start: 2024-01-04 | End: 2025-01-03

## 2024-01-04 RX ORDER — DILTIAZEM HYDROCHLORIDE 30 MG/1
30 TABLET, FILM COATED ORAL EVERY 12 HOURS
Qty: 60 TABLET | Refills: 11 | Status: SHIPPED | OUTPATIENT
Start: 2024-01-04 | End: 2025-01-03

## 2024-01-04 RX ORDER — POTASSIUM CHLORIDE 750 MG/1
10 CAPSULE, EXTENDED RELEASE ORAL DAILY
Qty: 1 CAPSULE | Refills: 0 | Status: SHIPPED | OUTPATIENT
Start: 2024-01-04

## 2024-01-04 RX ORDER — BUDESONIDE AND FORMOTEROL FUMARATE DIHYDRATE 160; 4.5 UG/1; UG/1
2 AEROSOL RESPIRATORY (INHALATION) EVERY 12 HOURS
Qty: 10.2 G | Refills: 11 | Status: SHIPPED | OUTPATIENT
Start: 2024-01-04 | End: 2025-01-03

## 2024-01-04 RX ORDER — BUDESONIDE AND FORMOTEROL FUMARATE DIHYDRATE 80; 4.5 UG/1; UG/1
2 AEROSOL RESPIRATORY (INHALATION) 2 TIMES DAILY
Qty: 1 G | Refills: 11 | Status: SHIPPED | OUTPATIENT
Start: 2024-01-04 | End: 2025-01-03

## 2024-01-04 RX ORDER — POTASSIUM CHLORIDE 750 MG/1
10 CAPSULE, EXTENDED RELEASE ORAL ONCE
Qty: 1 CAPSULE | Refills: 0 | Status: SHIPPED | OUTPATIENT
Start: 2024-01-04 | End: 2024-01-04

## 2024-01-04 RX ORDER — CLINDAMYCIN HYDROCHLORIDE 300 MG/1
300 CAPSULE ORAL EVERY 6 HOURS
Qty: 40 CAPSULE | Refills: 0 | Status: SHIPPED | OUTPATIENT
Start: 2024-01-04 | End: 2024-01-04

## 2024-01-04 RX ORDER — CLINDAMYCIN HYDROCHLORIDE 300 MG/1
300 CAPSULE ORAL EVERY 6 HOURS
Qty: 40 CAPSULE | Refills: 0 | Status: SHIPPED | OUTPATIENT
Start: 2024-01-04

## 2024-01-04 RX ADMIN — FOLIC ACID 1 MG: 1 TABLET ORAL at 09:01

## 2024-01-04 RX ADMIN — PREDNISONE 5 MG: 1 TABLET ORAL at 09:01

## 2024-01-04 RX ADMIN — POTASSIUM BICARBONATE 20 MEQ: 782 TABLET, EFFERVESCENT ORAL at 09:01

## 2024-01-04 RX ADMIN — CLINDAMYCIN HYDROCHLORIDE 300 MG: 150 CAPSULE ORAL at 06:01

## 2024-01-04 RX ADMIN — LOSARTAN POTASSIUM 25 MG: 25 TABLET, FILM COATED ORAL at 09:01

## 2024-01-04 RX ADMIN — DILTIAZEM HYDROCHLORIDE 30 MG: 30 TABLET, FILM COATED ORAL at 09:01

## 2024-01-04 RX ADMIN — CLINDAMYCIN HYDROCHLORIDE 300 MG: 150 CAPSULE ORAL at 12:01

## 2024-01-04 RX ADMIN — TAMSULOSIN HYDROCHLORIDE 0.4 MG: 0.4 CAPSULE ORAL at 09:01

## 2024-01-04 RX ADMIN — FLUTICASONE FUROATE AND VILANTEROL TRIFENATATE 1 PUFF: 100; 25 POWDER RESPIRATORY (INHALATION) at 08:01

## 2024-01-04 RX ADMIN — APIXABAN 5 MG: 2.5 TABLET, FILM COATED ORAL at 09:01

## 2024-01-04 RX ADMIN — AMIODARONE HYDROCHLORIDE 100 MG: 100 TABLET ORAL at 09:01

## 2024-01-04 RX ADMIN — FAMOTIDINE 40 MG: 20 TABLET ORAL at 09:01

## 2024-01-04 RX ADMIN — FINASTERIDE 5 MG: 5 TABLET, FILM COATED ORAL at 09:01

## 2024-01-04 RX ADMIN — PAROXETINE HYDROCHLORIDE 40 MG: 10 TABLET, FILM COATED ORAL at 09:01

## 2024-01-04 NOTE — ASSESSMENT & PLAN NOTE
On home O2 regimen currently  Continue home inhaler  Duoneb q8h  Continue home chronic steroids    Await placement for rehab  Right knee much improved  Consult PT/OT  Repeat labs in AM    1/3/24:  Patient has decided to go home on hospice expected1/4  Medically stable  Mild hypokalemia noted.  Will replace before Discharge

## 2024-01-04 NOTE — PLAN OF CARE
Problem: Gas Exchange Impaired  Goal: Optimal Gas Exchange  Outcome: Ongoing, Progressing  Intervention: Optimize Oxygenation and Ventilation  Flowsheets (Taken 1/4/2024 1230)  Airway/Ventilation Management: airway patency maintained  Head of Bed (HOB) Positioning: HOB elevated   Patient in no apparent distress. Sat's 93  % on 1 lpm. PRN treatments not needed. Breo daily . Will continue to monitor.

## 2024-01-04 NOTE — PT/OT/SLP PROGRESS
Speech Language Pathology      Jama James  MRN: [id number]    Patient discharging home with hospice today. ST will discharge at this time.

## 2024-01-04 NOTE — SUBJECTIVE & OBJECTIVE
Interval History:     Review of Systems   Constitutional:  Negative for activity change, appetite change, fatigue and fever.   HENT:  Negative for congestion, ear discharge, mouth sores, nosebleeds, rhinorrhea, sinus pressure, sinus pain and tinnitus.    Eyes: Negative.  Negative for pain, redness and itching.   Respiratory:  Negative for apnea, cough, choking, chest tightness, shortness of breath, wheezing and stridor.    Cardiovascular:  Negative for chest pain, palpitations and leg swelling.   Gastrointestinal:  Negative for abdominal distention, abdominal pain, anal bleeding, blood in stool, constipation and diarrhea.   Endocrine: Negative.    Genitourinary:  Negative for difficulty urinating, flank pain, frequency and urgency.   Musculoskeletal:  Negative for arthralgias, back pain, gait problem and myalgias.   Skin:  Negative for color change and pallor.   Allergic/Immunologic: Negative.    Neurological:  Negative for dizziness, facial asymmetry, weakness, light-headedness and headaches.   Hematological:  Negative for adenopathy. Does not bruise/bleed easily.     Objective:     Vital Signs (Most Recent):  Temp: 97 °F (36.1 °C) (01/03/24 2000)  Pulse: 65 (01/03/24 2000)  Resp: (!) 24 (01/03/24 2000)  BP: 115/60 (01/03/24 2000)  SpO2: 95 % (01/03/24 2000) Vital Signs (24h Range):  Temp:  [97 °F (36.1 °C)-99 °F (37.2 °C)] 97 °F (36.1 °C)  Pulse:  [65-73] 65  Resp:  [20-27] 24  SpO2:  [95 %-96 %] 95 %  BP: (113-137)/(60-81) 115/60     Weight: 77.1 kg (170 lb)  Body mass index is 20.16 kg/m².    Intake/Output Summary (Last 24 hours) at 1/3/2024 2331  Last data filed at 1/3/2024 1724  Gross per 24 hour   Intake 720 ml   Output 1500 ml   Net -780 ml         Physical Exam  Musculoskeletal:         General: Swelling and tenderness present.      Cervical back: Normal range of motion.      Right lower leg: Edema present.      Left lower leg: Edema present.   Neurological:      General: No focal deficit present.    Psychiatric:         Mood and Affect: Mood normal.           Significant Labs: All pertinent labs within the past 24 hours have been reviewed.  Recent Lab Results         01/03/24  0639        Albumin 2.8              ALT 7       Anion Gap 11        11       AST 12       Baso # 0.04        0.04       Basophil % 0.8        0.8       BILIRUBIN TOTAL 0.4  Comment: For infants and newborns, interpretation of results should be based  on gestational age, weight and in agreement with clinical  observations.    Premature Infant recommended reference ranges:  Up to 24 hours.............<8.0 mg/dL  Up to 48 hours............<12.0 mg/dL  3-5 days..................<15.0 mg/dL  6-29 days.................<15.0 mg/dL         BUN 8        8       Calcium 9.3        9.3       Chloride 100        100       CO2 34        34       Creatinine 0.9        0.9       Differential Method Automated        Automated       eGFR >60.0        >60.0       Eos # 0.3        0.3       Eosinophil % 5.4        5.4       Glucose 116        116       Gran # (ANC) 3.2        3.2       Gran % 63.8        63.8       Hematocrit 29.1        29.1       Hemoglobin 8.8        8.8       Immature Grans (Abs) 0.01  Comment: Mild elevation in immature granulocytes is non specific and   can be seen in a variety of conditions including stress response,   acute inflammation, trauma and pregnancy. Correlation with other   laboratory and clinical findings is essential.          0.01  Comment: Mild elevation in immature granulocytes is non specific and   can be seen in a variety of conditions including stress response,   acute inflammation, trauma and pregnancy. Correlation with other   laboratory and clinical findings is essential.         Immature Granulocytes 0.2        0.2       Lymph # 1.0        1.0       Lymph % 19.4        19.4       MCH 25.5        25.5       MCHC 30.2        30.2       MCV 84        84       Mono # 0.5        0.5       Mono % 10.4         10.4       MPV 10.0        10.0       nRBC 0        0       Platelet Count 361        361       Potassium 3.2        3.2       PROTEIN TOTAL 6.3       RBC 3.45        3.45       RDW 14.8        14.8       Sodium 145        145       WBC 5.01        5.01               Significant Imaging: I have reviewed all pertinent imaging results/findings within the past 24 hours.  I have reviewed and interpreted all pertinent imaging results/findings within the past 24 hours.

## 2024-01-04 NOTE — NURSING
New orders received for discharge, patient is agreeable with discharge. PIV removed, catheter tip intact. Dressing applied. Discharge teaching done per phone with family member, verbalized understanding. Presciptions e-scripted to pharmacy of choice. Medications reviewed, appointments given. Will d/c home with all belongings by [facility]

## 2024-01-04 NOTE — PROGRESS NOTES
[facility] Medicine  Progress Note    Patient Name: Jama James  MRN: [id number]  Patient Class: IP- Inpatient   Admission Date: 12/26/2023  Length of Stay: 6 days  Attending Physician: Mohsen Mari MD  Primary Care Provider: Marisel Farrell III, MD        Subjective:     Principal Problem:COPD (chronic obstructive pulmonary disease)        HPI:  Patient is a 68 w/ Chronic Hypoxic Respiratory Failure on 3-4 LFNC 2/2 COPD, Metastatic Prostate cancer, HTN, HLD, Afib on eliquis presenting with  presenting with confusion x 1 day. Has been feeling weak and lethargic for past 3-4 days. Recently discharged from [facility] to [place]. Recent ED visit on 12/24 after fall at [place] where he hit his head. Work up negative and discharged back to [place]. Since then patient has become progressively weak. Today they called family because patient was not acting himself and he was brought to ED. No fever, chills, NVD. Patient was recently admitted to [facility] for septic arthritis right knee. He had washout via arthroscopy, and ID recommended ampicillin 2gm q4hr for 4 week course to end 12/20/23. Patient had swelling in right knee during stay at [facility]. Showed joint effusion. Plan was to set up aspiration with ortho as an outpatient.     Overview/Hospital Course:  Patient complains of some residual knee pain and difficulty with weight-bearing but patient is otherwise improving.  Mental status today was much improved than on the previous day.  Patient was alert and oriented at his baseline.  Patient is awaiting nursing home placement for rehabilitation due to his knee in surgical infections.    1/3/24  Patient is stable with no complaints, awake alert and pleasant  Labs reviewed showed potassium of 3.2 otherwise labs were unremarkable.  Replace electrolytes   Patient is ready for placement if excepted at [facility]    Interval History:     Review of Systems   Constitutional:  Negative for activity change,  appetite change, fatigue and fever.   HENT:  Negative for congestion, ear discharge, mouth sores, nosebleeds, rhinorrhea, sinus pressure, sinus pain and tinnitus.    Eyes: Negative.  Negative for pain, redness and itching.   Respiratory:  Negative for apnea, cough, choking, chest tightness, shortness of breath, wheezing and stridor.    Cardiovascular:  Negative for chest pain, palpitations and leg swelling.   Gastrointestinal:  Negative for abdominal distention, abdominal pain, anal bleeding, blood in stool, constipation and diarrhea.   Endocrine: Negative.    Genitourinary:  Negative for difficulty urinating, flank pain, frequency and urgency.   Musculoskeletal:  Negative for arthralgias, back pain, gait problem and myalgias.   Skin:  Negative for color change and pallor.   Allergic/Immunologic: Negative.    Neurological:  Negative for dizziness, facial asymmetry, weakness, light-headedness and headaches.   Hematological:  Negative for adenopathy. Does not bruise/bleed easily.     Objective:     Vital Signs (Most Recent):  Temp: 97 °F (36.1 °C) (01/03/24 2000)  Pulse: 65 (01/03/24 2000)  Resp: (!) 24 (01/03/24 2000)  BP: 115/60 (01/03/24 2000)  SpO2: 95 % (01/03/24 2000) Vital Signs (24h Range):  Temp:  [97 °F (36.1 °C)-99 °F (37.2 °C)] 97 °F (36.1 °C)  Pulse:  [65-73] 65  Resp:  [20-27] 24  SpO2:  [95 %-96 %] 95 %  BP: (113-137)/(60-81) 115/60     Weight: 77.1 kg (170 lb)  Body mass index is 20.16 kg/m².    Intake/Output Summary (Last 24 hours) at 1/3/2024 2331  Last data filed at 1/3/2024 1724  Gross per 24 hour   Intake 720 ml   Output 1500 ml   Net -780 ml         Physical Exam  Musculoskeletal:         General: Swelling and tenderness present.      Cervical back: Normal range of motion.      Right lower leg: Edema present.      Left lower leg: Edema present.   Neurological:      General: No focal deficit present.   Psychiatric:         Mood and Affect: Mood normal.           Significant Labs: All pertinent  labs within the past 24 hours have been reviewed.  Recent Lab Results         01/03/24  0639        Albumin 2.8              ALT 7       Anion Gap 11        11       AST 12       Baso # 0.04        0.04       Basophil % 0.8        0.8       BILIRUBIN TOTAL 0.4  Comment: For infants and newborns, interpretation of results should be based  on gestational age, weight and in agreement with clinical  observations.    Premature Infant recommended reference ranges:  Up to 24 hours.............<8.0 mg/dL  Up to 48 hours............<12.0 mg/dL  3-5 days..................<15.0 mg/dL  6-29 days.................<15.0 mg/dL         BUN 8        8       Calcium 9.3        9.3       Chloride 100        100       CO2 34        34       Creatinine 0.9        0.9       Differential Method Automated        Automated       eGFR >60.0        >60.0       Eos # 0.3        0.3       Eosinophil % 5.4        5.4       Glucose 116        116       Gran # (ANC) 3.2        3.2       Gran % 63.8        63.8       Hematocrit 29.1        29.1       Hemoglobin 8.8        8.8       Immature Grans (Abs) 0.01  Comment: Mild elevation in immature granulocytes is non specific and   can be seen in a variety of conditions including stress response,   acute inflammation, trauma and pregnancy. Correlation with other   laboratory and clinical findings is essential.          0.01  Comment: Mild elevation in immature granulocytes is non specific and   can be seen in a variety of conditions including stress response,   acute inflammation, trauma and pregnancy. Correlation with other   laboratory and clinical findings is essential.         Immature Granulocytes 0.2        0.2       Lymph # 1.0        1.0       Lymph % 19.4        19.4       MCH 25.5        25.5       MCHC 30.2        30.2       MCV 84        84       Mono # 0.5        0.5       Mono % 10.4        10.4       MPV 10.0        10.0       nRBC 0        0       Platelet Count 361        361        Potassium 3.2        3.2       PROTEIN TOTAL 6.3       RBC 3.45        3.45       RDW 14.8        14.8       Sodium 145        145       WBC 5.01        5.01               Significant Imaging: I have reviewed all pertinent imaging results/findings within the past 24 hours.  I have reviewed and interpreted all pertinent imaging results/findings within the past 24 hours.    Assessment/Plan:      * COPD (chronic obstructive pulmonary disease)  On home O2 regimen currently  Continue home inhaler  Duoneb q8h  Continue home chronic steroids    Await placement for rehab  Right knee much improved  Consult PT/OT  Repeat labs in AM    1/3/24:  Patient has decided to go home on hospice expected1/4  Medically stable  Mild hypokalemia noted.  Will replace before Discharge    Septic arthritis of knee, right        Acute metabolic encephalopathy  Waxing and waning mental status during admission. Stopping sedating medications. Possibly hypoactive delirium  Q4h neurochecks  Delirium precautions.  Stable      Effusion of right knee  Recent admission for septic arthritis right knee and enterococcus bacteremia. Finished abx on 12/20  Knee with significant swelling and warmth to palpation  Reviewed xray and CT scan  Consulted ortho  Restarted eliquis  Radiology consult placed for joint aspiration but unable to aspirate any fluid  Ortho did scope of knee but did not find any obvious source of infection  Blood and cultures from knee NGTD- stop abx and monitor clinically        Goals of care, counseling/discussion  Advance Care Planning  Patient confirmed to me that he is DNR/DNI      Benign localized prostatic hyperplasia with lower urinary tract symptoms (LUTS)  Continue home medications      PAF (paroxysmal atrial fibrillation)  Patient with Paroxysmal (<7 days) atrial fibrillation which is controlled currently with Calcium Channel Blocker. Patient is currently in sinus rhythm.[id number] Score: 1. Hold AC for now given possible need  for surgical intervention    Prostate cancer  Noted  Patient's family will have to bring in his home medication- Will order      Hyperlipidemia  Continue statin      Tobacco abuse  Nicotine patch PRN      Weakness  Likely due to deconditioning vs acute infection  PT/OT      Anxiety  Hold valium      Depression  Continue home medicaitons    HTN (hypertension)  Chronic, controlled. Latest blood pressure and vitals reviewed-     Temp:  [98.2 °F (36.8 °C)-98.5 °F (36.9 °C)]   Pulse:  [66-72]   Resp:  [17-24]   BP: (131-175)/()   SpO2:  [99 %-100 %] .   Home meds for hypertension were reviewed and noted below.   Hypertension Medications               diltiaZEM (CARDIZEM) 30 MG tablet Take 1 tablet (30 mg total) by mouth every 12 (twelve) hours.    losartan (COZAAR) 25 MG tablet Take 1 tablet (25 mg total) by mouth 2 (two) times daily.            While in the hospital, will manage blood pressure as follows; Continue home antihypertensive regimen    Will utilize p.r.n. blood pressure medication only if patient's blood pressure greater than 180/110 and he develops symptoms such as worsening chest pain or shortness of breath.      VTE Risk Mitigation (From admission, onward)           Ordered     apixaban tablet 5 mg  2 times daily         12/28/23 0711     Reason for No Pharmacological VTE Prophylaxis  Once        Question:  Reasons:  Answer:  Already adequately anticoagulated on oral Anticoagulants    12/26/23 1718     IP VTE HIGH RISK PATIENT  Once         12/26/23 1718     Place sequential compression device  Until discontinued         12/26/23 1718                    Discharge Planning   CLEMENTINA: 1/5/2024     Code Status: DNR   Is the patient medically ready for discharge?:     Reason for patient still in hospital (select all that apply): Treatment  Discharge Plan A: Hospice/home   Discharge Delays: None known at this time              Mohsen Mari MD  Department of Hospital Medicine   [facility]

## 2024-01-04 NOTE — DISCHARGE SUMMARY
[facility] Medicine  Discharge Summary      Patient Name: Jama James  MRN: [id number]  [facility] [id number]  Patient Class: IP- Inpatient  Admission Date: 12/26/2023  Hospital Length of Stay: 6 days  Discharge Date and Time:  01/04/2024 12:22 PM  Attending Physician: Mohsen Mari MD   Discharging Provider: Mohsen Mari MD  Primary Care Provider: Marisel Farrell III, MD    Primary Care Team: Networked reference to record PCT     HPI:   Patient is a 68 w/ Chronic Hypoxic Respiratory Failure on 3-4 LFNC 2/2 COPD, Metastatic Prostate cancer, HTN, HLD, Afib on eliquis presenting with  presenting with confusion x 1 day. Has been feeling weak and lethargic for past 3-4 days. Recently discharged from [facility] to [place]. Recent ED visit on 12/24 after fall at [place] where he hit his head. Work up negative and discharged back to [place]. Since then patient has become progressively weak. Today they called family because patient was not acting himself and he was brought to ED. No fever, chills, NVD. Patient was recently admitted to [facility] for septic arthritis right knee. He had washout via arthroscopy, and ID recommended ampicillin 2gm q4hr for 4 week course to end 12/20/23. Patient had swelling in right knee during stay at [facility]. Showed joint effusion. Plan was to set up aspiration with ortho as an outpatient.     Procedure(s) (LRB):  ARTHROSCOPY, KNEE, INCISION AND DRAINAGE (Right)      Hospital Course:   Patient complains of some residual knee pain and difficulty with weight-bearing but patient is otherwise improving.  Mental status today was much improved than on the previous day.  Patient was alert and oriented at his baseline.  Patient is awaiting nursing home placement for rehabilitation due to his knee in surgical infections.    1/3/24  Patient is stable with no complaints, awake alert and pleasant  Labs reviewed showed potassium of 3.2 otherwise labs were unremarkable.  Replace electrolytes    Patient is ready for placement if excepted at [facility]    01/04/2024:   Patient is stable and having no other difficulties this morning.  Potassium is mildly decreased at 3.2 we will continue potassium 10 mEq daily underneath as-needed basis.  Patient is ready for discharge and he will be discharged to hospice services at home.     Goals of Care Treatment Preferences:  Code Status: DNR       LaPOST: Yes  What is most important right now is to focus on spending time at home, avoiding the hospital, remaining as independent as possible, symptom/pain control.  Accordingly, we have decided that the best plan to meet the patient's goals includes continuing with treatment.      Consults:   Consults (From admission, onward)          Status Ordering Provider     IP consult to case management  Once        Provider:  (Not yet assigned)    JOSE ALFREDO Horne            No new Assessment & Plan notes have been filed under this hospital service since the last note was generated.  Service: Hospital Medicine    Final Active Diagnoses:    Diagnosis Date Noted POA    PRINCIPAL PROBLEM:  Septic arthritis of knee, right [M00.9] 11/21/2023 Yes    COPD (chronic obstructive pulmonary disease) [J44.9] 03/21/2023 Yes    Effusion of right knee [M25.461] 12/26/2023 Yes    Acute metabolic encephalopathy [G93.41] 12/29/2023 Yes    Goals of care, counseling/discussion [Z71.89] 11/29/2023 Not Applicable    Benign localized prostatic hyperplasia with lower urinary tract symptoms (LUTS) [N40.1] 11/20/2023 Yes    PAF (paroxysmal atrial fibrillation) [I48.0] 03/26/2023 Yes    Prostate cancer [C61] 05/27/2020 Yes    Hyperlipidemia [E78.5] 08/04/2015 Yes    Tobacco abuse [Z72.0] 06/11/2014 Yes    Weakness [R53.1] 03/06/2014 Yes    Anxiety [F41.9]  Yes    Depression [F32.A]  Yes    HTN (hypertension) [I10]  Yes      Problems Resolved During this Admission:       Discharged Condition: good    Disposition:  [facility]    Follow Up: as needed    Patient Instructions:   No discharge procedures on file.    Significant Diagnostic Studies: Labs: All labs within the past 24 hours have been reviewed  Cardiac Graphics: ECG: none    Pending Diagnostic Studies:       None           Medications:  Reconciled Home Medications:      Medication List        START taking these medications      budesonide-formoterol 80-4.5 mcg 80-4.5 mcg/actuation Hfaa  Commonly known as: SYMBICORT  Inhale 2 puffs into the lungs 2 (two) times a day. Controller     clindamycin 300 MG capsule  Commonly known as: CLEOCIN  Take 1 capsule (300 mg total) by mouth every 6 (six) hours.     potassium chloride 10 MEQ Cpsr  Commonly known as: MICRO-K  Take 1 capsule (10 mEq total) by mouth once daily.            CHANGE how you take these medications      * atorvastatin 10 MG tablet  Commonly known as: LIPITOR  TAKE 1 TABLET AT BEDTIME FOR CHOLESTEROL (TAKE WITH CO-ENZYME Q-10)  What changed: Another medication with the same name was added. Make sure you understand how and when to take each.     * atorvastatin 10 MG tablet  Commonly known as: LIPITOR  Take 1 tablet (10 mg total) by mouth every evening.  What changed: You were already taking a medication with the same name, and this prescription was added. Make sure you understand how and when to take each.     * diltiaZEM 30 MG tablet  Commonly known as: CARDIZEM  Take 1 tablet (30 mg total) by mouth every 12 (twelve) hours.  What changed: Another medication with the same name was added. Make sure you understand how and when to take each.     * diltiaZEM 30 MG tablet  Commonly known as: CARDIZEM  Take 1 tablet (30 mg total) by mouth every 12 (twelve) hours.  What changed: You were already taking a medication with the same name, and this prescription was added. Make sure you understand how and when to take each.           * This list has 4 medication(s) that are the same as other medications prescribed for you. Read  the directions carefully, and ask your doctor or other care provider to review them with you.                CONTINUE taking these medications      abiraterone 250 mg Tab  Commonly known as: ZYTIGA  Take 4 tablets (1,000 mg total) by mouth once daily.     albuterol-ipratropium 2.5 mg-0.5 mg/3 mL nebulizer solution  Commonly known as: DUO-NEB  PLACE ONE (1) VIAL IN NEBULIZER AND INHALE EVERY 6 HOURS AS DIRECTED AS NEEDED FOR WHEEZING     amiodarone 100 MG Tab  Commonly known as: PACERONE  Take 1 tablet (100 mg total) by mouth once daily.     apixaban 5 mg Tab  Commonly known as: ELIQUIS  Take 1 tablet (5 mg total) by mouth 2 (two) times daily.     aspirin 81 MG EC tablet  Commonly known as: ECOTRIN  Take 1 tablet (81 mg total) by mouth once daily.     bicalutamide 50 MG Tab  Commonly known as: CASODEX  Take 1 tablet (50 mg total) by mouth once daily.     cyanocobalamin (vitamin B-12) 2,000 mcg Tab  Take 2,000 mcg by mouth once daily.     diazePAM 5 MG tablet  Commonly known as: VALIUM  Take 1 tablet (5 mg total) by mouth every 8 (eight) hours.     diclofenac sodium 1 % Gel  Commonly known as: VOLTAREN  Apply 2 g topically once daily.     famotidine 40 MG tablet  Commonly known as: PEPCID  Take 1 tablet (40 mg total) by mouth once daily.     ferrous sulfate 325 mg (65 mg iron) Tab tablet  Commonly known as: FEOSOL  Take 1 tablet (325 mg total) by mouth every Mon, Wed, Fri.     finasteride 5 mg tablet  Commonly known as: PROSCAR  Take 1 tablet (5 mg total) by mouth once daily.     fluticasone furoate-vilanteroL 100-25 mcg/dose diskus inhaler  Commonly known as: BREO  Inhale 1 puff into the lungs once daily. Controller     folic acid 1 MG tablet  Commonly known as: FOLVITE  Take 1 tablet (1 mg total) by mouth once daily.     losartan 25 MG tablet  Commonly known as: COZAAR  Take 1 tablet (25 mg total) by mouth 2 (two) times daily.     oxyCODONE 5 MG immediate release tablet  Commonly known as: ROXICODONE  Take 1 tablet  (5 mg total) by mouth every 6 (six) hours as needed for Pain.     paroxetine 40 MG tablet  Commonly known as: PAXIL  Take 1 tablet (40 mg total) by mouth once daily.     predniSONE 10 MG tablet  Commonly known as: DELTASONE  Take 0.5 tablets (5 mg total) by mouth once daily.     tamsulosin 0.4 mg Cap  Commonly known as: FLOMAX  Take 1 capsule (0.4 mg total) by mouth once daily.     traZODone 100 MG tablet  Commonly known as: DESYREL  Take 1 tablet (100 mg total) by mouth every evening.     VANCOMYCIN 1.25 G/250 ML NS (READY TO MIX)  Inject 250 mLs (1,250 mg total) into the vein every 12 (twelve) hours.              Indwelling Lines/Drains at time of discharge:   Lines/Drains/Airways       None                   Time spent on the discharge of patient: 35 minutes         Mohsen Mari MD  Department of Hospital Medicine  [facility] Intensive Care

## 2024-01-04 NOTE — PLAN OF CARE
[facility] - Intensive Care  Discharge Final Note    Primary Care Provider: Marisel Farrell III, MD    Expected Discharge Date: 1/4/2024    Patient notified that transportation was set up with [facility] for pickup around 3:00pm. Ila with [facility] aware of pickup time. Called his sister in law Jodie Gipson who is at their home & will be there when he returns home. States all medical equipment has been set up. Denies any other needs at this time.       Final Discharge Note (most recent)       Final Note - 01/04/24 1417          Final Note    Assessment Type Final Discharge Note     Anticipated Discharge Disposition Hospice/Home     What phone number can be called within the next 1-3 days to see how you are doing after discharge? [phone]        Post-Acute Status    Post-Acute Authorization Hospice     Hospice Status Set-up Complete/Auth obtained     Discharge Delays Ambulance Transport/Facility Transport                     Important Message from Medicare  Important Message from Medicare regarding Discharge Appeal Rights: Given to patient/caregiver, Explained to patient/caregiver, Signed/date by patient/caregiver     Date IMM was signed: 01/02/24  Time IMM was signed: 1120    Contact Info       [facility]   Specialty: Hospice    [address] [place] [address]   Phone: [phone]       Next Steps: Follow up    Instructions: will provide hospice services

## 2024-01-04 NOTE — PLAN OF CARE
[facility] - Intensive Care  Discharge Reassessment    Primary Care Provider: Marisel Farrell III, MD    Expected Discharge Date: 1/5/2024    [facility] rep Ila spoke with patient, his spouse & his sister in law regarding hospice at home.  They are in agreement to go home tomorrow with hospice. They have to clean out the living room of the apartment in order to get the hospital bed & other supplies in the home. Plan to have the equipment delivered some time this afternoon once they are able to get everything moved. Plan will be to return home via [facility] with [facility]. Will continue to follow.       Reassessment (most recent)       Discharge Reassessment - 01/03/24 1200          Discharge Reassessment    Assessment Type Discharge Planning Reassessment     Did the patient's condition or plan change since previous assessment? No     Discharge Plan discussed with: Patient;Spouse/sig other;Caregiver     Name(s) and Number(s) Jodie Gipson sister in law & Margaret James spouse     Communicated CLEMENTINA with patient/caregiver Yes     Discharge Plan A Hospice/home     Discharge Plan B Hospice/home     DME Needed Upon Discharge  hospital bed;oxygen     Transition of Care Barriers None     Why the patient remains in the hospital Requires continued medical care        Post-Acute Status    Post-Acute Authorization Hospice     Hospice Status Pending equipment/medication delivery     Discharge Delays None known at this time

## 2024-01-09 LAB
ACID FAST MOD KINY STN SPEC: NORMAL
MYCOBACTERIUM SPEC QL CULT: NORMAL

## 2024-02-10 RX ORDER — DILTIAZEM HYDROCHLORIDE 30 MG/1
TABLET, FILM COATED ORAL
Qty: 60 TABLET | Refills: 11 | OUTPATIENT
Start: 2024-02-10

## 2024-03-11 PROBLEM — J96.21 ACUTE ON CHRONIC RESPIRATORY FAILURE WITH HYPOXIA AND HYPERCAPNIA: Status: RESOLVED | Noted: 2023-11-25 | Resolved: 2024-03-11

## 2024-03-11 PROBLEM — J96.22 ACUTE ON CHRONIC RESPIRATORY FAILURE WITH HYPOXIA AND HYPERCAPNIA: Status: RESOLVED | Noted: 2023-11-25 | Resolved: 2024-03-11

## 2024-07-11 NOTE — PLAN OF CARE
Problem: Gas Exchange Impaired  Goal: Optimal Gas Exchange  Outcome: Ongoing, Progressing  Problem: Asthma Comorbidity  Goal: Maintenance of Asthma Control  Outcome: Ongoing, Progressing     Problem: COPD (Chronic Obstructive Pulmonary Disease) Comorbidity  Goal: Maintenance of COPD Symptom Control  Outcome: Ongoing, Progressing  Patient in no apparent distress. Sat's 94-99  % on 2 lpm. Shortness of breath present with and without exertion. Aerosol treatments given Q 6. Breo daily . Will continue to monitor.     Perilesional Excision Additional Text (Leave Blank If You Do Not Want): The margin was drawn around the clinically apparent lesion. Incisions were then made along these lines to the appropriate tissue plane and the lesion was extirpated.

## (undated) DEVICE — GLOVE SURG ULTRA TOUCH 7.5

## (undated) DEVICE — SPONGE GAUZE 4X4 12 PLY STRL

## (undated) DEVICE — DRESSING XEROFORM FOIL 4X4

## (undated) DEVICE — UNDERGLOVE BIOGEL MICRO BLUE SZ 8.5

## (undated) DEVICE — BLADE 90-S SERFAS 3.5 279-351-100

## (undated) DEVICE — SPONGE GAUZE 10S 4X4  442214

## (undated) DEVICE — SEE MEDLINE ITEM 157185

## (undated) DEVICE — COVER TABLE BACK 44IN X 90IN

## (undated) DEVICE — TRAY SKIN SCRUB WET PREMIUM

## (undated) DEVICE — SUTURE ETHILON 3-0 PS-1 18 1663H

## (undated) DEVICE — TUBING SUC UNIV W/CONN 12FT

## (undated) DEVICE — GLOVE BIOGEL PI ORTHO PRO 7.5

## (undated) DEVICE — TUBING CYSTO DOUBLE 654301

## (undated) DEVICE — COVER PROXIMA MAYO STAND

## (undated) DEVICE — ALCOHOL 70% ANTISEPTIC ISO 4OZ

## (undated) DEVICE — PAD ABD 5X9   7196D

## (undated) DEVICE — SPONGE GAUZE 16PLY 4X4

## (undated) DEVICE — KIT COLLECTION E SWAB REGULAR

## (undated) DEVICE — SOL IRRIGATION WATER 3000ML

## (undated) DEVICE — GAUZE SPONGE 4X4 12PLY

## (undated) DEVICE — GOWN POLY REINF BRTH SLV 3XL

## (undated) DEVICE — GLOVE SENSICARE PI GRN 7.5

## (undated) DEVICE — PACK ARTHROSCOPY SMHS009-07

## (undated) DEVICE — SOLUTION IRRI NS BOTTLE 1000ML R5200-01

## (undated) DEVICE — TAPE SURGICAL MICROFOAM 4IN

## (undated) DEVICE — BLADE AGRESSIVE PLUS 5.5 375-564-000

## (undated) DEVICE — TUBING CROSSFLOW INFLOW CASS

## (undated) DEVICE — TOURNIQUET SB QC DP 34X4IN

## (undated) DEVICE — PAD BOVIE ADULT

## (undated) DEVICE — BAG LINGEMAN DRAIN UROLOGY

## (undated) DEVICE — DRAPE U STD FILM LG 60X84IN

## (undated) DEVICE — SEE MEDLINE ITEM 157116

## (undated) DEVICE — Device

## (undated) DEVICE — GLOVE SENSICARE PI SURG 6

## (undated) DEVICE — TOURNIQUET 1 PORT YELLOW24X4IN

## (undated) DEVICE — SOCKINETTE IMPERVIOUS 12X48IN

## (undated) DEVICE — GUN BIOPSY 18GA MONOPLY

## (undated) DEVICE — BANDAGE ESMARK 6X12

## (undated) DEVICE — TOWEL OR DISP STRL BLUE 4/PK

## (undated) DEVICE — UNDERGLOVES BIOGEL PI SIZE 8

## (undated) DEVICE — DRAPE U SPLIT SHEET 54X76IN

## (undated) DEVICE — SOL 9P NACL IRR PIC IL

## (undated) DEVICE — GLOVE SENSICARE PI SURG 7

## (undated) DEVICE — BANDAGE MATRIX HK LOOP 6IN 5YD

## (undated) DEVICE — PAD CAST SPECIALIST STRL 4

## (undated) DEVICE — GLOVE SURGEONS ULTRA TOUCH 6.5

## (undated) DEVICE — BLADE SURG #15 CARBON STEEL

## (undated) DEVICE — GOWN POLY REINF BRTH SLV XL

## (undated) DEVICE — GAUZE SPONGE BULKEE 6X6.75IN

## (undated) DEVICE — SOLUTION NACL 0.9% 3000ML

## (undated) DEVICE — SUT 4-0 ETHILON 18 PS-2

## (undated) DEVICE — CUFF TOURNIQUET 34DUAL PRT 5921-034-235

## (undated) DEVICE — CUTTER MENISCUS AGGRESSIVE 4.0

## (undated) DEVICE — SCRUB HIBICLENS 4% CHG 4OZ

## (undated) DEVICE — GLOVE SENSICARE PI SURG 8

## (undated) DEVICE — BNDG COFLEX FOAM LF2 ST 6X5YD

## (undated) DEVICE — SET CYSTO IRR DRP CHMBR 84IN

## (undated) DEVICE — APPLICATOR CHLORAPREP ORN 26ML

## (undated) DEVICE — GOWN SURGICAL XX LARGE X LONG

## (undated) DEVICE — GLOVE BIOGEL PI GOLD SZ  8.5